# Patient Record
Sex: MALE | Race: WHITE | NOT HISPANIC OR LATINO | Employment: UNEMPLOYED | URBAN - METROPOLITAN AREA
[De-identification: names, ages, dates, MRNs, and addresses within clinical notes are randomized per-mention and may not be internally consistent; named-entity substitution may affect disease eponyms.]

---

## 2019-08-19 ENCOUNTER — TRANSFERRED RECORDS (OUTPATIENT)
Dept: HEALTH INFORMATION MANAGEMENT | Facility: CLINIC | Age: 29
End: 2019-08-19

## 2019-12-20 ENCOUNTER — TRANSFERRED RECORDS (OUTPATIENT)
Dept: HEALTH INFORMATION MANAGEMENT | Facility: CLINIC | Age: 29
End: 2019-12-20

## 2020-12-15 ENCOUNTER — HOSPITAL ENCOUNTER (EMERGENCY)
Facility: CLINIC | Age: 30
Discharge: HOME | End: 2020-12-15
Attending: EMERGENCY MEDICINE | Admitting: EMERGENCY MEDICINE
Payer: COMMERCIAL

## 2020-12-15 VITALS
SYSTOLIC BLOOD PRESSURE: 138 MMHG | RESPIRATION RATE: 18 BRPM | TEMPERATURE: 96.4 F | WEIGHT: 220 LBS | OXYGEN SATURATION: 100 % | DIASTOLIC BLOOD PRESSURE: 86 MMHG | HEART RATE: 99 BPM

## 2020-12-15 DIAGNOSIS — Z76.0 ENCOUNTER FOR MEDICATION REFILL: ICD-10-CM

## 2020-12-15 LAB
AMPHETAMINES UR QL SCN: NEGATIVE
BARBITURATES UR QL: NEGATIVE
BENZODIAZ UR QL: NEGATIVE
CANNABINOIDS UR QL SCN: NEGATIVE
COCAINE UR QL: NEGATIVE
ETHANOL UR QL SCN: NEGATIVE
OPIATES UR QL SCN: NEGATIVE

## 2020-12-15 PROCEDURE — 80307 DRUG TEST PRSMV CHEM ANLYZR: CPT | Performed by: EMERGENCY MEDICINE

## 2020-12-15 PROCEDURE — 80320 DRUG SCREEN QUANTALCOHOLS: CPT | Performed by: EMERGENCY MEDICINE

## 2020-12-15 PROCEDURE — 99283 EMERGENCY DEPT VISIT LOW MDM: CPT | Performed by: EMERGENCY MEDICINE

## 2020-12-15 PROCEDURE — 99282 EMERGENCY DEPT VISIT SF MDM: CPT | Performed by: EMERGENCY MEDICINE

## 2020-12-15 RX ORDER — BUPROPION HYDROCHLORIDE 150 MG/1
150 TABLET ORAL EVERY MORNING
COMMUNITY
End: 2021-08-12

## 2020-12-15 NOTE — ED AVS SNAPSHOT
[facility] Emergency Department  [address]  [place] [address]  Phone: [phone]  Fax: [phone]                                    Xavier Landon   MRN: [id number]    Department: [facility] Emergency Department   Date of Visit: 12/15/2020           After Visit Summary Signature Page    I have received my discharge instructions, and my questions have been answered. I have discussed any challenges I see with this plan with the nurse or doctor.    ..........................................................................................................................................  Patient/Patient Representative Signature      ..........................................................................................................................................  Patient Representative Print Name and Relationship to Patient    ..................................................               ................................................  Date                                   Time    ..........................................................................................................................................  Reviewed by Signature/Title    ...................................................              ..............................................  Date                                               Time          22EPIC Rev 08/18

## 2020-12-15 NOTE — ED PROVIDER NOTES
ED Provider Note  [facility]      History     Chief Complaint   Patient presents with     Medication Refill     Says he is here for a refill for his adderall. Says his psychiatrist cancelled his appointment.  His therapist told him to come here.     Suicidal     did not check in for this, but answered yes to a couple of the screening questions.  Suicidal thoughts, no plan.     HPI  Xavier Landon is a 30 year old male with a past medical history significant for depression, ADHD and tobacco use who presents to the ED for a medication refill.  He says he has an intake appointment with [facility] this week for an IOP intake appointment.  His psychiatrist fired him for erratic behavior so he needs his adderall refilled.  His therapist told him to go to the ER for a refill.  He does not want a therapist evaluation because he already has the IOP intake appointment.  Denies safety concerns.      Past Medical History  Past Medical History:   Diagnosis Date     ADHD (attention deficit hyperactivity disorder)      Past Surgical History:   Procedure Laterality Date     GENITOURINARY SURGERY            Amphetamine-Dextroamphetamine (ADDERALL XR PO)       buPROPion (WELLBUTRIN XL) 150 MG 24 hr tablet       Varenicline Tartrate (CHANTIX PO)       FLUoxetine HCl (PROZAC PO)      No Known Allergies  Past medical history, past surgical history, medications, and allergies were reviewed with the patient. Additional pertinent items: Depression, ADHD and tobacco use    Family History  No family history on file.  Family history was reviewed with the patient. Additional pertinent items: None    Social History  Social History     Tobacco Use     Smoking status: Former Smoker   Substance Use Topics     Alcohol use: Not on file     Drug use: Not on file      Social history was reviewed with the patient. Additional pertinent items: None      Review of Systems  A complete review of systems was performed  "with pertinent positives and negatives noted in the HPI, and all other systems negative.    Physical Exam   BP: 138/86  Pulse: 99  Temp: 96.4  F (35.8  C)  Resp: 18  Weight: 99.8 kg (220 lb)  SpO2: 100 %  Physical Exam  When the patient found out we do not refill adderall he became angry and said he wanted to leave and did not want any further assistance.  He said \"well this is a f#*& ing waste\".    ED Course      Procedures         No results found for any visits on 12/15/20.  Medications - No data to display     Assessments & Plan (with Medical Decision Making)   The patient presented for adderall refill.  When he found out that we do not refill this in the ED he became angry and wanted to leave immediately without DEC assessment or physician exam.  He did say his prior psychiatrist fired him due to his erratic behavior.      I have reviewed the nursing notes. I have reviewed the findings, diagnosis, plan and need for follow up with the patient.    New Prescriptions    No medications on file       Final diagnoses:   Encounter for medication refill       --    [facility] EMERGENCY DEPARTMENT  12/15/2020     Mellisa Batista MD  12/15/20 [id number]    "

## 2020-12-22 ENCOUNTER — TELEPHONE (OUTPATIENT)
Dept: BEHAVIORAL HEALTH | Facility: CLINIC | Age: 30
End: 2020-12-22

## 2020-12-22 ENCOUNTER — HOSPITAL ENCOUNTER (OUTPATIENT)
Dept: BEHAVIORAL HEALTH | Facility: CLINIC | Age: 30
Discharge: HOME | End: 2020-12-22
Attending: PSYCHIATRY & NEUROLOGY | Admitting: PSYCHIATRY & NEUROLOGY
Payer: COMMERCIAL

## 2020-12-22 ENCOUNTER — BEH TREATMENT PLAN (OUTPATIENT)
Dept: BEHAVIORAL HEALTH | Facility: CLINIC | Age: 30
End: 2020-12-22
Attending: PSYCHIATRY & NEUROLOGY

## 2020-12-22 DIAGNOSIS — F33.2 MAJOR DEPRESSIVE DISORDER, RECURRENT EPISODE, SEVERE WITH ANXIOUS DISTRESS (H): ICD-10-CM

## 2020-12-22 DIAGNOSIS — F33.2 MDD (MAJOR DEPRESSIVE DISORDER), RECURRENT SEVERE, WITHOUT PSYCHOSIS (H): ICD-10-CM

## 2020-12-22 PROCEDURE — 90791 PSYCH DIAGNOSTIC EVALUATION: CPT | Mod: TEL | Performed by: PSYCHOLOGIST

## 2020-12-22 RX ORDER — DEXTROAMPHETAMINE SACCHARATE, AMPHETAMINE ASPARTATE, DEXTROAMPHETAMINE SULFATE AND AMPHETAMINE SULFATE 5; 5; 5; 5 MG/1; MG/1; MG/1; MG/1
20 TABLET ORAL 2 TIMES DAILY
COMMUNITY
End: 2021-09-14

## 2020-12-22 ASSESSMENT — COLUMBIA-SUICIDE SEVERITY RATING SCALE - C-SSRS
4. HAVE YOU HAD THESE THOUGHTS AND HAD SOME INTENTION OF ACTING ON THEM?: NO
ATTEMPT LIFETIME: NO
5. HAVE YOU STARTED TO WORK OUT OR WORKED OUT THE DETAILS OF HOW TO KILL YOURSELF? DO YOU INTEND TO CARRY OUT THIS PLAN?: NO
6. HAVE YOU EVER DONE ANYTHING, STARTED TO DO ANYTHING, OR PREPARED TO DO ANYTHING TO END YOUR LIFE?: NO
TOTAL  NUMBER OF INTERRUPTED ATTEMPTS LIFETIME: NO
6. HAVE YOU EVER DONE ANYTHING, STARTED TO DO ANYTHING, OR PREPARED TO DO ANYTHING TO END YOUR LIFE?: NO
TOTAL  NUMBER OF INTERRUPTED ATTEMPTS PAST 3 MONTHS: NO
2. HAVE YOU ACTUALLY HAD ANY THOUGHTS OF KILLING YOURSELF LIFETIME?: YES
2. HAVE YOU ACTUALLY HAD ANY THOUGHTS OF KILLING YOURSELF?: YES
TOTAL  NUMBER OF ABORTED OR SELF INTERRUPTED ATTEMPTS PAST LIFETIME: NO
5. HAVE YOU STARTED TO WORK OUT OR WORKED OUT THE DETAILS OF HOW TO KILL YOURSELF? DO YOU INTEND TO CARRY OUT THIS PLAN?: NO
4. HAVE YOU HAD THESE THOUGHTS AND HAD SOME INTENTION OF ACTING ON THEM?: NO
3. HAVE YOU BEEN THINKING ABOUT HOW YOU MIGHT KILL YOURSELF?: NO
REASONS FOR IDEATION LIFETIME: MOSTLY TO END OR STOP THE PAIN (YOU COULDN'T GO ON LIVING WITH THE PAIN OR HOW YOU WERE FEELING)
ATTEMPT PAST THREE MONTHS: NO
TOTAL  NUMBER OF ABORTED OR SELF INTERRUPTED ATTEMPTS PAST 3 MONTHS: NO
REASONS FOR IDEATION PAST MONTH: MOSTLY TO END OR STOP THE PAIN (YOU COULDN'T GO ON LIVING WITH THE PAIN OR HOW YOU WERE FEELING)
1. IN THE PAST MONTH, HAVE YOU WISHED YOU WERE DEAD OR WISHED YOU COULD GO TO SLEEP AND NOT WAKE UP?: YES
1. IN THE PAST MONTH, HAVE YOU WISHED YOU WERE DEAD OR WISHED YOU COULD GO TO SLEEP AND NOT WAKE UP?: YES

## 2020-12-22 ASSESSMENT — ANXIETY QUESTIONNAIRES
7. FEELING AFRAID AS IF SOMETHING AWFUL MIGHT HAPPEN: MORE THAN HALF THE DAYS
IF YOU CHECKED OFF ANY PROBLEMS ON THIS QUESTIONNAIRE, HOW DIFFICULT HAVE THESE PROBLEMS MADE IT FOR YOU TO DO YOUR WORK, TAKE CARE OF THINGS AT HOME, OR GET ALONG WITH OTHER PEOPLE: VERY DIFFICULT
GAD7 TOTAL SCORE: 13
3. WORRYING TOO MUCH ABOUT DIFFERENT THINGS: NEARLY EVERY DAY
2. NOT BEING ABLE TO STOP OR CONTROL WORRYING: MORE THAN HALF THE DAYS
6. BECOMING EASILY ANNOYED OR IRRITABLE: MORE THAN HALF THE DAYS
5. BEING SO RESTLESS THAT IT IS HARD TO SIT STILL: NOT AT ALL
1. FEELING NERVOUS, ANXIOUS, OR ON EDGE: MORE THAN HALF THE DAYS

## 2020-12-22 ASSESSMENT — PATIENT HEALTH QUESTIONNAIRE - PHQ9
SUM OF ALL RESPONSES TO PHQ QUESTIONS 1-9: 24
5. POOR APPETITE OR OVEREATING: MORE THAN HALF THE DAYS

## 2020-12-22 NOTE — TELEPHONE ENCOUNTER
"RN Review of Medical History / Physical Health Screen  Outpatient Mental Health Programs - [facility] Adult Partial Hospitalization Program    PATIENT'S NAME: Xavier Landon  MRN:   [id number]  :   1990  ACCT. NUMBER: [id number]  CURRENT AGE:  30 year old    DATE OF DIAGNOSTIC ASSESSMENT: 20  DATE OF ADMISSION: 20     Please see Diagnostic Assessment for additional Medical History.     General Health:   Have you had any exposure to any communicable disease in the past 2-3 weeks? no     Are you aware of safe sex practices? yes       Nutrition:    Are you on a special diet? If yes, please explain:  no   Do you have any concerns regarding your nutritional status? If yes, please explain:  no   Have you had any appetite changes in the last 3 months?  No     Have you had any weight loss or weight gain in the last 3 months?  No     Do you have a history of an eating disorder? no   Do you have a history of being in an eating disorder program? no     No; Unable to measure  Because of temporary in-person programmatic suspension due to COVID-19 pandemic, all pt weights and heights will be collected through patient self-report an recorded in physical health screening progress note upon admission to the program.                            Height/Weight Review:  Patient reported height:     6'3\"   Patient reports weight:  Date last checked:  215lb   Any referrals/needs identified?     no       BMI Review:  Was the patient informed of BMI? no      Findings  No Intervention         Fall Risk:   Have you had any falls in the past 3 months? no     Do you currently use any assistive devices for mobility?   no      Additional Comments/Assessment: denies outstanding medical issues at this time    Per completion of the Medical History / Physical Health Screen, is there a recommendation to see / follow up with a primary care physician/clinic or dentist?    No.      Verna Gamble " RN  12/22/2020

## 2020-12-22 NOTE — PROGRESS NOTES
"Outpatient Mental Health Services - Adult     MY COPING PLAN FOR SAFETY     PATIENT'S NAME:    Xavier Landon  MRN:                           [id number]     SAFETY PLAN:     Step 1: Warning signs / cues (Thoughts, images, mood, situation, behavior) that a crisis may be developing:     ? Thoughts: \"I can't do this anymore\"  ? Images: none identified  ? Thinking Processes: ruminations (can't stop thinking about my problems): feeling overwhelmed and like a failure  ? Mood: worsening depression, hopelessness and helplessness  ? Behaviors: using drugs, using alcohol, not taking care of myself and not taking care of my responsibilities  ? Situations: right feels like nothing is going right   ?    Step 2: Coping strategies - Things I can do to take my mind off of my problems without contacting another person (relaxation technique, physical activity):     ? Distress Tolerance Strategies:  \"nothing sticks\" said he does not have the discipline to stick to anything  ? Physical Activities: exercise has helped in the past  ? Focus on helpful thoughts:  remind myself of what is important to me: family and friends     Step 3: People and social settings that provide distraction:                 Name: friends                No social outings due to COVID     Step 4: Remind myself of people and things that are important to me and worth living for:  Parents, friends     Step 5: When I am in crisis, I can ask these people to help me use my safety plan:                 Name: lauri Roberts     Step 6: Making the environment safe:      ? remove alcohol and be around others     Step 7: Professionals or agencies I can contact during a crisis:     ? Suicide Prevention Lifeline: 9-305-408-TALK (1609)  ? Crisis Text Line Service (available 24 hours a day, 7 days a week): Text [place] to 368025  ? Call  **CRISIS (842891) from a cell phone to talk to a team of professionals who can help you.          Crisis Services By [facility]: Phone Number: "   [place]     [phone]   [place]    [phone]   [place]    [phone]   [place]    [phone]   [place]    [phone]   [place] [phone]   [place]     [phone]      ? Call 911 or go to my nearest emergency department.             I helped develop this safety plan and agree to use it when needed.  I have been given a copy of this plan.          Today s date:  12/22/2020  Adapted from Safety Plan Template 2008 Val Yu and Timo Coffey is reprinted with the express permission of the authors.  No portion of the Safety Plan Template may be reproduced without the express, written permission.  You can contact the authors at [email or web address] or [email or web address]

## 2020-12-22 NOTE — PROGRESS NOTES
Admission Date: 12/22/2020    Identify any current concerns with potential impact to admission:     medication/medical concerns: no     immediate safety concerns: no    Does patient have safety plan? yes  Note: Please copy safety plan copied into [facility] Encounter     Other (insurance/childcare/transportation/housing/planned absences/etc): work at 3 pm schedule. Has work off at this time, and may need assist with FMLA/leave paperwork.    Patient's insurance is: [employer]    Does patient need appointment with provider? Pt will see Dr Hardy/Juan Jose    Review patient's program schedule and inform them of any variation due to late days or holidays.                                                                     Completed by: GORDO Gamble RNC          HPI      ROS      Physical Exam

## 2020-12-22 NOTE — PROGRESS NOTES
"[facility]  Provider Name:  Dr. Cristina Larsen [facility] [phone]        PATIENT'S NAME: Xavier Landon  PREFERRED NAME: Huey  PRONOUNS: he/him/his     MRN: [id number]  : 1990   ACCT. NUMBER:  [id number]  DATE OF SERVICE: 20  START TIME: 815  END TIME: 955  PREFERRED PHONE: [phone]  May we leave a program related message: Yes  SERVICE MODALITY:  Phone Visit:      Provider verified identity through the following two step process.  Patient provided:  Patient     The patient has been notified of the following:      \"We have found that certain health care needs can be provided without the need for a face to face visit.  This service lets us provide the care you need with a phone conversation.       I will have full access to your [facility] medical record during this entire phone call.   I will be taking notes for your medical record.      Since this is like an office visit, we will bill your insurance company for this service.       There are potential benefits and risks of telephone visits (e.g. limits to patient confidentiality) that differ from in-person visits.?  Confidentiality still applies for telephone services, and nobody will record the visit.  It is important to be in a quiet, private space that is free of distractions (including cell phone or other devices) during the visit.??      If during the course of the call I believe a telephone visit is not appropriate, you will not be charged for this service\"     Consent has been obtained for this service by care team member: Yes     UNIVERSAL ADULT Mental Health DIAGNOSTIC ASSESSMENT      Identifying Information:   Patient is a 30 year old, .  The pronoun use throughout this assessment reflects the patient's chosen pronoun.  Patient was referred for an assessment by current behavioral health provider Sommer Ghosh.  Patient attended the session alone.     Chief Complaint: " "  The reason for seeking services at this time is: \" having a rough time, feel at the end of my rope\"   The problem(s) began May.  He said he started seeing his therapist and she did recommend PHP at that time but it did not feel as bad.  He said his mood has worsened and he is not seeing the point of the suffering anymore.  He said his feelings of failure and grief do not seem to leave. He said he feels like a failure.  He said he has felt miserable for his entire adult life.  He said he needs to do something to change his life.He said it is \"hard to overstate how disappointed I am in my life\".  Patient has attempted to resolve these concerns in the past through individual therapy, psychiatry.    Social/Family History:  Patient reported they grew up in [place]. Moved to [place] at age 15 to [place] for father's work.  They were raised by biological parents.  Parents stayed .  Pt is an only child   Patient reported that their childhood was good, did not want for anything. [place], affluent family.  Patient described their current relationships with family of origin as david to have parents who care about him and support him.      The patient describes their cultural background as .  Cultural influences and impact on patient's life structure, values, norms, and healthcare: Racial or Ethnic Self-Identification white.  Contextual influences on patient's health include: Family Factors good family.    These factors will be addressed in the Preliminary Treatment plan.  Patient identified their preferred language to be English. Patient reported they does not need the assistance of an  or other support involved in therapy.     Patient reported had no significant delays in developmental tasks.   Patient's highest education level was some college. Patient identified the following learning problems: attention. Said he had a psych evaluation in October.   Modifications will not be used to " "assist communication in therapy.   Patient reports they are  able to understand written materials.    Patient reported the following relationship history never .  Patient's current relationship status is single .   Patient identified their sexual orientation as heterosexual.  Patient reported having zero child(katt). Patient identified siblings and friends as part of their support system.  Patient identified the quality of these relationships as stable and meaningful.      Patient's current living/housing situation involves staying with house.  They live with a few roommates and they report that housing is stable.     Patient is currently employed full time and reports they are not able to function appropriately at work.. 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  He said he is on the verge of tears every shift at work.  He said \"the place is devouring my soul\".  He said he does not believe he is valued or validated at this job.  He said it has him drowning that he does not feel like his job has meaning.  He said he has been there 2 years and he has medical benefits.  Patient reports their finances are obtained through employment.  Patient does identify finances as a current stressor.  He said he has been floating by dipping into his 401K    Patient reported that they have  been involved with the legal system.  DUI 9 years ago.  Said he just turned 21. He said he does not drink and drive as a result of the DUI.  He reports he had a \"bad break up\" a few years ago and his exgirlfriend took out a restraining order, but rescinded it.   Patient denies being on probation / parole / under the jurisdiction of the court.    Patient's Strengths and Limitations:  Patient identified the following strengths or resources that will help them succeed in treatment: motivation. Things that may interfere with the patient's success in treatment include: none identified.     Personal and Family Medical History:   Patient does " report a family history of mental health concerns.  Patient reports family history includes Anxiety Disorder in his mother; Depression in his father..     Patient does report Mental Health Diagnosis and/or Treatment.  Patient Patient reported the following previous diagnoses which include(s): ADHD and Depression.  Patient reported symptoms began adolescence.   Patient has received mental health services in the past: therapy and psychiatry.  Psychiatric Hospitalizations: None.  Patient denies a history of civil commitment.  Currently, patient is receiving other mental health services.  These include psychotherapy with Sommer Ghosh and psychiatry with new psychiatrist.  Next appointment: TBS. He reports he had a prescriber who would no longer see him because he missed too many appointments.  He said he was given a referral for a video appt for a prescriber in [place] but has not seen this person yet          Patient has had a physical exam to rule out medical causes for current symptoms.  Date of last physical exam was greater than a year ago and client was encouraged to schedule an exam with PCP. The patient does not have a Primary Care Provider and was encouraged to establish care with a PCP..  Patient reports the following current medical concerns: impotence.  Patient denies any issues with pain..   There are not significant appetite / nutritional concerns / weight changes.   Patient does not report a history of head injury / trauma / cognitive impairment.      Patient reports current meds as:   Outpatient Medications Marked as Taking for the 12/22/20 encounter (Hospital Encounter) with [facility]   Medication Sig     Amphetamine-Dextroamphetamine (ADDERALL XR PO) Take 60 mg by mouth daily.     amphetamine-dextroamphetamine (ADDERALL) 20 MG tablet Take 20 mg by mouth 2 times daily     Varenicline Tartrate (CHANTIX PO) Take by mouth 2 times daily     He reports he was just prescribed a new medication but  could not remember the name and he has not picked up the medication as of this appointment.        Medication Adherence:  Patient reports there have been some months he has run out of his adderall a few days prior to his prescription running out.  He said he will take an extra one if he cannot maintain his attention during work. .   Patient Allergies:  No Known Allergies    Medical History:    Past Medical History:   Diagnosis Date     ADHD (attention deficit hyperactivity disorder)          Current Mental Status Exam:   Appearance:   Unable to assess due to telephone assessment  Eye Contact:   Unable to assess due to telephone assessment  Psychomotor:   Unable to assess due to telephone assessment      Gait / station:  Unable to assess due to telephone assessment  Attitude / Demeanor: Cooperative   Speech      Rate / Production: Hyperverbal       Volume:  Normal  volume      Language:  no problems  Mood:   Anxious  Depressed   Affect:   Tearful   Thought Content: Rumination   Thought Process: Flight of Ideas       Associations: No loosening of associations  Insight:   Poor   Judgment:  Impaired   Orientation:  All  Attention/concentration: Fair    Rating Scales:    PHQ9:    PHQ-9 SCORE 12/22/2020   PHQ-9 Total Score 24   ;    GAD7:    SHAUNA-7 SCORE 12/22/2020   Total Score 13     CGI:     First:Considering your total clinical experience with this particular patient population, how severe are the patient's symptoms at this time?: 5 (12/22/2020  8:25 AM)  ;    Most recentCompared to the patient's condition at the START of treatment, this patient's condition is: 4 (12/22/2020  8:25 AM)      Substance Use:  Patient did report a family history of substance use concerns; see medical history section for details.  Patient has not received chemical dependency treatment in the past.  Patient has not ever been to detox.      Patient is not currently receiving any chemical dependency treatment. Patient reported the following  "problems as a result of their substance use: sexual issues.    Patient reports he drinks 4-5 drinks per night a few nights per week.  He said he does know that he should not be drinking. He said his roommates reported concern for him and told him he cannot drink while in their home.  He said he has not drank for the past week.  He is willing to abstain from alcohol use while in the program. He said he has been using to cope and knows it is not a solution to his problem.  Patient denies using tobacco.  Patient reports he smokes marijuana 1-2 per week.  Patient denies using caffeine.  Patient reports using/abusing the following substance(s). Patient reports he has used cocaine.  He said if it offered to him, he will take it.  He said the last time was in the summer. He said it is rare and he does not buy it.    CAGE- AID:    1. Yes  2. Yes  3. No  4. No  Substance Use: hangovers and daily use    Based on the positive CAGE score and clinical interview there are indications of substance use disorder.  Pt agrees to no use while in the program.  He reports his roommate has asked him to refrain from use while in the apartment and he agreed.  He said he is not going out due to COVID.  He believes if his meds worked he would have more symptom relief.      Significant Losses / Trauma / Abuse / Neglect Issues:   Patient did not serve in the .  There are indications or report of significant loss, trauma, abuse or neglect issues related to: are no indications and client denies any losses, trauma, abuse, or neglect concerns.  Concerns for possible neglect are not present.     Safety Assessment: no suicide attempts, no concrete plans, \"is it worth it to keep going\" has never had thoughts about how to kill himself.  He said he has thoughts a few days per week.  He said his thoughts are not concerning because it is just a manifestation of self-loathing.  He said he does not see himself trying to hurt himself.  He said he " does not consider the thoughts past having.  He said the thoughts are fleeting.    Current Safety Concerns:  [place] Suicide Severity Rating Scale (Lifetime/Recent)  [place] Suicide Severity Rating (Lifetime/Recent) 12/22/2020   1. Wish to be Dead (Lifetime) Yes   Wish to be Dead Description (Lifetime) (No Data)   Comments feeling overwhelmed wondering if life is worth it   1. Wish to be Dead (Recent) Yes   Wish to be Dead Description (Recent) (No Data)   Comments overwhelmed wondering if his life is worth it   2. Non-Specific Active Suicidal Thoughts (Lifetime) Yes   Non-Specific Active Suicidal Thought Description (Lifetime) (No Data)   Comments just does not want to feel so bad any longer   2. Non-Specific Active Suicidal Thoughts (Recent) Yes   Non-Specific Active Suicidal Thought Description (Recent) (No Data)   Comments has fleeting thoughts of being dead daily   3. Active Suicidal Ideation with any Methods (Not Plan) Without Intent to Act (Lifetime) No   3. Active Sucidal Ideation with any Methods (Not Plan) Without Intent to Act (Recent) No   4. Active Suicidal Ideation with Some Intent to Act, Without Specific Plan (Lifetime) No   4. Active Suicidal Ideation with Some Intent to Act, Without Specific Plan (Recent) No   5. Active Suicidal Ideation with Specific Plan and Intent (Lifetime) No   5. Active Suicidal Ideation with Specific Plan and Intent (Recent) No   Most Severe Ideation Rating (Lifetime) 5   Frequency (Lifetime) 4   Duration (Lifetime) 1   Controllability (Lifetime) 2   Protective Factors  (Lifetime) 1   Reasons for Ideation (Lifetime) 4   Most Severe Ideation Rating (Past Month) 5   Frequency (Past Month) 4   Duration (Past Month) 1   Controllability (Past Month) 2   Protective Factors (Past Month) 1   Reasons for Ideation (Past Month) 4   Actual Attempt (Lifetime) No   Actual Attempt (Past 3 Months) No   Has subject engaged in non-suicidal self-injurious behavior? (Lifetime) No   Has subject  "engaged in non-suicidal self-injurious behavior? (Past 3 Months) No   Interrupted Attempts (Lifetime) No   Interrupted Attempts (Past 3 Months) No   Aborted or Self-Interrupted Attempt (Lifetime) No   Aborted or Self-Interrupted Attempt (Past 3 Months) No   Preparatory Acts or Behavior (Lifetime) No   Preparatory Acts or Behavior (Past 3 Months) No     Patient denies current homicidal ideation and behaviors.  Patient denies current self-injurious ideation and behaviors.    Patient denied risk behaviors associated with substance use.  Patient denies any high risk behaviors associated with mental health symptoms.  Patient reports the following current concerns for their personal safety: None.  Patient reports there are not  firearms in the house. .     History of Safety Concerns:  Patient denied a history of homicidal ideation.     Patient denied a history of personal safety concerns.    Patient denied a history of assaultive behaviors.    Patient denied a history of sexual assault behaviors.     Patient denied a history of risk behaviors associated with substance use.  Patient reported a history of impulsive/compulsive spending behaviors associated with mental health symptoms.  Patient reports the following protective factors: \"has to believe there is something better than this,  still has some hope\", obligations to family, friends    Risk Plan:  See Recommendations for Safety and Risk Management Plan    Review of Symptoms per patient report:  Depression: Change in sleep, Excessive or inappropriate guilt, Change in energy level, Difficulties concentrating, Psychomotor slowing or agitation, Suicidal ideation, Feelings of hopelessness, Feelings of helplessness, Low self-worth, Ruminations, Irritability, Feeling sad, down, or depressed and Frequent crying  Kathy:  No Symptoms  Psychosis: No Symptoms  Anxiety: Excessive worry, Nervousness, Physical complaints, such as headaches, stomachaches, muscle tension, Ruminations, " Poor concentration and Irritability  Panic:  No symptoms  Post Traumatic Stress Disorder:  No Symptoms   Eating Disorder: No Symptoms  ADD / ADHD:  Inattentive, Difficulties listening, Poor task completion, Poor organizational skills, Distractibility and Impulsive  Conduct Disorder: No symptoms  Autism Spectrum Disorder: No symptoms  Obsessive Compulsive Disorder: No Symptoms    Patient reports the following compulsive behaviors and treatment history: Pornography - has not had treatment..      Diagnostic Criteria:   A. Excessive anxiety and worry about a number of events or activities (such as work or school performance).   B. The person finds it difficult to control the worry.   - Restlessness or feeling keyed up or on edge.    - Difficulty concentrating or mind going blank.    - Irritability.    - Muscle tension.    - Sleep disturbance (difficulty falling or staying asleep, or restless unsatisfying sleep).   D. The focus of the anxiety and worry is not confined to features of an Axis I disorder.  E. The anxiety, worry, or physical symptoms cause clinically significant distress or impairment in social, occupational, or other important areas of functioning.   F. The disturbance is not due to the direct physiological effects of a substance (e.g., a drug of abuse, a medication) or a general medical condition (e.g., hyperthyroidism) and does not occur exclusively during a Mood Disorder, a Psychotic Disorder, or a Pervasive Developmental Disorder.  A) Recurrent episode(s) - symptoms have been present during the same 2-week period and represent a change from previous functioning 5 or more symptoms (required for diagnosis)   - Depressed mood. Note: In children and adolescents, can be irritable mood.     - Diminished interest or pleasure in all, or almost all, activities.    - Psychomotor activity agitation.    - Fatigue or loss of energy.    - Feelings of worthlessness or excessive guilt.    - Diminished ability to think  or concentrate, or indecisiveness.    - Recurrent thoughts of death (not just fear of dying), recurrent suicidal ideation without a specific plan, or a suicide attempt or a specific plan for committing suicide.   B) The symptoms cause clinically significant distress or impairment in social, occupational, or other important areas of functioning  C) The episode is not attributable to the physiological effects of a substance or to another medical condition  D) The occurence of major depressive episode is not better explained by other thought / psychotic disorders  E) There has never been a manic episode or hypomanic episode    Functional Status:  Patient reports the following functional impairments: chronic disease management, management of the household and or completion of tasks, organization and self-care.     WHODAS:   WHODAS 2.0 Total Score 2020   Total Score 26       LOCUS Worksheet     Name: Xavier Landon MRN: [id number]    : 1990      Gender:  male    PMI:  [employer]   Provider Name: [facility] Provider NPI:  [id number]    Actual level of Care Provided:  Outpatient therapy and psychiatry    Service(s) receiving or referred to:  Partial Hospitalization    Reason for Variance: Increase in symptom distress including suicidal thoughts      Rating completed by: Dr. Cristina Larsen [facility]       I. Risk of Harm:   2      Low Risk of Harm    II. Functional Status:   4      Serious Impairment    III. Co-Morbidity:   3      Significant Co-Morbidity    IV - A. Recovery Environment - Level of Stress:   3      Moderately Stress Environment    IV - B. Recovery Environment - Level of Support:   3      Limited Support in Environment    V. Treatment and Recovery History:   4      Poor Response to Treatment and Recovery Management    VI. Engagement and Recovery Project:   3      Limited Engagement and Recovery       22 Composite Score    Level of Care Recommendation:   20 to 22       Medically Monitored  "Non-Residential Services                  Outpatient Mental Health Services - Adult    MY COPING PLAN FOR SAFETY    PATIENT'S NAME: Xavier Landon  MRN:   [id number]    SAFETY PLAN:    Step 1: Warning signs / cues (Thoughts, images, mood, situation, behavior) that a crisis may be developing:      Thoughts: \"I can't do this anymore\"    Images: none identified    Thinking Processes: ruminations (can't stop thinking about my problems): feeling overwhelmed and like a failure    Mood: worsening depression, hopelessness and helplessness    Behaviors: using drugs, using alcohol, not taking care of myself and not taking care of my responsibilities    Situations: right feels like nothing is going right       Step 2: Coping strategies - Things I can do to take my mind off of my problems without contacting another person (relaxation technique, physical activity):      Distress Tolerance Strategies:  \"nothing sticks\" said he does not have the discipline to stick to anything    Physical Activities: exercise has helped in the past    Focus on helpful thoughts:  remind myself of what is important to me: family and friends    Step 3: People and social settings that provide distraction:     Name: friends    No social outings due to COVID    Step 4: Remind myself of people and things that are important to me and worth living for:  Parents, friends    Step 5: When I am in crisis, I can ask these people to help me use my safety plan:     Name: lauri Roberts    Step 6: Making the environment safe:       remove alcohol and be around others    Step 7: Professionals or agencies I can contact during a crisis:      Suicide Prevention Lifeline: 0-472-800-TALK (8965)    Crisis Text Line Service (available 24 hours a day, 7 days a week): Text [place] to 881421    Call  **CRISIS (307909) from a cell phone to talk to a team of professionals who can help you.    Crisis Services By [facility]: Phone Number:   [place]     [phone]   [place]    " [phone]   [place]    [phone]   [place]    [phone]   [place]    [phone]   [place] [phone]   [place]     [phone]       Call 911 or go to my nearest emergency department.     I helped develop this safety plan and agree to use it when needed.  I have been given a copy of this plan.        Today s date:  12/22/2020  Adapted from Safety Plan Template 2008 Val Yu and Timo Coffey is reprinted with the express permission of the authors.  No portion of the Safety Plan Template may be reproduced without the express, written permission.  You can contact the authors at [email or web address] or [email or web address]    Programmatic care:  Current LOCUS was assessed and patient needs the following level of care based on score 22  .    Clinical Summary:  1. Reason for assessment: Pt referred for increased structure and mood stabilization  .  2. Psychosocial, Cultural and Contextual Factors: Pt reports he is on leave from work.  Stressed due to symptoms  .  3. Principal DSM5 Diagnoses  (Sustained by DSM5 Criteria Listed Above):   296.33 (F33.2) Major Depressive Disorder, Recurrent Episode, Severe _ and With anxious distress  300.02 (F41.1) Generalized Anxiety Disorder.  4. Other Diagnoses that is relevant to services:   Attention-Deficit/Hyperactivity Disorder  314.01 (F90.9) Unspecified Attention -Deficit / Hyperactivity Disorder.  5. Provisional Diagnosis: R/O Substance-Related & Addictive Disorders Alcohol Use Disorder   303.90 (F10.20) Moderate , as evidenced by recent increase in alcohol use, drinking more than intended, causing problems with roomates .  Should he be unable to control use, or other symptoms emerge during the course of treatment, the disorder may become the focus of assessment at that time.  6. Prognosis: Relieve Acute Symptoms.    7. Likely consequences of symptoms if not treated: Without treatment patient more than likely will experience a continuation of  symptoms with decreased daily functioning, requiring an increased level of care.  8. Client strengths include: articulate  Recommendations:     1. Plan for Safety and Risk Management:   A safety and risk management plan has been developed including: Patient consented to co-developed safety plan.  Safety and risk management plan was completed.  Patient agreed to use safety plan should any safety concerns arise.  A copy was given to the patient..   Report to child / adult protection services was NA.     2. Patient identified no cultural or spiritual concerns for treatment services. Patient encouraged to ask for help should needs arise in the course of treatment.      3. Initial Treatment will focus on:    stabilize mood, increase skills to manage negative thinking.     4. Resources/Service Plan:    services are not indicated.   Modifications to assist communication are not indicated.   Additional disability accommodations are not indicated.      5. Collaboration:   Collaboration / coordination of treatment will be initiated with the following  support professionals: outpatient therapist.      6.  Referrals:   The following referral(s) will be initiated: Partial Hospitalization Program. Next Scheduled Appointment: 12/23/20.     A Release of Information has been obtained for the following: emergency contact.    7. VIVEK:    VIVEK:  Discussed the general effects of drugs and alcohol on health and well-being. Recommendations:  Abstain from chemical use while in the program .     8. Records:   These were not available for review at time of assessment.   Information in this assessment was obtained from the medical record and  provided by patient who is a limited historian.    Patient will have open access to their mental health medical record.      Provider Name/ Credentials:  Dr. Crsitina Larsen PSYD, LP   December 22, 2020

## 2020-12-23 ENCOUNTER — HOSPITAL ENCOUNTER (OUTPATIENT)
Dept: BEHAVIORAL HEALTH | Facility: CLINIC | Age: 30
End: 2020-12-23
Attending: PSYCHIATRY & NEUROLOGY
Payer: COMMERCIAL

## 2020-12-23 DIAGNOSIS — F39 EPISODIC MOOD DISORDER (H): Primary | ICD-10-CM

## 2020-12-23 PROBLEM — F33.2 MDD (MAJOR DEPRESSIVE DISORDER), RECURRENT SEVERE, WITHOUT PSYCHOSIS (H): Status: ACTIVE | Noted: 2020-12-23

## 2020-12-23 PROCEDURE — H0035 MH PARTIAL HOSP TX UNDER 24H: HCPCS | Mod: GT | Performed by: OCCUPATIONAL THERAPIST

## 2020-12-23 PROCEDURE — H0035 MH PARTIAL HOSP TX UNDER 24H: HCPCS | Mod: GT | Performed by: COUNSELOR

## 2020-12-23 RX ORDER — LAMOTRIGINE 25 MG/1
25 TABLET ORAL DAILY
Qty: 50 TABLET | Refills: 0 | Status: SHIPPED | OUTPATIENT
Start: 2020-12-23 | End: 2021-09-14

## 2020-12-23 ASSESSMENT — ANXIETY QUESTIONNAIRES: GAD7 TOTAL SCORE: 13

## 2020-12-23 NOTE — GROUP NOTE
Process Group Note    PATIENT'S NAME: Xavier Landon  MRN:   [id number]  :   1990  ACCT. NUMBER: [id number]  DATE OF SERVICE: 20  START TIME: 10:00 AM  END TIME: 10:50 AM  FACILITATOR: Izzy Arriaza [facility]  TOPIC:  Process Group    Diagnoses:  296.33 (F33.2) Major Depressive Disorder, Recurrent Episode, Severe _ and With anxious distress  300.02 (F41.1) Generalized Anxiety Disorder.  4. Other Diagnoses that is relevant to services:   Attention-Deficit/Hyperactivity Disorder  314.01 (F90.9) Unspecified Attention -Deficit / Hyperactivity Disorder.  5. Provisional Diagnosis: R/O Substance-Related & Addictive Disorders Alcohol Use Disorder   303.90 (F10.20) Moderate , as evidenced by recent increase in alcohol use, drinking more than intended, causing problems with roomates .  Should he be unable to control use, or other symptoms emerge during the course of treatment, the disorder may become the focus of assessment at that time.      [facility] Adult Partial Hospitalization Program  TRACK: PHP    NUMBER OF PARTICIPANTS: 7    Telemedicine Visit: The patient's condition can be safely assessed and treated via synchronous audio and visual telemedicine encounter.      Reason for Telemedicine Visit: Services only offered telehealth and due to COVID-19    Originating Site (Patient Location): Patient's home    Distant Site (Provider Location): Provider Remote Setting    Consent:  The patient/guardian has verbally consented to: the potential risks and benefits of telemedicine (video visit) versus in person care; bill my insurance or make self-payment for services provided; and responsibility for payment of non-covered services.     Mode of Communication:  Video Conference via [employer]    As the provider I attest to compliance with applicable laws and regulations related to telemedicine.            Data:    Session content: At the start of this group, patients were invited to check in by  identifying themselves, describing their current emotional status, and identifying issues to address in this group.   Area(s) of treatment focus addressed in this session included Symptom Management, Personal Safety and Community Resources/Discharge Planning.    Patient reported feeling embarrassed about late. Patient discussed working toward finishing D1G shopping. Patient identified shopping as skills they will use to address their goal(s). Patient reported getting distracted may be a barrier to working toward their goal(s) and/or addressing mental health symptoms. Patient reported no safety concerns and/or self-injurious behaviors. Patient reported no substance use. Patient reported they are taking their medications as prescribed. Patient reported feeling proud that they showed up for the treatment group. Patient discussed starting programming with the treatment group.     Therapeutic Interventions/Treatment Strategies:  Psychotherapist offered support, feedback and validation and reinforced use of skills. Treatment modalities used include Motivational Interviewing and Cognitive Behavioral Therapy. Interventions include Coping Skills: Assisted patient in identifying 1-2 healthy distraction skills to reduce overall distress, Symptoms Management: Promoted understanding of their diagnoses and how it impacts their functioning and Emotions Management:  Discussed barriers to emotional regulation.    Assessment:    Patient response:   Patient responded to session by accepting feedback, giving feedback, listening, focusing on goals, being attentive and accepting support    Possible barriers to participation / learning include: and no barriers identified    Health Issues:   None reported       Substance Use Review:   Substance Use: No active concerns identified.    Mental Status/Behavioral Observations  Appearance:   Appropriate   Eye Contact:   Good   Psychomotor Behavior: Normal   Attitude:   Cooperative    Orientation:   All  Speech   Rate / Production: Normal/ Responsive Normal    Volume:  Normal   Mood:    Anxious  Normal  Affect:    Appropriate   Thought Content:   Clear and Safety denies any current safety concerns including suicidal ideation, self-harm, and homicidal ideation  Thought Form:  Coherent  Logical     Insight:    Good     Plan:     Safety Plan: No current safety concerns identified.  Recommended that patient call 911 or go to the local ED should there be a change in any of these risk factors.     Barriers to treatment: None identified    Patient Contracts (see media tab):  None    Substance Use: Provided encouragement towards sobriety     Continue or Discharge: Patient will continue in Adult Partial Hospitalization Program (PHP)  as planned. Patient is likely to benefit from learning and using skills as they work toward the goals identified in their treatment plan.      Izzy Arriaza, [facility]  December 23, 2020

## 2020-12-23 NOTE — GROUP NOTE
Psychoeducation Group Note    PATIENT'S NAME: Xavier Landon  MRN:   [id number]  :   1990  ACCT. NUMBER: [id number]  DATE OF SERVICE: 20  START TIME: 11:00 AM  END TIME: 11:50 AM  FACILITATOR: Cinda Cantu OTR/L  TOPIC: MH PHP OT Group: Self- Regulation Skills  [facility] Adult Partial Hospitalization Program  TRACK: 1    NUMBER OF PARTICIPANTS: 7    Telemedicine Visit: The patient's condition can be safely assessed and treated via synchronous audio and visual telemedicine encounter.      Reason for Telemedicine Visit: Services only offered telehealth    Originating Site (Patient Location): Patient's home    Distant Site (Provider Location): [facility] Outpatient Setting: Partial [facility]    Consent:  The patient/guardian has verbally consented to: the potential risks and benefits of telemedicine (video visit) versus in person care; bill my insurance or make self-payment for services provided; and responsibility for payment of non-covered services.     Mode of Communication:  Video Conference via [employer]    As the provider I attest to compliance with applicable laws and regulations related to telemedicine.     Summary of Group / Topics Discussed:  Self-Regulation Skills: Coping Through the Senses Introduction Part 1: Patients were introduced and taught about neurosensory based skills and strategies related to supporting effective self regulation skills.  Patients were taught about the external sensory systems and how they can be used for coping with mental health symptoms and stressors.  Patients were provided with an opportunity to increase self-awareness of helpful sensory input and self care activities. Patients were introduced on how to create supportive environments that encourage use of these skills.    Patient Session Goals / Objectives:    Review/learn the external sensory systems and how they relate to self-regulation    Identified specific and  individualized neurosensory skills to help manage stress and symptoms     Identified skills learned and how this applies to current daily life    Established a plan for practice of these skills in their own environments      Patient Participation / Response:  Fully participated with the group by sharing personal reflections / insights and openly received / provided feedback with other participants.    Patient presentation: constricted affect; appeared restless in session-moving around, changing positions; active in sharing ideas/examples with the group, Verbalized understanding of content and Patient would benefit from additional opportunities to practice the content to be able to generalize it to their everyday life with increased intentionality, consistency, and efficacy in support of their psychiatric recovery    Treatment Plan:  Patient has an initial individualized treatment plan that was created as part of their diagnostic assessment / admission process.  A master individualized treatment plan is in the process of being developed with the patient and multi-disciplinary care team.    CLYDE Salmeron/L

## 2020-12-23 NOTE — H&P
"Outpatient Psychiatric Evaluation- Standard  Adult    Name:  Xavier Landon  : 1990    Psychiatrist or PCP: None currently, previously saw Mary Nelson MD  Current Psychotherapist: [facility] in [place]    Identifying Data:  Patient is a 30 year old, single  White American male  who presents for initial visit with me.  Patient is currently unemployed. Patient attended the phone/video session alone. Patient prefers to be called: \" Huey\"    Chief Complaint:  \"nothing is going right\"    HPI:  Xavier Landon is a 30 year old male with past history including ADHD, polysubstance use disorder and possible personality disorder presenting for PHP at his own referral because he feels as life is \"miserable\" and he is unable to function in any sphere.  Huey reports lifelong history of ADHD, but his parents were opposed to using stimulants until late in high school when he started to take Vyvanse.  He took that and later Adderall until about age 26, when he took a 3-year In using stimulants due to financial reasons.  Throughout his late adolescence and to the present day he drinks alcohol regularly, 5-6 drinks daily recently until he quit 5 days ago.  He also smokes marijuana regularly.  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  Sometime in  he decided to go back on Adderall and was getting treatment by Dr. Nelson at [facility], but she terminated services this fall after he made some phone calls to the office and Huey admits today that he was \"rude\" to the office staff.  He is guarded about what exactly happened, but alludes to a dispute over his medications, and what he was upset about, but he did find another prescriber last week who refilled his Adderall XR 30 mg twice daily and Adderall IR 20 mg daily prescriptions \"after I had to pay a $50 co-pay.\"  Review of the [place] [facility] database shows consistent prescriptions " "throughout the year with the dose increasing from 20 mg twice daily to the current 80 mg total daily dose over the course of the year with some prescriptions seemingly filled in less than 30 days from the previous one.  Review of care everywhere indicates he was in a partial hospital program and 2012 at [facility] but continued to drink during that treatment series and was discharged prematurely.  He saw a therapist twice in 2018, but was inconsistent with that care and was advised to seek more intense level of service.  Identified problems have consistently related to difficulty maintaining relationships and employment, conflict with others and his drinking although he minimizes its impact and has never sought CD treatment.    Huey reports that his issues are \"I am a full spectrum of failure\" and \"I cannot will myself to do anything.\"  He feels that he has been a failure and is incapable of normal functioning in areas of relationships or career advancement.  He says he has few friends, no hobbies, has messed up all his relationships, and he cannot go on like this, so he decided to \"take a break from drinking\" and was told about partial hospital program, so he made arrangements to enroll.  He presents as very dramatic and self-deprecatory, profane and psychomotor agitated.  \"Every day I have to eat the shit sandwich that is my life.\"  He describes a history consistent with lifelong severe ADHD, including school performance below expectations, chronic disruptive behavior in school, which he did well in with minimal studying.  When he stopped stimulants for 3 years from 2017 to 2019, he says he was \"completely unable to function.\"  When he did not have stimulants, his anxiety was worse and he experienced cardiac symptoms at times.  He reports a \"definite diagnosable addiction to pornography\", reporting exposure as early as age 8 and doing online porn on a regular usually daily basis, which he describes as having disrupted " "several relationships, and now he sees as a barrier to even developing a relationship with a woman.  \"I do not have romantic feelings for women anymore.\"  He has undergone therapy at various times through his life without much impact on his sexual behavior, but recently was advised to seek DBT therapy.  He also says he has an [facility].    He describes chronic feelings of self-loathing and low mood.  He has thoughts of suicide but says he is \"too much of a coward\" and has never made a suicide attempt.  He often sleeps poorly and is plagued by \"dark thoughts.\"  He has taken Prozac and Wellbutrin but did not find them particularly helpful for depression.    Past diagnoses include: ADHD, alcohol dependence, cannabis use disorder, personality disorder  Current medications include:   Current Outpatient Medications   Medication     Amphetamine-Dextroamphetamine (ADDERALL XR PO)     amphetamine-dextroamphetamine (ADDERALL) 20 MG tablet     buPROPion (WELLBUTRIN XL) 150 MG 24 hr tablet     FLUoxetine HCl (PROZAC PO)     Varenicline Tartrate (CHANTIX PO)     No current facility-administered medications for this encounter.         Adderall XR 60 mg QAM   Adderall 20 mg Q. 3PM  Medication side effects: Yes: occ insomnia  Current stressors include: Financial Difficulties, Symptoms and Occupational Difficulties  Coping mechanisms and supports include: Drugs/Alcohol    Psychiatric Review of Symptoms:  Depression: Interest: Decrease  Depressed Mood  Energy: Decrease  Guilt: Increase  Concentration: Decrease  Psychomotor agitation  Worthless: Increase   Irritability: Increase    PHQ-9 scores:   PHQ-9 SCORE 12/22/2020   PHQ-9 Total Score 24     Kathy:  Pressured Speech: Increase  Irritability      Medical Review of Systems:  10 systems (general, cardiovascular, respiratory, eyes, ENT, endocrine, GI, , M/S, neurological) were reviewed.. The remaining systems are all " unremarkable.    A 12-item WHODAS 2.0 assessment was not completed. See Epic for any previously completed WHODAS assessments.    Psychiatric History:   Hospitalizations: None  History of Commitment? No   Past Treatment: counseling, medication(s) from physician / PCP and psychiatry  Suicide Attempts: No   Current Suicide Risk: Suicide Assessment Completed Today.  Self-injurious Behavior: Denies  Electroconvulsive Therapy (ECT) or Transcranial Magnetic Stimulation (TMS): No   GeneSight Genetic Testing: No     Past medication trials include but are not limited to:   Stimulants, fluoxetine, bupropion    Substance Use History: See above    Based on the clinical interview, there  are indications of drug or alcohol abuse. Although the patient currently is abstaining from alcohol. Continue to monitor.   Discussed effect of substance use on overall health.     Past Medical History:  Past Medical History:   Diagnosis Date     ADHD (attention deficit hyperactivity disorder)       Surgery:   Past Surgical History:   Procedure Laterality Date     GENITOURINARY SURGERY       Food and Medicine Allergies:   No Known Allergies  Seizures or Head Injury: No  Diet: No Restrictions  Exercise: No regular exercise program  Supplements: Reviewed per Electronic Medical Record Today    Vital Signs:  None since this is a phone/video visit.     Labs:  Most recent laboratory results reviewed and the pertinent results include:   No visits with results within 1 Year(s) from this visit.   Latest known visit with results is:   No results found for any previous visit.     Most recent labs reviewed and no new labs.     Family History:   Patient reported family history includes:   Family History   Problem Relation Age of Onset     Anxiety Disorder Mother      Depression Father      Attention Deficit Disorder Maternal Aunt        Social History:   Birth place: [place]  Childhood: Yes intact home  Siblings:  zero Brother(s),  zero Sister(s)  Highest  education level was some college.   Employment Status: Employed  Current Living situation:  Feels safe at home.  Children: zero   Firearms/Weapons Access: No: Patient denies   Service: No    Legal History:  Yes: DUI; ex-girlfriend had a restraining order against him      Comprehensive Examination (limited due to virtual visit format, phone/video):  Vital Signs:  Vitals: There were no vitals taken for this visit.  General/Constitutional:  Appearance: awake, alert, adequately groomed, agitated, restless, talkative  Attitude:  cooperative   Eye Contact:  good  Musculoskeletal:  Muscle Strength and Tone: no gross abnormalities by observation  Psychomotor Behavior:  no evidence of tardive dyskinesia, dystonia, or tics  Gait and Station: normal, no gross abnormalities noted by observation  Psychiatric:  Speech:  clear, coherent, regular rate, rhythm, and volume  Associations:  no loose associations  Thought Process:  logical, linear and goal oriented  Thought Content: Reports hopelessness and passive death wish, no evidence of homicidal ideation, no evidence of psychotic thought, no auditory hallucinations present and no visual hallucinations present  Mood:  depressed  Affect:  intensity is exaggerated and intensity is dramatic  Insight:  limited  Judgment:  limited, adequate for safety  Impulse Control:  limited  Neurological:  Oriented to:  person, place, time, and situation  Attention Span and Concentration:  normal  Language: intact  Recent and Remote Memory:  Intact to interview. Not formally assessed. No amnesia.  Fund of Knowledge: appropriate    DSM5  Diagnosis:  1. Attention-Deficit/Hyperactivity Disorder  314.01 (F90.9) Unspecified Attention -Deficit / Hyperactivity Disorder  2. 300.4 (F34.1) Persistent Depressive Disorder, Moderate  3. Substance-Related & Addictive Disorders Alcohol Use Disorder   303.90 (F10.20) Moderate Active recently, quit 5 days ago  4. 304.30 (F12.20)  Cannabis Use Disorder  Moderate  , Current  5. 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  He also reports an addiction to pornography.  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  Although he reports his function is much worse when he does not take stimulants, it is hard to know how his function is better in the past year when he did have Adderall compared to previously.  He has not had much benefit from limited exposure to antidepressants but after discussing various options including nonaddicting antianxiety drugs, antipsychotics, or mood stabilizers, Huey agreed to a trial of lamotrigine to target his emotional dysregulation and low mood.  He was advised over the potential benefits and risks including Dick-Errol syndrome and provided education regarding reporting any rash to medical personnel.      Medication side effects and alternatives reviewed. Health promotion activities recommended and reviewed today. All questions addressed. Education and counseling completed regarding risks and benefits of medications and psychotherapy options. Recommend therapy for additional support.     Treatment Plan:    Continue Adderall as per her outside prescriber    Discontinue fluoxetine and Wellbutrin    Start lamotrigine 25 mg daily for 1 week, then 25 mg twice daily; Rx sent to [facility] in [place] [id number]    Continue therapy as  planned.    Continue all other medications as reviewed per electronic medical record today.     Safety plan reviewed. To the Emergency Department as needed or call after hours crisis line at [phone] or [phone]. [place] Crisis Text Line: Text [place] to 802575  or  Suicide LifeLine Chat: [email or web address] [email or web address]/chat    Follow-up next week in PHP    Follow up with outpatient provider as planned or sooner if needed for acute medical concerns.    Call the psychiatric nurse line with medication questions or concerns at [phone].    1Rebelhart may be used to communicate with your provider, but this is not intended to be used for emergencies.    Patient Education:  Given information about Lamictal as per routine including mild rash (5% incidence), and more severe Dick-Errol syndrome, instructions to contact provider for any rash, go to an ER if experiences blistering rash on mucous membranes, do not skip doses or go on and off medication, avoid new skin products or laundry soap 1/3000    Community Resources:    National Suicide Prevention Lifeline: 377.553.5732 (TTY: 554.296.2307). Call anytime for help.  (www.suicidepreventionlifeline.org)  National [place] on Mental Illness (www.laura.org): 222.147.7589 or 679-179-3682.   Mental Health Association (www.mentalhealth.org): 860.774.8878 or 985-024-5423.  [place] Crisis Text Line: Text [place] to 635810  Suicide LifeLine Chat: [email or web address] [employer].[email or web address]/chat    Administrative Billing:     Video-Visit Details    Type of service:  Video Visit    Video Start Time (time video started): 1:20 PM  Video End Time (time video stopped): 2:20 PM    Originating Location (pt. Location): Home    Distant Location (provider location): Provider remote location    Mode of Communication:  Video Conference via [email or web address]    Physician has received verbal consent for a Video Visit from the patient? Yes        Signed:   Maxime Ingram MD

## 2020-12-24 ENCOUNTER — HOSPITAL ENCOUNTER (OUTPATIENT)
Dept: BEHAVIORAL HEALTH | Facility: CLINIC | Age: 30
End: 2020-12-24
Attending: PSYCHIATRY & NEUROLOGY
Payer: COMMERCIAL

## 2020-12-24 ENCOUNTER — TELEPHONE (OUTPATIENT)
Dept: BEHAVIORAL HEALTH | Facility: CLINIC | Age: 30
End: 2020-12-24

## 2020-12-24 PROCEDURE — H0035 MH PARTIAL HOSP TX UNDER 24H: HCPCS | Mod: 95

## 2020-12-24 PROCEDURE — H0035 MH PARTIAL HOSP TX UNDER 24H: HCPCS | Mod: GT | Performed by: COUNSELOR

## 2020-12-24 PROCEDURE — G0177 OPPS/PHP; TRAIN & EDUC SERV: HCPCS | Mod: GT

## 2020-12-24 NOTE — TELEPHONE ENCOUNTER
Writer contacted patient about his absences from the program yesterday, patient reported he was meeting with the psychiatrist yesterday afternoon and reported no barriers to attending programming today.    MORGAN Zhang on 12/24/2020 at 8:28 AM

## 2020-12-24 NOTE — GROUP NOTE
Psychotherapy Group Note    PATIENT'S NAME: Xavier Landon  MRN:   [id number]  :   1990  ACCT. NUMBER: [id number]  DATE OF SERVICE: 20  START TIME: 10:00 AM  END TIME: 10:50 AM  FACILITATOR: Izzy Arriaza LPCC  TOPIC: MH EBP Group: Coping Skills  [facility] Adult Partial Hospitalization Program  TRACK: [facility]    NUMBER OF PARTICIPANTS: 8    Summary of Group / Topics Discussed:  Coping Skills: Additional Coping Skills:  Patients discussed and practiced processing strategies to address holiday/family stressors.  Reviewed the benefits of applying the aforementioned coping strategies.  Patients explored how these strategies might be applied to daily stressors or distressing situations.    Patient Session Goals / Objectives:    Understand the purpose and benefits of applying in the moment coping strategies    Address barriers to utilizing coping skills when in distress.    Telemedicine Visit: The patient's condition can be safely assessed and treated via synchronous audio and visual telemedicine encounter.      Reason for Telemedicine Visit: Services only offered telehealth and due to COVID-19    Originating Site (Patient Location): Patient's home    Distant Site (Provider Location): Provider Remote Setting    Consent:  The patient/guardian has verbally consented to: the potential risks and benefits of telemedicine (video visit) versus in person care; bill my insurance or make self-payment for services provided; and responsibility for payment of non-covered services.     Mode of Communication:  Video Conference via [employer]    As the provider I attest to compliance with applicable laws and regulations related to telemedicine.          Patient Participation / Response:  Fully participated with the group by sharing personal reflections / insights and openly received / provided feedback with other participants.    Demonstrated understanding of topics discussed through group discussion and  participation, Expressed understanding of the relevance / importance of coping skills at distressing times in life and Demonstrated knowledge of when to consider using a variety of coping skills in daily life    Treatment Plan:  Patient has an initial individualized treatment plan that was created as part of their diagnostic assessment / admission process.  A master individualized treatment plan is in the process of being developed with the patient and multi-disciplinary care team.    Izzy Arriaza, [facility]

## 2020-12-24 NOTE — GROUP NOTE
Process Group Note    PATIENT'S NAME: Xavier Landon  MRN:   [id number]  :   1990  ACCT. NUMBER: [id number]  DATE OF SERVICE: 20  START TIME:  9:00 AM  END TIME:  9:50 AM  FACILITATOR: [facility]aza [facility]  TOPIC:  Process Group    Diagnoses:  296.33 (F33.2) Major Depressive Disorder, Recurrent Episode, Severe _ and With anxious distress  300.02 (F41.1) Generalized Anxiety Disorder.  4. Other Diagnoses that is relevant to services:   Attention-Deficit/Hyperactivity Disorder  314.01 (F90.9) Unspecified Attention -Deficit / Hyperactivity Disorder.  5. Provisional Diagnosis: R/O Substance-Related & Addictive Disorders Alcohol Use Disorder   303.90 (F10.20) Moderate , as evidenced by recent increase in alcohol use, drinking more than intended, causing problems with roomates .  Should he be unable to control use, or other symptoms emerge during the course of treatment, the disorder may become the focus of assessment at that time.      [facility] Adult Partial Hospitalization Program  TRACK: PHP    NUMBER OF PARTICIPANTS: 8    Telemedicine Visit: The patient's condition can be safely assessed and treated via synchronous audio and visual telemedicine encounter.      Reason for Telemedicine Visit: Services only offered telehealth and due to COVID-19    Originating Site (Patient Location): Patient's home    Distant Site (Provider Location): Provider Remote Setting    Consent:  The patient/guardian has verbally consented to: the potential risks and benefits of telemedicine (video visit) versus in person care; bill my insurance or make self-payment for services provided; and responsibility for payment of non-covered services.     Mode of Communication:  Video Conference via [email or web address]    As the provider I attest to compliance with applicable laws and regulations related to telemedicine.            Data:    Session content: At the start of this group, patients were invited to check in by  identifying themselves, describing their current emotional status, and identifying issues to address in this group.   Area(s) of treatment focus addressed in this session included Symptom Management, Personal Safety and Community Resources/Discharge Planning.    Patient reported feeling stressed. Patient discussed working toward shoveling. Patient identified physical activity as skills they will use to address their goal(s). Patient reported fear and avoidance may be a barrier to working toward their goal(s) and/or addressing mental health symptoms. Patient reported yes safety concerns and/or self-injurious behaviors. Patient reported yes substance use. Patient reported they are taking their medications as prescribed. Patient reported feeling proud that they are in this program. Patient discussed needing to complete paperwork for FMLA and feeling trapped and not able to complete it with the treatment group.     Therapeutic Interventions/Treatment Strategies:  Psychotherapist offered support, feedback and validation and reinforced use of skills. Treatment modalities used include Motivational Interviewing and Cognitive Behavioral Therapy. Interventions include Coping Skills: Assisted patient in identifying 1-2 healthy distraction skills to reduce overall distress, Symptoms Management: Promoted understanding of their diagnoses and how it impacts their functioning and Emotions Management:  Discussed barriers to emotional regulation.    Assessment:    Patient response:   Patient responded to session by accepting feedback, giving feedback, listening, focusing on goals, being attentive and accepting support    Possible barriers to participation / learning include: and no barriers identified    Health Issues:   None reported       Substance Use Review:   Substance Use: Last use: Marijuana last night    Mental Status/Behavioral Observations  Appearance:   Appropriate   Eye Contact:   Good   Psychomotor Behavior: Normal    Attitude:   Cooperative   Orientation:   All  Speech   Rate / Production: Normal/ Responsive Normal    Volume:  Normal   Mood:    Anxious  Depressed  Normal  Affect:    Appropriate   Thought Content:   Clear and Safety reports  presence of suicidal ideation passive suicidal ideation   Thought Form:  Coherent  Logical     Insight:    Good     Plan:     Safety Plan: Patient consented to co-developed safety plan.  Safety and risk management plan was completed.  Patient agreed to use safety plan should any safety concerns arise.  A copy was given to the patient.    Committed to safety and agreed to follow previously developed safety coping plan.      Barriers to treatment: None identified    Patient Contracts (see media tab):  None    Substance Use: Provided encouragement towards sobriety     Continue or Discharge: Patient will continue in Adult Partial Hospitalization Program (PHP)  as planned. Patient is likely to benefit from learning and using skills as they work toward the goals identified in their treatment plan.      Izzy Arriaza, [facility]  December 24, 2020

## 2020-12-24 NOTE — GROUP NOTE
Psychoeducation Group Note    PATIENT'S NAME: Xavier Landon  MRN:   [id number]  :   1990  ACCT. NUMBER: [id number]  DATE OF SERVICE: 20  START TIME: 11:00 AM  END TIME: 11:50 AM  FACILITATOR: iDallo Shabazz OTR/L  TOPIC:  PHP OT Group: Lifestyle Balance and Structure  Telemedicine Visit: The patient's condition can be safely assessed and treated via synchronous audio and visual telemedicine encounter.      Reason for Telemedicine Visit: COVID-19    Originating Site (Patient Location): Patient's home    Distant Site (Provider Location): [facility]: [place] [address]    Consent:  The patient/guardian has verbally consented to: the potential risks and benefits of telemedicine (video visit) versus in person care; bill my insurance or make self-payment for services provided; and responsibility for payment of non-covered services.     Mode of Communication:  Video Conference via [employer]    As the provider I attest to compliance with applicable laws and regulations related to telemedicine.   [facility] Adult Partial Hospitalization Program  TRACK: Group 1    NUMBER OF PARTICIPANTS: 6    Summary of Group / Topics Discussed:  Lifestyle Balance and Structure:  Leisure/Benefits of Focused activity to Mange Stress Provided psychoeducation and discussion on benefits of leisure on stress management, parasympathetic NS activation, and wellness. Facilitated a structured self-reflective process where patients identified their leisure values: what is most important to them with regards to how they spend their time and energy on that promotes lifestyle balance to support mental wellbeing. Experiential process facilitated where patients reflected on past, present, and potential future leisure activities that fulfill their leisure values. Validation and support provided.    Patient Session Goals / Objectives:    [place] the mechanisms and benefits of leisure activity to create lifestyle  balance and improve mental health.     Identified their leisure values (how they want to spend their time and energy)    Identified past, present, and future leisure activities that demonstrate a connection to their leisure values    Identify first step to engaging in a new or old leisure activity again and problem solve barriers.         Patient Participation / Response:  Moderately participated, sharing some personal reflections / insights and adequately adequately received / provided feedback with other participants.    Demonstrated understanding of content through handout/group discussion , Verbalized understanding of content and Patient would benefit from additional opportunities to practice the content to be able to generalize it to their everyday life with increased intentionality, consistency, and efficacy in support of their psychiatric recovery    Treatment Plan:  Patient has a current master individualized treatment plan.  See Epic treatment plan for more information.    Diallo Shabazz, OTR/L

## 2020-12-24 NOTE — GROUP NOTE
Psychoeducation Group Note    PATIENT'S NAME: Xavier Landon  MRN:   [id number]  :   1990  ACCT. NUMBER: [id number]  DATE OF SERVICE: 20  START TIME:  2:00 PM  END TIME:  2:50 PM  FACILITATOR: Diallo Shabazz OTR/L  TOPIC: [facility] OT Group: Lifestyle Balance and Structure  Telemedicine Visit: The patient's condition can be safely assessed and treated via synchronous audio and visual telemedicine encounter.      Reason for Telemedicine Visit: COVID-19    Originating Site (Patient Location): Patient's home    Distant Site (Provider Location): [facility]: [facility]    Consent:  The patient/guardian has verbally consented to: the potential risks and benefits of telemedicine (video visit) versus in person care; bill my insurance or make self-payment for services provided; and responsibility for payment of non-covered services.     Mode of Communication:  Video Conference via [place]    As the provider I attest to compliance with applicable laws and regulations related to telemedicine.   [facility] Adult Partial Hospitalization Program  TRACK: Group 1    NUMBER OF PARTICIPANTS: 6    Summary of Group / Topics Discussed:  Lifestyle Balance and Structure: Holiday Wellness Recovery Action Plan Patients were provided psychoeducation on an evidenced framework to help them identify areas in their lives (roles, routines, rituals, relationships, habits) that are most important to them and identify a plan to keep themselves well during the holiday.    Patient Session Goals / Objectives:    Reflected on possibilities of what lifestyle balance consists of though a group brainstorming process.     Identified areas of imbalance in their lives as well areas that are balanced.    Apply a values based prioritizing process to their most important life roles, routines, habits, rituals, and relationships and work to create a schema for lifestyle balance.     Identify strategies and  skills to meet specific needs and address barriers     Wellness Recovery Action Plan        Patient Participation / Response:  Fully participated with the group by sharing personal reflections / insights and openly received / provided feedback with other participants.    Demonstrated understanding of content through handout/group discussion , Verbalized understanding of content and Patient would benefit from additional opportunities to practice the content to be able to generalize it to their everyday life with increased intentionality, consistency, and efficacy in support of their psychiatric recovery    Treatment Plan:  Patient has a current master individualized treatment plan.  See Epic treatment plan for more information.    Diallo Shabazz, OTR/L

## 2020-12-24 NOTE — GROUP NOTE
Psychoeducation Group Note    PATIENT'S NAME: Xavier Landon  MRN:   [id number]  :   1990  ACCT. NUMBER: [id number]  DATE OF SERVICE: 20  START TIME:  1:00 PM  END TIME:  1:50 PM  FACILITATOR: Jenifer Pagan RN  TOPIC: TIMUR RN Group: Medication Education and Management  [facility] Adult Partial Hospitalization Program  TRACK: 1    NUMBER OF PARTICIPANTS: 7    Telemedicine Visit: The patient's condition can be safely assessed and treated via synchronous audio and visual telemedicine encounter.      Reason for Telemedicine Visit: Services only offered telehealth    Originating Site (Patient Location): Patient's home    Distant Site (Provider Location): Provider Remote Setting    Consent:  The patient/guardian has verbally consented to: the potential risks and benefits of telemedicine (video visit) versus in person care; bill my insurance or make self-payment for services provided; and responsibility for payment of non-covered services.     Mode of Communication:  Video Conference via [employer]bel    As the provider I attest to compliance with applicable laws and regulations related to telemedicine.    Summary of Group / Topics Discussed:  Medication Educations and Management:  Medication Assessment/Review: In this group, patients were encouraged to discuss questions, concerns, and knowledge of their medications. Various topics were reviewed within the context of a group-based discussion including medication side effects, adverse reactions, contraindications, and expected effects.The risks of substance use was also discussed with particular emphasis on avoiding substance abuse to cope.    Patient Session Goals / Objectives:  ? Assessed patient understanding of their current medications and indication  ? Describe the risks of drinking when taking medications  ? Assessed knowledge of medication side effects and how to manage them      Patient Participation / Response:  Fully participated with  the group by sharing personal reflections / insights and openly received / provided feedback with other participants.     Demonstrated understanding of topics discussed through group discussion and participation, Identified / Expressed personal readiness to practice skills and Verbalized understanding of medication education and management topic    Treatment Plan:  Patient has an initial individualized treatment plan that was created as part of their diagnostic assessment / admission process.  A master individualized treatment plan is in the process of being developed with the patient and multi-disciplinary care team.    Jenifer Pagan RN

## 2020-12-25 NOTE — ADDENDUM NOTE
Encounter addended by: Diallo Shaabzz, OTR/L on: 12/25/2020 7:30 AM   Actions taken: Charge Capture section accepted

## 2020-12-28 ENCOUNTER — HOSPITAL ENCOUNTER (OUTPATIENT)
Dept: BEHAVIORAL HEALTH | Facility: CLINIC | Age: 30
End: 2020-12-28
Attending: PSYCHIATRY & NEUROLOGY
Payer: COMMERCIAL

## 2020-12-28 ENCOUNTER — TELEPHONE (OUTPATIENT)
Dept: BEHAVIORAL HEALTH | Facility: CLINIC | Age: 30
End: 2020-12-28

## 2020-12-28 PROCEDURE — H0035 MH PARTIAL HOSP TX UNDER 24H: HCPCS | Mod: 95

## 2020-12-28 PROCEDURE — H0035 MH PARTIAL HOSP TX UNDER 24H: HCPCS | Mod: GT | Performed by: OCCUPATIONAL THERAPIST

## 2020-12-28 PROCEDURE — H0035 MH PARTIAL HOSP TX UNDER 24H: HCPCS | Mod: 95 | Performed by: COUNSELOR

## 2020-12-28 NOTE — GROUP NOTE
Process Group Note    PATIENT'S NAME: Xavier Landon  MRN:   [id number]  :   1990  ACCT. NUMBER: [id number]  DATE OF SERVICE: 20  START TIME: 10:00 AM  END TIME: 10:50 AM  FACILITATOR: Izzy Arriaza LPCC  TOPIC:  Process Group    Diagnoses:  296.33 (F33.2) Major Depressive Disorder, Recurrent Episode, Severe _ and With anxious distress  300.02 (F41.1) Generalized Anxiety Disorder.  4. Other Diagnoses that is relevant to services:   Attention-Deficit/Hyperactivity Disorder  314.01 (F90.9) Unspecified Attention -Deficit / Hyperactivity Disorder.  5. Provisional Diagnosis: R/O Substance-Related & Addictive Disorders Alcohol Use Disorder   303.90 (F10.20) Moderate , as evidenced by recent increase in alcohol use, drinking more than intended, causing problems with roomates .  Should he be unable to control use, or other symptoms emerge during the course of treatment, the disorder may become the focus of assessment at that time.      [facility] Adult Partial Hospitalization Program  TRACK: PHP    NUMBER OF PARTICIPANTS: 7    Telemedicine Visit: The patient's condition can be safely assessed and treated via synchronous audio and visual telemedicine encounter.      Reason for Telemedicine Visit: Services only offered telehealth and due to COVID-19    Originating Site (Patient Location): Patient's home    Distant Site (Provider Location): Provider Remote Setting    Consent:  The patient/guardian has verbally consented to: the potential risks and benefits of telemedicine (video visit) versus in person care; bill my insurance or make self-payment for services provided; and responsibility for payment of non-covered services.     Mode of Communication:  Video Conference via [employer]    As the provider I attest to compliance with applicable laws and regulations related to telemedicine.          Data:    Session content: At the start of this group, patients were invited to check in by  identifying themselves, describing their current emotional status, and identifying issues to address in this group.   Area(s) of treatment focus addressed in this session included Symptom Management, Personal Safety and Community Resources/Discharge Planning.    Patient reported feeling irate, irritated and frustrated with himself. Patient discussed working toward completing FMLA paperwork. Patient identified advocating for himself as skills they will use to address their goal(s). Patient reported lack of motivation may be a barrier to working toward their goal(s) and/or addressing mental health symptoms. Patient reported yes safety concerns and/or self-injurious behaviors; patient reports fleeting/passive suicidal ideation with no plan or intent/consistently engage in safety planning. Patient reported yes substance use. Patient reported they are taking their medications as prescribed. Patient reported feeling proud that they cleaned this weekend. Patient discussed paperwork frustrations with the treatment group.     Therapeutic Interventions/Treatment Strategies:  Psychotherapist offered support, feedback and validation and reinforced use of skills. Treatment modalities used include Motivational Interviewing and Cognitive Behavioral Therapy. Interventions include Coping Skills: Assisted patient in identifying 1-2 healthy distraction skills to reduce overall distress, Symptoms Management: Promoted understanding of their diagnoses and how it impacts their functioning and Emotions Management:  Discussed barriers to emotional regulation.    Assessment:    Patient response:   Patient responded to session by accepting feedback, giving feedback, listening, focusing on goals, being attentive and accepting support    Possible barriers to participation / learning include: and no barriers identified    Health Issues:   None reported       Substance Use Review:   Substance Use: Last use: Marijuana over the weekend    Mental  Status/Behavioral Observations  Appearance:   Appropriate   Eye Contact:   Good   Psychomotor Behavior: Normal   Attitude:   Cooperative   Orientation:   All  Speech   Rate / Production: Normal/ Responsive Normal    Volume:  Normal   Mood:    Anxious  Depressed  Normal  Affect:    Appropriate   Thought Content:   Clear and Safety denies any current safety concerns including suicidal ideation, self-harm, and homicidal ideation  Thought Form:  Coherent  Logical     Insight:    Good     Plan:     Safety Plan: No current safety concerns identified.  Recommended that patient call 911 or go to the local ED should there be a change in any of these risk factors.     Barriers to treatment: None identified    Patient Contracts (see media tab):  None    Substance Use: Provided encouragement towards sobriety     Continue or Discharge: Patient will continue in Adult Partial Hospitalization Program (PHP)  as planned. Patient is likely to benefit from learning and using skills as they work toward the goals identified in their treatment plan.      Izzy Arriaza, [facility]  December 28, 2020

## 2020-12-28 NOTE — GROUP NOTE
Psychoeducation Group Note    PATIENT'S NAME: Xavier Landon  MRN:   [id number]  :   1990  ACCT. NUMBER: [id number]  DATE OF SERVICE: 20  START TIME:  9:00 AM  END TIME:  9:50 AM  FACILITATOR: Jenifer Pagan RN  TOPIC: TIMUR RN Group: Mental Health Maintenance  [facility] Adult Partial Hospitalization Program  TRACK: 1    NUMBER OF PARTICIPANTS: 8    Telemedicine Visit: The patient's condition can be safely assessed and treated via synchronous audio and visual telemedicine encounter.      Reason for Telemedicine Visit: Services only offered telehealth    Originating Site (Patient Location): Patient's home    Distant Site (Provider Location): Provider Remote Setting    Consent:  The patient/guardian has verbally consented to: the potential risks and benefits of telemedicine (video visit) versus in person care; bill my insurance or make self-payment for services provided; and responsibility for payment of non-covered services.     Mode of Communication:  Video Conference via [employer]    As the provider I attest to compliance with applicable laws and regulations related to telemedicine.    Summary of Group / Topics Discussed:  Mental Health Maintenance:  Vulnerability: In this group, the concept of vulnerability was explored through the viewing, discussion, and self-reflection of the Nazario Gibson Talk Titled,  The Power of Vulnerability.      Patient Session Goals / Objectives:  ? Defined and described definition of vulnerability   ? Identified 2 or more ways of practicing authenticity       Patient Participation / Response:  Fully participated with the group by sharing personal reflections / insights and openly received / provided feedback with other participants.    Demonstrated understanding of topics discussed through group discussion and participation, Identified / Expressed personal readiness to practice skills and Verbalized understanding of mental health maintenance  topic    Treatment Plan:  Patient has a current master individualized treatment plan.  See Epic treatment plan for more information.    Jenifer Pagan RN

## 2020-12-28 NOTE — GROUP NOTE
Psychoeducation Group Note    PATIENT'S NAME: Xavier Landon  MRN:   [id number]  :   1990  ACCT. NUMBER: [id number]  DATE OF SERVICE: 20  START TIME: 11:00 AM  END TIME: 11:50 AM  FACILITATOR: Verna Gamble RN  TOPIC: TIMUR RN Group: Mental Health Maintenance  [facility] Adult Partial Hospitalization Program  TRACK: 1    NUMBER OF PARTICIPANTS: 6    Summary of Group / Topics Discussed:  Mental Health Maintenance:  Assessments of Strengths: Patients completed a self-reflection on personal strengths worksheet. The concept of personal strength as it relates to resilience were explored. Patients shared responses with the group and participated in discussion.     Patient Session Goals / Objectives:  ? Patients identified 1-3 qualities they consider a personal strength.  ? Patients understood the concept of personal strengths and the connection it has to resiliency  Telemedicine Visit: The patient's condition can be safely assessed and treated via synchronous audio and visual telemedicine encounter.      Reason for Telemedicine Visit: Services only offered telehealth    Originating Site (Patient Location): Patient's home    Distant Site (Provider Location): Provider Remote Setting    Consent:  The patient/guardian has verbally consented to: the potential risks and benefits of telemedicine (video visit) versus in person care; bill my insurance or make self-payment for services provided; and responsibility for payment of non-covered services.     Mode of Communication:  Video Conference via [employer]    As the provider I attest to compliance with applicable laws and regulations related to telemedicine.       Patient Participation / Response:  Fully participated with the group by sharing personal reflections / insights and openly received / provided feedback with other participants.    Demonstrated understanding of topics discussed through group discussion and participation    Treatment  Plan:  Patient has a current master individualized treatment plan.  See Epic treatment plan for more information.    Verna Gamble RN

## 2020-12-28 NOTE — GROUP NOTE
Psychotherapy Group Note    PATIENT'S NAME: Xavier Landon  MRN:   [id number]  :   1990  ACCT. NUMBER: [id number]  DATE OF SERVICE: 20  START TIME:  2:00 PM  END TIME:  2:50 PM  FACILITATOR: Izzy Arriaza LPCC  TOPIC: MH EBP Group: Self-Awareness  [facility] Adult Partial Hospitalization Program  TRACK: [facility]    NUMBER OF PARTICIPANTS: 6    Summary of Group / Topics Discussed:  Self-Awareness: Values: Patients identified personal values by examining development of their current values and how their values influence their daily functioning and life choices. Patients explored the impact of their values on their thoughts, feelings, and actions. Patients discussed definition of personal values and how they develop and change over time. The goal is to help patients reconcile value conflicts and achieve balance and flexibility to improve mood and daily functioning.     Patient Session Goals / Objectives:    Examined development of values and impact of values on functioning    Identified and prioritized important values related to current well-being     Identified strategies to change or enhance values to positively impact symptoms    Assisted patients to find ways to adapt functioning to better fit their values    Telemedicine Visit: The patient's condition can be safely assessed and treated via synchronous audio and visual telemedicine encounter.      Reason for Telemedicine Visit: Services only offered telehealth and due to COVID-19    Originating Site (Patient Location): Patient's home    Distant Site (Provider Location): Provider Remote Setting    Consent:  The patient/guardian has verbally consented to: the potential risks and benefits of telemedicine (video visit) versus in person care; bill my insurance or make self-payment for services provided; and responsibility for payment of non-covered services.     Mode of Communication:  Video Conference via [employer]    As the provider I  attest to compliance with applicable laws and regulations related to telemedicine.        Patient Participation / Response:  Fully participated with the group by sharing personal reflections / insights and openly received / provided feedback with other participants.    Demonstrated understanding of topics discussed through group discussion and participation, Demonstrated understanding of values, strengths, and challenges to learn about themselves and Identified / Expressed readiness to act intentionally, increase self-compassion, promote personal growth    Treatment Plan:  Patient has a current master individualized treatment plan.  See Epic treatment plan for more information.    Izzy Arriaza, KURTC

## 2020-12-28 NOTE — PROGRESS NOTES
Adult Partial Hospitalization Program:  Individualized Treatment Plan     Date of Plan: 20    Name: Xavier Landon MRN: [id number]  :   1990    Programs: Adult Partial Hospitalization Program    DSM5 Primary Diagnosis:  296.33 (F33.2) Major Depressive Disorder, Recurrent Episode, Severe _ and With anxious distress  300.02 (F41.1) Generalized Anxiety Disorder.  4. Other Diagnoses that is relevant to services:   Attention-Deficit/Hyperactivity Disorder  314.01 (F90.9) Unspecified Attention -Deficit / Hyperactivity Disorder.  5. Provisional Diagnosis: R/O Substance-Related & Addictive Disorders Alcohol Use Disorder   303.90 (F10.20) Moderate , as evidenced by recent increase in alcohol use, drinking more than intended, causing problems with roomates .  Should he be unable to control use, or other symptoms emerge during the course of treatment, the disorder may become the focus of assessment at that time.      Adult Partial Hospitalization Program Multidisciplinary Team Members: [facility], Roseann Mccain [facility],  Radha Gamble, RUSTY, Tim Wise MOTR/L; Cinda Cantu OTR/L; Nabeel Jones MD    Xavier Landon will participate in the Adult Partial Hospitalization Program 5 days per week, 5 hours per day. Anticipated duration/discharge: 2 weeks    Due to COVID-19, services will be delivered via telemedicine until further notice.     Program Start Date: 20  Anticipated Discharge Date: 21 (pending authorization/clinical changes)    NOTE: Complete CGI     Xavier Landon would be at reasonable risk of requiring inpatient hospitalization in the absence of partial hospitalization.     Review Date: Does Xavier Landon continue to meet criteria to participate in the Partial Hospitalization Program, 5 days per week; 5 hours per day?   20 yes   21 Yes  No-discharging as scheduled       Client Strengths:  educated, employed, intelligent, support of family,  "friends and providers, wants to learn and willing to ask questions    Client Areas of Vulnerability:  Anxiety  Depressive symptoms     Client Participation in Plan:  Contributed to goals and plan   Attended individual treatment plan meeting on 12/29/20  Agrees with plan     Long-Term Goals:  Knowledge about illness and management of symptoms   Maintenance of personal safety     Abuse Prevention Plan:  Safe, therapeutic environment   Safety coping plan as needed   Education regarding illness and skill development     Discharge Criteria:  Satisfactory progress toward treatment goals   Improvement re: identified problems and symptoms   Ability to continue recovery at next level of service   Has a discharge plan in place   Has safety/coping plan in place        Areas of Treatment Focus       Area of Treatment Focus:  Personal Safety  Start Date:    12/29/20    Problem Description:  Safety Assessment: no suicide attempts, no concrete plans, \"is it worth it to keep going\" has never had thoughts about how to kill himself.  He said he has thoughts a few days per week.  He said his thoughts are not concerning because it is just a manifestation of self-loathing.  He said he does not see himself trying to hurt himself.  He said he does not consider the thoughts past having.  He said the thoughts are fleeting.      Goal: Target Date: 1/4/21, discharge Status: Stopped  1) Safety:   Client will notify staff when needing assistance to develop or implement a coping plan to manage suicidal or self injurious urges.  Client will use coping plan for safety, as needed.    Progress:  12/29/20: Met with team members. Discussed program, process, and progress. Discussed and set treatment goals. Patient agrees with goal, continue until next review.   1/4/21: No current safety concerns reported, continue goal until discharge.   1/7/21-Discharge: Patient has reported a decrease in suicidal ideation while in programming; it is recommended " "patient continue to address this goal are in individual therapy and dual diagnosis programming.     Treatment Strategies:  Assist clients in establishing / strengthening support network  Assist to identify treatment goals  Assist with discharge planning  Engage in safety planning when indicated  Facilitate increased self-awareness  Provide education regarding distress tolerance skills     Area of Treatment Focus:  Symptom Management  Start Date:    12/29/20    Description:   Chief Complaint:   The reason for seeking services at this time is: \" having a rough time, feel at the end of my rope\"   The problem(s) began May.  He said he started seeing his therapist and she did recommend PHP at that time but it did not feel as bad.  He said his mood has worsened and he is not seeing the point of the suffering anymore.  He said his feelings of failure and grief do not seem to leave. He said he feels like a failure.  He said he has felt miserable for his entire adult life.  He said he needs to do something to change his life.He said it is \"hard to overstate how disappointed I am in my life\".  Patient has attempted to resolve these concerns in the past through individual therapy, psychiatry.    Goal: Target Date: 1/4/21, discharge Status: Stopped  2) In Psychotherapy groups Huey will:   Identify and process emotions regarding increased mood symptoms and feelings of being a \"failure.\" Patient will learn and utilize appropriate coping skills to address mood symptoms and frustrations when he engages in negative self-talk.      As measured by self report and staff observation during groups daily.       Progress:   12/29/20: Met with team members. Discussed program, process, and progress. Discussed and set treatment goals. Patient agrees with goal, continue until next review.   1/4/21: Patient consistently processes emotions and mood symptoms. Patient expresses frustration with difficulty implementing coping skills. Continue goal " until discharge.   1/7/21-Discharge: It is recommended patient continue to address this goal are in individual therapy and dual diagnosis programming.      Treatment Strategies:  Assist clients in establishing / strengthening support network  Assist to identify treatment goals  Assist with discharge planning  Engage in safety planning when indicated  Facilitate increased self awareness  Provide education regarding how to use group process, thoughts/behaviors/emotions management, emotional regulation, values identification, distorted thinking, grief and loss, shame, self compassion, self awareness, mindfulness, radical acceptance, distress tolerance skills, hope, problem solving. Communication/interpersonal effectiveness.      Area of Treatment Focus:  Develop / Improve Independent Living Skills  Start Date:    12/29/20    Description:    Patient engages in negative self-talk and would like to increase self awareness to improve self-talk.     Goal: Target Date: 1/4/21, discharge Status: Stopped  3) In Occupational Therapy groups Huey will:    Lifestyle health: Learn, practice, apply 2 skills/strategies that support participation in meaningful activities with an emphasis on social realtionships to improve mental health management after discharge.     Self Regulation: Learn, practice, apply 2 body based self regulation skills for improved mood and distress tolerance to support participation in meaningful activities with social relationships and occupational tasks (roles, routines, relationships and responsibilities).     Resiliency development: Develop self awareness around ways to manage self-compassion from a self compassionate and culturally relevant perspective to support mental wellbeing.      As measured by self report and staff observation during groups daily.     Progress:   12/29/20: Met with team members. Discussed program, process, and progress. Discussed and set treatment goals. Patient would like to find  "\"more meaning and sense of purpose in life.\"  1/4/21: Patient would like to continue finding \"more meaning and purpose\" and process emotions in both group programming and individual therapy.   1/7/21-Discharge: It is recommended patient continue to address this goal are in individual therapy and dual diagnosis programming.      Treatment Strategies:  Assist to identify treatment goals  Engage in safety planning when indicated  Facilitate increased self awareness  OT assessment  Provide education regarding:  Lifestyle Health: balance/structure/routine, goal setting and integration, prioritizing and planning, leisure values, behavior activation, weaving a mindful lifestyle and benefits of focused activity,.  Self regulation: sensory modulation, window of tolerance, ANS and vagus nerve activation, self awareness, development of a self regulation plan, sensory enhanced mindfulness, sensory/body based grounding skills.  Resiliency development: Motivation, transitions, energy management, self compassion, stress management, positive thought processes, neuroscience of change.        Area of Treatment Focus:  Community Resources/Discharge Planning  Start Date:    12/29/20    Description:   Psychiatrist/Med Mgmt: Referral for a provider in [place]   Individual Therapist: Sommer Ghosh    Goal: Target Date: 1/4/21, discharge   Status: Stopped    4) Wellness and Discharge preparation:     Will improve wellness related behaviors by attending wellness sessions and ask questions as they come up.    Will increase effective use of support / increase ability to ask for help. Invite someone to the Support Network Education group to discuss your support needs.    Will develop an aftercare / transition plan by scheduling on-going individual therapy and participating in the dual diagnosis program    Progress:  12/29/20: Met with team members. Discussed program, process, and progress. Discussed and set treatment goals. Patient and program " "staff discussed the dual diagnosis program upon discharge from [facility]. Patient is currently taking a break from individual therapy while in [facility].   1/4/21: Patient reported he doesn't know if he will attend DDP upon discharge and would like to consult with his individual therapist this evening.   1/7/21-Discharge: It is recommended patient continue to address this goal are in individual therapy and dual diagnosis programming.      Treatment Strategies:  Assist to identify treatment goals  Assist with discharge planning  Engage in safety planning when indicated  Facilitate increased self awareness  Provide education regarding eight dimensions of wellness. sleep hygiene. medication education and management. stigma. nutrition. signs and symptoms. community resources. support network education.      MORGAN Zhang LPCC on 12/29/2020 at 12:10 PM  MORGAN Zhang on 1/5/2021 at 2:02 PM        NOTE: Required signatures are completed manually and scanned into the electronic medical record. See \"Media\" tab in epic.    The Individualized Treatment Plan Signature Page has been routed to the provider for co-sign.     "

## 2020-12-29 ENCOUNTER — HOSPITAL ENCOUNTER (OUTPATIENT)
Dept: BEHAVIORAL HEALTH | Facility: CLINIC | Age: 30
End: 2020-12-29
Attending: PSYCHIATRY & NEUROLOGY
Payer: COMMERCIAL

## 2020-12-29 ENCOUNTER — TELEPHONE (OUTPATIENT)
Dept: BEHAVIORAL HEALTH | Facility: CLINIC | Age: 30
End: 2020-12-29

## 2020-12-29 PROCEDURE — H0035 MH PARTIAL HOSP TX UNDER 24H: HCPCS | Mod: GT | Performed by: COUNSELOR

## 2020-12-29 PROCEDURE — H0035 MH PARTIAL HOSP TX UNDER 24H: HCPCS | Mod: 95

## 2020-12-29 NOTE — PROGRESS NOTES
Acknowledgement of Current Treatment Plan       I have reviewed my treatment plan with my therapist / counselor on 12/29.   I agree with the plan as it is written in the electronic health record.    Name:      Signature:  Xavier Landon Unable to sign due to COVID-19     Dr. Nabeel Jones MD  Psychiatrist    Roseann Mccain, MS, [facility], BC-DMT  Psychotherapist     Izzy Arriaza MA, [facility]  Psychotherapist  MORGAN Zhang on 12/29/2020 at 10:48 AM

## 2020-12-29 NOTE — GROUP NOTE
Psychotherapy Group Note    PATIENT'S NAME: Xavier Landon  MRN:   [id number]  :   1990  ACCT. NUMBER: [id number]  DATE OF SERVICE: 20  START TIME:  2:00 PM  END TIME:  2:50 PM  FACILITATOR: Izzy Arriaza LPCC  TOPIC:  EBP Group: Emotions Management  [facility] Adult Partial Hospitalization Program  TRACK: [facility]    NUMBER OF PARTICIPANTS: 7    Summary of Group / Topics Discussed:  Emotions Management: Guilt and Shame: Patients explored and shared personal experiences associated with feelings of guilt and shame.  Group explored how these feelings develop, what they mean to each individual, and how to increase acceptance and usefulness of these feelings.  Group members assisted one another to contextualize these concepts and promote healing.     Patient Session Goals / Objectives:    Discuss and review definitions and personal views/experiences with guilt and shame    Understand the differences between guilt and shame    Explore how feelings of guilt and shame impact functioning    Understand and practice strategies to manage difficult emotions and move towards healing    Understand and normalize difficult emotions through group discussion    Understand the utility of guilt and shame    Target  unwanted  emotions for change    Telemedicine Visit: The patient's condition can be safely assessed and treated via synchronous audio and visual telemedicine encounter.      Reason for Telemedicine Visit: Services only offered telehealth and due to COVID-19    Originating Site (Patient Location): Patient's home    Distant Site (Provider Location): Provider Remote Setting    Consent:  The patient/guardian has verbally consented to: the potential risks and benefits of telemedicine (video visit) versus in person care; bill my insurance or make self-payment for services provided; and responsibility for payment of non-covered services.     Mode of Communication:  Video Conference via  [employer]    As the provider I attest to compliance with applicable laws and regulations related to telemedicine.        Patient Participation / Response:  Fully participated with the group by sharing personal reflections / insights and openly received / provided feedback with other participants.    Demonstrated understanding of topics discussed through group discussion and participation, Expressed understanding of the relevance / importance of emotions management skills at distressing times in life and Demonstrated knowledge of when to consider applying a variety of emotions management skills in daily life    Treatment Plan:  Patient has a current master individualized treatment plan and today was our weekly review of the patient's progress.  See Epic treatment plan for progress / updates on goals and plan.    Izzy Arriaza, [facility]

## 2020-12-29 NOTE — GROUP NOTE
Process Group Note    PATIENT'S NAME: Xavier Landon  MRN:   [id number]  :   1990  ACCT. NUMBER: [id number]  DATE OF SERVICE: 20  START TIME:  1:00 PM  END TIME:  1:50 PM  [facility]  TOPIC:  Process Group    Diagnoses:  296.33 (F33.2) Major Depressive Disorder, Recurrent Episode, Severe _ and With anxious distress  300.02 (F41.1) Generalized Anxiety Disorder.  4. Other Diagnoses that is relevant to services:   Attention-Deficit/Hyperactivity Disorder  314.01 (F90.9) Unspecified Attention -Deficit / Hyperactivity Disorder.  5. Provisional Diagnosis: R/O Substance-Related & Addictive Disorders Alcohol Use Disorder   303.90 (F10.20) Moderate , as evidenced by recent increase in alcohol use, drinking more than intended, causing problems with roomates .  Should he be unable to control use, or other symptoms emerge during the course of treatment, the disorder may become the focus of assessment at that time.      [facility] Adult Partial Hospitalization Program  TRACK: PHP    NUMBER OF PARTICIPANTS: 6    Telemedicine Visit: The patient's condition can be safely assessed and treated via synchronous audio and visual telemedicine encounter.      Reason for Telemedicine Visit: Services only offered telehealth and due to COVID-19    Originating Site (Patient Location): Patient's home    Distant Site (Provider Location): Provider Remote Setting    Consent:  The patient/guardian has verbally consented to: the potential risks and benefits of telemedicine (video visit) versus in person care; bill my insurance or make self-payment for services provided; and responsibility for payment of non-covered services.     Mode of Communication:  Video Conference via [employer]    As the provider I attest to compliance with applicable laws and regulations related to telemedicine.            Data:    Session content: At the start of this group, patients were invited to check in by  identifying themselves, describing their current emotional status, and identifying issues to address in this group.   Area(s) of treatment focus addressed in this session included Symptom Management, Personal Safety and Community Resources/Discharge Planning.    Patient reported feeling better than yesterday. Patient discussed working toward sending FMA paperwork. Patient identified being capable as skills they will use to address their goal(s). Patient reported motivation may be a barrier to working toward their goal(s) and/or addressing mental health symptoms. Patient reported no safety concerns and/or self-injurious behaviors. Patient reported yes substance use. Patient reported they are taking their medications as prescribed. Patient reported feeling proud that they watched a movie with his roommate.     Therapeutic Interventions/Treatment Strategies:  Psychotherapist offered support, feedback and validation and reinforced use of skills. Treatment modalities used include Motivational Interviewing and Cognitive Behavioral Therapy. Interventions include Coping Skills: Discussed use of self-soothe skills to decrease distress in the body and Assisted patient in identifying 1-2 healthy distraction skills to reduce overall distress, Symptoms Management: Promoted understanding of their diagnoses and how it impacts their functioning and Emotions Management:  Discussed barriers to emotional regulation.    Assessment:    Patient response:   Patient responded to session by accepting feedback, giving feedback, listening, focusing on goals, being attentive and accepting support    Possible barriers to participation / learning include: and no barriers identified    Health Issues:   None reported       Substance Use Review:   Substance Use: Last use: Marijuana last night    Mental Status/Behavioral Observations  Appearance:   Appropriate   Eye Contact:   Good   Psychomotor Behavior: Normal   Attitude:   Cooperative    Orientation:   All  Speech   Rate / Production: Normal/ Responsive Normal    Volume:  Normal   Mood:    Anxious  Depressed  Normal  Affect:    Appropriate   Thought Content:   Clear and Safety reports  presence of suicidal ideation passive suicidal ideation   Thought Form:  Coherent  Logical     Insight:    Good     Plan:     Safety Plan: Patient consented to co-developed safety plan.  Safety and risk management plan was completed.  Patient agreed to use safety plan should any safety concerns arise.  A copy was given to the patient.    Committed to safety and agreed to follow previously developed safety coping plan.      Barriers to treatment: None identified    Patient Contracts (see media tab):  None    Substance Use: Provided encouragement towards sobriety     Continue or Discharge: Patient will continue in Adult Partial Hospitalization Program (PHP)  as planned. Patient is likely to benefit from learning and using skills as they work toward the goals identified in their treatment plan.      Izzy Arriaza, [facility]  December 29, 2020

## 2020-12-29 NOTE — GROUP NOTE
Psychoeducation Group Note    PATIENT'S NAME: Xavier Landon  MRN:   [id number]  :   1990  ACCT. NUMBER: [id number]  DATE OF SERVICE: 20  START TIME: 11:00 AM  END TIME: 11:50 AM  FACILITATOR: Verna Gamble RN  TOPIC: MH RN Group: [facility] Adult Partial Hospitalization Program  TRACK: 1    NUMBER OF PARTICIPANTS: 7    Summary of Group / Topics Discussed:  [facility]:  [facility]: self compassion  Patients will watch a video by Dr Lenore Delong, and discuss.  A brief review of the research on self compassion will be presented.  Patients will discuss differences between self compassion and self esteem.  We will discuss 3 core concepts of self compassion    Patient Session Goals / Objectives:  ? Patients will compare and contrast self esteem and self compassion  ? Patients will verbalize 3 concepts of self compassion  ? Patients will participate in self compassion exercise    Telemedicine Visit: The patient's condition can be safely assessed and treated via synchronous audio and visual telemedicine encounter.      Reason for Telemedicine Visit: Services only offered telehealth    Originating Site (Patient Location): Patient's home    Distant Site (Provider Location): Provider Remote Setting    Consent:  The patient/guardian has verbally consented to: the potential risks and benefits of telemedicine (video visit) versus in person care; bill my insurance or make self-payment for services provided; and responsibility for payment of non-covered services.     Mode of Communication:  Video Conference via [employer]    As the provider I attest to compliance with applicable laws and regulations related to telemedicine.           Patient Participation / Response:  Fully participated with the group by sharing personal reflections / insights and openly received / provided feedback with other participants.    Demonstrated understanding of topics discussed through  group discussion and participation    Treatment Plan:  Patient has a current master individualized treatment plan.  See Epic treatment plan for more information.    Verna Gamble RN

## 2020-12-29 NOTE — GROUP NOTE
Psychoeducation Group Note    PATIENT'S NAME: Xavier Landon  MRN:   [id number]  :   1990  ACCT. NUMBER: [id number]  DATE OF SERVICE: 20  START TIME:  9:00 AM  END TIME:  9:50 AM  FACILITATOR: Jenifer Pagan RN  TOPIC: TIMUR RN Group: [facility] Adult Partial Hospitalization Program  TRACK: 1    NUMBER OF PARTICIPANTS: 6    Telemedicine Visit: The patient's condition can be safely assessed and treated via synchronous audio and visual telemedicine encounter.      Reason for Telemedicine Visit: Services only offered telehealth    Originating Site (Patient Location): Patient's home    Distant Site (Provider Location): Provider Remote Setting    Consent:  The patient/guardian has verbally consented to: the potential risks and benefits of telemedicine (video visit) versus in person care; bill my insurance or make self-payment for services provided; and responsibility for payment of non-covered services.     Mode of Communication:  Video Conference via [employer]    As the provider I attest to compliance with applicable laws and regulations related to telemedicine.    Summary of Group / Topics Discussed:  Health Maintenance: Discharge planning/Community resources: Patients worked on completing an instructor-facilitated discharge planning activity. Discharge planning begins for all patients after admission. Competent discharge planning promotes a successful transition and decreases the likelihood of mental health relapse. In this group, all dimensions of wellness were reviewed to assess for needs/discharge readiness. These dimensions included: physical, emotional, occupational/productivity, environmental, social, spiritual, intellectual, and financial. Patients worked on completing/updating their discharge planning and identifying their treatment needs prior to time of discharge.     Patient Session Goals / Objectives:  ? Identified unmet treatment needs to accomplish before  discharge  ? Completed all dimensions of the discharge planning packet  ? Participated in the planning process, make phone calls, set up appointments, got connected with community resources, followed up with treatment team as needed         Patient Participation / Response:  Fully participated with the group by sharing personal reflections / insights and openly received / provided feedback with other participants.    Demonstrated understanding of topics discussed through group discussion and participation, Identified / Expressed personal readiness to practice skills and Verbalized understanding of health maintenance topic    Treatment Plan:  Patient has a current master individualized treatment plan.  See Epic treatment plan for more information.    Jenifer Pagan RN

## 2020-12-30 ENCOUNTER — HOSPITAL ENCOUNTER (OUTPATIENT)
Dept: BEHAVIORAL HEALTH | Facility: CLINIC | Age: 30
End: 2020-12-30
Attending: PSYCHIATRY & NEUROLOGY
Payer: COMMERCIAL

## 2020-12-30 PROCEDURE — H0035 MH PARTIAL HOSP TX UNDER 24H: HCPCS | Mod: 95

## 2020-12-30 PROCEDURE — 90853 GROUP PSYCHOTHERAPY: CPT | Mod: GT | Performed by: COUNSELOR

## 2020-12-30 PROCEDURE — H0035 MH PARTIAL HOSP TX UNDER 24H: HCPCS | Mod: GT

## 2020-12-30 PROCEDURE — H0035 MH PARTIAL HOSP TX UNDER 24H: HCPCS | Mod: 95 | Performed by: COUNSELOR

## 2020-12-30 NOTE — GROUP NOTE
Psychotherapy Group Note    PATIENT'S NAME: Xavier Landon  MRN:   [id number]  :   1990  ACCT. NUMBER: [id number]  DATE OF SERVICE: 20  START TIME:  1:00 PM  END TIME:  1:50 PM  FACILITATOR: Izzy Arriaza LPCC  TOPIC: MH EBP Group: Symptom Awareness  [facility] Adult Partial Hospitalization Program  TRACK: [facility]    NUMBER OF PARTICIPANTS: 6    Summary of Group / Topics Discussed:  Symptom Awareness: Mood Disorders: Patients received a general overview of mood disorders including depressive disorders, anxiety disorders, and bipolar disorders and how it relates to their current symptoms. The purpose is to promote understanding of their diagnoses and how it impacts their functioning. Patients reviewed their current awareness of symptoms and diagnoses. Patients received information regarding diagnoses, etiology, cultural, and environmental factors as well as impact on functioning.     Patient Session Goals / Objectives:    Discussed patient individual symptoms and experiences    Reviewed diagnostic criteria and etiology of diagnoses     Telemedicine Visit: The patient's condition can be safely assessed and treated via synchronous audio and visual telemedicine encounter.      Reason for Telemedicine Visit: Services only offered telehealth and due to COVID-19    Originating Site (Patient Location): Patient's home    Distant Site (Provider Location): Provider Remote Setting    Consent:  The patient/guardian has verbally consented to: the potential risks and benefits of telemedicine (video visit) versus in person care; bill my insurance or make self-payment for services provided; and responsibility for payment of non-covered services.     Mode of Communication:  Video Conference via [employer]    As the provider I attest to compliance with applicable laws and regulations related to telemedicine.          Patient Participation / Response:  Fully participated with the group by sharing personal  reflections / insights and openly received / provided feedback with other participants.    Demonstrated understanding of topics discussed through group discussion and participation, Demonstrated understanding of how information regarding symptoms can assist in management of symptoms and Identified / Expressed personal readiness to increase awareness of symptoms and apply skills as necessary    Treatment Plan:  Patient has a current master individualized treatment plan.  See Epic treatment plan for more information.    Izzy Arriaza, [facility]

## 2020-12-30 NOTE — GROUP NOTE
Process Group Note    PATIENT'S NAME: Xavier Landon  MRN:   [id number]  :   1990  ACCT. NUMBER: [id number]  DATE OF SERVICE: 20  START TIME: 10:00 AM  END TIME: 10:50 AM  FACILITATOR: Izzy Arriaza LPCC  TOPIC:  Process Group    Diagnoses:  296.33 (F33.2) Major Depressive Disorder, Recurrent Episode, Severe _ and With anxious distress  300.02 (F41.1) Generalized Anxiety Disorder.  4. Other Diagnoses that is relevant to services:   Attention-Deficit/Hyperactivity Disorder  314.01 (F90.9) Unspecified Attention -Deficit / Hyperactivity Disorder.  5. Provisional Diagnosis: R/O Substance-Related & Addictive Disorders Alcohol Use Disorder   303.90 (F10.20) Moderate , as evidenced by recent increase in alcohol use, drinking more than intended, causing problems with roomates .  Should he be unable to control use, or other symptoms emerge during the course of treatment, the disorder may become the focus of assessment at that time.      [facility] Adult Partial Hospitalization Program  TRACK: PHP    NUMBER OF PARTICIPANTS: 5    Telemedicine Visit: The patient's condition can be safely assessed and treated via synchronous audio and visual telemedicine encounter.      Reason for Telemedicine Visit: Services only offered telehealth and due to COVID-19    Originating Site (Patient Location): Patient's home    Distant Site (Provider Location): Provider Remote Setting    Consent:  The patient/guardian has verbally consented to: the potential risks and benefits of telemedicine (video visit) versus in person care; bill my insurance or make self-payment for services provided; and responsibility for payment of non-covered services.     Mode of Communication:  Video Conference via [facility]    As the provider I attest to compliance with applicable laws and regulations related to telemedicine.            Data:    Session content: At the start of this group, patients were invited to check in by  identifying themselves, describing their current emotional status, and identifying issues to address in this group.   Area(s) of treatment focus addressed in this session included Symptom Management, Personal Safety and Community Resources/Discharge Planning.    Patient reported feeling excited for the weekend. Patient discussed working toward figuring logistics of his road trip. Patient identified motivation as skills they will use to address their goal(s). Patient reported no identifable barriers to working toward their goal(s) and/or addressing mental health symptoms. Patient reported no safety concerns and/or self-injurious behaviors. Patient reported no substance use. Patient reported they are taking their medications as prescribed. Patient reported feeling proud that they sent his FMLA paperwork to program staff. Patient discussed his weekend plans with the treatment group.     Therapeutic Interventions/Treatment Strategies:  Psychotherapist offered support, feedback and validation and reinforced use of skills. Treatment modalities used include Motivational Interviewing and Cognitive Behavioral Therapy. Interventions include Coping Skills: Assisted patient in identifying 1-2 healthy distraction skills to reduce overall distress, Symptoms Management: Promoted understanding of their diagnoses and how it impacts their functioning and Emotions Management:  Discussed barriers to emotional regulation.    Assessment:    Patient response:   Patient responded to session by accepting feedback, giving feedback, listening, focusing on goals and being attentive    Possible barriers to participation / learning include: and no barriers identified    Health Issues:   None reported       Substance Use Review:   Substance Use: Last use: Marijuana last night    Mental Status/Behavioral Observations  Appearance:   Appropriate   Eye Contact:   Good   Psychomotor Behavior: Normal   Attitude:   Cooperative    Orientation:   All  Speech   Rate / Production: Normal/ Responsive Normal    Volume:  Normal   Mood:    Anxious  Depressed  Normal  Affect:    Appropriate   Thought Content:   Clear and Safety denies any current safety concerns including suicidal ideation, self-harm, and homicidal ideation  Thought Form:  Coherent  Logical     Insight:    Good     Plan:     Safety Plan: No current safety concerns identified.  Recommended that patient call 911 or go to the local ED should there be a change in any of these risk factors.     Barriers to treatment: None identified    Patient Contracts (see media tab):  None    Substance Use: Provided encouragement towards sobriety     Continue or Discharge: Patient will continue in Adult Partial Hospitalization Program (PHP)  as planned. Patient is likely to benefit from learning and using skills as they work toward the goals identified in their treatment plan.      Izzy Arriaza, [facility]  December 30, 2020

## 2020-12-30 NOTE — GROUP NOTE
Psychoeducation Group Note    PATIENT'S NAME: Xavire Landon  MRN:   [id number]  :   1990  [facility] NUMBER: [id number]  DATE OF SERVICE: 20  START TIME:  2:00 PM  END TIME:  2:50 PM  FACILITATOR: Verna Gamble RN  TOPIC: MH RN Group: Mind/Body Practice & Complementary  [facility] Adult Partial Hospitalization Program  TRACK: 1    NUMBER OF PARTICIPANTS: 6    Summary of Group / Topics Discussed:  Mind/Body Practice & Complementary Therapies:  Progressive Muscle Relaxation: In addition to affecting our mood and behavior, psychological stress can cause a myriad of physical symptoms in our body. Patients were educated on these effects and guided to increased self-awareness of how stress affects their body. The purpose, benefits, history, and techniques of progressive muscle relaxation were discussed. In an instructor guided experiential, patients were guided to practice PMR to help reduce physical symptoms of psychological stress and achieve a more balanced feeling of well-being.    Patient Session Goals / Objectives:  ? Identified physiological symptoms of stress on the body  ? Listed & Explained the purpose and benefits to practicing PMR   ? Practiced progressive muscle relaxation experiential  Telemedicine Visit: The patient's condition can be safely assessed and treated via synchronous audio and visual telemedicine encounter.      Reason for Telemedicine Visit: Services only offered telehealth    Originating Site (Patient Location): Patient's home    Distant Site (Provider Location): Provider Remote Setting    Consent:  The patient/guardian has verbally consented to: the potential risks and benefits of telemedicine (video visit) versus in person care; bill my insurance or make self-payment for services provided; and responsibility for payment of non-covered services.     Mode of Communication:  Video Conference via [employer]    As the provider I attest to compliance with applicable  laws and regulations related to telemedicine.       Patient Participation / Response:  Moderately participated, sharing some personal reflections / insights and adequately adequately received / provided feedback with other participants.    Demonstrated understanding of topics discussed through group discussion and participation    Treatment Plan:  Patient has a current master individualized treatment plan.  See Epic treatment plan for more information.    Verna Gamble RN

## 2020-12-30 NOTE — PROGRESS NOTES
"[facility] Psychiatric Progress Note  2020    Patient:  Xavier Landon   Medical Record Number:  [id number]  :  1990          Interim History:   The patient's care was discussed with the treatment team and chart notes were reviewed.  Patient seen last week for initial PHP exam did not get Lamictal filled \"I was too busy\" continues to abstain from alcohol but is smoking marijuana regularly.  Reports he finds the groups helpful but worries he has too far to go to get better.  Reports ongoing anxiety, which he would typically suppress with alcohol, consists of muscle tension and heart pounding at times.  Willing to try propranolol, will go and get Lamictal filled at the same time.    Psychiatric ROS:  Mood: depressed, anxious and agitated, less so than last week  Sleep:abnormal  Appetite:normal  Eating:normal  Energy Level:LOW  Concentration/Memory Problems:  YES  Suicidal Thoughts:No  Homicidal Thoughts:No  Psychotic Symptoms: No  Medication Side Effects:No  Medication Compliance:No  Physical Complaints:positive for anxiety symptoms as noted above         Medications:     Current Outpatient Medications   Medication Sig     Amphetamine-Dextroamphetamine (ADDERALL XR PO) Take 60 mg by mouth daily.     amphetamine-dextroamphetamine (ADDERALL) 20 MG tablet Take 20 mg by mouth 2 times daily     buPROPion (WELLBUTRIN XL) 150 MG 24 hr tablet Take 150 mg by mouth every morning     FLUoxetine HCl (PROZAC PO) Take  by mouth.     lamoTRIgine (LAMICTAL) 25 MG tablet Take 1 tablet (25 mg) by mouth daily for 1 week, then 1 tablet twice daily. Do not exceed this dose.     Varenicline Tartrate (CHANTIX PO) Take by mouth 2 times daily     No current facility-administered medications for this encounter.              Allergies:   No Known Allergies         Psychiatric Examination:   There were no vitals taken for this visit.  Weight is 0 lbs 0 oz  There is no height or " weight on file to calculate BMI.    Appearance:  adequately groomed, alert and mild distress  Attitude:  guarded  Eye Contact:  fair  Mood:  anxious  Affect:  intensity is exaggerated, intensity is dramatic and reactive  Speech:  pressured speech  Psychomotor Behavior:  no evidence of tardive dyskinesia, dystonia, or tics  Throught Process:  circumstantial  Associations:  no loose associations  Thought Content:  passive suicidal ideation present, no auditory hallucinations present and no visual hallucinations present  Insight:  fair  Judgement:  limited  Oriented to:  time, person, and place  Attention Span and Concentration:  limited  Recent and Remote Memory:  fair  Gait:Normal    Risk/Potential for Dangerousness:  Multiple Active Diagnoses:HIGH  Self Care:MODERATE  Suicide:LOW  Assault:LOW  Self Injurious Behaviors:LOW  Inappropriate Sexual Behavior:MODERATE         Labs:   No results found for this or any previous visit (from the past 24 hour(s)).     No results found for this or any previous visit (from the past 336 hour(s)).      Impression:   This is a 30 year old male ADHD and substance use disorder, has quit drinking for 2 weeks but displays little interest in long-term sobriety or CD treatment.  Did not get his Lamictal filled last week, will do so and try as needed propranolol low dose to target his anxiety symptoms.         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

## 2020-12-30 NOTE — GROUP NOTE
Psychoeducation Group Note    PATIENT'S NAME: Xavier Landon  MRN:   [id number]  :   1990  ACCT. NUMBER: [id number]  DATE OF SERVICE: 20  START TIME:  9:00 AM  END TIME:  9:50 AM  FACILITATOR: Verna Gamble RN  TOPIC: TIMUR RN Group: [facility] Adult Partial Hospitalization Program  TRACK: 1    NUMBER OF PARTICIPANTS: 6    Summary of Group / Topics Discussed:  Foundations of Health: Nutrition: Macronutrients: Patient were provided education on major macronutrients, their role in the body, and why it is important to meet daily nutritional needs. Obstacles to meeting daily nutritional needs were identified in group discussion and strategies to promote improved nutrition were explored. Macronutrients discussed include: carbohydrates, proteins and amino acids, fats, fiber, and water.     Patient Session Goals / Objectives:  ? Discussed the role of diet on mood, physical health, energy level, and the development of chronic disease.  ? Identified daily nutritional needs recommended by the [employer] via My [employer] education resources  ? Developing increased health literacy skills in finding credible nutrition information from reliable sources  Telemedicine Visit: The patient's condition can be safely assessed and treated via synchronous audio and visual telemedicine encounter.      Reason for Telemedicine Visit: Services only offered telehealth    Originating Site (Patient Location): Patient's home    Distant Site (Provider Location): Provider Remote Setting    Consent:  The patient/guardian has verbally consented to: the potential risks and benefits of telemedicine (video visit) versus in person care; bill my insurance or make self-payment for services provided; and responsibility for payment of non-covered services.     Mode of Communication:  Video Conference via [employer]    As the provider I attest to compliance with applicable laws and regulations related to telemedicine.        Patient Participation / Response:  Fully participated with the group by sharing personal reflections / insights and openly received / provided feedback with other participants.    Demonstrated understanding of topics discussed through group discussion and participation    Treatment Plan:  Patient has a current master individualized treatment plan.  See Epic treatment plan for more information.    Verna Gamble RN

## 2020-12-30 NOTE — GROUP NOTE
Psychoeducation Group Note    PATIENT'S NAME: Xavier Landon  MRN:   [id number]  :   1990  ACCT. NUMBER: [id number]  DATE OF SERVICE: 20  START TIME: 11:00 AM  END TIME: 11:50 AM  FACILITATOR: Diallo Shabazz OTR/L  TOPIC: [facility] OT Group: Lifestyle Balance and Structure  [facility] Day Treatment  TRACK: Partial    NUMBER OF PARTICIPANTS: 6    Summary of Group / Topics Discussed:  Lifestyle Balance and Structure:  Leisure Values and Motivation: Provided psychoeducation and discussion on benefits of leisure on stress management, parasympathetic NS activation, and wellness. Facilitated a structured self-reflective process where patients identified their leisure values: what is most important to them with regards to how they spend their time and energy on that promotes lifestyle balance to support mental wellbeing. Experiential process facilitated where patients reflected on past, present, and potential future leisure activities that fulfill their leisure values. Validation and support provided.    Patient Session Goals / Objectives:    [place] the mechanisms and benefits of leisure activity to create lifestyle balance and improve mental health.     Identified their leisure values (how they want to spend their time and energy)    Identified past, present, and future leisure activities that demonstrate a connection to their leisure values    Identify first step to engaging in a new or old leisure activity again and problem solve barriers.         Patient Participation / Response:  Fully participated with the group by sharing personal reflections / insights and openly received / provided feedback with other participants.    Demonstrated understanding of content through handout/group discussion , Verbalized understanding of content and Patient would benefit from additional opportunities to practice the content to be able to generalize it to their everyday life with  increased intentionality, consistency, and efficacy in support of their psychiatric recovery    Treatment Plan:  Patient has a current master individualized treatment plan.  See Epic treatment plan for more information.    Diallo Shabazz, OTR/L

## 2020-12-31 NOTE — ADDENDUM NOTE
Encounter addended by: Izzy Arriaza LPCC on: 12/31/2020 7:31 AM   Actions taken: Charge Capture section accepted

## 2021-01-04 ENCOUNTER — HOSPITAL ENCOUNTER (OUTPATIENT)
Dept: BEHAVIORAL HEALTH | Facility: CLINIC | Age: 31
End: 2021-01-04
Attending: PSYCHIATRY & NEUROLOGY
Payer: COMMERCIAL

## 2021-01-04 DIAGNOSIS — F33.2 MDD (MAJOR DEPRESSIVE DISORDER), RECURRENT SEVERE, WITHOUT PSYCHOSIS (H): Primary | ICD-10-CM

## 2021-01-04 RX ORDER — PROPRANOLOL HYDROCHLORIDE 10 MG/1
10 TABLET ORAL 4 TIMES DAILY PRN
Qty: 60 TABLET | Refills: 0 | Status: SHIPPED | OUTPATIENT
Start: 2021-01-04 | End: 2021-08-12

## 2021-01-04 NOTE — PROGRESS NOTES
"[facility]   Dr. Jones's Psychiatric Progress Note  2021      Patient:  Xavier Landon   Medical Record Number:  [id number]  :  1990          Interim History:   The patient's care was discussed with the treatment team and chart notes were reviewed.  Patient seen last week for initial PHP exam did not get Lamictal filled \"I was too busy\" continues to abstain from alcohol but is smoking marijuana regularly.  Reports he finds the groups helpful but worries he has too far to go to get better.  Reports ongoing anxiety, which he would typically suppress with alcohol, consists of muscle tension and heart pounding at times.  Willing to try propranolol, will go and get Lamictal filled at the same time.    21  Pt started the PHP the week before Xmas.  [place] lives in [place]  He has no OP psychiatrist.  Mood was depressed and hopeless PTA.  He had anhedonia and amotivation.  He has not started Lamictal or even picked it up.  Mood is a little better.  He's unemployed.  He's looking at DBT or MICD Day Tx.      Psychiatric ROS:  Mood:  Not great;  A little better  Sleep:  Too much or too little  Appetite:  Out of hand at times if not taking Adderall  Eating:  Normal;  Stable back on meds  Energy Level: ok;  Visited friends and enjoyed self  Concentration/Memory:  Poor;  Adderall helps  Suicidal Thoughts:No  Homicidal Thoughts:No  Psychotic Symptoms: No  Medication Side Effects:No  Medication Compliance:No  Physical Complaints: none         Medications:     Current Outpatient Medications   Medication Sig     propranolol (INDERAL) 10 MG tablet Take 1 tablet (10 mg) by mouth 4 times daily as needed (anxiety)     Amphetamine-Dextroamphetamine (ADDERALL XR PO) Take 60 mg by mouth daily.     amphetamine-dextroamphetamine (ADDERALL) 20 MG tablet Take 20 mg by mouth 2 times daily     buPROPion (WELLBUTRIN XL) 150 MG 24 hr tablet Take 150 mg by mouth every morning     FLUoxetine HCl " (PROZAC PO) Take  by mouth.     lamoTRIgine (LAMICTAL) 25 MG tablet Take 1 tablet (25 mg) by mouth daily for 1 week, then 1 tablet twice daily. Do not exceed this dose.     Varenicline Tartrate (CHANTIX PO) Take by mouth 2 times daily     No current facility-administered medications for this encounter.              Allergies:   No Known Allergies         Psychiatric Examination:   There were no vitals taken for this visit.  Weight is 0 lbs 0 oz  There is no height or weight on file to calculate BMI.    Appearance:  adequately groomed, alert and mild distress  Attitude:  guarded  Eye Contact:  fair  Mood:  anxious  Affect:  intensity is exaggerated, intensity is dramatic and reactive  Speech:  pressured speech  Psychomotor Behavior:  no evidence of tardive dyskinesia, dystonia, or tics  Throught Process:  circumstantial  Associations:  no loose associations  Thought Content:  passive suicidal ideation present, no auditory hallucinations present and no visual hallucinations present  Insight:  fair  Judgement:  limited  Oriented to:  time, person, and place  Attention Span and Concentration:  limited  Recent and Remote Memory:  fair  Gait:Normal    Risk/Potential for Dangerousness:  Multiple Active Diagnoses:HIGH  Self Care:MODERATE  Suicide:LOW  Assault:LOW  Self Injurious Behaviors:LOW  Inappropriate Sexual Behavior:MODERATE         Labs:   No results found for this or any previous visit (from the past 24 hour(s)).     No results found for this or any previous visit (from the past 336 hour(s)).      Impression:   This is a 30 year old male ADHD and substance use disorder, has quit drinking for 2 weeks but displays little interest in long-term sobriety or CD treatment.  Did not get his Lamictal filled last week, will do so and try as needed propranolol low dose to target his anxiety symptoms.         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 Ingram added propranolol 10 mg up to 4 times daily as needed anxiety or tremor also start taking lamotrigine, which he never picked up at the pharmacy.    Discussed Risks/Benefits/Side Effects/Alternatives: YES  Able to give informed consent:  YES   Precautions:yes, advised to watch for lightheadedness or dizziness  DBT or MICD Day Tx.        Nabeel Jones MD

## 2021-01-05 ENCOUNTER — HOSPITAL ENCOUNTER (OUTPATIENT)
Dept: BEHAVIORAL HEALTH | Facility: CLINIC | Age: 31
End: 2021-01-05
Attending: PSYCHIATRY & NEUROLOGY
Payer: COMMERCIAL

## 2021-01-05 ENCOUNTER — TELEPHONE (OUTPATIENT)
Dept: BEHAVIORAL HEALTH | Facility: CLINIC | Age: 31
End: 2021-01-05

## 2021-01-05 PROCEDURE — H0035 MH PARTIAL HOSP TX UNDER 24H: HCPCS | Mod: GT | Performed by: COUNSELOR

## 2021-01-05 PROCEDURE — H0035 MH PARTIAL HOSP TX UNDER 24H: HCPCS | Mod: GT

## 2021-01-05 NOTE — TELEPHONE ENCOUNTER
Writer contacted patient to complete treatment plan review and discuss treatment options as patient has missed the past two days of the partial hospitalization program. Patient's voicemail box was full and writer was unable to leave a message.     MORGAN Zhang on 1/5/2021 at 10:14 AM

## 2021-01-05 NOTE — PROGRESS NOTES
Acknowledgement of Current Treatment Plan       I have reviewed my treatment plan with my therapist / counselor on 1/5/21.   I agree with the plan as it is written in the electronic health record.    Name:      Signature:  Xavier Landon Unable to sign due to COVID-19     Dr. Nabeel Jones MD  Psychiatrist    Roseann Mccain, MS, [facility], BC-DMT  Psychotherapist     Izzy Arriaza MA, [facility]  Psychotherapist  MORGAN Zhang on 1/5/2021 at 2:03 PM

## 2021-01-05 NOTE — TELEPHONE ENCOUNTER
Writer contacted patient to discuss treatment plan; voicemail box was full.     MORGAN hZang on 1/5/2021 at 11:53 AM

## 2021-01-05 NOTE — GROUP NOTE
Psychotherapy Group Note    PATIENT'S NAME: Xavier Landon  MRN:   [id number]  :   1990  ACCT. NUMBER: [id number]  DATE OF SERVICE: 21  START TIME:  4:00 PM  END TIME:  6:00 PM  FACILITATOR: Izzy Arriaza LPCC  TOPIC: MH EBP Group: Specialty Awareness  [facility] Adult Partial Hospitalization Program  TRACK: [facility]    NUMBER OF PARTICIPANTS: 4    Summary of Group / Topics Discussed:  Specialty Topics: Education Support Network: Patients worked on identifying the mild, moderate, and severe symptoms of their disorder.  Patients identified helpful supportive statements and actions they would appreciate from caregivers.  The goal is to gather information in preparation for the education support network for problem solving and planning for support with caregivers.    Education Support Network:  Patients and caregivers received an overview of diagnoses including physical, intellectual, and behavioral indicators of illness. Patients presented information created in the support network development group to engage caregivers in planning for help and support to manage mental health symptoms.  The goal is to help patients and caregivers create a plan for support and assistance after discharge.      Patient Session Goals / Objectives:    Understanding diagnoses and accompanying physical reactions, thoughts, and behaviors.      Explored options to support patients in their recovery     Formulated a plan with caregivers for assistance and support to aid in recovery     Problem solved barriers to follow through on plan      Telemedicine Visit: The patient's condition can be safely assessed and treated via synchronous audio and visual telemedicine encounter.      Reason for Telemedicine Visit: Services only offered telehealth and due to COVID-19    Originating Site (Patient Location): Patient's home    Distant Site (Provider Location): Provider Remote Setting    Consent:  The patient/guardian has  verbally consented to: the potential risks and benefits of telemedicine (video visit) versus in person care; bill my insurance or make self-payment for services provided; and responsibility for payment of non-covered services.     Mode of Communication:  Video Conference via [employer]    As the provider I attest to compliance with applicable laws and regulations related to telemedicine.        Patient Participation / Response:  Fully participated with the group by sharing personal reflections / insights and openly received / provided feedback with other participants.    Demonstrated understanding of topics discussed through group discussion and participation and Identified / Expressed readiness to act on skill suggestions discussed in topic    Treatment Plan:  Patient has a current master individualized treatment plan and today was our weekly review of the patient's progress.  See Epic treatment plan for progress / updates on goals and plan.    Izzy Arriaza, [facility]

## 2021-01-05 NOTE — GROUP NOTE
Process Group Note    PATIENT'S NAME: Xavier Landon  MRN:   [id number]  :   1990  ACCT. NUMBER: [id number]  DATE OF SERVICE: 21  START TIME:  1:00 PM  END TIME:  1:50 PM  FACILITATOR: [facility]aza [facility]  TOPIC:  Process Group    Diagnoses:  296.33 (F33.2) Major Depressive Disorder, Recurrent Episode, Severe _ and With anxious distress  300.02 (F41.1) Generalized Anxiety Disorder.  4. Other Diagnoses that is relevant to services:   Attention-Deficit/Hyperactivity Disorder  314.01 (F90.9) Unspecified Attention -Deficit / Hyperactivity Disorder.  5. Provisional Diagnosis: R/O Substance-Related & Addictive Disorders Alcohol Use Disorder   303.90 (F10.20) Moderate , as evidenced by recent increase in alcohol use, drinking more than intended, causing problems with roomates .  Should he be unable to control use, or other symptoms emerge during the course of treatment, the disorder may become the focus of assessment at that time.      [facility] Adult Partial Hospitalization Program  TRACK: [facility]    NUMBER OF PARTICIPANTS: 5    Telemedicine Visit: The patient's condition can be safely assessed and treated via synchronous audio and visual telemedicine encounter.      Reason for Telemedicine Visit: Services only offered telehealth and due to COVID-19    Originating Site (Patient Location): Patient's home    Distant Site (Provider Location): Provider Remote Setting    Consent:  The patient/guardian has verbally consented to: the potential risks and benefits of telemedicine (video visit) versus in person care; bill my insurance or make self-payment for services provided; and responsibility for payment of non-covered services.     Mode of Communication:  Video Conference via [employer]    As the provider I attest to compliance with applicable laws and regulations related to telemedicine.            Data:    Session content: At the start of this group, patients were invited to check in by  identifying themselves, describing their current emotional status, and identifying issues to address in this group.   Area(s) of treatment focus addressed in this session included Symptom Management, Personal Safety and Community Resources/Discharge Planning.    Patient reported feeling tired. Patient discussed working toward focusing during group. Patient identified focusing on one thing at a time as skills they will use to address their goal(s). Patient reported exhaustion from driving may be a barrier to working toward their goal(s) and/or addressing mental health symptoms. Patient reported no safety concerns and/or self-injurious behaviors. Patient reported yes substance use. Patient reported they are taking their medications as prescribed. Patient reported feeling proud that they made the trip to [place]. Patient discussed his trip to visit his friend with the treatment group.     Therapeutic Interventions/Treatment Strategies:  Psychotherapist offered support, feedback and validation and reinforced use of skills. Treatment modalities used include Motivational Interviewing and Cognitive Behavioral Therapy. Interventions include Coping Skills: Assisted patient in identifying 1-2 healthy distraction skills to reduce overall distress, Symptoms Management: Promoted understanding of their diagnoses and how it impacts their functioning and Emotions Management:  Discussed barriers to emotional regulation.    Assessment:    Patient response:   Patient responded to session by accepting feedback, giving feedback, listening, focusing on goals, being attentive and accepting support    Possible barriers to participation / learning include: and no barriers identified    Health Issues:   None reported       Substance Use Review:   Substance Use: Last use: Marijuana and alcohol over the weekend    Mental Status/Behavioral Observations  Appearance:   Appropriate   Eye Contact:   Good   Psychomotor Behavior: Normal    Attitude:   Cooperative   Orientation:   All  Speech   Rate / Production: Normal/ Responsive Normal    Volume:  Normal   Mood:    Anxious  Depressed  Normal  Affect:    Appropriate   Thought Content:   Clear and Safety denies any current safety concerns including suicidal ideation, self-harm, and homicidal ideation  Thought Form:  Coherent  Logical     Insight:    Good     Plan:     Safety Plan: No current safety concerns identified.  Recommended that patient call 911 or go to the local ED should there be a change in any of these risk factors.     Barriers to treatment: None identified    Patient Contracts (see media tab):  None    Substance Use: Provided encouragement towards sobriety     Continue or Discharge: Patient will continue in Adult Partial Hospitalization Program (PHP)  as planned. Patient is likely to benefit from learning and using skills as they work toward the goals identified in their treatment plan.      Izzy Arriaza, [facility]  January 5, 2021

## 2021-01-05 NOTE — GROUP NOTE
Psychoeducation Group Note    PATIENT'S NAME: Xavier Landon  MRN:   [id number]  :   1990  ACCT. NUMBER: [id number]  DATE OF SERVICE: 21  START TIME:  2:00 PM  END TIME:  2:50 PM  FACILITATOR: Verna Gamble RN  TOPIC: TIMUR RN Group: [facility] Adult Partial Hospitalization Program  TRACK: 1    NUMBER OF PARTICIPANTS: 5    Summary of Group / Topics Discussed:  Foundations of Health: Sleep: Case study/sleep hygiene: Patients explored the connection between sleep and mental illness. Patients learned about how adequate sleep can improve health, productivity, wellness, quality of life, and safety.     Patient Session Goals / Objectives:  ? Demonstrated understanding of sleep hygiene practices and benefits of sleep  ? Identified sleep hygiene strategies to utilize     Described the connection between sleep disturbances and mental illness  Telemedicine Visit: The patient's condition can be safely assessed and treated via synchronous audio and visual telemedicine encounter.      Reason for Telemedicine Visit: Services only offered telehealth    Originating Site (Patient Location): Patient's home    Distant Site (Provider Location): Provider Remote Setting    Consent:  The patient/guardian has verbally consented to: the potential risks and benefits of telemedicine (video visit) versus in person care; bill my insurance or make self-payment for services provided; and responsibility for payment of non-covered services.     Mode of Communication:  Video Conference via [employer]    As the provider I attest to compliance with applicable laws and regulations related to telemedicine.       Patient Participation / Response:  Fully participated with the group by sharing personal reflections / insights and openly received / provided feedback with other participants.    Demonstrated understanding of topics discussed through group discussion and participation    Treatment  Plan:  Patient has a current master individualized treatment plan.  See Epic treatment plan for more information.    Verna Gamble RN

## 2021-01-05 NOTE — GROUP NOTE
Psychoeducation Group Note    PATIENT'S NAME: Xavier Landon  MRN:   [id number]  :   1990  [id number]  DATE OF SERVICE: 21  START TIME:  3:00 PM  END TIME:  3:50 PM  FACILITATOR: Tim Wise OT  TOPIC: [facility] OT Group: Self- Regulation Skills  [facility] Adult Partial Hospitalization Program  TRACK: 1    Telemedicine Visit: The patient's condition can be safely assessed and treated via synchronous audio and visual telemedicine encounter.      Reason for Telemedicine Visit: Services only offered telehealth and Covid 19    Originating Site (Patient Location): Patient's home    Distant Site (Provider Location): Provider Remote Setting    Consent:  The patient/guardian has verbally consented to: the potential risks and benefits of telemedicine (video visit) versus in person care; bill my insurance or make self-payment for services provided; and responsibility for payment of non-covered services.     Mode of Communication:  Video Conference via [employer]    As the provider I attest to compliance with applicable laws and regulations related to telemedicine.      NUMBER OF PARTICIPANTS: 5    Summary of Group / Topics Discussed:  Self-Regulation Skills: Sensory Enhanced Mindfulness: Breathwork. Patients were provided with written and verbal psychoeducation on the concept of integrating mindfulness with a bottom up, sensory rich, experiential intervention activities to develop self-awareness and skills for self-regulation.  Emphasis placed on the benefits of mindfulness through bottom up (body based) activities and how they can help to emotionally ground oneself or provide a healthy distraction to self-regulate when distressed. Patients worked to increase knowledge and skills during a guided skilled structured sensory enhanced activity: safe functional breathing practices to support distress tolerance. Facilitation of reflection to reinforce taught concepts was provided.  "    Patient Session Goals / Objectives:    Identified how using sensory enhanced mindfulness practices can be used for grounding, stress management, and self-regulation      Improved awareness of different types of sensory enhanced mindfulness activities that assist with healthy coping of stress and symptoms      Established a plan for practice of these skills in their own environments    Practiced and reflected on how to generalize taught skills to their everyday life        Patient Participation / Response:  Fully participated with the group by sharing personal reflections / insights and openly received / provided feedback with other participants.    Patient presentation: Resisitent to concept of \"mindfulness\" but was able to express this and did participate in the breathing practice. , Verbalized understanding of content and Patient would benefit from additional opportunities to practice the content to be able to generalize it to their everyday life with increased intentionality, consistency, and efficacy in support of their psychiatric recovery    Treatment Plan:  Patient has a current master individualized treatment plan.  See Epic treatment plan for more information.    Tim Wise, OT  "

## 2021-01-06 ENCOUNTER — HOSPITAL ENCOUNTER (OUTPATIENT)
Dept: BEHAVIORAL HEALTH | Facility: CLINIC | Age: 31
End: 2021-01-06
Attending: PSYCHIATRY & NEUROLOGY
Payer: COMMERCIAL

## 2021-01-06 PROCEDURE — H0035 MH PARTIAL HOSP TX UNDER 24H: HCPCS | Mod: GT

## 2021-01-06 PROCEDURE — H0035 MH PARTIAL HOSP TX UNDER 24H: HCPCS | Mod: GT | Performed by: OCCUPATIONAL THERAPIST

## 2021-01-06 PROCEDURE — H0035 MH PARTIAL HOSP TX UNDER 24H: HCPCS | Mod: GT | Performed by: COUNSELOR

## 2021-01-06 PROCEDURE — H0035 MH PARTIAL HOSP TX UNDER 24H: HCPCS | Mod: GT | Performed by: SOCIAL WORKER

## 2021-01-06 NOTE — PROGRESS NOTES
"[facility]   Dr. Jones's Psychiatric Progress Note  2021      Patient:  Xavier Landon   Medical Record Number:  [id number]  :  1990          Interim History:   The patient's care was discussed with the treatment team and chart notes were reviewed.  Patient seen last week for initial PHP exam did not get Lamictal filled \"I was too busy\" continues to abstain from alcohol but is smoking marijuana regularly.  Reports he finds the groups helpful but worries he has too far to go to get better.  Reports ongoing anxiety, which he would typically suppress with alcohol, consists of muscle tension and heart pounding at times.  Willing to try propranolol, will go and get Lamictal filled at the same time.    21  Pt started the PHP the week before [facility].  Pt lives in [facility].  He has no OP psychiatrist.  Mood was depressed and hopeless PTA.  He had anhedonia and amotivation.  He has not started Lamictal or even picked it up.  Mood is a little better.  He's unemployed.  He's looking at DBT or [facility].      21:  Looking at next steps.  Pt was referred to PHP by an intake person at [facility].  His psych thru [facility] in [place] termed with him.  Pt had left that [facility] rude voicemails, so they terminated with him.   Pt has not gotten an outpatient psychiatrist.  He may have to go back to work next week.  Pt finishes PHP tomorrow.  Pt works at [employer] in [place].  He's bummed that he might have to go back to work next week.  Likes being around people in group but finds PHP unhelpful.  No racing thoughts.  He never did follow through with filling the Lamictal Dr. Ingram prescribed for him.      Psychiatric ROS:  Mood:  Anxious, somewhat upset  Sleep:  Not great; 4 to 5 hours;    Appetite:  normal  Eating:  Normal  Energy Level:  energized  Concentration/Memory:  Poor;  Adderall helps  Suicidal Thoughts:No  Homicidal Thoughts:No  Psychotic Symptoms: No  Medication Side " Effects:No  Medication Compliance:No  Physical Complaints: none         Medications:     Current Outpatient Medications   Medication Sig     Amphetamine-Dextroamphetamine (ADDERALL XR PO) Take 60 mg by mouth daily.     amphetamine-dextroamphetamine (ADDERALL) 20 MG tablet Take 20 mg by mouth 2 times daily     buPROPion (WELLBUTRIN XL) 150 MG 24 hr tablet Take 150 mg by mouth every morning     FLUoxetine HCl (PROZAC PO) Take  by mouth.     lamoTRIgine (LAMICTAL) 25 MG tablet Take 1 tablet (25 mg) by mouth daily for 1 week, then 1 tablet twice daily. Do not exceed this dose.     propranolol (INDERAL) 10 MG tablet Take 1 tablet (10 mg) by mouth 4 times daily as needed (anxiety)     Varenicline Tartrate (CHANTIX PO) Take by mouth 2 times daily     No current facility-administered medications for this encounter.              Allergies:   No Known Allergies         Psychiatric Examination:   There were no vitals taken for this visit.  Weight is 0 lbs 0 oz  There is no height or weight on file to calculate BMI.    Appearance:  adequately groomed, alert and mild distress  Attitude:  guarded  Eye Contact:  fair  Mood:  anxious  Affect:  intensity is exaggerated, intensity is dramatic and reactive  Speech:  pressured speech  Psychomotor Behavior:  no evidence of tardive dyskinesia, dystonia, or tics  Throught Process:  circumstantial  Associations:  no loose associations  Thought Content:  passive suicidal ideation present, no auditory hallucinations present and no visual hallucinations present  Insight:  fair  Judgement:  limited  Oriented to:  time, person, and place  Attention Span and Concentration:  limited  Recent and Remote Memory:  fair  Gait:Normal    Risk/Potential for Dangerousness:  Multiple Active Diagnoses:HIGH  Self Care:MODERATE  Suicide:LOW  Assault:LOW  Self Injurious Behaviors:LOW  Inappropriate Sexual Behavior:MODERATE         Labs:   No results found for this or any previous visit (from the past 24  hour(s)).     No results found for this or any previous visit (from the past 336 hour(s)).      Impression:   This is a 30 year old male ADHD and substance use disorder, has quit drinking for 2 weeks but displays little interest in long-term sobriety or CD treatment.  Did not get his Lamictal filled last week, will do so and try as needed propranolol low dose to target his anxiety symptoms.         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 Ingram added propranolol 10 mg up to 4 times daily as needed anxiety or tremor also start taking lamotrigine, which he never picked up at the pharmacy.    Pt is getting a psychiatrist thru [facility] but not Dr. Jones.      Discussed Risks/Benefits/Side Effects/Alternatives: YES  Able to give informed consent:  YES   Precautions:yes, advised to watch for lightheadedness or dizziness  DBT thru [facility] or [facility].        Nabeel Jones MD

## 2021-01-06 NOTE — GROUP NOTE
Psychoeducation Group Note    PATIENT'S NAME: Xavier Landon  MRN:   [id number]  :   1990  ACCT. NUMBER: [id number]  DATE OF SERVICE: 21  START TIME:  9:00 AM  END TIME:  9:50 AM  FACILITATOR: Verna Gamble RN  TOPIC: MH RN Group: Brain Health  [facility] Adult Partial Hospitalization Program  TRACK: 1    NUMBER OF PARTICIPANTS: 8    Summary of Group / Topics Discussed:  Brain Health:  Pathophysiology of stress and anxiety: Patients were educated on the difference between stress, chronic stress, and anxiety. The anatomy and pathophysiology of the body/brain were reviewed to illustrate the immediate effects of stress/anxiety in the body and the long term effects and increased risks for chronic disease that come from unmanaged stress/anxiety. Self-coping strategies to manage symptoms of stress were reviewed and pharmacologic, psychotherapeutic, and complementary treatment options were discussed.    Patient Session Goals / Objectives:  ? Described the differences between stress and anxiety and how the body responds to it  ? Listed the long term effects and increased risks for chronic disease that can arise from unmanaged stress/anxiety  ? Identified and described pharmacologic, psychotherapeutic, and complementary treatment options  Telemedicine Visit: The patient's condition can be safely assessed and treated via synchronous audio and visual telemedicine encounter.      Reason for Telemedicine Visit: Services only offered telehealth    Originating Site (Patient Location): Patient's home    Distant Site (Provider Location): Provider Remote Setting    Consent:  The patient/guardian has verbally consented to: the potential risks and benefits of telemedicine (video visit) versus in person care; bill my insurance or make self-payment for services provided; and responsibility for payment of non-covered services.     Mode of Communication:  Video Conference via [employer]    As the provider I  attest to compliance with applicable laws and regulations related to telemedicine.       Patient Participation / Response:  Fully participated with the group by sharing personal reflections / insights and openly received / provided feedback with other participants.    Demonstrated understanding of topics discussed through group discussion and participation    Treatment Plan:  Patient has a current master individualized treatment plan.  See Epic treatment plan for more information.    Verna Gamble RN

## 2021-01-06 NOTE — GROUP NOTE
Process Group Note    PATIENT'S NAME: Xavier aLndon  MRN:   [id number]  :   1990  ACCT. 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      Reason for Telemedicine Visit: Services only offered telehealth and due to COVID-19    Originating Site (Patient Location): Patient's home    Distant Site (Provider Location): Provider Remote Setting    Consent:  The patient/guardian has verbally consented to: the potential risks and benefits of telemedicine (video visit) versus in person care; bill my insurance or make self-payment for services provided; and responsibility for payment of non-covered services.     Mode of Communication:  Video Conference via [employer]    As the provider I attest to compliance with applicable laws and regulations related to telemedicine.            Data:    Session content: At the start of this group, patients were invited to check in by identifying themselves, describing their current emotional status, and identifying issues to address in this group.   Area(s) of treatment focus addressed in this session included Symptom Management, Personal Safety and Community Resources/Discharge Planning.    Patient reported feeling anxious and tired. Patient discussed working toward planning next steps after completing this program. Patient identified contact his therapist as skills they will use to address their goal(s). Patient reported anxiety  may be a barrier to working toward their goal(s) and/or addressing mental health symptoms. Patient reported no safety concerns and/or self-injurious behaviors. Patient reported no substance use. Patient reported they are taking their medications as prescribed. Patient reported feeling proud that they didn't use substances. Patient discussed wanting to start his lamictal prescription with the treatment group.     Therapeutic Interventions/Treatment Strategies:  Psychotherapist offered support, feedback and validation and reinforced use of skills. Treatment modalities used include Motivational Interviewing and Cognitive Behavioral Therapy. Interventions include Coping Skills: Discussed use of self-soothe skills to decrease distress in the body, Mindfulness: Facilitated discussion of when/how to use mindfulness skills to benefit general health, mental health symptoms, and stressors and Symptoms Management: Promoted understanding of their diagnoses and how it impacts their functioning.    Assessment:    Patient response:   Patient responded to session by accepting feedback, giving feedback, listening, focusing on goals, being attentive and accepting support    Possible barriers to participation / learning include: and no barriers identified    Health Issues:   None reported       Substance Use Review:   Substance Use: No active concerns identified.    Mental Status/Behavioral Observations  Appearance:   Appropriate   Eye Contact:   Good   Psychomotor Behavior: Normal   Attitude:   Cooperative   Orientation:   All  Speech   Rate / Production: Normal/ Responsive Normal    Volume:  Normal   Mood:    Anxious  Depressed  Normal  Affect:    Appropriate   Thought Content:   Clear and Safety denies any current safety concerns including suicidal ideation, self-harm, and homicidal ideation  Thought Form:  Coherent  Logical     Insight:    Good     Plan:     Safety Plan: No current safety concerns identified.  Recommended that  patient call 911 or go to the local ED should there be a change in any of these risk factors.     Barriers to treatment: None identified    Patient Contracts (see media tab):  None    Substance Use: Provided encouragement towards sobriety     Continue or Discharge: Patient will continue in Adult Partial Hospitalization Program (PHP)  as planned. Patient is likely to benefit from learning and using skills as they work toward the goals identified in their treatment plan.      Izzy Arriaza, [facility]  January 6, 2021

## 2021-01-06 NOTE — GROUP NOTE
Psychotherapy Group Note    PATIENT'S NAME: Xavier Landon  MRN:   [id number]  :   1990  ACCT. NUMBER: [id number]  DATE OF SERVICE: 21  START TIME:  2:00 PM  END TIME:  2:50 PM  FACILITATOR: Roseann Mccain LICSW  TOPIC:  EBP Group: Enhanced Mindfulness  [facility] Adult Partial Hospitalization Program  TRACK: 1    NUMBER OF PARTICIPANTS: 8    Summary of Group / Topics Discussed:  Enhanced Mindfulness: Body and Mind Integration: Patients received an overview and education regarding the importance of including the body in the management of emotional health and self-care and as a direct route to mindfulness practice.  Patients discussed various ways in which the body can serve as an informant to their physical and emotional experiences/need. Patients discussed the body as a direct link to the present moment and to mindfulness practice.  Patients discussed current relationship with body, self-awareness, mindfulness practice and barriers to connection with body.  Patients were guided through breath work and movement to facilitate greater self-awareness, grounding, self-expression, and connection to other.  Patients discussed how the experiential could be applied to better manage mental health and develop hudson connection to self.    Patient Session Goals / Objectives:    Identify how movement awareness could be used for grounding, stress management, self-expression, connection to other and self-regulation    Improved awareness of breath and movement preferences    Identify how movement and the body is used in mindfulness practice    Reflect on use of these practices in everyday life.    Identify barriers to attending to body  Telemedicine Visit: The patient's condition can be safely assessed and treated via synchronous audio and visual telemedicine encounter.      Reason for Telemedicine Visit: Services only offered telehealth and covid19    Originating Site (Patient Location): Patient's  home    Distant Site (Provider Location): Provider Remote Setting    Consent:  The patient/guardian has verbally consented to: the potential risks and benefits of telemedicine (video visit) versus in person care; bill my insurance or make self-payment for services provided; and responsibility for payment of non-covered services.     Mode of Communication:  Video Conference via [employer]    As the provider I attest to compliance with applicable laws and regulations related to telemedicine.       Patient Participation / Response:  Fully participated with the group by sharing personal reflections / insights and openly received / provided feedback with other participants.    Demonstrated understanding of topics discussed through group discussion and participation and Practiced skills in session    Treatment Plan:  Patient has a current master individualized treatment plan.  See Epic treatment plan for more information.    ESTRELLITA EarlySW

## 2021-01-06 NOTE — GROUP NOTE
Psychoeducation Group Note    PATIENT'S NAME: Xavier Landon  MRN:   [id number]  :   1990  ACCT. NUMBER: [id number]  DATE OF SERVICE: 21  START TIME: 11:00 AM  END TIME: 11:50 AM  FACILITATOR: Cinda Cantu OTR/L  TOPIC: MH PHP OT Group: Self- Regulation Skills  [facility] Adult Partial Hospitalization Program  TRACK: 1    NUMBER OF PARTICIPANTS: 8    Telemedicine Visit: The patient's condition can be safely assessed and treated via synchronous audio and visual telemedicine encounter.      Reason for Telemedicine Visit: Services only offered telehealth    Originating Site (Patient Location): Patient's home    Distant Site (Provider Location): [facility] Outpatient Setting: Partial [facility]    Consent:  The patient/guardian has verbally consented to: the potential risks and benefits of telemedicine (video visit) versus in person care; bill my insurance or make self-payment for services provided; and responsibility for payment of non-covered services.     Mode of Communication:  Video Conference via Zoom     As the provider I attest to compliance with applicable laws and regulations related to telemedicine.     Summary of Group / Topics Discussed:  Self-Regulation Skills: Coping Through the Senses Introduction Part 1: Patients were introduced and taught about neurosensory based skills and strategies related to supporting effective self regulation skills.  Patients were taught about the external sensory systems and how they can be used for coping with mental health symptoms and stressors.  Patients were provided with an opportunity to identify and increase self-awareness of helpful sensory input and self care activities. Patients were introduced on how to create supportive environments that encourage use of these skills.  Briefly discussed the importance of developing a repertoire of helpful sensory based coping skills for self regulation and normalized the use of these  skills in everyday life.      Patient Session Goals / Objectives:    Review/learn the external sensory systems and how they relate to self-regulation    Identified specific and individualized neurosensory skills to help when distressed      Identified skills learned and how this applies to current daily life    Established a plan for practice of these skills in their own environments      Patient Participation / Response:  Fully participated with the group by sharing personal reflections / insights and openly received / provided feedback with other participants.    Patient presentation: constricted affect; active in group discussion sharing ideas and asking questions, Verbalized understanding of content and Patient would benefit from additional opportunities to practice the content to be able to generalize it to their everyday life with increased intentionality, consistency, and efficacy in support of their psychiatric recovery    Treatment Plan:  Patient has a current master individualized treatment plan.  See Epic treatment plan for more information.    Cinda Cantu, OTR/L

## 2021-01-06 NOTE — PROGRESS NOTES
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 [phone]  [facility] [phone] Crisis Connection [phone]  [facility] [phone] [facility] [phone]  [facility] [phone] National Suicide Prevention [phone]  [facility] [phone] Suicide Prevention [phone]  [facility] [phone]    Managing symptoms of:  Depression, anxiety, negative self-talk and thoughts    Community support/health:  [facility] at [employer] [facility]    Managing Symptoms and Preventing Relapse    * Go to all of your appointments    * Take all medications as directed.      * Carry a current list if medication with you    * Do not use illicit (street) drugs.  Avoid alcohol    * Report these symptoms to your care team. These are early signs of relapse:   Thoughts of suicide   Losing more sleep   Increased confusion   Mood getting worse   Feeling more aggressive   Other:  Isolation    *Use these skills daily:  Mindfulness, vulnerability with support system, express emotions    Copy of summary sent to: Patient via [email or web address]    Follow up with psychiatrist / main caregiver: New Provider at [facility]    Next visit: 21    Follow up with your therapist: Sommer Ghosh   Next visit: Patient has contacted therapist and will schedule follow-up appointments    Go to group therapy and / or support groups at: Dual Diagnosis Program  (DDP 2) at [facility] meets Monday, Tuesday, Thursday and Friday from 10:00am-1:00pm; scheduled to start Monday 1/11    See your medical doctor about:  Annual physical exams and any medical concerns that may arise.     Other:  Your treatment team enjoyed the opportunity to work with you Huey!    Client Signature:_Unable to sign due to COVID-19  Staff Signature:__Izzy Arriaza [facility] on 1/7/2021 at 11:59 AM

## 2021-01-07 ENCOUNTER — HOSPITAL ENCOUNTER (OUTPATIENT)
Dept: BEHAVIORAL HEALTH | Facility: CLINIC | Age: 31
End: 2021-01-07
Attending: PSYCHIATRY & NEUROLOGY
Payer: COMMERCIAL

## 2021-01-07 PROCEDURE — H0035 MH PARTIAL HOSP TX UNDER 24H: HCPCS | Mod: GT | Performed by: COUNSELOR

## 2021-01-07 PROCEDURE — H0035 MH PARTIAL HOSP TX UNDER 24H: HCPCS | Mod: GT

## 2021-01-07 NOTE — PROGRESS NOTES
Admission Date: 1/7/2021    Identify any current concerns with potential impact to admission:     medication/medical concerns: None reported     immediate safety concerns: None reported     Does patient have safety plan? Yes  Note: Please copy safety plan copied into BEH Encounter     Other (insurance/childcare/transportation/housing/planned absences/etc): None reported    Patient's insurance is: [employer] .     Does patient need appointment with provider? Yes; IOP track in DDP    Review patient's program schedule and inform them of any variation due to late days or holidays.                                                                                  (delete if not applicable):   For Dual Disorder Outpatient Program:     Patient reported his last use of substances as: last night (marijuana).     Patient reports the following concerns in regards to withdrawal/intoxication: None reported         Completed by: MORGAN Zhang on 1/7/2021 at 12:07 PM

## 2021-01-07 NOTE — GROUP NOTE
Psychoeducation Group Note    PATIENT'S NAME: Xavier Landon  MRN:   [id number]  :   1990  [id number]  DATE OF SERVICE: 21  START TIME: 11:00 AM  END TIME: 11:50 AM  FACILITATOR: Tim Wise OT  TOPIC: [facility] OT Group: Self- Regulation Skills  [facility] Adult Partial Hospitalization Program  TRACK: 1    Telemedicine Visit: The patient's condition can be safely assessed and treated via synchronous audio and visual telemedicine encounter.      Reason for Telemedicine Visit: Services only offered telehealth and Covid 19    Originating Site (Patient Location): Patient's home    Distant Site (Provider Location): Provider Remote Setting    Consent:  The patient/guardian has verbally consented to: the potential risks and benefits of telemedicine (video visit) versus in person care; bill my insurance or make self-payment for services provided; and responsibility for payment of non-covered services.     Mode of Communication:  Video Conference via [employer]    As the provider I attest to compliance with applicable laws and regulations related to telemedicine.      NUMBER OF PARTICIPANTS: 8    Summary of Group / Topics Discussed:  Occupational Therapy: Self-Regulation Skills: The vagus nerve and autonomic regulation:   Patient's explored the neurosensory connections between the body and the mind including skills and techniques to help develop one's capacity to be self aware and self regulate. Patients were provided with psychoeducation on the Autonomic Nervous System specifically focusing on the parasympathetic nervous system and explored the ways to tap into the calming properties of the Vagus nerve (parasympathetic NS) to help with distress tolerance and manage the impact of stressors on the body. Concepts presented and discussed included: interoception, polyvagal theory (neuroception, ventral and dorsal vagal activity, sympathetic, vagal brake). Explored ways to develop  vagal tone and heart rate variability as strategy to support effective stress responses in specific contexts for improved functioning. Facilitated discussion around specific ways to they are doing this already and some possibilities for moving forward including: breath work, vagal nerve mobilization exercises, cold input, meditation, physical activity, socializing & laughing, talking/singing/humming/gargling as based on current neurosensory science.    Patient Session Goals / Objectives:    Maynard how the ANS works with an emphasis on the vagus nerve and importance of its' impact on functioning and mental wellbeing.    Maynard about the Polyvagal theory as a framework to begin to understand stress responses and autonomic regulation. (Shel[place])    Maynard how developing interoceptive awareness can help one self-regulate sooner rather than later.    Identified specific and individualized neurosensory skills to help when distressed and for crisis management via the vagus nerve, specifically ways to develop/build vagal tone to support mental health management.     Established a plan for practice of these skills in their own environments aligned with their Occupational Therapy goals       Patient Participation / Response:  Minimally participated, only when prompted / asked.    Patient presentation: Disengaged. Not  on camera. , Verbalized understanding of content and Patient would benefit from additional opportunities to practice the content to be able to generalize it to their everyday life with increased intentionality, consistency, and efficacy in support of their psychiatric recovery    Treatment Plan:  Patient has a current master individualized treatment plan.  See Epic treatment plan for more information.    Tim Wise, OT

## 2021-01-07 NOTE — GROUP NOTE
Psychoeducation Group Note    PATIENT'S NAME: Xavier Landon  MRN:   [id number]  :   1990  ACCT. NUMBER: [id number]  DATE OF SERVICE: 21  START TIME:  1:00 PM  END TIME:  1:50 PM  FACILITATOR: Verna Gamble RN  TOPIC: MH RN Group: Brain Health  [facility] Adult Partial Hospitalization Program  TRACK: 1    NUMBER OF PARTICIPANTS: 8    Summary of Group / Topics Discussed:  Brain Health:  Pathophysiology of stress and anxiety: Patients were educated on the difference between stress, chronic stress, and anxiety. The anatomy and pathophysiology of the body/brain were reviewed to illustrate the immediate effects of stress/anxiety in the body and the long term effects and increased risks for chronic disease that come from unmanaged stress/anxiety. Self-coping strategies to manage symptoms of stress were reviewed and pharmacologic, psychotherapeutic, and complementary treatment options were discussed.    Patient Session Goals / Objectives:  ? Described the differences between stress and anxiety and how the body responds to it  ? Listed the long term effects and increased risks for chronic disease that can arise from unmanaged stress/anxiety  ? Identified and described pharmacologic, psychotherapeutic, and complementary treatment options  Telemedicine Visit: The patient's condition can be safely assessed and treated via synchronous audio and visual telemedicine encounter.      Reason for Telemedicine Visit: Services only offered telehealth    Originating Site (Patient Location): Patient's home    Distant Site (Provider Location): Provider Remote Setting    Consent:  The patient/guardian has verbally consented to: the potential risks and benefits of telemedicine (video visit) versus in person care; bill my insurance or make self-payment for services provided; and responsibility for payment of non-covered services.     Mode of Communication:  Video Conference via [employer]    As the provider I  attest to compliance with applicable laws and regulations related to telemedicine.       Patient Participation / Response:  Fully participated with the group by sharing personal reflections / insights and openly received / provided feedback with other participants.    Identified / Expressed personal readiness to practice skills    Treatment Plan:  Patient has a current master individualized treatment plan.  See Epic treatment plan for more information.    Verna Gamble RN

## 2021-01-07 NOTE — PROGRESS NOTES
Acknowledgement of Current Treatment Plan       I have reviewed my treatment plan with my therapist / counselor on 1/7/21.   I agree with the plan as it is written in the electronic health record.    Name:      Signature:  Xavier Landon Unable to sign due to COVID-19     Dr. Nabeel Jones MD  Psychiatrist    Roseann Mccain, MS, [facility], BC-DMT  Psychotherapist     Izzy Arriaza MA, [facility]  Psychotherapist  MORGAN Zhang on 1/7/2021 at 8:43 AM

## 2021-01-07 NOTE — GROUP NOTE
Process Group Note    PATIENT'S NAME: Xavier Landon  MRN:   [id number]  :   1990  ACCT. 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      Reason for Telemedicine Visit: Services only offered telehealth and due to COVID-19    Originating Site (Patient Location): Patient's home    Distant Site (Provider Location): Provider Remote Setting    Consent:  The patient/guardian has verbally consented to: the potential risks and benefits of telemedicine (video visit) versus in person care; bill my insurance or make self-payment for services provided; and responsibility for payment of non-covered services.     Mode of Communication:  Video Conference via [employer]    As the provider I attest to compliance with applicable laws and regulations related to telemedicine.            Data:    Session content: At the start of this group, patients were invited to check in by identifying themselves, describing their current emotional status, and identifying issues to address in this group.   Area(s) of treatment focus addressed in this session included Symptom Management, Personal Safety and Community Resources/Discharge Planning.    Patient reported feeling uncertain and apprehensive about discharging today. Patient discussed working toward finalizing his discharge plan. Patient identified processing ideas as skills they will use to address their goal(s). Patient reported  avoiding tasks may be a barrier to working toward their goal(s) and/or addressing mental health symptoms. Patient reported no safety concerns and/or self-injurious behaviors. Patient reported no substance use. Patient reported they are taking their medications as prescribed. Patient reported feeling proud that they completed this program. Patient discussed going to the dual diagnosis program with the treatment group.     Therapeutic Interventions/Treatment Strategies:  Psychotherapist offered support, feedback and validation and reinforced use of skills. Treatment modalities used include Motivational Interviewing and Cognitive Behavioral Therapy. Interventions include Coping Skills: Assisted patient in identifying 1-2 healthy distraction skills to reduce overall distress, Symptoms Management: Promoted understanding of their diagnoses and how it impacts their functioning and Emotions Management:  Discussed barriers to emotional regulation.    Assessment:    Patient response:   Patient responded to session by accepting feedback, giving feedback, listening, focusing on goals and being attentive    Possible barriers to participation / learning include: and no barriers identified    Health Issues:   None reported       Substance Use Review:   Substance Use: No active concerns identified.    Mental Status/Behavioral Observations  Appearance:   Appropriate   Eye Contact:   Good   Psychomotor Behavior: Normal   Attitude:   Cooperative   Orientation:   All  Speech   Rate / Production: Normal/ Responsive Normal    Volume:  Normal   Mood:    Anxious  Depressed  Irritable  Normal  Affect:    Appropriate   Thought Content:   Clear and Safety denies any current safety concerns including suicidal ideation, self-harm, and homicidal ideation  Thought Form:  Coherent  Logical     Insight:    Good     Plan:     Safety Plan: No current safety concerns identified.  Recommended that patient call 911 or go to the local ED should there be  a change in any of these risk factors.     Barriers to treatment: None identified    Patient Contracts (see media tab):  None    Substance Use: Provided encouragement towards sobriety     Continue or Discharge: Patient is being discharged today. See Treatment Plan and Discharge Summary.       Izzy Arriaza, [facility]  January 7, 2021

## 2021-01-07 NOTE — GROUP NOTE
Psychotherapy Group Note    PATIENT'S NAME: Xavier Landon  MRN:   [id number]  :   1990  ACCT. NUMBER: [id number]  DATE OF SERVICE: 21  START TIME: 10:00 AM  END TIME: 10:50 AM  FACILITATOR: Izzy Arriaza LPCC  TOPIC: MH EBP Group: Cognitive Restructuring  [facility] Adult Partial Hospitalization Program  TRACK: [facility]    NUMBER OF PARTICIPANTS: 8    Summary of Group / Topics Discussed:  Cognitive Restructuring: Distortions: Patients received an overview of how to identify common cognitive distortions. Patients will explore alternatives to cognitive distortions and practice challenging their negative thought patterns. The goal is to help patients target modify ineffective thought patterns.     Patient Session Goals / Objectives:    Familiarized self with ineffective / unhealthy thoughts and how they develop.      Explored impact of ineffective thoughts / distortions on mood and activity    Formulated new neutral/positive alternatives to challenge less helpful / ineffective thoughts.    Practiced and plan to apply in daily life    Telemedicine Visit: The patient's condition can be safely assessed and treated via synchronous audio and visual telemedicine encounter.      Reason for Telemedicine Visit: Services only offered telehealth and due to COVID-19    Originating Site (Patient Location): Patient's home    Distant Site (Provider Location): Provider Remote Setting    Consent:  The patient/guardian has verbally consented to: the potential risks and benefits of telemedicine (video visit) versus in person care; bill my insurance or make self-payment for services provided; and responsibility for payment of non-covered services.     Mode of Communication:  Video Conference via [employer]    As the provider I attest to compliance with applicable laws and regulations related to telemedicine.             Patient Participation / Response:  Fully participated with the group by sharing personal  reflections / insights and openly received / provided feedback with other participants.    Demonstrated understanding of topics discussed through group discussion and participation, Expressed understanding of the relationship between behaviors, thoughts, and feelings and Demonstrated knowledge of personal thought patterns and how they impact their mood and behavior.    Treatment Plan:  Patient has See Epic Treatment Plan - Patient is discharging.    Izzy Arriaza, [facility]

## 2021-01-07 NOTE — GROUP NOTE
Psychotherapy Group Note    PATIENT'S NAME: Xavier Landon  MRN:   [id number]  :   1990  ACCT. NUMBER: [id number]  DATE OF SERVICE: 21  START TIME:  2:00 PM  END TIME:  2:50 PM  FACILITATOR: Izzy Arriaza LPCC  TOPIC:  EBP Group: Coping Skills  [facility] Adult Partial Hospitalization Program  TRACK: [facility]    NUMBER OF PARTICIPANTS: 8    Summary of Group / Topics Discussed:  Coping Skills: Radical Acceptance: Patients learned radical acceptance as a way to tolerate heightened stress in the moment.  Patients identified situations that necessitate radical acceptance.  They focused on applying acceptance of the moment to tolerate difficult emotions and events. Patients discussed how to distinguish when this can be useful in their lives and when other skills are more relevant or helpful.    Patient Session Goals / Objectives:    Understand that some amount of pain exists for each of us in our lives    Process the difficulty of acceptance in our lives and benefits of radical acceptance to mood and functioning.    Demonstrate understanding of when to use the radical acceptance to tolerate distress by providing examples of situations where this could be applied.    Identify barriers to applying radical acceptance to difficult situations and explore strategies to overcome them    Telemedicine Visit: The patient's condition can be safely assessed and treated via synchronous audio and visual telemedicine encounter.      Reason for Telemedicine Visit: Services only offered telehealth and due to COVID-19    Originating Site (Patient Location): Patient's home    Distant Site (Provider Location): Provider Remote Setting    Consent:  The patient/guardian has verbally consented to: the potential risks and benefits of telemedicine (video visit) versus in person care; bill my insurance or make self-payment for services provided; and responsibility for payment of non-covered services.     Mode of  Communication:  Video Conference via [employer]    As the provider I attest to compliance with applicable laws and regulations related to telemedicine.          Patient Participation / Response:  Fully participated with the group by sharing personal reflections / insights and openly received / provided feedback with other participants.    Demonstrated understanding of topics discussed through group discussion and participation, Expressed understanding of the relevance / importance of coping skills at distressing times in life and Demonstrated knowledge of when to consider using a variety of coping skills in daily life    Treatment Plan:  Patient has See Epic Treatment Plan - Patient is discharging.    Izzy Arriaza, [facility]

## 2021-01-07 NOTE — PROGRESS NOTES
LOCUS Worksheet     Name: Xavier Landon MRN: [id number]    : 1990      Gender:  male    PMI:  N/A   Provider Name: Izzy Arriaza MA, KURT   Provider NPI:  [id number]    Actual level of Care Provided:  Partial Hospitalization Program    Service(s) receiving or referred to:  Dual Diagnosis Program    Reason for Variance: Discharging as scheduled to lower level of care      Rating completed by: MORGAN Zhang on 2021 at 8:38 AM      I. Risk of Harm:   3      Moderate Risk of Harm    II. Functional Status:   3      Moderate Impairment    III. Co-Morbidity:   2      Minor Co-Morbidity    IV - A. Recovery Environment - Level of Stress:   2      Mildly Stressful Environment    IV - B. Recovery Environment - Level of Support:   3      Limited Support in Environment    V. Treatment and Recovery History:   3      Moderate to Equivocal Response to Treatment and Recovery Management    VI. Engagement and Recovery Project:   3      Limited Engagement and Recovery       19 Composite Score    Level of Care Recommendation:   17 to 19       High Intensity Community Based Services

## 2021-01-11 ENCOUNTER — HOSPITAL ENCOUNTER (OUTPATIENT)
Dept: BEHAVIORAL HEALTH | Facility: CLINIC | Age: 31
End: 2021-01-11
Attending: PSYCHIATRY & NEUROLOGY
Payer: COMMERCIAL

## 2021-01-11 PROBLEM — F33.2 MAJOR DEPRESSIVE DISORDER, RECURRENT EPISODE, SEVERE WITH ANXIOUS DISTRESS (H): Status: ACTIVE | Noted: 2021-01-11

## 2021-01-11 PROCEDURE — 90853 GROUP PSYCHOTHERAPY: CPT | Mod: GT | Performed by: PSYCHOLOGIST

## 2021-01-11 PROCEDURE — 90853 GROUP PSYCHOTHERAPY: CPT | Mod: 95 | Performed by: MARRIAGE & FAMILY THERAPIST

## 2021-01-11 NOTE — GROUP NOTE
Process Group Note    PATIENT'S NAME: Xavier Landon  MRN:   [id number]  :   1990  ACCT. NUMBER: [id number]  DATE OF SERVICE: 21  START TIME: 10:00 AM  END TIME: 10:50 AM  FACILITATOR: Deborah Watson LMFT  TOPIC:  Process Group    Diagnoses:  296.33 (F33.2) Major Depressive Disorder, Recurrent Episode, Severe _ and With anxious distress  300.02 (F41.1) Generalized Anxiety Disorder.  4. Other Diagnoses that is relevant to services:   Attention-Deficit/Hyperactivity Disorder  314.01 (F90.9) Unspecified Attention -Deficit / Hyperactivity Disorder.  5. Provisional Diagnosis: R/O Substance-Related & Addictive Disorders Alcohol Use Disorder   303.90 (F10.20) [facility] Adult Dual Diagnosis Day Treatment  TRACK: 2    NUMBER OF PARTICIPANTS: 3    Telemedicine Visit: The patient's condition can be safely assessed and treated via synchronous audio and visual telemedicine encounter.      Reason for Telemedicine Visit: Services only offered telehealth    Originating Site (Patient Location): Patient's home    Distant Site (Provider Location): Provider Remote Setting    Consent:  The patient/guardian has verbally consented to: the potential risks and benefits of telemedicine (video visit) versus in person care; bill my insurance or make self-payment for services provided; and responsibility for payment of non-covered services.     Mode of Communication:  Video Conference via [employer]    As the provider I attest to compliance with applicable laws and regulations related to telemedicine.           Data:    Session content: At the start of this group, patients were invited to check in by identifying themselves, describing their current emotional status, and identifying issues to address in this group.   Area(s) of treatment focus addressed in this session included Symptom Management, Personal Safety and Abstinence/Relapse Prevention.    Huey reports feeling groggy, tired, nervous  today, his goal is to figure out his mental health needs and increase stability, figuring out long term disability needs, is using calming and self soothing, is out of adderall for adhd, reports passive si-can follow safety plan, used alcohol last night, is taking rx as prescribed-no adderall, is going to start lamictal, is proud of attending group today, shared about his sobriety journey    Therapeutic Interventions/Treatment Strategies:  Psychotherapist offered support, feedback and validation and reinforced use of skills. Treatment modalities used include Motivational Interviewing. Interventions include Relapse Prevention: Assisted patient in identifying the challenges and barriers to participation and attendance to support groups/community resources.    Assessment:    Patient response:   Patient responded to session by listening, being attentive and appearing alert    Possible barriers to participation / learning include: and no barriers identified    Health Issues:   None reported       Substance Use Review:   Substance Use: Last use: 1/10    Mental Status/Behavioral Observations  Appearance:   Appropriate   Eye Contact:   Good   Psychomotor Behavior: Restless   Attitude:   Cooperative   Orientation:   All  Speech   Rate / Production: Normal    Volume:  Normal   Mood:    Anxious   Affect:    Blunted   Thought Content:   Clear and Safety reports  presence of suicidal ideation passive suicidal ideation   Thought Form:  Coherent  Logical     Insight:    Good     Plan:     Safety Plan: Committed to safety and agreed to follow previously developed safety coping plan.      Barriers to treatment: None identified    Patient Contracts (see media tab):  None    Substance Use: Provided encouragement towards sobriety    Provided support and affirmation for steps taken towards sobriety      Continue or Discharge: Patient will continue in Adult Dual Disorder Program (DDP) as planned. Patient is likely to benefit from  learning and using skills as they work toward the goals identified in their treatment plan.      Vance Watson, LMFT  January 11, 2021

## 2021-01-11 NOTE — GROUP NOTE
Psychoeducation Group Note    PATIENT'S NAME: Xavier Landon  MRN:   [id number]  :   1990  ACCT. NUMBER: [id number]  DATE OF SERVICE: 21  START TIME: 12:00 PM  END TIME: 12:50 PM  FACILITATOR: Jairo Rios LP  TOPIC: MH RN Group: Health Maintenance  [facility] Adult Dual Diagnosis Day Treatment  TRACK: 2    NUMBER OF PARTICIPANTS: 3    Telemedicine Visit: The patient's condition can be safely assessed and treated via synchronous audio and visual telemedicine encounter.      Reason for Telemedicine Visit: Services only offered telehealth    Originating Site (Patient Location): Patient's home    Distant Site (Provider Location): Provider Remote Setting    Consent:  The patient/guardian has verbally consented to: the potential risks and benefits of telemedicine (video visit) versus in person care; bill my insurance or make self-payment for services provided; and responsibility for payment of non-covered services.     Mode of Communication:  Video Conference via [employer]    As the provider I attest to compliance with applicable laws and regulations related to telemedicine.      Summary of Group / Topics Discussed:  Health Maintenance: Wellness Check-in: Patients met with group facilitator to individually review a holistic wellness check-in to assess patient medication adherence/concerns, appointments, physical and mental health, exercise, nutrition, sleep, socialization, substance use, and need for service/resource referrals.       Patient Session Goals / Objectives:    Discussed various aspects of health management and self-care related to physical and mental health    Demonstrated increased self-awareness of current wellness needs    Developed health literacy skills in navigating the healthcare system and self-advocacy/communicating needs with health care team          Patient Participation / Response:  Fully participated with the group by sharing personal reflections /  insights and openly received / provided feedback with other participants.    Demonstrated understanding of topics discussed through group discussion and participation    Treatment Plan:  Patient has a current master individualized treatment plan.  See Epic treatment plan for more information.    Jairo Rios LP

## 2021-01-11 NOTE — GROUP NOTE
Psychotherapy Group Note    PATIENT'S NAME: Xavier Landon  MRN:   [id number]  :   1990  ACCT. NUMBER: [id number]  DATE OF SERVICE: 21  START TIME: 11:00 AM  END TIME: 11:50 AM  FACILITATOR: Deborah Watson LMFT  TOPIC:  EBP Group: Emotions Management  [facility] Adult Dual Diagnosis Day Treatment  TRACK: 2    NUMBER OF PARTICIPANTS: 3    Telemedicine Visit: The patient's condition can be safely assessed and treated via synchronous audio and visual telemedicine encounter.      Reason for Telemedicine Visit: Services only offered telehealth    Originating Site (Patient Location): Patient's home    Distant Site (Provider Location): Provider Remote Setting    Consent:  The patient/guardian has verbally consented to: the potential risks and benefits of telemedicine (video visit) versus in person care; bill my insurance or make self-payment for services provided; and responsibility for payment of non-covered services.     Mode of Communication:  Video Conference via [employer]    As the provider I attest to compliance with applicable laws and regulations related to telemedicine.   Summary of Group / Topics Discussed:  Emotions Management: Understanding Emotions: Patients discussed the purpose of emotions and function they serve in our lives.  Reviewed core emotions, why they happen (triggers), and how they occur. The group assisted one anothers' understanding that: emotional experiences are important; difficult emotions have a place in our lives; and the differences between various emotions.    Patient Session Goals / Objectives:    Demonstrate understanding of types various emotions    Identify and discuss specific emotions and when they occur; understand triggers    Discuss barriers to emotional regulation      Patient Participation / Response:  Fully participated with the group by sharing personal reflections / insights and openly received / provided feedback with other  participants.    Demonstrated understanding of topics discussed through group discussion and participation, Expressed understanding of the relevance / importance of emotions management skills at distressing times in life and Self-aware of experiences with difficult emotions, and strategies to employ to manage them    Treatment Plan:  Patient has an initial individualized treatment plan that was created as part of their diagnostic assessment / admission process.  A master individualized treatment plan is in the process of being developed with the patient and multi-disciplinary care team.    Vance Watson LMFT

## 2021-01-12 ENCOUNTER — HOSPITAL ENCOUNTER (OUTPATIENT)
Dept: BEHAVIORAL HEALTH | Facility: CLINIC | Age: 31
End: 2021-01-12
Attending: PSYCHIATRY & NEUROLOGY
Payer: COMMERCIAL

## 2021-01-12 PROCEDURE — 90853 GROUP PSYCHOTHERAPY: CPT | Mod: GT | Performed by: MARRIAGE & FAMILY THERAPIST

## 2021-01-12 NOTE — PROGRESS NOTES
Adult Dual Disorder Program:  Individualized Treatment Plan     Date of Plan: 2020    Name: Xavier Landon MRN: [id number]    : 1990    Programs:  Adult Dual Disorder Program (DDP)    Clinical Track (if applicable):  Group 2    DSM5 Diagnosis  296.33 (F33.2) Major Depressive Disorder, Recurrent Episode, Severe _ and With anxious distress  300.02 (F41.1) Generalized Anxiety Disorder.  Attention-Deficit/Hyperactivity Disorder  314.01 (F90.9) Unspecified Attention -Deficit / Hyperactivity Disorder.  Substance-Related & Addictive Disorders Alcohol Use Disorder   303.90 (F10.20) Moderate   301.83 (F60.3) Borderline Personality Disorder      Adult Dual Disorder Program Multidisciplinary Team Members: Reba Pagan MD, Marcel Rios MA, , [facility]; VIKTOR Guzmán, [facility]; Bing Aguilar PsyD,  and/or Dr. Mingo Aj MD    Xavier Landon will participate in the Adult Dual Disorder Program  4 days per week, 3 hours per day. Anticipated duration/discharge: 6-8 weeks     Due to COVID-19, services will be delivered via telemedicine until further notice.        Program Start Date: 2021  Anticipated Discharge Date: 3/5/2021 (pending authorization/clinical changes)    NOTE: Complete CGI     Review Date: Does Xavier Landon continue to meet criteria to participate in the Adult Dual Disorder Program, 5 days per week; 3 hours per day?   21 yes   21 Yes - Mingo Aj MD   21 Yes - Mingo Aj MD               Client Strengths:  open to learning, open to suggestions / feedback, willing to ask questions and willing to relate to others    Client Participation in Plan:  Contributed to goals and plan   Agrees with plan   Received copy of treatment plan   Discussed with staff     Areas of Vulnerability:  Suicidal Ideation   Anxiety  Depressive symptoms     Long-Term Goals:  Knowledge about illness and management of symptoms   Maintenance of sobriety     Abuse  Prevention Plan:  Safe, therapeutic environment   Safety coping plan as needed   Education regarding illness and skill development   Monitor for use of substances    Discharge Criteria:  Satisfactory progress toward treatment goals   Ability to continue recovery at next level of service   Has a discharge plan in place   Has safety/coping plan in place   Ability to maintain sobriety  Regular attendance as scheduled     Areas of Treatment Focus        Area of Treatment Focus:   Personal Safety  Start Date:    1/13/2021    Dimension:   III. Emotional / Behavioral Condition    Description:   Huey has struggled with passive suicidal ideation daily, he reports an ability to follow and use his coping for safety plan     Goal:  Target Date: 2/17/21, 3/5/21 Status: Completed  Client will notify staff when needing assistance to develop or implement a coping plan to manage suicidal or self injurious urges.  Client will use coping plan for safety, as needed.      Progress: 2/17/2021 Huey usually reports passive suicidal ideation daily, he reports an ability to follow his coping plan for safety, continue goal. 3/5/2021 Huey has remained safe, he reports lessened intensity of passive suicidal ideation, he is discharging today.       Treatment Strategies:   Assess / reassess level of potential for harm to self or others  Engage in safety planning when indicated        Area of Treatment Focus:   Symptom Stabilization and Management  Start Date:    1/13/2021    Dimension:   III. Emotional / Behavioral Condition    Description:   Huey reports struggling with depressive and anxiety symptoms, he reports the symptoms impair his ability to function    Goal:  Target Date: 2/17/21, 3/5/21 Status: Completed  Huey will learn and practice 1-2 coping skills per week to manage symptoms of depression and anxiety  Huey will take processing time at least 1x per week  Huey will continue to see his psychiatrist-  Huey will obtain an individual  therapist and work on setting up DBT as aftercare  Huey will continue to take rx as prescribed      Progress: 2/17/21 Huey continues to struggle with depressive symptoms, he continues to see Patricia Feliciano at [facility] for medication management-he is looking to get on an anti-depressent.  He is currently looking for an individual therapist as his case with Sommer Ghosh has been closed.  Huey struggles to regulate his emotions, often becoming overwhelmed by them.  Huey has recently started seeing Dr Phillip at the [facility] to address his needs related to his sexual health concerns.  Huey is open to finding DBT treatment as after care, continue goals. 3/5/21 Huey is discharging today, he reports utlizing breathing to ground himself and regulate his emotions, he reports still struggling with cognitive distortions.  He is going to attend [facility] for DBT services starting 3/8/21.  He will continue to see Dr Phillip.  He reports his new rx prozac seems to be working.       Treatment Strategies:   Teach adaptive coping skills and communication skills  Use reality based supportive approach        Area of Treatment Focus:   Abstinence / Relapse Prevention  Start Date:    1/13/21    Dimension:   IV. Treatment Acceptance / Resistance    Description:   Huey has struggled with alcohol use, he reports one period of sobriety lasting about 60 days, he reports alcohol has been a tool to cope for many years    Goal:  Target Date: 2/17/21, 3/5/21 Status: Completed  Huey will have zero instances of substance use while in the program  Huey will learn and practice 1-2 skills per week to manage urges, cravings, and thoughts about use  Huey will consider MAT as an additional way to manage sobriety-evaluating his needs in relation to his recovery efforts  Huey will consider [email or web address] as a support group      Progress: 2/17/21 Huey has had 2 slips while in the program, he has engaged in discussions to figure  "out his triggers and alternative skills he can use to manage overwhelming thoughts and emotions.  It has been recommended by Dr Phillip to consider naltrexone as a MAT, continue goals. 3/5/21 Huey has remained sober since 2/14, he reports a plan to continue his sobriety in the DBT Substance use program.  He is discharging today.       Treatment Strategies:   Teach adaptive coping skills and communication skills  Use reality based supportive approach      Area of Treatment Focus:   Community Resources / Support and Discharge Planning  Start Date:    1/13/21    Dimension:   VI. Recovery Environment    Description:   Huey reports concern with returning to work, he is working on FMLA and possibly short term disabilty while he attends the program-go back to work-    Goal:  Target Date: 2/17/21, 3/5/21 Status: Completed  Huey will follow up with the referral to the [facility]  Huey will be returning to work-he wants to find a different job  Huey will work on finding purpose-working on increasing motivation  Huey is working on following through on DBT referrals to continue to meet his mental health wellness goals  Huey is working on structuring financial payments  Huey is working toward his end goal of moving from the state and meeting his needs so that he move    Progress: 2/17/21 Huey has started working with Dr Phillip at the [facility].  He was approved for short term disability. Continue goals. 3/5/21 Huey is discharging today, he will start DBT on 3/8/21 and will continue to see Dr Phillip.         Treatment Strategies:   Assist clients in establishing / strengthening support network  Assist with discharge planning         Vance Watson, LMFT      NOTE: Required signatures are completed manually and scanned into the electronic medical record. See \"Media\" tab in epic.    The Individualized Treatment Plan Signature Page has been routed to the provider for co-sign.     I have reviewed the patient's " Individualized Treatment Plan and agree with the current goals, interventions and level of care.     Mingo Aj MD  1/13/2021

## 2021-01-12 NOTE — GROUP NOTE
Psychotherapy Group Note    PATIENT'S NAME: Xavier Landon  MRN:   [id number]  :   1990  ACCT. NUMBER: [id number]  DATE OF SERVICE: 21  START TIME: 11:00 AM  END TIME: 11:50 AM  FACILITATOR: Deborah Watson LMFT  TOPIC:  EBP Group: Emotions Management  [facility] Adult Dual Diagnosis Day Treatment  TRACK: 2    NUMBER OF PARTICIPANTS: 3    Telemedicine Visit: The patient's condition can be safely assessed and treated via synchronous audio and visual telemedicine encounter.      Reason for Telemedicine Visit: Services only offered telehealth    Originating Site (Patient Location): Patient's home    Distant Site (Provider Location): Provider Remote Setting    Consent:  The patient/guardian has verbally consented to: the potential risks and benefits of telemedicine (video visit) versus in person care; bill my insurance or make self-payment for services provided; and responsibility for payment of non-covered services.     Mode of Communication:  Video Conference via [employer]    As the provider I attest to compliance with applicable laws and regulations related to telemedicine.   Summary of Group / Topics Discussed:  Emotions Management: Model of Emotions: Patients were introduced to the cyclical model of emotions.  Explored emotions are shaped by different events and one s interpretation of events.  Group discussed how emotions begin with an event, followed by one s interpretation, followed by associated feelings.  Discussion included a review of personal urges and actions that can/do follow an emotional experience in the patient s life, and the end results.    Patient Session Goals / Objectives:    Demonstrate understanding of types various emotions.    Identify and discuss specific emotions and when they occur; understand triggers.    Identify individual emotions and physical sensations that accompany them.    Discuss urges and actions, and how to influence the intensity of emotional  reactions and disrupt the cycle.      Discuss barriers to emotional regulation.    Choose 1-2 strategies to assist with emotional response to potentially distressing situations.      Patient Participation / Response:  Moderately participated, sharing some personal reflections / insights and adequately adequately received / provided feedback with other participants.    Demonstrated understanding of topics discussed through group discussion and participation, Expressed understanding of the relevance / importance of emotions management skills at distressing times in life and Self-aware of experiences with difficult emotions, and strategies to employ to manage them    Treatment Plan:  Patient has an initial individualized treatment plan that was created as part of their diagnostic assessment / admission process.  A master individualized treatment plan is in the process of being developed with the patient and multi-disciplinary care team.    Vance Watson LMFT

## 2021-01-12 NOTE — GROUP NOTE
Psychotherapy Group Note    PATIENT'S NAME: Xavier Landon  MRN:   [id number]  :   1990  ACCT. NUMBER: [id number]  DATE OF SERVICE: 21  START TIME: 12:00 PM  END TIME: 12:50 PM  FACILITATOR: Deborah Watson LMFT  TOPIC:  EBP Group: Emotions Management  [facility] Adult Dual Diagnosis Day Treatment  TRACK: 2    NUMBER OF PARTICIPANTS: 4    Telemedicine Visit: The patient's condition can be safely assessed and treated via synchronous audio and visual telemedicine encounter.      Reason for Telemedicine Visit: Services only offered telehealth    Originating Site (Patient Location): Patient's home    Distant Site (Provider Location): Provider Remote Setting    Consent:  The patient/guardian has verbally consented to: the potential risks and benefits of telemedicine (video visit) versus in person care; bill my insurance or make self-payment for services provided; and responsibility for payment of non-covered services.     Mode of Communication:  Video Conference via [employer]    As the provider I attest to compliance with applicable laws and regulations related to telemedicine.   Summary of Group / Topics Discussed:  Emotions Management: Check Facts: Patients participated in an interactive approach to identifying and challenging cognitive distortions that arise following an event that triggers intense emotion.  Patients choose an emotional reaction/event to work on.  The group shared their experiences and thought processes for feedback.      Patient Session Goals / Objectives:    Learn the process of identifying thoughts associated with the situation and reaction    Learn to challenge cognitive distortions and reframe the situation/event/reaction     Distinguish between facts, feelings, thoughts    Gain understanding of how to interpret an emotional reaction    Practice identification of cognitive distortions and evaluating an emotionally charged situation more  rationally/objectively/mindfully      Patient Participation / Response:  Fully participated with the group by sharing personal reflections / insights and openly received / provided feedback with other participants.    Demonstrated understanding of topics discussed through group discussion and participation, Expressed understanding of the relevance / importance of emotions management skills at distressing times in life and Self-aware of experiences with difficult emotions, and strategies to employ to manage them    Treatment Plan:  Patient has an initial individualized treatment plan that was created as part of their diagnostic assessment / admission process.  A master individualized treatment plan is in the process of being developed with the patient and multi-disciplinary care team.    Vance Watson LMFT

## 2021-01-12 NOTE — GROUP NOTE
Process Group Note    PATIENT'S NAME: Xavier Landon  MRN:   [id number]  :   1990  ACCT. NUMBER: [id number]  DATE OF SERVICE: 21  START TIME: 10:00 AM  END TIME: 10:50 AM  FACILITATOR: Deborah Watson LMFT  TOPIC:  Process Group    Diagnoses:  296.33 (F33.2) Major Depressive Disorder, Recurrent Episode, Severe _ and With anxious distress  300.02 (F41.1) Generalized Anxiety Disorder.  4. Other Diagnoses that is relevant to services:   Attention-Deficit/Hyperactivity Disorder  314.01 (F90.9) Unspecified Attention -Deficit / Hyperactivity Disorder.  5. Provisional Diagnosis: R/O Substance-Related & Addictive Disorders Alcohol Use Disorder   303.90 (F10.20) [facility] Adult Dual Diagnosis Day Treatment  TRACK: 2    NUMBER OF PARTICIPANTS: 3    Telemedicine Visit: The patient's condition can be safely assessed and treated via synchronous audio and visual telemedicine encounter.      Reason for Telemedicine Visit: Services only offered telehealth    Originating Site (Patient Location): Patient's home    Distant Site (Provider Location): Provider Remote Setting    Consent:  The patient/guardian has verbally consented to: the potential risks and benefits of telemedicine (video visit) versus in person care; bill my insurance or make self-payment for services provided; and responsibility for payment of non-covered services.     Mode of Communication:  Video Conference via [employer]    As the provider I attest to compliance with applicable laws and regulations related to telemedicine.           Data:    Session content: At the start of this group, patients were invited to check in by identifying themselves, describing their current emotional status, and identifying issues to address in this group.   Area(s) of treatment focus addressed in this session included Symptom Management, Personal Safety and Abstinence/Relapse Prevention.    Huey is feeling disoriented since he slept so  much yesterday, is feeling skeptical and frustrated, his goal for the day is to do some laundry and clean his room, reports passive si-can follow safety plan, denies chemical use, started lamictal today, is feeling grateful that he is living in the place he is at right now    Therapeutic Interventions/Treatment Strategies:  Psychotherapist offered support, feedback and validation and reinforced use of skills.    Assessment:    Patient response:   Patient responded to session by listening, being attentive and appearing alert    Possible barriers to participation / learning include: and no barriers identified    Health Issues:   None reported       Substance Use Review:   Substance Use: Last use: 10/10    Mental Status/Behavioral Observations  Appearance:   Appropriate   Eye Contact:   Good   Psychomotor Behavior: Normal   Attitude:   Cooperative   Orientation:   All  Speech   Rate / Production: Normal    Volume:  Normal   Mood:    Anxious   Affect:    Blunted   Thought Content:   Clear and Safety reports  presence of suicidal ideation passive suicidal ideation   Thought Form:  Coherent  Logical     Insight:    Good     Plan:     Safety Plan: Committed to safety and agreed to follow previously developed safety coping plan.      Barriers to treatment: None identified    Patient Contracts (see media tab):  None    Substance Use: Provided encouragement towards sobriety    Provided support and affirmation for steps taken towards sobriety      Continue or Discharge: Patient will continue in Adult Dual Disorder Program (DDP) as planned. Patient is likely to benefit from learning and using skills as they work toward the goals identified in their treatment plan.      Vance Watson, LMFT  January 12, 2021

## 2021-01-13 ENCOUNTER — HOSPITAL ENCOUNTER (OUTPATIENT)
Dept: BEHAVIORAL HEALTH | Facility: CLINIC | Age: 31
End: 2021-01-13
Attending: PSYCHIATRY & NEUROLOGY
Payer: COMMERCIAL

## 2021-01-13 PROCEDURE — 99214 OFFICE O/P EST MOD 30 MIN: CPT | Mod: 95 | Performed by: PSYCHIATRY & NEUROLOGY

## 2021-01-13 NOTE — PROGRESS NOTES
Adult Dual Disorder Program:   Acknowledgement of Current Treatment Plan       I have reviewed my treatment plan with my therapist on 1/13/2021.   I agree with the plan as it is written in the electronic health record.    Name:      Signature:  Xavier Landon       Unable to sign due to covid    VIKTOR Guzmán  Psychotherapist   VIKTOR Anderson on 1/13/2021 at 9:52 AM       Reba Pagan MD  Psychiatrist/Medical Director   Reba Pagan MD on 2-5-21

## 2021-01-13 NOTE — PROGRESS NOTES
"[facility]   Adult Day Treatment, Followup Note    PATIENT'S NAME: Xavier Landon  MRN:   [id number]  :   1990  ACCT. NUMBER: [id number]  DATE OF SERVICE: 21         Interim History:     The patient is a 29yo male with a history of ADHD, depression and alcohol use disorder who is seen for followup in our MICD program. MICD program is \"going fine.\" Only a couple of days in. Thinks it will be beneficial. Just started Lamictal. Hasn't noticed anything yet with it. Seen about six psychiatrists in the last month. Is in the process of finding a more permanent provider. New provider through [facility]. Working on the paperwork for them. Knows that he is on a high dose. Patrick in [place]. Mood is \"okay, improving.\" Says that he is in \"a dismal situation lifewise.\" Has had some SI at times but no real intent or urges. Says that he has a safety program in place. No desire or motivation to harm himself. No history of suicide attempts. Feels safe at home. Sleeping \"too much or too little.\" Tried Inderal but wasn't sure if it was helping.          Medications:     Lamictal 25mg currently. Will increase to BID in two weeks.   Inderal PRN  Adderall per outpatient provider  -- Adderall XR 60mg Qam  -- Adderall IR 20mg Qafternoon  Wellbutrin and Prozac discontinued     Current Outpatient Medications   Medication     Amphetamine-Dextroamphetamine (ADDERALL XR PO)     amphetamine-dextroamphetamine (ADDERALL) 20 MG tablet     buPROPion (WELLBUTRIN XL) 150 MG 24 hr tablet     FLUoxetine HCl (PROZAC PO)     lamoTRIgine (LAMICTAL) 25 MG tablet     propranolol (INDERAL) 10 MG tablet     Varenicline Tartrate (CHANTIX PO)     No current facility-administered medications for this visit.            Allergies:   No Known Allergies       Labs:   No results found for this or any previous visit (from the past 24 hour(s)).       Psychiatric Examination:     PHQ-9 scores:   PHQ-9 SCORE " 12/22/2020   PHQ-9 Total Score 24     SHAUNA-7 scores:    SHAUNA-7 SCORE 12/22/2020   Total Score 13       Risk status (Self / Other harm or suicidal ideation)  Patient has had a history of suicidal ideation: but no suicide attempts  Patient denies current fears or concerns for personal safety.  Patient reports the following current or recent suicidal ideation or behaviors: at times but no intent or urges.  Patient denies current or recent homicidal ideation or behaviors.  Patient denies current or recent self injurious behavior or ideation.  Patient denies other safety concerns.  A safety and risk management plan has not been developed at this time, however patient was encouraged to call [place] [phone] should there be a change in any of these risk factors.    Appearance: awake, alert and adequately groomed  Attitude:  cooperative  Eye Contact:  good  Mood:  better  Affect:  mood congruent  Speech:  clear, coherent  Psychomotor Behavior:  no evidence of tardive dyskinesia, dystonia, or tics  Thought Process:  goal oriented  Associations:  no loose associations  Thought Content:  no evidence of suicidal ideation or homicidal ideation and no evidence of psychotic thought  Insight:  fair  Judgement:  fair  Oriented to:  time, person, and place  Attention Span and Concentration:  fair  Recent and Remote Memory:  intact           Diagnoses:     Axis I: ADHD              Alcohol use disorder              Cannabis use disorder              Depression NOS, R/O bipolar 2 disorder           Assessment and Plan:     Treatment Objective(s) Addressed in This Session:  The purpose of today's call is for this author to provide oversight of patient's care while receiving program services. Specific treatment goals addressed included personal safety, symptoms stabilization and management, wellness and mental health, and community resources/discharge planning.     1) Is running out of his Adderall. Will coordinate with MICD program and  outpatient provider/pharmacy about continuing this.   2) Continue Lamictal titration.     This author will follow up with the patient in approximately 30 days.    Patient continues to meet criteria for recommended level of care: MICD program  Patient would be at reasonable risk of requiring a higher level of care in the absence of current services.    Patient does agree with the current plan of care.    Mingo Aj MD  1/13/2021      Video-Visit Details    Type of service:  Video Visit    Video Start Time (time video started): 1332    Video End Time (time video stopped): 1349    Originating Location (pt. Location): Home    Distant Location (provider location): Provider remote location    Mode of Communication:  Video Conference via [employer]    Physician has received verbal consent for a Video Visit from the patient? Yes    Entered by: Mingo Aj on 1/13/2021 at 4:45 AM

## 2021-01-14 ENCOUNTER — HOSPITAL ENCOUNTER (OUTPATIENT)
Dept: BEHAVIORAL HEALTH | Facility: CLINIC | Age: 31
End: 2021-01-14
Attending: PSYCHIATRY & NEUROLOGY
Payer: COMMERCIAL

## 2021-01-14 PROCEDURE — 90853 GROUP PSYCHOTHERAPY: CPT | Mod: 95 | Performed by: PSYCHOLOGIST

## 2021-01-14 NOTE — GROUP NOTE
Psychotherapy Group Note    PATIENT'S NAME: Xavier Landon  MRN:   [id number]  :   1990  ACCT. NUMBER: [id number]  DATE OF SERVICE: 21  START TIME: 11:00 AM  END TIME: 11:50 AM  FACILITATOR: Jairo Rios LP  TOPIC:  EBP Group: DDP Relapse Prevention  [facility] Adult Dual Diagnosis Day Treatment  TRACK: 2    NUMBER OF PARTICIPANTS: 3    Telemedicine Visit: The patient's condition can be safely assessed and treated via synchronous audio and visual telemedicine encounter.      Reason for Telemedicine Visit: Services only offered telehealth    Originating Site (Patient Location): Patient's home    Distant Site (Provider Location): Provider Remote Setting    Consent:  The patient/guardian has verbally consented to: the potential risks and benefits of telemedicine (video visit) versus in person care; bill my insurance or make self-payment for services provided; and responsibility for payment of non-covered services.     Mode of Communication:  Video Conference via [employer]    As the provider I attest to compliance with applicable laws and regulations related to telemedicine.      Summary of Group / Topics Discussed:  DDP Relapse Prevention: Observing and Describing Process of Relapse: Patients explored the process of relapse and what individual factors can contribute to relapse. This will assist patients in recognizing that relapse is a process that often begins well before the actual use of a substance. Patients discussed their own personal vulnerabilities, emotions, cravings, urges, situations, and thoughts that may lead to a relapse and what has led to past relapses.     Patient Session Goals / Objectives:    Verbalized understanding the importance of awareness of factors that contribute to relapse     Verbalized understanding that relapse is a process    Shared personal vulnerabilities, thoughts, emotions, and situations that may lead to relapse     Described skills  to manage factors that contribute to relapse         Patient Participation / Response:  Fully participated with the group by sharing personal reflections / insights and openly received / provided feedback with other participants.    Demonstrated understanding of topics discussed through group discussion and participation    Treatment Plan:  Patient has a current master individualized treatment plan.  See Epic treatment plan for more information.    Jairo Rios, LP

## 2021-01-14 NOTE — GROUP NOTE
Psychoeducation Group Note    PATIENT'S NAME: Xavier Landon  MRN:   [id number]  :   1990  ACCT. NUMBER: [id number]  DATE OF SERVICE: 21  START TIME: 12:00 PM  END TIME: 12:50 PM  FACILITATOR: Jairo Rios LP  TOPIC: TIMUR RN Group: Mental Health Maintenance  [facility] Adult Dual Diagnosis Day Treatment  TRACK: 2    NUMBER OF PARTICIPANTS: 3    Telemedicine Visit: The patient's condition can be safely assessed and treated via synchronous audio and visual telemedicine encounter.      Reason for Telemedicine Visit: Services only offered telehealth    Originating Site (Patient Location): Patient's home    Distant Site (Provider Location): Provider Remote Setting    Consent:  The patient/guardian has verbally consented to: the potential risks and benefits of telemedicine (video visit) versus in person care; bill my insurance or make self-payment for services provided; and responsibility for payment of non-covered services.     Mode of Communication:  Video Conference via [employer]    As the provider I attest to compliance with applicable laws and regulations related to telemedicine.      Summary of Group / Topics Discussed:  Mental Health Maintenance:  Stigma: In this group patients explored stigma surrounding a mental health diagnosis.  The group discussed the way stigma impacts their own life, and discussed strategies to reduce. The relationship between physical and mental health were also explored in the context of healthcare access, treatment, and support.    Patient Session Goals / Objectives:  ? Patients identified the importance of practicing emotional hygiene  ? Patients identified ways to decrease the  impact of stigma in their own life          Patient Participation / Response:  Fully participated with the group by sharing personal reflections / insights and openly received / provided feedback with other participants.    Demonstrated understanding of topics  discussed through group discussion and participation    Treatment Plan:  Patient has a current master individualized treatment plan.  See Epic treatment plan for more information.    Jairo Rios LP

## 2021-01-14 NOTE — GROUP NOTE
Process Group Note    PATIENT'S NAME: Xavier Landon  MRN:   [id number]  :   1990  ACCT. NUMBER: [id number]  DATE OF SERVICE: 21  START TIME: 10:00 AM  END TIME: 10:50 AM  FACILITATOR: Jairo Rios LP  TOPIC:  Process Group    Diagnoses:  296.33 (F33.2) Major Depressive Disorder, Recurrent Episode, Severe _ and With anxious distress  300.02 (F41.1) Generalized Anxiety Disorder.  4. Other Diagnoses that is relevant to services:   Attention-Deficit/Hyperactivity Disorder  314.01 (F90.9) Unspecified Attention -Deficit / Hyperactivity Disorder.  5. Provisional Diagnosis: R/O Substance-Related & Addictive Disorders Alcohol Use Disorder   303.90 (F10.20) [facility] Adult Dual Diagnosis Day Treatment  TRACK: 2    NUMBER OF PARTICIPANTS: 3    Telemedicine Visit: The patient's condition can be safely assessed and treated via synchronous audio and visual telemedicine encounter.      Reason for Telemedicine Visit: Services only offered telehealth    Originating Site (Patient Location): Patient's home    Distant Site (Provider Location): Provider Remote Setting    Consent:  The patient/guardian has verbally consented to: the potential risks and benefits of telemedicine (video visit) versus in person care; bill my insurance or make self-payment for services provided; and responsibility for payment of non-covered services.     Mode of Communication:  Video Conference via [employer]    As the provider I attest to compliance with applicable laws and regulations related to telemedicine.        Data:    Session content: At the start of this group, patients were invited to check in by identifying themselves, describing their current emotional status, and identifying issues to address in this group.   Area(s) of treatment focus addressed in this session included Symptom Management, Personal Safety, Community Resources/Discharge Planning, Abstinence/Relapse Prevention,  "Develop / Improve Independent Living Skills and Develop Socialization / Interpersonal Relationship Skills.    Pt report daily S/I and denies a plan or intent. Pt reports he feels like he has made a lot of mistakes in life and feels worthless. Pt reports he would not follow through on his thoughts. Pt reports he was able to clean his room yesterday.     Therapeutic Interventions/Treatment Strategies:  Psychotherapist offered support, feedback and validation. Treatment modalities used include Cognitive Behavioral Therapy. Interventions include Coping Skills: Discussed how the use of intentional \"in the moment\" actions can help reduce distress and Emotions Management:  Increased awareness of daily mood patterns/changes.    Assessment:    Patient response:   Patient responded to session by accepting feedback, giving feedback, listening, focusing on goals, being attentive, accepting support and appearing alert    Possible barriers to participation / learning include: and no barriers identified    Health Issues:   None reported       Substance Use Review:   Substance Use: alcohol .     Mental Status/Behavioral Observations  Appearance:   Appropriate   Eye Contact:   Good   Psychomotor Behavior: Normal   Attitude:   Cooperative   Orientation:   All  Speech   Rate / Production: Normal    Volume:  Normal   Mood:    Depressed   Affect:    Appropriate   Thought Content:   Clear  Thought Form:  Coherent  Logical     Insight:    Fair     Plan:     Safety Plan: Patient consented to co-developed safety plan.  Safety and risk management plan was completed.  Patient agreed to use safety plan should any safety concerns arise.  A copy was given to the patient.     Barriers to treatment: None identified    Patient Contracts (see media tab):  None    Substance Use: Stage of Change: Action     Continue or Discharge: Patient will continue in Adult Dual Disorder Program (DDP) as planned. Patient is likely to benefit from learning and using " skills as they work toward the goals identified in their treatment plan.      Jairo Rios, NADIYA  January 14, 2021

## 2021-01-15 ENCOUNTER — HOSPITAL ENCOUNTER (OUTPATIENT)
Dept: BEHAVIORAL HEALTH | Facility: CLINIC | Age: 31
End: 2021-01-15
Attending: PSYCHIATRY & NEUROLOGY
Payer: COMMERCIAL

## 2021-01-15 ENCOUNTER — HEALTH MAINTENANCE LETTER (OUTPATIENT)
Age: 31
End: 2021-01-15

## 2021-01-15 PROCEDURE — 90853 GROUP PSYCHOTHERAPY: CPT | Mod: GT | Performed by: PSYCHOLOGIST

## 2021-01-15 NOTE — GROUP NOTE
Process Group Note    PATIENT'S NAME: Xavier Landon  MRN:   [id number]  :   1990  ACCT. NUMBER: [id number]  DATE OF SERVICE: 1/15/21  START TIME: 10:00 AM  END TIME: 10:50 AM  FACILITATOR: Jairo Rios LP  TOPIC:  Process Group    Diagnoses:  296.33 (F33.2) Major Depressive Disorder, Recurrent Episode, Severe _ and With anxious distress  300.02 (F41.1) Generalized Anxiety Disorder.  4. Other Diagnoses that is relevant to services:   Attention-Deficit/Hyperactivity Disorder  314.01 (F90.9) Unspecified Attention -Deficit / Hyperactivity Disorder.  5. Provisional Diagnosis: R/O Substance-Related & Addictive Disorders Alcohol Use Disorder   303.90 (F10.20) [facility] Adult Dual Diagnosis Day Treatment  TRACK: 2    NUMBER OF PARTICIPANTS: 4    Telemedicine Visit: The patient's condition can be safely assessed and treated via synchronous audio and visual telemedicine encounter.      Reason for Telemedicine Visit: Services only offered telehealth    Originating Site (Patient Location): Patient's home    Distant Site (Provider Location): Provider Remote Setting    Consent:  The patient/guardian has verbally consented to: the potential risks and benefits of telemedicine (video visit) versus in person care; bill my insurance or make self-payment for services provided; and responsibility for payment of non-covered services.     Mode of Communication:  Video Conference via [employer]    As the provider I attest to compliance with applicable laws and regulations related to telemedicine.            Data:    Session content: At the start of this group, patients were invited to check in by identifying themselves, describing their current emotional status, and identifying issues to address in this group.   Area(s) of treatment focus addressed in this session included Symptom Management, Personal Safety, Community Resources/Discharge Planning, Abstinence/Relapse  Prevention, Develop / Improve Independent Living Skills and Develop Socialization / Interpersonal Relationship Skills.    Pt reports he is irritable due to political issues and does not want to talk about it in this format. Pt has a goal to shovel. Pt denies safety issues and denies use.     Therapeutic Interventions/Treatment Strategies:  Psychotherapist offered support, feedback and validation.    Assessment:    Patient response:   Patient responded to session by giving feedback, listening, focusing on goals, being attentive and appearing alert    Possible barriers to participation / learning include: and no barriers identified    Health Issues:   None reported       Substance Use Review:   Substance Use: alcohol .     Mental Status/Behavioral Observations  Appearance:   Appropriate   Eye Contact:   Good   Psychomotor Behavior: Normal   Attitude:   Guarded   Orientation:   All  Speech   Rate / Production: Normal    Volume:  Normal   Mood:    Irritable   Affect:    Appropriate   Thought Content:   Clear  Thought Form:  Coherent  Logical     Insight:    Fair     Plan:     Safety Plan: Recommended that patient call 911 or go to the local ED should there be a change in any of these risk factors.     Barriers to treatment: None identified    Patient Contracts (see media tab):  None    Substance Use: Stage of Change: Action     Continue or Discharge: Patient will continue in Adult Dual Disorder Program (DDP) as planned. Patient is likely to benefit from learning and using skills as they work toward the goals identified in their treatment plan.      Jairo Rios LP  January 15, 2021

## 2021-01-18 ENCOUNTER — HOSPITAL ENCOUNTER (OUTPATIENT)
Dept: BEHAVIORAL HEALTH | Facility: CLINIC | Age: 31
End: 2021-01-18
Attending: PSYCHIATRY & NEUROLOGY
Payer: COMMERCIAL

## 2021-01-18 PROCEDURE — 90853 GROUP PSYCHOTHERAPY: CPT | Mod: 95

## 2021-01-18 PROCEDURE — 90853 GROUP PSYCHOTHERAPY: CPT | Mod: 95 | Performed by: PSYCHOLOGIST

## 2021-01-18 NOTE — GROUP NOTE
Telemedicine Visit: The patient's condition can be safely assessed and treated via synchronous audio and visual telemedicine encounter.      Reason for Telemedicine Visit: Patient has requested telehealth visit    Originating Site (Patient Location): Patient's home    Distant Site (Provider Location): Provider Remote Setting    Consent:  The patient/guardian has verbally consented to: the potential risks and benefits of telemedicine (video visit) versus in person care; bill my insurance or make self-payment for services provided; and responsibility for payment of non-covered services.     Mode of Communication:  Video Conference via [employer]    As the provider I attest to compliance with applicable laws and regulations related to telemedicine.  Psychotherapy Group Note    PATIENT'S NAME: Xavier Landon  MRN:   [id number]  :   1990  ACCT. NUMBER: [id number]  DATE OF SERVICE: 21  START TIME: 12:00 PM  END TIME: 12:50 PM  FACILITATOR: Chey Gabriel LMFT  TOPIC:  EBP Group: Coping Skills  [facility]+ Program  TRACK: DDP2   NUMBER OF PARTICIPANTS: 2    Summary of Group / Topics Discussed:  Coping Skills: Grounding: Patients discussed and practiced strategies to increase attachment / presence to the current moment.  Patients identified situations in which using these strategies will help improve emotion regulation sense of calm in the body.  Reviewed the benefits of applying grounding strategies, as well as past / current practices of each member.  Patients identified situations in which using these strategies would reduce stress. They developed the ability to distinguish when these strategies can be useful in their lives for management and stress and psychological well-being.    Patient Session Goals / Objectives:    Understand the purpose of using grounding strategies to reduce stress.    Verbalize understanding of how and when to apply grounding strategies to reduce distress and  increase presence in the moment.    Review patients current grounding practices and discuss a more formal way of practicing and accessing skills.    Practice using various calming strategies (e.g. 5-4-3-2-1; mental and body awareness).    Choose 1-2 grounding strategies to apply during times of distress.        Patient Participation / Response:  Moderately participated, sharing some personal reflections / insights and adequately adequately received / provided feedback with other participants.    Identified barriers to applying coping skills    Treatment Plan:  Patient has a current master individualized treatment plan.  See Epic treatment plan for more information.    VIKTOR Nielsen

## 2021-01-18 NOTE — GROUP NOTE
Process Group Note    PATIENT'S NAME: Xavier Landon  MRN:   [id number]  :   1990  ACCT. NUMBER: [id number]  DATE OF SERVICE: 21  START TIME: 10:00 AM  END TIME: 10:50 AM  FACILITATOR: Jairo Rios LP  TOPIC:  Process Group    Diagnoses:  296.33 (F33.2) Major Depressive Disorder, Recurrent Episode, Severe _ and With anxious distress  300.02 (F41.1) Generalized Anxiety Disorder.  4. Other Diagnoses that is relevant to services:   Attention-Deficit/Hyperactivity Disorder  314.01 (F90.9) Unspecified Attention -Deficit / Hyperactivity Disorder.  5. Provisional Diagnosis: R/O Substance-Related & Addictive Disorders Alcohol Use Disorder   303.90 (F10.20) [facility] Adult Dual Diagnosis Day Treatment  TRACK: 2    NUMBER OF PARTICIPANTS: 2    Telemedicine Visit: The patient's condition can be safely assessed and treated via synchronous audio and visual telemedicine encounter.      Reason for Telemedicine Visit: Services only offered telehealth    Originating Site (Patient Location): Patient's home    Distant Site (Provider Location): Provider Remote Setting    Consent:  The patient/guardian has verbally consented to: the potential risks and benefits of telemedicine (video visit) versus in person care; bill my insurance or make self-payment for services provided; and responsibility for payment of non-covered services.     Mode of Communication:  Video Conference via [employer]    As the provider I attest to compliance with applicable laws and regulations related to telemedicine.            Data:    Session content: At the start of this group, patients were invited to check in by identifying themselves, describing their current emotional status, and identifying issues to address in this group.   Area(s) of treatment focus addressed in this session included Symptom Management, Personal Safety, Community Resources/Discharge Planning, Abstinence/Relapse  Prevention, Develop / Improve Independent Living Skills and Develop Socialization / Interpersonal Relationship Skills.    Pt reports irritation with no providers being willing to fill his adderall rx. He has reached out to the people he has talked to and no one will call him back.     Pt reports frustration with the group size (2). He asked what he is supposed to be doing here.     Pt reports low motivation, denies safety concerns, denies use.     Therapeutic Interventions/Treatment Strategies:  Psychotherapist offered support, feedback and validation. Treatment modalities used include Cognitive Behavioral Therapy. Interventions include Coping Skills: Discussed use of self-soothe skills to decrease distress in the body and Addressed barriers to utilizing coping skills when in distress and Emotions Management:  Increased awareness of daily mood patterns/changes.    Assessment:    Patient response:   Patient responded to session by accepting feedback, giving feedback, listening, focusing on goals, being attentive, accepting support and appearing alert    Possible barriers to participation / learning include: and no barriers identified    Health Issues:   None reported       Substance Use Review:   Substance Use: alcohol .     Mental Status/Behavioral Observations  Appearance:   Appropriate   Eye Contact:   Good   Psychomotor Behavior: Normal   Attitude:   Cooperative   Orientation:   All  Speech   Rate / Production: Normal    Volume:  Normal   Mood:    Irritable   Affect:    Appropriate   Thought Content:   Clear  Thought Form:  Coherent  Logical     Insight:    Good     Plan:     Safety Plan: Recommended that patient call 911 or go to the local ED should there be a change in any of these risk factors.     Barriers to treatment: None identified    Patient Contracts (see media tab):  None    Substance Use: Stage of Change: Action     Continue or Discharge: Patient will continue in Adult Dual Disorder Program (DDP) as  planned. Patient is likely to benefit from learning and using skills as they work toward the goals identified in their treatment plan.      Jairo Rios LP  January 18, 2021

## 2021-01-18 NOTE — GROUP NOTE
Psychoeducation Group Note    PATIENT'S NAME: Xavier Landon  MRN:   [id number]  :   1990  ACCT. NUMBER: [id number]  DATE OF SERVICE: 21  START TIME: 11:00 AM  END TIME: 11:50 AM  FACILITATOR: Jairo Rios LP  TOPIC: MH RN Group: Health Maintenance  [facility] Adult Dual Diagnosis Day Treatment  TRACK: 2    NUMBER OF PARTICIPANTS: 2    Telemedicine Visit: The patient's condition can be safely assessed and treated via synchronous audio and visual telemedicine encounter.      Reason for Telemedicine Visit: Services only offered telehealth    Originating Site (Patient Location): Patient's home    Distant Site (Provider Location): Provider Remote Setting    Consent:  The patient/guardian has verbally consented to: the potential risks and benefits of telemedicine (video visit) versus in person care; bill my insurance or make self-payment for services provided; and responsibility for payment of non-covered services.     Mode of Communication:  Video Conference via [email or web address]    As the provider I attest to compliance with applicable laws and regulations related to telemedicine.      Summary of Group / Topics Discussed:  Health Maintenance: Wellness Check-in: Patients met with group facilitator to individually review a holistic wellness check-in to assess patient medication adherence/concerns, appointments, physical and mental health, exercise, nutrition, sleep, socialization, substance use, and need for service/resource referrals.       Patient Session Goals / Objectives:    Discussed various aspects of health management and self-care related to physical and mental health    Demonstrated increased self-awareness of current wellness needs    Developed health literacy skills in navigating the healthcare system and self-advocacy/communicating needs with health care team          Patient Participation / Response:  Fully participated with the group by sharing personal reflections /  insights and openly received / provided feedback with other participants.    Demonstrated understanding of topics discussed through group discussion and participation    Treatment Plan:  Patient has a current master individualized treatment plan.  See Epic treatment plan for more information.    Jairo Rios LP

## 2021-01-19 ENCOUNTER — TELEPHONE (OUTPATIENT)
Dept: BEHAVIORAL HEALTH | Facility: CLINIC | Age: 31
End: 2021-01-19

## 2021-01-19 ENCOUNTER — HOSPITAL ENCOUNTER (OUTPATIENT)
Dept: BEHAVIORAL HEALTH | Facility: CLINIC | Age: 31
End: 2021-01-19
Attending: PSYCHIATRY & NEUROLOGY
Payer: COMMERCIAL

## 2021-01-19 PROCEDURE — 90853 GROUP PSYCHOTHERAPY: CPT | Mod: 95

## 2021-01-19 PROCEDURE — 90853 GROUP PSYCHOTHERAPY: CPT | Mod: 95 | Performed by: PSYCHOLOGIST

## 2021-01-19 NOTE — GROUP NOTE
Psychotherapy Group Note    PATIENT'S NAME: Xavier Landon  MRN:   [id number]  :   1990  ACCT. NUMBER: [id number]  DATE OF SERVICE: 21  START TIME: 10:00 AM  END TIME: 10:50 AM  FACILITATOR: Jairo Rios LP  TOPIC:  EBP Group: Cognitive Restructuring  [facility] Adult Dual Diagnosis Day Treatment  TRACK: 2    NUMBER OF PARTICIPANTS: 2    Telemedicine Visit: The patient's condition can be safely assessed and treated via synchronous audio and visual telemedicine encounter.      Reason for Telemedicine Visit: Services only offered telehealth    Originating Site (Patient Location): Patient's home    Distant Site (Provider Location): Provider Remote Setting    Consent:  The patient/guardian has verbally consented to: the potential risks and benefits of telemedicine (video visit) versus in person care; bill my insurance or make self-payment for services provided; and responsibility for payment of non-covered services.     Mode of Communication:  Video Conference via [employer]    As the provider I attest to compliance with applicable laws and regulations related to telemedicine.      Summary of Group / Topics Discussed:  Cognitive Restructuring: Distortions: Patients received an overview of how to identify common cognitive distortions. Patients will explore alternatives to cognitive distortions and practice challenging their negative thought patterns. The goal is to help patients target modify ineffective thought patterns.     Patient Session Goals / Objectives:    Familiarized self with ineffective / unhealthy thoughts and how they develop.      Explored impact of ineffective thoughts / distortions on mood and activity    Formulated new neutral/positive alternatives to challenge less helpful / ineffective thoughts.    Practiced and plan to apply in daily life               Patient Participation / Response:  Fully participated with the group by sharing personal reflections /  insights and openly received / provided feedback with other participants.    Demonstrated understanding of topics discussed through group discussion and participation    Treatment Plan:  Patient has a current master individualized treatment plan.  See Epic treatment plan for more information.    Jairo Rios LP

## 2021-01-19 NOTE — GROUP NOTE
Telemedicine Visit: The patient's condition can be safely assessed and treated via synchronous audio and visual telemedicine encounter.      Reason for Telemedicine Visit: Services only offered telehealth    Originating Site (Patient Location): Patient's home    Distant Site (Provider Location): Provider Remote Setting    Consent:  The patient/guardian has verbally consented to: the potential risks and benefits of telemedicine (video visit) versus in person care; bill my insurance or make self-payment for services provided; and responsibility for payment of non-covered services.     Mode of Communication:  Video Conference via [employer]    As the provider I attest to compliance with applicable laws and regulations related to telemedicine.  Process Group Note    PATIENT'S NAME: Xavier Landon  MRN:   [id number]  :   1990  ACCT. NUMBER: [id number]  DATE OF SERVICE: 21  START TIME: 11:00 AM  END TIME: 11:50 AM  FACILITATOR: Chey Gabriel LMFT  TOPIC:  Process Group    Diagnoses:  296.33 (F33.2) Major Depressive Disorder, Recurrent Episode, Severe _ and With anxious distress  300.02 (F41.1) Generalized Anxiety Disorder.  4. Other Diagnoses that is relevant to services:   Attention-Deficit/Hyperactivity Disorder  314.01 (F90.9) Unspecified Attention -Deficit / Hyperactivity Disorder.  5. 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   Area(s) of treatment focus addressed in this session included Symptom Management.  Processed feeling upset with providers and the process of getting his medication refilled. Identifies  "he is taking steps he needs to take and feels invalidated that his doctors continue to set up barriers for him to get his med. Explored ways to take steps in managing stress in the moment and reframe. Explored ways to maintain sobriety and focus on strengths. No substance use and no safety concerns.     Therapeutic Interventions/Treatment Strategies:  Psychotherapist offered support, feedback and validation and reinforced use of skills. Treatment modalities used include Cognitive Behavioral Therapy. Interventions include Cognitive Restructuring:  Assisted patient in identifying new neutral/positive core beliefs and Coping Skills: Discussed use of self-soothe skills to decrease distress in the body and Discussed how the use of intentional \"in the moment\" actions can help reduce distress.    Assessment:    Patient response:   Patient responded to session by accepting feedback, giving feedback, listening and verbalizing understanding    Possible barriers to participation / learning include: and no barriers identified    Health Issues:   None reported       Substance Use Review:   Substance Use: No active concerns identified.    Mental Status/Behavioral Observations  Appearance:   Appropriate   Eye Contact:   Good   Psychomotor Behavior: Agitated   Attitude:   Cooperative   Orientation:   All  Speech   Rate / Production: Normal    Volume:  Normal   Mood:    Agitated  Affect:    Appropriate   Thought Content:   Clear  Thought Form:  Coherent  Logical  Obsessive     Insight:    Poor     Plan:     Safety Plan: No current safety concerns identified.  Recommended that patient call 911 or go to the local ED should there be a change in any of these risk factors.     Barriers to treatment: None identified    Patient Contracts (see media tab):  None    Substance Use: Not addressed in session     Continue or Discharge: Patient will continue in Adult Dual Disorder Program (DDP) as planned. Patient is likely to benefit from " learning and using skills as they work toward the goals identified in their treatment plan.      VIKTOR Nielsen  January 19, 2021

## 2021-01-21 ENCOUNTER — TELEPHONE (OUTPATIENT)
Dept: BEHAVIORAL HEALTH | Facility: CLINIC | Age: 31
End: 2021-01-21

## 2021-01-21 ENCOUNTER — HOSPITAL ENCOUNTER (OUTPATIENT)
Dept: BEHAVIORAL HEALTH | Facility: CLINIC | Age: 31
End: 2021-01-21
Attending: PSYCHIATRY & NEUROLOGY
Payer: COMMERCIAL

## 2021-01-21 PROCEDURE — 90853 GROUP PSYCHOTHERAPY: CPT | Mod: 95 | Performed by: MARRIAGE & FAMILY THERAPIST

## 2021-01-21 PROCEDURE — 90853 GROUP PSYCHOTHERAPY: CPT | Mod: 95 | Performed by: PSYCHOLOGIST

## 2021-01-21 NOTE — GROUP NOTE
Psychotherapy Group Note    PATIENT'S NAME: Xavier Landon  MRN:   [id number]  :   1990  ACCT. NUMBER: [id number]  DATE OF SERVICE: 21  START TIME: 12:00 PM  END TIME: 12:50 PM  FACILITATOR: Deborah Watson LMFT  TOPIC:  EBP Group: Coping Skills  [facility] Adult Dual Diagnosis Day Treatment  TRACK: 2    NUMBER OF PARTICIPANTS: 3    Telemedicine Visit: The patient's condition can be safely assessed and treated via synchronous audio and visual telemedicine encounter.      Reason for Telemedicine Visit: Services only offered telehealth    Originating Site (Patient Location): Patient's home    Distant Site (Provider Location): Provider Remote Setting    Consent:  The patient/guardian has verbally consented to: the potential risks and benefits of telemedicine (video visit) versus in person care; bill my insurance or make self-payment for services provided; and responsibility for payment of non-covered services.     Mode of Communication:  Video Conference via [employer]    As the provider I attest to compliance with applicable laws and regulations related to telemedicine.   Summary of Group / Topics Discussed:  Coping Skills: Radical Acceptance: Patients learned radical acceptance as a way to tolerate heightened stress in the moment.  Patients identified situations that necessitate radical acceptance.  They focused on applying acceptance of the moment to tolerate difficult emotions and events. Patients discussed how to distinguish when this can be useful in their lives and when other skills are more relevant or helpful.    Patient Session Goals / Objectives:    Understand that some amount of pain exists for each of us in our lives    Process the difficulty of acceptance in our lives and benefits of radical acceptance to mood and functioning.    Demonstrate understanding of when to use the radical acceptance to tolerate distress by providing examples of situations where this could be  applied.    Identify barriers to applying radical acceptance to difficult situations and explore strategies to overcome them        Patient Participation / Response:  Fully participated with the group by sharing personal reflections / insights and openly received / provided feedback with other participants.    Demonstrated understanding of topics discussed through group discussion and participation, Expressed understanding of the relevance / importance of coping skills at distressing times in life and Demonstrated knowledge of when to consider using a variety of coping skills in daily life    Treatment Plan:  Patient has a current master individualized treatment plan.  See Epic treatment plan for more information.    Vance Watson, LARISAFT

## 2021-01-21 NOTE — TELEPHONE ENCOUNTER
Called pt to discuss teddy for his psych provider magi wal[facility], pt gave verbal permission and teddy was filled out.    Pt is needing medication refilled.  Is struggling with concentration.  Pt stated he would call [facility] again to get his adhd testing and psych testing that was done to give to program.    This writer will contact magi walters to make introduction and coordinate care

## 2021-01-21 NOTE — GROUP NOTE
Process Group Note    PATIENT'S NAME: Xavier Landon  MRN:   [id number]  :   1990  ACCT. NUMBER: [id number]  DATE OF SERVICE: 21  START TIME: 11:00 AM  END TIME: 11:50 AM  FACILITATOR: Deborah Watson LMFT  TOPIC:  Process Group    Diagnoses:  296.33 (F33.2) Major Depressive Disorder, Recurrent Episode, Severe _ and With anxious distress  300.02 (F41.1) Generalized Anxiety Disorder.  4. Other Diagnoses that is relevant to services:   Attention-Deficit/Hyperactivity Disorder  314.01 (F90.9) Unspecified Attention -Deficit / Hyperactivity Disorder.  5. Provisional Diagnosis: R/O Substance-Related & Addictive Disorders Alcohol Use Disorder   303.90 (F10.20) [facility] Adult Dual Diagnosis Day Treatment  TRACK: 2    NUMBER OF PARTICIPANTS: 3    Telemedicine Visit: The patient's condition can be safely assessed and treated via synchronous audio and visual telemedicine encounter.      Reason for Telemedicine Visit: Services only offered telehealth    Originating Site (Patient Location): Patient's home    Distant Site (Provider Location): Provider Remote Setting    Consent:  The patient/guardian has verbally consented to: the potential risks and benefits of telemedicine (video visit) versus in person care; bill my insurance or make self-payment for services provided; and responsibility for payment of non-covered services.     Mode of Communication:  Video Conference via [employer] Lynk    As the provider I attest to compliance with applicable laws and regulations related to telemedicine.           Data:    Session content: At the start of this group, patients were invited to check in by identifying themselves, describing their current emotional status, and identifying issues to address in this group.   Area(s) of treatment focus addressed in this session included Symptom Management, Personal Safety and Abstinence/Relapse Prevention.    Huey reports feeling frustrated today-his  "goal for the day is to get his prescription filled-calling [facility], motivation-desperation, denies barriers, reports passive si-can follow safety plan, smoked weed last night-is taking lamictal, is proud that he bought a bunch of groceries the other day and has been making meals-it feels good to not go out and buy food, was hanging out with roommates-was feeling frustrated with program-thinking \"fuck it\"-struggling with triggers    Therapeutic Interventions/Treatment Strategies:  Psychotherapist offered support, feedback and validation and reinforced use of skills. Treatment modalities used include Motivational Interviewing. Interventions include Relapse Prevention: Facilitated understanding the importance of awareness of factors that contribute to relapse , Assisted patient in identifying personal vulnerabilities, thoughts, emotions, and situations that may lead to relapse  and Coached on skills to manage factors that contribute to relapse.    Assessment:    Patient response:   Patient responded to session by accepting feedback, listening, being attentive and appearing alert    Possible barriers to participation / learning include: and no barriers identified    Health Issues:   None reported       Substance Use Review:   Substance Use: Last use: 1/20    Mental Status/Behavioral Observations  Appearance:   Appropriate   Eye Contact:   Good   Psychomotor Behavior: Normal   Attitude:   Cooperative   Orientation:   All  Speech   Rate / Production: Normal    Volume:  Normal   Mood:    Anxious  Irritable   Affect:    Constricted   Thought Content:   Clear and Safety reports  presence of suicidal ideation passive suicidal ideation   Thought Form:  Coherent  Logical     Insight:    Good     Plan:     Safety Plan: Committed to safety and agreed to follow previously developed safety coping plan.      Barriers to treatment: None identified    Patient Contracts (see media tab):  None    Substance Use: Provided encouragement " towards sobriety    Provided support and affirmation for steps taken towards sobriety     Facilitated behavior chain analysis     Continue or Discharge: Patient will continue in Adult Dual Disorder Program (DDP) as planned. Patient is likely to benefit from learning and using skills as they work toward the goals identified in their treatment plan.      Vance Watson, LMFT  January 21, 2021

## 2021-01-21 NOTE — GROUP NOTE
Psychotherapy Group Note    PATIENT'S NAME: Xavier Landon  MRN:   [id number]  :   1990  ACCT. NUMBER: [id number]  DATE OF SERVICE: 21  START TIME: 10:00 AM  END TIME: 10:50 AM  FACILITATOR: Jairo Rios LP  TOPIC:  EBP Group: DDP Relapse Prevention  [facility] Adult Dual Diagnosis Day Treatment  TRACK: 2    NUMBER OF PARTICIPANTS: 3    Telemedicine Visit: The patient's condition can be safely assessed and treated via synchronous audio and visual telemedicine encounter.      Reason for Telemedicine Visit: Services only offered telehealth    Originating Site (Patient Location): Patient's home    Distant Site (Provider Location): Provider Remote Setting    Consent:  The patient/guardian has verbally consented to: the potential risks and benefits of telemedicine (video visit) versus in person care; bill my insurance or make self-payment for services provided; and responsibility for payment of non-covered services.     Mode of Communication:  Video Conference via [employer]    As the provider I attest to compliance with applicable laws and regulations related to telemedicine.      Summary of Group / Topics Discussed:  DDP Relapse Prevention: Observing and Describing Triggers for Substance Use: Patients explored in greater depth factors that contribute to a relapse including: emotions, thoughts, and situations that trigger substance use. Goal of topic is to assist patients in increased recognition of specific emotions, thoughts, and situations they may experience that increases risk. Patients were able to identify and share their specific emotions, thoughts, and situations with the group. Patients also learned  bridge burning  skill to assist in removing means of acting on substance use.    Patient Session Goals / Objectives:    Verbalized understanding of the importance of awareness of triggers for substance use    Identified their own individual triggers    [place]  effective coping skills in response to triggers for substance use including  bridge burning  skill      Patient Participation / Response:  Moderately participated, sharing some personal reflections / insights and adequately adequately received / provided feedback with other participants.    Demonstrated understanding of topics discussed through group discussion and participation    Treatment Plan:  Patient has a current master individualized treatment plan.  See Epic treatment plan for more information.    Jairo Rios, LP

## 2021-01-22 ENCOUNTER — HOSPITAL ENCOUNTER (OUTPATIENT)
Dept: BEHAVIORAL HEALTH | Facility: CLINIC | Age: 31
End: 2021-01-22
Attending: PSYCHIATRY & NEUROLOGY
Payer: COMMERCIAL

## 2021-01-22 PROCEDURE — 90853 GROUP PSYCHOTHERAPY: CPT | Mod: GT | Performed by: MARRIAGE & FAMILY THERAPIST

## 2021-01-22 PROCEDURE — 90853 GROUP PSYCHOTHERAPY: CPT | Mod: 95 | Performed by: MARRIAGE & FAMILY THERAPIST

## 2021-01-22 PROCEDURE — 90853 GROUP PSYCHOTHERAPY: CPT | Mod: 95

## 2021-01-22 NOTE — GROUP NOTE
Psychoeducation Group Note    PATIENT'S NAME: Xavier Landon  MRN:   [id number]  :   1990  ACCT. NUMBER: [id number]  DATE OF SERVICE: 21  START TIME: 12:00 PM  END TIME: 12:50 PM  FACILITATOR: Deborah Watson LMFT  TOPIC: TIMUR RN Group: Health Maintenance  [facility] Adult Dual Diagnosis Day Treatment  TRACK: 2    NUMBER OF PARTICIPANTS: 2    Telemedicine Visit: The patient's condition can be safely assessed and treated via synchronous audio and visual telemedicine encounter.      Reason for Telemedicine Visit: Services only offered telehealth    Originating Site (Patient Location): Patient's home    Distant Site (Provider Location): Provider Remote Setting    Consent:  The patient/guardian has verbally consented to: the potential risks and benefits of telemedicine (video visit) versus in person care; bill my insurance or make self-payment for services provided; and responsibility for payment of non-covered services.     Mode of Communication:  Video Conference via [employer]    As the provider I attest to compliance with applicable laws and regulations related to telemedicine.   Summary of Group / Topics Discussed:  Health Maintenance: Weekend planning: Patients were given time to complete a weekend plan of what they will do to promote wellness and sobriety over the weekend when they do not have the structure of group. Patients were encouraged to review progress on their treatment goals and were challenged to identify ways to work toward meeting them. Patients identified and discussed foreseeable barriers to success over the weekend and then developed a plan to overcome them. Patients reviewed their distress coping skills and social support network and discussed this with the group.       Patient Session Goals / Objectives:    ?    Identified activities to engage in that promote balance in wellness  ?    Distinguished possible barriers to success over the weekend and created a plan to  overcome them  ?    Listed distress coping skills and identified social support network to utilize if in crisis during the weekend          Patient Participation / Response:  Fully participated with the group by sharing personal reflections / insights and openly received / provided feedback with other participants.    Demonstrated understanding of topics discussed through group discussion and participation and Identified / Expressed personal readiness to practice skills    Treatment Plan:  Patient has a current master individualized treatment plan.  See Epic treatment plan for more information.    Vance Watson LMFT

## 2021-01-22 NOTE — GROUP NOTE
Process Group Note    PATIENT'S NAME: Xavier Landon  MRN:   [id number]  :   1990  ACCT. NUMBER: [id number]  DATE OF SERVICE: 21  START TIME: 10:00 AM  END TIME: 10:50 AM  FACILITATOR: Deborah Watson LMFT  TOPIC:  Process Group    Diagnoses:  296.33 (F33.2) Major Depressive Disorder, Recurrent Episode, Severe _ and With anxious distress  300.02 (F41.1) Generalized Anxiety Disorder.  4. Other Diagnoses that is relevant to services:   Attention-Deficit/Hyperactivity Disorder  314.01 (F90.9) Unspecified Attention -Deficit / Hyperactivity Disorder.  5. Provisional Diagnosis: R/O Substance-Related & Addictive Disorders Alcohol Use Disorder   303.90 (F10.20) [facility] Adult Dual Diagnosis Day Treatment  TRACK: 2    NUMBER OF PARTICIPANTS: 2    Telemedicine Visit: The patient's condition can be safely assessed and treated via synchronous audio and visual telemedicine encounter.      Reason for Telemedicine Visit: Services only offered telehealth    Originating Site (Patient Location): Patient's home    Distant Site (Provider Location): Provider Remote Setting    Consent:  The patient/guardian has verbally consented to: the potential risks and benefits of telemedicine (video visit) versus in person care; bill my insurance or make self-payment for services provided; and responsibility for payment of non-covered services.     Mode of Communication:  Video Conference via [employer]    As the provider I attest to compliance with applicable laws and regulations related to telemedicine.           Data:    Session content: At the start of this group, patients were invited to check in by identifying themselves, describing their current emotional status, and identifying issues to address in this group.   Area(s) of treatment focus addressed in this session included Symptom Management and Personal Safety.    Huey is feeling tired, his goal is to get his rx filled today, will be using  persistence, denies safety concerns today, denies chemical use, is taking rx, is grateful to have his roommates, discussed going back to work and feeling concern over the expectations    Therapeutic Interventions/Treatment Strategies:  Psychotherapist offered support, feedback and validation and reinforced use of skills. Treatment modalities used include Motivational Interviewing. Interventions include Behavioral Activation: Explored how behaviors effect mood and interact with thoughts and feelings.    Assessment:    Patient response:   Patient responded to session by listening, being attentive and appearing alert    Possible barriers to participation / learning include: and no barriers identified    Health Issues:   None reported       Substance Use Review:   Substance Use: Last use: 1/20    Mental Status/Behavioral Observations  Appearance:   Appropriate   Eye Contact:   Good   Psychomotor Behavior: Normal   Attitude:   Cooperative   Orientation:   All  Speech   Rate / Production: Normal    Volume:  Normal   Mood:    Anxious  Depressed   Affect:    Constricted   Thought Content:   Clear and Safety denies any current safety concerns including suicidal ideation, self-harm, and homicidal ideation  Thought Form:  Coherent  Logical     Insight:    Good     Plan:     Safety Plan: No current safety concerns identified.  Recommended that patient call 911 or go to the local ED should there be a change in any of these risk factors.     Barriers to treatment: None identified    Patient Contracts (see media tab):  None    Substance Use: Provided encouragement towards sobriety    Provided support and affirmation for steps taken towards sobriety      Continue or Discharge: Patient will continue in Adult Dual Disorder Program (DDP) as planned. Patient is likely to benefit from learning and using skills as they work toward the goals identified in their treatment plan.      Vance Watson, LMFT  January 22, 2021

## 2021-01-22 NOTE — GROUP NOTE
Psychotherapy Group Note    PATIENT'S NAME: Xavier Landon  MRN:   [id number]  :   1990  ACCT. NUMBER: [id number]  DATE OF SERVICE: 21  START TIME: 11:00 AM  END TIME: 11:50 AM  FACILITATOR: Mariia Ramos LICSW  TOPIC: MH EBP Group: Coping Skills  [facility] Adult Dual Diagnosis Day Treatment  TRACK: 2    Telemedicine Visit: The patient's condition can be safely assessed and treated via synchronous audio and visual telemedicine encounter.      Reason for Telemedicine Visit: Services only offered telehealth    Originating Site (Patient Location): Patient's home    Distant Site (Provider Location): [facility]: [facility]    Consent:  The patient/guardian has verbally consented to: the potential risks and benefits of telemedicine (video visit) versus in person care; bill my insurance or make self-payment for services provided; and responsibility for payment of non-covered services.     Mode of Communication:  Video Conference via [employer]    As the provider I attest to compliance with applicable laws and regulations related to telemedicine.      NUMBER OF PARTICIPANTS: 2    Summary of Group / Topics Discussed:  Coping Skills: Self-Soothe: Patients learned to apply self-soothe as a way to decrease heightened stress in the moment.  Patients identified situations that necessitate self-soothe strategies.  They focused on ways to manage physical symptoms of distress using the senses. They discussed how to distinguish when this can be useful in their lives when other strategies are more relevant or helpful.    Patient Session Goals / Objectives:    Understand the purpose of using the senses to decrease distress    Process what happens in the body when using self-soothe strategies    Demonstrate understanding of when to use self-soothe strategies    Identify and problem solve barriers to applying self-soothe strategies.    Choose 1-2 self-soothe strategies to apply during times  of distress.        Patient Participation / Response:  Fully participated with the group by sharing personal reflections / insights and openly received / provided feedback with other participants.    Demonstrated understanding of topics discussed through group discussion and participation and Identified / Expressed personal readiness to practice new coping skills    Treatment Plan:  Patient has a current master individualized treatment plan.  See Epic treatment plan for more information.    Mariia Kang, LICSW

## 2021-01-25 ENCOUNTER — HOSPITAL ENCOUNTER (OUTPATIENT)
Dept: BEHAVIORAL HEALTH | Facility: CLINIC | Age: 31
End: 2021-01-25
Attending: PSYCHIATRY & NEUROLOGY
Payer: COMMERCIAL

## 2021-01-25 PROCEDURE — 90853 GROUP PSYCHOTHERAPY: CPT | Mod: GT | Performed by: MARRIAGE & FAMILY THERAPIST

## 2021-01-25 PROCEDURE — 90853 GROUP PSYCHOTHERAPY: CPT | Mod: 95 | Performed by: MARRIAGE & FAMILY THERAPIST

## 2021-01-25 PROCEDURE — 90853 GROUP PSYCHOTHERAPY: CPT | Mod: GT | Performed by: PSYCHOLOGIST

## 2021-01-25 NOTE — GROUP NOTE
Process Group Note    PATIENT'S NAME: Xavier Landon  MRN:   [id number]  :   1990  ACCT. NUMBER: [id number]  DATE OF SERVICE: 21  START TIME: 10:00 AM  END TIME: 10:50 AM  FACILITATOR: Deborah Watson LMFT  TOPIC:  Process Group    Diagnoses:  296.33 (F33.2) Major Depressive Disorder, Recurrent Episode, Severe _ and With anxious distress  300.02 (F41.1) Generalized Anxiety Disorder.  4. Other Diagnoses that is relevant to services:   Attention-Deficit/Hyperactivity Disorder  314.01 (F90.9) Unspecified Attention -Deficit / Hyperactivity Disorder.  5. Provisional Diagnosis: R/O Substance-Related & Addictive Disorders Alcohol Use Disorder   303.90 (F10.20) [facility] Adult Dual Diagnosis Day Treatment  TRACK: 2    NUMBER OF PARTICIPANTS: 3    Telemedicine Visit: The patient's condition can be safely assessed and treated via synchronous audio and visual telemedicine encounter.      Reason for Telemedicine Visit: Services only offered telehealth    Originating Site (Patient Location): Patient's home    Distant Site (Provider Location): Provider Remote Setting    Consent:  The patient/guardian has verbally consented to: the potential risks and benefits of telemedicine (video visit) versus in person care; bill my insurance or make self-payment for services provided; and responsibility for payment of non-covered services.     Mode of Communication:  Video Conference via [employer]    As the provider I attest to compliance with applicable laws and regulations related to telemedicine.           Data:    Session content: At the start of this group, patients were invited to check in by identifying themselves, describing their current emotional status, and identifying issues to address in this group.   Area(s) of treatment focus addressed in this session included Symptom Management, Personal Safety and Abstinence/Relapse Prevention.    Huey is feeling anxious and on edge-mental  state, his goal for the day is to clean up his room and put things away-is going to use the nervous energy, denies barriers, reports passive si-can follow safety plan, denies chemical use, is taking rx as prescribed, is proud that he made the appt for his med provider, discussed his weekend, seeing his parents and volunteering this weekend, feeling good about the few hours he put in    Therapeutic Interventions/Treatment Strategies:  Psychotherapist offered support, feedback and validation and reinforced use of skills. Treatment modalities used include Dialectical Behavioral Therapy. Interventions include Cognitive Restructuring:  Assisted patient in formulating new neutral/positive alternatives to challenge less helpful / ineffective thoughts.    Assessment:    Patient response:   Patient responded to session by listening, being attentive and appearing alert    Possible barriers to participation / learning include: and no barriers identified    Health Issues:   None reported       Substance Use Review:   Substance Use: Last use: 1/20    Mental Status/Behavioral Observations  Appearance:   Appropriate   Eye Contact:   Good   Psychomotor Behavior: Restless   Attitude:   Cooperative   Orientation:   All  Speech   Rate / Production: Hyperverbal    Volume:  Normal   Mood:    Anxious   Affect:    Constricted   Thought Content:   Clear and Safety reports  presence of suicidal ideation passive suicidal ideation   Thought Form:  Coherent  Logical     Insight:    Good     Plan:     Safety Plan: Committed to safety and agreed to follow previously developed safety coping plan.      Barriers to treatment: None identified    Patient Contracts (see media tab):  None    Substance Use: Provided encouragement towards sobriety    Provided support and affirmation for steps taken towards sobriety      Continue or Discharge: Patient will continue in Adult Dual Disorder Program (DDP) as planned. Patient is likely to benefit from learning  and using skills as they work toward the goals identified in their treatment plan.      Vance Watson, LMFT  January 25, 2021

## 2021-01-25 NOTE — GROUP NOTE
Psychotherapy Group Note    PATIENT'S NAME: Xavier Landon  MRN:   [id number]  :   1990  ACCT. NUMBER: [id number]  DATE OF SERVICE: 21  START TIME: 11:00 AM  END TIME: 11:50 AM  FACILITATOR: Deborah Watson LMFT  TOPIC: MH EBP Group: Relationship Skills  [facility] Adult Dual Diagnosis Day Treatment  TRACK: 2    NUMBER OF PARTICIPANTS: 3    Telemedicine Visit: The patient's condition can be safely assessed and treated via synchronous audio and visual telemedicine encounter.      Reason for Telemedicine Visit: Services only offered telehealth    Originating Site (Patient Location): Patient's home    Distant Site (Provider Location): Provider Remote Setting    Consent:  The patient/guardian has verbally consented to: the potential risks and benefits of telemedicine (video visit) versus in person care; bill my insurance or make self-payment for services provided; and responsibility for payment of non-covered services.     Mode of Communication:  Video Conference via [employer]    As the provider I attest to compliance with applicable laws and regulations related to telemedicine.     Summary of Group / Topics Discussed:  Relationship Skills: Assertive Communication: Patients were provided with a general overview of assertive communication skills and how practicing assertive communication skills will assist patients in developing healthier and more effective relationships. Patients reviewed their current awareness on ability to practice assertive communication, ways to increase assertive communication, and identified/problem solved barriers to assertive communication.     Patient Session Goals / Objectives:    Identified and discussed patient individual challenges with communication    Presented and practiced effective communication skills in session    Assisted patients in implementing more effective communication skills in their relationships      Patient Participation / Response:  Fully  participated with the group by sharing personal reflections / insights and openly received / provided feedback with other participants.    Demonstrated understanding of topics discussed through group discussion and participation, Demonstrated understanding of relationship skills and communication skills and Identified / Expressed personal readiness to incorporate effective communication skills    Treatment Plan:  Patient has a current master individualized treatment plan.  See Epic treatment plan for more information.    Vance Watson, LARISAFT

## 2021-01-25 NOTE — GROUP NOTE
Psychoeducation Group Note    PATIENT'S NAME: Xavier Landon  MRN:   [id number]  :   1990  ACCT. NUMBER: [id number]  DATE OF SERVICE: 21  START TIME: 12:00 PM  END TIME: 12:50 PM  FACILITATOR: Jairo Rios LP  TOPIC: MH RN Group: Health Maintenance  [facility] Adult Dual Diagnosis Day Treatment  TRACK: 2    NUMBER OF PARTICIPANTS: 2    Telemedicine Visit: The patient's condition can be safely assessed and treated via synchronous audio and visual telemedicine encounter.      Reason for Telemedicine Visit: Services only offered telehealth    Originating Site (Patient Location): Patient's home    Distant Site (Provider Location): Provider Remote Setting    Consent:  The patient/guardian has verbally consented to: the potential risks and benefits of telemedicine (video visit) versus in person care; bill my insurance or make self-payment for services provided; and responsibility for payment of non-covered services.     Mode of Communication:  Video Conference via [employer]    As the provider I attest to compliance with applicable laws and regulations related to telemedicine.      Summary of Group / Topics Discussed:  Health Maintenance: Wellness Check-in: Patients met with group facilitator to individually review a holistic wellness check-in to assess patient medication adherence/concerns, appointments, physical and mental health, exercise, nutrition, sleep, socialization, substance use, and need for service/resource referrals.       Patient Session Goals / Objectives:    Discussed various aspects of health management and self-care related to physical and mental health    Demonstrated increased self-awareness of current wellness needs    Developed health literacy skills in navigating the healthcare system and self-advocacy/communicating needs with health care team          Patient Participation / Response:  Fully participated with the group by sharing personal reflections /  insights and openly received / provided feedback with other participants.    Demonstrated understanding of topics discussed through group discussion and participation    Treatment Plan:  Patient has a current master individualized treatment plan.  See Epic treatment plan for more information.    Jairo Rios LP

## 2021-01-26 ENCOUNTER — HOSPITAL ENCOUNTER (OUTPATIENT)
Dept: BEHAVIORAL HEALTH | Facility: CLINIC | Age: 31
End: 2021-01-26
Attending: PSYCHIATRY & NEUROLOGY
Payer: COMMERCIAL

## 2021-01-26 PROCEDURE — 90853 GROUP PSYCHOTHERAPY: CPT | Mod: 95 | Performed by: MARRIAGE & FAMILY THERAPIST

## 2021-01-26 NOTE — GROUP NOTE
Psychoeducation Group Note    PATIENT'S NAME: Xavier Landon  MRN:   [id number]  :   1990  ACCT. NUMBER: [id number]  DATE OF SERVICE: 21  START TIME: 12:00 PM  END TIME: 12:50 PM  FACILITATOR: Deborah Watson LMFT  TOPIC: MH RN Group: Mental Health Maintenance  [facility] Adult Dual Diagnosis Day Treatment  TRACK: 2    NUMBER OF PARTICIPANTS: 2    Telemedicine Visit: The patient's condition can be safely assessed and treated via synchronous audio and visual telemedicine encounter.      Reason for Telemedicine Visit: Services only offered telehealth    Originating Site (Patient Location): Patient's home    Distant Site (Provider Location): Provider Remote Setting    Consent:  The patient/guardian has verbally consented to: the potential risks and benefits of telemedicine (video visit) versus in person care; bill my insurance or make self-payment for services provided; and responsibility for payment of non-covered services.     Mode of Communication:  Video Conference via [employer]    As the provider I attest to compliance with applicable laws and regulations related to telemedicine.     Summary of Group / Topics Discussed:  Mental Health Maintenance:  Vulnerability: In this group, the concept of vulnerability was explored through the viewing, discussion, and self-reflection of the Nazario Gibson Talk Titled,  The Power of Vulnerability.      Patient Session Goals / Objectives:  ? Defined and described definition of vulnerability   ? Identified 2 or more ways of practicing authenticity         Patient Participation / Response:  Fully participated with the group by sharing personal reflections / insights and openly received / provided feedback with other participants.    Demonstrated understanding of topics discussed through group discussion and participation, Identified / Expressed personal readiness to practice skills and Verbalized understanding of mental health maintenance  topic    Treatment Plan:  Patient has a current master individualized treatment plan.  See Epic treatment plan for more information.    Vance Watson LMFT

## 2021-01-26 NOTE — GROUP NOTE
Psychotherapy Group Note    PATIENT'S NAME: Xavier Landon  MRN:   [id number]  :   1990  ACCT. NUMBER: [id number]  DATE OF SERVICE: 21  START TIME: 11:00 AM  END TIME: 11:50 AM  FACILITATOR: Deborah Watson LMFT  TOPIC: MH EBP Group: Relationship Skills  [facility] Adult Dual Diagnosis Day Treatment  TRACK: 2    NUMBER OF PARTICIPANTS: 2    Telemedicine Visit: The patient's condition can be safely assessed and treated via synchronous audio and visual telemedicine encounter.      Reason for Telemedicine Visit: Services only offered telehealth    Originating Site (Patient Location): Patient's home    Distant Site (Provider Location): Provider Remote Setting    Consent:  The patient/guardian has verbally consented to: the potential risks and benefits of telemedicine (video visit) versus in person care; bill my insurance or make self-payment for services provided; and responsibility for payment of non-covered services.     Mode of Communication:  Video Conference via [employer]    As the provider I attest to compliance with applicable laws and regulations related to telemedicine.   Summary of Group / Topics Discussed:  Relationship Skills: Boundaries: Patients were provided with a general overview of interpersonal boundaries and how lack of boundaries relates to symptoms and functioning. The purpose is to help patients identify boundary issues and gain awareness and skills to work towards healthier interpersonal boundaries. Current awareness of healthy boundary characteristics and barriers to establishing healthy boundaries were discussed.    Patient Session Goals / Objectives:    Familiarized patients with the concept of interpersonal boundaries and their characteristics    Discussed and practiced strategies to promote healthier interpersonal boundaries    Identified boundary issues and identified plan to improve boundaries      Patient Participation / Response:  Fully participated with  the group by sharing personal reflections / insights and openly received / provided feedback with other participants.    Demonstrated understanding of topics discussed through group discussion and participation, Demonstrated understanding of relationship skills and communication skills and Identified / Expressed personal readiness to incorporate effective communication skills    Treatment Plan:  Patient has a current master individualized treatment plan.  See Epic treatment plan for more information.    Vance Watson, LARISAFT

## 2021-01-28 ENCOUNTER — HOSPITAL ENCOUNTER (OUTPATIENT)
Dept: BEHAVIORAL HEALTH | Facility: CLINIC | Age: 31
End: 2021-01-28
Attending: PSYCHIATRY & NEUROLOGY
Payer: COMMERCIAL

## 2021-01-28 PROCEDURE — 90853 GROUP PSYCHOTHERAPY: CPT | Mod: 95 | Performed by: MARRIAGE & FAMILY THERAPIST

## 2021-01-28 PROCEDURE — 90853 GROUP PSYCHOTHERAPY: CPT | Mod: GT | Performed by: PSYCHOLOGIST

## 2021-01-28 NOTE — GROUP NOTE
Psychotherapy Group Note    PATIENT'S NAME: Xavier Landon  MRN:   [id number]  :   1990  ACCT. NUMBER: [id number]  DATE OF SERVICE: 21  START TIME: 11:00 AM  END TIME: 11:50 AM  FACILITATOR: Jairo Rios LP  TOPIC:  EBP Group: DDP Relapse Prevention  [facility] Adult Dual Diagnosis Day Treatment  TRACK: 2    NUMBER OF PARTICIPANTS: 2    Telemedicine Visit: The patient's condition can be safely assessed and treated via synchronous audio and visual telemedicine encounter.      Reason for Telemedicine Visit: Services only offered telehealth    Originating Site (Patient Location): Patient's home    Distant Site (Provider Location): Provider Remote Setting    Consent:  The patient/guardian has verbally consented to: the potential risks and benefits of telemedicine (video visit) versus in person care; bill my insurance or make self-payment for services provided; and responsibility for payment of non-covered services.     Mode of Communication:  Video Conference via [employer]    As the provider I attest to compliance with applicable laws and regulations related to telemedicine.      Summary of Group / Topics Discussed:  DDP Relapse Prevention: Stages of Change: Patients explored the process and types of change in relation to substance use, including but not limited to: theories of change, steps to making change, methods of changing behavior, and potential barriers to implementing change. Patients discussed their current and past experiences with managing change in relation to substance use and what stage of change they currently identify with. Patients identified what changes may benefit their daily lives and how they can work towards implementing change.    Patient Session Goals / Objectives:    Gained understanding of the change process    Identified positive and negative behavioral patterns    Made plans to track and implement changes and shared experiences in  group    Identified personal barriers to change        Patient Participation / Response:  Fully participated with the group by sharing personal reflections / insights and openly received / provided feedback with other participants.    Demonstrated understanding of topics discussed through group discussion and participation    Treatment Plan:  Patient has a current master individualized treatment plan.  See Epic treatment plan for more information.    Jairo Rios, LP

## 2021-01-28 NOTE — GROUP NOTE
Process Group Note    PATIENT'S NAME: Xavier Landon  MRN:   [id number]  :   1990  ACCT. NUMBER: [id number]  DATE OF SERVICE: 21  START TIME: 10:00 AM  END TIME: 10:50 AM  FACILITATOR: Deborah Watson LMFT  TOPIC:  Process Group    Diagnoses:  296.33 (F33.2) Major Depressive Disorder, Recurrent Episode, Severe _ and With anxious distress  300.02 (F41.1) Generalized Anxiety Disorder.  4. Other Diagnoses that is relevant to services:   Attention-Deficit/Hyperactivity Disorder  314.01 (F90.9) Unspecified Attention -Deficit / Hyperactivity Disorder.  5. Provisional Diagnosis: R/O Substance-Related & Addictive Disorders Alcohol Use Disorder   303.90 (F10.20) [facility] Adult Dual Diagnosis Day Treatment  TRACK: 2    NUMBER OF PARTICIPANTS: 2    Telemedicine Visit: The patient's condition can be safely assessed and treated via synchronous audio and visual telemedicine encounter.      Reason for Telemedicine Visit: Services only offered telehealth    Originating Site (Patient Location): Patient's home    Distant Site (Provider Location): Provider Remote Setting    Consent:  The patient/guardian has verbally consented to: the potential risks and benefits of telemedicine (video visit) versus in person care; bill my insurance or make self-payment for services provided; and responsibility for payment of non-covered services.     Mode of Communication:  Video Conference via [employer]    As the provider I attest to compliance with applicable laws and regulations related to telemedicine.           Data:    Session content: At the start of this group, patients were invited to check in by identifying themselves, describing their current emotional status, and identifying issues to address in this group.   Area(s) of treatment focus addressed in this session included Symptom Management, Personal Safety and Abstinence/Relapse Prevention.    Huey is feeling all of the place today, he  got his medication, he got his psych eval finally and got [facility], is feeling like he is at a crossroads, is feeling excited possibly hopeful, feeling confused about his BPD diagnosis, his gaol for the day is to clean his room-look into short term disability, is taking his rx as prescribed, denies barriers, reports passive si can follow safety plan-denies chemical use, is taking rx as prescribed, is grateful that he will be getting services from the sexual [facility] center-    Therapeutic Interventions/Treatment Strategies:  Psychotherapist offered support, feedback and validation and reinforced use of skills. Treatment modalities used include Motivational Interviewing. Interventions include Behavioral Activation: Explored how behaviors effect mood and interact with thoughts and feelings.    Assessment:    Patient response:   Patient responded to session by accepting feedback, listening, being attentive and appearing alert    Possible barriers to participation / learning include: and no barriers identified    Health Issues:   None reported       Substance Use Review:   Substance Use: Last use: 1/20    Mental Status/Behavioral Observations  Appearance:   Appropriate   Eye Contact:   Good   Psychomotor Behavior: Normal   Attitude:   Cooperative   Orientation:   All  Speech   Rate / Production: Normal    Volume:  Normal   Mood:    calm, relaxed  Affect:    Appropriate   Thought Content:   Clear and Safety reports  presence of suicidal ideation passive suicidal ideation   Thought Form:  Coherent  Logical     Insight:    Good     Plan:     Safety Plan: Committed to safety and agreed to follow previously developed safety coping plan.      Barriers to treatment: None identified    Patient Contracts (see media tab):  None    Substance Use: Provided encouragement towards sobriety    Provided support and affirmation for steps taken towards sobriety      Continue or  Discharge: Patient will continue in Adult Dual Disorder Program (DDP) as planned. Patient is likely to benefit from learning and using skills as they work toward the goals identified in their treatment plan.      Vance Watson, LMFT  January 28, 2021

## 2021-01-28 NOTE — PROGRESS NOTES
Case Coordination And Contacts Made       Name: Xavier Landon MRN: [id number]    : 1990    Area of Focus:  D. Assessment    Pt reports a BPD diagnosis from his psych testing done at [facility].  This writer educated pt on Borderline Personality Disorder-reviewed criteria with pt, and discussed the criteria that pt endorsed.  This writer evaluated pt- 7 out of 9 criteria met for borderline personality disorder.  Educated pt on treatment options, validated and normalized pt's lived experience.  Pt will continue with ddp program, will speak to his individual therapist about DBT skill building in sessions.  Pt will consider DBT group options (however return to work may impact this).  Will meet for aftercare planning on Wednesday 2/3.  BPD will be added to pt's treatment diagnosis.    296.33 (F33.2) Major Depressive Disorder, Recurrent Episode, Severe _ and With anxious distress  300.02 (F41.1) Generalized Anxiety Disorder.  Attention-Deficit/Hyperactivity Disorder  314.01 (F90.9) Unspecified Attention -Deficit / Hyperactivity Disorder.  Substance-Related & Addictive Disorders Alcohol Use Disorder   303.90 (F10.20) Moderate   301.83 (F60.3) Borderline Personality Disorder

## 2021-01-28 NOTE — GROUP NOTE
Psychoeducation Group Note    PATIENT'S NAME: Xavier Landon  MRN:   [id number]  :   1990  ACCT. NUMBER: [id number]  DATE OF SERVICE: 21  START TIME: 12:00 PM  END TIME: 12:50 PM  FACILITATOR: Deborah Watson LMFT  TOPIC: MH RN Group: Mental Health Maintenance  [facility] Adult Dual Diagnosis Day Treatment  TRACK: 2    NUMBER OF PARTICIPANTS: 2    Telemedicine Visit: The patient's condition can be safely assessed and treated via synchronous audio and visual telemedicine encounter.      Reason for Telemedicine Visit: Services only offered telehealth    Originating Site (Patient Location): Patient's home    Distant Site (Provider Location): Provider Remote Setting    Consent:  The patient/guardian has verbally consented to: the potential risks and benefits of telemedicine (video visit) versus in person care; bill my insurance or make self-payment for services provided; and responsibility for payment of non-covered services.     Mode of Communication:  Video Conference via [employer]    As the provider I attest to compliance with applicable laws and regulations related to telemedicine.     Summary of Group / Topics Discussed:  Mental Health Maintenance:  Anatomy of Trust: In this group, the concept of trust was explored through the viewing, discussion, and self-reflection of the Osirisne Brown video Titled,  the anatomy of trust.      Patient Session Goals / Objectives:  ? Defined and described definition of trust  ? Identified 2 or more ways of practicing authenticity         Patient Participation / Response:  Fully participated with the group by sharing personal reflections / insights and openly received / provided feedback with other participants.    Demonstrated understanding of topics discussed through group discussion and participation and Identified / Expressed personal readiness to practice skills    Treatment Plan:  Patient has a current master individualized treatment plan.  See  Epic treatment plan for more information.    Vance Watson LMFT

## 2021-01-29 ENCOUNTER — HOSPITAL ENCOUNTER (OUTPATIENT)
Dept: BEHAVIORAL HEALTH | Facility: CLINIC | Age: 31
End: 2021-01-29
Attending: PSYCHIATRY & NEUROLOGY
Payer: COMMERCIAL

## 2021-01-29 PROCEDURE — 90853 GROUP PSYCHOTHERAPY: CPT | Mod: GT | Performed by: MARRIAGE & FAMILY THERAPIST

## 2021-01-29 NOTE — GROUP NOTE
Process Group Note    PATIENT'S NAME: Xavier Landon  MRN:   [id number]  :   1990  ACCT. NUMBER: [id number]  DATE OF SERVICE: 21  START TIME: 10:00 AM  END TIME: 10:50 AM  FACILITATOR: Deborah Watson LMFT  TOPIC:  Process Group    Diagnoses:  296.33 (F33.2) Major Depressive Disorder, Recurrent Episode, Severe _ and With anxious distress  300.02 (F41.1) Generalized Anxiety Disorder.  Attention-Deficit/Hyperactivity Disorder  314.01 (F90.9) Unspecified Attention -Deficit / Hyperactivity Disorder.  Substance-Related & Addictive Disorders Alcohol Use Disorder   303.90 (F10.20) Moderate   301.83 (F60.3) Borderline Personality Disorder      [facility] Adult Dual Diagnosis Day Treatment  TRACK: 2    NUMBER OF PARTICIPANTS: 3    Telemedicine Visit: The patient's condition can be safely assessed and treated via synchronous audio and visual telemedicine encounter.      Reason for Telemedicine Visit: Services only offered telehealth    Originating Site (Patient Location): Patient's home    Distant Site (Provider Location): Provider Remote Setting    Consent:  The patient/guardian has verbally consented to: the potential risks and benefits of telemedicine (video visit) versus in person care; bill my insurance or make self-payment for services provided; and responsibility for payment of non-covered services.     Mode of Communication:  Video Conference via [employer]    As the provider I attest to compliance with applicable laws and regulations related to telemedicine.       Data:    Session content: At the start of this group, patients were invited to check in by identifying themselves, describing their current emotional status, and identifying issues to address in this group.   Area(s) of treatment focus addressed in this session included Symptom Management, Personal Safety and Abstinence/Relapse Prevention.    Huey is feeling worried, today, he slept well, having more troubled thoughts about  the future-reports struggling with thoughts, reports passive si-can follow plan-denies chemical use, is taking rx as prescribed, is grateful for the program therapist who has helped him understand things this week and has been supportive, he discussed his struggling with motivation    Therapeutic Interventions/Treatment Strategies:  Psychotherapist offered support, feedback and validation and reinforced use of skills. Treatment modalities used include Dialectical Behavioral Therapy. Interventions include Behavioral Activation: Reinforced benefits/challenges of change process through applying skills to replace unwanted behaviors.    Assessment:    Patient response:   Patient responded to session by accepting feedback, listening, being attentive and appearing alert    Possible barriers to participation / learning include: and no barriers identified    Health Issues:   None reported       Substance Use Review:   Substance Use: Last use: 1/20    Mental Status/Behavioral Observations  Appearance:   Appropriate   Eye Contact:   Good   Psychomotor Behavior: Normal   Attitude:   Cooperative   Orientation:   All  Speech   Rate / Production: Normal    Volume:  Normal   Mood:    Anxious  Depressed   Affect:    Blunted   Thought Content:   Clear and Safety reports  presence of suicidal ideation passive suicidal ideation   Thought Form:  Coherent  Logical     Insight:    Good     Plan:     Safety Plan: Committed to safety and agreed to follow previously developed safety coping plan.      Barriers to treatment: None identified    Patient Contracts (see media tab):  None    Substance Use: Provided encouragement towards sobriety    Provided support and affirmation for steps taken towards sobriety      Continue or Discharge: Patient will continue in Adult Dual Disorder Program (DDP) as planned. Patient is likely to benefit from learning and using skills as they work toward the goals identified in their treatment plan.      Vance  LOKI Watson, LMFT  January 29, 2021

## 2021-01-29 NOTE — GROUP NOTE
Psychotherapy Group Note    PATIENT'S NAME: Xavier Landon  MRN:   [id number]  :   1990  ACCT. NUMBER: [id number]  DATE OF SERVICE: 21  START TIME: 11:00 AM  END TIME: 11:50 AM  FACILITATOR: Deborah Watson LMFT  TOPIC: MH EBP Group: Relationship Skills  [facility] Adult Dual Diagnosis Day Treatment  TRACK: 2    NUMBER OF PARTICIPANTS: 3  Telemedicine Visit: The patient's condition can be safely assessed and treated via synchronous audio and visual telemedicine encounter.      Reason for Telemedicine Visit: Services only offered telehealth    Originating Site (Patient Location): Patient's home    Distant Site (Provider Location): Provider Remote Setting    Consent:  The patient/guardian has verbally consented to: the potential risks and benefits of telemedicine (video visit) versus in person care; bill my insurance or make self-payment for services provided; and responsibility for payment of non-covered services.     Mode of Communication:  Video Conference via [employer]    As the provider I attest to compliance with applicable laws and regulations related to telemedicine.   Summary of Group / Topics Discussed:  Relationship Skills: Conflict Resolution: Topic of conflict resolution in interpersonal communication was discussed. Patients received an overview of how communication skills during times of conflict impact symptoms and functioning. Patients discussed their current communication challenges and how this impacts their functioning. Patients also verbalized understanding of skills learned and practiced in session.     Patient Session Goals / Objectives:    Identified and discussed patient individual challenges with communication    Presented and practiced effective communication skills in session    Assisted patients in implementing more effective communication skills in their relationships      Patient Participation / Response:  Fully participated with the group by sharing  personal reflections / insights and openly received / provided feedback with other participants.    Demonstrated understanding of topics discussed through group discussion and participation, Demonstrated understanding of relationship skills and communication skills and Identified / Expressed personal readiness to incorporate effective communication skills    Treatment Plan:  Patient has a current master individualized treatment plan.  See Epic treatment plan for more information.    Vance Watson LMFT

## 2021-02-01 ENCOUNTER — HOSPITAL ENCOUNTER (OUTPATIENT)
Dept: BEHAVIORAL HEALTH | Facility: CLINIC | Age: 31
End: 2021-02-01
Attending: PSYCHIATRY & NEUROLOGY
Payer: COMMERCIAL

## 2021-02-01 ENCOUNTER — VIRTUAL VISIT (OUTPATIENT)
Dept: PSYCHOLOGY | Facility: CLINIC | Age: 31
End: 2021-02-01
Payer: COMMERCIAL

## 2021-02-01 DIAGNOSIS — F33.2 MAJOR DEPRESSIVE DISORDER, RECURRENT EPISODE, SEVERE WITH ANXIOUS DISTRESS (H): ICD-10-CM

## 2021-02-01 DIAGNOSIS — F19.90 SUBSTANCE USE DISORDER: ICD-10-CM

## 2021-02-01 DIAGNOSIS — F90.2 ATTENTION DEFICIT HYPERACTIVITY DISORDER (ADHD), COMBINED TYPE: ICD-10-CM

## 2021-02-01 DIAGNOSIS — F91.8 CONDUCT DISORDER, UNDIFFERENTIATED TYPE: Primary | ICD-10-CM

## 2021-02-01 DIAGNOSIS — F41.1 GENERALIZED ANXIETY DISORDER: ICD-10-CM

## 2021-02-01 PROCEDURE — 90832 PSYTX W PT 30 MINUTES: CPT | Mod: GT | Performed by: MARRIAGE & FAMILY THERAPIST

## 2021-02-01 PROCEDURE — 90791 PSYCH DIAGNOSTIC EVALUATION: CPT | Mod: 95 | Performed by: PSYCHOLOGIST

## 2021-02-01 NOTE — PROGRESS NOTES
[facility]  Program in Human Sexuality  Department of Family Medicine & Community Health  [facility]  [address]               [place], [place] [address]  Phone: [phone]  Fax: [phone]  [email or web address]    Diagnostic Assessment Interview       Date of Service: 2/01/21  Client Name: Xavier Landon  YOB: 1990  Age: 30 year old  MRN: [id number]  Gender/Gender Identity: male  Treating Provider: Aysha Phillip, PhD LP  Program: CSB  Type of Session: Diagnostic Assessment  Present in Session: Patient  Number of Minutes:  55    Video start time: 12:00  Video end time: 12:55    Telemedicine Visit: The patient's condition can be safely assessed and treated via synchronous audio and visual telemedicine encounter.      Reason for Telemedicine Visit: Covid    Originating Site (Patient Location): Patient's home    Distant Site (Provider Location): [facility] Clinics: [facility]    Consent:  The patient/guardian has verbally consented to: the potential risks and benefits of telemedicine (video visit) versus in person care; bill my insurance or make self-payment for services provided; and responsibility for payment of non-covered services.     Mode of Communication:  Video Conference via Doxy.    As the provider I attest to compliance with applicable laws and regulations related to telemedicine.         Reviewed confidentiality, informed consent, and relevant [facility] policies.    Referral Source: [facility]    Cultural/Spiritual/Advent Considerations:   Patient is a 30 year old year old individual assigned male at birth who identifies as male.  Patient denied any other culturally relevant factors.    Chief Complaint/ History of Presenting Problem and Goals:  Patient's problems with out of control, driven, impulsive, compulsive sexual behavior began very early in his life.  He started masturbating to pornography at age 8 or 9 - started compulsively.   "Continued to escalate over time.  He would masturbate multiple times a day. He describes ejaculating 1-12 times daily.  He would try to quit and would be successful for a few days and then go back to his pattern.  As a result he feels that he has been impotent all of his life and believes this is due to his \"porn addiction.\"  He feels that his sex life has been stolen from him.  He is extremely distressed about this and does not see the reason to continue living this way.    In terms of erections, he is able to get aroused and have a full erection but when he attempts oral or vaginal penetration, he loses his erection.    He also feels that the manner in which he learned to masturbate is the cause.  He was uncircumcised and would masturbate with his penis tucked down because it would be painful if he would get a full erection.  He learned to ejaculate without an erection.   He was circumcised at age 17 to release the tension caused by his foreskin.    He is very distressed about his erotic interests.  He has a lot of submissive fantasies - been depraved, being penetrated by multiple partners including she-males.  He also has cross-dressing fantasies.  He is mostly attracted to women but he has fantasies of having sex with men - usually in a submissive mannter.    As he has fantasies of being a women and fantasies of \"she-males\" and being penetrated by men, he wonders about his gender identity and sexual orientation.      He feels he has fetishized his own failure as a person.    He also describes crippling ADHD which is treated with medication (adderal) since high school.   He feels he could not function without adderal.    As a result of all of this, he has suffered crippling anxiety and depression and substance use problems.    Patient has made the following efforts to change problematic behavior:  Mostly has dealt with anxiety, depression and substance use in therapy.  Had had so much shame has never dealt with " his sexual problems in depth.    Patient would like to feel capable of being a functional male adult.  He would like to feel capable of love in romantic and sexual contexts.    More or less every facet of his existence causes him deep shame.  He feels that he has an utterly miserable catastrophically failed sex life.      Endorsed problematic behaviors include:   Anonymous/multiple sexual encounters: No  Compulsive Fixation on unattainable partner: No  Obsessive fantasizing: Yes  Multiple love affairs: No  Compulsive sexual expression with primary partner: No  Compulsive masturbation: Yes  Internet pornography: Yes  Other types of pornography: No  Chat rooms, social media sites: No  Personals ads: No  Bath houses: No  Peep/strip shows: No  Phone sex: No  Prostitution: No  Sex in public places: No    Any paraphilic behaviors:  Voyeurism: No  Exhibitionism: No  Frotteurism: No  Sexual Masochism: No  Sexual Sadism: No  Pedophilia: No  Cross dressing: Yes  Fetishism: No  Coercive sexual behavior: No    Patient reports sexual orientation concerns.    Patient reports gender identity concerns.    Patient reports sexual functioning concerns.   ________________________________________________________________  Mental Health History:    Patient has had a history of hospitalizations for mental health.  Patient has not had a history of suicide attempts.   Patient has had a history of suicidal ideation.   Patient has had a history of previous psychotherapy.    He was first hospitalized in 2012 at [facility] in a partial outpatient program.  Did not finish.    Then in December 2020 went to [facility] was hospitalized and then began a dual diagnosis outpatient treatment with an emphasis on addressing substance use disorder, major depression and learning DBT skills.    He has had about 6-8 therapists in his lifetime, none of which have seemed to help him.  Most recently, he saw Sommer Ghosh from May - December 2020  for depression, anxiety and ADHD.    She tried to address sexual dysfunction and compulsive sexual behavior and other issues which led her to recommend partial hospitalization and now in current DBT at [place].    Also saw a therapist at [facility] from Spring 2019-Winter, 2019 for depressive symptoms.  He left that therapy as he did not feel the therapist  cared.     Has been treated by Patricia Gonzalez at [facility]  from Jan 2021 to present.  Has seen her twice.  Being seen for ADHD, depression, anxiety.    Patient has been on adderal since an adolescent.  Currently has been prescribed 40 mg of Adderal XR.  Was also prescribed lamictal 25 mg for depression.  Does not feel that this is controlling his depression.  In the past, he has been treated with Wellbutrin which he did not think was helpful.  He was not sure if he had been treated with an SSRI but if had been, he doesn t feel that it was very effective.    Patient has had a history of mental health concerns in his family. His father had undiagnosed depression.  Mother suffered from anxiety and depression.       Substance Use:   Patient acknowledges using nicotine products. Has tried to quit before and took Chantix.  Would like to quit again.  Patient has been using alcohol. Typically used every day and acknowledges abuse pattern.  Has recently quit using as part of his treatment. Has been sober 2 weeeks.  Acknowledges that he has abused alcohol his whole adult life.  The patient denies using any other substances.   Patient acknowledges substance abuse issues (see CAGE score - 3).  Patient denies a history of substance use concerns in his family.    Medical History:   Patient did not report a primary care physician.  He denies any medical issues past or present.    Relationships/Social History    Family History:   Father: is 70 and in good health.  He describes his relationship as distant sometimes, a bit closed off, but still loving and supportive.  He is  understandably disappointed and confused about his son s mental condition.    Mother: was loving and supportive  throughout but also He is understandably disappointed and confused about her son s mental condition.    Overall he describes a loving environment growing up which does not explain his difficulty in his own adult life.    Siblings: He is an only child.    Children: None    He has had three significant intimate relationships.  One in [id number], one in [id number] and one in spring 2020 till fall of 2020.  He describes these relationships as short lived and hurt by his own sexual dysfunction.      Trauma History:  The patient reports no history of trauma.     Educational History:   Patient completed high school and then went on to college.  He did not do well after his 1st year and then tried a second year and that did not work out either.    He attributes his mental health problems as interfering with his educational progress.    Occupational history:    Current/most recent position: [employer].  .  Lynn been there since 10/2018.  He is not happy with his work or where he is occupationally.  He is happy that this job provides him health care.  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  He is on FMLA for the past two moths.     FINANCIAL RESOURCES:   The patient's financial situation is dissatisfying.  Would like a better job.  Thinking of applying for short-term disability.    HOUSING/LIVING SITUATION:   The patient lives with two roomates.    Legal Issues:   Patient acknowledges having a DUI in 2012.  No other present legal issues.      ______________________________________________________________________     CONCLUSIONS    Strengths and Vulnerabilities:   His parents are very  supportive.  He has a few good friends.  He is articulate and bright.  His mental health issues are clearly his biggest weakness.    Symptoms:    Compulsive sexual behavior.  Depressed  Anxious  Problems with attention, concentration  Sexual Dysfunction.    See PHQ 9 (24) and SHAUNA 7 (13) Scores    DSM-5 Diagnosis:   312.89 (F91.8) Other Specified Disruptive, Impulse Control, and Conduct Disorder (Hypersexual Disorder)    Major Depressive Disorder, recurrent, severe with anxiety  Generalized Anxiety Disorder  ADHD  Substance Use Disorder (Alchohol)  Rule out Bipolar  Rule out Personality Disorder    Conclusions/Recommendations/Initial Treatment Goals:   Based on the patient's report of symptoms, patient meets criteria for compulsive sexual behavior, major depression, generalized anxiety, ADHD and substance use disorder.  He is CSB is rather severe and extremely distressing.  He also worries that he may have damaged his penis either through his pattern of masturbation or through his excessive masturbation - also contributing to his sexual dysfunction.  His sexual dysfunction seems to be limited to attempts at sexual intercourse.  Otherwise, he describes good sexual arousal.  He has been diagnosed as bipolar but the does not feel that is accurate and will have to be ruled out. It seems he is probably dealing with a personality disorder and that will need to be ruled out as well.  The patient's mental health concerns affect patient's ability to function occupationally and in social and interpersonal relationships.  and have been causing clinically significant distress. The patient reports no drug/alcohol use in the past two weeks. However, he acknowledges having a substance use disorder that certainly goes back to all of his adult years.   Patient denies safety concerns. Based on the patient's reported symptoms and impact on functioning, the plan for the patient is to consult with his dual diagnosis program to coordinate  treatment planning.  He needs to establish ongoing psychiatric care with his current psychiatrist or see psychiatry at [facility].  He seems appropriate for care at [facility] although he needs to be stabilized with his mood and to stay in recovery from alcohol.  He will benefic from further testing through our [employer] measures which I told him I would send him.  He might benefit from a more thorough psychiatric assessment to aid in treatment planning.  He certainly needs to be assisted in treating his compulsive sexual behavior which he attributes much of his mental health problems.  He also needs to sort through his sexual orientation, gender identity and erotic conflicts.    Aysha Phillip, PhD LP

## 2021-02-01 NOTE — ADDENDUM NOTE
Encounter addended by: Deborha Watson LMFT on: 2/1/2021 1:57 PM   Actions taken: Clinical Note Signed

## 2021-02-01 NOTE — PROGRESS NOTES
Start time: 10:00am  End time: 11:15am  ATTENDEES: Patient   Service Modality:  Video Visit:      Provider verified identity through the following two step process.  Patient provided:  Patient     Telemedicine Visit: The patient's condition can be safely assessed and treated via synchronous audio and visual telemedicine encounter.      Reason for Telemedicine Visit: Services only offered telehealth    Originating Site (Patient Location): Patient's home    Distant Site (Provider Location): Provider Remote Setting    Consent:  The patient/guardian has verbally consented to: the potential risks and benefits of telemedicine (video visit) versus in person care; bill my insurance or make self-payment for services provided; and responsibility for payment of non-covered services.     Patient would like the video invitation sent by:  My Chart    Mode of Communication:  Video Conference via Medical Zoom    As the provider I attest to compliance with applicable laws and regulations related to telemedicine.    Previous PHQ-9:   PHQ-9 SCORE 2020   PHQ-9 Total Score 24     Previous SHAUNA-7:   SHAUNA-7 SCORE 2020   Total Score 13     Diagnosis  296.33 (F33.2) Major Depressive Disorder, Recurrent Episode, Severe _ and With anxious distress  300.02 (F41.1) Generalized Anxiety Disorder.  Attention-Deficit/Hyperactivity Disorder  314.01 (F90.9) Unspecified Attention -Deficit / Hyperactivity Disorder.  Substance-Related & Addictive Disorders Alcohol Use Disorder   303.90 (F10.20) Moderate   301.83 (F60.3) Borderline Personality Disorder      DATA    Treatment Objective(s) Addressed in This Session:    Pt checked in about the weekend, struggling with action and feeling stuck, he reports wanting to work on long term goals for himself and seeing changes-discussed how to achieve long term goals-creating plan of action    Current Stressors / Issues:  Feeling stuck, feeling frustrated with life situation      Progress on Treatment  Objective(s) / Homework:  No improvement - PREPARATION (Decided to change - considering how); Intervened by negotiating a change plan and determining options / strategies for behavior change, identifying triggers, exploring social supports, and working towards setting a date to begin behavior change    Therapeutic Interventions/Treatment Strategies:  Support, Feedback, Problem Solving and Motivational Enhancement Therapy    Response to Treatment Strategies:  Accepted Feedback, Listened, Focused on Goals, Attentive, Accepted Support and Alert    Changes in Health Issues:   None reported    Chemical Use Review:   Substance Use: No substance use concerns reported / identified no chemical use since 1/20 (cannabis), 3 weeks of no use for alcohol    Assessment: Current Emotional / Mental Status (status of significant symptoms):  Risk status (Self / Other harm or suicidal ideation)  Patient has had a history of suicidal ideation: passiv  Patient denies current fears or concerns for personal safety.  Patient denies current or recent suicidal ideation or behaviors.  Patient denies current or recent homicidal ideation or behaviors.  Patient denies current or recent self injurious behavior or ideation.  Patient denies other safety concerns.  A safety and risk management plan has not been developed at this time, however patient was encouraged to call [place] [phone] should there be a change in any of these risk factors.    Appearance:   Appropriate   Eye Contact:   Good   Psychomotor Behavior: Normal   Attitude:   Cooperative  Attentive  Orientation:   All  Speech   Rate / Production: Talkative   Volume:  Normal   Mood:    calm engaged  Affect:    Appropriate   Thought Content:  Clear   Thought Form:  Coherent  Logical   Insight:    Good     Diagnoses:      Plan: (Homework, other):  Work on setting up and following through with having a more set schedule and routine during the day     Patient has a current master  individualized treatment plan.  See Epic treatment plan for more information.                                                Patient has reviewed and agreed to the above plan.      Vance Watson, LMFT  February 1, 2021

## 2021-02-02 ENCOUNTER — HOSPITAL ENCOUNTER (OUTPATIENT)
Dept: BEHAVIORAL HEALTH | Facility: CLINIC | Age: 31
End: 2021-02-02
Attending: PSYCHIATRY & NEUROLOGY
Payer: COMMERCIAL

## 2021-02-02 PROCEDURE — 90832 PSYTX W PT 30 MINUTES: CPT | Mod: 95 | Performed by: MARRIAGE & FAMILY THERAPIST

## 2021-02-02 NOTE — GROUP NOTE
Process Note    PATIENT'S NAME: Xavier Landon  MRN:   [id number]  :   1990  ACCT. NUMBER: [id number]  DATE OF SERVICE: 21  START TIME: 10:00 AM  END TIME: 11:35 AM  FACILITATOR: Deborah Watson LMFT  TOPIC: MH Process     Diagnoses:  296.33 (F33.2) Major Depressive Disorder, Recurrent Episode, Severe _ and With anxious distress  300.02 (F41.1) Generalized Anxiety Disorder.  Attention-Deficit/Hyperactivity Disorder  314.01 (F90.9) Unspecified Attention -Deficit / Hyperactivity Disorder.  Substance-Related & Addictive Disorders Alcohol Use Disorder   303.90 (F10.20) Moderate   301.83 (F60.3) Borderline Personality Disorder      [facility] Adult Dual Diagnosis Day Treatment  TRACK: 2    NUMBER OF PARTICIPANTS: 1 person-individual therapy given    Telemedicine Visit: The patient's condition can be safely assessed and treated via synchronous audio and visual telemedicine encounter.      Reason for Telemedicine Visit: Services only offered telehealth    Originating Site (Patient Location): Patient's home    Distant Site (Provider Location): Provider Remote Setting    Consent:  The patient/guardian has verbally consented to: the potential risks and benefits of telemedicine (video visit) versus in person care; bill my insurance or make self-payment for services provided; and responsibility for payment of non-covered services.     Mode of Communication:  Video Conference via Sonavation    As the provider I attest to compliance with applicable laws and regulations related to telemedicine.           Data:    Session content: At the start of this meeting, patients were invited to check in by identifying themselves, describing their current emotional status, and identifying issues to address in this group.   Area(s) of treatment focus addressed in this session included Symptom Management, Personal Safety and Abstinence/Relapse Prevention.    Huey reports passive si-can follow plan, discussed  "goals for the week and setting up routine, explored barriers to setting routine and strategies to follow through on goals, pt reports a \"desire to learn and a desire to obtain mastery\" encouraged pt to use these two values as a way to decide if he will engage in an activity, provided education around pt's recent psych eval and psycho-education on dopamine, oxytocin, serotonin, and endorphins.  Pt engaged in discussion about barriers, struggling with negative self talk and the critical self, and ways to mitigate it    Therapeutic Interventions/Treatment Strategies:  Psychotherapist offered support, feedback and validation and reinforced use of skills. Treatment modalities used include Dialectical Behavioral Therapy. Interventions include Behavioral Activation: Reinforced benefits/challenges of change process through applying skills to replace unwanted behaviors, Cognitive Restructuring:  Explored impact of ineffective thoughts / distortions on mood and activity and Assisted patient in formulating new neutral/positive alternatives to challenge less helpful / ineffective thoughts and Mindfulness: Facilitated discussion of when/how to use mindfulness skills to benefit general health, mental health symptoms, and stressors and Encouraged a plan to use mindfulness skills in daily life.    Assessment:    Patient response:   Patient responded to session by accepting feedback, listening, being attentive and appearing alert    Possible barriers to participation / learning include: and no barriers identified    Health Issues:   None reported       Substance Use Review:   Substance Use: Last use: 1/20    Mental Status/Behavioral Observations  Appearance:   Appropriate   Eye Contact:   Good   Psychomotor Behavior: Normal   Attitude:   Cooperative   Orientation:   All  Speech   Rate / Production: Emotional Talkative   Volume:  Normal   Mood:    Anxious  Agitated  Affect:    Constricted   Thought Content:   Clear and Safety reports "  presence of suicidal ideation passive suicidal ideation   Thought Form:  Coherent  Logical     Insight:    Good     Plan:     Safety Plan: Committed to safety and agreed to follow previously developed safety coping plan.      Barriers to treatment: None identified    Patient Contracts (see media tab):  None    Substance Use: Provided encouragement towards sobriety    Provided support and affirmation for steps taken towards sobriety      Continue or Discharge: Patient will continue in Adult Dual Disorder Program (DDP) as planned. Patient is likely to benefit from learning and using skills as they work toward the goals identified in their treatment plan.      Vance Watson, LMFT  February 2, 2021

## 2021-02-04 ENCOUNTER — HOSPITAL ENCOUNTER (OUTPATIENT)
Dept: BEHAVIORAL HEALTH | Facility: CLINIC | Age: 31
End: 2021-02-04
Attending: PSYCHIATRY & NEUROLOGY
Payer: COMMERCIAL

## 2021-02-04 PROCEDURE — 90853 GROUP PSYCHOTHERAPY: CPT | Mod: 95 | Performed by: MARRIAGE & FAMILY THERAPIST

## 2021-02-04 PROCEDURE — 90853 GROUP PSYCHOTHERAPY: CPT | Mod: GT | Performed by: MARRIAGE & FAMILY THERAPIST

## 2021-02-04 PROCEDURE — 90853 GROUP PSYCHOTHERAPY: CPT | Mod: 95 | Performed by: PSYCHOLOGIST

## 2021-02-04 ASSESSMENT — PATIENT HEALTH QUESTIONNAIRE - PHQ9
5. POOR APPETITE OR OVEREATING: MORE THAN HALF THE DAYS
SUM OF ALL RESPONSES TO PHQ QUESTIONS 1-9: 24

## 2021-02-04 ASSESSMENT — ANXIETY QUESTIONNAIRES
6. BECOMING EASILY ANNOYED OR IRRITABLE: NEARLY EVERY DAY
3. WORRYING TOO MUCH ABOUT DIFFERENT THINGS: MORE THAN HALF THE DAYS
7. FEELING AFRAID AS IF SOMETHING AWFUL MIGHT HAPPEN: MORE THAN HALF THE DAYS
5. BEING SO RESTLESS THAT IT IS HARD TO SIT STILL: NOT AT ALL
2. NOT BEING ABLE TO STOP OR CONTROL WORRYING: MORE THAN HALF THE DAYS
GAD7 TOTAL SCORE: 13
1. FEELING NERVOUS, ANXIOUS, OR ON EDGE: MORE THAN HALF THE DAYS

## 2021-02-04 NOTE — GROUP NOTE
Psychotherapy Group Note    PATIENT'S NAME: Xavier Landon  MRN:   [id number]  :   1990  ACCT. NUMBER: [id number]  DATE OF SERVICE: 21  START TIME: 12:00 PM  END TIME: 12:50 PM  FACILITATOR: Jairo Rios LP  TOPIC:  EBP Group: DDP Relapse Prevention  [facility] Adult Dual Diagnosis Day Treatment  TRACK: 2    NUMBER OF PARTICIPANTS: 3    Telemedicine Visit: The patient's condition can be safely assessed and treated via synchronous audio and visual telemedicine encounter.      Reason for Telemedicine Visit: Services only offered telehealth    Originating Site (Patient Location): Patient's home    Distant Site (Provider Location): Provider Remote Setting    Consent:  The patient/guardian has verbally consented to: the potential risks and benefits of telemedicine (video visit) versus in person care; bill my insurance or make self-payment for services provided; and responsibility for payment of non-covered services.     Mode of Communication:  Video Conference via Audible Magic    As the provider I attest to compliance with applicable laws and regulations related to telemedicine.      Summary of Group / Topics Discussed:  DDP Relapse Prevention: Urge Surfing: Patients explored the process and types of change in relation to substance use, including but not limited to: theories of change, steps to making change, methods of changing behavior, and potential barriers to implementing change. Patients discussed their current and past experiences with managing change in relation to substance use and what stage of change they currently identify with. Patients identified what changes may benefit their daily lives and how they can work towards implementing change.    Patient Session Goals / Objectives:    Gained understanding of the change process    Identified positive and negative behavioral patterns    Made plans to track and implement changes and shared experiences in  group    Identified personal barriers to change        Patient Participation / Response:  Moderately participated, sharing some personal reflections / insights and adequately adequately received / provided feedback with other participants.    Demonstrated understanding of topics discussed through group discussion and participation    Treatment Plan:  Patient has a current master individualized treatment plan.  See Epic treatment plan for more information.    Jairo Rios, LP

## 2021-02-04 NOTE — GROUP NOTE
Psychotherapy Group Note    PATIENT'S NAME: Xavier Landon  MRN:   [id number]  :   1990  ACCT. NUMBER: [id number]  DATE OF SERVICE: 21  START TIME: 12:00 PM  END TIME: 12:50 PM  FACILITATOR: Deborah Watson LMFT  TOPIC:  EBP Group: Self-Awareness  [facility] Adult Dual Diagnosis Day Treatment  TRACK: 2    NUMBER OF PARTICIPANTS: 3    Telemedicine Visit: The patient's condition can be safely assessed and treated via synchronous audio and visual telemedicine encounter.      Reason for Telemedicine Visit: Services only offered telehealth    Originating Site (Patient Location): Patient's home    Distant Site (Provider Location): Provider Remote Setting    Consent:  The patient/guardian has verbally consented to: the potential risks and benefits of telemedicine (video visit) versus in person care; bill my insurance or make self-payment for services provided; and responsibility for payment of non-covered services.     Mode of Communication:  Video Conference via [employer]    As the provider I attest to compliance with applicable laws and regulations related to telemedicine.     Summary of Group / Topics Discussed:  Self-Awareness: Personal Strengths: Topic focused on assisting patients in identifying personal strengths and how they relate to the management of mental health symptoms. Patients discussed the benefits of acknowledging their personal strengths and their impact on mood improvement, mindfulness, and perspective. Patients worked to increase time spent on recognition and appreciation of what is positive and working in their lives. The goal is to reduce rumination and negative thinking resulting in increased mindfulness and resilience. Patients will work to put skills into practice and problem-solve barriers.     Patient Session Goals / Objectives:    Identified personal strengths    Identified barriers to recognition of personal strengths    Verbalized understanding of strategies  to increase use of their strengths in management of daily symptoms      Patient Participation / Response:  Fully participated with the group by sharing personal reflections / insights and openly received / provided feedback with other participants.    Demonstrated understanding of topics discussed through group discussion and participation and Demonstrated understanding of values, strengths, and challenges to learn about themselves    Treatment Plan:  Patient has a current master individualized treatment plan.  See Epic treatment plan for more information.    Vance Watson, LARISAFT

## 2021-02-04 NOTE — GROUP NOTE
Process Group Note    PATIENT'S NAME: Xavier Landon  MRN:   [id number]  :   1990  ACCT. NUMBER: [id number]  DATE OF SERVICE: 21  START TIME: 10:00 AM  END TIME: 10:50 AM  FACILITATOR: Deborah Watson LMFT  TOPIC:  Process Group    Diagnoses:  296.33 (F33.2) Major Depressive Disorder, Recurrent Episode, Severe _ and With anxious distress  300.02 (F41.1) Generalized Anxiety Disorder.  Attention-Deficit/Hyperactivity Disorder  314.01 (F90.9) Unspecified Attention -Deficit / Hyperactivity Disorder.  Substance-Related & Addictive Disorders Alcohol Use Disorder   303.90 (F10.20) Moderate   301.83 (F60.3) Borderline Personality Disorder      [facility] Adult Dual Diagnosis Day Treatment  TRACK: 2    NUMBER OF PARTICIPANTS: 3    Telemedicine Visit: The patient's condition can be safely assessed and treated via synchronous audio and visual telemedicine encounter.      Reason for Telemedicine Visit: Services only offered telehealth    Originating Site (Patient Location): Patient's home    Distant Site (Provider Location): Provider Remote Setting    Consent:  The patient/guardian has verbally consented to: the potential risks and benefits of telemedicine (video visit) versus in person care; bill my insurance or make self-payment for services provided; and responsibility for payment of non-covered services.     Mode of Communication:  Video Conference via [employer]    As the provider I attest to compliance with applicable laws and regulations related to telemedicine.           Data:    Session content: At the start of this group, patients were invited to check in by identifying themselves, describing their current emotional status, and identifying issues to address in this group.   Area(s) of treatment focus addressed in this session included Symptom Management, Personal Safety and Abstinence/Relapse Prevention.    Huey reports feeling agitated and determined today, his goal for the day is to  get headlight fixed on car-draw and read today, is using pros and cons, is struggling with the snow, passive si-can use coping plan, denies chemical use, is taking rx, is proud that he followed through with therapist and friend yesterday-processed about his therapist ending services with him and trying to not take it personally    Therapeutic Interventions/Treatment Strategies:  Psychotherapist offered support, feedback and validation and reinforced use of skills. Treatment modalities used include Dialectical Behavioral Therapy. Interventions include Relapse Prevention: Assisted patient in identifying the challenges and barriers to participation and attendance to support groups/community resources.    Assessment:    Patient response:   Patient responded to session by listening, being attentive and appearing alert    Possible barriers to participation / learning include: and no barriers identified    Health Issues:   None reported       Substance Use Review:   Substance Use: Last use: 1/20    Mental Status/Behavioral Observations  Appearance:   Appropriate   Eye Contact:   Good   Psychomotor Behavior: Normal   Attitude:   Cooperative   Orientation:   All  Speech   Rate / Production: Normal    Volume:  Normal   Mood:    Depressed   Affect:    Blunted   Thought Content:   Clear and Safety reports  presence of suicidal ideation passive suicidal ideation   Thought Form:  Coherent  Logical     Insight:    Good     Plan:     Safety Plan: Committed to safety and agreed to follow previously developed safety coping plan.      Barriers to treatment: None identified    Patient Contracts (see media tab):  None    Substance Use: Provided encouragement towards sobriety    Provided support and affirmation for steps taken towards sobriety      Continue or Discharge: Patient will continue in Adult Dual Disorder Program (DDP) as planned. Patient is likely to benefit from learning and using skills as they work toward the goals  identified in their treatment plan.      Vance Watson, LMFT  February 4, 2021

## 2021-02-05 ENCOUNTER — TELEPHONE (OUTPATIENT)
Dept: PSYCHOLOGY | Facility: CLINIC | Age: 31
End: 2021-02-05

## 2021-02-05 ENCOUNTER — TELEPHONE (OUTPATIENT)
Dept: BEHAVIORAL HEALTH | Facility: CLINIC | Age: 31
End: 2021-02-05

## 2021-02-05 ASSESSMENT — ANXIETY QUESTIONNAIRES: GAD7 TOTAL SCORE: 13

## 2021-02-05 NOTE — TELEPHONE ENCOUNTER
Huey was absent from groups this date and did not call in. Placed call and talked with him directly to find out reason for absence. He noted that he had woken up a little late and then knowing the his primary psychotherapist was not going to be working this date that he would take time to focus on himself this date. He apologized for not informing staff of this decision. This writer gave him main program number again and noted he can call in to ask for an email invite if he awakens late in the future. He noted he was doing all right for the day and his decision to take care of himself today.

## 2021-02-08 ENCOUNTER — HOSPITAL ENCOUNTER (OUTPATIENT)
Dept: BEHAVIORAL HEALTH | Facility: CLINIC | Age: 31
End: 2021-02-08
Attending: PSYCHIATRY & NEUROLOGY
Payer: COMMERCIAL

## 2021-02-08 ENCOUNTER — VIRTUAL VISIT (OUTPATIENT)
Dept: PSYCHOLOGY | Facility: CLINIC | Age: 31
End: 2021-02-08
Payer: COMMERCIAL

## 2021-02-08 DIAGNOSIS — F41.1 GENERALIZED ANXIETY DISORDER: ICD-10-CM

## 2021-02-08 DIAGNOSIS — F91.8 CONDUCT DISORDER, UNDIFFERENTIATED TYPE: Primary | ICD-10-CM

## 2021-02-08 DIAGNOSIS — F33.2 MDD (MAJOR DEPRESSIVE DISORDER), RECURRENT SEVERE, WITHOUT PSYCHOSIS (H): ICD-10-CM

## 2021-02-08 DIAGNOSIS — F19.90 SUBSTANCE USE DISORDER: ICD-10-CM

## 2021-02-08 DIAGNOSIS — F90.2 ATTENTION DEFICIT HYPERACTIVITY DISORDER (ADHD), COMBINED TYPE: ICD-10-CM

## 2021-02-08 PROCEDURE — 90853 GROUP PSYCHOTHERAPY: CPT | Mod: GT

## 2021-02-08 PROCEDURE — 90785 PSYTX COMPLEX INTERACTIVE: CPT | Mod: 95 | Performed by: PSYCHOLOGIST

## 2021-02-08 PROCEDURE — 99207 PR NO CHARGE LOS: CPT | Performed by: PSYCHOLOGIST

## 2021-02-08 PROCEDURE — 90834 PSYTX W PT 45 MINUTES: CPT | Mod: 95 | Performed by: PSYCHOLOGIST

## 2021-02-08 NOTE — PROGRESS NOTES
" [facility]  Program in Human Sexuality  Department of Family Medicine & Community Health  [facility]  [address]               [place], [place] [address]  Phone: [phone]  Fax: [phone]  [email or web address]    [place] -  Case Progress Note    2/08/21    Client Name: Xavier Landon  YOB: 1990  MRN:  [id number]  Treating Provider: Aysha Phillip, PhD LP  Type of Session: Individual  Present in Session: Patient  Number of Minutes:  45    Video start time: 11:05  Video end time: 11:50    Telemedicine Visit: The patient's condition can be safely assessed and treated via synchronous audio and visual telemedicine encounter.      Reason for Telemedicine Visit: covid    Originating Site (Patient Location): Patient's home    Distant Site (Provider Location): [facility] Clinics: [facility]    Consent:  The patient/guardian has verbally consented to: the potential risks and benefits of telemedicine (video visit) versus in person care; bill my insurance or make self-payment for services provided; and responsibility for payment of non-covered services.     Mode of Communication:  Video Conference via [employer]    As the provider I attest to compliance with applicable laws and regulations related to telemedicine.      Current Symptoms/Status:    Patient's problems with out of control, driven, impulsive, compulsive sexual behavior began very early in his life.    He would try to quit and would be successful for a few days and then go back to his pattern.  As a result he feels that he has been impotent all of his life and believes this is due to his \"porn addiction.\"  He feels that his sex life has been stolen from him.  He is extremely distressed about this and does not see the reason to continue living this way.    In terms of erections, he is able to get aroused and have a full erection but when he attempts oral or vaginal penetration, he loses " "his erection.    He is very distressed about his erotic interests.  He has a lot of submissive fantasies - been depraved, being penetrated by multiple partners including she-males.  He also has cross-dressing fantasies.  He is mostly attracted to women but he has fantasies of having sex with men - usually in a submissive mannter.    As he has fantasies of being a women and fantasies of \"she-males\" and being penetrated by men, he wonders about his gender identity and sexual orientation.      He feels he has fetishized his own failure as a person.    He also describes crippling ADHD which is treated with medication (adderal) since high school.   He feels he could not function without adderal.    As a result of all of this, he has suffered crippling anxiety and depression and substance use problems.    Patient would like to feel capable of being a functional male adult.  He would like to feel capable of love in romantic and sexual contexts.    More or less every facet of his existence causes him deep shame.  He feels that he has an utterly miserable catastrophically failed sex life.      Progress Toward Treatment Goals:   Just beginning therapy    Intervention: Modality and Description:  Interpersonal, CBT, relapse prevention    Response to Intervention:  Processed more of his history,  Utter anger and frustration.  Talked about need for psychiatric review of his meds  Emphasized that he would need to stay focused on his recovery from alcohol use disorder.  Tried to give hope - bursts of anger and frustration.  Feeling hopeless.      Assignment:  See psychiatrist.  Arrange coordination with therapist in dual dx program.    Interactive Complexity:  There are four specific communication difficulties that complicate the work of the primary psychiatric procedure.  Interactive complexity (+[id number]) may be reported when at least one of these difficulties is present.    Communication difficulties present during current the " psychiatric procedure include:  1. The need to manage maladaptive communication (related to e.g. high anxiety, high reactivity, repeated questions, or disagreement) among participants that complicates delivery of care.    Diagnosis:  312.89 (F91.8) Other Specified Disruptive, Impulse Control, and Conduct Disorder (Hypersexual Disorder)    Major Depressive Disorder, recurrent, severe with anxiety  Generalized Anxiety Disorder  ADHD  Substance Use Disorder (Alchohol)  Rule out Bipolar  Rule out Personality Disorder      Plan / Need for Future Services:  Return for therapy in 1 week.      Aysha Phillip, PhD LP

## 2021-02-08 NOTE — GROUP NOTE
Telemedicine Visit: The patient's condition can be safely assessed and treated via synchronous audio and visual telemedicine encounter.      Reason for Telemedicine Visit: Services only offered telehealth    Originating Site (Patient Location): Patient's home    Distant Site (Provider Location): Provider Remote Setting    Consent:  The patient/guardian has verbally consented to: the potential risks and benefits of telemedicine (video visit) versus in person care; bill my insurance or make self-payment for services provided; and responsibility for payment of non-covered services.     Mode of Communication:  Video Conference via [employer]    As the provider I attest to compliance with applicable laws and regulations related to telemedicine.  Psychotherapy Group Note    PATIENT'S NAME: Xavier Landon  MRN:   [id number]  :   1990  ACCT. NUMBER: [id number]  DATE OF SERVICE: 21  START TIME: 10:00 AM  END TIME: 10:50 AM  FACILITATOR: Chey Gabriel LMFT  TOPIC:  EBP Group: Cognitive Restructuring  [place] [address] Program  TRACK: DDP2    NUMBER OF PARTICIPANTS: 3    Summary of Group / Topics Discussed:  Cognitive Restructuring: Distortions: Patients received an overview of how to identify common cognitive distortions. Patients will explore alternatives to cognitive distortions and practice challenging their negative thought patterns. The goal is to help patients target modify ineffective thought patterns.     Patient Session Goals / Objectives:    Familiarized self with ineffective / unhealthy thoughts and how they develop.      Explored impact of ineffective thoughts / distortions on mood and activity    Formulated new neutral/positive alternatives to challenge less helpful / ineffective thoughts.    Practiced and plan to apply in daily life               Patient Participation / Response:  Moderately participated, sharing some personal reflections / insights and adequately adequately received /  provided feedback with other participants.    Identified barriers to changing thought patterns and Practiced skills in session    Treatment Plan:  Patient has a current master individualized treatment plan.  See Epic treatment plan for more information.    VIKTOR Nielsen

## 2021-02-09 ENCOUNTER — HOSPITAL ENCOUNTER (OUTPATIENT)
Dept: BEHAVIORAL HEALTH | Facility: CLINIC | Age: 31
End: 2021-02-09
Attending: PSYCHIATRY & NEUROLOGY
Payer: COMMERCIAL

## 2021-02-09 PROCEDURE — 90853 GROUP PSYCHOTHERAPY: CPT | Mod: 95 | Performed by: MARRIAGE & FAMILY THERAPIST

## 2021-02-09 PROCEDURE — 99214 OFFICE O/P EST MOD 30 MIN: CPT | Mod: 95 | Performed by: PSYCHIATRY & NEUROLOGY

## 2021-02-09 NOTE — GROUP NOTE
Psychotherapy Group Note    PATIENT'S NAME: Xavier Landon  MRN:   [id number]  :   1990  ACCT. NUMBER: [id number]  DATE OF SERVICE: 21  START TIME: 12:00 PM  END TIME: 12:50 PM  FACILITATOR: Deborah Watson LMFT  TOPIC:  EBP Group: Cognitive Restructuring  [facility] Adult Dual Diagnosis Day Treatment  TRACK: 2    NUMBER OF PARTICIPANTS: 4    Telemedicine Visit: The patient's condition can be safely assessed and treated via synchronous audio and visual telemedicine encounter.      Reason for Telemedicine Visit: Services only offered telehealth    Originating Site (Patient Location): Patient's home    Distant Site (Provider Location): Provider Remote Setting    Consent:  The patient/guardian has verbally consented to: the potential risks and benefits of telemedicine (video visit) versus in person care; bill my insurance or make self-payment for services provided; and responsibility for payment of non-covered services.     Mode of Communication:  Video Conference via [employer]    As the provider I attest to compliance with applicable laws and regulations related to telemedicine.   Summary of Group / Topics Discussed:  Cognitive Restructuring: Distortions: Patients received an overview of how to identify common cognitive distortions. Patients will explore alternatives to cognitive distortions and practice challenging their negative thought patterns. The goal is to help patients target modify ineffective thought patterns.     Patient Session Goals / Objectives:    Familiarized self with ineffective / unhealthy thoughts and how they develop.      Explored impact of ineffective thoughts / distortions on mood and activity    Formulated new neutral/positive alternatives to challenge less helpful / ineffective thoughts.    Practiced and plan to apply in daily life               Patient Participation / Response:  Moderately participated, sharing some personal reflections / insights and  adequately adequately received / provided feedback with other participants.    Demonstrated knowledge of personal thought patterns and how they impact their mood and behavior., Identified barriers to changing thought patterns and Practiced skills in session    Treatment Plan:  Patient has a current master individualized treatment plan.  See Epic treatment plan for more information.    Vance Watson LMFT

## 2021-02-09 NOTE — GROUP NOTE
Process Group Note    PATIENT'S NAME: Xavier Landon  MRN:   [id number]  :   1990  ACCT. NUMBER: [id number]  DATE OF SERVICE: 21  START TIME: 10:00 AM  END TIME: 10:50 AM  FACILITATOR: Deborah Watson LMFT  TOPIC:  Process Group    Diagnoses:  296.33 (F33.2) Major Depressive Disorder, Recurrent Episode, Severe _ and With anxious distress  300.02 (F41.1) Generalized Anxiety Disorder.  Attention-Deficit/Hyperactivity Disorder  314.01 (F90.9) Unspecified Attention -Deficit / Hyperactivity Disorder.  Substance-Related & Addictive Disorders Alcohol Use Disorder   303.90 (F10.20) Moderate   301.83 (F60.3) Borderline Personality Disorder      [facility] Adult Dual Diagnosis Day Treatment  TRACK: 2    NUMBER OF PARTICIPANTS: 4    Telemedicine Visit: The patient's condition can be safely assessed and treated via synchronous audio and visual telemedicine encounter.      Reason for Telemedicine Visit: Services only offered telehealth    Originating Site (Patient Location): Patient's home    Distant Site (Provider Location): Provider Remote Setting    Consent:  The patient/guardian has verbally consented to: the potential risks and benefits of telemedicine (video visit) versus in person care; bill my insurance or make self-payment for services provided; and responsibility for payment of non-covered services.     Mode of Communication:  Video Conference via [employer]    As the provider I attest to compliance with applicable laws and regulations related to telemedicine.           Data:    Session content: At the start of this group, patients were invited to check in by identifying themselves, describing their current emotional status, and identifying issues to address in this group.   Area(s) of treatment focus addressed in this session included Symptom Management, Personal Safety and Abstinence/Relapse Prevention.    Huye reports feeling irritated and nervous-havent told parents about  treatment-is going to need to address it with his parents, his goal is to clear his head and figure out how to talk to his parents about asking for money and his situation, is going to use distractions-writing things down, reports passive si-can follow safety plan, denies chemical use-is taking rx-is proud that he is getting the living room painted and it looks better, Client declined additional process time but contributed to group discussion and provided feedback and support to peers.      Therapeutic Interventions/Treatment Strategies:  Psychotherapist offered support, feedback and validation and reinforced use of skills. Treatment modalities used include Dialectical Behavioral Therapy. Interventions include Relationship Skills: Assisted patients in implementing more effective communication skills in their relationships.    Assessment:    Patient response:   Patient responded to session by accepting feedback, listening, being attentive and appearing alert    Possible barriers to participation / learning include: and no barriers identified    Health Issues:   None reported       Substance Use Review:   Substance Use: Last use: 1/20    Mental Status/Behavioral Observations  Appearance:   Appropriate   Eye Contact:   Good   Psychomotor Behavior: Normal   Attitude:   Cooperative   Orientation:   All  Speech   Rate / Production: Normal    Volume:  Normal   Mood:    Anxious  Irritable   Affect:    Blunted   Thought Content:   Clear and Safety reports  presence of suicidal ideation passive suicidal ideation   Thought Form:  Coherent  Logical     Insight:    Good     Plan:     Safety Plan: Committed to safety and agreed to follow previously developed safety coping plan.      Barriers to treatment: None identified    Patient Contracts (see media tab):  None    Substance Use: Provided encouragement towards sobriety    Provided support and affirmation for steps taken towards sobriety      Continue or Discharge: Patient will  continue in Adult Dual Disorder Program (DDP) as planned. Patient is likely to benefit from learning and using skills as they work toward the goals identified in their treatment plan.      Vance Watson, LMFT  February 9, 2021

## 2021-02-09 NOTE — PROGRESS NOTES
"[facility]   Adult Day Treatment, Followup Note    PATIENT'S NAME: Xavier Landon  MRN:   [id number]  :   1990  ACCT. NUMBER: [id number]  DATE OF SERVICE: 21         Interim History:     The patient is a 31yo male with a history of ADHD, depression and alcohol use disorder who is seen for followup in our MICD program. \"Just waking up.\" Program is going \"all right.\" Mood is \"stable-scar\" but \"still pretty down.\" Not on the Lamictal anymore. Still using the Inderal as needed - doesn't seem to do much. Says that he has been \"getting so many diagnoses from so many people - and it would take a long time to kick in.\" Still on Adderall XR - no longer taking IR and is now on 40mg Qam. ADHD is \"fine - it helps me focus.\" [facility] with doctor [facility] for compulsions. Dr. Aysha Phillip. [facility]. Says that he \"always has suicidal thoughts\". No urges or plans. Has not done anything to harm himself. No recent alcohol or drug use. Does have some cravings, but \"I'm dealing with them.\" Program is four days a week. Wants to wait to discuss with outpatient provider before trying any other medications for mood.          Medications:     Stopped Lamictal.   Inderal PRN  Adderall per outpatient provider  -- Adderall XR 40mg Qam    Current Outpatient Medications   Medication     Amphetamine-Dextroamphetamine (ADDERALL XR PO)     amphetamine-dextroamphetamine (ADDERALL) 20 MG tablet     buPROPion (WELLBUTRIN XL) 150 MG 24 hr tablet     FLUoxetine HCl (PROZAC PO)     lamoTRIgine (LAMICTAL) 25 MG tablet     propranolol (INDERAL) 10 MG tablet     Varenicline Tartrate (CHANTIX PO)     No current facility-administered medications for this visit.            Allergies:   No Known Allergies       Labs:   No results found for this or any previous visit (from the past 24 hour(s)).       Psychiatric Examination:     PHQ-9 scores:   PHQ-9 SCORE " "12/22/2020 2/4/2021   PHQ-9 Total Score 24 24     SHAUNA-7 scores:    SHAUNA-7 SCORE 12/22/2020 2/4/2021   Total Score 13 13       Risk status (Self / Other harm or suicidal ideation)  Patient has had a history of suicidal ideation: but no suicide attempts  Patient denies current fears or concerns for personal safety.  Patient reports the following current or recent suicidal ideation or behaviors: at times but no intent or urges.  Patient denies current or recent homicidal ideation or behaviors.  Patient denies current or recent self injurious behavior or ideation.  Patient denies other safety concerns.  A safety and risk management plan has not been developed at this time, however patient was encouraged to call [place] [phone] should there be a change in any of these risk factors.    Appearance: awake, alert and adequately groomed  Attitude:  cooperative  Eye Contact:  good  Mood:  \"stable-scar\"  Affect:  mood congruent  Speech:  clear, coherent  Psychomotor Behavior:  no evidence of tardive dyskinesia, dystonia, or tics  Thought Process:  goal oriented  Associations:  no loose associations  Thought Content:  no evidence of psychotic thought and passive suicidal ideation present, but denies any urges or plans. Feels safe at home.   Insight:  fair  Judgement:  fair  Oriented to:  time, person, and place  Attention Span and Concentration:  fair  Recent and Remote Memory:  intact           Diagnoses:     Axis I: ADHD              Alcohol use disorder              Cannabis use disorder              Depression NOS, R/O bipolar 2 disorder           Assessment and Plan:     Treatment Objective(s) Addressed in This Session:  The purpose of today's call is for this author to provide oversight of patient's care while receiving program services. Specific treatment goals addressed included personal safety, symptoms stabilization and management, wellness and mental health, and community resources/discharge planning.     1) " Continue Adderall and Inderal PRN.    This author will follow up with the patient in approximately 30 days.    Patient continues to meet criteria for recommended level of care: MICD program  Patient would be at reasonable risk of requiring a higher level of care in the absence of current services.    Patient does agree with the current plan of care.    Mingo Aj MD  2/9/2021      Video-Visit Details    Type of service:  Video Visit    Video Start Time (time video started): 0830    Video End Time (time video stopped): 0838    Originating Location (pt. Location): Home    Distant Location (provider location): Provider remote location    Mode of Communication:  Video Conference via AnSyn    Physician has received verbal consent for a Video Visit from the patient? Yes    Entered by: Mingo Aj on 2/9/2021 at 3:43 AM

## 2021-02-09 NOTE — GROUP NOTE
Psychotherapy Group Note    PATIENT'S NAME: Xavier Landon  MRN:   [id number]  :   1990  ACCT. NUMBER: [id number]  DATE OF SERVICE: 21  START TIME: 11:00 AM  END TIME: 11:50 AM  FACILITATOR: Deborah Watson LMFT  TOPIC:  EBP Group: Cognitive Restructuring  [facility] Adult Dual Diagnosis Day Treatment  TRACK: 2    NUMBER OF PARTICIPANTS: 4    Telemedicine Visit: The patient's condition can be safely assessed and treated via synchronous audio and visual telemedicine encounter.      Reason for Telemedicine Visit: Services only offered telehealth    Originating Site (Patient Location): Patient's home    Distant Site (Provider Location): Provider Remote Setting    Consent:  The patient/guardian has verbally consented to: the potential risks and benefits of telemedicine (video visit) versus in person care; bill my insurance or make self-payment for services provided; and responsibility for payment of non-covered services.     Mode of Communication:  Video Conference via [employer]    As the provider I attest to compliance with applicable laws and regulations related to telemedicine.   Summary of Group / Topics Discussed:  Cognitive Restructuring: Core Beliefs: Patients received an overview of what a core belief is, and how they develop. Patients then began to identify their negative core beliefs. Patients worked to modify core beliefs with the goal of improved self-image and functioning.     Patient Session Goals / Objectives:    Familiarize self with the concept of core beliefs and how they develop.      Explore personal core beliefs (positive and negative)    Develop / advance recognition of the connection between negative thoughts and negative core beliefs.    Formulate new neutral/positive core beliefs               Patient Participation / Response:  Fully participated with the group by sharing personal reflections / insights and openly received / provided feedback with other  participants.    Demonstrated understanding of topics discussed through group discussion and participation, Expressed understanding of the relationship between behaviors, thoughts, and feelings and Demonstrated knowledge of personal thought patterns and how they impact their mood and behavior.    Treatment Plan:  Patient has a current master individualized treatment plan.  See Epic treatment plan for more information.    Vance Watson LMFT

## 2021-02-11 ENCOUNTER — HOSPITAL ENCOUNTER (OUTPATIENT)
Dept: BEHAVIORAL HEALTH | Facility: CLINIC | Age: 31
End: 2021-02-11
Attending: PSYCHIATRY & NEUROLOGY
Payer: COMMERCIAL

## 2021-02-11 PROCEDURE — 90853 GROUP PSYCHOTHERAPY: CPT | Mod: 95 | Performed by: MARRIAGE & FAMILY THERAPIST

## 2021-02-11 NOTE — GROUP NOTE
Psychotherapy Group Note    PATIENT'S NAME: Xavier Landon  MRN:   [id number]  :   1990  ACCT. NUMBER: [id number]  DATE OF SERVICE: 21  START TIME: 11:00 AM  END TIME: 11:50 AM  FACILITATOR: Deborah Watson LMFT  TOPIC:  EBP Group: Cognitive Restructuring  JEAN [facility] Adult Dual Diagnosis Day Treatment  TRACK: 2    NUMBER OF PARTICIPANTS: 5    Telemedicine Visit: The patient's condition can be safely assessed and treated via synchronous audio and visual telemedicine encounter.      Reason for Telemedicine Visit: Services only offered telehealth    Originating Site (Patient Location): Patient's home    Distant Site (Provider Location): Provider Remote Setting    Consent:  The patient/guardian has verbally consented to: the potential risks and benefits of telemedicine (video visit) versus in person care; bill my insurance or make self-payment for services provided; and responsibility for payment of non-covered services.     Mode of Communication:  Video Conference via [employer]    As the provider I attest to compliance with applicable laws and regulations related to telemedicine.   Summary of Group / Topics Discussed:  Cognitive Restructuring: Perfectionism: Patients discussed and reflected on what perfectionism is, how it develops, and how it impacts functioning. Ways to challenge perfectionism were explored and discussed by the group, with the goal of challenging perfectionistic thinking and improving functioning.    Patient Session Goals / Objectives:    Understand the concept of perfectionistic thoughts and how they develop.     Increase recognition of the connection between perfectionistic thinking and symptoms.    Explore and practice new ways to challenge the perfectionistic stance and replace with reasonable expectations of self and others.    Begin to formulate realistic personal expectations and goals.               Patient Participation / Response:  Moderately participated,  sharing some personal reflections / insights and adequately adequately received / provided feedback with other participants.    Demonstrated understanding of topics discussed through group discussion and participation, Expressed understanding of the relationship between behaviors, thoughts, and feelings and Identified barriers to changing thought patterns    Treatment Plan:  Patient has a current master individualized treatment plan.  See Epic treatment plan for more information.    Vnace Watson, LARISAFT

## 2021-02-11 NOTE — GROUP NOTE
Process Group Note    PATIENT'S NAME: Xavier Landon  MRN:   [id number]  :   1990  ACCT. NUMBER: [id number]  DATE OF SERVICE: 21  START TIME: 10:00 AM  END TIME: 10:50 AM  FACILITATOR: Deborah Watson LMFT  TOPIC:  Process Group    Diagnoses:  296.33 (F33.2) Major Depressive Disorder, Recurrent Episode, Severe _ and With anxious distress  300.02 (F41.1) Generalized Anxiety Disorder.  Attention-Deficit/Hyperactivity Disorder  314.01 (F90.9) Unspecified Attention -Deficit / Hyperactivity Disorder.  Substance-Related & Addictive Disorders Alcohol Use Disorder   303.90 (F10.20) Moderate   301.83 (F60.3) Borderline Personality Disorder      [facility] Adult Dual Diagnosis Day Treatment  TRACK: 2    NUMBER OF PARTICIPANTS: 5    Telemedicine Visit: The patient's condition can be safely assessed and treated via synchronous audio and visual telemedicine encounter.      Reason for Telemedicine Visit: Services only offered telehealth    Originating Site (Patient Location): Patient's home    Distant Site (Provider Location): Provider Remote Setting    Consent:  The patient/guardian has verbally consented to: the potential risks and benefits of telemedicine (video visit) versus in person care; bill my insurance or make self-payment for services provided; and responsibility for payment of non-covered services.     Mode of Communication:  Video Conference via [email or web address]    As the provider I attest to compliance with applicable laws and regulations related to telemedicine.           Data:    Session content: At the start of this group, patients were invited to check in by identifying themselves, describing their current emotional status, and identifying issues to address in this group.   Area(s) of treatment focus addressed in this session included Symptom Management, Personal Safety and Abstinence/Relapse Prevention.    Huey is feeling exhausted and anxious today, his goal is to pay some bills and  make a budget today, time management  Is the skill today, is struggling with low motivation, passive si-can follow safety plan, denies chemical use its 30 days today, is taking rx, is grateful that he is in the program today, Client declined additional process time but contributed to group discussion and provided feedback and support to peers.      Therapeutic Interventions/Treatment Strategies:  Psychotherapist offered support, feedback and validation and reinforced use of skills.    Assessment:    Patient response:   Patient responded to session by listening, being attentive and appearing alert    Possible barriers to participation / learning include: and no barriers identified    Health Issues:   None reported       Substance Use Review:   Substance Use: Last use: 1/20    Mental Status/Behavioral Observations  Appearance:   Appropriate   Eye Contact:   Good   Psychomotor Behavior: Restless   Attitude:   Cooperative   Orientation:   All  Speech   Rate / Production: Normal    Volume:  Normal   Mood:    Anxious  Depressed   Affect:    Blunted   Thought Content:   Clear and Safety reports  presence of suicidal ideation passive suicidal ideation   Thought Form:  Coherent  Logical     Insight:    Good     Plan:     Safety Plan: Committed to safety and agreed to follow previously developed safety coping plan.      Barriers to treatment: None identified    Patient Contracts (see media tab):  None    Substance Use: Provided encouragement towards sobriety    Provided support and affirmation for steps taken towards sobriety      Continue or Discharge: Patient will continue in Adult Dual Disorder Program (DDP) as planned. Patient is likely to benefit from learning and using skills as they work toward the goals identified in their treatment plan.      Vance Watson, LMFT  February 11, 2021

## 2021-02-12 ENCOUNTER — HOSPITAL ENCOUNTER (OUTPATIENT)
Dept: BEHAVIORAL HEALTH | Facility: CLINIC | Age: 31
End: 2021-02-12
Attending: PSYCHIATRY & NEUROLOGY
Payer: COMMERCIAL

## 2021-02-12 PROCEDURE — 90853 GROUP PSYCHOTHERAPY: CPT | Mod: GT | Performed by: MARRIAGE & FAMILY THERAPIST

## 2021-02-12 NOTE — GROUP NOTE
Psychoeducation Group Note    PATIENT'S NAME: Xavier Landon  MRN:   [id number]  :   1990  ACCT. NUMBER: [id number]  DATE OF SERVICE: 21  START TIME: 11:00 AM  END TIME: 11:50 AM  FACILITATOR: Deborah Watson LMFT  TOPIC: TIMUR RN Group: [facility] Adult Dual Diagnosis Day Treatment  TRACK: 2    NUMBER OF PARTICIPANTS: 4    Telemedicine Visit: The patient's condition can be safely assessed and treated via synchronous audio and visual telemedicine encounter.      Reason for Telemedicine Visit: Services only offered telehealth    Originating Site (Patient Location): Patient's home    Distant Site (Provider Location): Provider Remote Setting    Consent:  The patient/guardian has verbally consented to: the potential risks and benefits of telemedicine (video visit) versus in person care; bill my insurance or make self-payment for services provided; and responsibility for payment of non-covered services.     Mode of Communication:  Video Conference via [employer]    As the provider I attest to compliance with applicable laws and regulations related to telemedicine.     Summary of Group / Topics Discussed:  Foundations of Health: Sleep: Case study/sleep hygiene: Patients explored the connection between sleep and mental illness. Patients learned about how adequate sleep can improve health, productivity, wellness, quality of life, and safety.     Patient Session Goals / Objectives:  ? Demonstrated understanding of sleep hygiene practices and benefits of sleep  ? Identified sleep hygiene strategies to utilize     Described the connection between sleep disturbances and mental illness        Patient Participation / Response:  Moderately participated, sharing some personal reflections / insights and adequately adequately received / provided feedback with other participants.    Demonstrated understanding of topics discussed through group discussion and participation and  Identified / Expressed personal readiness to practice skills    Treatment Plan:  Patient has a current master individualized treatment plan.  See Epic treatment plan for more information.    Vance Watson LMFT

## 2021-02-12 NOTE — GROUP NOTE
Process Group Note    PATIENT'S NAME: Xavier Landon  MRN:   [id number]  :   1990  ACCT. NUMBER: [id number]  DATE OF SERVICE: 21  START TIME: 10:00 AM  END TIME: 10:50 AM  FACILITATOR: Deborah Watson LMFT  TOPIC:  Process Group    Diagnoses:  296.33 (F33.2) Major Depressive Disorder, Recurrent Episode, Severe _ and With anxious distress  300.02 (F41.1) Generalized Anxiety Disorder.  Attention-Deficit/Hyperactivity Disorder  314.01 (F90.9) Unspecified Attention -Deficit / Hyperactivity Disorder.  Substance-Related & Addictive Disorders Alcohol Use Disorder   303.90 (F10.20) Moderate   301.83 (F60.3) Borderline Personality Disorder      [facility] Adult Dual Diagnosis Day Treatment  TRACK: 2    NUMBER OF PARTICIPANTS: 4    Telemedicine Visit: The patient's condition can be safely assessed and treated via synchronous audio and visual telemedicine encounter.      Reason for Telemedicine Visit: Services only offered telehealth    Originating Site (Patient Location): Patient's home    Distant Site (Provider Location): Provider Remote Setting    Consent:  The patient/guardian has verbally consented to: the potential risks and benefits of telemedicine (video visit) versus in person care; bill my insurance or make self-payment for services provided; and responsibility for payment of non-covered services.     Mode of Communication:  Video Conference via [employer]otte    As the provider I attest to compliance with applicable laws and regulations related to telemedicine.           Data:    Session content: At the start of this group, patients were invited to check in by identifying themselves, describing their current emotional status, and identifying issues to address in this group.   Area(s) of treatment focus addressed in this session included Symptom Management, Personal Safety and Abstinence/Relapse Prevention.    Huey is feeling like he overslept yesterday, he napped yesterday until 5pm,  and then went back to bed-he has slept 18 hours-his goal for the day is clean up his room-help his roommates finish painting today, focusing on the obligations and the reward, denies barriers, is struggling with low motivation, reports passive si-can follow safety plan, denies chemical use, is taking rx, is grateful for the hot and sour soup that is in the fridge, Client declined additional process time but contributed to group discussion and provided feedback and support to peers.     Therapeutic Interventions/Treatment Strategies:  Psychotherapist offered support, feedback and validation and reinforced use of skills.    Assessment:    Patient response:   Patient responded to session by giving feedback, listening, being attentive and appearing alert    Possible barriers to participation / learning include: and no barriers identified    Health Issues:   None reported       Substance Use Review:   Substance Use: Last use: 1/20    Mental Status/Behavioral Observations  Appearance:   Disheveled   Eye Contact:   Good   Psychomotor Behavior: Restless   Attitude:   Cooperative   Orientation:   All  Speech   Rate / Production: Normal    Volume:  Normal   Mood:    Anxious   Affect:    Blunted   Thought Content:   Clear and Safety reports  presence of suicidal ideation passive suicidal ideation   Thought Form:  Coherent  Logical     Insight:    Good     Plan:     Safety Plan: Committed to safety and agreed to follow previously developed safety coping plan.      Barriers to treatment: None identified    Patient Contracts (see media tab):  None    Substance Use: Provided encouragement towards sobriety    Provided support and affirmation for steps taken towards sobriety      Continue or Discharge: Patient will continue in Adult Dual Disorder Program (DDP) as planned. Patient is likely to benefit from learning and using skills as they work toward the goals identified in their treatment plan.      Vance Watson,  [facility]  February 12, 2021

## 2021-02-14 PROBLEM — F41.1 GENERALIZED ANXIETY DISORDER: Status: ACTIVE | Noted: 2021-02-14

## 2021-02-14 PROBLEM — F19.90 SUBSTANCE USE DISORDER: Status: ACTIVE | Noted: 2021-02-14

## 2021-02-14 PROBLEM — F91.8 CONDUCT DISORDER, UNDIFFERENTIATED TYPE: Status: ACTIVE | Noted: 2021-02-14

## 2021-02-14 PROBLEM — F90.2 ATTENTION DEFICIT HYPERACTIVITY DISORDER (ADHD), COMBINED TYPE: Status: ACTIVE | Noted: 2021-02-14

## 2021-02-15 ENCOUNTER — TELEPHONE (OUTPATIENT)
Dept: BEHAVIORAL HEALTH | Facility: CLINIC | Age: 31
End: 2021-02-15

## 2021-02-15 ENCOUNTER — HOSPITAL ENCOUNTER (OUTPATIENT)
Dept: BEHAVIORAL HEALTH | Facility: CLINIC | Age: 31
End: 2021-02-15
Attending: PSYCHIATRY & NEUROLOGY
Payer: COMMERCIAL

## 2021-02-15 PROCEDURE — 90853 GROUP PSYCHOTHERAPY: CPT | Mod: GT | Performed by: MARRIAGE & FAMILY THERAPIST

## 2021-02-15 PROCEDURE — 90853 GROUP PSYCHOTHERAPY: CPT | Mod: 95 | Performed by: PSYCHOLOGIST

## 2021-02-15 NOTE — GROUP NOTE
Psychoeducation Group Note    PATIENT'S NAME: Xavier Landon  MRN:   [id number]  :   1990  ACCT. NUMBER: [id number]  DATE OF SERVICE: 2/15/21  START TIME: 12:00 PM  END TIME: 12:50 PM  FACILITATOR: Jairo Rios LP  TOPIC: MH RN Group: Health Maintenance  [facility] Adult Dual Diagnosis Day Treatment  TRACK: 2    NUMBER OF PARTICIPANTS: 6    Telemedicine Visit: The patient's condition can be safely assessed and treated via synchronous audio and visual telemedicine encounter.      Reason for Telemedicine Visit: Services only offered telehealth    Originating Site (Patient Location): Patient's home    Distant Site (Provider Location): Provider Remote Setting    Consent:  The patient/guardian has verbally consented to: the potential risks and benefits of telemedicine (video visit) versus in person care; bill my insurance or make self-payment for services provided; and responsibility for payment of non-covered services.     Mode of Communication:  Video Conference via [employer]    As the provider I attest to compliance with applicable laws and regulations related to telemedicine.      Summary of Group / Topics Discussed:  Health Maintenance: Wellness Check-in: Patients met with group facilitator to individually review a holistic wellness check-in to assess patient medication adherence/concerns, appointments, physical and mental health, exercise, nutrition, sleep, socialization, substance use, and need for service/resource referrals.       Patient Session Goals / Objectives:    Discussed various aspects of health management and self-care related to physical and mental health    Demonstrated increased self-awareness of current wellness needs    Developed health literacy skills in navigating the healthcare system and self-advocacy/communicating needs with health care team          Patient Participation / Response:  Fully participated with the group by sharing personal reflections /  insights and openly received / provided feedback with other participants.    Demonstrated understanding of topics discussed through group discussion and participation    Treatment Plan:  Patient has a current master individualized treatment plan.  See Epic treatment plan for more information.    Jairo Rios LP

## 2021-02-15 NOTE — GROUP NOTE
Process Group Note    PATIENT'S NAME: Xavier Landon  MRN:   [id number]  :   1990  ACCT. NUMBER: [id number]  DATE OF SERVICE: 2/15/21  START TIME: 10:00 AM  END TIME: 10:50 AM  FACILITATOR: Deborah Watson LMFT  TOPIC:  Process Group    Diagnoses:  296.33 (F33.2) Major Depressive Disorder, Recurrent Episode, Severe _ and With anxious distress  300.02 (F41.1) Generalized Anxiety Disorder.  Attention-Deficit/Hyperactivity Disorder  314.01 (F90.9) Unspecified Attention -Deficit / Hyperactivity Disorder.  Substance-Related & Addictive Disorders Alcohol Use Disorder   303.90 (F10.20) Moderate   301.83 (F60.3) Borderline Personality Disorder      [facility] Adult Dual Diagnosis Day Treatment  TRACK: 2    NUMBER OF PARTICIPANTS: 7    As the provider I attest to compliance with applicable laws and regulations related to telemedicine.   Telemedicine Visit: The patient's condition can be safely assessed and treated via synchronous audio and visual telemedicine encounter.      Reason for Telemedicine Visit: Services only offered telehealth    Originating Site (Patient Location): Patient's home    Distant Site (Provider Location): Provider Remote Setting    Consent:  The patient/guardian has verbally consented to: the potential risks and benefits of telemedicine (video visit) versus in person care; bill my insurance or make self-payment for services provided; and responsibility for payment of non-covered services.     Mode of Communication:  Video Conference via [employer]    As the provider I attest to compliance with applicable laws and regulations related to telemedicine.           Data:    Session content: At the start of this group, patients were invited to check in by identifying themselves, describing their current emotional status, and identifying issues to address in this group.   Area(s) of treatment focus addressed in this session included Symptom Management, Personal Safety and  Abstinence/Relapse Prevention.    Huey is feeling disappointed, anxious, and overwhelmed today, his goal is to have a conversation with roommates and clean room, put up his calendar-is going to use blocks of time and a timer today, is struggling with emotional regulation, reports passive si-can follow safety plan, reports using alcohol all weekend-is not happy about it, is taking rx, is grateful that he has a place to stay-he processed about using alcohol over the weekend    Therapeutic Interventions/Treatment Strategies:  Psychotherapist offered support, feedback and validation and reinforced use of skills. Treatment modalities used include Motivational Interviewing. Interventions include Relapse Prevention: Facilitated understanding of effective coping skills in response to triggers for substance use.    Assessment:    Patient response:   Patient responded to session by listening, being attentive and appearing alert    Possible barriers to participation / learning include: and no barriers identified    Health Issues:   None reported       Substance Use Review:   Substance Use: Last use: 2/14    Mental Status/Behavioral Observations  Appearance:   Appropriate   Eye Contact:   Good   Psychomotor Behavior: Normal   Attitude:   Cooperative   Orientation:   All  Speech   Rate / Production: Normal    Volume:  Normal   Mood:    Anxious  Depressed   Affect:    Blunted   Thought Content:   Clear and Safety reports  presence of suicidal ideation passive suicidal ideation   Thought Form:  Coherent  Logical     Insight:    Good     Plan:     Safety Plan: Committed to safety and agreed to follow previously developed safety coping plan.      Barriers to treatment: None identified    Patient Contracts (see media tab):  None    Substance Use: Provided encouragement towards sobriety    Provided support and affirmation for steps taken towards sobriety     Facilitated behavior chain analysis     Continue or Discharge: Patient will  continue in Adult Dual Disorder Program (DDP) as planned. Patient is likely to benefit from learning and using skills as they work toward the goals identified in their treatment plan.      Vance Watson, LMFT  February 15, 2021

## 2021-02-15 NOTE — GROUP NOTE
Psychotherapy Group Note    PATIENT'S NAME: Xavier Landon  MRN:   [id number]  :   1990  ACCT. NUMBER: [id number]  DATE OF SERVICE: 2/15/21  START TIME: 11:00 AM  END TIME: 11:50 AM  FACILITATOR: Deborah Watson LMFT  TOPIC:  EBP Group: [facility] Adult Dual Diagnosis Day Treatment  TRACK: 2    NUMBER OF PARTICIPANTS: 6    Telemedicine Visit: The patient's condition can be safely assessed and treated via synchronous audio and visual telemedicine encounter.      Reason for Telemedicine Visit: Services only offered telehealth    Originating Site (Patient Location): Patient's home    Distant Site (Provider Location): Provider Remote Setting    Consent:  The patient/guardian has verbally consented to: the potential risks and benefits of telemedicine (video visit) versus in person care; bill my insurance or make self-payment for services provided; and responsibility for payment of non-covered services.     Mode of Communication:  Video Conference via [employer]    As the provider I attest to compliance with applicable laws and regulations related to telemedicine.   Summary of Group / Topics Discussed:  Mindfulness: What is Mindfulness: Patients received an overview on what mindfulness is and how mindfulness can benefit general health, mental health symptoms, and stressors. The history of mindfulness, its application to mental health therapies, and key concepts were also discussed. Patients discussed current awareness, knowledge, and practice of mindfulness skills. Patients also discussed barriers to mindfulness practice.     Patient Session Goals / Objectives:    Demonstrated understanding of key concepts and application to daily life    Identified when/how to use mindfulness     Resolved barriers to practice    Identified plan to use mindfulness in daily life      Patient Participation / Response:  Moderately participated, sharing some personal reflections / insights and  adequately adequately received / provided feedback with other participants.    Demonstrated understanding of topics discussed through group discussion and participation and Demonstrated understanding of mindfulness skills and benefits of practice    Treatment Plan:  Patient has a current master individualized treatment plan.  See Epic treatment plan for more information.    Vance Watson LMFT

## 2021-02-15 NOTE — TELEPHONE ENCOUNTER
"Discussed attendance expectations, pt stated he \"wasn't in a good place\" Friday which is why he skipped last hour, when encouraged pt stated he would attend all 3 hours of programming each day this week   "

## 2021-02-16 ENCOUNTER — VIRTUAL VISIT (OUTPATIENT)
Dept: PSYCHOLOGY | Facility: CLINIC | Age: 31
End: 2021-02-16

## 2021-02-16 ENCOUNTER — HOSPITAL ENCOUNTER (OUTPATIENT)
Dept: BEHAVIORAL HEALTH | Facility: CLINIC | Age: 31
End: 2021-02-16
Attending: PSYCHIATRY & NEUROLOGY
Payer: COMMERCIAL

## 2021-02-16 DIAGNOSIS — F90.2 ATTENTION DEFICIT HYPERACTIVITY DISORDER (ADHD), COMBINED TYPE: ICD-10-CM

## 2021-02-16 DIAGNOSIS — F19.90 SUBSTANCE USE DISORDER: ICD-10-CM

## 2021-02-16 DIAGNOSIS — F33.2 MDD (MAJOR DEPRESSIVE DISORDER), RECURRENT SEVERE, WITHOUT PSYCHOSIS (H): ICD-10-CM

## 2021-02-16 DIAGNOSIS — F91.8 CONDUCT DISORDER, UNDIFFERENTIATED TYPE: Primary | ICD-10-CM

## 2021-02-16 PROCEDURE — 90853 GROUP PSYCHOTHERAPY: CPT | Mod: GT | Performed by: MARRIAGE & FAMILY THERAPIST

## 2021-02-16 PROCEDURE — 90834 PSYTX W PT 45 MINUTES: CPT | Mod: GT | Performed by: PSYCHOLOGIST

## 2021-02-16 PROCEDURE — 99207 PR NO CHARGE LOS: CPT | Performed by: PSYCHOLOGIST

## 2021-02-16 PROCEDURE — 90785 PSYTX COMPLEX INTERACTIVE: CPT | Mod: GT | Performed by: PSYCHOLOGIST

## 2021-02-16 NOTE — GROUP NOTE
Psychotherapy Group Note    PATIENT'S NAME: Xavier Landon  MRN:   [id number]  :   1990  ACCT. NUMBER: [id number]  DATE OF SERVICE: 21  START TIME: 11:00 AM  END TIME: 11:50 AM  FACILITATOR: Deborah Watson LMFT  TOPIC:  EBP Group: [facility] Adult Dual Diagnosis Day Treatment  TRACK: 2    NUMBER OF PARTICIPANTS: 4    Telemedicine Visit: The patient's condition can be safely assessed and treated via synchronous audio and visual telemedicine encounter.      Reason for Telemedicine Visit: Services only offered telehealth    Originating Site (Patient Location): Patient's home    Distant Site (Provider Location): Provider Remote Setting    Consent:  The patient/guardian has verbally consented to: the potential risks and benefits of telemedicine (video visit) versus in person care; bill my insurance or make self-payment for services provided; and responsibility for payment of non-covered services.     Mode of Communication:  Video Conference via [employer]    As the provider I attest to compliance with applicable laws and regulations related to telemedicine.   Summary of Group / Topics Discussed:  Mindfulness: Mindfulness Skills: Patients received information on the main components of mindfulness. Patients participated in discussion on how to practice observing, describing, and participating in internal and external environments. Relevance of mindfulness skills to overall mental and physical health was explored.  Patients explored and discussed in group their current awareness and knowledge of mindfulness skills as well as barriers to applying skills.    Patient Session Goals / Objectives:    Demonstrated and verbalized understanding of key mindfulness concepts    Identified when/how to use mindfulness skills    Resolved barriers to practicing mindfulness skills    Identified plan to use mindfulness skills in daily life       Patient Participation / Response:  Fully  participated with the group by sharing personal reflections / insights and openly received / provided feedback with other participants.    Demonstrated understanding of topics discussed through group discussion and participation, Demonstrated understanding of mindfulness skills and benefits of practice and Practiced skills in session    Treatment Plan:  Patient has a current master individualized treatment plan.  See Epic treatment plan for more information.    Vance Watson LMFT

## 2021-02-16 NOTE — GROUP NOTE
Psychotherapy Group Note    PATIENT'S NAME: Xavier Landon  MRN:   [id number]  :   1990  ACCT. NUMBER: [id number]  DATE OF SERVICE: 21  START TIME: 12:00 PM  END TIME: 12:50 PM  FACILITATOR: Deborah Watson LMFT  TOPIC:  EBP Group: [facility] Adult Dual Diagnosis Day Treatment  TRACK: 2    NUMBER OF PARTICIPANTS: 5    Telemedicine Visit: The patient's condition can be safely assessed and treated via synchronous audio and visual telemedicine encounter.      Reason for Telemedicine Visit: Services only offered telehealth    Originating Site (Patient Location): Patient's home    Distant Site (Provider Location): Provider Remote Setting    Consent:  The patient/guardian has verbally consented to: the potential risks and benefits of telemedicine (video visit) versus in person care; bill my insurance or make self-payment for services provided; and responsibility for payment of non-covered services.     Mode of Communication:  Video Conference via [employer]    As the provider I attest to compliance with applicable laws and regulations related to telemedicine.     Summary of Group / Topics Discussed:  Mindfulness: Mindfulness Skills: Patients received information on the main components of mindfulness. Patients participated in discussion on how to practice observing, describing, and participating in internal and external environments. Relevance of mindfulness skills to overall mental and physical health was explored.  Patients explored and discussed in group their current awareness and knowledge of mindfulness skills as well as barriers to applying skills.    Patient Session Goals / Objectives:    Demonstrated and verbalized understanding of key mindfulness concepts    Identified when/how to use mindfulness skills    Resolved barriers to practicing mindfulness skills    Identified plan to use mindfulness skills in daily life       Patient Participation / Response:  Fully  participated with the group by sharing personal reflections / insights and openly received / provided feedback with other participants.    Demonstrated understanding of topics discussed through group discussion and participation, Demonstrated understanding of mindfulness skills and benefits of practice and Practiced skills in session    Treatment Plan:  Patient has a current master individualized treatment plan.  See Epic treatment plan for more information.    Vance Watson LMFT

## 2021-02-16 NOTE — GROUP NOTE
Process Group Note    PATIENT'S NAME: Xavier Landon  MRN:   [id number]  :   1990  ACCT. NUMBER: [id number]  DATE OF SERVICE: 21  START TIME: 10:00 AM  END TIME: 10:50 AM  FACILITATOR: Deborah Watson LMFT  TOPIC:  Process Group    Diagnoses:  296.33 (F33.2) Major Depressive Disorder, Recurrent Episode, Severe _ and With anxious distress  300.02 (F41.1) Generalized Anxiety Disorder.  Attention-Deficit/Hyperactivity Disorder  314.01 (F90.9) Unspecified Attention -Deficit / Hyperactivity Disorder.  Substance-Related & Addictive Disorders Alcohol Use Disorder   303.90 (F10.20) Moderate   301.83 (F60.3) Borderline Personality Disorder      [facility] Adult Dual Diagnosis Day Treatment  TRACK: 2    NUMBER OF PARTICIPANTS: 5    Telemedicine Visit: The patient's condition can be safely assessed and treated via synchronous audio and visual telemedicine encounter.      Reason for Telemedicine Visit: Services only offered telehealth    Originating Site (Patient Location): Patient's home    Distant Site (Provider Location): Provider Remote Setting    Consent:  The patient/guardian has verbally consented to: the potential risks and benefits of telemedicine (video visit) versus in person care; bill my insurance or make self-payment for services provided; and responsibility for payment of non-covered services.     Mode of Communication:  Video Conference via [employer]    As the provider I attest to compliance with applicable laws and regulations related to telemedicine.           Data:    Session content: At the start of this group, patients were invited to check in by identifying themselves, describing their current emotional status, and identifying issues to address in this group.   Area(s) of treatment focus addressed in this session included Symptom Management, Personal Safety and Abstinence/Relapse Prevention.    Huey is feeling apprehensive, he goes to his parents house today for dinner,  is feeling blank, his goal is to talk to his parents about the family meeting tomorrow, is going to work on being honest and manage his emotions with his parents, is struggling with patterns of behavior, reports passive si-can use coping skills, denies chemical use, is taking rx, is grateful for his roommates, asked for feedback on how to talk to his family about his mh and his participation in the program    Therapeutic Interventions/Treatment Strategies:  Psychotherapist offered support, feedback and validation and reinforced use of skills. Treatment modalities used include Dialectical Behavioral Therapy. Interventions include Relationship Skills: Encouraged development and maintenance  of healthy boundaries and Discussed strategies to promote healthier understanding of interpersonal relationships.    Assessment:    Patient response:   Patient responded to session by accepting feedback, listening, being attentive and appearing alert    Possible barriers to participation / learning include: and no barriers identified    Health Issues:   None reported       Substance Use Review:   Substance Use: Last use: 2/14    Mental Status/Behavioral Observations  Appearance:   Appropriate   Eye Contact:   Good   Psychomotor Behavior: Normal   Attitude:   Cooperative   Orientation:   All  Speech   Rate / Production: Normal    Volume:  Normal   Mood:    Anxious   Affect:    Blunted  Subdued   Thought Content:   Clear and Safety reports  presence of suicidal ideation passive suicidal ideation   Thought Form:  Coherent  Logical     Insight:    Good     Plan:     Safety Plan: Committed to safety and agreed to follow previously developed safety coping plan.      Barriers to treatment: None identified    Patient Contracts (see media tab):  None    Substance Use: Provided encouragement towards sobriety    Provided support and affirmation for steps taken towards sobriety      Continue or Discharge: Patient will continue in Adult Dual  Disorder Program (DDP) as planned. Patient is likely to benefit from learning and using skills as they work toward the goals identified in their treatment plan.      Vance Watson, LMFT  February 16, 2021

## 2021-02-17 NOTE — PROGRESS NOTES
Adult Dual Disorder Program:   Acknowledgement of Current Treatment Plan       I have reviewed my treatment plan with my therapist on 2/17/21.   I agree with the plan as it is written in the electronic health record.    Name:      Signature:  Xavier Landon       Unable to sign due to covid 19   VIKTOR Guzmán  Psychotherapist   VIKTOR Anderson on 2/17/2021 at 10:19 AM       Reba Pagan MD  Psychiatrist/Medical Director   Reba Pagan MD on 2-21-21

## 2021-02-17 NOTE — PROGRESS NOTES
[facility] -  Case Progress Note    2/16/21    Client Name: Xavier Landon  YOB: 1990  MRN:  [id number]  Treating Provider: Aysha Phillip, PhD LP  Type of Session: Individual  Present in Session: Patient    Number of Minutes:  49    Video start time: 1:10  Video end time: 1:59    Telemedicine Visit: The patient's condition can be safely assessed and treated via synchronous audio and visual telemedicine encounter.      Reason for Telemedicine Visit: Covid    Originating Site (Patient Location): Patient's home    Distant Site (Provider Location): [facility]: [facility]    Consent:  The patient/guardian has verbally consented to: the potential risks and benefits of telemedicine (video visit) versus in person care; bill my insurance or make self-payment for services provided; and responsibility for payment of non-covered services.     Mode of Communication:  Video Conference via [employer]    As the provider I attest to compliance with applicable laws and regulations related to telemedicine.        Current Symptoms/Status:  Patient's problems with out of control, driven, impulsive, compulsive sexual behavior began very early in his life.   He feels that his sex life has been stolen from him.  He is extremely distressed about this and does not see the reason to continue live.  No current suicidal plan.  Depressed.  Attention and concentration issues.  Sexual Orientation and gender identity concerns. Sexual functioning issues.    Progress Toward Treatment Goals:   Has just begun assessment and treatment planning at [facility].  In dual diagnosis OIP at [facility].  Had relapse on alcohol this weekend.  Remains very dysregulated in terms of emotions.    Intervention: Modality and Description:  Interpersonal, CBT, relapse prevention.  Treatment planning.    Response to Intervention:  Patient is still dysregulated.  Talked about treatment planning.  Had relapse this weekend.  Emphasized need to  get involved in recovery programs.  Pt. Said he would follow through.  Discussed dx and need to stay with current psychiatrist. Consider antidepressant.  And then consider naltrexone.  Will be meeting with parents and treatment team.  Concerns about costs of tx.  Pt is being relatively compliant at the same time very discouraged about help.  Became very overwrought.  Had to compose himself.  Expressions of anger.  Reinforced the need to stay the course.  Discussed consultation with his primary therapist, agreement on tx.  Needs to get into DBT and if necessary day treatment.  Needs continuity.  Explained need to focus on his sobriety and follow through with DBT in order to effectively deal with CSB.  Noted increase in CSB with adderal use - but problems with getting things accomplished without it.    Encouraged him to consult with psychiatrist to get his meds on track.  Through liaison with primary therapist confirmed dx of BPD.  Does not think bipolar dx is appropriate.    Assignment:  Meet with psychiatrist asap to revisit med regimen.  Follow tx recommendations of dual diagnosis program;  Sobriety!      Interactive Complexity:  There are four specific communication difficulties that complicate the work of the primary psychiatric procedure.  Interactive complexity (+[id number]) may be reported when at least one of these difficulties is present.    Communication difficulties present during current the psychiatric procedure include:  1. The need to manage maladaptive communication (related to e.g. high anxiety, high reactivity, anger outburts that complicates delivery of care.    Diagnosis:  312.89 (F91.8) Other Specified Disruptive, Impulse Control, and Conduct Disorder (Hypersexual Disorder)    Major Depressive Disorder, recurrent, with high anxiety  ADHD  Borderline Personality Disorder      Plan / Need for Future Services:  Return for therapy in 2 weeks.      TREATMENT PLAN  Date of Treatment Plan 2-16-21  Name:  Xavier CARLTON Barbi  : 1990  Medical Record Number: [id number]  Treating Provider: Aysha Pihllip  Type of Session: Individual  Present in Session: Patient    Current Status:  Depression/Mood:  Sad  Decreased Self-Esteem  Hopelessness/Helplessness  Irritable  Decreased Concentration  Suicidal Ideation  Dysphoria    Anxiety/Panic:  Worry  Intrusive Thoughts    Thought:   Reports no problems or symptoms at this time    Sensorium:  Reports no problems or symptoms at this time    Behavior/Health:  Compulsive  Hyperactive    Chemical Use:  Alcohol Abuse    Sexual Problems:  Compulsive sexual behavior  Erectile Problems      Suicide Risk Assessment:  Assessed Level of Immediate Risk:  YES  Ideation:  YES  Plan:  NO  Means:  NO  Intent:  NO    Homicide Risk Assessment:  Assessed Level of Immediate Risk:  YES  Ideation:  NO  Plan:  NO  Means:  NO  Intent:  NO    Impact of Symptoms on Function:  Decreased Social/Family Function  Decreased Work Function    DSM-5 Diagnoses:       Screening Questionnaires:  Please indicate whether the following screening questionnaires have been completed:  CAGE: Yes  PHQ-9: Yes    SHAUNA-7: Yes  Safety Screening: Yes  WHODAS: No    Problem(s):  1. Compulsive Sexual Behavior.  2. Substance use disorder - alcoholism  3. Depression  4. Personality disorder  5. ADHD    Short-Long Term Goals  Decrease Symptoms  Increase Coping  Change Behavior  Change Cognitions  Increase Psychosocial/Support Functioning  Increase Decision Making  Monitor Psych Status    Interventions  [facility]  Cognitive-Behavioral Therapy  Medication Management Referral    Expected Outcomes and Prognosis  Unknown    Anticipated Treatment Duration:  Unknown    Frequency of Sessions  2 x / Month    Progress Update (for Plan Update Only)  NA:  1st Treatment Planning    Current Psychoactive Medications:  See Psychiatric Treatment Plan  Discharge & Aftercare Goals: To Be Determined.  Care Coordination: Yes    Consent  to Treatment:   Patient participated in this treatment planning process and indicated verbal agreement with the above treatment plan.  If patient doesn't sign, indicate why: Telehealth visit due to COVID19 pandemic    Patient Signature: Xavier Landon  Date: 2-16-21     Provider Signature: Aysha Phillip, PhD,   Date: 2-16-21

## 2021-02-18 ENCOUNTER — HOSPITAL ENCOUNTER (OUTPATIENT)
Dept: BEHAVIORAL HEALTH | Facility: CLINIC | Age: 31
End: 2021-02-18
Attending: PSYCHIATRY & NEUROLOGY
Payer: COMMERCIAL

## 2021-02-18 PROCEDURE — 90853 GROUP PSYCHOTHERAPY: CPT | Mod: GT | Performed by: MARRIAGE & FAMILY THERAPIST

## 2021-02-18 PROCEDURE — 90853 GROUP PSYCHOTHERAPY: CPT | Mod: GT | Performed by: PSYCHOLOGIST

## 2021-02-18 NOTE — GROUP NOTE
Psychotherapy Group Note    PATIENT'S NAME: Xavier Landon  MRN:   [id number]  :   1990  ACCT. NUMBER: [id number]  DATE OF SERVICE: 21  START TIME: 11:00 AM  END TIME: 11:50 AM  FACILITATOR: Jairo Rios LP  TOPIC:  EBP Group: DDP Relapse Prevention  [facility] Adult Dual Diagnosis Day Treatment  TRACK: 2    NUMBER OF PARTICIPANTS: 7    Telemedicine Visit: The patient's condition can be safely assessed and treated via synchronous audio and visual telemedicine encounter.      Reason for Telemedicine Visit: Services only offered telehealth    Originating Site (Patient Location): Patient's home    Distant Site (Provider Location): Provider Remote Setting    Consent:  The patient/guardian has verbally consented to: the potential risks and benefits of telemedicine (video visit) versus in person care; bill my insurance or make self-payment for services provided; and responsibility for payment of non-covered services.     Mode of Communication:  Video Conference via TableNOW    As the provider I attest to compliance with applicable laws and regulations related to telemedicine.      Summary of Group / Topics Discussed:  DDP Relapse Prevention: Goodbye Letter: Patients were assigned topic of writing a goodbye letter to their substance of abuse and presented the goodbye letter to the group. Purpose of this assignment is to process grief around loss of substance and coping with emotions of not having substances as a way to cope with stressors and difficult emotions. This assignment highlights maladaptive relationship patient has with the substance and gain awareness about how substance use impacted functioning. This group is conducted in a two part series. First group is education about assignment and reading example goodbye letters. Second group allows patients to read their completed goodbye letters to group members and receive feedback and validation.     Patient Session  Goals / Objectives:    Processed grief and loss of the substances    Gained awareness of maladaptive relationship with substance     Received support, feedback, and validation from peers        Patient Participation / Response:  Fully participated with the group by sharing personal reflections / insights and openly received / provided feedback with other participants.    Demonstrated understanding of topics discussed through group discussion and participation    Treatment Plan:  Patient has a current master individualized treatment plan.  See Epic treatment plan for more information.    Jairo Rios, LP

## 2021-02-18 NOTE — GROUP NOTE
Psychotherapy Group Note    PATIENT'S NAME: Xavier Landon  MRN:   [id number]  :   1990  ACCT. NUMBER: [id number]  DATE OF SERVICE: 21  START TIME: 12:00 PM  END TIME: 12:50 PM  FACILITATOR: Deborah Watson LMFT  TOPIC:  EBP Group: [facility] Adult Dual Diagnosis Day Treatment  TRACK: 2    NUMBER OF PARTICIPANTS: 6    Telemedicine Visit: The patient's condition can be safely assessed and treated via synchronous audio and visual telemedicine encounter.      Reason for Telemedicine Visit: Services only offered telehealth    Originating Site (Patient Location): Patient's home    Distant Site (Provider Location): Provider Remote Setting    Consent:  The patient/guardian has verbally consented to: the potential risks and benefits of telemedicine (video visit) versus in person care; bill my insurance or make self-payment for services provided; and responsibility for payment of non-covered services.     Mode of Communication:  Video Conference via [employer]    As the provider I attest to compliance with applicable laws and regulations related to telemedicine.   Summary of Group / Topics Discussed:  Mindfulness: Mindfulness Experiential: Patients received an overview on what mindfulness is and how mindfulness can benefit general health, mental health symptoms, and stressors. The history of mindfulness, its application to mental health therapies, and key concepts were also discussed. Patients discussed current awareness, knowledge, and practice of mindfulness skills. Patients also discussed barriers to mindfulness practice.    Patient Session Goals / Objectives:    Demonstrated and verbalized understanding of key mindfulness concepts    Identified when/how to use mindfulness skills    Resolved barriers to practicing mindfulness skills    Identified plan to use mindfulness skills in daily life       Patient Participation / Response:  Fully participated with the group by sharing  personal reflections / insights and openly received / provided feedback with other participants.    Demonstrated understanding of topics discussed through group discussion and participation, Demonstrated understanding of mindfulness skills and benefits of practice and Identified / Expressed personal readiness to practice mindfulness skills    Treatment Plan:  Patient has a current master individualized treatment plan.  See Epic treatment plan for more information.    Vance Watson LMFT

## 2021-02-18 NOTE — GROUP NOTE
Process Group Note    PATIENT'S NAME: Xavier Landon  MRN:   [id number]  :   1990  ACCT. NUMBER: [id number]  DATE OF SERVICE: 21  START TIME: 10:00 AM  END TIME: 10:50 AM  FACILITATOR: Deborah Watson LMFT  TOPIC:  Process Group    Diagnoses:  296.33 (F33.2) Major Depressive Disorder, Recurrent Episode, Severe _ and With anxious distress  300.02 (F41.1) Generalized Anxiety Disorder.  Attention-Deficit/Hyperactivity Disorder  314.01 (F90.9) Unspecified Attention -Deficit / Hyperactivity Disorder.  Substance-Related & Addictive Disorders Alcohol Use Disorder   303.90 (F10.20) Moderate   301.83 (F60.3) Borderline Personality Disorder      [facility] Adult Dual Diagnosis Day Treatment  TRACK: 2    NUMBER OF PARTICIPANTS: 7    Telemedicine Visit: The patient's condition can be safely assessed and treated via synchronous audio and visual telemedicine encounter.      Reason for Telemedicine Visit: Services only offered telehealth    Originating Site (Patient Location): Patient's home    Distant Site (Provider Location): Provider Remote Setting    Consent:  The patient/guardian has verbally consented to: the potential risks and benefits of telemedicine (video visit) versus in person care; bill my insurance or make self-payment for services provided; and responsibility for payment of non-covered services.     Mode of Communication:  Video Conference via trueEX    As the provider I attest to compliance with applicable laws and regulations related to telemedicine.           Data:    Session content: At the start of this group, patients were invited to check in by identifying themselves, describing their current emotional status, and identifying issues to address in this group.   Area(s) of treatment focus addressed in this session included Symptom Management, Personal Safety and Abstinence/Relapse Prevention.    Huey is feeling hopeful and rejuvenated, isnt feeling awful, his goal for the  day is to organize and write down his goals and get started on them, is using distractions by keeping busy, using mindfulness, denies barriers, reports passive si-can follow safety plan, is taking rx, denies chemical use, is grateful to his family and is grateful for the group, Client declined additional process time but contributed to group discussion and provided feedback and support to peers.      Therapeutic Interventions/Treatment Strategies:  Psychotherapist offered support, feedback and validation and reinforced use of skills.    Assessment:    Patient response:   Patient responded to session by giving feedback, listening, being attentive and appearing alert    Possible barriers to participation / learning include: and no barriers identified    Health Issues:   None reported       Substance Use Review:   Substance Use: Last use: 2/14    Mental Status/Behavioral Observations  Appearance:   Appropriate   Eye Contact:   Good   Psychomotor Behavior: Normal   Attitude:   Cooperative   Orientation:   All  Speech   Rate / Production: Normal    Volume:  Normal   Mood:    Anxious   Affect:    Appropriate   Thought Content:   Clear and Safety reports  presence of suicidal ideation passive suicidal ideation   Thought Form:  Coherent  Logical     Insight:    Good     Plan:     Safety Plan: Committed to safety and agreed to follow previously developed safety coping plan.      Barriers to treatment: None identified    Patient Contracts (see media tab):  None    Substance Use: Provided encouragement towards sobriety    Provided support and affirmation for steps taken towards sobriety      Continue or Discharge: Patient will continue in Adult Dual Disorder Program (DDP) as planned. Patient is likely to benefit from learning and using skills as they work toward the goals identified in their treatment plan.      Vance Watson, LMFT  February 18, 2021

## 2021-02-19 ENCOUNTER — HOSPITAL ENCOUNTER (OUTPATIENT)
Dept: BEHAVIORAL HEALTH | Facility: CLINIC | Age: 31
End: 2021-02-19
Attending: PSYCHIATRY & NEUROLOGY
Payer: COMMERCIAL

## 2021-02-19 PROCEDURE — 90853 GROUP PSYCHOTHERAPY: CPT | Mod: GT | Performed by: MARRIAGE & FAMILY THERAPIST

## 2021-02-19 PROCEDURE — 90853 GROUP PSYCHOTHERAPY: CPT | Mod: 95 | Performed by: PSYCHOLOGIST

## 2021-02-19 NOTE — GROUP NOTE
Psychoeducation Group Note    PATIENT'S NAME: Xavier Landon  MRN:   [id number]  :   1990  ACCT. NUMBER: [id number]  DATE OF SERVICE: 21  START TIME: 12:00 PM  END TIME: 12:50 PM  FACILITATOR: Jairo Rios LP  TOPIC: TIMUR RN Group: Health Maintenance  [facility] Adult Dual Diagnosis Day Treatment  TRACK: 2    NUMBER OF PARTICIPANTS: 5    Telemedicine Visit: The patient's condition can be safely assessed and treated via synchronous audio and visual telemedicine encounter.      Reason for Telemedicine Visit: Services only offered telehealth    Originating Site (Patient Location): Patient's home    Distant Site (Provider Location): Provider Remote Setting    Consent:  The patient/guardian has verbally consented to: the potential risks and benefits of telemedicine (video visit) versus in person care; bill my insurance or make self-payment for services provided; and responsibility for payment of non-covered services.     Mode of Communication:  Video Conference via xaitment    As the provider I attest to compliance with applicable laws and regulations related to telemedicine.      Summary of Group / Topics Discussed:  Health Maintenance: Weekend planning: Patients were given time to complete a weekend plan of what they will do to promote wellness and sobriety over the weekend when they do not have the structure of group. Patients were encouraged to review progress on their treatment goals and were challenged to identify ways to work toward meeting them. Patients identified and discussed foreseeable barriers to success over the weekend and then developed a plan to overcome them. Patients reviewed their distress coping skills and social support network and discussed this with the group.       Patient Session Goals / Objectives:    ?    Identified activities to engage in that promote balance in wellness  ?    Distinguished possible barriers to success over the weekend and  created a plan to overcome them  ?    Listed distress coping skills and identified social support network to utilize if in crisis during the weekend          Patient Participation / Response:  Fully participated with the group by sharing personal reflections / insights and openly received / provided feedback with other participants.    Demonstrated understanding of topics discussed through group discussion and participation    Treatment Plan:  Patient has a current master individualized treatment plan.  See Epic treatment plan for more information.    Jairo Rios, LP

## 2021-02-19 NOTE — GROUP NOTE
Psychotherapy Group Note    PATIENT'S NAME: Xavier Landon  MRN:   [id number]  :   1990  ACCT. NUMBER: [id number]  DATE OF SERVICE: 21  START TIME: 11:00 AM  END TIME: 11:50 AM  FACILITATOR: Deborah Watson LMFT  TOPIC:  EBP Group: Behavioral Activation  [facility] Adult Dual Diagnosis Day Treatment  TRACK: 2    NUMBER OF PARTICIPANTS: 4    Telemedicine Visit: The patient's condition can be safely assessed and treated via synchronous audio and visual telemedicine encounter.      Reason for Telemedicine Visit: Services only offered telehealth    Originating Site (Patient Location): Patient's home    Distant Site (Provider Location): Provider Remote Setting    Consent:  The patient/guardian has verbally consented to: the potential risks and benefits of telemedicine (video visit) versus in person care; bill my insurance or make self-payment for services provided; and responsibility for payment of non-covered services.     Mode of Communication:  Video Conference via Miso    As the provider I attest to compliance with applicable laws and regulations related to telemedicine.     Summary of Group / Topics Discussed:  Behavioral Activation: Activity Scheduling:Patients explored how they currently spend their time, and how specific behaviors impact thoughts and feelings.  The group explored the effect of negative and positive activities on mood states and thought patterns.  Patients identified activities that help to improve mood and thinking patterns, and developed a plan to implement positive activities between sessions.      Patient Session Goals / Objectives:    Identify impact of current behaviors on thoughts and mood    Identify 2-3 behavioral changes that could have a positive impact on thoughts and mood    Prepare to make desired behavioral change: Create a change plan / activity schedule      Patient Participation / Response:  Fully participated with the group by sharing  personal reflections / insights and openly received / provided feedback with other participants.    Demonstrated understanding of topics discussed through group discussion and participation and Expressed understanding of the relationship between behaviors, thoughts, and feelings    Treatment Plan:  Patient has a current master individualized treatment plan.  See Epic treatment plan for more information.    Vance Watson LMFT

## 2021-02-19 NOTE — GROUP NOTE
Process Group Note    PATIENT'S NAME: Xavier Landon  MRN:   [id number]  :   1990  ACCT. NUMBER: [id number]  DATE OF SERVICE: 21  START TIME: 10:00 AM  END TIME: 10:50 AM  FACILITATOR: Deborah Watson LMFT  TOPIC:  Process Group    Diagnoses:  296.33 (F33.2) Major Depressive Disorder, Recurrent Episode, Severe _ and With anxious distress  300.02 (F41.1) Generalized Anxiety Disorder.  Attention-Deficit/Hyperactivity Disorder  314.01 (F90.9) Unspecified Attention -Deficit / Hyperactivity Disorder.  Substance-Related & Addictive Disorders Alcohol Use Disorder   303.90 (F10.20) Moderate   301.83 (F60.3) Borderline Personality Disorder      [facility] Adult Dual Diagnosis Day Treatment  TRACK: 2    NUMBER OF PARTICIPANTS: 4    Telemedicine Visit: The patient's condition can be safely assessed and treated via synchronous audio and visual telemedicine encounter.      Reason for Telemedicine Visit: Services only offered telehealth    Originating Site (Patient Location): Patient's home    Distant Site (Provider Location): Provider Remote Setting    Consent:  The patient/guardian has verbally consented to: the potential risks and benefits of telemedicine (video visit) versus in person care; bill my insurance or make self-payment for services provided; and responsibility for payment of non-covered services.     Mode of Communication:  Video Conference via Zixi    As the provider I attest to compliance with applicable laws and regulations related to telemedicine.           Data:    Session content: At the start of this group, patients were invited to check in by identifying themselves, describing their current emotional status, and identifying issues to address in this group.   Area(s) of treatment focus addressed in this session included Symptom Management, Personal Safety and Abstinence/Relapse Prevention.  Huey is feeling positive today-, energetic, he has talked to his parents  for the past few days, made spaghetti and meatballs last night, is feeling refreshed and rejuvenated, his goal for the day is to go down the list of things to do, write things down, schedule payments, look into smart recovery meeting, using organizational skills, work on productivity, denies barriers, denies safety concerns today, denies chemical use, is taking rx, is proud that he has not drank this week,processed about speaking to his family importance of communication     Therapeutic Interventions/Treatment Strategies:  Psychotherapist offered support, feedback and validation and reinforced use of skills. Treatment modalities used include Dialectical Behavioral Therapy. Interventions include Relationship Skills: Assisted patients in implementing more effective communication skills in their relationships and Discussed strategies to promote healthier understanding of interpersonal relationships.    Assessment:    Patient response:   Patient responded to session by accepting feedback, listening, being attentive and appearing alert    Possible barriers to participation / learning include: and no barriers identified    Health Issues:   None reported       Substance Use Review:   Substance Use: Last use: 2/14    Mental Status/Behavioral Observations  Appearance:   Appropriate   Eye Contact:   Good   Psychomotor Behavior: Normal   Attitude:   Cooperative   Orientation:   All  Speech   Rate / Production: Normal    Volume:  Normal   Mood:    Euthymic  Affect:    Bright   Thought Content:   Clear and Safety denies any current safety concerns including suicidal ideation, self-harm, and homicidal ideation  Thought Form:  Coherent  Logical     Insight:    Good     Plan:     Safety Plan: No current safety concerns identified.  Recommended that patient call 911 or go to the local ED should there be a change in any of these risk factors.     Barriers to treatment: None identified    Patient Contracts (see media tab):   None    Substance Use: Provided encouragement towards sobriety    Provided support and affirmation for steps taken towards sobriety      Continue or Discharge: Patient will continue in Adult Dual Disorder Program (DDP) as planned. Patient is likely to benefit from learning and using skills as they work toward the goals identified in their treatment plan.      Vance Watson, LMFT  February 19, 2021

## 2021-02-22 ENCOUNTER — HOSPITAL ENCOUNTER (OUTPATIENT)
Dept: BEHAVIORAL HEALTH | Facility: CLINIC | Age: 31
End: 2021-02-22
Attending: PSYCHIATRY & NEUROLOGY
Payer: COMMERCIAL

## 2021-02-22 PROCEDURE — 90853 GROUP PSYCHOTHERAPY: CPT | Mod: GT | Performed by: PSYCHOLOGIST

## 2021-02-22 PROCEDURE — 90853 GROUP PSYCHOTHERAPY: CPT | Mod: GT | Performed by: MARRIAGE & FAMILY THERAPIST

## 2021-02-22 NOTE — GROUP NOTE
Psychotherapy Group Note    PATIENT'S NAME: Xavier Landon  MRN:   [id number]  :   1990  ACCT. NUMBER: [id number]  DATE OF SERVICE: 21  START TIME: 11:00 AM  END TIME: 11:50 AM  FACILITATOR: Deborah Watson LMFT  TOPIC:  EBP Group: Behavioral Activation  [facility] Adult Dual Diagnosis Day Treatment  TRACK: 2    NUMBER OF PARTICIPANTS: 7    Telemedicine Visit: The patient's condition can be safely assessed and treated via synchronous audio and visual telemedicine encounter.      Reason for Telemedicine Visit: Services only offered telehealth    Originating Site (Patient Location): Patient's home    Distant Site (Provider Location): Provider Remote Setting    Consent:  The patient/guardian has verbally consented to: the potential risks and benefits of telemedicine (video visit) versus in person care; bill my insurance or make self-payment for services provided; and responsibility for payment of non-covered services.     Mode of Communication:  Video Conference via [employer]    As the provider I attest to compliance with applicable laws and regulations related to telemedicine.   Summary of Group / Topics Discussed:  Behavioral Activation: Motivation and Procrastination: Patients explored how they currently spend their time, identifying thoughts and feelings that are motivating and serve to increase desired behaviors.  They also examined behaviors that contribute to procrastination.  Different types of procrastination behaviors were identified, and strategies to reduce individual procrastination and increase motivation were explored and practiced.  Patients identified ways to increase goal-directed activities to enhance mood and reduce symptoms.        Patient Session Goals / Objectives:    Identify current patterns of procrastination behavior and how they influence thoughts and moods, and inhibit motivation.    Identify behaviors that can be implemented that contribute to improving  thoughts and feelings, motivation, and reduce symptoms.    Identify and develop a plan to increase activities that promote a sense of accomplishment and competence.    Practice scheduling positive activities / behaviors into daily routines.      Patient Participation / Response:  Fully participated with the group by sharing personal reflections / insights and openly received / provided feedback with other participants.    Demonstrated understanding of topics discussed through group discussion and participation, Expressed understanding of the relationship between behaviors, thoughts, and feelings and Shared experiences and challenges with making behavioral changes    Treatment Plan:  Patient has a current master individualized treatment plan.  See Epic treatment plan for more information.    Vance Watson LMFT

## 2021-02-22 NOTE — GROUP NOTE
Process Group Note    PATIENT'S NAME: Xavier Landon  MRN:   [id number]  :   1990  ACCT. NUMBER: [id number]  DATE OF SERVICE: 21  START TIME: 10:00 AM  END TIME: 10:50 AM  FACILITATOR: Deborah Watson LMFT  TOPIC:  Process Group    Diagnoses:  296.33 (F33.2) Major Depressive Disorder, Recurrent Episode, Severe _ and With anxious distress  300.02 (F41.1) Generalized Anxiety Disorder.  Attention-Deficit/Hyperactivity Disorder  314.01 (F90.9) Unspecified Attention -Deficit / Hyperactivity Disorder.  Substance-Related & Addictive Disorders Alcohol Use Disorder   303.90 (F10.20) Moderate   301.83 (F60.3) Borderline Personality Disorder      [facility] Adult Dual Diagnosis Day Treatment  TRACK: 2    NUMBER OF PARTICIPANTS: 6    Telemedicine Visit: The patient's condition can be safely assessed and treated via synchronous audio and visual telemedicine encounter.      Reason for Telemedicine Visit: Services only offered telehealth    Originating Site (Patient Location): Patient's home    Distant Site (Provider Location): Provider Remote Setting    Consent:  The patient/guardian has verbally consented to: the potential risks and benefits of telemedicine (video visit) versus in person care; bill my insurance or make self-payment for services provided; and responsibility for payment of non-covered services.     Mode of Communication:  Video Conference via [employer]    As the provider I attest to compliance with applicable laws and regulations related to telemedicine.           Data:    Session content: At the start of this group, patients were invited to check in by identifying themselves, describing their current emotional status, and identifying issues to address in this group.   Area(s) of treatment focus addressed in this session included Symptom Management, Personal Safety and Abstinence/Relapse Prevention.    Huey is feeling alert, grounded, aware today, his goal for the day is to  organize a little more to prepare for treatment changes, he accomplished things he wanted to do over the weekend, is using determination, breathing exercises, he is getting a rx refill tomorrow, reports passive si-can follow safety plan, denies chemical use, is proud that he did not drink this weekend-Client declined additional process time but contributed to group discussion and provided feedback and support to peers.      Therapeutic Interventions/Treatment Strategies:  Psychotherapist offered support, feedback and validation and reinforced use of skills.    Assessment:    Patient response:   Patient responded to session by giving feedback, listening, being attentive and appearing alert    Possible barriers to participation / learning include: and no barriers identified    Health Issues:   None reported       Substance Use Review:   Substance Use: Last use: 2/14    Mental Status/Behavioral Observations  Appearance:   Appropriate   Eye Contact:   Good   Psychomotor Behavior: Normal   Attitude:   Cooperative   Orientation:   All  Speech   Rate / Production: Normal    Volume:  Normal   Mood:    Euthymic  Affect:    Bright   Thought Content:   Clear and Safety reports  presence of suicidal ideation passive suicidal ideation   Thought Form:  Coherent  Logical     Insight:    Good     Plan:     Safety Plan: Committed to safety and agreed to follow previously developed safety coping plan.      Barriers to treatment: None identified    Patient Contracts (see media tab):  None    Substance Use: Provided encouragement towards sobriety    Provided support and affirmation for steps taken towards sobriety      Continue or Discharge: Patient will continue in Adult Dual Disorder Program (DDP) as planned. Patient is likely to benefit from learning and using skills as they work toward the goals identified in their treatment plan.      Vance Watson, LMFT  February 22, 2021

## 2021-02-22 NOTE — GROUP NOTE
Psychoeducation Group Note    PATIENT'S NAME: Xavier Landon  MRN:   [id number]  :   1990  ACCT. NUMBER: [id number]  DATE OF SERVICE: 21  START TIME: 12:00 PM  END TIME: 12:50 PM  FACILITATOR: Jairo Rios LP  TOPIC: MH RN Group: Health Maintenance  [facility] Adult Dual Diagnosis Day Treatment  TRACK: 2    NUMBER OF PARTICIPANTS: 6    Telemedicine Visit: The patient's condition can be safely assessed and treated via synchronous audio and visual telemedicine encounter.      Reason for Telemedicine Visit: Services only offered telehealth    Originating Site (Patient Location): Patient's home    Distant Site (Provider Location): Provider Remote Setting    Consent:  The patient/guardian has verbally consented to: the potential risks and benefits of telemedicine (video visit) versus in person care; bill my insurance or make self-payment for services provided; and responsibility for payment of non-covered services.     Mode of Communication:  Video Conference via [employer]    As the provider I attest to compliance with applicable laws and regulations related to telemedicine.      Summary of Group / Topics Discussed:  Health Maintenance: Wellness Check-in: Patients met with group facilitator to individually review a holistic wellness check-in to assess patient medication adherence/concerns, appointments, physical and mental health, exercise, nutrition, sleep, socialization, substance use, and need for service/resource referrals.       Patient Session Goals / Objectives:    Discussed various aspects of health management and self-care related to physical and mental health    Demonstrated increased self-awareness of current wellness needs    Developed health literacy skills in navigating the healthcare system and self-advocacy/communicating needs with health care team          Patient Participation / Response:  Fully participated with the group by sharing personal reflections /  insights and openly received / provided feedback with other participants.    Demonstrated understanding of topics discussed through group discussion and participation    Treatment Plan:  Patient has a current master individualized treatment plan.  See Epic treatment plan for more information.    Jairo Rios LP

## 2021-02-23 ENCOUNTER — HOSPITAL ENCOUNTER (OUTPATIENT)
Dept: BEHAVIORAL HEALTH | Facility: CLINIC | Age: 31
End: 2021-02-23
Attending: PSYCHIATRY & NEUROLOGY
Payer: COMMERCIAL

## 2021-02-23 PROCEDURE — 90853 GROUP PSYCHOTHERAPY: CPT | Mod: 95 | Performed by: MARRIAGE & FAMILY THERAPIST

## 2021-02-23 NOTE — GROUP NOTE
Psychotherapy Group Note    PATIENT'S NAME: Xavier Landon  MRN:   [id number]  :   1990  ACCT. NUMBER: [id number]  DATE OF SERVICE: 21  START TIME: 11:00 AM  END TIME: 11:50 AM  FACILITATOR: Deborah Watson LMFT  TOPIC:  EBP Group: Behavioral Activation  [facility] Adult Dual Diagnosis Day Treatment  TRACK: 2    NUMBER OF PARTICIPANTS: 7    Telemedicine Visit: The patient's condition can be safely assessed and treated via synchronous audio and visual telemedicine encounter.      Reason for Telemedicine Visit: Services only offered telehealth    Originating Site (Patient Location): Patient's home    Distant Site (Provider Location): Provider Remote Setting    Consent:  The patient/guardian has verbally consented to: the potential risks and benefits of telemedicine (video visit) versus in person care; bill my insurance or make self-payment for services provided; and responsibility for payment of non-covered services.     Mode of Communication:  Video Conference via [employer]    As the provider I attest to compliance with applicable laws and regulations related to telemedicine.   Summary of Group / Topics Discussed:  Behavioral Activation: Motivation and Procrastination: Patients explored how they currently spend their time, identifying thoughts and feelings that are motivating and serve to increase desired behaviors.  They also examined behaviors that contribute to procrastination.  Different types of procrastination behaviors were identified, and strategies to reduce individual procrastination and increase motivation were explored and practiced.  Patients identified ways to increase goal-directed activities to enhance mood and reduce symptoms.        Patient Session Goals / Objectives:    Identify current patterns of procrastination behavior and how they influence thoughts and moods, and inhibit motivation.    Identify behaviors that can be implemented that contribute to improving  thoughts and feelings, motivation, and reduce symptoms.    Identify and develop a plan to increase activities that promote a sense of accomplishment and competence.    Practice scheduling positive activities / behaviors into daily routines.      Patient Participation / Response:  Fully participated with the group by sharing personal reflections / insights and openly received / provided feedback with other participants.    Demonstrated understanding of topics discussed through group discussion and participation, Expressed understanding of the relationship between behaviors, thoughts, and feelings and Shared experiences and challenges with making behavioral changes    Treatment Plan:  Patient has a current master individualized treatment plan.  See Epic treatment plan for more information.    Vance Watson LMFT

## 2021-02-23 NOTE — GROUP NOTE
Psychotherapy Group Note    PATIENT'S NAME: Xavier Landon  MRN:   [id number]  :   1990  ACCT. NUMBER: [id number]  DATE OF SERVICE: 21  START TIME: 12:00 PM  END TIME: 12:50 PM  FACILITATOR: Deborah Watson LMFT  TOPIC:  EBP Group: Behavioral Activation  [facility] Adult Dual Diagnosis Day Treatment  TRACK: 2    NUMBER OF PARTICIPANTS: 6    Telemedicine Visit: The patient's condition can be safely assessed and treated via synchronous audio and visual telemedicine encounter.      Reason for Telemedicine Visit: Services only offered telehealth    Originating Site (Patient Location): Patient's home    Distant Site (Provider Location): Provider Remote Setting    Consent:  The patient/guardian has verbally consented to: the potential risks and benefits of telemedicine (video visit) versus in person care; bill my insurance or make self-payment for services provided; and responsibility for payment of non-covered services.     Mode of Communication:  Video Conference via [employer]    As the provider I attest to compliance with applicable laws and regulations related to telemedicine.     Summary of Group / Topics Discussed:  Behavioral Activation: [place] Ahead: {Patients identified situations that prompt unwanted and unhelpful emotions / thoughts / behaviors.   Patients discussed how to problem solve by proactively using coping skills in potentially difficult situations. Components included describing the situation, brainstorming coping skills, imagining how scenario can/will unfold, rehearsing the action plan, and practicing relaxation to follow.  Patients practiced using these skills to reduce symptom distress and increase effective coping  behaviors.      Patient Session Goals / Objectives:    Identify difficult situation(s), and gain proficiency with alternative behaviors / skills to problem solve.    Increase confidence using coping skills through group practice in session.    Receive  and provide feedback regarding skill development.    Apply coping skills in daily life situations.      Patient Participation / Response:  Fully participated with the group by sharing personal reflections / insights and openly received / provided feedback with other participants.    Demonstrated understanding of topics discussed through group discussion and participation, Expressed understanding of the relationship between behaviors, thoughts, and feelings and Shared experiences and challenges with making behavioral changes    Treatment Plan:  Patient has a current master individualized treatment plan.  See Epic treatment plan for more information.    Vance Watson LMFT

## 2021-02-25 ENCOUNTER — VIRTUAL VISIT (OUTPATIENT)
Dept: PSYCHOLOGY | Facility: CLINIC | Age: 31
End: 2021-02-25
Payer: COMMERCIAL

## 2021-02-25 ENCOUNTER — HOSPITAL ENCOUNTER (OUTPATIENT)
Dept: BEHAVIORAL HEALTH | Facility: CLINIC | Age: 31
End: 2021-02-25
Attending: PSYCHIATRY & NEUROLOGY
Payer: COMMERCIAL

## 2021-02-25 DIAGNOSIS — F33.2 MDD (MAJOR DEPRESSIVE DISORDER), RECURRENT SEVERE, WITHOUT PSYCHOSIS (H): ICD-10-CM

## 2021-02-25 DIAGNOSIS — F41.1 GENERALIZED ANXIETY DISORDER: ICD-10-CM

## 2021-02-25 DIAGNOSIS — F91.8 CONDUCT DISORDER, UNDIFFERENTIATED TYPE: Primary | ICD-10-CM

## 2021-02-25 DIAGNOSIS — F19.90 SUBSTANCE USE DISORDER: ICD-10-CM

## 2021-02-25 DIAGNOSIS — F90.2 ATTENTION DEFICIT HYPERACTIVITY DISORDER (ADHD), COMBINED TYPE: ICD-10-CM

## 2021-02-25 PROCEDURE — 90853 GROUP PSYCHOTHERAPY: CPT | Mod: GT | Performed by: MARRIAGE & FAMILY THERAPIST

## 2021-02-25 PROCEDURE — 90837 PSYTX W PT 60 MINUTES: CPT | Mod: GT | Performed by: PSYCHOLOGIST

## 2021-02-25 PROCEDURE — 90853 GROUP PSYCHOTHERAPY: CPT | Mod: 95 | Performed by: MARRIAGE & FAMILY THERAPIST

## 2021-02-25 NOTE — GROUP NOTE
Psychotherapy Group Note    PATIENT'S NAME: Xavier Landon  MRN:   [id number]  :   1990  ACCT. NUMBER: [id number]  DATE OF SERVICE: 21  START TIME: 11:00 AM  END TIME: 11:50 AM  FACILITATOR: Deborah Watson LMFT  TOPIC:  EBP Group: Behavioral Activation  [facility] Adult Dual Diagnosis Day Treatment  TRACK: 2    NUMBER OF PARTICIPANTS: 7    Telemedicine Visit: The patient's condition can be safely assessed and treated via synchronous audio and visual telemedicine encounter.      Reason for Telemedicine Visit: Services only offered telehealth    Originating Site (Patient Location): Patient's home    Distant Site (Provider Location): Provider Remote Setting    Consent:  The patient/guardian has verbally consented to: the potential risks and benefits of telemedicine (video visit) versus in person care; bill my insurance or make self-payment for services provided; and responsibility for payment of non-covered services.     Mode of Communication:  Video Conference via [employer]    As the provider I attest to compliance with applicable laws and regulations related to telemedicine.     Summary of Group / Topics Discussed:  Behavioral Activation: Behavior Chain Analysis: Patients explored the steps leading up to identified unwanted behaviors, and ways to replace them with more effective behaviors. Patients examined factors contributing to unwanted behaviors, with a goal of determining ways to interrupt the unhealthy patterns.    Patient Session Goals / Objectives:    Identify thoughts, feelings, and actions that contribute to unwanted behaviors    Identify thoughts, feelings, and actions that contribute to more effective/healthy and desired behaviors    Make plans to track and implement changes and share experiences in group    Identify personal barriers to change      Patient Participation / Response:  Fully participated with the group by sharing personal reflections / insights and openly  received / provided feedback with other participants.    Demonstrated understanding of topics discussed through group discussion and participation and Expressed understanding of the relationship between behaviors, thoughts, and feelings    Treatment Plan:  Patient has a current master individualized treatment plan.  See Epic treatment plan for more information.    Vance Watson LMFT

## 2021-02-25 NOTE — GROUP NOTE
Psychotherapy Group Note    PATIENT'S NAME: Xavier Landon  MRN:   [id number]  :   1990  ACCT. NUMBER: [id number]  DATE OF SERVICE: 21  START TIME: 12:00 PM  END TIME: 12:50 PM  FACILITATOR: Deborah Watson LMFT  TOPIC:  EBP Group: DDP Relapse Prevention  [facility] Adult Dual Diagnosis Day Treatment  TRACK: 2    NUMBER OF PARTICIPANTS: 8    Telemedicine Visit: The patient's condition can be safely assessed and treated via synchronous audio and visual telemedicine encounter.      Reason for Telemedicine Visit: Services only offered telehealth    Originating Site (Patient Location): Patient's home    Distant Site (Provider Location): Provider Remote Setting    Consent:  The patient/guardian has verbally consented to: the potential risks and benefits of telemedicine (video visit) versus in person care; bill my insurance or make self-payment for services provided; and responsibility for payment of non-covered services.     Mode of Communication:  Video Conference via [employer]    As the provider I attest to compliance with applicable laws and regulations related to telemedicine.   Summary of Group / Topics Discussed:  DDP Relapse Prevention: Support Groups and Community Resources: Patients explored the different support groups and community resources that aid in recovery.  Patients examined their attitudes and preconceived notions about support groups including identifying challenges and barriers to participation and attendance in community supports. The importance of attending additional supports in the recovery journey was discussed.    Patient Session Goals / Objectives:    Identified reasons why support groups/community resources are helpful to recovery    Identified the challenges and barriers to participation and attendance to support groups/community resources    [place] the differences between different types of support groups/community resources    Searched and found a  support group/community resource that patient is willing to explore       Patient Participation / Response:  Fully participated with the group by sharing personal reflections / insights and openly received / provided feedback with other participants.    Demonstrated understanding of topics discussed through group discussion and participation and Demonstrated understanding of utilizing relapse prevention skills to manage urges and maintain sobriety    Treatment Plan:  Patient has a current master individualized treatment plan.  See Epic treatment plan for more information.    Vance Watson, LARISAFT

## 2021-02-25 NOTE — GROUP NOTE
Process Group Note    PATIENT'S NAME: Xavier Landon  MRN:   [id number]  :   1990  ACCT. NUMBER: [id number]  DATE OF SERVICE: 21  START TIME: 10:00 AM  END TIME: 10:50 AM  FACILITATOR: Deborah Watson LMFT  TOPIC:  Process Group    Diagnoses:  296.33 (F33.2) Major Depressive Disorder, Recurrent Episode, Severe _ and With anxious distress  300.02 (F41.1) Generalized Anxiety Disorder.  Attention-Deficit/Hyperactivity Disorder  314.01 (F90.9) Unspecified Attention -Deficit / Hyperactivity Disorder.  Substance-Related & Addictive Disorders Alcohol Use Disorder   303.90 (F10.20) Moderate   301.83 (F60.3) Borderline Personality Disorder      [facility] Adult Dual Diagnosis Day Treatment  TRACK: 2    NUMBER OF PARTICIPANTS: 7    Telemedicine Visit: The patient's condition can be safely assessed and treated via synchronous audio and visual telemedicine encounter.      Reason for Telemedicine Visit: Services only offered telehealth    Originating Site (Patient Location): Patient's home    Distant Site (Provider Location): Provider Remote Setting    Consent:  The patient/guardian has verbally consented to: the potential risks and benefits of telemedicine (video visit) versus in person care; bill my insurance or make self-payment for services provided; and responsibility for payment of non-covered services.     Mode of Communication:  Video Conference via [employer]    As the provider I attest to compliance with applicable laws and regulations related to telemedicine.           Data:    Session content: At the start of this group, patients were invited to check in by identifying themselves, describing their current emotional status, and identifying issues to address in this group.   Area(s) of treatment focus addressed in this session included Symptom Management, Personal Safety and Abstinence/Relapse Prevention.    Huey is feeling irritable and on edge today, his goal for today is to continue  working on his goals-finishing his list, finish his goodbye letter, action opposite to emotion, distress tolerance, barriers is freezing, reports passive si-can follow safety plan, denies chemical use, is taking rx, is proud that he started his goodbye letter last night, he processed about having more thoughts about his fate and sometimes feeling out of control    Therapeutic Interventions/Treatment Strategies:  Psychotherapist offered support, feedback and validation and reinforced use of skills. Treatment modalities used include Cognitive Behavioral Therapy. Interventions include Cognitive Restructuring:  Explored impact of ineffective thoughts / distortions on mood and activity.    Assessment:    Patient response:   Patient responded to session by accepting feedback, giving feedback, listening, being attentive and appearing alert    Possible barriers to participation / learning include: and no barriers identified    Health Issues:   None reported       Substance Use Review:   Substance Use: Last use: 2/14    Mental Status/Behavioral Observations  Appearance:   Appropriate   Eye Contact:   Good   Psychomotor Behavior: Normal   Attitude:   Cooperative   Orientation:   All  Speech   Rate / Production: Normal    Volume:  Normal   Mood:    Anxious  Depressed   Affect:    Blunted   Thought Content:   Clear and Safety reports  presence of suicidal ideation passive suicidal ideation   Thought Form:  Coherent  Logical     Insight:    Good     Plan:     Safety Plan: Committed to safety and agreed to follow previously developed safety coping plan.      Barriers to treatment: None identified    Patient Contracts (see media tab):  None    Substance Use: Provided encouragement towards sobriety    Provided support and affirmation for steps taken towards sobriety      Continue or Discharge: Patient will continue in Adult Dual Disorder Program (DDP) as planned. Patient is likely to benefit from learning and using skills as they  work toward the goals identified in their treatment plan.      Vance Watson, FT  February 25, 2021

## 2021-02-25 NOTE — PROGRESS NOTES
[facility] -  Case Progress Note    2/25/21    Client Name: Xavier Landon  YOB: 1990  MRN:  [id number]  Treating Provider: Aysha Phillip, PhD LP  Type of Session: Individual  Present in Session: Patient    Number of Minutes:  60    Video start time: 9:02  Video end time: 10:02    Telemedicine Visit: The patient's condition can be safely assessed and treated via synchronous audio and visual telemedicine encounter.      Reason for Telemedicine Visit: Covid    Originating Site (Patient Location): Patient's home    Distant Site (Provider Location): [facility] Clinics: [facility]    Consent:  The patient/guardian has verbally consented to: the potential risks and benefits of telemedicine (video visit) versus in person care; bill my insurance or make self-payment for services provided; and responsibility for payment of non-covered services.     Mode of Communication:  Video Conference via Zoom  As the provider I attest to compliance with applicable laws and regulations related to telemedicine.        Current Symptoms/Status:  Patient's problems with out of control, driven, impulsive, compulsive sexual behavior began very early in his life.   He feels that his sex life has been stolen from him.  He is extremely distressed about this and does not see the reason to continue live.  No current suicidal plan.  Depressed.  Attention and concentration issues.  Sexual Orientation and gender identity concerns. Sexual functioning issues.    Progress Toward Treatment Goals:   Has just begun assessment and treatment planning at [facility].  In dual diagnosis OIP at [place].  Has been sober since last relapse.  Remains very dysregulated in terms of emotions.  But seems just a bit better.    Intervention: Modality and Description:  Interpersonal, CBT, relapse prevention.       Response to Intervention:  Patient is still dysregulated. But seems a bit better.  Sober.  Emphasized need to get involved in  recovery programs.  Pt. Said he would follow through.  Started Prozac 20 mg per psychiatrist.  Went off of adderal (ran out) - and started up and that triggered t 2days of CSB.  When he is on it regularly he is able to manage his CSB better.    Met with parents and treatment team.  [place] good about that    Pt is being relatively compliant at the same time very discouraged about help (but a bit more hopeful).     Reinforced the need to stay the course.     Needs to get into DBT and if necessary day treatment.  Needs continuity.  Explained need to focus on his sobriety and follow through with DBT in order to effectively deal with CSB.     Assignment:  Follow up with psychiatrist asap to revisit med regimen.  Follow tx recommendations of dual diagnosis program;  Start DBT.  Sobriety!  Get into smart recovery.      Interactive Complexity:  There are four specific communication difficulties that complicate the work of the primary psychiatric procedure.  Interactive complexity (+[id number]) may be reported when at least one of these difficulties is present.    Communication difficulties present during current the psychiatric procedure include:  1. N/A    Diagnosis:  312.89 (F91.8) Other Specified Disruptive, Impulse Control, and Conduct Disorder (Hypersexual Disorder)    Major Depressive Disorder, recurrent, with high anxiety  ADHD  Borderline Personality Disorder      Plan / Need for Future Services:  Return for therapy in 3 weeks.      TREATMENT PLAN  Date of Treatment Plan 21  Name: Xavier Landon  : 1990  Medical Record Number: [id number]  Treating Provider: Aysha Phillip  Type of Session: Individual  Present in Session: Patient    Current Status:  Depression/Mood:  Sad  Decreased Self-Esteem  Hopelessness/Helplessness  Irritable  Decreased Concentration  Suicidal Ideation  Dysphoria    Anxiety/Panic:  Worry  Intrusive Thoughts    Thought:   Reports no problems or symptoms at this  time    Sensorium:  Reports no problems or symptoms at this time    Behavior/Health:  Compulsive  Hyperactive    Chemical Use:  Alcohol Abuse    Sexual Problems:  Compulsive sexual behavior  Erectile Problems      Suicide Risk Assessment:  Assessed Level of Immediate Risk:  YES  Ideation:  YES  Plan:  NO  Means:  NO  Intent:  NO    Homicide Risk Assessment:  Assessed Level of Immediate Risk:  YES  Ideation:  NO  Plan:  NO  Means:  NO  Intent:  NO    Impact of Symptoms on Function:  Decreased Social/Family Function  Decreased Work Function    DSM-5 Diagnoses:       Screening Questionnaires:  Please indicate whether the following screening questionnaires have been completed:  CAGE: Yes  PHQ-9: Yes    SHAUNA-7: Yes  Safety Screening: Yes  WHODAS: No    Problem(s):  1. Compulsive Sexual Behavior.  2. Substance use disorder - alcoholism  3. Depression  4. Personality disorder  5. ADHD    Short-Long Term Goals  Decrease Symptoms  Increase Coping  Change Behavior  Change Cognitions  Increase Psychosocial/Support Functioning  Increase Decision Making  Monitor Psych Status    Interventions  [place] [place] [facility]  Cognitive-Behavioral Therapy  Medication Management Referral    Expected Outcomes and Prognosis  Unknown    Anticipated Treatment Duration:  Unknown    Frequency of Sessions  2 x / Month    Progress Update (for Plan Update Only)  NA:  1st Treatment Planning    Current Psychoactive Medications:  See Psychiatric Treatment Plan  Discharge & Aftercare Goals: To Be Determined.  Care Coordination: Yes    Consent to Treatment:   Patient participated in this treatment planning process and indicated verbal agreement with the above treatment plan.  If patient doesn't sign, indicate why: Telehealth visit due to COVID19 pandemic    Patient Signature: Xavier Landon  Date: 2-16-21     Provider Signature: Aysha Phillip, PhD, LP  Date: 2-16-21

## 2021-02-26 ENCOUNTER — HOSPITAL ENCOUNTER (OUTPATIENT)
Dept: BEHAVIORAL HEALTH | Facility: CLINIC | Age: 31
End: 2021-02-26
Attending: PSYCHIATRY & NEUROLOGY
Payer: COMMERCIAL

## 2021-02-26 PROCEDURE — 90853 GROUP PSYCHOTHERAPY: CPT | Mod: GT | Performed by: PSYCHOLOGIST

## 2021-02-26 PROCEDURE — 90853 GROUP PSYCHOTHERAPY: CPT | Mod: 95 | Performed by: MARRIAGE & FAMILY THERAPIST

## 2021-02-26 PROCEDURE — 90853 GROUP PSYCHOTHERAPY: CPT | Mod: GT | Performed by: MARRIAGE & FAMILY THERAPIST

## 2021-02-26 NOTE — GROUP NOTE
Psychotherapy Group Note    PATIENT'S NAME: Xavier Landon  MRN:   [id number]  :   1990  ACCT. NUMBER: [id number]  DATE OF SERVICE: 21  START TIME: 12:00 PM  END TIME: 12:50 PM  FACILITATOR: Deborah Watson LMFT  TOPIC:  EBP Group: Coping Skills  [facility] Adult Dual Diagnosis Day Treatment  TRACK: 2    NUMBER OF PARTICIPANTS: 6    Telemedicine Visit: The patient's condition can be safely assessed and treated via synchronous audio and visual telemedicine encounter.      Reason for Telemedicine Visit: Services only offered telehealth    Originating Site (Patient Location): Patient's home    Distant Site (Provider Location): Provider Remote Setting    Consent:  The patient/guardian has verbally consented to: the potential risks and benefits of telemedicine (video visit) versus in person care; bill my insurance or make self-payment for services provided; and responsibility for payment of non-covered services.     Mode of Communication:  Video Conference via Fanmode    As the provider I attest to compliance with applicable laws and regulations related to telemedicine.     Summary of Group / Topics Discussed:  Coping Skills: Calming Strategies: Patients discussed and practiced strategies to increase sense of calm in the body.  Reviewed the benefits of calming strategies as well as past / current practices of each member.  Patients identified situations in which using these strategies would reduce stress. They developed the ability to distinguish when these strategies can be useful in their lives for management and stress and psychological well-being.    Patient Session Goals / Objectives:    Understand the purpose of using calming strategies to reduce stress    Review patients current calming practices and discuss a more formal way of practicing and accessing skills.    Increase ability to decide when to use various calming strategies (e.g. Progressive muscle relaxation, deep  breathing, guided imagery, + thoughts).    Choose 1-2 calming strategies to apply during times of distress.        Patient Participation / Response:  Fully participated with the group by sharing personal reflections / insights and openly received / provided feedback with other participants.    Demonstrated understanding of topics discussed through group discussion and participation, Expressed understanding of the relevance / importance of coping skills at distressing times in life and Demonstrated knowledge of when to consider using a variety of coping skills in daily life    Treatment Plan:  Patient has a current master individualized treatment plan.  See Epic treatment plan for more information.    Vnace Watson, LARISAFT

## 2021-02-26 NOTE — GROUP NOTE
Process Group Note    PATIENT'S NAME: Xavier Landon  MRN:   [id number]  :   1990  ACCT. NUMBER: [id number]  DATE OF SERVICE: 21  START TIME: 11:00 AM  END TIME: 11:50 AM  FACILITATOR: Deborah Watson LMFT  TOPIC:  Process Group    Diagnoses:  296.33 (F33.2) Major Depressive Disorder, Recurrent Episode, Severe _ and With anxious distress  300.02 (F41.1) Generalized Anxiety Disorder.  Attention-Deficit/Hyperactivity Disorder  314.01 (F90.9) Unspecified Attention -Deficit / Hyperactivity Disorder.  Substance-Related & Addictive Disorders Alcohol Use Disorder   303.90 (F10.20) Moderate   301.83 (F60.3) Borderline Personality Disorder      [facility] Adult Dual Diagnosis Day Treatment  TRACK: 2    NUMBER OF PARTICIPANTS: 7    Telemedicine Visit: The patient's condition can be safely assessed and treated via synchronous audio and visual telemedicine encounter.      Reason for Telemedicine Visit: Services only offered telehealth    Originating Site (Patient Location): Patient's home    Distant Site (Provider Location): Provider Remote Setting    Consent:  The patient/guardian has verbally consented to: the potential risks and benefits of telemedicine (video visit) versus in person care; bill my insurance or make self-payment for services provided; and responsibility for payment of non-covered services.     Mode of Communication:  Video Conference via TeraFirrma    As the provider I attest to compliance with applicable laws and regulations related to telemedicine.           Data:    Session content: At the start of this group, patients were invited to check in by identifying themselves, describing their current emotional status, and identifying issues to address in this group.   Area(s) of treatment focus addressed in this session included Symptom Management, Personal Safety and Abstinence/Relapse Prevention.    Huey reports feeling frantic and desperate-is hoping that he can be effective  in applying the skills he has learned here, his goal for the day is to call [place] office and [facility] recovery, will be using accountabilty, is struggling with motivation, passive si-can follow plan for safety, denies chemical use, is taking rx, is proud that he got the letter done, da shared his goodbye letter with the group.    Therapeutic Interventions/Treatment Strategies:  Psychotherapist offered support, feedback and validation and reinforced use of skills. Treatment modalities used include Motivational Interviewing. Interventions include Relapse Prevention: Facilitated understanding of effective coping skills in response to triggers for substance use.    Assessment:    Patient response:   Patient responded to session by accepting feedback, listening, being attentive and appearing alert    Possible barriers to participation / learning include: and no barriers identified    Health Issues:   None reported       Substance Use Review:   Substance Use: Last use: 2/14    Mental Status/Behavioral Observations  Appearance:   Appropriate   Eye Contact:   Good   Psychomotor Behavior: Normal   Attitude:   Cooperative   Orientation:   All  Speech   Rate / Production: Normal    Volume:  Normal   Mood:    Anxious   Affect:    Blunted   Thought Content:   Clear and Safety reports  presence of suicidal ideation passive suicidal ideation   Thought Form:  Coherent  Logical     Insight:    Good     Plan:     Safety Plan: Committed to safety and agreed to follow previously developed safety coping plan.      Barriers to treatment: None identified    Patient Contracts (see media tab):  None    Substance Use: Provided encouragement towards sobriety    Provided support and affirmation for steps taken towards sobriety      Continue or Discharge: Patient will continue in Adult Dual Disorder Program (DDP) as planned. Patient is likely to benefit from learning and using skills as they work toward the goals identified in their  treatment plan.      Vance Watson, [facility]  February 26, 2021

## 2021-02-26 NOTE — GROUP NOTE
Psychoeducation Group Note    PATIENT'S NAME: Xavier Landon  MRN:   [id number]  :   1990  ACCT. NUMBER: [id number]  DATE OF SERVICE: 21  START TIME: 10:00 AM  END TIME: 10:50 AM  FACILITATOR: Jairo Rios LP  TOPIC: TIMUR RN Group: Health Maintenance  [facility] Adult Dual Diagnosis Day Treatment  TRACK: 2    NUMBER OF PARTICIPANTS: 6    Telemedicine Visit: The patient's condition can be safely assessed and treated via synchronous audio and visual telemedicine encounter.      Reason for Telemedicine Visit: Services only offered telehealth    Originating Site (Patient Location): Patient's home    Distant Site (Provider Location): Provider Remote Setting    Consent:  The patient/guardian has verbally consented to: the potential risks and benefits of telemedicine (video visit) versus in person care; bill my insurance or make self-payment for services provided; and responsibility for payment of non-covered services.     Mode of Communication:  Video Conference via COADE    As the provider I attest to compliance with applicable laws and regulations related to telemedicine.      Summary of Group / Topics Discussed:  Health Maintenance: Weekend planning: Patients were given time to complete a weekend plan of what they will do to promote wellness and sobriety over the weekend when they do not have the structure of group. Patients were encouraged to review progress on their treatment goals and were challenged to identify ways to work toward meeting them. Patients identified and discussed foreseeable barriers to success over the weekend and then developed a plan to overcome them. Patients reviewed their distress coping skills and social support network and discussed this with the group.       Patient Session Goals / Objectives:    ?    Identified activities to engage in that promote balance in wellness  ?    Distinguished possible barriers to success over the weekend and  created a plan to overcome them  ?    Listed distress coping skills and identified social support network to utilize if in crisis during the weekend          Patient Participation / Response:  Fully participated with the group by sharing personal reflections / insights and openly received / provided feedback with other participants.    Demonstrated understanding of topics discussed through group discussion and participation    Treatment Plan:  Patient has a current master individualized treatment plan.  See Epic treatment plan for more information.    Jairo Rios, LP

## 2021-03-01 ENCOUNTER — HOSPITAL ENCOUNTER (OUTPATIENT)
Dept: BEHAVIORAL HEALTH | Facility: CLINIC | Age: 31
End: 2021-03-01
Attending: PSYCHIATRY & NEUROLOGY
Payer: COMMERCIAL

## 2021-03-01 PROCEDURE — 90853 GROUP PSYCHOTHERAPY: CPT | Mod: 95 | Performed by: MARRIAGE & FAMILY THERAPIST

## 2021-03-01 PROCEDURE — 90853 GROUP PSYCHOTHERAPY: CPT | Mod: 95 | Performed by: PSYCHOLOGIST

## 2021-03-01 PROCEDURE — 90853 GROUP PSYCHOTHERAPY: CPT | Mod: GT | Performed by: MARRIAGE & FAMILY THERAPIST

## 2021-03-01 NOTE — GROUP NOTE
Psychoeducation Group Note    PATIENT'S NAME: Xavier Landon  MRN:   [id number]  :   1990  ACCT. NUMBER: [id number]  DATE OF SERVICE: 3/01/21  START TIME: 12:00 PM  END TIME: 12:50 PM  FACILITATOR: Jairo Rios LP  TOPIC: TIMUR RN Group: Health Maintenance  [facility] Adult Dual Diagnosis Day Treatment  TRACK: 2    NUMBER OF PARTICIPANTS: 7    Telemedicine Visit: The patient's condition can be safely assessed and treated via synchronous audio and visual telemedicine encounter.      Reason for Telemedicine Visit: Services only offered telehealth    Originating Site (Patient Location): Patient's home    Distant Site (Provider Location): Provider Remote Setting    Consent:  The patient/guardian has verbally consented to: the potential risks and benefits of telemedicine (video visit) versus in person care; bill my insurance or make self-payment for services provided; and responsibility for payment of non-covered services.     Mode of Communication:  Video Conference via Kidlandia    As the provider I attest to compliance with applicable laws and regulations related to telemedicine.      Summary of Group / Topics Discussed:  Health Maintenance: Wellness Check-in: Patients met with group facilitator to individually review a holistic wellness check-in to assess patient medication adherence/concerns, appointments, physical and mental health, exercise, nutrition, sleep, socialization, substance use, and need for service/resource referrals.       Patient Session Goals / Objectives:    Discussed various aspects of health management and self-care related to physical and mental health    Demonstrated increased self-awareness of current wellness needs    Developed health literacy skills in navigating the healthcare system and self-advocacy/communicating needs with health care team          Patient Participation / Response:  Fully participated with the group by sharing personal reflections /  insights and openly received / provided feedback with other participants.    Demonstrated understanding of topics discussed through group discussion and participation    Treatment Plan:  Patient has a current master individualized treatment plan.  See Epic treatment plan for more information.    Jairo Rios LP

## 2021-03-01 NOTE — GROUP NOTE
Psychotherapy Group Note    PATIENT'S NAME: Xavier Landon  MRN:   [id number]  :   1990  ACCT. NUMBER: [id number]  DATE OF SERVICE: 3/01/21  START TIME: 11:00 AM  END TIME: 11:50 AM  FACILITATOR: Deborah Watson LMFT  TOPIC:  EBP Group: Coping Skills  [facility] Adult Dual Diagnosis Day Treatment  TRACK: 2    NUMBER OF PARTICIPANTS: 6  Telemedicine Visit: The patient's condition can be safely assessed and treated via synchronous audio and visual telemedicine encounter.      Reason for Telemedicine Visit: Services only offered telehealth    Originating Site (Patient Location): Patient's home    Distant Site (Provider Location): Provider Remote Setting    Consent:  The patient/guardian has verbally consented to: the potential risks and benefits of telemedicine (video visit) versus in person care; bill my insurance or make self-payment for services provided; and responsibility for payment of non-covered services.     Mode of Communication:  Video Conference via Xignite    As the provider I attest to compliance with applicable laws and regulations related to telemedicine.     Summary of Group / Topics Discussed:  Coping Skills: Building Positive Experiences: Patients discussed the importance of planning and engaging in positive experiences, as strategies to increase positive thinking, hope, and self-worth.  Explored the benefits of planning / creating positive experiences, including recognizing and reducing negativity bias by focusing on and building positive experiences.   Several approaches to building positive experiences were presented and discussed relevant to each patient.      Patient Session Goals / Objectives:    Understand the purpose of planning / creating / participating / sharing in positive experiences.    Explore patient s experiences related to negative thinking and how it influences activities and moodIdentify current positive events in patient s life.     Set goals to  increase a variety of positive experiences.    Address barriers to planning / engaging in positive experiences.        Patient Participation / Response:  Fully participated with the group by sharing personal reflections / insights and openly received / provided feedback with other participants.    Demonstrated understanding of topics discussed through group discussion and participation, Expressed understanding of the relevance / importance of coping skills at distressing times in life and Demonstrated knowledge of when to consider using a variety of coping skills in daily life    Treatment Plan:  Patient has a current master individualized treatment plan.  See Epic treatment plan for more information.    Vance Watson, LARISAFT

## 2021-03-01 NOTE — GROUP NOTE
Process Group Note    PATIENT'S NAME: Xavier Landon  MRN:   [id number]  :   1990  ACCT. NUMBER: [id number]  DATE OF SERVICE: 3/01/21  START TIME: 10:00 AM  END TIME: 10:50 AM  FACILITATOR: Deborah Watson LMFT  TOPIC:  Process Group    Diagnoses:  296.33 (F33.2) Major Depressive Disorder, Recurrent Episode, Severe _ and With anxious distress  300.02 (F41.1) Generalized Anxiety Disorder.  Attention-Deficit/Hyperactivity Disorder  314.01 (F90.9) Unspecified Attention -Deficit / Hyperactivity Disorder.  Substance-Related & Addictive Disorders Alcohol Use Disorder   303.90 (F10.20) Moderate   301.83 (F60.3) Borderline Personality Disorder      [facility] Adult Dual Diagnosis Day Treatment  TRACK: 2    NUMBER OF PARTICIPANTS: 6    Telemedicine Visit: The patient's condition can be safely assessed and treated via synchronous audio and visual telemedicine encounter.      Reason for Telemedicine Visit: Services only offered telehealth    Originating Site (Patient Location): Patient's home    Distant Site (Provider Location): Provider Remote Setting    Consent:  The patient/guardian has verbally consented to: the potential risks and benefits of telemedicine (video visit) versus in person care; bill my insurance or make self-payment for services provided; and responsibility for payment of non-covered services.     Mode of Communication:  Video Conference via [employer]    As the provider I attest to compliance with applicable laws and regulations related to telemedicine.           Data:    Session content: At the start of this group, patients were invited to check in by identifying themselves, describing their current emotional status, and identifying issues to address in this group.   Area(s) of treatment focus addressed in this session included Symptom Management, Personal Safety and Abstinence/Relapse Prevention.    Huey is feeling lost and bitter today, he is working on calling for dbt and  completing intake, call [facility], is using persistence and desire to keep job, is feeling low today, reports passive si-can follow safety plan, denies chemical use, is taking rx, is grateful that life isnt as bad as it could be, he processed about struggling with thoughts about if therapy is worth it, struggling with cognitive distortions    Therapeutic Interventions/Treatment Strategies:  Psychotherapist offered support, feedback and validation and reinforced use of skills. Treatment modalities used include Cognitive Behavioral Therapy. Interventions include Cognitive Restructuring:  Assisted patient in formulating new neutral/positive alternatives to challenge less helpful / ineffective thoughts.    Assessment:    Patient response:   Patient responded to session by accepting feedback, listening, being attentive and appearing alert    Possible barriers to participation / learning include: and no barriers identified    Health Issues:   None reported       Substance Use Review:   Substance Use: Last use: 2/14    Mental Status/Behavioral Observations  Appearance:   Appropriate   Eye Contact:   Good   Psychomotor Behavior: Normal   Attitude:   Cooperative   Orientation:   All  Speech   Rate / Production: Normal    Volume:  Normal   Mood:    Anxious  Depressed   Affect:    Blunted  Subdued   Thought Content:   Clear and Safety reports  presence of suicidal ideation passive suicidal ideation   Thought Form:  Coherent  Logical     Insight:    Good     Plan:     Safety Plan: Committed to safety and agreed to follow previously developed safety coping plan.      Barriers to treatment: None identified    Patient Contracts (see media tab):  None    Substance Use: Provided encouragement towards sobriety    Provided support and affirmation for steps taken towards sobriety      Continue or Discharge: Patient will continue in Adult Dual Disorder Program (DDP) as planned. Patient is likely to benefit from learning and using skills as  they work toward the goals identified in their treatment plan.      Vance Watson, [facility]  March 1, 2021

## 2021-03-02 ENCOUNTER — HOSPITAL ENCOUNTER (OUTPATIENT)
Dept: BEHAVIORAL HEALTH | Facility: CLINIC | Age: 31
End: 2021-03-02
Attending: PSYCHIATRY & NEUROLOGY
Payer: COMMERCIAL

## 2021-03-02 PROCEDURE — 90853 GROUP PSYCHOTHERAPY: CPT | Mod: GT | Performed by: MARRIAGE & FAMILY THERAPIST

## 2021-03-02 PROCEDURE — 90853 GROUP PSYCHOTHERAPY: CPT | Mod: 95 | Performed by: MARRIAGE & FAMILY THERAPIST

## 2021-03-02 NOTE — GROUP NOTE
Process Group Note    PATIENT'S NAME: Xavier Landon  MRN:   [id number]  :   1990  ACCT. NUMBER: [id number]  DATE OF SERVICE: 3/02/21  START TIME: 10:00 AM  END TIME: 10:50 AM  FACILITATOR: Deborah Watson LMFT  TOPIC:  Process Group    Diagnoses:  296.33 (F33.2) Major Depressive Disorder, Recurrent Episode, Severe _ and With anxious distress  300.02 (F41.1) Generalized Anxiety Disorder.  Attention-Deficit/Hyperactivity Disorder  314.01 (F90.9) Unspecified Attention -Deficit / Hyperactivity Disorder.  Substance-Related & Addictive Disorders Alcohol Use Disorder   303.90 (F10.20) Moderate   301.83 (F60.3) Borderline Personality Disorder      [facility] Adult Dual Diagnosis Day Treatment  TRACK: 2    NUMBER OF PARTICIPANTS: 7    Telemedicine Visit: The patient's condition can be safely assessed and treated via synchronous audio and visual telemedicine encounter.      Reason for Telemedicine Visit: Services only offered telehealth    Originating Site (Patient Location): Patient's home    Distant Site (Provider Location): Provider Remote Setting    Consent:  The patient/guardian has verbally consented to: the potential risks and benefits of telemedicine (video visit) versus in person care; bill my insurance or make self-payment for services provided; and responsibility for payment of non-covered services.     Mode of Communication:  Video Conference via "[employer]    As the provider I attest to compliance with applicable laws and regulations related to telemedicine.           Data:    Session content: At the start of this group, patients were invited to check in by identifying themselves, describing their current emotional status, and identifying issues to address in this group.   Area(s) of treatment focus addressed in this session included Symptom Management, Personal Safety and Abstinence/Relapse Prevention.    Huey is feeling hopeless and displeased and anxious, his goal for the day is  to call archana, see his parents, talk to roommates-pay some bills, using accountability, reports passive si-can follow coping plan, is taking rx, denies chemical use, is grateful that DBT is set up, he processed about having difficulty letting things go, struggling with talents-struggling with self loathing    Therapeutic Interventions/Treatment Strategies:  Psychotherapist offered support, feedback and validation and reinforced use of skills. Treatment modalities used include Dialectical Behavioral Therapy. Interventions include Cognitive Restructuring:  Explored impact of ineffective thoughts / distortions on mood and activity and Facilitated recognition of the connection between negative thoughts and negative core beliefs.    Assessment:    Patient response:   Patient responded to session by accepting feedback, giving feedback, listening, being attentive and appearing alert    Possible barriers to participation / learning include: and no barriers identified    Health Issues:   None reported       Substance Use Review:   Substance Use: Last use: 2/14    Mental Status/Behavioral Observations  Appearance:   Appropriate   Eye Contact:   Good   Psychomotor Behavior: Normal   Attitude:   Cooperative   Orientation:   All  Speech   Rate / Production: Normal    Volume:  Normal   Mood:    Depressed   Affect:    Blunted   Thought Content:   Clear and Safety reports  presence of suicidal ideation passive suicidal ideation   Thought Form:  Coherent  Logical     Insight:    Good     Plan:     Safety Plan: Committed to safety and agreed to follow previously developed safety coping plan.      Barriers to treatment: None identified    Patient Contracts (see media tab):  None    Substance Use: Provided encouragement towards sobriety    Provided support and affirmation for steps taken towards sobriety      Continue or Discharge: Patient will continue in Adult Dual Disorder Program (DDP) as planned. Patient is likely to benefit from  learning and using skills as they work toward the goals identified in their treatment plan.      Vance Watson, LMFT  March 2, 2021

## 2021-03-02 NOTE — GROUP NOTE
Psychoeducation Group Note    PATIENT'S NAME: Xavier Landon  MRN:   [id number]  :   1990  ACCT. NUMBER: [id number]  DATE OF SERVICE: 3/02/21  START TIME: 11:00 AM  END TIME: 11:50 AM  FACILITATOR: Deborah Watson LMFT  TOPIC: TIMUR RN Group: Health Maintenance  [facility] Adult Dual Diagnosis Day Treatment  TRACK: 2    NUMBER OF PARTICIPANTS: 7    Telemedicine Visit: The patient's condition can be safely assessed and treated via synchronous audio and visual telemedicine encounter.      Reason for Telemedicine Visit: Services only offered telehealth    Originating Site (Patient Location): Patient's home    Distant Site (Provider Location): Provider Remote Setting    Consent:  The patient/guardian has verbally consented to: the potential risks and benefits of telemedicine (video visit) versus in person care; bill my insurance or make self-payment for services provided; and responsibility for payment of non-covered services.     Mode of Communication:  Video Conference via [employer]    As the provider I attest to compliance with applicable laws and regulations related to telemedicine.     Summary of Group / Topics Discussed:  Health Maintenance: Eight Dimensions of Wellness: The concept of holistic health through the model of eight dimensions was introduced. Group members participated in identifying behaviors and activities in each of the dimensions of wellness.  The importance of each dimension was reinforced and the concept of balance in life as it relates to wellness was explored.      Patient Session Goals / Objectives:    Verbalized understanding of balance in wellness and how it relates to their life    Identified and explained the eight dimensions of wellness    Categorized activities and wellness needs into corresponding dimensions appropriately during exercise          Patient Participation / Response:  Moderately participated, sharing some personal reflections / insights and  adequately adequately received / provided feedback with other participants.    Demonstrated understanding of topics discussed through group discussion and participation and Identified / Expressed personal readiness to practice skills    Treatment Plan:  Patient has a current master individualized treatment plan.  See Epic treatment plan for more information.    Vance Watson LMFT

## 2021-03-02 NOTE — GROUP NOTE
Psychoeducation Group Note    PATIENT'S NAME: Xavier Landon  MRN:   [id number]  :   1990  ACCT. NUMBER: [id number]  DATE OF SERVICE: 3/02/21  START TIME: 12:00 PM  END TIME: 12:50 PM  FACILITATOR: Deborah Watson LMFT  TOPIC: TIMUR RN Group: Health Maintenance  [facility] Adult Dual Diagnosis Day Treatment  TRACK: 2    NUMBER OF PARTICIPANTS: 7    Telemedicine Visit: The patient's condition can be safely assessed and treated via synchronous audio and visual telemedicine encounter.      Reason for Telemedicine Visit: Services only offered telehealth    Originating Site (Patient Location): Patient's home    Distant Site (Provider Location): Provider Remote Setting    Consent:  The patient/guardian has verbally consented to: the potential risks and benefits of telemedicine (video visit) versus in person care; bill my insurance or make self-payment for services provided; and responsibility for payment of non-covered services.     Mode of Communication:  Video Conference via [employer]    As the provider I attest to compliance with applicable laws and regulations related to telemedicine.     Summary of Group / Topics Discussed:  Health Maintenance: Goal Setting: Meaningful goals can bring a sense of direction and purpose in life.  They also highlight our most important values. Patients were assisted by instructor to identify short term goals to promote their mental health recovery and improve overall health and wellness. Patients were educated on SMART goal setting framework as a strategy to increase outcomes and promote success.    Patient Session Goals / Objectives:  ? Explained the key concepts of SMART goal setting framework  ? Identified three goals successfully using SMART goal setting framework  ? Reviewed concept of balance in wellness as it pertains to goal setting        Patient Participation / Response:  Moderately participated, sharing some personal reflections / insights and  adequately adequately received / provided feedback with other participants.    Demonstrated understanding of topics discussed through group discussion and participation, Identified / Expressed personal readiness to practice skills and Verbalized understanding of health maintenance topic    Treatment Plan:  Patient has a current master individualized treatment plan.  See Epic treatment plan for more information.    Vance Watson LMFT

## 2021-03-04 ENCOUNTER — HOSPITAL ENCOUNTER (OUTPATIENT)
Dept: BEHAVIORAL HEALTH | Facility: CLINIC | Age: 31
End: 2021-03-04
Attending: PSYCHIATRY & NEUROLOGY
Payer: COMMERCIAL

## 2021-03-04 PROCEDURE — 90853 GROUP PSYCHOTHERAPY: CPT | Mod: 95 | Performed by: MARRIAGE & FAMILY THERAPIST

## 2021-03-04 PROCEDURE — 90853 GROUP PSYCHOTHERAPY: CPT | Mod: GT | Performed by: MARRIAGE & FAMILY THERAPIST

## 2021-03-04 NOTE — GROUP NOTE
Process Group Note    PATIENT'S NAME: Xavier Landon  MRN:   [id number]  :   1990  ACCT. NUMBER: [id number]  DATE OF SERVICE: 3/04/21  START TIME: 10:00 AM  END TIME: 10:50 AM  FACILITATOR: Deborah Watson LMFT  TOPIC:  Process Group    Diagnoses:  296.33 (F33.2) Major Depressive Disorder, Recurrent Episode, Severe _ and With anxious distress  300.02 (F41.1) Generalized Anxiety Disorder.  Attention-Deficit/Hyperactivity Disorder  314.01 (F90.9) Unspecified Attention -Deficit / Hyperactivity Disorder.  Substance-Related & Addictive Disorders Alcohol Use Disorder   303.90 (F10.20) Moderate   301.83 (F60.3) Borderline Personality Disorder      [facility] Adult Dual Diagnosis Day Treatment  TRACK: 2    NUMBER OF PARTICIPANTS: 6    Telemedicine Visit: The patient's condition can be safely assessed and treated via synchronous audio and visual telemedicine encounter.      Reason for Telemedicine Visit: Services only offered telehealth    Originating Site (Patient Location): Patient's home    Distant Site (Provider Location): Provider Remote Setting    Consent:  The patient/guardian has verbally consented to: the potential risks and benefits of telemedicine (video visit) versus in person care; bill my insurance or make self-payment for services provided; and responsibility for payment of non-covered services.     Mode of Communication:  Video Conference via [employer]    As the provider I attest to compliance with applicable laws and regulations related to telemedicine.           Data:    Session content: At the start of this group, patients were invited to check in by identifying themselves, describing their current emotional status, and identifying issues to address in this group.   Area(s) of treatment focus addressed in this session included Symptom Management, Personal Safety and Abstinence/Relapse Prevention.    Huey reports feeling pretty good-energized, capable today, thinks prozac may  "be working, his goal for the day is to get some phone calls done-take care of things around the house, is using effectiveness and action opposite to emotion today, is struggling with fear-freezing, denies safety concerns today, denies chemical use, is taking rx as prescribed, is proud that he paid some bills yesterday-he processed about wanting to be more productive today since he is feeling better and not wanting to \"waste\" his day of feeling good.    Therapeutic Interventions/Treatment Strategies:  Psychotherapist offered support, feedback and validation and reinforced use of skills. Treatment modalities used include Dialectical Behavioral Therapy. Interventions include Behavioral Activation: Encouraged strategies to reduce individual procrastination and increase motivation by increasing goal-directed activities to enhance mood and reduce symptoms. and Cognitive Restructuring:  Assisted patient in formulating new neutral/positive alternatives to challenge less helpful / ineffective thoughts.    Assessment:    Patient response:   Patient responded to session by accepting feedback, giving feedback, listening, being attentive, accepting support and appearing alert    Possible barriers to participation / learning include: and no barriers identified    Health Issues:   None reported       Substance Use Review:   Substance Use: Last use: 2/14    Mental Status/Behavioral Observations  Appearance:   Appropriate   Eye Contact:   Good   Psychomotor Behavior: Normal   Attitude:   Cooperative   Orientation:   All  Speech   Rate / Production: Normal    Volume:  Normal   Mood:    Euthymic  Affect:    Bright   Thought Content:   Clear and Safety denies any current safety concerns including suicidal ideation, self-harm, and homicidal ideation  Thought Form:  Coherent  Logical     Insight:    Good     Plan:     Safety Plan: No current safety concerns identified.  Recommended that patient call 911 or go to the local ED should there " be a change in any of these risk factors.     Barriers to treatment: None identified    Patient Contracts (see media tab):  None    Substance Use: Provided encouragement towards sobriety    Provided support and affirmation for steps taken towards sobriety      Continue or Discharge: Patient will continue in Adult Dual Disorder Program (DDP) as planned. Patient is likely to benefit from learning and using skills as they work toward the goals identified in their treatment plan.      Vance Watson, LMFT  March 4, 2021

## 2021-03-04 NOTE — GROUP NOTE
Psychoeducation Group Note    PATIENT'S NAME: Xavier Landon  MRN:   [id number]  :   1990  ACCT. NUMBER: [id number]  DATE OF SERVICE: 3/04/21  START TIME: 12:00 PM  END TIME: 12:50 PM  FACILITATOR: Deborah Watson LMFT  TOPIC: TIMUR RN Group: Health Maintenance  [facility] Adult Dual Diagnosis Day Treatment  TRACK: 2    NUMBER OF PARTICIPANTS: 7  Telemedicine Visit: The patient's condition can be safely assessed and treated via synchronous audio and visual telemedicine encounter.      Reason for Telemedicine Visit: Services only offered telehealth    Originating Site (Patient Location): Patient's home    Distant Site (Provider Location): Provider Remote Setting    Consent:  The patient/guardian has verbally consented to: the potential risks and benefits of telemedicine (video visit) versus in person care; bill my insurance or make self-payment for services provided; and responsibility for payment of non-covered services.     Mode of Communication:  Video Conference via [employer]    As the provider I attest to compliance with applicable laws and regulations related to telemedicine.     Summary of Group / Topics Discussed:  Health Maintenance: Discharge planning/Community resources: Patients worked on completing an instructor-facilitated discharge planning activity. Discharge planning begins for all patients after admission. Competent discharge planning promotes a successful transition and decreases the likelihood of mental health relapse. In this group, all dimensions of wellness were reviewed to assess for needs/discharge readiness. These dimensions included: physical, emotional, occupational/productivity, environmental, social, spiritual, intellectual, and financial. Patients worked on completing/updating their discharge planning and identifying their treatment needs prior to time of discharge.     Patient Session Goals / Objectives:  ? Identified unmet treatment needs to accomplish before  discharge  ? Completed all dimensions of the discharge planning packet  ? Participated in the planning process, make phone calls, set up appointments, got connected with community resources, followed up with treatment team as needed         Patient Participation / Response:  Moderately participated, sharing some personal reflections / insights and adequately adequately received / provided feedback with other participants.    Demonstrated understanding of topics discussed through group discussion and participation, Identified / Expressed personal readiness to practice skills and Verbalized understanding of health maintenance topic    Treatment Plan:  Patient has a current master individualized treatment plan.  See Epic treatment plan for more information.    Vance Watson LMFT

## 2021-03-04 NOTE — GROUP NOTE
Psychotherapy Group Note    PATIENT'S NAME: Xavier Landon  MRN:   [id number]  :   1990  ACCT. NUMBER: [id number]  DATE OF SERVICE: 3/04/21  START TIME: 11:00 AM  END TIME: 11:50 AM  FACILITATOR: Deborah Watson LMFT  TOPIC:  EBP Group: DDP Relapse Prevention  [facility] Adult Dual Diagnosis Day Treatment  TRACK: 2    NUMBER OF PARTICIPANTS: 6    Telemedicine Visit: The patient's condition can be safely assessed and treated via synchronous audio and visual telemedicine encounter.      Reason for Telemedicine Visit: Services only offered telehealth    Originating Site (Patient Location): Patient's home    Distant Site (Provider Location): Provider Remote Setting    Consent:  The patient/guardian has verbally consented to: the potential risks and benefits of telemedicine (video visit) versus in person care; bill my insurance or make self-payment for services provided; and responsibility for payment of non-covered services.     Mode of Communication:  Video Conference via [employer]    As the provider I attest to compliance with applicable laws and regulations related to telemedicine.   Summary of Group / Topics Discussed:  DDP Relapse Prevention: Relationship Mapping: Patients identified different types of relationships they have in their life and examined if there is conflict or closeness, the degree of conflict or closeness, and the reason for conflict. The goal of this topic is to help patients gain awareness of the relationships they have with others, identify types of conflict in patients  lives and how they impact symptoms/functioning, and identify how they can improve relationships with relationship and interpersonal skills they have learned.     Patient Session Goals / Objectives:    Familiarized patients with awareness to the different types of relationships they may have with different people and substances in their lives    Discussed and practiced strategies to promote healthier  understanding of interpersonal relationships with a focus on awareness of conflict, the causes of conflict, and the ways to transform conflict    Discussed the use of substances and its impact on their relationships      Patient Participation / Response:  Moderately participated, sharing some personal reflections / insights and adequately adequately received / provided feedback with other participants.    Demonstrated understanding of topics discussed through group discussion and participation, Demonstrated understanding of utilizing relapse prevention skills to manage urges and maintain sobriety and Identified / Expressed personal readiness to utilize relapse prevention skills    Treatment Plan:  Patient has a current master individualized treatment plan.  See Epic treatment plan for more information.    Vance Watson, LARISAFT

## 2021-03-05 ENCOUNTER — HOSPITAL ENCOUNTER (OUTPATIENT)
Dept: BEHAVIORAL HEALTH | Facility: CLINIC | Age: 31
End: 2021-03-05
Attending: PSYCHIATRY & NEUROLOGY
Payer: COMMERCIAL

## 2021-03-05 PROCEDURE — 90853 GROUP PSYCHOTHERAPY: CPT | Mod: 95 | Performed by: MARRIAGE & FAMILY THERAPIST

## 2021-03-05 PROCEDURE — 90853 GROUP PSYCHOTHERAPY: CPT | Mod: GT | Performed by: PSYCHOLOGIST

## 2021-03-05 NOTE — GROUP NOTE
Psychoeducation Group Note    PATIENT'S NAME: Xavier Landon  MRN:   [id number]  :   1990  ACCT. NUMBER: [id number]  DATE OF SERVICE: 3/05/21  START TIME: 10:00 AM  END TIME: 10:50 AM  FACILITATOR: Jairo Rios LP  TOPIC: TIMUR RN Group: Health Maintenance  [facility] Adult Dual Diagnosis Day Treatment  TRACK: 2    NUMBER OF PARTICIPANTS: 6    Telemedicine Visit: The patient's condition can be safely assessed and treated via synchronous audio and visual telemedicine encounter.      Reason for Telemedicine Visit: Services only offered telehealth    Originating Site (Patient Location): Patient's home    Distant Site (Provider Location): Provider Remote Setting    Consent:  The patient/guardian has verbally consented to: the potential risks and benefits of telemedicine (video visit) versus in person care; bill my insurance or make self-payment for services provided; and responsibility for payment of non-covered services.     Mode of Communication:  Video Conference via MeetMe    As the provider I attest to compliance with applicable laws and regulations related to telemedicine.     Summary of Group / Topics Discussed:  Health Maintenance: Weekend planning: Patients were given time to complete a weekend plan of what they will do to promote wellness and sobriety over the weekend when they do not have the structure of group. Patients were encouraged to review progress on their treatment goals and were challenged to identify ways to work toward meeting them. Patients identified and discussed foreseeable barriers to success over the weekend and then developed a plan to overcome them. Patients reviewed their distress coping skills and social support network and discussed this with the group.       Patient Session Goals / Objectives:    ?    Identified activities to engage in that promote balance in wellness  ?    Distinguished possible barriers to success over the weekend and created  a plan to overcome them  ?    Listed distress coping skills and identified social support network to utilize if in crisis during the weekend          Patient Participation / Response:  Fully participated with the group by sharing personal reflections / insights and openly received / provided feedback with other participants.    Demonstrated understanding of topics discussed through group discussion and participation    Treatment Plan:  Patient has a current master individualized treatment plan.  See Epic treatment plan for more information.    Jairo Rios, LP

## 2021-03-05 NOTE — GROUP NOTE
Process Group Note    PATIENT'S NAME: Xavier Landon  MRN:   [id number]  :   1990  ACCT. NUMBER: [id number]  DATE OF SERVICE: 3/05/21  START TIME: 11:00 AM  END TIME: 11:50 AM  FACILITATOR: Deborah Watson LMFT  TOPIC:  Process Group    Diagnoses:  296.33 (F33.2) Major Depressive Disorder, Recurrent Episode, Severe _ and With anxious distress  300.02 (F41.1) Generalized Anxiety Disorder.  Attention-Deficit/Hyperactivity Disorder  314.01 (F90.9) Unspecified Attention -Deficit / Hyperactivity Disorder.  Substance-Related & Addictive Disorders Alcohol Use Disorder   303.90 (F10.20) Moderate   301.83 (F60.3) Borderline Personality Disorder      [facility] Adult Dual Diagnosis Day Treatment  TRACK: 2    NUMBER OF PARTICIPANTS: 7    Telemedicine Visit: The patient's condition can be safely assessed and treated via synchronous audio and visual telemedicine encounter.      Reason for Telemedicine Visit: Services only offered telehealth    Originating Site (Patient Location): Patient's home    Distant Site (Provider Location): Provider Remote Setting    Consent:  The patient/guardian has verbally consented to: the potential risks and benefits of telemedicine (video visit) versus in person care; bill my insurance or make self-payment for services provided; and responsibility for payment of non-covered services.     Mode of Communication:  Video Conference via [employer]    As the provider I attest to compliance with applicable laws and regulations related to telemedicine.           Data:    Session content: At the start of this group, patients were invited to check in by identifying themselves, describing their current emotional status, and identifying issues to address in this group.   Area(s) of treatment focus addressed in this session included Symptom Management, Personal Safety and Abstinence/Relapse Prevention.    Huey reports feeling anxious today-wondering what is coming next-feeling  wistful-vaguly sad, his goal is to do some dishes, clear his head, make a list of things to do this week, is using action opposite to emotion, making a list, stay in the moment-take a long walk today, denies safety concerns, denies chemical use, is taking rx as prescribed, is grateful to the program-grateful to program therapist-said goodbye to the group and accepted feedback and support    Therapeutic Interventions/Treatment Strategies:  Psychotherapist offered support, feedback and validation and reinforced use of skills. Treatment modalities used include Dialectical Behavioral Therapy. Interventions include Relapse Prevention: Assisted patient in identifying the challenges and barriers to participation and attendance to support groups/community resources.    Assessment:    Patient response:   Patient responded to session by accepting feedback, giving feedback, listening, being attentive and appearing alert    Possible barriers to participation / learning include: and no barriers identified    Health Issues:   None reported       Substance Use Review:   Substance Use: Last use: 2/14    Mental Status/Behavioral Observations  Appearance:   Appropriate   Eye Contact:   Good   Psychomotor Behavior: Normal   Attitude:   Cooperative   Orientation:   All  Speech   Rate / Production: Normal    Volume:  Normal   Mood:    Anxious   Affect:    Appropriate   Thought Content:   Clear and Safety denies any current safety concerns including suicidal ideation, self-harm, and homicidal ideation  Thought Form:  Coherent  Logical     Insight:    Good     Plan:     Safety Plan: No current safety concerns identified.  Recommended that patient call 911 or go to the local ED should there be a change in any of these risk factors.     Barriers to treatment: None identified    Patient Contracts (see media tab):  None    Substance Use: Provided encouragement towards sobriety    Provided support and affirmation for steps taken towards  sobriety      Continue or Discharge: Patient is being discharged today. See Treatment Plan and Discharge Summary.       Vance Watson, [facility]  March 5, 2021

## 2021-03-05 NOTE — GROUP NOTE
Psychotherapy Group Note    PATIENT'S NAME: Xavier Landon  MRN:   [id number]  :   1990  ACCT. NUMBER: [id number]  DATE OF SERVICE: 3/05/21  START TIME: 12:00 PM  END TIME: 12:50 PM  FACILITATOR: Deborah Watson LMFT  TOPIC:  EBP Group: Specialty Awareness  [facility] Adult Dual Diagnosis Day Treatment  TRACK: 2    NUMBER OF PARTICIPANTS: 7    Telemedicine Visit: The patient's condition can be safely assessed and treated via synchronous audio and visual telemedicine encounter.      Reason for Telemedicine Visit: Services only offered telehealth    Originating Site (Patient Location): Patient's home    Distant Site (Provider Location): Provider Remote Setting    Consent:  The patient/guardian has verbally consented to: the potential risks and benefits of telemedicine (video visit) versus in person care; bill my insurance or make self-payment for services provided; and responsibility for payment of non-covered services.     Mode of Communication:  Video Conference via [employer]    As the provider I attest to compliance with applicable laws and regulations related to telemedicine.   Summary of Group / Topics Discussed:  Specialty Topics: Life Transitions: The topic of life transitions was presented in order to help patients to better understand the challenges presented by life transitions, and how to best navigate them. Exploring the phases of transition and how one works through them was discussed. Patients were provided with information regarding community resources.     Patient Session Goals / Objectives:    Discussed the timing and nature of major life transitions    Explored how life transitions may impact mental health and functioning    Discussed coping strategies to manage symptoms and help with transitioning    Discussed and planned a successful transition        Patient Participation / Response:  Moderately participated, sharing some personal reflections / insights and  adequately adequately received / provided feedback with other participants.    Demonstrated understanding of topics discussed through group discussion and participation, Identified / Expressed readiness to act on skill suggestions discussed in topic and Verbalized understanding of ways to proactively manage illness    Treatment Plan:  Patient has See Epic Treatment Plan - Patient is discharging.    Vance Watson LMFT

## 2021-03-05 NOTE — DISCHARGE SUMMARY
Adult Dual Disorder Program Discharge Summary / Instructions     Patient: Xavier Landon MRN: [id number]  : 1990 Age:  30 year old Sex:  male    Admission Date: 21  Discharge Date: 3/5/21    Reason for Discharge: Completed treatment               Prognosis: Fair      Client Progress Toward AchievingTreatment Plan Goals / Dimensions Risk Scale       Xavier attended programming regularly      Diagnosis:   296.33 (F33.2) Major Depressive Disorder, Recurrent Episode, Severe _ and With anxious distress  300.02 (F41.1) Generalized Anxiety Disorder.  Attention-Deficit/Hyperactivity Disorder  314.01 (F90.9) Unspecified Attention -Deficit / Hyperactivity Disorder.  Substance-Related & Addictive Disorders Alcohol Use Disorder   303.90 (F10.20) Moderate   301.83 (F60.3) Borderline Personality Disorder      Individualized Treatment Plan Goals/Progress:    Area of Treatment Focus:   Personal Safety  Start Date:    2021    Dimension:   III. Emotional / Behavioral Condition    Description:   Huey has struggled with passive suicidal ideation daily, he reports an ability to follow and use his coping for safety plan     Goal:  Target Date: 21, 3/5/21 Status: Completed  Client will notify staff when needing assistance to develop or implement a coping plan to manage suicidal or self injurious urges.  Client will use coping plan for safety, as needed.      Progress: 2021 Huey usually reports passive suicidal ideation daily, he reports an ability to follow his coping plan for safety, continue goal. 3/5/2021 Huey has remained safe, he reports lessened intensity of passive suicidal ideation, he is discharging today.       Treatment Strategies:   Assess / reassess level of potential for harm to self or others  Engage in safety planning when indicated        Area of Treatment Focus:   Symptom Stabilization and Management  Start Date:    2021    Dimension:   III. Emotional / Behavioral Condition     Description:   Huey reports struggling with depressive and anxiety symptoms, he reports the symptoms impair his ability to function    Goal:  Target Date: 2/17/21, 3/5/21 Status: Completed  Huey will learn and practice 1-2 coping skills per week to manage symptoms of depression and anxiety  Huey will take processing time at least 1x per week  Huey will continue to see his psychiatrist-  Huey will obtain an individual therapist and work on setting up DBT as aftercare  Huey will continue to take rx as prescribed      Progress: 2/17/21 Huey continues to struggle with depressive symptoms, he continues to see Patricia Feliciano at [facility] for medication management-he is looking to get on an anti-depressent.  He is currently looking for an individual therapist as his case with Sommer Ghosh has been closed.  Huey struggles to regulate his emotions, often becoming overwhelmed by them.  Huey has recently started seeing Dr Phillip at the [facility] to address his needs related to his sexual health concerns.  Huey is open to finding DBT treatment as after care, continue goals. 3/5/21 Huey is discharging today, he reports utlizing breathing to ground himself and regulate his emotions, he reports still struggling with cognitive distortions.  He is going to attend [facility] for DBT services starting 3/8/21.  He will continue to see Dr Phillip.  He reports his new rx prozac seems to be working.       Treatment Strategies:   Teach adaptive coping skills and communication skills  Use reality based supportive approach        Area of Treatment Focus:   Abstinence / Relapse Prevention  Start Date:    1/13/21    Dimension:   IV. Treatment Acceptance / Resistance    Description:   Huey has struggled with alcohol use, he reports one period of sobriety lasting about 60 days, he reports alcohol has been a tool to cope for many years    Goal:  Target Date: 2/17/21, 3/5/21 Status: Completed  Huey will have zero  instances of substance use while in the program  Huey will learn and practice 1-2 skills per week to manage urges, cravings, and thoughts about use  Huey will consider MAT as an additional way to manage sobriety-evaluating his needs in relation to his recovery efforts  Huey will consider [facility] as a support group      Progress: 2/17/21 Huey has had 2 slips while in the program, he has engaged in discussions to figure out his triggers and alternative skills he can use to manage overwhelming thoughts and emotions.  It has been recommended by Dr Phillip to consider naltrexone as a MAT, continue goals. 3/5/21 Huey has remained sober since 2/14, he reports a plan to continue his sobriety in the DBT Substance use program.  He is discharging today.       Treatment Strategies:   Teach adaptive coping skills and communication skills  Use reality based supportive approach      Area of Treatment Focus:   Community Resources / Support and Discharge Planning  Start Date:    1/13/21    Dimension:   VI. Recovery Environment    Description:   Huey reports concern with returning to work, he is working on FMLA and possibly short term disabilty while he attends the program-go back to work-    Goal:  Target Date: 2/17/21, 3/5/21 Status: Completed  Huey will follow up with the referral to the [facility]  Huey will be returning to work-he wants to find a different job  Huey will work on finding purpose-working on increasing motivation  Huey is working on following through on DBT referrals to continue to meet his mental health wellness goals  Huey is working on structuring financial payments  Huey is working toward his end goal of moving from the state and meeting his needs so that he move    Progress: 2/17/21 Huey has started working with Dr Phillip at the [facility].  He was approved for short term disability. Continue goals. 3/5/21 Huey is discharging today, he will start DBT on 3/8/21 and will continue to see   Kenji.         Treatment Strategies:   Assist clients in establishing / strengthening support network  Assist with discharge planning        Admit Discharge   PHQ-9 24 Sent via [employer]   SHAUNA-7 13 Sent via [employer]   CGI 4 3         Additional Comments: NA    Completed By: VIKTOR Guzmán [facility]

## 2021-03-05 NOTE — DISCHARGE SUMMARY
Adult Dual Disorder Program   Discharge Summary/Instructions     Patient: Xavier Landon MRN: [id number]  : 1990 Age:  30 year old Sex:  male    Admission Date: 21  Discharge Date: 3/5/21  Diagnosis: 296.33 (F33.2) Major Depressive Disorder, Recurrent Episode, Severe _ and With anxious distress  300.02 (F41.1) Generalized Anxiety Disorder.  Attention-Deficit/Hyperactivity Disorder  314.01 (F90.9) Unspecified Attention -Deficit / Hyperactivity Disorder.  Substance-Related & Addictive Disorders Alcohol Use Disorder   303.90 (F10.20) Moderate   301.83 (F60.3) Borderline Personality Disorder      Focus of Treatment / Discharge Recommendations:    Personal Safety/ Management of Symptoms:    * Follow your safety plan.  Report increased symptoms to your care team and/or use the crisis resources listed below.    Crisis Resources:    Suicide Prevention Lifeline: 2-611-802-TALK (1474)    Crisis Text Line Service (available 24 hours a day, 7 days a week): Text [place] to 278879    Call  **CRISIS (103864) from a cell phone to talk to a team of professionals who can help you.  Crisis Services By [facility]: Phone Number:   [place]     [phone]   [place]    [phone]   [place]    [phone]   [place]    [phone]   [place]    [phone]   [place] [phone]   [place]     [phone]       Call 911 or go to my nearest emergency department.     Managing Symptoms / Abstinence / Preventing Relapse:    Take all medicines as directed.  Carry a current list of medicines with you.    Use coping skills: deep breathing-mindfulness, challenging cognitive distortions, patience, persistence, willingness    Do not use illicit (street) drugs, controlled substances (narcotics) or alcohol.    Go to all appointments.    Report symptoms to your care team including: thoughts of suicide, loss of sleep, increased confusion, mood getting worse, feeling more aggressive.    Develop/Improve Independent Living/Socialization  Skills:    Community Resources/Supports and Discharge Planning:    Follow up with psychiatrist / main caregiver: Patricia Gonzalez at [facility]    Follow up with your therapist: Dr Aysha Phillip March 22 at 9am    Go to group therapy and / or support groups at: [facility]     See your medical doctor about:  Any other concerns or referrals    Other:  NA    Copy of summary sent to: sent to pt via [employer]      Client Signature:______unable to sign due to covid 19  Staff Signature:____VIKTOR Anderson on 3/5/2021 at 1:04 PM  ____

## 2021-03-22 ENCOUNTER — VIRTUAL VISIT (OUTPATIENT)
Dept: PSYCHOLOGY | Facility: CLINIC | Age: 31
End: 2021-03-22
Payer: COMMERCIAL

## 2021-03-22 ENCOUNTER — TELEPHONE (OUTPATIENT)
Dept: BEHAVIORAL HEALTH | Facility: CLINIC | Age: 31
End: 2021-03-22

## 2021-03-22 DIAGNOSIS — F33.2 MDD (MAJOR DEPRESSIVE DISORDER), RECURRENT SEVERE, WITHOUT PSYCHOSIS (H): ICD-10-CM

## 2021-03-22 DIAGNOSIS — F19.90 SUBSTANCE USE DISORDER: ICD-10-CM

## 2021-03-22 DIAGNOSIS — F90.2 ATTENTION DEFICIT HYPERACTIVITY DISORDER (ADHD), COMBINED TYPE: ICD-10-CM

## 2021-03-22 DIAGNOSIS — F91.8 CONDUCT DISORDER, UNDIFFERENTIATED TYPE: Primary | ICD-10-CM

## 2021-03-22 PROCEDURE — 99207 PR NO CHARGE LOS: CPT | Performed by: PSYCHOLOGIST

## 2021-03-22 PROCEDURE — 90837 PSYTX W PT 60 MINUTES: CPT | Mod: GT | Performed by: PSYCHOLOGIST

## 2021-03-22 NOTE — PROGRESS NOTES
[facility]  Case Progress Note    3/22/21    Client Name: Xavier Landon  YOB: 1990  MRN:  [id number]  Treating Provider: Aysha Phillip, PhD LP  Type of Session: Individual  Present in Session: Patient    Number of Minutes:  55    Video start time: 9:01  Video end time: 9:56    Telemedicine Visit: The patient's condition can be safely assessed and treated via synchronous audio and visual telemedicine encounter.      Reason for Telemedicine Visit: Covid    Originating Site (Patient Location): Patient's home    Distant Site (Provider Location): [facility]: [facility]    Consent:  The patient/guardian has verbally consented to: the potential risks and benefits of telemedicine (video visit) versus in person care; bill my insurance or make self-payment for services provided; and responsibility for payment of non-covered services.     Mode of Communication:  Video Conference via Zoom  As the provider I attest to compliance with applicable laws and regulations related to telemedicine.    Health Maintenance Summary - Mental Health Treatment Plan       Status Date      MENTAL HEALTH TX PLAN Overdue 1990               Current Symptoms/Status:  Patient's problems with out of control, driven, impulsive, compulsive sexual behavior began very early in his life.   He feels that his sex life has been stolen from him.  He is extremely distressed about this and does not see the reason to continue live.  No current suicidal plan.  Depressed.  Attention and concentration issues.  Sexual Orientation and gender identity concerns. Sexual functioning issues.    Progress Toward Treatment Goals:   Has just begun assessment and treatment planning at [facility].  Has graduated to DBT program.  Has been sober since last relapse.  Mood is much more regulated in terms of emotions.  Much improved.     Intervention: Modality and Description:  Interpersonal, CBT, relapse prevention.       Response to  Intervention:  Mood greatly improved since starting Prozac.  Started abilify.  Sober.  Emphasized need to get involved in recovery programs.  Pt. Said he would follow through.  Started Prozac 20 mg per psychiatrist.  5 mg. Of Abilify.  Adderal regularly.    When he is on it regularly he is able to manage his CSB better.    Pt is being relatively compliant.     Reinforced the need to stay the course.     Explained need to focus on his sobriety and follow through with DBT in order to effectively deal with CSB.     Assignment:  Follow up with psychiatrist re med regimen.  Follow tx recommendations of dual diagnosis program;  Continue  DBT.  Sobriety!  Get into smart recovery or AA      Interactive Complexity:  There are four specific communication difficulties that complicate the work of the primary psychiatric procedure.  Interactive complexity (+[id number]) may be reported when at least one of these difficulties is present.    Communication difficulties present during current the psychiatric procedure include:  1. N/A    Diagnosis:  312.89 (F91.8) Other Specified Disruptive, Impulse Control, and Conduct Disorder (Hypersexual Disorder)    Major Depressive Disorder, recurrent, with high anxiety  ADHD  Borderline Personality Disorder      Plan / Need for Future Services:  Return for therapy in 3 weeks.      TREATMENT PLAN  Date of Treatment Plan 21  Name: Xavier Landon  : 1990  Medical Record Number: [id number]  Treating Provider: Aysha Phillip  Type of Session: Individual  Present in Session: Patient    Current Status:  Depression/Mood:  Sad  Decreased Self-Esteem  Hopelessness/Helplessness  Irritable  Decreased Concentration  Suicidal Ideation  Dysphoria    Anxiety/Panic:  Worry  Intrusive Thoughts    Thought:   Reports no problems or symptoms at this time    Sensorium:  Reports no problems or symptoms at this time    Behavior/Health:  Compulsive  Hyperactive    Chemical Use:  Alcohol Abuse    Sexual  Problems:  Compulsive sexual behavior  Erectile Problems      Suicide Risk Assessment:  Assessed Level of Immediate Risk:  YES  Ideation:  YES  Plan:  NO  Means:  NO  Intent:  NO    Homicide Risk Assessment:  Assessed Level of Immediate Risk:  YES  Ideation:  NO  Plan:  NO  Means:  NO  Intent:  NO    Impact of Symptoms on Function:  Decreased Social/Family Function  Decreased Work Function    DSM-5 Diagnoses:       Screening Questionnaires:  Please indicate whether the following screening questionnaires have been completed:  CAGE: Yes  PHQ-9: Yes    SHAUNA-7: Yes  Safety Screening: Yes  WHODAS: No    Problem(s):  1. Compulsive Sexual Behavior.  2. Substance use disorder - alcoholism  3. Depression  4. Personality disorder  5. ADHD    Short-Long Term Goals  Decrease Symptoms  Increase Coping  Change Behavior  Change Cognitions  Increase Psychosocial/Support Functioning  Increase Decision Making  Monitor Psych Status    Interventions  [place] [facility]  Cognitive-Behavioral Therapy  Medication Management Referral    Expected Outcomes and Prognosis  Unknown    Anticipated Treatment Duration:  Unknown    Frequency of Sessions  2 x / Month    Progress Update (for Plan Update Only)  NA:  1st Treatment Planning    Current Psychoactive Medications:  See Psychiatric Treatment Plan  Discharge & Aftercare Goals: To Be Determined.  Care Coordination: Yes    Consent to Treatment:   Patient participated in this treatment planning process and indicated verbal agreement with the above treatment plan.  If patient doesn't sign, indicate why: Telehealth visit due to COVID19 pandemic    Patient Signature: Xavier Landon  Date: 2-16-21     Provider Signature: Aysha Phillip, PhD, LP  Date: 2-16-21

## 2021-04-26 ENCOUNTER — VIRTUAL VISIT (OUTPATIENT)
Dept: PSYCHOLOGY | Facility: CLINIC | Age: 31
End: 2021-04-26
Payer: COMMERCIAL

## 2021-04-26 DIAGNOSIS — F91.8 CONDUCT DISORDER, UNDIFFERENTIATED TYPE: Primary | ICD-10-CM

## 2021-04-26 DIAGNOSIS — F41.1 GENERALIZED ANXIETY DISORDER: ICD-10-CM

## 2021-04-26 DIAGNOSIS — F33.2 MDD (MAJOR DEPRESSIVE DISORDER), RECURRENT SEVERE, WITHOUT PSYCHOSIS (H): ICD-10-CM

## 2021-04-26 DIAGNOSIS — F19.90 SUBSTANCE USE DISORDER: ICD-10-CM

## 2021-04-26 PROCEDURE — 99207 PR NO CHARGE LOS: CPT | Performed by: PSYCHOLOGIST

## 2021-04-26 PROCEDURE — 90837 PSYTX W PT 60 MINUTES: CPT | Mod: GT | Performed by: PSYCHOLOGIST

## 2021-04-26 ASSESSMENT — PATIENT HEALTH QUESTIONNAIRE - PHQ9: SUM OF ALL RESPONSES TO PHQ QUESTIONS 1-9: 6

## 2021-04-26 NOTE — PROGRESS NOTES
[facility]  Case Progress Note    4/26/21    Client Name: Xavier Landon  YOB: 1990  MRN:  [id number]  Treating Provider: Aysha Phillip, PhD LP  Type of Session: Individual  Present in Session: Patient    Number of Minutes:  55    Video start time: 9:01  Video end time: 9:56    Telemedicine Visit: The patient's condition can be safely assessed and treated via synchronous audio and visual telemedicine encounter.      Reason for Telemedicine Visit: Covid    Originating Site (Patient Location): Patient's home    Distant Site (Provider Location): [facility] Clinics: [facility]    Consent:  The patient/guardian has verbally consented to: the potential risks and benefits of telemedicine (video visit) versus in person care; bill my insurance or make self-payment for services provided; and responsibility for payment of non-covered services.     Mode of Communication:  Video Conference via Zoom  As the provider I attest to compliance with applicable laws and regulations related to telemedicine.    Health Maintenance Summary - Mental Health Treatment Plan       Status Date      MENTAL HEALTH TX PLAN Next Due 1/16/2022      Done 2/16/2021               Current Symptoms/Status:  Patient's problems with out of control, driven, impulsive, compulsive sexual behavior began very early in his life.   He feels that his sex life has been stolen from him.  He is extremely distressed about this and does not see the reason to continue live.  No current suicidal plan.  Depressed.  Attention and concentration issues.  Sexual Orientation and gender identity concerns. Sexual functioning issues.    Progress Toward Treatment Goals:   Has just begun assessment and treatment planning at [facility].  Has graduated to DBT program.  Has been sober since last relapse.  Mood is much more regulated in terms of emotions.  Much improved.     Intervention: Modality and Description:  Interpersonal, CBT, relapse prevention.        Response to Intervention:  Mood greatly improved since starting Prozac and adding abilify.  Sober.  Emphasized need to get involved in recovery programs.  Pt. Said he would follow through.  Prozac 20 mg    5 mg. Of Abilify.  Adderal regularly.      Staying within boundaries for the last month.  No drinking.    Pt is being very compliant.     Reinforced the need to stay the course.     Focus on his sobriety and follow through with DBT in order to effectively deal with CSB.      Assignment:  Follow up with psychiatrist re med regimen.  Continue  DBT.  Sobriety!  Get into smart recovery or AA (Still resistant)       Interactive Complexity:  There are four specific communication difficulties that complicate the work of the primary psychiatric procedure.  Interactive complexity (+17274) may be reported when at least one of these difficulties is present.    Communication difficulties present during current the psychiatric procedure include:  1. N/A    Diagnosis:  312.89 (F91.8) Other Specified Disruptive, Impulse Control, and Conduct Disorder (Hypersexual Disorder)    Major Depressive Disorder, recurrent, with high anxiety  ADHD  Borderline Personality Disorder      Plan / Need for Future Services:  Return for therapy in 3 weeks.      TREATMENT PLAN  Date of Treatment Plan 21  Name: Xavier Landon  : 1990  Medical Record Number: [id number]  Treating Provider: Aysha Phillip  Type of Session: Individual  Present in Session: Patient    Current Status:  Depression/Mood:  Sad  Decreased Self-Esteem  Hopelessness/Helplessness  Irritable  Decreased Concentration  Suicidal Ideation  Dysphoria    Anxiety/Panic:  Worry  Intrusive Thoughts    Thought:   Reports no problems or symptoms at this time    Sensorium:  Reports no problems or symptoms at this time    Behavior/Health:  Compulsive  Hyperactive    Chemical Use:  Alcohol Abuse    Sexual Problems:  Compulsive sexual behavior  Erectile Problems      Suicide  Risk Assessment:  Assessed Level of Immediate Risk:  YES  Ideation:  YES  Plan:  NO  Means:  NO  Intent:  NO    Homicide Risk Assessment:  Assessed Level of Immediate Risk:  YES  Ideation:  NO  Plan:  NO  Means:  NO  Intent:  NO    Impact of Symptoms on Function:  Decreased Social/Family Function  Decreased Work Function    DSM-5 Diagnoses:       Screening Questionnaires:  Please indicate whether the following screening questionnaires have been completed:  CAGE: Yes  PHQ-9: Yes    SHAUNA-7: Yes  Safety Screening: Yes  WHODAS: No    Problem(s):  1. Compulsive Sexual Behavior.  2. Substance use disorder - alcoholism  3. Depression  4. Personality disorder  5. ADHD    Short-Long Term Goals  Decrease Symptoms  Increase Coping  Change Behavior  Change Cognitions  Increase Psychosocial/Support Functioning  Increase Decision Making  Monitor Psych Status    Interventions  [facility]  Cognitive-Behavioral Therapy  Medication Management Referral    Expected Outcomes and Prognosis  Unknown    Anticipated Treatment Duration:  Unknown    Frequency of Sessions  2 x / Month    Progress Update (for Plan Update Only)  NA:  1st Treatment Planning    Current Psychoactive Medications:  See Psychiatric Treatment Plan  Discharge & Aftercare Goals: To Be Determined.  Care Coordination: Yes    Consent to Treatment:   Patient participated in this treatment planning process and indicated verbal agreement with the above treatment plan.  If patient doesn't sign, indicate why: Telehealth visit due to COVID19 pandemic    Patient Signature: Xavier Landon  Date: 2-16-21     Provider Signature: Aysha Phillip, PhD, LP  Date: 2-16-21

## 2021-05-17 ENCOUNTER — VIRTUAL VISIT (OUTPATIENT)
Dept: PSYCHOLOGY | Facility: CLINIC | Age: 31
End: 2021-05-17
Payer: COMMERCIAL

## 2021-05-17 DIAGNOSIS — F41.1 GENERALIZED ANXIETY DISORDER: ICD-10-CM

## 2021-05-17 DIAGNOSIS — F90.2 ATTENTION DEFICIT HYPERACTIVITY DISORDER (ADHD), COMBINED TYPE: ICD-10-CM

## 2021-05-17 DIAGNOSIS — F33.2 MDD (MAJOR DEPRESSIVE DISORDER), RECURRENT SEVERE, WITHOUT PSYCHOSIS (H): ICD-10-CM

## 2021-05-17 DIAGNOSIS — F19.90 SUBSTANCE USE DISORDER: ICD-10-CM

## 2021-05-17 DIAGNOSIS — F91.8 CONDUCT DISORDER, UNDIFFERENTIATED TYPE: Primary | ICD-10-CM

## 2021-05-17 PROCEDURE — 99207 PR NO CHARGE LOS: CPT | Performed by: PSYCHOLOGIST

## 2021-05-17 PROCEDURE — 90785 PSYTX COMPLEX INTERACTIVE: CPT | Mod: GT | Performed by: PSYCHOLOGIST

## 2021-05-17 PROCEDURE — 90837 PSYTX W PT 60 MINUTES: CPT | Mod: GT | Performed by: PSYCHOLOGIST

## 2021-05-17 NOTE — PROGRESS NOTES
[facility]  Case Progress Note    5/17/21    Client Name: Xavier Landon  YOB: 1990  MRN:  [id number]  Treating Provider: Aysha Phillip, PhD LP  Type of Session: Individual  Present in Session: Patient    Number of Minutes:  55    Video start time: 3:03  Video end time: 3:59    Telemedicine Visit: The patient's condition can be safely assessed and treated via synchronous audio and visual telemedicine encounter.      Reason for Telemedicine Visit: Covid    Originating Site (Patient Location): Patient's home    Distant Site (Provider Location): [facility] Clinics: [facility]    Consent:  The patient/guardian has verbally consented to: the potential risks and benefits of telemedicine (video visit) versus in person care; bill my insurance or make self-payment for services provided; and responsibility for payment of non-covered services.     Mode of Communication:  Video Conference via Zoom  As the provider I attest to compliance with applicable laws and regulations related to telemedicine.    Health Maintenance Summary - Mental Health Treatment Plan       Status Date      MENTAL HEALTH TX PLAN Next Due 1/16/2022      Done 2/16/2021               Current Symptoms/Status:  Patient's problems with out of control, driven, impulsive, compulsive sexual behavior began very early in his life.   He feels that his sex life has been stolen from him.  He is extremely distressed about this and does not see the reason to continue live.  No current suicidal plan.  Depressed.  Attention and concentration issues.  Sexual Orientation and gender identity concerns. Sexual functioning issues.    Progress Toward Treatment Goals:   Has just begun assessment and treatment planning at [facility].  Has graduated to DBT program.  Has been sober since last relapse.  Mood is much more regulated in terms of emotions.  Much improved.     Intervention: Modality and Description:  Interpersonal, CBT, relapse prevention.        Response to Intervention:  Seems more regressed.  Mood worse.  Had some anxiety and as a result more use of porn.  Feels like he is back at square one.  Acknowledged that he hasn/t.    Sober.  Emphasized need to get involved in recovery programs.  Pt. Said he would follow through.  Prozac 20 mg    5 mg. Of Abilify.  Adderal regularly.      Going back to work.  Looking for a new job that might be more fulfilling.    Staying within boundaries for the last month.  No drinking.    Pt is being very compliant.     Reinforced the need to stay the course.     Focus on his sobriety and follow through with DBT in order to effectively deal with CSB.    Was very resistant today - but in the end seemed to have a way forward.      Assignment:  Follow up with psychiatrist re med regimen (consider change in med)  Continue  DBT.  Sobriety!  Get into smart recovery or AA (Still resistant)       Interactive Complexity:  There are four specific communication difficulties that complicate the work of the primary psychiatric procedure.  Interactive complexity (+68065) may be reported when at least one of these difficulties is present.    Communication difficulties present during current the psychiatric procedure include:  Maladaptive communication, high anxiety, depressive feelings made procedure difficult.    Diagnosis:  312.89 (F91.8) Other Specified Disruptive, Impulse Control, and Conduct Disorder (Hypersexual Disorder)    Major Depressive Disorder, recurrent, with high anxiety  ADHD  Borderline Personality Disorder      Plan / Need for Future Services:  Return for therapy in 3 weeks.      TREATMENT PLAN  Date of Treatment Plan 21  Name: Xavier Landon  : 1990  Medical Record Number: [id number]  Treating Provider: Aysha Phillip  Type of Session: Individual  Present in Session: Patient    Current Status:  Depression/Mood:  Sad  Decreased Self-Esteem  Hopelessness/Helplessness  Irritable  Decreased  Concentration  Suicidal Ideation  Dysphoria    Anxiety/Panic:  Worry  Intrusive Thoughts    Thought:   Reports no problems or symptoms at this time    Sensorium:  Reports no problems or symptoms at this time    Behavior/Health:  Compulsive  Hyperactive    Chemical Use:  Alcohol Abuse    Sexual Problems:  Compulsive sexual behavior  Erectile Problems      Suicide Risk Assessment:  Assessed Level of Immediate Risk:  YES  Ideation:  YES  Plan:  NO  Means:  NO  Intent:  NO    Homicide Risk Assessment:  Assessed Level of Immediate Risk:  YES  Ideation:  NO  Plan:  NO  Means:  NO  Intent:  NO    Impact of Symptoms on Function:  Decreased Social/Family Function  Decreased Work Function    DSM-5 Diagnoses:       Screening Questionnaires:  Please indicate whether the following screening questionnaires have been completed:  CAGE: Yes  PHQ-9: Yes    SHAUNA-7: Yes  Safety Screening: Yes  WHODAS: No    Problem(s):  1. Compulsive Sexual Behavior.  2. Substance use disorder - alcoholism  3. Depression  4. Personality disorder  5. ADHD    Short-Long Term Goals  Decrease Symptoms  Increase Coping  Change Behavior  Change Cognitions  Increase Psychosocial/Support Functioning  Increase Decision Making  Monitor Psych Status    Interventions  [facility]  Cognitive-Behavioral Therapy  Medication Management Referral    Expected Outcomes and Prognosis  Unknown    Anticipated Treatment Duration:  Unknown    Frequency of Sessions  2 x / Month    Progress Update (for Plan Update Only)  NA:  1st Treatment Planning    Current Psychoactive Medications:  See Psychiatric Treatment Plan  Discharge & Aftercare Goals: To Be Determined.  Care Coordination: Yes    Consent to Treatment:   Patient participated in this treatment planning process and indicated verbal agreement with the above treatment plan.  If patient doesn't sign, indicate why: Telehealth visit due to COVID19 pandemic    Patient Signature: Xavier Landon  Date: 2-16-21      Provider Signature: Aysha Phillip, PhD,   Date: 2-16-21

## 2021-08-10 ENCOUNTER — MYC MEDICAL ADVICE (OUTPATIENT)
Dept: FAMILY MEDICINE | Facility: CLINIC | Age: 31
End: 2021-08-10

## 2021-08-10 ENCOUNTER — TELEPHONE (OUTPATIENT)
Dept: FAMILY MEDICINE | Facility: CLINIC | Age: 31
End: 2021-08-10

## 2021-08-11 NOTE — TELEPHONE ENCOUNTER
"Scheduling notified me that patient had tried to make virtual visit to \" establish care/ refill medications\" As I am not taking on any new patients I tried to call him times 2 to help redirect him   I kept being disconnected from his epic telephone number so I called his emergency contact and asked them for his correct telephone number   I then called again and was able to reach him  and he agreed to schedule with a provide who is seeing new patients     "

## 2021-08-11 NOTE — PROGRESS NOTES
Huey is a 31 year old who is being evaluated via a billable video visit.      How would you like to obtain your AVS? MyChart  If the video visit is dropped, the invitation should be resent by: Text to cell phone: [phone]  Will anyone else be joining your video visit? No    Video Start Time: 5:35 PM    Assessment & Plan     Borderline personality disorder (H)  Complex mental health Hx, Chemical dependancy,  I think best managed by multispecialty team    - FLUoxetine (PROZAC) 20 MG capsule; Take 1 capsule (20 mg) by mouth daily  - ARIPiprazole (ABILIFY) 5 MG tablet; Take 1 tablet (5 mg) by mouth daily  - MENTAL HEALTH REFERRAL  - Adult; Psychiatry; Psychiatry; Collaborative Care Psychiatry Service/Bridge to Long-Term Psychiatry as indicated ([phone]); Yes; Diagnostic clarification; Yes; We will contact you to schedule the appointment or ple...; Future    Major depressive disorder, recurrent episode, severe with anxious distress (H)    - FLUoxetine (PROZAC) 20 MG capsule; Take 1 capsule (20 mg) by mouth daily  - ARIPiprazole (ABILIFY) 5 MG tablet; Take 1 tablet (5 mg) by mouth daily  - MENTAL HEALTH REFERRAL  - Adult; Psychiatry; Psychiatry; Collaborative Care Psychiatry Service/Bridge to Long-Term Psychiatry as indicated ([phone]); Yes; Diagnostic clarification; Yes; We will contact you to schedule the appointment or ple...; Future    Attention deficit hyperactivity disorder (ADHD), combined type    Some signs and Hx of compulsive use of substances, he is at risk for abuse.  That said has been on this since age 18 by his report    We will bridge on recent dose for now,     - amphetamine-dextroamphetamine (ADDERALL XR) 20 MG 24 hr capsule; Take 1 capsule (20 mg) by mouth 2 times daily  - MENTAL HEALTH REFERRAL  - Adult; Psychiatry; Psychiatry; Collaborative Care Psychiatry Service/Bridge to Long-Term Psychiatry as indicated ([phone]); Yes; Diagnostic clarification; Yes; We will contact you to  "schedule the appointment or ple...; Future    Generalized anxiety disorder    - FLUoxetine (PROZAC) 20 MG capsule; Take 1 capsule (20 mg) by mouth daily  - ARIPiprazole (ABILIFY) 5 MG tablet; Take 1 tablet (5 mg) by mouth daily  - MENTAL HEALTH REFERRAL  - Adult; Psychiatry; Psychiatry; Collaborative Care Psychiatry Service/Bridge to Long-Term Psychiatry as indicated ([phone]); Yes; Diagnostic clarification; Yes; We will contact you to schedule the appointment or ple...; Future    Other Specified Disruptive, Impulse Control, and Conduct Disorder (Hypersexual Disorder)      Following at [facility], continue care    - FLUoxetine (PROZAC) 20 MG capsule; Take 1 capsule (20 mg) by mouth daily  - ARIPiprazole (ABILIFY) 5 MG tablet; Take 1 tablet (5 mg) by mouth daily  - amphetamine-dextroamphetamine (ADDERALL XR) 20 MG 24 hr capsule; Take 1 capsule (20 mg) by mouth 2 times daily  - MENTAL HEALTH REFERRAL  - Adult; Psychiatry; Psychiatry; [facility] Psychiatry Service/Bridge to Long-Term Psychiatry as indicated ([phone]); Yes; Diagnostic clarification; Yes; We will contact you to schedule the appointment or ple...; Future    Substance use disorder  Complicates use of controlled substances, he understands this and says he \"has this under control\"   Some lack of insight there I think      No follow-ups on file.    Brad Garcia MD  [facility] is a 31 year old who presents for the following health issues     HPI     New Patient/Transfer of Care    Medication Followup of adderall XR 40 mg, fluoxetine 20 mg, aripiprazole 5 mg    Taking Medication as prescribed: yes    Side Effects:  None    Medication Helping Symptoms:  Yes    Pt requesting referral for psychiatry - switched insurance recently and his regular psychiatrist is no longer covered, pt has not had meds for 1.5 months, requesting refills today.        Old Psychiatrist no longer in [place] " "[facility] recovery  Candace Magana  Per ED records 12/15 \"His psychiatrist fired him for erratic behavior so he needs his adderall refilled.  His therapist told him to go to the ER for a refill.  He does not want a therapist evaluation because he already has the IOP intake appointment.  Denies safety concerns.\"    Pt reports that he left an irate message on her [employer] [place] prescription drug registry. No concerning patterns of Rx use in this state in the past year that I can find    However patient did ask for much higher doses of adderall than recently prescribed    Psych hx from 12/23/20  \"ADHD maintained for many years on stimulants, and alcohol and cannabis use, and an unspecified mood disorder which may be secondary to his substance abuse.  He also reports an addiction to pornography.  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  \"    December 2020 went to [facility] was hospitalized and then began a dual diagnosis outpatient treatment with an emphasis on addressing substance use disorder, major depression and learning DBT skills.                Depression Followup    How are you doing with your depression since your last visit? Improved     Are you having other symptoms that might be associated with depression? Yes:  depression sx's    Have you had a significant life event? Started new job; recently finished [facility] DBT outpatient program mid-june     Are you feeling anxious or having panic attacks?   No    Do you have any concerns with your use of alcohol or other drugs? No    Social History     Tobacco Use     Smoking status: Former Smoker     Smokeless tobacco: Never Used   Substance Use Topics     Alcohol use: Not on file     Drug use: Not on file     No flowsheet data found.  No flowsheet data found.  Last PHQ-9 8/12/2021   1.  Little " interest or pleasure in doing things 3   2.  Feeling down, depressed, or hopeless 3   3.  Trouble falling or staying asleep, or sleeping too much 2   4.  Feeling tired or having little energy 3   5.  Poor appetite or overeating 3   6.  Feeling bad about yourself 3   7.  Trouble concentrating 3   8.  Moving slowly or restless 0   Q9: Thoughts of better off dead/self-harm past 2 weeks 2   PHQ-9 Total Score 22   Difficulty at work, home, or with people Very difficult   Some encounter information is confidential and restricted. Go to Review [email or web address] activity to see all data.     SHAUNA-7  8/12/2021   1. Feeling nervous, anxious, or on edge 3   2. Not being able to stop or control worrying 3   3. Worrying too much about different things 3   4. Trouble relaxing 2   5. Being so restless that it is hard to sit still 0   6. Becoming easily annoyed or irritable 2   7. Feeling afraid, as if something awful might happen 3   SHAUNA-7 Total Score 16   If you checked any problems, how difficult have they made it for you to do your work, take care of things at home, or get along with other people? Somewhat difficult   Some encounter information is confidential and restricted. Go to [email or web address] activity to see all data.       Suicide Assessment Five-step Evaluation and Treatment (SAFE-T)      .ADHD Follow-Up    Date of last ADHD office visit: 5/17/21  Status since last visit: Stable, but has been out of medication for 1.5 months  Taking controlled (daily) medications as prescribed: Yes                     Parent/Patient Concerns with Medications: None  ADHD Medication     Amphetamines Disp Start End     Amphetamine-Dextroamphetamine (ADDERALL XR PO)          Sig - Route: Take 60 mg by mouth daily. - Oral    Patient not taking: Reported on 8/12/2021       Class: Historical     amphetamine-dextroamphetamine (ADDERALL) 20 MG tablet          Sig - Route: Take 20 mg by mouth 2 times daily - Oral    Patient not taking: Reported on  8/12/2021       Class: Historical        Medication Benefits:   Controlled symptoms: Attention span and Distractability      Medication side effects:  none          How many servings of fruits and vegetables do you eat daily?  0-1    On average, how many sweetened beverages do you drink each day (Examples: soda, juice, sweet tea, etc.  Do NOT count diet or artificially sweetened beverages)?   0    How many days per week do you exercise enough to make your heart beat faster? 5    How many minutes a day do you exercise enough to make your heart beat faster? 20 - 29    How many days per week do you miss taking your medication? 0        Review of Systems   Constitutional, HEENT, cardiovascular, pulmonary, gi and gu systems are negative, except as otherwise noted.      Objective           Vitals:  No vitals were obtained today due to virtual visit.    Physical Exam   GENERAL: Healthy, alert and no distress  EYES: Eyes grossly normal to inspection.  No discharge or erythema, or obvious scleral/conjunctival abnormalities.  RESP: No audible wheeze, cough, or visible cyanosis.  No visible retractions or increased work of breathing.    SKIN: Visible skin clear. No significant rash, abnormal pigmentation or lesions.  NEURO: Cranial nerves grossly intact.  Mentation and speech appropriate for age.  PSYCH: Mentation appears normal, affect normal/bright, judgement mildly impaired (Pt minimizes CD history)          Video-Visit Details    Type of service:  Video Visit    Video End Time:6:02 PM    Originating Location (pt. Location): Home    Distant Location (provider location):  [facility]     Platform used for Video Visit: Signifyd

## 2021-08-12 ENCOUNTER — VIRTUAL VISIT (OUTPATIENT)
Dept: FAMILY MEDICINE | Facility: CLINIC | Age: 31
End: 2021-08-12
Payer: COMMERCIAL

## 2021-08-12 DIAGNOSIS — F41.1 GENERALIZED ANXIETY DISORDER: ICD-10-CM

## 2021-08-12 DIAGNOSIS — F33.2 MAJOR DEPRESSIVE DISORDER, RECURRENT EPISODE, SEVERE WITH ANXIOUS DISTRESS (H): ICD-10-CM

## 2021-08-12 DIAGNOSIS — F19.90 SUBSTANCE USE DISORDER: ICD-10-CM

## 2021-08-12 DIAGNOSIS — F60.3 BORDERLINE PERSONALITY DISORDER (H): Primary | ICD-10-CM

## 2021-08-12 DIAGNOSIS — F90.2 ATTENTION DEFICIT HYPERACTIVITY DISORDER (ADHD), COMBINED TYPE: ICD-10-CM

## 2021-08-12 DIAGNOSIS — F91.8 CONDUCT DISORDER, UNDIFFERENTIATED TYPE: ICD-10-CM

## 2021-08-12 PROBLEM — F98.8 ADD (ATTENTION DEFICIT DISORDER): Status: ACTIVE | Noted: 2019-12-29

## 2021-08-12 PROBLEM — F33.9 DEPRESSION, MAJOR, RECURRENT (H): Status: ACTIVE | Noted: 2018-05-21

## 2021-08-12 PROCEDURE — 99204 OFFICE O/P NEW MOD 45 MIN: CPT | Mod: 95 | Performed by: FAMILY MEDICINE

## 2021-08-12 RX ORDER — DEXTROAMPHETAMINE SACCHARATE, AMPHETAMINE ASPARTATE MONOHYDRATE, DEXTROAMPHETAMINE SULFATE AND AMPHETAMINE SULFATE 5; 5; 5; 5 MG/1; MG/1; MG/1; MG/1
20 CAPSULE, EXTENDED RELEASE ORAL 2 TIMES DAILY
Qty: 60 CAPSULE | Refills: 0 | Status: SHIPPED | OUTPATIENT
Start: 2021-08-12 | End: 2021-09-14

## 2021-08-12 RX ORDER — ARIPIPRAZOLE 5 MG/1
5 TABLET ORAL DAILY
COMMUNITY
Start: 2021-05-19 | End: 2021-08-12

## 2021-08-12 RX ORDER — ARIPIPRAZOLE 5 MG/1
5 TABLET ORAL DAILY
Qty: 30 TABLET | Refills: 3 | Status: SHIPPED | OUTPATIENT
Start: 2021-08-12 | End: 2021-10-14

## 2021-08-12 ASSESSMENT — ANXIETY QUESTIONNAIRES
1. FEELING NERVOUS, ANXIOUS, OR ON EDGE: NEARLY EVERY DAY
GAD7 TOTAL SCORE: 16
2. NOT BEING ABLE TO STOP OR CONTROL WORRYING: NEARLY EVERY DAY
6. BECOMING EASILY ANNOYED OR IRRITABLE: MORE THAN HALF THE DAYS
7. FEELING AFRAID AS IF SOMETHING AWFUL MIGHT HAPPEN: NEARLY EVERY DAY
IF YOU CHECKED OFF ANY PROBLEMS ON THIS QUESTIONNAIRE, HOW DIFFICULT HAVE THESE PROBLEMS MADE IT FOR YOU TO DO YOUR WORK, TAKE CARE OF THINGS AT HOME, OR GET ALONG WITH OTHER PEOPLE: SOMEWHAT DIFFICULT
3. WORRYING TOO MUCH ABOUT DIFFERENT THINGS: NEARLY EVERY DAY
5. BEING SO RESTLESS THAT IT IS HARD TO SIT STILL: NOT AT ALL

## 2021-08-12 ASSESSMENT — PATIENT HEALTH QUESTIONNAIRE - PHQ9
5. POOR APPETITE OR OVEREATING: MORE THAN HALF THE DAYS
SUM OF ALL RESPONSES TO PHQ QUESTIONS 1-9: 22

## 2021-08-13 ASSESSMENT — ANXIETY QUESTIONNAIRES: GAD7 TOTAL SCORE: 16

## 2021-08-23 ENCOUNTER — VIRTUAL VISIT (OUTPATIENT)
Dept: PSYCHOLOGY | Facility: CLINIC | Age: 31
End: 2021-08-23
Payer: COMMERCIAL

## 2021-08-23 DIAGNOSIS — F90.2 ATTENTION DEFICIT HYPERACTIVITY DISORDER (ADHD), COMBINED TYPE: ICD-10-CM

## 2021-08-23 DIAGNOSIS — F33.2 MDD (MAJOR DEPRESSIVE DISORDER), RECURRENT SEVERE, WITHOUT PSYCHOSIS (H): ICD-10-CM

## 2021-08-23 DIAGNOSIS — F41.1 GENERALIZED ANXIETY DISORDER: ICD-10-CM

## 2021-08-23 DIAGNOSIS — F19.90 SUBSTANCE USE DISORDER: ICD-10-CM

## 2021-08-23 DIAGNOSIS — F91.8 CONDUCT DISORDER, UNDIFFERENTIATED TYPE: Primary | ICD-10-CM

## 2021-08-23 PROCEDURE — 90837 PSYTX W PT 60 MINUTES: CPT | Mod: 95 | Performed by: PSYCHOLOGIST

## 2021-08-23 PROCEDURE — 99207 PR NO CHARGE LOS: CPT | Performed by: PSYCHOLOGIST

## 2021-08-23 NOTE — PROGRESS NOTES
[facility]  Case Progress Note    8/23/21    Client Name: Xavier Landon  YOB: 1990  MRN:  [id number]  Treating Provider: Aysha Phillip, PhD LP  Type of Session: Individual  Present in Session: Patient    Number of Minutes:  55    Video start time: 12:00  Video end time: 12:55    Telemedicine Visit: The patient's condition can be safely assessed and treated via synchronous audio and visual telemedicine encounter.      Reason for Telemedicine Visit: Covid    Originating Site (Patient Location): Patient's home    Distant Site (Provider Location): [facility]: [facility]    Consent:  The patient/guardian has verbally consented to: the potential risks and benefits of telemedicine (video visit) versus in person care; bill my insurance or make self-payment for services provided; and responsibility for payment of non-covered services.     Mode of Communication:  Video Conference via D[employer]    As the provider I attest to compliance with applicable laws and regulations related to telemedicine.    Health Maintenance Summary - Mental Health Treatment Plan       Status Date      MENTAL HEALTH TX PLAN Next Due 1/16/2022      Done 2/16/2021           Current Symptoms/Status:  Patient's problems with out of control, driven, impulsive, compulsive sexual behavior began very early in his life.   He feels that his sex life has been stolen from him.  He is extremely distressed about this and does not see the reason to continue live.  No current suicidal plan.  Depressed.  Attention and concentration issues.  Sexual Orientation and gender identity concerns. Sexual functioning issues.    Progress Toward Treatment Goals:   Has regressed but getting back to working on goals.  Mood is much more regulated in terms of emotions.  Much improved.     Intervention: Modality and Description:  Interpersonal, CBT, relapse prevention.       Response to Intervention:  Has a new job - likes it.  Could not go  back to old job (hated it).  Was very very depressed - had gone back to working.    Stopped doing DBT - but continued working with LICSW     Went back on adderal about two weeks ago. 40 mg/1 day  - extended release.  Discontinued Prozac 20 mg, 5 mg. Abilify.    Went back to drinking - still drinking.  Drinking every day and more on weekends.  Trying to use mindfulness, harm reduction.  Feels some progress.  Was sober for three months.    Does not feel as bad as hopeless.    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    Reinforced the need to stay the course.     Focus on his sobriety and continue with LICSW.    Assignment:  Follow up with psychiatrist re med regimen (consider change in med)  Schedule with Kofi Carter.  Sobriety!  Get into smart recovery or AA (Still resistant)       Interactive Complexity:  None.       Diagnosis:  312.89 (F91.8) Other Specified Disruptive, Impulse Control, and Conduct Disorder (Hypersexual Disorder)    Major Depressive Disorder, recurrent, with high anxiety  ADHD  Borderline Personality Disorder      Plan / Need for Future Services:  Return for therapy in 4-5 weeks.      TREATMENT PLAN  Date of Treatment Plan 21  Name: Xavierronaldo Landon  : 1990  Medical Record Number: [id number]  Treating Provider: Aysha Phillip  Type of Session: Individual  Present in Session: Patient    Current Status:  Depression/Mood:  Sad  Decreased Self-Esteem  Hopelessness/Helplessness  Irritable  Decreased Concentration  Suicidal Ideation  Dysphoria    Anxiety/Panic:  Worry  Intrusive Thoughts    Thought:   Reports no problems or symptoms at this time    Sensorium:  Reports no problems or symptoms at this time    Behavior/Health:  Compulsive  Hyperactive    Chemical Use:  Alcohol Abuse    Sexual Problems:  Compulsive sexual behavior  Erectile Problems      Suicide Risk Assessment:  Assessed Level of Immediate Risk:  YES  Ideation:  YES  Plan:  NO  Means:  NO  Intent:  NO    Homicide  Risk Assessment:  Assessed Level of Immediate Risk:  YES  Ideation:  NO  Plan:  NO  Means:  NO  Intent:  NO    Impact of Symptoms on Function:  Decreased Social/Family Function  Decreased Work Function    DSM-5 Diagnoses:       Screening Questionnaires:  Please indicate whether the following screening questionnaires have been completed:  CAGE: Yes  PHQ-9: Yes    SHAUNA-7: Yes  Safety Screening: Yes  WHODAS: No    Problem(s):  1. Compulsive Sexual Behavior.  2. Substance use disorder - alcoholism  3. Depression  4. Personality disorder  5. ADHD    Short-Long Term Goals  Decrease Symptoms  Increase Coping  Change Behavior  Change Cognitions  Increase Psychosocial/Support Functioning  Increase Decision Making  Monitor Psych Status    Interventions  [facility]  Cognitive-Behavioral Therapy  Medication Management Referral    Expected Outcomes and Prognosis  Unknown    Anticipated Treatment Duration:  Unknown    Frequency of Sessions  2 x / Month    Progress Update (for Plan Update Only)  NA:  1st Treatment Planning    Current Psychoactive Medications:  See Psychiatric Treatment Plan  Discharge & Aftercare Goals: To Be Determined.  Care Coordination: Yes    Consent to Treatment:   Patient participated in this treatment planning process and indicated verbal agreement with the above treatment plan.  If patient doesn't sign, indicate why: Telehealth visit due to COVID19 pandemic    Patient Signature: Xavier Landon  Date: 2-16-21     Provider Signature: Aysha Phillip, PhD, LP  Date: 2-16-21

## 2021-08-30 ENCOUNTER — MYC MEDICAL ADVICE (OUTPATIENT)
Dept: FAMILY MEDICINE | Facility: CLINIC | Age: 31
End: 2021-08-30

## 2021-08-31 NOTE — TELEPHONE ENCOUNTER
Routing to prior PCP    Please see [employer] message and advise.      Verna Brizuela RN 8/31/2021 at 3:10 PM

## 2021-09-03 ENCOUNTER — MEDICAL CORRESPONDENCE (OUTPATIENT)
Dept: HEALTH INFORMATION MANAGEMENT | Facility: CLINIC | Age: 31
End: 2021-09-03

## 2021-09-14 ENCOUNTER — VIRTUAL VISIT (OUTPATIENT)
Dept: FAMILY MEDICINE | Facility: CLINIC | Age: 31
End: 2021-09-14

## 2021-09-14 DIAGNOSIS — F91.8 CONDUCT DISORDER, UNDIFFERENTIATED TYPE: ICD-10-CM

## 2021-09-14 DIAGNOSIS — F90.2 ATTENTION DEFICIT HYPERACTIVITY DISORDER (ADHD), COMBINED TYPE: ICD-10-CM

## 2021-09-14 PROCEDURE — 99213 OFFICE O/P EST LOW 20 MIN: CPT | Mod: 95 | Performed by: FAMILY MEDICINE

## 2021-09-14 RX ORDER — DEXTROAMPHETAMINE SACCHARATE, AMPHETAMINE ASPARTATE MONOHYDRATE, DEXTROAMPHETAMINE SULFATE AND AMPHETAMINE SULFATE 5; 5; 5; 5 MG/1; MG/1; MG/1; MG/1
20 CAPSULE, EXTENDED RELEASE ORAL 2 TIMES DAILY
Qty: 60 CAPSULE | Refills: 0 | Status: SHIPPED | OUTPATIENT
Start: 2021-09-14 | End: 2021-10-15

## 2021-09-14 NOTE — PROGRESS NOTES
Huey is a 31 year old who is being evaluated via a billable video visit.      How would you like to obtain your AVS? Александр  If the video visit is dropped, the invitation should be resent by: Александр or else Text to cell phone: [phone]  Will anyone else be joining your video visit? No    Video Start Time: 5:20 PM    Assessment & Plan     Attention deficit hyperactivity disorder (ADHD), combined type  Stable    Complex mental health Hx, Chemical dependancy,  I think best managed by multispecialty team  Some signs and Hx of compulsive use of substances, he is at risk for abuse.  That said has been on this since age 18 by his report    We will bridge on recent dose for now,     - amphetamine-dextroamphetamine (ADDERALL XR) 20 MG 24 hr capsule; Take 1 capsule (20 mg) by mouth 2 times daily    Other Specified Disruptive, Impulse Control, and Conduct Disorder (Hypersexual Disorder)    [facility]    - amphetamine-dextroamphetamine (ADDERALL XR) 20 MG 24 hr capsule; Take 1 capsule (20 mg) by mouth 2 times daily    No follow-ups on file.    Brad Garcia MD  [facility]   Huey is a 31 year old who presents for the following health issues     HPI     Medication Followup of adderall XR 20 mg BID    Taking Medication as prescribed: yes    Side Effects:  None    Medication Helping Symptoms:  Yes    Pt states still unable to make psych appt as there are no openings until October, needs refills in the meantime.     ADHD Follow-Up    Date of last ADHD office visit: 8/12/21 VV  Status since last visit: Stable  Taking controlled (daily) medications as prescribed: Yes           Parent/Patient Concerns with Medications: None  ADHD Medication     Amphetamines Disp Start End     amphetamine-dextroamphetamine (ADDERALL XR) 20 MG 24 hr capsule    60 capsule 8/12/2021     Sig - Route: Take 1 capsule (20 mg) by mouth 2 times daily - Oral    Class: E-Prescribe    Earliest  Fill Date: 8/12/2021     Amphetamine-Dextroamphetamine (ADDERALL XR PO)          Sig - Route: Take 60 mg by mouth daily. - Oral    Patient not taking: Reported on 8/12/2021       Class: Historical     amphetamine-dextroamphetamine (ADDERALL) 20 MG tablet          Sig - Route: Take 20 mg by mouth 2 times daily - Oral    Patient not taking: Reported on 8/12/2021       Class: Historical          Medication Benefits:   Controlled symptoms: Hyperactivity - motor restlessness, Attention span and Distractability      Medication side effects:  Side effects noted: none  Denies: appetite suppression, weight loss and insomnia      Review of Systems   Constitutional, HEENT, cardiovascular, pulmonary, gi and gu systems are negative, except as otherwise noted.      Objective           Vitals:  No vitals were obtained today due to virtual visit.    Physical Exam   GENERAL: Healthy, alert and no distress  EYES: Eyes grossly normal to inspection.  No discharge or erythema, or obvious scleral/conjunctival abnormalities.  RESP: No audible wheeze, cough, or visible cyanosis.  No visible retractions or increased work of breathing.    SKIN: Visible skin clear. No significant rash, abnormal pigmentation or lesions.  NEURO: Cranial nerves grossly intact.  Mentation and speech appropriate for age.  PSYCH: Mentation appears normal, affect normal/bright, judgement and insight intact, normal speech and appearance well-groomed.              Video-Visit Details    Type of service:  Video Visit    Video End Time:5:30 PM    Originating Location (pt. Location): Home    Distant Location (provider location):  [facility]     Platform used for Video Visit: Share0

## 2021-09-16 ENCOUNTER — MEDICAL CORRESPONDENCE (OUTPATIENT)
Dept: HEALTH INFORMATION MANAGEMENT | Facility: CLINIC | Age: 31
End: 2021-09-16

## 2021-09-30 ENCOUNTER — VIRTUAL VISIT (OUTPATIENT)
Dept: PSYCHOLOGY | Facility: CLINIC | Age: 31
End: 2021-09-30
Payer: COMMERCIAL

## 2021-09-30 DIAGNOSIS — F41.1 GENERALIZED ANXIETY DISORDER: ICD-10-CM

## 2021-09-30 DIAGNOSIS — F19.90 SUBSTANCE USE DISORDER: ICD-10-CM

## 2021-09-30 DIAGNOSIS — F90.2 ATTENTION DEFICIT HYPERACTIVITY DISORDER (ADHD), COMBINED TYPE: ICD-10-CM

## 2021-09-30 DIAGNOSIS — F91.8 CONDUCT DISORDER, UNDIFFERENTIATED TYPE: Primary | ICD-10-CM

## 2021-09-30 DIAGNOSIS — F33.1 MODERATE EPISODE OF RECURRENT MAJOR DEPRESSIVE DISORDER (H): ICD-10-CM

## 2021-09-30 PROCEDURE — 90832 PSYTX W PT 30 MINUTES: CPT | Mod: 95 | Performed by: PSYCHOLOGIST

## 2021-09-30 PROCEDURE — 99207 PR NO CHARGE LOS: CPT | Performed by: PSYCHOLOGIST

## 2021-09-30 NOTE — PROGRESS NOTES
[facility]  Case Progress Note    9/30/21    Client Name: Xavier Landon  YOB: 1990  MRN:  [id number]  Treating Provider: Aysha Phillip, PhD LP  Type of Session: Individual  Present in Session: Patient    Number of Minutes:  30    Video start time: 3:00  Video end time: 3:30    Telemedicine Visit: The patient's condition can be safely assessed and treated via synchronous audio and visual telemedicine encounter.      Reason for Telemedicine Visit: Covid    Originating Site (Patient Location): Patient's home    Distant Site (Provider Location): [facility]: [facility]    Consent:  The patient/guardian has verbally consented to: the potential risks and benefits of telemedicine (video visit) versus in person care; bill my insurance or make self-payment for services provided; and responsibility for payment of non-covered services.     Mode of Communication:  Video Conference via D[employer]    As the provider I attest to compliance with applicable laws and regulations related to telemedicine.    Health Maintenance Summary - Mental Health Treatment Plan       Status Date      MENTAL HEALTH TX PLAN Next Due 1/16/2022      Done 2/16/2021           Current Symptoms/Status:  Patient's problems with out of control, driven, impulsive, compulsive sexual behavior began very early in his life.   He feels that his sex life has been stolen from him.  He is extremely distressed about this and does not see the reason to continue live.  No current suicidal plan.  Depressed.  Attention and concentration issues.  Sexual Orientation and gender identity concerns. Sexual functioning issues.    Progress Toward Treatment Goals:   Has regressed but getting back to working on goals.  Mood is a bit more dark.  Overall since beginning much improved.     Intervention: Modality and Description:  Interpersonal, CBT, relapse prevention.       Response to Intervention:  Has a new job - likes it.  Stuggling with  working and living at home.  Feeding his negative core fuels.    Stopped doing DBT - but continued working with LICSW on a weekly basis    Continues on adderal  40 mg/1 day  - extended release.  Discontinued Prozac 20 mg, 5 mg. Abilify.  Planned to get refill of Prozac today.  Will see Dr. Martin next week.    Went back to drinking - still drinking.  Drinking every day (approx 7 drinks) and more on weekends.  Trying to use mindfulness, harm reduction.  Feels some progress.  Was sober for three months.    Feeling more hopeless today.      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    Reinforced the need to stay the course.     Focus on his sobriety and continue with LICSW.    Assignment:  Follow up with psychiatrist re med regimen (consider change in med)  See. Kofi Carter.  Keep his alcohol use under reasonably control.  Get into smart recovery or AA (Still resistant)       Interactive Complexity:  None.       Diagnosis:  312.89 (F91.8) Other Specified Disruptive, Impulse Control, and Conduct Disorder (Hypersexual Disorder)    Major Depressive Disorder, recurrent, with high anxiety  ADHD  Borderline Personality Disorder      Plan / Need for Future Services:  Return for therapy in 4-5 weeks.      TREATMENT PLAN  Date of Treatment Plan 21  Name: Xavier Landon  : 1990  Medical Record Number: [id number]  Treating Provider: Aysha Phillip  Type of Session: Individual  Present in Session: Patient    Current Status:  Depression/Mood:  Sad  Decreased Self-Esteem  Hopelessness/Helplessness  Irritable  Decreased Concentration  Suicidal Ideation  Dysphoria    Anxiety/Panic:  Worry  Intrusive Thoughts    Thought:   Reports no problems or symptoms at this time    Sensorium:  Reports no problems or symptoms at this time    Behavior/Health:  Compulsive  Hyperactive    Chemical Use:  Alcohol Abuse    Sexual Problems:  Compulsive sexual behavior  Erectile Problems      Suicide Risk Assessment:  Assessed  Level of Immediate Risk:  YES  Ideation:  YES  Plan:  NO  Means:  NO  Intent:  NO    Homicide Risk Assessment:  Assessed Level of Immediate Risk:  YES  Ideation:  NO  Plan:  NO  Means:  NO  Intent:  NO    Impact of Symptoms on Function:  Decreased Social/Family Function  Decreased Work Function    DSM-5 Diagnoses:       Screening Questionnaires:  Please indicate whether the following screening questionnaires have been completed:  CAGE: Yes  PHQ-9: Yes    SHAUNA-7: Yes  Safety Screening: Yes  WHODAS: No    Problem(s):  1. Compulsive Sexual Behavior.  2. Substance use disorder - alcoholism  3. Depression  4. Personality disorder  5. ADHD    Short-Long Term Goals  Decrease Symptoms  Increase Coping  Change Behavior  Change Cognitions  Increase Psychosocial/Support Functioning  Increase Decision Making  Monitor Psych Status    Interventions  [place] [facility]  Cognitive-Behavioral Therapy  Medication Management Referral    Expected Outcomes and Prognosis  Unknown    Anticipated Treatment Duration:  Unknown    Frequency of Sessions  2 x / Month    Progress Update (for Plan Update Only)  NA:  1st Treatment Planning    Current Psychoactive Medications:  See Psychiatric Treatment Plan  Discharge & Aftercare Goals: To Be Determined.  Care Coordination: Yes    Consent to Treatment:   Patient participated in this treatment planning process and indicated verbal agreement with the above treatment plan.  If patient doesn't sign, indicate why: Telehealth visit due to COVID19 pandemic    Patient Signature: Xavier Landon  Date: 2-16-21     Provider Signature: Aysha Phillip, PhD, LP  Date: 2-16-21

## 2021-10-02 ENCOUNTER — HEALTH MAINTENANCE LETTER (OUTPATIENT)
Age: 31
End: 2021-10-02

## 2021-10-04 ENCOUNTER — APPOINTMENT (OUTPATIENT)
Dept: URBAN - METROPOLITAN AREA CLINIC 256 | Age: 31
Setting detail: DERMATOLOGY
End: 2021-10-04

## 2021-10-04 VITALS — WEIGHT: 210 LBS | RESPIRATION RATE: 15 BRPM | HEIGHT: 74 IN

## 2021-10-04 DIAGNOSIS — L40.0 PSORIASIS VULGARIS: ICD-10-CM

## 2021-10-04 PROCEDURE — 99203 OFFICE O/P NEW LOW 30 MIN: CPT

## 2021-10-04 PROCEDURE — OTHER PRESCRIPTION: OTHER

## 2021-10-04 PROCEDURE — OTHER COUNSELING: OTHER

## 2021-10-04 RX ORDER — CLOBETASOL PROPIONATE 0.5 MG/G
OINTMENT TOPICAL
Qty: 60 | Refills: 4 | Status: ERX

## 2021-10-04 RX ORDER — CLOBETASOL PROPIONATE 0.5 MG/G
OINTMENT TOPICAL
Qty: 60 | Refills: 4 | Status: ERX | COMMUNITY
Start: 2021-10-04

## 2021-10-04 ASSESSMENT — LOCATION SIMPLE DESCRIPTION DERM
LOCATION SIMPLE: RIGHT HAND
LOCATION SIMPLE: LEFT HAND

## 2021-10-04 ASSESSMENT — LOCATION DETAILED DESCRIPTION DERM
LOCATION DETAILED: LEFT RADIAL PALM
LOCATION DETAILED: RIGHT ULNAR PALM

## 2021-10-04 ASSESSMENT — LOCATION ZONE DERM: LOCATION ZONE: HAND

## 2021-10-04 NOTE — HPI: DRY SKIN
How Severe Is Your Dry Skin?: moderate
Additional History: Patient has utilized eucerin with no noticeable relief.

## 2021-10-14 ENCOUNTER — VIRTUAL VISIT (OUTPATIENT)
Dept: PSYCHIATRY | Facility: CLINIC | Age: 31
End: 2021-10-14
Payer: COMMERCIAL

## 2021-10-14 ENCOUNTER — RX ONLY (RX ONLY)
Age: 31
End: 2021-10-14

## 2021-10-14 DIAGNOSIS — F91.8 CONDUCT DISORDER, UNDIFFERENTIATED TYPE: ICD-10-CM

## 2021-10-14 DIAGNOSIS — F90.2 ATTENTION DEFICIT HYPERACTIVITY DISORDER (ADHD), COMBINED TYPE: ICD-10-CM

## 2021-10-14 DIAGNOSIS — F60.3 BORDERLINE PERSONALITY DISORDER (H): ICD-10-CM

## 2021-10-14 DIAGNOSIS — F33.2 MAJOR DEPRESSIVE DISORDER, RECURRENT EPISODE, SEVERE WITH ANXIOUS DISTRESS (H): ICD-10-CM

## 2021-10-14 DIAGNOSIS — F41.1 GENERALIZED ANXIETY DISORDER: ICD-10-CM

## 2021-10-14 PROCEDURE — 99214 OFFICE O/P EST MOD 30 MIN: CPT | Mod: 95 | Performed by: PHYSICIAN ASSISTANT

## 2021-10-14 RX ORDER — BETAMETHASONE DIPROPIONATE 0.5 MG/G
OINTMENT TOPICAL
Qty: 45 | Refills: 4 | Status: ERX | COMMUNITY
Start: 2021-10-14

## 2021-10-14 ASSESSMENT — ANXIETY QUESTIONNAIRES
5. BEING SO RESTLESS THAT IT IS HARD TO SIT STILL: NOT AT ALL
2. NOT BEING ABLE TO STOP OR CONTROL WORRYING: MORE THAN HALF THE DAYS
1. FEELING NERVOUS, ANXIOUS, OR ON EDGE: MORE THAN HALF THE DAYS
6. BECOMING EASILY ANNOYED OR IRRITABLE: MORE THAN HALF THE DAYS
3. WORRYING TOO MUCH ABOUT DIFFERENT THINGS: MORE THAN HALF THE DAYS
GAD7 TOTAL SCORE: 12
7. FEELING AFRAID AS IF SOMETHING AWFUL MIGHT HAPPEN: MORE THAN HALF THE DAYS

## 2021-10-14 NOTE — PROGRESS NOTES
Video start time: 6pm  Video end time: 645    Telemedicine Visit: The patient's condition can be safely assessed and treated via synchronous audio and visual telemedicine encounter.      Reason for Telemedicine Visit: Services only offered telehealth    Originating Site (Patient Location): Patient's home    Distant Site (Provider Location): Provider Remote Setting- Home Office    Consent:  The patient/guardian has verbally consented to: the potential risks and benefits of telemedicine (video visit) versus in person care; bill my insurance or make self-payment for services provided; and responsibility for payment of non-covered services.     Mode of Communication:  Video Conference via [employer]    As the provider I attest to compliance with applicable laws and regulations related to telemedicine.    [facility]    Name:  Xavier Landon   Aliases:  XAVIER LANDON W : HUEY LANDON  :  1990   MRN:  [id number]  Treating Provider: Kofi Martin PA-C EdD     Medications at start of visit:  Outpatient Medications Prior to Visit   Medication Sig Dispense Refill     amphetamine-dextroamphetamine (ADDERALL XR) 20 MG 24 hr capsule Take 1 capsule (20 mg) by mouth 2 times daily 60 capsule 0     ARIPiprazole (ABILIFY) 5 MG tablet Take 1 tablet (5 mg) by mouth daily 30 tablet 3     FLUoxetine (PROZAC) 20 MG capsule Take 1 capsule (20 mg) by mouth daily 30 capsule 3     Varenicline Tartrate (CHANTIX PO) Take by mouth 2 times daily (Patient not taking: Reported on 10/14/2021)       No facility-administered medications prior to visit.       Allergies:  No Known Allergies    Today's Visit    Current Complaint: Huey Morse presents for a discussion regarding options for psychiatric medication management, referred by Dr. Phillip.      Review of Systems: A review of the following systems was negative: Opthalmic, ENT, Cardiovascular, Gastrointestinal, Genitourinary, Musculoskeletal,  Integumentary, Neurological, Endocrine, Respiratory, Hematologic, Immunologic      Chemical History: Denies usage of and cravings for alcohol, cannabis, heroin, methamphetamine, cocaine, prescription opioids/benzodiazepines.      Social History: Reviewed with patient.  See Dr. Phillip's notes for complete details.     Mental Status Exam: The patient's orientation, memory,  Attention, language and fund of knowledge are at their usual best baseline.  Grooming/Hygiene: adequate  Eye Contact: normal  Psychomotor: normal  Observed mood and affect: appropriate  Judgment: intact, with thoughtful decision making and insight  Speech: normal rate, rhythm, tone and volume  Thought processes: normal, with normal rate of thought  Associations:  No deficiency  Abnormal Thoughts: none      Assessment: This is a 32 yo man who carries the diagnosis of impulse control disorder, ADHD. The patient's questions were answered to their satisfaction regarding the plan as outlined below. Side effects, risks/benefits/alternatives regarding all psychiatric medications were discussed with the patient in detail.          Plan:    Patient Instructions   1)Naltrexone 50 mg: Take 1/2 tablet daily for 2 weeks, then take 1 tablet daily (new medication to help with compulsive behavior).    2)Fluoxetine: Continue taking as you have been.     3)Adderall:  For now, continue the 40 mg XR.  We will look at adding immediate release in the future.      4)We talked about other medications which may help with CSB, such as lithium.      5)See me in 1 month.  Contact my office with any questions or concerns.         Total face-to-face time: 30 min    Counseling/Coordination of care greater than 50%.    Counseling/Coordination of care included: Educational counseling regarding psychiatric medications, side effects, interactions.

## 2021-10-15 RX ORDER — NALTREXONE HYDROCHLORIDE 50 MG/1
TABLET, FILM COATED ORAL
Qty: 30 TABLET | Refills: 1 | Status: SHIPPED | OUTPATIENT
Start: 2021-10-15 | End: 2021-11-10

## 2021-10-15 RX ORDER — DEXTROAMPHETAMINE SACCHARATE, AMPHETAMINE ASPARTATE MONOHYDRATE, DEXTROAMPHETAMINE SULFATE AND AMPHETAMINE SULFATE 5; 5; 5; 5 MG/1; MG/1; MG/1; MG/1
20 CAPSULE, EXTENDED RELEASE ORAL 2 TIMES DAILY
Qty: 60 CAPSULE | Refills: 0 | Status: SHIPPED | OUTPATIENT
Start: 2021-10-15 | End: 2021-11-10

## 2021-10-15 ASSESSMENT — ANXIETY QUESTIONNAIRES: GAD7 TOTAL SCORE: 12

## 2021-10-15 NOTE — PATIENT INSTRUCTIONS
1)Naltrexone 50 mg: Take 1/2 tablet daily for 2 weeks, then take 1 tablet daily (new medication to help with compulsive behavior).    2)Fluoxetine: Continue taking as you have been.     3)Adderall:  For now, continue the 40 mg XR.  We will look at adding immediate release in the future.      4)We talked about other medications which may help with CSB, such as lithium.      5)See me in 1 month.  Contact my office with any questions or concerns.

## 2021-10-25 ENCOUNTER — VIRTUAL VISIT (OUTPATIENT)
Dept: PSYCHOLOGY | Facility: CLINIC | Age: 31
End: 2021-10-25
Payer: COMMERCIAL

## 2021-10-25 DIAGNOSIS — F33.2 MDD (MAJOR DEPRESSIVE DISORDER), RECURRENT SEVERE, WITHOUT PSYCHOSIS (H): ICD-10-CM

## 2021-10-25 DIAGNOSIS — F90.2 ATTENTION DEFICIT HYPERACTIVITY DISORDER (ADHD), COMBINED TYPE: ICD-10-CM

## 2021-10-25 DIAGNOSIS — F41.1 GENERALIZED ANXIETY DISORDER: ICD-10-CM

## 2021-10-25 DIAGNOSIS — F19.90 SUBSTANCE USE DISORDER: ICD-10-CM

## 2021-10-25 DIAGNOSIS — F91.8 CONDUCT DISORDER, UNDIFFERENTIATED TYPE: Primary | ICD-10-CM

## 2021-10-25 PROCEDURE — 99207 PR NO CHARGE LOS: CPT | Performed by: PSYCHOLOGIST

## 2021-10-25 PROCEDURE — 90834 PSYTX W PT 45 MINUTES: CPT | Mod: 95 | Performed by: PSYCHOLOGIST

## 2021-10-25 NOTE — PROGRESS NOTES
[facility]  Case Progress Note    10/25/21    Client Name: Xavier Landon  YOB: 1990  MRN:  [id number]  Treating Provider: Aysha Phillip, PhD LP  Type of Session: Individual  Present in Session: Patient    Number of Minutes:  45    Video start time: 3:00  Video end time: 3:45    Telemedicine Visit: The patient's condition can be safely assessed and treated via synchronous audio and visual telemedicine encounter.      Reason for Telemedicine Visit: Covid    Originating Site (Patient Location): Patient's home    Distant Site (Provider Location): [facility] Clinics: [facility]    Consent:  The patient/guardian has verbally consented to: the potential risks and benefits of telemedicine (video visit) versus in person care; bill my insurance or make self-payment for services provided; and responsibility for payment of non-covered services.     Mode of Communication:  Video Conference had to switch  via phone    As the provider I attest to compliance with applicable laws and regulations related to telemedicine.    Health Maintenance Summary - Mental Health Treatment Plan       Status Date      MENTAL HEALTH TX PLAN Next Due 1/16/2022      Done 2/16/2021           Current Symptoms/Status:  Patient's problems with out of control, driven, impulsive, compulsive sexual behavior began very early in his life.   He feels that his sex life has been stolen from him.  He is extremely distressed about this and does not see the reason to continue live.  No current suicidal plan.  Depressed.  Attention and concentration issues.  Sexual Orientation and gender identity concerns. Sexual functioning issues.    Progress Toward Treatment Goals:   Seems much improved since I saw him last.  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    Intervention: Modality and Description:  Interpersonal, CBT, relapse prevention.       Response to Intervention:  Feeling such better - and still living at home.  Working with  L[facility] on a weekly basis    Saw Dr. Martin.  Plans to start   Started back on Prozac 1 - and 1 1/2 week.  Has not felt much improvement yet.  Will start naltrexone this week.  Uses pill minder.    Continues on adderal  40 mg/1 day  - extended release.  Prozac 20 mg.     Holding steady.  Drinking every day (approx 7 drinks - down to 5 - sometimes more) and more on weekends.  Trying to use mindfulness, harm reduction.  Feels some progress.  Was sober for three months before.    Feeling more hopeful today.  Radical acceptance.    Reinforced the need to stay the course.     Focus on his sobriety and continue with LICSW.    Assignment:  Follow up with psychiatrist re med regimen (consider change in med)  Continue  with Kofi Carter.  Keep his alcohol use under reasonably control.  Get into smart recovery or AA (Still resistant)       Interactive Complexity:  None.       Diagnosis:  312.89 (F91.8) Other Specified Disruptive, Impulse Control, and Conduct Disorder (Hypersexual Disorder)    Major Depressive Disorder, recurrent, with high anxiety  ADHD  Borderline Personality Disorder      Plan / Need for Future Services:  Return for therapy in 4-5 weeks.      TREATMENT PLAN  Date of Treatment Plan 21  Name: Xavierronaldo Landon  : 1990  Medical Record Number: [id number]  Treating Provider: Aysha Phillip  Type of Session: Individual  Present in Session: Patient    Current Status:  Depression/Mood:  Sad  Decreased Self-Esteem  Hopelessness/Helplessness  Irritable  Decreased Concentration  Suicidal Ideation  Dysphoria    Anxiety/Panic:  Worry  Intrusive Thoughts    Thought:   Reports no problems or symptoms at this time    Sensorium:  Reports no problems or symptoms at this time    Behavior/Health:  Compulsive  Hyperactive    Chemical Use:  Alcohol Abuse    Sexual Problems:  Compulsive sexual behavior  Erectile Problems      Suicide Risk Assessment:  Assessed Level of Immediate Risk:  YES  Ideation:  YES  Plan:   NO  Means:  NO  Intent:  NO    Homicide Risk Assessment:  Assessed Level of Immediate Risk:  YES  Ideation:  NO  Plan:  NO  Means:  NO  Intent:  NO    Impact of Symptoms on Function:  Decreased Social/Family Function  Decreased Work Function    DSM-5 Diagnoses:       Screening Questionnaires:  Please indicate whether the following screening questionnaires have been completed:  CAGE: Yes  PHQ-9: Yes    SHAUNA-7: Yes  Safety Screening: Yes  WHODAS: No    Problem(s):  1. Compulsive Sexual Behavior.  2. Substance use disorder - alcoholism  3. Depression  4. Personality disorder  5. ADHD    Short-Long Term Goals  Decrease Symptoms  Increase Coping  Change Behavior  Change Cognitions  Increase Psychosocial/Support Functioning  Increase Decision Making  Monitor Psych Status    Interventions  [facility]  Cognitive-Behavioral Therapy  Medication Management Referral    Expected Outcomes and Prognosis  Unknown    Anticipated Treatment Duration:  Unknown    Frequency of Sessions  2 x / Month    Progress Update (for Plan Update Only)  NA:  1st Treatment Planning    Current Psychoactive Medications:  See Psychiatric Treatment Plan  Discharge & Aftercare Goals: To Be Determined.  Care Coordination: Yes    Consent to Treatment:   Patient participated in this treatment planning process and indicated verbal agreement with the above treatment plan.  If patient doesn't sign, indicate why: Telehealth visit due to COVID19 pandemic    Patient Signature: Xavier Landon  Date: 2-16-21     Provider Signature: Aysha Phillip, PhD,   Date: 2-16-21

## 2021-11-10 DIAGNOSIS — F91.8 CONDUCT DISORDER, UNDIFFERENTIATED TYPE: ICD-10-CM

## 2021-11-10 DIAGNOSIS — F90.2 ATTENTION DEFICIT HYPERACTIVITY DISORDER (ADHD), COMBINED TYPE: ICD-10-CM

## 2021-11-10 RX ORDER — DEXTROAMPHETAMINE SACCHARATE, AMPHETAMINE ASPARTATE MONOHYDRATE, DEXTROAMPHETAMINE SULFATE AND AMPHETAMINE SULFATE 5; 5; 5; 5 MG/1; MG/1; MG/1; MG/1
20 CAPSULE, EXTENDED RELEASE ORAL 2 TIMES DAILY
Qty: 60 CAPSULE | Refills: 0 | Status: SHIPPED | OUTPATIENT
Start: 2021-11-10 | End: 2021-12-09

## 2021-11-10 RX ORDER — NALTREXONE HYDROCHLORIDE 50 MG/1
TABLET, FILM COATED ORAL
Qty: 30 TABLET | Refills: 1 | Status: SHIPPED | OUTPATIENT
Start: 2021-11-10 | End: 2021-12-09

## 2021-11-10 NOTE — TELEPHONE ENCOUNTER
Requested Medication: Adderall XR  Dose: 20mg  Quantity: 60  Refills: 0    Take 1 capsule 2 times daily  _____    Requested Medication: Naltrexone  Dose: 50mg  Quantity: 30  Refills: 1    Take 1 tablet daily      Last seen at [facility]: 10/14  Next Appointment with Provider: 12/1    Lois Baird CMA

## 2021-12-09 DIAGNOSIS — F91.8 CONDUCT DISORDER, UNDIFFERENTIATED TYPE: ICD-10-CM

## 2021-12-09 DIAGNOSIS — F60.3 BORDERLINE PERSONALITY DISORDER (H): ICD-10-CM

## 2021-12-09 DIAGNOSIS — F90.2 ATTENTION DEFICIT HYPERACTIVITY DISORDER (ADHD), COMBINED TYPE: ICD-10-CM

## 2021-12-09 DIAGNOSIS — F33.2 MAJOR DEPRESSIVE DISORDER, RECURRENT EPISODE, SEVERE WITH ANXIOUS DISTRESS (H): ICD-10-CM

## 2021-12-09 DIAGNOSIS — F41.1 GENERALIZED ANXIETY DISORDER: ICD-10-CM

## 2021-12-09 RX ORDER — NALTREXONE HYDROCHLORIDE 50 MG/1
TABLET, FILM COATED ORAL
Qty: 30 TABLET | Refills: 1 | Status: SHIPPED | OUTPATIENT
Start: 2021-12-09 | End: 2022-01-19

## 2021-12-09 RX ORDER — DEXTROAMPHETAMINE SACCHARATE, AMPHETAMINE ASPARTATE MONOHYDRATE, DEXTROAMPHETAMINE SULFATE AND AMPHETAMINE SULFATE 5; 5; 5; 5 MG/1; MG/1; MG/1; MG/1
20 CAPSULE, EXTENDED RELEASE ORAL 2 TIMES DAILY
Qty: 60 CAPSULE | Refills: 0 | Status: SHIPPED | OUTPATIENT
Start: 2021-12-09 | End: 2022-01-04

## 2021-12-09 NOTE — TELEPHONE ENCOUNTER
Requested Medication: Naltrexone  Dose: 50mg  Quantity: 30  Refills: 1  _____    Requested Medication: Fluoxetine  Dose: 20mg  Quantity: 30  Refills: 3  _____    Requested Medication: Adderall XR   Dose: 20mg  Quantity: 60  Refills: 0    Take 1 (20mg) capsule twice daily    Last seen at [facility]: 10/14 - 1 mo follow up  Next Appointment with Provider: 1/19/2022    Lois Baird CMA

## 2022-01-03 DIAGNOSIS — F90.2 ATTENTION DEFICIT HYPERACTIVITY DISORDER (ADHD), COMBINED TYPE: ICD-10-CM

## 2022-01-03 DIAGNOSIS — F91.8 CONDUCT DISORDER, UNDIFFERENTIATED TYPE: ICD-10-CM

## 2022-01-03 NOTE — TELEPHONE ENCOUNTER
Requested Medication: Adderall XR 20mg  Dose: 40mg  Quantity: 60  Refills: 0    Take 1 (20mg) capsule twice daily    Last seen at [facility]: 10/14 -  Next Appointment with Provider: 1/19/2022    Lois Baird CMA

## 2022-01-04 RX ORDER — DEXTROAMPHETAMINE SACCHARATE, AMPHETAMINE ASPARTATE MONOHYDRATE, DEXTROAMPHETAMINE SULFATE AND AMPHETAMINE SULFATE 5; 5; 5; 5 MG/1; MG/1; MG/1; MG/1
20 CAPSULE, EXTENDED RELEASE ORAL 2 TIMES DAILY
Qty: 60 CAPSULE | Refills: 0 | Status: SHIPPED | OUTPATIENT
Start: 2022-01-04 | End: 2022-02-03

## 2022-01-19 ENCOUNTER — VIRTUAL VISIT (OUTPATIENT)
Dept: PSYCHIATRY | Facility: CLINIC | Age: 32
End: 2022-01-19
Payer: COMMERCIAL

## 2022-01-19 DIAGNOSIS — F91.8 CONDUCT DISORDER, UNDIFFERENTIATED TYPE: ICD-10-CM

## 2022-01-19 DIAGNOSIS — F90.2 ATTENTION DEFICIT HYPERACTIVITY DISORDER (ADHD), COMBINED TYPE: Primary | ICD-10-CM

## 2022-01-19 PROCEDURE — 99214 OFFICE O/P EST MOD 30 MIN: CPT | Mod: 95 | Performed by: PHYSICIAN ASSISTANT

## 2022-01-19 RX ORDER — LITHIUM CARBONATE 150 MG/1
CAPSULE ORAL
Qty: 45 CAPSULE | Refills: 1 | Status: SHIPPED | OUTPATIENT
Start: 2022-01-19 | End: 2022-03-23

## 2022-01-19 RX ORDER — DEXTROAMPHETAMINE SACCHARATE, AMPHETAMINE ASPARTATE, DEXTROAMPHETAMINE SULFATE AND AMPHETAMINE SULFATE 2.5; 2.5; 2.5; 2.5 MG/1; MG/1; MG/1; MG/1
TABLET ORAL
Qty: 30 TABLET | Refills: 0 | Status: SHIPPED | OUTPATIENT
Start: 2022-01-19 | End: 2022-02-24

## 2022-01-19 ASSESSMENT — PATIENT HEALTH QUESTIONNAIRE - PHQ9
SUM OF ALL RESPONSES TO PHQ QUESTIONS 1-9: 6
10. IF YOU CHECKED OFF ANY PROBLEMS, HOW DIFFICULT HAVE THESE PROBLEMS MADE IT FOR YOU TO DO YOUR WORK, TAKE CARE OF THINGS AT HOME, OR GET ALONG WITH OTHER PEOPLE: SOMEWHAT DIFFICULT
SUM OF ALL RESPONSES TO PHQ QUESTIONS 1-9: 6

## 2022-01-19 ASSESSMENT — ANXIETY QUESTIONNAIRES
4. TROUBLE RELAXING: NOT AT ALL
1. FEELING NERVOUS, ANXIOUS, OR ON EDGE: SEVERAL DAYS
2. NOT BEING ABLE TO STOP OR CONTROL WORRYING: SEVERAL DAYS
7. FEELING AFRAID AS IF SOMETHING AWFUL MIGHT HAPPEN: SEVERAL DAYS
6. BECOMING EASILY ANNOYED OR IRRITABLE: SEVERAL DAYS
7. FEELING AFRAID AS IF SOMETHING AWFUL MIGHT HAPPEN: SEVERAL DAYS
GAD7 TOTAL SCORE: 5
GAD7 TOTAL SCORE: 5
5. BEING SO RESTLESS THAT IT IS HARD TO SIT STILL: NOT AT ALL
3. WORRYING TOO MUCH ABOUT DIFFERENT THINGS: SEVERAL DAYS
GAD7 TOTAL SCORE: 5

## 2022-01-20 ASSESSMENT — ANXIETY QUESTIONNAIRES: GAD7 TOTAL SCORE: 5

## 2022-01-20 ASSESSMENT — PATIENT HEALTH QUESTIONNAIRE - PHQ9: SUM OF ALL RESPONSES TO PHQ QUESTIONS 1-9: 6

## 2022-01-20 NOTE — PATIENT INSTRUCTIONS
1)Lithium 150 mg: Take 1 capsule once daily (new medication to help with mood stability, compulsive behavior).    2)Adderall immediate release 10 mg: Take 1 tablet once daily in     3)No other medication changes for now.     4)Contact my office with an update in 2 weeks.  If you are not having side effects, but not noticing any benefit, we will increase to 300 mg/day.     5)We will need to check lithium level, kidney and thyroid function within the next month.     6)See me in 1 month.

## 2022-01-20 NOTE — PROGRESS NOTES
Video start time: 735  Video end time: 8pm    Telemedicine Visit: The patient's condition can be safely assessed and treated via synchronous audio and visual telemedicine encounter.      Reason for Telemedicine Visit: Services only offered telehealth    Originating Site (Patient Location): Patient's home    Distant Site (Provider Location): Provider Remote Setting- Home Office    Consent:  The patient/guardian has verbally consented to: the potential risks and benefits of telemedicine (video visit) versus in person care; bill my insurance or make self-payment for services provided; and responsibility for payment of non-covered services.     Mode of Communication:  Video Conference via [employer]    As the provider I attest to compliance with applicable laws and regulations related to telemedicine.    [facility] Management    Name:  Xavier Landon   Aliases:  XAVIER LANDON W : HUEY LANDON  :  1990   MRN:  [id number]  Treating Provider: Kofi Martin PA-C EdD     Medications at start of visit:  Outpatient Medications Prior to Visit   Medication Sig Dispense Refill     amphetamine-dextroamphetamine (ADDERALL XR) 20 MG 24 hr capsule Take 1 capsule (20 mg) by mouth 2 times daily 60 capsule 0     FLUoxetine (PROZAC) 20 MG capsule Take 1 capsule (20 mg) by mouth daily 30 capsule 3     naltrexone (DEPADE/REVIA) 50 MG tablet Take 1 tablet daily 30 tablet 1     Varenicline Tartrate (CHANTIX PO) Take by mouth 2 times daily (Patient not taking: Reported on 10/14/2021)       No facility-administered medications prior to visit.       Allergies:  No Known Allergies    Today's Visit    Current Complaint: Huey presents for a recheck.  At their last appointment we continued adderall and fluoxetine. He tried the naltrexone, however he developed s/e and discontinued it. He continues to struggle with mood instability and CSB. The patient is not endorsing suicidal/homicidal ideation, active planning,  intent or means.  The patient is not endorsing s/s suggestive of hypomania/kamar or psychosis.      Review of Systems: A review of the following systems was negative: Opthalmic, ENT, Cardiovascular, Gastrointestinal, Genitourinary, Musculoskeletal, Integumentary, Neurological, Endocrine, Respiratory, Hematologic, Immunologic      Chemical History: Denies usage of and cravings for alcohol, cannabis, heroin, methamphetamine, cocaine, prescription opioids/benzodiazepines.      Social History: No interval change    Mental Status Exam: The patient's orientation, memory,  Attention, language and fund of knowledge are at their usual best baseline.  Grooming/Hygiene: adequate  Eye Contact: normal  Psychomotor: normal  Observed mood and affect: appropriate  Judgment: intact, with thoughtful decision making and insight  Speech: normal rate, rhythm, tone and volume  Thought processes: normal, with normal rate of thought  Associations:  No deficiency  Abnormal Thoughts: none      Assessment: This is a 30 yo who carries the diagnosis of MDD, SHAUNA. The patient's questions were answered to their satisfaction regarding the plan as outlined below. Side effects, risks/benefits/alternatives regarding all psychiatric medications were discussed with the patient in detail.          Plan:    Patient Instructions   1)Lithium 150 mg: Take 1 capsule once daily (new medication to help with mood stability, compulsive behavior).    2)Adderall immediate release 10 mg: Take 1 tablet once daily in     3)No other medication changes for now.     4)Contact my office with an update in 2 weeks.  If you are not having side effects, but not noticing any benefit, we will increase to 300 mg/day.     5)We will need to check lithium level, kidney and thyroid function within the next month.     6)See me in 1 month.        Total face-to-face time: 30 min    Counseling/Coordination of care greater than 50%.    Counseling/Coordination of care included:  Educational counseling regarding psychiatric medications, side effects, interactions.                           Answers for HPI/ROS submitted by the patient on 1/19/2022  If you checked off any problems, how difficult have these problems made it for you to do your work, take care of things at home, or get along with other people?: Somewhat difficult  PHQ9 TOTAL SCORE: 6  SHAUNA 7 TOTAL SCORE: 5

## 2022-01-22 ENCOUNTER — HEALTH MAINTENANCE LETTER (OUTPATIENT)
Age: 32
End: 2022-01-22

## 2022-01-31 ENCOUNTER — VIRTUAL VISIT (OUTPATIENT)
Dept: PSYCHOLOGY | Facility: CLINIC | Age: 32
End: 2022-01-31
Payer: COMMERCIAL

## 2022-01-31 DIAGNOSIS — F19.90 SUBSTANCE USE DISORDER: ICD-10-CM

## 2022-01-31 DIAGNOSIS — F33.2 MDD (MAJOR DEPRESSIVE DISORDER), RECURRENT SEVERE, WITHOUT PSYCHOSIS (H): ICD-10-CM

## 2022-01-31 DIAGNOSIS — F91.8 CONDUCT DISORDER, UNDIFFERENTIATED TYPE: Primary | ICD-10-CM

## 2022-01-31 DIAGNOSIS — F90.2 ATTENTION DEFICIT HYPERACTIVITY DISORDER (ADHD), COMBINED TYPE: ICD-10-CM

## 2022-01-31 PROCEDURE — 90834 PSYTX W PT 45 MINUTES: CPT | Mod: 95 | Performed by: PSYCHOLOGIST

## 2022-01-31 PROCEDURE — 99207 PR NO CHARGE LOS: CPT | Performed by: PSYCHOLOGIST

## 2022-01-31 NOTE — PROGRESS NOTES
[facility]  Case Progress Note    1/31/22    Client Name: Xavier Landon  YOB: 1990  MRN:  [id number]  Treating Provider: Aysha Phillip, PhD LP  Type of Session: Individual  Present in Session: Patient    Number of Minutes:  45    Video start time: 2:00  Video end time: 2:45    Telemedicine Visit: The patient's condition can be safely assessed and treated via synchronous audio and visual telemedicine encounter.      Reason for Telemedicine Visit: Covid    Originating Site (Patient Location): Patient's home    Distant Site (Provider Location): [facility] Clinics: [facility]    Consent:  The patient/guardian has verbally consented to: the potential risks and benefits of telemedicine (video visit) versus in person care; bill my insurance or make self-payment for services provided; and responsibility for payment of non-covered services.     Mode of Communication:  Video Conference on Doxy    As the provider I attest to compliance with applicable laws and regulations related to telemedicine.    Health Maintenance Summary - Mental Health Treatment Plan          Overdue - MENTAL HEALTH TX PLAN (Every 11 Months) Overdue since 1/16/2022 02/16/2021  Done                Current Symptoms/Status:  Patient's problems with out of control, driven, impulsive, compulsive sexual behavior began very early in his life.   He feels that his sex life has been stolen from him.  He is extremely distressed about this and does not see the reason to continue live.  No current suicidal plan.  Depressed.  Attention and concentration issues.  Sexual Orientation and gender identity concerns. Sexual functioning issues.    Progress Toward Treatment Goals:   Lost job.  Was regressing after that - but now stabilizing.      Intervention: Modality and Description:  Interpersonal, CBT, relapse prevention.       Response to Intervention:  Feeling such better - now living with friend.   Applying for unemployment,  look for a job.  Working with L[employer] on a weekly basis    Saw Dr. Martin.     Tried naltrexone. Felt like a body high.  Stopped immediately.  Feeling more regulated this past week without drinking.  Has reduced alcohol significantly.  Started back on Prozac 20 mg.  Has been prescribe lithium 150mg.  Will see Kofi mid Feb.     Met a woman.  In a very serious relationship.  Having some sexual functioning issues.  But has been very understanding.  Job gave him confidence.  Things were going well.  Was able to let her in.  Not as much self-loathing.  Feels more self efficacy.  Doesn't feel doomed.    After losing job began drinking recklessly and getting back into CSB patterns.  Been a week not drinking.  Has reduced porn use.    Encouraged him to attend AA meetings.      Continues to use mindfulness, harm reduction.  Feels some progress.    Feeling more hopeful today.  Radical acceptance.    Reinforced the need to stay the course.     Focus on his sobriety and continue with LICSW.    Assignment:  Follow up with psychiatrist re med regimen.  Continue  with Kofi Carter.  Keep his alcohol use under reasonably control.  Get into  AA (seems more willing)       Interactive Complexity:  None.       Diagnosis:  312.89 (F91.8) Other Specified Disruptive, Impulse Control, and Conduct Disorder (Hypersexual Disorder)    Major Depressive Disorder, recurrent, with high anxiety  ADHD  Borderline Personality Disorder  Substance Use Disorder      Plan / Need for Future Services:  Return for therapy in 4-5 weeks.      TREATMENT PLAN  Date of Treatment Plan 21  Name: Xavier Landon  : 1990  Medical Record Number: [id number]  Treating Provider: Aysha Phillip  Type of Session: Individual  Present in Session: Patient    Current Status:  Depression/Mood:  Sad  Decreased Self-Esteem  Hopelessness/Helplessness  Irritable  Decreased Concentration  Suicidal Ideation  Dysphoria    Anxiety/Panic:  Worry  Intrusive  Thoughts    Thought:   Reports no problems or symptoms at this time    Sensorium:  Reports no problems or symptoms at this time    Behavior/Health:  Compulsive  Hyperactive    Chemical Use:  Alcohol Abuse    Sexual Problems:  Compulsive sexual behavior  Erectile Problems      Suicide Risk Assessment:  Assessed Level of Immediate Risk:  YES  Ideation:  YES  Plan:  NO  Means:  NO  Intent:  NO    Homicide Risk Assessment:  Assessed Level of Immediate Risk:  YES  Ideation:  NO  Plan:  NO  Means:  NO  Intent:  NO    Impact of Symptoms on Function:  Decreased Social/Family Function  Decreased Work Function    DSM-5 Diagnoses:       Screening Questionnaires:  Please indicate whether the following screening questionnaires have been completed:  CAGE: Yes  PHQ-9: Yes    SHAUNA-7: Yes  Safety Screening: Yes  WHODAS: No    Problem(s):  1. Compulsive Sexual Behavior.  2. Substance use disorder - alcoholism  3. Depression  4. Personality disorder  5. ADHD    Short-Long Term Goals  Decrease Symptoms  Increase Coping  Change Behavior  Change Cognitions  Increase Psychosocial/Support Functioning  Increase Decision Making  Monitor Psych Status    Interventions  [place] [facility]  Cognitive-Behavioral Therapy  Medication Management Referral    Expected Outcomes and Prognosis  Unknown    Anticipated Treatment Duration:  Unknown    Frequency of Sessions  2 x / Month    Progress Update (for Plan Update Only)  NA:  1st Treatment Planning    Current Psychoactive Medications:  See Psychiatric Treatment Plan  Discharge & Aftercare Goals: To Be Determined.  Care Coordination: Yes    Consent to Treatment:   Patient participated in this treatment planning process and indicated verbal agreement with the above treatment plan.  If patient doesn't sign, indicate why: Telehealth visit due to COVID19 pandemic    Patient Signature: Xavier Landon  Date: 2-16-21     Provider Signature: Aysha Phillip, PhD, LP  Date: 2-16-21          [facility]  Sexual Health  Case Progress Note    1/31/22    Client Name: Xavier Landon  YOB: 1990  MRN:  [id number]  Treating Provider: Aysha Phillip, PhD LP  Type of Session: Individual  Present in Session: Patient    Number of Minutes:  45    Video start time: 3:00  Video end time: 3:45    Telemedicine Visit: The patient's condition can be safely assessed and treated via synchronous audio and visual telemedicine encounter.      Reason for Telemedicine Visit: Covid    Originating Site (Patient Location): Patient's home    Distant Site (Provider Location): [facility] Clinics: [facility]    Consent:  The patient/guardian has verbally consented to: the potential risks and benefits of telemedicine (video visit) versus in person care; bill my insurance or make self-payment for services provided; and responsibility for payment of non-covered services.     Mode of Communication:  Video Conference had to switch  via phone    As the provider I attest to compliance with applicable laws and regulations related to telemedicine.    Health Maintenance Summary - Mental Health Treatment Plan          Overdue - MENTAL HEALTH TX PLAN (Every 11 Months) Overdue since 1/16/2022 02/16/2021  Done                Current Symptoms/Status:  Patient's problems with out of control, driven, impulsive, compulsive sexual behavior began very early in his life.   He feels that his sex life has been stolen from him.  He is extremely distressed about this and does not see the reason to continue live.  No current suicidal plan.  Depressed.  Attention and concentration issues.  Sexual Orientation and gender identity concerns. Sexual functioning issues.    Progress Toward Treatment Goals:   Seems much improved since I saw him last.  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    Intervention: Modality and Description:  Interpersonal, CBT, relapse prevention.       Response to Intervention:  Feeling such better - and still living at  home.  Working with [facility] on a weekly basis    Saw Dr. Martin.  Plans to start   Started back on Prozac 1 - and 1 1/2 week.  Has not felt much improvement yet.  Will start naltrexone this week.  Uses pill minder.    Continues on adderal  40 mg/1 day  - extended release.  Prozac 20 mg.     Holding steady.  Drinking every day (approx 7 drinks - down to 5 - sometimes more) and more on weekends.  Trying to use mindfulness, harm reduction.  Feels some progress.  Was sober for three months before.    Feeling more hopeful today.  Radical acceptance.    Reinforced the need to stay the course.     Focus on his sobriety and continue with LICSW.    Assignment:  Follow up with psychiatrist re med regimen (consider change in med)  Continue  with Kofi Carter.  Keep his alcohol use under reasonably control.  Get into smart recovery or AA (Still resistant)       Interactive Complexity:  None.       Diagnosis:  312.89 (F91.8) Other Specified Disruptive, Impulse Control, and Conduct Disorder (Hypersexual Disorder)    Major Depressive Disorder, recurrent, with high anxiety  ADHD  Borderline Personality Disorder      Plan / Need for Future Services:  Return for therapy in 4-5 weeks.      TREATMENT PLAN  Date of Treatment Plan 21  Name: Xavier Kirknetostephanie  : 1990  Medical Record Number: [id number]  Treating Provider: Aysha Phillip  Type of Session: Individual  Present in Session: Patient    Current Status:  Depression/Mood:  Sad  Decreased Self-Esteem  Hopelessness/Helplessness  Irritable  Decreased Concentration  Suicidal Ideation  Dysphoria    Anxiety/Panic:  Worry  Intrusive Thoughts    Thought:   Reports no problems or symptoms at this time    Sensorium:  Reports no problems or symptoms at this time    Behavior/Health:  Compulsive  Hyperactive    Chemical Use:  Alcohol Abuse    Sexual Problems:  Compulsive sexual behavior  Erectile Problems      Suicide Risk Assessment:  Assessed Level of Immediate Risk:  YES  Ideation:   YES  Plan:  NO  Means:  NO  Intent:  NO    Homicide Risk Assessment:  Assessed Level of Immediate Risk:  YES  Ideation:  NO  Plan:  NO  Means:  NO  Intent:  NO    Impact of Symptoms on Function:  Decreased Social/Family Function  Decreased Work Function    DSM-5 Diagnoses:       Screening Questionnaires:  Please indicate whether the following screening questionnaires have been completed:  CAGE: Yes  PHQ-9: Yes    SHAUNA-7: Yes  Safety Screening: Yes  WHODAS: No    Problem(s):  1. Compulsive Sexual Behavior.  2. Substance use disorder - alcoholism  3. Depression  4. Personality disorder  5. ADHD    Short-Long Term Goals  Decrease Symptoms  Increase Coping  Change Behavior  Change Cognitions  Increase Psychosocial/Support Functioning  Increase Decision Making  Monitor Psych Status    Interventions  [place] [facility]  Cognitive-Behavioral Therapy  Medication Management Referral    Expected Outcomes and Prognosis  Unknown    Anticipated Treatment Duration:  Unknown    Frequency of Sessions  2 x / Month    Progress Update (for Plan Update Only)  NA:  1st Treatment Planning    Current Psychoactive Medications:  See Psychiatric Treatment Plan  Discharge & Aftercare Goals: To Be Determined.  Care Coordination: Yes    Consent to Treatment:   Patient participated in this treatment planning process and indicated verbal agreement with the above treatment plan.  If patient doesn't sign, indicate why: Telehealth visit due to COVID19 pandemic    Patient Signature: Xavier Landon  Date: 2-16-21     Provider Signature: Aysha Phillip, PhD, LP  Date: 2-16-21

## 2022-02-03 DIAGNOSIS — F90.2 ATTENTION DEFICIT HYPERACTIVITY DISORDER (ADHD), COMBINED TYPE: ICD-10-CM

## 2022-02-03 DIAGNOSIS — F91.8 CONDUCT DISORDER, UNDIFFERENTIATED TYPE: ICD-10-CM

## 2022-02-03 RX ORDER — DEXTROAMPHETAMINE SACCHARATE, AMPHETAMINE ASPARTATE MONOHYDRATE, DEXTROAMPHETAMINE SULFATE AND AMPHETAMINE SULFATE 5; 5; 5; 5 MG/1; MG/1; MG/1; MG/1
20 CAPSULE, EXTENDED RELEASE ORAL 2 TIMES DAILY
Qty: 60 CAPSULE | Refills: 0 | Status: SHIPPED | OUTPATIENT
Start: 2022-02-03 | End: 2022-02-24

## 2022-02-03 NOTE — TELEPHONE ENCOUNTER
Requested Medication: Adderall XR  Dose: 20mg  Quantity: 60  Refills: 0    Take 1 capsule (20mg) twice daily    Last seen at [facility]: 1/19/22 - 1 mo follow up  Next Appointment with Provider: 2/24/2022    Lois Baird CMA

## 2022-02-24 ENCOUNTER — VIRTUAL VISIT (OUTPATIENT)
Dept: PSYCHIATRY | Facility: CLINIC | Age: 32
End: 2022-02-24
Payer: COMMERCIAL

## 2022-02-24 DIAGNOSIS — F91.8 CONDUCT DISORDER, UNDIFFERENTIATED TYPE: ICD-10-CM

## 2022-02-24 DIAGNOSIS — F90.2 ATTENTION DEFICIT HYPERACTIVITY DISORDER (ADHD), COMBINED TYPE: ICD-10-CM

## 2022-02-24 PROCEDURE — 99207 PR NO BILLABLE SERVICE THIS VISIT: CPT | Performed by: PHYSICIAN ASSISTANT

## 2022-02-24 RX ORDER — DEXTROAMPHETAMINE SACCHARATE, AMPHETAMINE ASPARTATE, DEXTROAMPHETAMINE SULFATE AND AMPHETAMINE SULFATE 2.5; 2.5; 2.5; 2.5 MG/1; MG/1; MG/1; MG/1
TABLET ORAL
Qty: 30 TABLET | Refills: 0 | Status: SHIPPED | OUTPATIENT
Start: 2022-02-24 | End: 2022-03-22

## 2022-02-24 RX ORDER — DEXTROAMPHETAMINE SACCHARATE, AMPHETAMINE ASPARTATE MONOHYDRATE, DEXTROAMPHETAMINE SULFATE AND AMPHETAMINE SULFATE 5; 5; 5; 5 MG/1; MG/1; MG/1; MG/1
20 CAPSULE, EXTENDED RELEASE ORAL 2 TIMES DAILY
Qty: 60 CAPSULE | Refills: 0 | Status: SHIPPED | OUTPATIENT
Start: 2022-02-24 | End: 2022-03-22

## 2022-02-24 ASSESSMENT — ANXIETY QUESTIONNAIRES
5. BEING SO RESTLESS THAT IT IS HARD TO SIT STILL: SEVERAL DAYS
GAD7 TOTAL SCORE: 11
3. WORRYING TOO MUCH ABOUT DIFFERENT THINGS: MORE THAN HALF THE DAYS
7. FEELING AFRAID AS IF SOMETHING AWFUL MIGHT HAPPEN: MORE THAN HALF THE DAYS
1. FEELING NERVOUS, ANXIOUS, OR ON EDGE: MORE THAN HALF THE DAYS
2. NOT BEING ABLE TO STOP OR CONTROL WORRYING: SEVERAL DAYS
6. BECOMING EASILY ANNOYED OR IRRITABLE: SEVERAL DAYS

## 2022-02-24 ASSESSMENT — PATIENT HEALTH QUESTIONNAIRE - PHQ9
SUM OF ALL RESPONSES TO PHQ QUESTIONS 1-9: 18
5. POOR APPETITE OR OVEREATING: MORE THAN HALF THE DAYS

## 2022-02-25 ASSESSMENT — ANXIETY QUESTIONNAIRES: GAD7 TOTAL SCORE: 11

## 2022-02-28 ENCOUNTER — VIRTUAL VISIT (OUTPATIENT)
Dept: PSYCHOLOGY | Facility: CLINIC | Age: 32
End: 2022-02-28
Payer: COMMERCIAL

## 2022-02-28 DIAGNOSIS — F19.90 SUBSTANCE USE DISORDER: ICD-10-CM

## 2022-02-28 DIAGNOSIS — F91.8 CONDUCT DISORDER, UNDIFFERENTIATED TYPE: Primary | ICD-10-CM

## 2022-02-28 DIAGNOSIS — F90.2 ATTENTION DEFICIT HYPERACTIVITY DISORDER (ADHD), COMBINED TYPE: ICD-10-CM

## 2022-02-28 PROCEDURE — 99207 PR NO CHARGE LOS: CPT | Performed by: PSYCHOLOGIST

## 2022-02-28 PROCEDURE — 90834 PSYTX W PT 45 MINUTES: CPT | Mod: 95 | Performed by: PSYCHOLOGIST

## 2022-02-28 NOTE — PROGRESS NOTES
[facility]  Case Progress Note    2/28/22    Client Name: Xavier Landon  YOB: 1990  MRN:  [id number]  Treating Provider: Aysha Phillip, PhD LP  Type of Session: Individual  Present in Session: Patient    Number of Minutes:  45    Video start time: 12:00  Video end time: 12:45    Telemedicine Visit: The patient's condition can be safely assessed and treated via synchronous audio and visual telemedicine encounter.      Reason for Telemedicine Visit: Covid    Originating Site (Patient Location): Patient's home    Distant Site (Provider Location): [facility]: [facility]    Consent:  The patient/guardian has verbally consented to: the potential risks and benefits of telemedicine (video visit) versus in person care; bill my insurance or make self-payment for services provided; and responsibility for payment of non-covered services.     Mode of Communication:  Video Conference on Doxy    As the provider I attest to compliance with applicable laws and regulations related to telemedicine.    Health Maintenance Summary - Mental Health Treatment Plan          Overdue - MENTAL HEALTH TX PLAN (Every 11 Months) Overdue since 1/16/2022 02/16/2021  Done                Current Symptoms/Status:  Patient's problems with out of control, driven, impulsive, compulsive sexual behavior began very early in his life.   He feels that his sex life has been stolen from him.  He is extremely distressed about this and does not see the reason to continue live.  No current suicidal plan.  Depressed.  Attention and concentration issues.  Sexual Orientation and gender identity concerns. Sexual functioning issues.    Progress Toward Treatment Goals:   Regressed.  Drinking more.  CSB increased.      Intervention: Modality and Description:  Interpersonal, CBT, relapse prevention.       Response to Intervention:  Drinking still out of control.  Went to one AA meeting - not enough.  Encouraged him to think  about IOP, inpatient or much more regular AA.    Saw Dr. Martin.   Prescribed lithium.  Will start today.  Started back on Prozac 20 mg.  Has been prescribe lithium 150mg.        Relationship going reasonably well.  Encouraged him to talk to her about Alanon.    Job gave him confidence.  Things were going well.  Was able to let her in.  Not as much self-loathing.  Feels more self efficacy.  Doesn't feel doomed.    Reinforced the need to stay the course.     .    Assignment:   Focus on his sobriety and continue with [facility]   Get into  AA (seems more willing)       Interactive Complexity:  None.       Diagnosis:  312.89 (F91.8) Other Specified Disruptive, Impulse Control, and Conduct Disorder (Hypersexual Disorder)    Major Depressive Disorder, recurrent, with high anxiety  ADHD  Borderline Personality Disorder  Substance Use Disorder      Plan / Need for Future Services:  Return for therapy in 4-5 weeks.      TREATMENT PLAN  Date of Treatment Plan 21  Name: Xavier Landon  : 1990  Medical Record Number: [id number]  Treating Provider: Aysha Phillip  Type of Session: Individual  Present in Session: Patient    Current Status:  Depression/Mood:  Sad  Decreased Self-Esteem  Hopelessness/Helplessness  Irritable  Decreased Concentration  Suicidal Ideation  Dysphoria    Anxiety/Panic:  Worry  Intrusive Thoughts    Thought:   Reports no problems or symptoms at this time    Sensorium:  Reports no problems or symptoms at this time    Behavior/Health:  Compulsive  Hyperactive    Chemical Use:  Alcohol Abuse    Sexual Problems:  Compulsive sexual behavior  Erectile Problems      Suicide Risk Assessment:  Assessed Level of Immediate Risk:  YES  Ideation:  YES  Plan:  NO  Means:  NO  Intent:  NO    Homicide Risk Assessment:  Assessed Level of Immediate Risk:  YES  Ideation:  NO  Plan:  NO  Means:  NO  Intent:  NO    Impact of Symptoms on Function:  Decreased Social/Family Function  Decreased Work Function    DSM-5  Diagnoses:       Screening Questionnaires:  Please indicate whether the following screening questionnaires have been completed:  CAGE: Yes  PHQ-9: Yes    SHAUNA-7: Yes  Safety Screening: Yes  WHODAS: No    Problem(s):  1. Compulsive Sexual Behavior.  2. Substance use disorder - alcoholism  3. Depression  4. Personality disorder  5. ADHD    Short-Long Term Goals  Decrease Symptoms  Increase Coping  Change Behavior  Change Cognitions  Increase Psychosocial/Support Functioning  Increase Decision Making  Monitor Psych Status    Interventions  [facility]  Cognitive-Behavioral Therapy  Medication Management Referral    Expected Outcomes and Prognosis  Unknown    Anticipated Treatment Duration:  Unknown    Frequency of Sessions  2 x / Month    Progress Update (for Plan Update Only)  NA:  1st Treatment Planning    Current Psychoactive Medications:  See Psychiatric Treatment Plan  Discharge & Aftercare Goals: To Be Determined.  Care Coordination: Yes    Consent to Treatment:   Patient participated in this treatment planning process and indicated verbal agreement with the above treatment plan.  If patient doesn't sign, indicate why: Telehealth visit due to COVID19 pandemic    Patient Signature: Xavier Barbi  Date: 2-16-21     Provider Signature: Aysha Phillip, PhD, LP  Date: 2-16-21          [facility]  Case Progress Note    2/28/22    Client Name: Xavier Landon  YOB: 1990  MRN:  [id number]  Treating Provider: Aysha Phillip, PhD LP  Type of Session: Individual  Present in Session: Patient    Number of Minutes:  45    Video start time: 3:00  Video end time: 3:45    Telemedicine Visit: The patient's condition can be safely assessed and treated via synchronous audio and visual telemedicine encounter.      Reason for Telemedicine Visit: Covid    Originating Site (Patient Location): Patient's home    Distant Site (Provider Location): [facility]: [facility]    Consent:  The  patient/guardian has verbally consented to: the potential risks and benefits of telemedicine (video visit) versus in person care; bill my insurance or make self-payment for services provided; and responsibility for payment of non-covered services.     Mode of Communication:  Video Conference had to switch  via phone    As the provider I attest to compliance with applicable laws and regulations related to telemedicine.    Health Maintenance Summary - Mental Health Treatment Plan          Overdue - MENTAL HEALTH TX PLAN (Every 11 Months) Overdue since 1/16/2022 02/16/2021  Done                Current Symptoms/Status:  Patient's problems with out of control, driven, impulsive, compulsive sexual behavior began very early in his life.   He feels that his sex life has been stolen from him.  He is extremely distressed about this and does not see the reason to continue live.  No current suicidal plan.  Depressed.  Attention and concentration issues.  Sexual Orientation and gender identity concerns. Sexual functioning issues.    Progress Toward Treatment Goals:   Seems much improved since I saw him last.  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    Intervention: Modality and Description:  Interpersonal, CBT, relapse prevention.       Response to Intervention:  Feeling such better - and still living at home.  Working with [facility] on a weekly basis    Saw Dr. Martin.  Plans to start   Started back on Prozac 1 - and 1 1/2 week.  Has not felt much improvement yet.  Will start naltrexone this week.  Uses pill minder.    Continues on adderal  40 mg/1 day  - extended release.  Prozac 20 mg.     Holding steady.  Drinking every day (approx 7 drinks - down to 5 - sometimes more) and more on weekends.  Trying to use mindfulness, harm reduction.  Feels some progress.  Was sober for three months before.    Feeling more hopeful today.  Radical acceptance.    Reinforced the need to stay the course.     Focus on his sobriety and continue  with [facility].    Assignment:  Follow up with psychiatrist re med regimen (consider change in med)  Continue  with Kofi Carter.  Keep his alcohol use under reasonably control.  Get into smart recovery or AA (Still resistant)       Interactive Complexity:  None.       Diagnosis:  312.89 (F91.8) Other Specified Disruptive, Impulse Control, and Conduct Disorder (Hypersexual Disorder)    Major Depressive Disorder, recurrent, with high anxiety  ADHD  Borderline Personality Disorder      Plan / Need for Future Services:  Return for therapy in 4-5 weeks.      TREATMENT PLAN  Date of Treatment Plan 21  Name: Xavier BIANKA Barbi  : 1990  Medical Record Number: [id number]  Treating Provider: Aysha Phillip  Type of Session: Individual  Present in Session: Patient    Current Status:  Depression/Mood:  Sad  Decreased Self-Esteem  Hopelessness/Helplessness  Irritable  Decreased Concentration  Suicidal Ideation  Dysphoria    Anxiety/Panic:  Worry  Intrusive Thoughts    Thought:   Reports no problems or symptoms at this time    Sensorium:  Reports no problems or symptoms at this time    Behavior/Health:  Compulsive  Hyperactive    Chemical Use:  Alcohol Abuse    Sexual Problems:  Compulsive sexual behavior  Erectile Problems      Suicide Risk Assessment:  Assessed Level of Immediate Risk:  YES  Ideation:  YES  Plan:  NO  Means:  NO  Intent:  NO    Homicide Risk Assessment:  Assessed Level of Immediate Risk:  YES  Ideation:  NO  Plan:  NO  Means:  NO  Intent:  NO    Impact of Symptoms on Function:  Decreased Social/Family Function  Decreased Work Function    DSM-5 Diagnoses:       Screening Questionnaires:  Please indicate whether the following screening questionnaires have been completed:  CAGE: Yes  PHQ-9: Yes    SHAUNA-7: Yes  Safety Screening: Yes  WHODAS: No    Problem(s):  1. Compulsive Sexual Behavior.  2. Substance use disorder - alcoholism  3. Depression  4. Personality disorder  5. ADHD    Short-Long Term  Goals  Decrease Symptoms  Increase Coping  Change Behavior  Change Cognitions  Increase Psychosocial/Support Functioning  Increase Decision Making  Monitor Psych Status    Interventions  [facility]  Cognitive-Behavioral Therapy  Medication Management Referral    Expected Outcomes and Prognosis  Unknown    Anticipated Treatment Duration:  Unknown    Frequency of Sessions  2 x / Month    Progress Update (for Plan Update Only)  NA:  1st Treatment Planning    Current Psychoactive Medications:  See Psychiatric Treatment Plan  Discharge & Aftercare Goals: To Be Determined.  Care Coordination: Yes    Consent to Treatment:   Patient participated in this treatment planning process and indicated verbal agreement with the above treatment plan.  If patient doesn't sign, indicate why: Telehealth visit due to COVID19 pandemic    Patient Signature: Xavier Landon  Date: 2-16-21     Provider Signature: Aysha Phillip, PhD, LP  Date: 2-16-21

## 2022-03-22 DIAGNOSIS — F91.8 CONDUCT DISORDER, UNDIFFERENTIATED TYPE: ICD-10-CM

## 2022-03-22 DIAGNOSIS — F90.2 ATTENTION DEFICIT HYPERACTIVITY DISORDER (ADHD), COMBINED TYPE: ICD-10-CM

## 2022-03-22 RX ORDER — DEXTROAMPHETAMINE SACCHARATE, AMPHETAMINE ASPARTATE, DEXTROAMPHETAMINE SULFATE AND AMPHETAMINE SULFATE 2.5; 2.5; 2.5; 2.5 MG/1; MG/1; MG/1; MG/1
TABLET ORAL
Qty: 30 TABLET | Refills: 0 | Status: SHIPPED | OUTPATIENT
Start: 2022-03-22 | End: 2022-04-27

## 2022-03-22 RX ORDER — DEXTROAMPHETAMINE SACCHARATE, AMPHETAMINE ASPARTATE MONOHYDRATE, DEXTROAMPHETAMINE SULFATE AND AMPHETAMINE SULFATE 5; 5; 5; 5 MG/1; MG/1; MG/1; MG/1
20 CAPSULE, EXTENDED RELEASE ORAL 2 TIMES DAILY
Qty: 60 CAPSULE | Refills: 0 | Status: SHIPPED | OUTPATIENT
Start: 2022-03-22 | End: 2022-04-27

## 2022-03-22 NOTE — TELEPHONE ENCOUNTER
Requested Medication: Adderall  Dose: 10mg  Quantity: 30  Refills: 0    Take 1 tablet in the afternoon for breakthrough sx  _____    Requested Medication: Adderall   Dose: 20mg  Quantity: 60  Refills: 0    Take 1 (20mg) capsule bid    Last seen at [facility]: 2/24 -   Next Appointment with Provider:  Visit date not found      Lois Baird CMA

## 2022-03-23 DIAGNOSIS — F91.8 CONDUCT DISORDER, UNDIFFERENTIATED TYPE: ICD-10-CM

## 2022-03-23 RX ORDER — LITHIUM CARBONATE 150 MG/1
CAPSULE ORAL
Qty: 45 CAPSULE | Refills: 1 | Status: SHIPPED | OUTPATIENT
Start: 2022-03-23 | End: 2022-04-06

## 2022-03-23 NOTE — TELEPHONE ENCOUNTER
Requested Medication: Lithium   Dose: 150mg  Quantity: 45  Refills: 1    Take 1 capsule daily    Last seen at [facility]: 2/24 - 1 mo follow up  Next Appointment with Provider: 3/31    Lois Baird CMA    Patient still taking the Lithium once daily    Lab orders are pended

## 2022-03-28 ENCOUNTER — VIRTUAL VISIT (OUTPATIENT)
Dept: PSYCHOLOGY | Facility: CLINIC | Age: 32
End: 2022-03-28
Payer: COMMERCIAL

## 2022-03-28 DIAGNOSIS — F91.8 CONDUCT DISORDER, UNDIFFERENTIATED TYPE: Primary | ICD-10-CM

## 2022-03-28 DIAGNOSIS — F90.2 ATTENTION DEFICIT HYPERACTIVITY DISORDER (ADHD), COMBINED TYPE: ICD-10-CM

## 2022-03-28 DIAGNOSIS — F19.90 SUBSTANCE USE DISORDER: ICD-10-CM

## 2022-03-28 PROCEDURE — 90832 PSYTX W PT 30 MINUTES: CPT | Mod: 95 | Performed by: PSYCHOLOGIST

## 2022-03-28 PROCEDURE — 99207 PR NO CHARGE LOS: CPT | Performed by: PSYCHOLOGIST

## 2022-03-28 NOTE — PROGRESS NOTES
"[facility] - Progress Note    Date of Service: 3/28/22   Name: Xavier Landon  : 1990  Medical Record Number: [id number]  Treating Provider: Aysha Phillip  Type of Session: Individual  Present in Session: Patient  Session Start and Stop Time: 3:00-3:30  Number of Minutes:  30 minute    SERVICE MODALITY:  Phone Visit:      Provider verified identity through the following two step process.  Patient provided:  Patient is known previously to provider    The patient has been notified of the following:      \"We have found that certain health care needs can be provided without the need for a face to face visit.  This service lets us provide the care you need with a phone conversation.       I will have full access to your [facility] medical record during this entire phone call.   I will be taking notes for your medical record.      Since this is like an office visit, we will bill your insurance company for this service.       There are potential benefits and risks of telephone visits (e.g. limits to patient confidentiality) that differ from in-person visits.?Confidentiality still applies for telephone services, and nobody will record the visit.  It is important to be in a quiet, private space that is free of distractions (including cell phone or other devices) during the visit.??      If during the course of the call I believe a telephone visit is not appropriate, you will not be charged for this service\"     Consent has been obtained for this service by care team member: Yes     DSM-5 Diagnoses:  312.89 (F91.8) Other Specified Disruptive, Impulse Control, and Conduct Disorder (Hypersexual Disorder)    Substance Use Disorder  Major Depression, recurrent, moderate.      Current Reported Symptoms and Status update:  Still struggling with boundaries around sex and alcohol.  Depression seems to be a bit better.    Progress Toward Treatment Goals:   Minimal progress - " CONTEMPLATION (Considering change and yet undecided); Intervened by assessing the negative and positive thinking (ambivalence) about behavior change    Therapeutic Interventions/Treatment Strategies:    Area(s) of treatment focus addressed in this session included Symptom Management.        Treatment modalities used include Motivational Interviewing, Cognitive Behavioral Therapy and Interpersonal.  Support, Limit/Boundaries, Motivational Enhancement Therapy and Cognitive Behavioral Therapy    Patient Response:   Patient responded to session by accepting feedback, listening and being attentive  Possible barriers to participation / learning include: unresponsive to treatment strategies    Current Mental Status Exam:   Appearance:  could not assess   Eye Contact:  could not asess   Attitude / Demeanor: Cooperative  Pleasant  Speech      Rate / Production: Normal/ Responsive      Volume:  Normal  volume  Orientation:  All  Mood:   Anhedonia  Affect:   Flat   Thought Content: Clear   Insight:   Fair       Plan/Need for Future Services:  Return for therapy in 1 month  to treat diagnosed problems.    Patient has a current master individualized treatment plan.  See Epic treatment plan for more information.    Assignment:  Go to AA meetings.  Get sponsor    Interactive Complexity:  There are four specific communication difficulties that complicate the work of the primary psychiatric procedure.  Interactive complexity (+[id number]) may be reported when at least one of these difficulties is present.    Communication difficulties present during current the psychiatric procedure include:  1. None.

## 2022-04-06 ENCOUNTER — VIRTUAL VISIT (OUTPATIENT)
Dept: PSYCHIATRY | Facility: CLINIC | Age: 32
End: 2022-04-06
Payer: COMMERCIAL

## 2022-04-06 DIAGNOSIS — F91.8 CONDUCT DISORDER, UNDIFFERENTIATED TYPE: ICD-10-CM

## 2022-04-06 DIAGNOSIS — F60.3 BORDERLINE PERSONALITY DISORDER (H): ICD-10-CM

## 2022-04-06 DIAGNOSIS — F33.2 MAJOR DEPRESSIVE DISORDER, RECURRENT EPISODE, SEVERE WITH ANXIOUS DISTRESS (H): ICD-10-CM

## 2022-04-06 DIAGNOSIS — F41.1 GENERALIZED ANXIETY DISORDER: ICD-10-CM

## 2022-04-06 PROCEDURE — 99214 OFFICE O/P EST MOD 30 MIN: CPT | Mod: 95 | Performed by: PHYSICIAN ASSISTANT

## 2022-04-06 RX ORDER — LITHIUM CARBONATE 300 MG/1
300 CAPSULE ORAL
Qty: 30 CAPSULE | Refills: 1 | Status: SHIPPED | OUTPATIENT
Start: 2022-04-06 | End: 2022-04-21

## 2022-04-06 ASSESSMENT — ANXIETY QUESTIONNAIRES
7. FEELING AFRAID AS IF SOMETHING AWFUL MIGHT HAPPEN: MORE THAN HALF THE DAYS
1. FEELING NERVOUS, ANXIOUS, OR ON EDGE: MORE THAN HALF THE DAYS
3. WORRYING TOO MUCH ABOUT DIFFERENT THINGS: MORE THAN HALF THE DAYS
6. BECOMING EASILY ANNOYED OR IRRITABLE: MORE THAN HALF THE DAYS
GAD7 TOTAL SCORE: 10
5. BEING SO RESTLESS THAT IT IS HARD TO SIT STILL: NOT AT ALL
7. FEELING AFRAID AS IF SOMETHING AWFUL MIGHT HAPPEN: MORE THAN HALF THE DAYS
2. NOT BEING ABLE TO STOP OR CONTROL WORRYING: SEVERAL DAYS
4. TROUBLE RELAXING: SEVERAL DAYS
GAD7 TOTAL SCORE: 10
GAD7 TOTAL SCORE: 10

## 2022-04-06 ASSESSMENT — PATIENT HEALTH QUESTIONNAIRE - PHQ9
10. IF YOU CHECKED OFF ANY PROBLEMS, HOW DIFFICULT HAVE THESE PROBLEMS MADE IT FOR YOU TO DO YOUR WORK, TAKE CARE OF THINGS AT HOME, OR GET ALONG WITH OTHER PEOPLE: SOMEWHAT DIFFICULT
SUM OF ALL RESPONSES TO PHQ QUESTIONS 1-9: 20
SUM OF ALL RESPONSES TO PHQ QUESTIONS 1-9: 20

## 2022-04-07 ASSESSMENT — PATIENT HEALTH QUESTIONNAIRE - PHQ9: SUM OF ALL RESPONSES TO PHQ QUESTIONS 1-9: 20

## 2022-04-07 ASSESSMENT — ANXIETY QUESTIONNAIRES: GAD7 TOTAL SCORE: 10

## 2022-04-07 NOTE — PROGRESS NOTES
Video start time: 7pm  Video end time: 710    Telemedicine Visit: The patient's condition can be safely assessed and treated via synchronous audio and visual telemedicine encounter.      Reason for Telemedicine Visit: Services only offered telehealth    Originating Site (Patient Location): Patient's home    Distant Site (Provider Location): Provider Remote Setting- Home Office    Consent:  The patient/guardian has verbally consented to: the potential risks and benefits of telemedicine (video visit) versus in person care; bill my insurance or make self-payment for services provided; and responsibility for payment of non-covered services.     Mode of Communication:  Video Conference via [email or web address]    As the provider I attest to compliance with applicable laws and regulations related to telemedicine.    [facility]    Name:  Xavier Landon   Aliases:  XAVIER LANDON W : HUEY LANDON  :  1990   MRN:  [id number]  Treating Provider: Kofi Martin PA-C EdD     Medications at start of visit:  Outpatient Medications Prior to Visit   Medication Sig Dispense Refill     amphetamine-dextroamphetamine (ADDERALL XR) 20 MG 24 hr capsule Take 1 capsule (20 mg) by mouth 2 times daily 60 capsule 0     amphetamine-dextroamphetamine (ADDERALL) 10 MG tablet Take 1 tablet daily in afternoon for breakthrough sx 30 tablet 0     FLUoxetine (PROZAC) 20 MG capsule Take 1 capsule (20 mg) by mouth daily 30 capsule 3     lithium (ESKALITH) 150 MG capsule Take 1 capsule once daily for 2 weeks, then take 2 capsules daily 45 capsule 1     No facility-administered medications prior to visit.       Allergies:  No Known Allergies    Today's Visit    Current Complaint: Huey presents for a recheck.  At their last appointment no changes were made to his regimen.  He is reporting no s/e from the lithium, however he is unsure as to efficacy.  He reports overall stability of psychiatric sx, however he would be  interested in trying a higher dosage of lithium. The patient is not endorsing suicidal/homicidal ideation, active planning, intent or means.  The patient is not endorsing s/s suggestive of hypomania/kamar or psychosis.  The patient is endorsing good compliance with and efficacy of their medication regimen without s/e.       Review of Systems: A review of the following systems was negative: Opthalmic, ENT, Cardiovascular, Gastrointestinal, Genitourinary, Musculoskeletal, Integumentary, Neurological, Endocrine, Respiratory, Hematologic, Immunologic      Chemical History: Denies usage of and cravings for alcohol, cannabis, heroin, methamphetamine, cocaine, prescription opioids/benzodiazepines.      Social History: Huey continues to look for work, but has a likely prospect.     Mental Status Exam: The patient's orientation, memory,  Attention, language and fund of knowledge are at their usual best baseline.  Grooming/Hygiene: adequate  Eye Contact: normal  Psychomotor: normal  Observed mood and affect: appropriate  Judgment: intact, with thoughtful decision making and insight  Speech: normal rate, rhythm, tone and volume  Thought processes: normal, with normal rate of thought  Associations:  No deficiency  Abnormal Thoughts: none      Assessment: This is a 30 yo man who carries the diagnosis of MDD, BPD. The patient's questions were answered to their satisfaction regarding the plan as outlined below. Side effects, risks/benefits/alternatives regarding all psychiatric medications were discussed with the patient in detail.          Plan:    Patient Instructions   1)Lithium 300 mg: Take 1 capsule once daily (dosage increase)    2)No other medication changes for now.      3)See me in 1 month.  Contact the clinic with any questions or concerns.        Total face-to-face time: 30 min    Counseling/Coordination of care greater than 50%.    Counseling/Coordination of care included: Educational counseling regarding psychiatric  medications, side effects, interactions.                           Answers for HPI/ROS submitted by the patient on 4/6/2022  If you checked off any problems, how difficult have these problems made it for you to do your work, take care of things at home, or get along with other people?: Somewhat difficult  PHQ9 TOTAL SCORE: 20  SHAUNA 7 TOTAL SCORE: 10

## 2022-04-07 NOTE — PATIENT INSTRUCTIONS
1)Lithium 300 mg: Take 1 capsule once daily (dosage increase)    2)No other medication changes for now.      3)See me in 1 month.  Contact the clinic with any questions or concerns.

## 2022-04-21 DIAGNOSIS — F91.8 CONDUCT DISORDER, UNDIFFERENTIATED TYPE: ICD-10-CM

## 2022-04-21 RX ORDER — LITHIUM CARBONATE 300 MG/1
300 CAPSULE ORAL
Qty: 90 CAPSULE | Refills: 0 | Status: SHIPPED | OUTPATIENT
Start: 2022-04-21 | End: 2022-06-16

## 2022-04-21 NOTE — TELEPHONE ENCOUNTER
Requested Medication: Lithium  Dose: 300mg  Quantity: 90  Refills: 0    Take 1 (300mg) capsule three times daily with meals    Last seen at [facility]: 4/6 - 1 mo follow up  Next Appointment with Provider: 6/16    Lois Baird CMA

## 2022-04-25 ENCOUNTER — VIRTUAL VISIT (OUTPATIENT)
Dept: FAMILY MEDICINE | Facility: CLINIC | Age: 32
End: 2022-04-25
Payer: COMMERCIAL

## 2022-04-25 DIAGNOSIS — R11.0 NAUSEA: Primary | ICD-10-CM

## 2022-04-25 DIAGNOSIS — F91.8 CONDUCT DISORDER, UNDIFFERENTIATED TYPE: ICD-10-CM

## 2022-04-25 DIAGNOSIS — F90.2 ATTENTION DEFICIT HYPERACTIVITY DISORDER (ADHD), COMBINED TYPE: ICD-10-CM

## 2022-04-25 PROCEDURE — 99214 OFFICE O/P EST MOD 30 MIN: CPT | Mod: 95 | Performed by: FAMILY MEDICINE

## 2022-04-25 RX ORDER — ONDANSETRON 4 MG/1
4-8 TABLET, FILM COATED ORAL EVERY 8 HOURS PRN
Qty: 20 TABLET | Refills: 1 | Status: SHIPPED | OUTPATIENT
Start: 2022-04-25 | End: 2022-05-09

## 2022-04-25 ASSESSMENT — PATIENT HEALTH QUESTIONNAIRE - PHQ9
SUM OF ALL RESPONSES TO PHQ QUESTIONS 1-9: 14
10. IF YOU CHECKED OFF ANY PROBLEMS, HOW DIFFICULT HAVE THESE PROBLEMS MADE IT FOR YOU TO DO YOUR WORK, TAKE CARE OF THINGS AT HOME, OR GET ALONG WITH OTHER PEOPLE: SOMEWHAT DIFFICULT
SUM OF ALL RESPONSES TO PHQ QUESTIONS 1-9: 14

## 2022-04-25 ASSESSMENT — ENCOUNTER SYMPTOMS
HEMATOCHEZIA: 0
CONSTITUTIONAL NEGATIVE: 1
NAUSEA: 1
HEARTBURN: 0
DIARRHEA: 1
VOMITING: 1

## 2022-04-25 NOTE — PROGRESS NOTES
Huey is a 31 year old who is being evaluated via a billable video visit.      How would you like to obtain your AVS? MyChart  If the video visit is dropped, the invitation should be resent by: Text to cell phone: [phone]  Will anyone else be joining your video visit? No    Video Start Time: 2:31 PM    Assessment and Plan    (R11.0) Nausea  (primary encounter diagnosis)  Comment: does't sound c/w GB, more likley alcoholic gastritis vs pancreatitis.  Advised to eat low fat diet and reduce alcohol.  Will arrange for testing and follow up locally.  Advised to seek emergy care if sx worsen.  Plan: Comprehensive metabolic panel (BMP + Alb, Alk         Phos, ALT, AST, Total. Bili, TP), Lipase, CBC         with platelets, ondansetron (ZOFRAN) 4 MG         tablet              RTC in 1w prn    Ej Sweeney MD      Subjective   Huey is a 31 year old who presents for the following health issues     History of Present Illness       Mental Health Follow-up:                    Today's PHQ-9         PHQ-9 Total Score: 14  PHQ-9 Q9 Thoughts of better off dead/self-harm past 2 weeks :   (P) More than half the days  Thoughts of suicide or self harm: (P) No  Self-harm Plan:     Self-harm Action:       Safety concerns for self or others: (P) No    How difficult have these problems made it for you to do your work, take care of things at home, or get along with other people: Somewhat difficult        Reason for visit:  Persistent, increasing nausea and vomiting  Symptom onset:  1-2 weeks ago  Symptoms include:  Persistent nausea, vomiting, an ache right below my ribcage on the right side  Symptom intensity:  Moderate  Symptom progression:  Improving  Had these symptoms before:  No  What makes it worse:  No  What makes it better:  Alcohol and cannabis soothe them a bit.    He eats 2-3 servings of fruits and vegetables daily.He consumes 3 sweetened beverage(s) daily.He exercises with enough effort to increase his heart rate 20 to 29  minutes per day.  He exercises with enough effort to increase his heart rate 4 days per week.   He is taking medications regularly.         Nausea for a week, then vomiting.  Using some pepto bismol.  Did try some nausea Rx from room mate who has UC.  Feels he has a gallstone because pain is more RUQ.  Has not really had anything to eat in the last couple of days, still feeling nausea.  Drinking EtOH seems to help and does admit to overusing. Rare MJ, tried that as well and it didn't help.  Does feel better today than he did yesterday.  Did have some diarrhea this am, but had really been taking lots of vitamin C.  No bloody emesis or black tarry stool.     Lives in [place], does not have primary there.      Review of Systems   Constitutional: Negative.    Gastrointestinal: Positive for diarrhea, nausea and vomiting. Negative for heartburn and hematochezia.   Genitourinary: Negative.             Objective    Vitals - Patient Reported  Pain Score: Moderate Pain (4)      Vitals:  No vitals were obtained today due to virtual visit.    Physical Exam   GENERAL: Healthy, alert and no distress  EYES: Eyes grossly normal to inspection.  No discharge or erythema, or obvious scleral/conjunctival abnormalities.  RESP: No audible wheeze, cough, or visible cyanosis.  No visible retractions or increased work of breathing.    SKIN: Visible skin clear. No significant rash, abnormal pigmentation or lesions.  NEURO: Cranial nerves grossly intact.  Mentation and speech appropriate for age.  PSYCH: Mentation appears normal, affect normal/bright, judgement and insight intact, normal speech and appearance well-groomed.                Video-Visit Details    Type of service:  Video Visit    Video End Time:2:45 PM    Originating Location (pt. Location): Home    Distant Location (provider location):  [facility]     Platform used for Video Visit: [email or web address]

## 2022-04-25 NOTE — TELEPHONE ENCOUNTER
Requested Medication: Adderall 10mg  Dose: 10mg  Quantity: 30  Refills: 0    Take 1 tablet in afternoon for breakthrough sx  _____    Requested Medication:  Adderall XR 20mg  Dose: 20mg  Quantity: 60  Refills: 0    Take 1 capsule (20mg) 2 times daily    Last seen at [facility]: 4/6 - 1 mo  Next Appointment with Provider: 6/16    Lois Baird CMA

## 2022-04-26 ENCOUNTER — LAB (OUTPATIENT)
Dept: LAB | Facility: CLINIC | Age: 32
End: 2022-04-26
Payer: COMMERCIAL

## 2022-04-26 DIAGNOSIS — R11.0 NAUSEA: ICD-10-CM

## 2022-04-26 LAB
ERYTHROCYTE [DISTWIDTH] IN BLOOD BY AUTOMATED COUNT: 12.9 % (ref 10–15)
HCT VFR BLD AUTO: 49.2 % (ref 40–53)
HGB BLD-MCNC: 16.9 G/DL (ref 13.3–17.7)
LIPASE SERPL-CCNC: 72 U/L (ref 73–393)
MCH RBC QN AUTO: 31.4 PG (ref 26.5–33)
MCHC RBC AUTO-ENTMCNC: 34.3 G/DL (ref 31.5–36.5)
MCV RBC AUTO: 91 FL (ref 78–100)
PLATELET # BLD AUTO: 230 10E3/UL (ref 150–450)
RBC # BLD AUTO: 5.39 10E6/UL (ref 4.4–5.9)
WBC # BLD AUTO: 5.7 10E3/UL (ref 4–11)

## 2022-04-26 PROCEDURE — 80053 COMPREHEN METABOLIC PANEL: CPT

## 2022-04-26 PROCEDURE — 85027 COMPLETE CBC AUTOMATED: CPT

## 2022-04-26 PROCEDURE — 36415 COLL VENOUS BLD VENIPUNCTURE: CPT

## 2022-04-26 PROCEDURE — 83690 ASSAY OF LIPASE: CPT

## 2022-04-26 ASSESSMENT — PATIENT HEALTH QUESTIONNAIRE - PHQ9: SUM OF ALL RESPONSES TO PHQ QUESTIONS 1-9: 14

## 2022-04-27 LAB
ALBUMIN SERPL-MCNC: 4.6 G/DL (ref 3.4–5)
ALP SERPL-CCNC: 91 U/L (ref 40–150)
ALT SERPL W P-5'-P-CCNC: 201 U/L (ref 0–70)
ANION GAP SERPL CALCULATED.3IONS-SCNC: 10 MMOL/L (ref 3–14)
AST SERPL W P-5'-P-CCNC: 119 U/L (ref 0–45)
BILIRUB SERPL-MCNC: 0.6 MG/DL (ref 0.2–1.3)
BUN SERPL-MCNC: 12 MG/DL (ref 7–30)
CALCIUM SERPL-MCNC: 9.3 MG/DL (ref 8.5–10.1)
CHLORIDE BLD-SCNC: 105 MMOL/L (ref 94–109)
CO2 SERPL-SCNC: 23 MMOL/L (ref 20–32)
CREAT SERPL-MCNC: 0.78 MG/DL (ref 0.66–1.25)
GFR SERPL CREATININE-BSD FRML MDRD: >90 ML/MIN/1.73M2
GLUCOSE BLD-MCNC: 96 MG/DL (ref 70–99)
POTASSIUM BLD-SCNC: 4.1 MMOL/L (ref 3.4–5.3)
PROT SERPL-MCNC: 8.2 G/DL (ref 6.8–8.8)
SODIUM SERPL-SCNC: 138 MMOL/L (ref 133–144)

## 2022-04-27 RX ORDER — DEXTROAMPHETAMINE SACCHARATE, AMPHETAMINE ASPARTATE MONOHYDRATE, DEXTROAMPHETAMINE SULFATE AND AMPHETAMINE SULFATE 5; 5; 5; 5 MG/1; MG/1; MG/1; MG/1
20 CAPSULE, EXTENDED RELEASE ORAL 2 TIMES DAILY
Qty: 60 CAPSULE | Refills: 0 | Status: SHIPPED | OUTPATIENT
Start: 2022-04-27 | End: 2022-05-23

## 2022-04-27 RX ORDER — DEXTROAMPHETAMINE SACCHARATE, AMPHETAMINE ASPARTATE, DEXTROAMPHETAMINE SULFATE AND AMPHETAMINE SULFATE 2.5; 2.5; 2.5; 2.5 MG/1; MG/1; MG/1; MG/1
TABLET ORAL
Qty: 30 TABLET | Refills: 0 | Status: SHIPPED | OUTPATIENT
Start: 2022-04-27 | End: 2022-05-23

## 2022-04-28 ENCOUNTER — MYC MEDICAL ADVICE (OUTPATIENT)
Dept: FAMILY MEDICINE | Facility: CLINIC | Age: 32
End: 2022-04-28
Payer: COMMERCIAL

## 2022-05-02 ENCOUNTER — VIRTUAL VISIT (OUTPATIENT)
Dept: PSYCHOLOGY | Facility: CLINIC | Age: 32
End: 2022-05-02
Payer: COMMERCIAL

## 2022-05-02 DIAGNOSIS — F91.8 CONDUCT DISORDER, UNDIFFERENTIATED TYPE: Primary | ICD-10-CM

## 2022-05-02 DIAGNOSIS — F19.90 SUBSTANCE USE DISORDER: ICD-10-CM

## 2022-05-02 PROCEDURE — 99207 PR NO CHARGE LOS: CPT | Performed by: PSYCHOLOGIST

## 2022-05-02 PROCEDURE — 90832 PSYTX W PT 30 MINUTES: CPT | Mod: 95 | Performed by: PSYCHOLOGIST

## 2022-05-02 NOTE — PROGRESS NOTES
[facility] - Progress Note    Date of Service: 22   Name: Xavier Landon  : 1990  Medical Record Number: [id number]  Treating Provider: Aysha Phillip, Ph.D,   Type of Session: Individual  Present in Session: Patient  Session Start and Stop Time: 2:10-2:30  Number of Minutes:  20    SERVICE MODALITY:  Video Visit:      Provider verified identity through the following two step process.  Patient provided:  Patient  and Patient address    Telemedicine Visit: The patient's condition can be safely assessed and treated via synchronous audio and visual telemedicine encounter.      Reason for Telemedicine Visit: Services only offered telehealth    Originating Site (Patient Location): Patient's home    Distant Site (Provider Location): [facility]    Consent:  The patient/guardian has verbally consented to: the potential risks and benefits of telemedicine (video visit) versus in person care; bill my insurance or make self-payment for services provided; and responsibility for payment of non-covered services.     Patient would like the video invitation sent by:  Send to e-mail at: [email or web address]    Mode of Communication:  Video Conference via well    As the provider I attest to compliance with applicable laws and regulations related to telemedicine.    DSM-5 Diagnoses:  312.89 (F91.8) Other Specified Disruptive, Impulse Control, and Conduct Disorder (Hypersexual Disorder)    Substance Use Disorder  Major Depression, recurrent, moderate.      Current Reported Symptoms and Status update:  Still struggling with boundaries around sex and alcohol.  Depression seems to be about the same.    Progress Toward Treatment Goals:   Minimal progress - CONTEMPLATION (Considering change and yet undecided); Intervened by assessing the negative and positive thinking (ambivalence) about behavior change    Therapeutic Interventions/Treatment  Strategies:    Area(s) of treatment focus addressed in this session included Symptom Management.    Treatment modalities used include Motivational Interviewing, Cognitive Behavioral Therapy and Interpersonal.  Support, Limit/Boundaries, Motivational Enhancement Therapy and Cognitive Behavioral Therapy    Patient Response:   Patient responded to session by accepting feedback, listening and being attentive  Possible barriers to participation / learning include: unresponsive to treatment strategies   Challenged him about whether he could get alcohol under control without inpatient treatment.  He still wants to do what he can.    Had medical issues that are related to abuse of alchol.  Has either acute gastritis or pancreatitis.      MENTAL STATUS EXAM  Appearance: appropriate  Attitude: cooperative  Behavior: normal  Eyre Contact: normal  Speech: normal  Orientation: oreinted to person , place, time and situation  Mood:  admits sadness and anxiety most of time  Affect: Mood Congruient  Thought Process: clear  Suicidal Ideation: reports thoughts, no intention  Hallucination: no      Plan/Need for Future Services:  Return for therapy in 1 month  to treat diagnosed problems.    Patient has a current master individualized treatment plan.  See Epic treatment plan for more information.    Assignment:  Go to AA meetings.  Get sponsor. Consider inpatient tx.    Interactive Complexity:  There are four specific communication difficulties that complicate the work of the primary psychiatric procedure.  Interactive complexity (+[id number]) may be reported when at least one of these difficulties is present.    Communication difficulties present during current the psychiatric procedure include:  1. None.                     Aysha Phillip, Ph.D, LP

## 2022-05-06 NOTE — PROGRESS NOTES
Assessment & Plan     Nausea  Symptoms appear to have been due to alcohol induced hepatitis.  It is reassuring that he is feeling better since he quit drinking a couple of weeks ago.  I encouraged him to continue abstaining which he is planning to do.  He is feeling confident that he will be able to do this.  I recommended rechecking labs including an acute hepatitis panel.  He will continue to follow closely with his psychiatrist.    Elevated liver enzymes  - Acute hepatitis panel; Future  - Comprehensive metabolic panel (BMP + Alb, Alk Phos, ALT, AST, Total. Bili, TP); Future    Alcohol abuse    Attention deficit hyperactivity disorder (ADHD), combined type    Borderline personality disorder (H)    Major depressive disorder, recurrent episode, severe with anxious distress (H)    Generalized anxiety disorder                     Return in about 4 weeks (around 6/6/2022) for Follow up if symptoms not improving..    Ganesh Oneill, [facility]    Sultana Arzate is a 31 year old who presents for the following health issues     HPI     Concern - Nausea   Onset: 1 month ago  Description: nausea, vomiting (nonbloody)  Intensity: moderate  Progression of Symptoms:  improving  Accompanying Signs & Symptoms: ache below rib cage  Previous history of similar problem: no  Precipitating factors:        Worsened by: alcohol  Alleviating factors:        Improved by: discontinued use of alcohol.  However, he noticed some mild withdrawal symptoms for few days after he quit.  This included sweating and shaking symptoms.  Therapies tried and outcome: d/t elevated ALT, AST, pt discontinued use of alcohol and reports feeling back to baseline.  He reports that he was drinking about a half a liter of hard alcohol over the past year.        Review of Systems   Constitutional, HEENT, cardiovascular, pulmonary, gi and gu systems are negative, except as otherwise noted.      Objective    /82   Pulse  94   Temp 97.8  F (36.6  C)   Resp 18   Wt 103.9 kg (229 lb)   SpO2 98%   There is no height or weight on file to calculate BMI.  Physical Exam   GENERAL: healthy, alert and no distress  EYES: Eyes grossly normal to inspection, PERRL and conjunctivae and sclerae normal  HENT: mouth without ulcers or lesions  NECK: no adenopathy, no asymmetry, masses, or scars and thyroid normal to palpation  RESP: lungs clear to auscultation - no rales, rhonchi or wheezes  CV: regular rate and rhythm, normal S1 S2, no S3 or S4, no murmur, click or rub, no peripheral edema and peripheral pulses strong  ABDOMEN: soft, nontender, no hepatosplenomegaly, no masses and bowel sounds normal  MS: no gross musculoskeletal defects noted, no edema  SKIN: no suspicious lesions or rashes  NEURO: Normal strength and tone, mentation intact and speech normal  PSYCH: mentation appears normal, affect normal/bright

## 2022-05-09 ENCOUNTER — OFFICE VISIT (OUTPATIENT)
Dept: FAMILY MEDICINE | Facility: CLINIC | Age: 32
End: 2022-05-09
Payer: COMMERCIAL

## 2022-05-09 ENCOUNTER — MYC MEDICAL ADVICE (OUTPATIENT)
Dept: FAMILY MEDICINE | Facility: CLINIC | Age: 32
End: 2022-05-09

## 2022-05-09 VITALS
RESPIRATION RATE: 18 BRPM | WEIGHT: 229 LBS | OXYGEN SATURATION: 98 % | HEART RATE: 94 BPM | DIASTOLIC BLOOD PRESSURE: 82 MMHG | TEMPERATURE: 97.8 F | SYSTOLIC BLOOD PRESSURE: 130 MMHG

## 2022-05-09 DIAGNOSIS — F90.2 ATTENTION DEFICIT HYPERACTIVITY DISORDER (ADHD), COMBINED TYPE: ICD-10-CM

## 2022-05-09 DIAGNOSIS — F33.2 MAJOR DEPRESSIVE DISORDER, RECURRENT EPISODE, SEVERE WITH ANXIOUS DISTRESS (H): ICD-10-CM

## 2022-05-09 DIAGNOSIS — R74.8 ELEVATED LIVER ENZYMES: ICD-10-CM

## 2022-05-09 DIAGNOSIS — F10.10 ALCOHOL ABUSE: ICD-10-CM

## 2022-05-09 DIAGNOSIS — R74.8 ELEVATED LIVER ENZYMES: Primary | ICD-10-CM

## 2022-05-09 DIAGNOSIS — R11.0 NAUSEA: Primary | ICD-10-CM

## 2022-05-09 DIAGNOSIS — F60.3 BORDERLINE PERSONALITY DISORDER (H): ICD-10-CM

## 2022-05-09 DIAGNOSIS — F41.1 GENERALIZED ANXIETY DISORDER: ICD-10-CM

## 2022-05-09 LAB
ALBUMIN SERPL-MCNC: 4.3 G/DL (ref 3.4–5)
ALP SERPL-CCNC: 79 U/L (ref 40–150)
ALT SERPL W P-5'-P-CCNC: 240 U/L (ref 0–70)
ANION GAP SERPL CALCULATED.3IONS-SCNC: 3 MMOL/L (ref 3–14)
AST SERPL W P-5'-P-CCNC: 83 U/L (ref 0–45)
BILIRUB SERPL-MCNC: 0.5 MG/DL (ref 0.2–1.3)
BUN SERPL-MCNC: 14 MG/DL (ref 7–30)
CALCIUM SERPL-MCNC: 9.2 MG/DL (ref 8.5–10.1)
CHLORIDE BLD-SCNC: 108 MMOL/L (ref 94–109)
CO2 SERPL-SCNC: 26 MMOL/L (ref 20–32)
CREAT SERPL-MCNC: 0.86 MG/DL (ref 0.66–1.25)
GFR SERPL CREATININE-BSD FRML MDRD: >90 ML/MIN/1.73M2
GLUCOSE BLD-MCNC: 83 MG/DL (ref 70–99)
POTASSIUM BLD-SCNC: 3.7 MMOL/L (ref 3.4–5.3)
PROT SERPL-MCNC: 7.6 G/DL (ref 6.8–8.8)
SODIUM SERPL-SCNC: 137 MMOL/L (ref 133–144)

## 2022-05-09 PROCEDURE — 80074 ACUTE HEPATITIS PANEL: CPT | Performed by: FAMILY MEDICINE

## 2022-05-09 PROCEDURE — 99214 OFFICE O/P EST MOD 30 MIN: CPT | Performed by: FAMILY MEDICINE

## 2022-05-09 PROCEDURE — 36415 COLL VENOUS BLD VENIPUNCTURE: CPT | Performed by: FAMILY MEDICINE

## 2022-05-09 PROCEDURE — 80053 COMPREHEN METABOLIC PANEL: CPT | Performed by: FAMILY MEDICINE

## 2022-05-11 LAB
HAV IGM SERPL QL IA: NEGATIVE
HBV CORE IGM SERPL QL IA: NEGATIVE
HBV SURFACE AG SERPL QL IA: NONREACTIVE
HCV AB SERPL QL IA: NEGATIVE

## 2022-05-23 DIAGNOSIS — F91.8 CONDUCT DISORDER, UNDIFFERENTIATED TYPE: ICD-10-CM

## 2022-05-23 DIAGNOSIS — F90.2 ATTENTION DEFICIT HYPERACTIVITY DISORDER (ADHD), COMBINED TYPE: ICD-10-CM

## 2022-05-23 NOTE — TELEPHONE ENCOUNTER
Requested Medication:  Adderall XR 20mg  Dose: 20mg  Quantity: 60  Refills: 1    Take 1 capsule twice daily    _____    Requested Medication:  Adderall 10mg  Dose: 10mg  Quantity: 30  Refills: 1    Take 1 capsule daily    Last seen at [facility]: 4/6 - 1 month  Next Appointment with Provider:  6/16      Lois Baird CMA

## 2022-05-24 RX ORDER — DEXTROAMPHETAMINE SACCHARATE, AMPHETAMINE ASPARTATE, DEXTROAMPHETAMINE SULFATE AND AMPHETAMINE SULFATE 2.5; 2.5; 2.5; 2.5 MG/1; MG/1; MG/1; MG/1
TABLET ORAL
Qty: 30 TABLET | Refills: 0 | Status: SHIPPED | OUTPATIENT
Start: 2022-05-24 | End: 2022-06-16

## 2022-05-24 RX ORDER — DEXTROAMPHETAMINE SACCHARATE, AMPHETAMINE ASPARTATE MONOHYDRATE, DEXTROAMPHETAMINE SULFATE AND AMPHETAMINE SULFATE 5; 5; 5; 5 MG/1; MG/1; MG/1; MG/1
20 CAPSULE, EXTENDED RELEASE ORAL 2 TIMES DAILY
Qty: 60 CAPSULE | Refills: 0 | Status: SHIPPED | OUTPATIENT
Start: 2022-05-24 | End: 2022-06-16

## 2022-05-31 ENCOUNTER — TELEPHONE (OUTPATIENT)
Dept: FAMILY MEDICINE | Facility: CLINIC | Age: 32
End: 2022-05-31
Payer: COMMERCIAL

## 2022-05-31 NOTE — TELEPHONE ENCOUNTER
"Called patinent    DE out of office this week  No mention of checking hormones in 5/9/22 OV notes.    Patient states ok.  Will discuss with DE when he next sees him.  \"I just thought since I was have blood drawn I could check other labs\"  States he fine with just checking CMP at this time.    Jes Darby RN    "

## 2022-05-31 NOTE — TELEPHONE ENCOUNTER
Reason for Call: Request for an order or referral:    Order or referral being requested: lab order: hormone levels    Date needed: as soon as possible    Has the patient been seen by the PCP for this problem? YES    Additional comments: Patient asked if Dr. Xochitl Oneill could please add orders to have his hormone levels tested tomorrow at his lab appointment.    Phone number Patient can be reached at:  Home number on file [phone] (home)    Best Time:  Anytime    Can we leave a detailed message on this number?  YES    Call taken on 5/31/2022 at 10:46 AM by Betty Nunez

## 2022-06-01 ENCOUNTER — LAB (OUTPATIENT)
Dept: LAB | Facility: CLINIC | Age: 32
End: 2022-06-01
Payer: COMMERCIAL

## 2022-06-01 DIAGNOSIS — R74.8 ELEVATED LIVER ENZYMES: ICD-10-CM

## 2022-06-01 DIAGNOSIS — F91.8 CONDUCT DISORDER, UNDIFFERENTIATED TYPE: ICD-10-CM

## 2022-06-01 LAB
ALBUMIN SERPL-MCNC: 4.3 G/DL (ref 3.4–5)
ALP SERPL-CCNC: 68 U/L (ref 40–150)
ALT SERPL W P-5'-P-CCNC: 55 U/L (ref 0–70)
ANION GAP SERPL CALCULATED.3IONS-SCNC: 6 MMOL/L (ref 3–14)
AST SERPL W P-5'-P-CCNC: 19 U/L (ref 0–45)
BILIRUB SERPL-MCNC: 0.6 MG/DL (ref 0.2–1.3)
BUN SERPL-MCNC: 12 MG/DL (ref 7–30)
CALCIUM SERPL-MCNC: 9.3 MG/DL (ref 8.5–10.1)
CHLORIDE BLD-SCNC: 107 MMOL/L (ref 94–109)
CO2 SERPL-SCNC: 25 MMOL/L (ref 20–32)
CREAT SERPL-MCNC: 0.84 MG/DL (ref 0.66–1.25)
GFR SERPL CREATININE-BSD FRML MDRD: >90 ML/MIN/1.73M2
GLUCOSE BLD-MCNC: 101 MG/DL (ref 70–99)
LITHIUM SERPL-SCNC: 0.5 MMOL/L
POTASSIUM BLD-SCNC: 4 MMOL/L (ref 3.4–5.3)
PROT SERPL-MCNC: 7.6 G/DL (ref 6.8–8.8)
SODIUM SERPL-SCNC: 138 MMOL/L (ref 133–144)
TSH SERPL DL<=0.005 MIU/L-ACNC: 2.91 MU/L (ref 0.4–4)

## 2022-06-01 PROCEDURE — 80053 COMPREHEN METABOLIC PANEL: CPT

## 2022-06-01 PROCEDURE — 36415 COLL VENOUS BLD VENIPUNCTURE: CPT

## 2022-06-01 PROCEDURE — 80178 ASSAY OF LITHIUM: CPT

## 2022-06-01 PROCEDURE — 84443 ASSAY THYROID STIM HORMONE: CPT

## 2022-06-06 ENCOUNTER — VIRTUAL VISIT (OUTPATIENT)
Dept: PSYCHOLOGY | Facility: CLINIC | Age: 32
End: 2022-06-06
Payer: COMMERCIAL

## 2022-06-06 DIAGNOSIS — F90.2 ATTENTION DEFICIT HYPERACTIVITY DISORDER (ADHD), COMBINED TYPE: ICD-10-CM

## 2022-06-06 DIAGNOSIS — F91.8 CONDUCT DISORDER, UNDIFFERENTIATED TYPE: Primary | ICD-10-CM

## 2022-06-06 DIAGNOSIS — F19.90 SUBSTANCE USE DISORDER: ICD-10-CM

## 2022-06-06 DIAGNOSIS — F33.2 MDD (MAJOR DEPRESSIVE DISORDER), RECURRENT SEVERE, WITHOUT PSYCHOSIS (H): ICD-10-CM

## 2022-06-06 PROCEDURE — 99207 PR NO CHARGE LOS: CPT | Performed by: PSYCHOLOGIST

## 2022-06-06 PROCEDURE — 90791 PSYCH DIAGNOSTIC EVALUATION: CPT | Mod: 95 | Performed by: PSYCHOLOGIST

## 2022-06-06 ASSESSMENT — PATIENT HEALTH QUESTIONNAIRE - PHQ9
SUM OF ALL RESPONSES TO PHQ QUESTIONS 1-9: 12
10. IF YOU CHECKED OFF ANY PROBLEMS, HOW DIFFICULT HAVE THESE PROBLEMS MADE IT FOR YOU TO DO YOUR WORK, TAKE CARE OF THINGS AT HOME, OR GET ALONG WITH OTHER PEOPLE: SOMEWHAT DIFFICULT
SUM OF ALL RESPONSES TO PHQ QUESTIONS 1-9: 12

## 2022-06-06 NOTE — PROGRESS NOTES
"          [facility]            [address] [place] [address] [address]  [place] [address]                                                                                Phone: [phone]                                                                                  Fax: [phone]                                                                    [email or web address]    ANNUAL UPDATE: Diagnostic Assessment Interview    Date of Service: 6/06/22  Client Name: Xavier Landon  YOB: 1990  31 year old  MRN:  [id number]  Gender/Gender Identity:Male/Man  Treating Provider:  Aysha Phillip  Program: [facility]  Type of Session: Assessment  Present in Session: Patient  Number of Minutes:      The following was reviewed with the patient.   \"We have found that certain health care needs can be provided without the need for a face to face visit.  This service lets us provide the care you need with a phone conversation. I will have full access to your [facility] medical record during this entire phone call.   I will be taking notes for your medical record. Since this is like an office visit, we will bill your insurance company for this service. There are potential benefits and risks of telephone visits (e.g. limits to patient confidentiality) that differ from in-person visits.?  Confidentiality still applies for telephone services, and nobody will record the visit.  It is important to be in a quiet, private space that is free of distractions (including cell phone or other devices) during the visit.??If during the course of the call I believe a telephone visit is not appropriate, you will not be charged for this service\"    Consent has been obtained for this service by care team member: Yes     Updated Presenting Problem and Goals:    Has long history of compulsive sexual behavior.  Would like to gain control   Four times a week uses porn for about a half hour.  Not feeling so " dysphoric about that level.  Feels like it is so much better.  Feels that prescription of lithium is really helpful in addition to prescription of Prozac and Adderal.  Would like to develop healthy sexual relationship.  Stay sober.  Maintain job.  Wants to improve physical health.    Updated Mental Health History:   Still quite depressed.  Has improved.  Less suicidal.     PATIENT HEALTH QUESTIONNAIRE-9 (PHQ - 9)    Over the last 2 weeks, how often have you been bothered by any of the following problems?    1. Little interest or pleasure in doing things -  Several days   2. Feeling down, depressed, or hopeless -  Several days   3. Trouble falling or staying asleep, or sleeping too much - Several days   4. Feeling tired or having little energy -  Several days   5. Poor appetite or overeating -  Several days   6. Feeling bad about yourself - or that you are a failure or have let yourself or your family down -  Nearly every day   7. Trouble concentrating on things, such as reading the newspaper or watching television - Nearly every day   8. Moving or speaking so slowly that other people could have noticed? Or the opposite - being so fidgety or restless that you have been moving around a lot more than usual Not at all   9. Thoughts that you would be better off dead or of hurting  yourself in some way Several days   Total Score: 12     If you checked off any problems, how difficult have these problems made it for you to do your work, take care of things at home, or get along with other people?      Developed by Gina Cortes, Angelia Jara, Kenneth Phillips and colleagues, with an educational nahed from [employer] No permission required to reproduce, translate, display or distribute. permission required to reproduce, translate, display or distribute.     Updated Substance Use:   Long history of substance use disorder (alcohol).  Has been sober since early May 2022.    Updated Medical History:   Was starting  to have negative health impact of alcohol use.  Liver function has improved since stopping drinking.     Updated Family History:   Improved relationship with parents.  Helpfu that he is not living with them    Updated Current Significant Relationship/Partner:  Broke up with girfriend.  Feels ok about that.  Can now focus more on himself.      Updated Educational History:  None    Updated Occupational History:  Recently job a new job - feeling good about that.    Updated Legal Issues:  None.  ________________________________________________________________  CONCLUSIONS    Updated Strengths and Liabilities:   Seeking help.  Has support from family.  Liabilities are long history of mental illness, multiple co-morbidities.      Updated Symptoms:  Compulsive sexual behavior, Depressed, low self esteem, suicidal ideation, anxious, adhd reasonably controlled.  High reactivity in interpersonal relationships.    See updated PHQ-9, SHAUNA-7, CAGE, and Safety Screening    Updated Mental Status:      Mental Status:   Appearance:  Could not assess - telephone  Behavior/relationship to examiner/demeanor:  Cooperative and Engaged  Orientation: Oriented to all  Speech Rate:  Normal  Speech Spontaneity:  Normal  Mood:  depressed, dysphoric, distraught and really down  Affect:  Appropriate/mood-congruent and Dysphoric  Thought Process (Associations):  Logical, Linear and Goal directed  Thought Content:  Suicidal ideation and denies suicidal intent or plan  Abnormal Perception:  None  Attention/Concentration:  Normal  Language:  Intact  Insight:  Adequate  Judgment:  Fair          Interactive Complexity:  Communication difficulties present during the current psychiatric procedure included:     1. N/A    Updated DSM-5 Diagnoses:  312.89 (F91.8) Other Specified Disruptive, Impulse Control, and Conduct Disorder (Hypersexual Disorder)    Major Depressive Disorder, recurrent, severe with anxiety  Generalized Anxiety Disorder  ADHD  Substance  Use Disorder (Alcohol)  Borderline Personality Disorder        Conclusions/Recommendations/Initial Treatment Goals:   The client is a 31 year old cis white heterosexual  person assigned male at birth who identifies as a man. Based on the client's current report of symptoms, client continues to meet criteria for above referenced diagnoses. The client's mental health concerns continue to affect client's ability to function occupationally, interpersonally and have been causing clinically significant distress. The client reports no /alcohol use for the last month.  Has a long history of substance use disorder.  Client denies safety concerns. Based on the client's reported symptoms and impact on functioning, the plan for the patient is:  1. Continue supportive individual therapy    2. Continue care with psychiatry for medication management.   3. Consider ways of increasing client's social support through social activities, meditation groups and possibly AA.  4. Continue to assess the impact of client's family and relational patterns on their current well-being.   5. Consider participation in CSB group therapy in order to provide further indepth treatment for CSB.  6. Coordinate care as needed with medical providers.     Aysha Phillip, ., LP           Answers for HPI/ROS submitted by the patient on 6/6/2022  If you checked off any problems, how difficult have these problems made it for you to do your work, take care of things at home, or get along with other people?: Somewhat difficult  PHQ9 TOTAL SCORE: 12

## 2022-06-16 ENCOUNTER — VIRTUAL VISIT (OUTPATIENT)
Dept: PSYCHIATRY | Facility: CLINIC | Age: 32
End: 2022-06-16
Payer: COMMERCIAL

## 2022-06-16 DIAGNOSIS — F91.8 CONDUCT DISORDER, UNDIFFERENTIATED TYPE: ICD-10-CM

## 2022-06-16 DIAGNOSIS — F90.2 ATTENTION DEFICIT HYPERACTIVITY DISORDER (ADHD), COMBINED TYPE: ICD-10-CM

## 2022-06-16 PROCEDURE — 99214 OFFICE O/P EST MOD 30 MIN: CPT | Mod: 95 | Performed by: PHYSICIAN ASSISTANT

## 2022-06-16 RX ORDER — FLUOXETINE 40 MG/1
40 CAPSULE ORAL DAILY
Qty: 30 CAPSULE | Refills: 1 | Status: SHIPPED | OUTPATIENT
Start: 2022-06-16 | End: 2022-08-16

## 2022-06-16 RX ORDER — LITHIUM CARBONATE 300 MG/1
CAPSULE ORAL
Qty: 60 CAPSULE | Refills: 1 | Status: SHIPPED | OUTPATIENT
Start: 2022-06-16 | End: 2022-08-16

## 2022-06-16 RX ORDER — DEXTROAMPHETAMINE SACCHARATE, AMPHETAMINE ASPARTATE MONOHYDRATE, DEXTROAMPHETAMINE SULFATE AND AMPHETAMINE SULFATE 5; 5; 5; 5 MG/1; MG/1; MG/1; MG/1
20 CAPSULE, EXTENDED RELEASE ORAL 2 TIMES DAILY
Qty: 60 CAPSULE | Refills: 0 | Status: SHIPPED | OUTPATIENT
Start: 2022-06-16 | End: 2022-07-19

## 2022-06-16 RX ORDER — DEXTROAMPHETAMINE SACCHARATE, AMPHETAMINE ASPARTATE, DEXTROAMPHETAMINE SULFATE AND AMPHETAMINE SULFATE 2.5; 2.5; 2.5; 2.5 MG/1; MG/1; MG/1; MG/1
TABLET ORAL
Qty: 30 TABLET | Refills: 0 | Status: SHIPPED | OUTPATIENT
Start: 2022-06-16 | End: 2022-07-19

## 2022-06-17 NOTE — PROGRESS NOTES
Video start time: 735  Video end time: 745    Telemedicine Visit: The patient's condition can be safely assessed and treated via synchronous audio and visual telemedicine encounter.      Reason for Telemedicine Visit: Services only offered telehealth    Originating Site (Patient Location): Patient's home    Distant Site (Provider Location): Provider Remote Setting- Home Office    Consent:  The patient/guardian has verbally consented to: the potential risks and benefits of telemedicine (video visit) versus in person care; bill my insurance or make self-payment for services provided; and responsibility for payment of non-covered services.     Mode of Communication:  Video Conference via [employer]    As the provider I attest to compliance with applicable laws and regulations related to telemedicine.    [facility]    Name:  Xavier Landon   Aliases:  XAVIER LANDON W : HUEY LANDON  :  1990   MRN:  [id number]  Treating Provider: Kofi Martin PA-C EdD     Medications at start of visit:  Outpatient Medications Prior to Visit   Medication Sig Dispense Refill     amphetamine-dextroamphetamine (ADDERALL XR) 20 MG 24 hr capsule Take 1 capsule (20 mg) by mouth 2 times daily 60 capsule 0     amphetamine-dextroamphetamine (ADDERALL) 10 MG tablet Take 1 tablet daily in afternoon for breakthrough sx 30 tablet 0     FLUoxetine (PROZAC) 20 MG capsule Take 1 capsule (20 mg) by mouth daily 30 capsule 3     lithium 300 MG capsule Take 1 capsule (300 mg) by mouth 3 times daily (with meals) 90 capsule 0     No facility-administered medications prior to visit.       Allergies:  No Known Allergies    Today's Visit    Current Complaint: Huey presents for a recheck.  At their last appointment no changes were made to his regimen. He is reporting some ongoing and increasing sx of depression. The patient is not endorsing suicidal/homicidal ideation, active planning, intent or means.  The patient is  not endorsing s/s suggestive of hypomania/kamar or psychosis.  The patient is endorsing good compliance with and efficacy of their medication regimen without s/e, however he would be interested in an adjustment of his medications to offset depressive sx.     Review of Systems: A review of the following systems was negative: Opthalmic, ENT, Cardiovascular, Gastrointestinal, Genitourinary, Musculoskeletal, Integumentary, Neurological, Endocrine, Respiratory, Hematologic, Immunologic      Chemical History: Denies usage of and cravings for alcohol, cannabis, heroin, methamphetamine, cocaine, prescription opioids/benzodiazepines.      Social History: No interval change    Mental Status Exam: The patient's orientation, memory,  Attention, language and fund of knowledge are at their usual best baseline.  Grooming/Hygiene: adequate  Eye Contact: normal  Psychomotor: normal  Observed mood and affect: appropriate  Judgment: intact, with thoughtful decision making and insight  Speech: normal rate, rhythm, tone and volume  Thought processes: normal, with normal rate of thought  Associations:  No deficiency  Abnormal Thoughts: none      Assessment: This is a 30 yo man who carries the diagnosis of MDD. The patient's questions were answered to their satisfaction regarding the plan as outlined below. Side effects, risks/benefits/alternatives regarding all psychiatric medications were discussed with the patient in detail.          Plan:    There are no Patient Instructions on file for this visit.     Total face-to-face time: 30 min    Counseling/Coordination of care greater than 50%.    Counseling/Coordination of care included: Educational counseling regarding psychiatric medications, side effects, interactions.                           Answers for HPI/ROS submitted by the patient on 6/16/2022  If you checked off any problems, how difficult have these problems made it for you to do your work, take care of things at home, or get  along with other people?: Somewhat difficult  PHQ9 TOTAL SCORE: 14

## 2022-06-17 NOTE — PATIENT INSTRUCTIONS
1)Prozac 40 mg: Take 1 capsule once daily (dosage increase)    2)Lithium 300 mg: Take 1 tablet twice daily (dosage increase).    3)Please make the above changes 1 week apart.     4)No other medication changes.    5)See me in 1 month.  Contact the clinic with any questions or concerns.

## 2022-06-21 ENCOUNTER — VIRTUAL VISIT (OUTPATIENT)
Dept: PSYCHOLOGY | Facility: CLINIC | Age: 32
End: 2022-06-21
Payer: COMMERCIAL

## 2022-06-21 DIAGNOSIS — F91.8 CONDUCT DISORDER, UNDIFFERENTIATED TYPE: ICD-10-CM

## 2022-06-21 DIAGNOSIS — F90.2 ATTENTION DEFICIT HYPERACTIVITY DISORDER (ADHD), COMBINED TYPE: ICD-10-CM

## 2022-06-21 DIAGNOSIS — F33.2 MDD (MAJOR DEPRESSIVE DISORDER), RECURRENT SEVERE, WITHOUT PSYCHOSIS (H): Primary | ICD-10-CM

## 2022-06-21 PROCEDURE — 90853 GROUP PSYCHOTHERAPY: CPT | Mod: 95

## 2022-06-21 PROCEDURE — 99207 PR NO BILLABLE SERVICE THIS VISIT: CPT

## 2022-06-21 NOTE — PROGRESS NOTES
Program in Human Sexuality  [facility]  [address], [address]  [place]  [address]    Group Progress Note  [place] [facility] - Progress Note    Date of Service: 22   Name: Xavier Landon  : 1990  Medical Record Number: [id number]  Therapist(s) in Group: VIKTOR Anderson, [facility] and Misty Shin, PhD  Type of Session: Group  :  Number of Attendees:  5  Session Start and Stop Time: 6357-1257  Number of Minutes:  105    SERVICE MODALITY:  Video Visit:      Provider verified identity through the following two step process.  Patient provided:  Patient is known previously to provider    Telemedicine Visit: The patient's condition can be safely assessed and treated via synchronous audio and visual telemedicine encounter.      Reason for Telemedicine Visit: Services only offered telehealth    Originating Site (Patient Location): Patient's home    Distant Site (Provider Location): Provider Remote Setting- Home Office    Consent:  The patient/guardian has verbally consented to: the potential risks and benefits of telemedicine (video visit) versus in person care; bill my insurance or make self-payment for services provided; and responsibility for payment of non-covered services.     Patient would like the video invitation sent by:  Send to e-mail at: [email or web address]    Mode of Communication:  Video Conference via Medical Zoom    As the provider I attest to compliance with applicable laws and regulations related to telemedicine.    DSM-5 Diagnoses:  296.33 (F33.2) Major Depressive Disorder, Recurrent Episode, Severe _ and With anxious distress  300.02 (F41.1) Generalized Anxiety Disorder.  Attention-Deficit/Hyperactivity Disorder  314.01 (F90.9) Unspecified Attention -Deficit / Hyperactivity Disorder.  Substance-Related & Addictive Disorders Alcohol Use Disorder   303.90 (F10.20) Moderate   301.83 (F60.3) Borderline Personality  Disorder      Current Reported Symptoms and Status update:  Depressed mood, csb    Progress Toward Treatment Goals:   Minimal progress - PREPARATION (Decided to change - considering how); Intervened by negotiating a change plan and determining options / strategies for behavior change, identifying triggers, exploring social supports, and working towards setting a date to begin behavior change    Therapeutic Interventions/Treatment Strategies:    Area(s) of treatment focus addressed in this session included Symptom Management and Develop Socialization / Interpersonal Relationship Skills.    Patient checked in about their mental health symptoms, healthy coping skills used over the week, negative coping skills used over the week, what sexual behaviors they engaged in-fantasy, masturbation, sex, their comfort level with their behaviors, if there were triggers, urges to violate boundaries, and violations of boundaries.  They took time to process about why they are in group they provided support and feedback to others in the group.    Psychotherapist offered support, feedback and validation and reinforced use of skills. Treatment modalities used include Motivational Interviewing and group psychodynamic . Interventions include Relapse Prevention: Assisted patient in identifying the challenges and barriers to participation and attendance to support groups/community resources and Relationship Skills: Discussed strategies to promote healthier understanding of interpersonal relationships.  Support, Feedback and Structured Activity    Patient Response:   Patient responded to session by accepting feedback, giving feedback, listening, being attentive, accepting support, appearing alert and verbalizing understanding  Possible barriers to participation / learning include: and no barriers identified    Current Mental Status Exam:   Appearance:  Appropriate   Eye Contact:  Good   Attitude / Demeanor: Cooperative   Speech      Rate /  Production: Normal/ Responsive      Volume:  Normal  volume  Orientation:  All  Mood:   Anxious   Affect:   Appropriate   Thought Content: Clear   Insight:   Good       Plan/Need for Future Services:  Return for therapy in 1 weeks to treat diagnosed problems.    Patient has a current master individualized treatment plan.  See Epic treatment plan for more information.    Assignment:  Return to group in one week     Interactive Complexity:  There are four specific communication difficulties that complicate the work of the primary psychiatric procedure.  Interactive complexity (+06577) may be reported when at least one of these difficulties is present.    Communication difficulties present during current the psychiatric procedure include:  1. None.      Signature/Title:    Vance Watson, VIKTOR, [facility]

## 2022-06-28 ENCOUNTER — VIRTUAL VISIT (OUTPATIENT)
Dept: PSYCHOLOGY | Facility: CLINIC | Age: 32
End: 2022-06-28
Payer: COMMERCIAL

## 2022-06-28 DIAGNOSIS — F90.2 ATTENTION DEFICIT HYPERACTIVITY DISORDER (ADHD), COMBINED TYPE: ICD-10-CM

## 2022-06-28 DIAGNOSIS — F91.8 CONDUCT DISORDER, UNDIFFERENTIATED TYPE: ICD-10-CM

## 2022-06-28 DIAGNOSIS — F33.2 MDD (MAJOR DEPRESSIVE DISORDER), RECURRENT SEVERE, WITHOUT PSYCHOSIS (H): Primary | ICD-10-CM

## 2022-06-28 PROCEDURE — 90853 GROUP PSYCHOTHERAPY: CPT | Mod: 95

## 2022-06-28 PROCEDURE — 99207 PR NO BILLABLE SERVICE THIS VISIT: CPT

## 2022-06-28 NOTE — PROGRESS NOTES
Program in Human Sexuality  [facility]  [address], [address]  [place]  [address]    Group Progress Note  [place] [facility] - Progress Note    Date of Service: 22   Name: Xavier Landon  : 1990  Medical Record Number: [id number]  Therapist(s) in Group: VIKTOR Anderson, [facility] and Shira Shin, PhD  Type of Session: Group  :  Number of Attendees:  5  Session Start and Stop Time: 7627-3814  Number of Minutes:  105    SERVICE MODALITY:  Video Visit:      Provider verified identity through the following two step process.  Patient provided:  Patient is known previously to provider    Telemedicine Visit: The patient's condition can be safely assessed and treated via synchronous audio and visual telemedicine encounter.      Reason for Telemedicine Visit: Services only offered telehealth    Originating Site (Patient Location): Patient's home    Distant Site (Provider Location): Provider Remote Setting- Home Office    Consent:  The patient/guardian has verbally consented to: the potential risks and benefits of telemedicine (video visit) versus in person care; bill my insurance or make self-payment for services provided; and responsibility for payment of non-covered services.     Patient would like the video invitation sent by:  Send to e-mail at: [email or web address]    Mode of Communication:  Video Conference via Medical Zoom    As the provider I attest to compliance with applicable laws and regulations related to telemedicine.    DSM-5 Diagnoses:  296.33 (F33.2) Major Depressive Disorder, Recurrent Episode, Severe _ and With anxious distress  300.02 (F41.1) Generalized Anxiety Disorder.  Attention-Deficit/Hyperactivity Disorder  314.01 (F90.9) Unspecified Attention -Deficit / Hyperactivity Disorder.  Substance-Related & Addictive Disorders Alcohol Use Disorder   303.90 (F10.20) Moderate   301.83 (F60.3) Borderline Personality  "Disorder      Current Reported Symptoms and Status update:  Struggling with compulsive sexual behavior  Struggling with emotional regulation    Progress Toward Treatment Goals:   Minimal progress - PREPARATION (Decided to change - considering how); Intervened by negotiating a change plan and determining options / strategies for behavior change, identifying triggers, exploring social supports, and working towards setting a date to begin behavior change    Therapeutic Interventions/Treatment Strategies:    Area(s) of treatment focus addressed in this session included Symptom Management, Abstinence/Relapse Prevention and Develop Socialization / Interpersonal Relationship Skills.    Patient checked in about their mental health symptoms, healthy coping skills used over the week, negative coping skills used over the week, what sexual behaviors they engaged in-fantasy, masturbation, sex, their comfort level with their behaviors, if there were triggers, urges to violate boundaries, and violations of boundaries.  They took time to process about get some clarity and share with the group-how he is feeling stuck they provided support and feedback to others in the group.    Psychotherapist offered support, feedback and validation and reinforced use of skills. Treatment modalities used include Motivational Interviewing, Cognitive Behavioral Therapy, Dialectical Behavioral Therapy and grou psychodynamic. Interventions include Cognitive Restructuring:  Facilitated recognition of the connection between negative thoughts and negative core beliefs, Coping Skills: Discussed how the use of intentional \"in the moment\" actions can help reduce distress and Facilitated understanding of  what factors may contribute to symptom relapse and skills plan to manage symptom relapse  and Relapse Prevention: Facilitated understanding the importance of awareness of factors that contribute to relapse  and Assisted patient in identifying personal " vulnerabilities, thoughts, emotions, and situations that may lead to relapse .  Support, Feedback, Structured Activity and Clarification    Patient Response:   Patient responded to session by accepting feedback, giving feedback, listening, focusing on goals, being attentive, accepting support and appearing alert  Possible barriers to participation / learning include: and no barriers identified    Current Mental Status Exam:   Appearance:  Appropriate   Eye Contact:  Good   Attitude / Demeanor: Cooperative   Speech      Rate / Production: Normal/ Responsive Emotional      Volume:  Normal  volume  Orientation:  All  Mood:   Depressed   Affect:   Appropriate  Constricted   Thought Content: Clear   Insight:   Good       Plan/Need for Future Services:  Return for therapy in 1 weeks to treat diagnosed problems.    Patient has a current master individualized treatment plan.  See Epic treatment plan for more information.    Assignment:  Return to group in 1 week    Interactive Complexity:  There are four specific communication difficulties that complicate the work of the primary psychiatric procedure.  Interactive complexity (+[id number]) may be reported when at least one of these difficulties is present.    Communication difficulties present during current the psychiatric procedure include:  1. None.      Signature/Title:    Vance Watson, VIKTOR, [facility]

## 2022-07-05 ENCOUNTER — VIRTUAL VISIT (OUTPATIENT)
Dept: PSYCHOLOGY | Facility: CLINIC | Age: 32
End: 2022-07-05
Payer: COMMERCIAL

## 2022-07-05 DIAGNOSIS — F90.2 ATTENTION DEFICIT HYPERACTIVITY DISORDER (ADHD), COMBINED TYPE: ICD-10-CM

## 2022-07-05 DIAGNOSIS — F91.8 CONDUCT DISORDER, UNDIFFERENTIATED TYPE: ICD-10-CM

## 2022-07-05 DIAGNOSIS — F33.2 MDD (MAJOR DEPRESSIVE DISORDER), RECURRENT SEVERE, WITHOUT PSYCHOSIS (H): Primary | ICD-10-CM

## 2022-07-05 PROCEDURE — 99207 PR NO BILLABLE SERVICE THIS VISIT: CPT

## 2022-07-05 PROCEDURE — 90853 GROUP PSYCHOTHERAPY: CPT | Mod: 95

## 2022-07-05 NOTE — GROUP NOTE
Program in Human Sexuality  [facility]  [address], [address]  [place]  [address]    Group Progress Note  [place] for Sexual [facility] Health - Progress Note    Date of Service: 22   Name: Xavier Landon  : 1990  Medical Record Number: [id number]  Therapist(s) in Group: VIKTOR Anderson, [facility] and Shira Shin, PhD  Type of Session: Group  :  Number of Attendees:  7  Session Start and Stop Time: 5500-9526  Number of Minutes:  105    SERVICE MODALITY:  Video Visit:      Provider verified identity through the following two step process.  Patient provided:  Patient is known previously to provider    Telemedicine Visit: The patient's condition can be safely assessed and treated via synchronous audio and visual telemedicine encounter.      Reason for Telemedicine Visit: Services only offered telehealth    Originating Site (Patient Location): Patient's home    Distant Site (Provider Location): Provider Remote Setting- Home Office    Consent:  The patient/guardian has verbally consented to: the potential risks and benefits of telemedicine (video visit) versus in person care; bill my insurance or make self-payment for services provided; and responsibility for payment of non-covered services.     Patient would like the video invitation sent by:  Send to e-mail at: [email or web address]    Mode of Communication:  Video Conference via Medical Zoom    As the provider I attest to compliance with applicable laws and regulations related to telemedicine.    DSM-5 Diagnoses:  296.33 (F33.2) Major Depressive Disorder, Recurrent Episode, Severe _ and With anxious distress  300.02 (F41.1) Generalized Anxiety Disorder.  Attention-Deficit/Hyperactivity Disorder  314.01 (F90.9) Unspecified Attention -Deficit / Hyperactivity Disorder.  Substance-Related & Addictive Disorders Alcohol Use Disorder   303.90 (F10.20) Moderate   301.83 (F60.3) Borderline Personality  Disorder      Current Reported Symptoms and Status update:  Struggling with compulsive sexual behavior  Struggling with emotional regulation    Progress Toward Treatment Goals:   Minimal progress - PREPARATION (Decided to change - considering how); Intervened by negotiating a change plan and determining options / strategies for behavior change, identifying triggers, exploring social supports, and working towards setting a date to begin behavior change    Therapeutic Interventions/Treatment Strategies:    Area(s) of treatment focus addressed in this session included Symptom Management, Abstinence/Relapse Prevention and Develop Socialization / Interpersonal Relationship Skills.    Patient checked in about their mental health symptoms, healthy coping skills used over the week, negative coping skills used over the week, what sexual behaviors they engaged in-fantasy, masturbation, sex, their comfort level with their behaviors, if there were triggers, urges to violate boundaries, and violations of boundaries.  They took time to process about the past week they provided support and feedback to others in the group.      Psychotherapist offered support, feedback and validation and reinforced use of skills. Treatment modalities used include Motivational Interviewing and group psychodynamic. Interventions include Relapse Prevention: Assisted patient in identifying the challenges and barriers to participation and attendance to support groups/community resources.  Support, Feedback, Structured Activity and Clarification    Patient Response:   Patient responded to session by accepting feedback, giving feedback, listening, being attentive, appearing alert and verbalizing understanding  Possible barriers to participation / learning include: and no barriers identified    Current Mental Status Exam:   Appearance:  Appropriate   Eye Contact:  Good   Attitude / Demeanor: Guarded   Speech      Rate / Production: Emotional       Volume:  Loud  volume  Orientation:  All  Mood:   Anxious  Depressed  Elevated  Irritable   Affect:   Constricted   Thought Content: Clear   Insight:   Fair       Plan/Need for Future Services:  Return for therapy in 1 weeks to treat diagnosed problems.    Patient has a current master individualized treatment plan.  See Epic treatment plan for more information.    Assignment:  Attend group therapy    Interactive Complexity:  There are four specific communication difficulties that complicate the work of the primary psychiatric procedure.  Interactive complexity (+07793) may be reported when at least one of these difficulties is present.    Communication difficulties present during current the psychiatric procedure include:  1. None.      Signature/Title:    Vance Watson, VIKTOR, [facility]

## 2022-07-08 ENCOUNTER — OFFICE VISIT (OUTPATIENT)
Dept: PSYCHOLOGY | Facility: CLINIC | Age: 32
End: 2022-07-08
Payer: COMMERCIAL

## 2022-07-08 DIAGNOSIS — F91.8 CONDUCT DISORDER, UNDIFFERENTIATED TYPE: ICD-10-CM

## 2022-07-08 DIAGNOSIS — F90.2 ATTENTION DEFICIT HYPERACTIVITY DISORDER (ADHD), COMBINED TYPE: ICD-10-CM

## 2022-07-08 DIAGNOSIS — F33.2 MDD (MAJOR DEPRESSIVE DISORDER), RECURRENT SEVERE, WITHOUT PSYCHOSIS (H): Primary | ICD-10-CM

## 2022-07-08 DIAGNOSIS — F19.90 SUBSTANCE USE DISORDER: ICD-10-CM

## 2022-07-08 PROCEDURE — 90837 PSYTX W PT 60 MINUTES: CPT | Performed by: MARRIAGE & FAMILY THERAPIST

## 2022-07-08 PROCEDURE — 99207 PR NO BILLABLE SERVICE THIS VISIT: CPT | Performed by: MARRIAGE & FAMILY THERAPIST

## 2022-07-08 NOTE — PROGRESS NOTES
[place] for Sexual [facility] Gender Health - Progress Note    Date of Service: 22   Name: Xavier Landon  : 1990  Medical Record Number: [id number]  Treating Provider: VIKTOR Anderson, [facility]  Type of Session: Individual  Present in Session: pt  Session Start and Stop Time: 2859-7093  Number of Minutes:  53    SERVICE MODALITY:  In-person    DSM-5 Diagnoses:  296.33 (F33.2) Major Depressive Disorder, Recurrent Episode, Severe _ and With anxious distress  300.02 (F41.1) Generalized Anxiety Disorder.  Attention-Deficit/Hyperactivity Disorder  314.01 (F90.9) Unspecified Attention -Deficit / Hyperactivity Disorder.  Substance-Related & Addictive Disorders Alcohol Use Disorder   303.90 (F10.20) Moderate   301.83 (F60.3) Borderline Personality Disorder      Current Reported Symptoms and Status update:  Struggling with compulsive sexual behavior  Struggling with emotional regulation    Progress Toward Treatment Goals:   Minimal progress - ACTION (Actively working towards change); Intervened by reinforcing change plan / affirming steps taken    Therapeutic Interventions/Treatment Strategies:    Area(s) of treatment focus addressed in this session included Symptom Management and Develop Socialization / Interpersonal Relationship Skills.    Processed and discussed mindfulness progress, understanding purpose of program    Psychotherapist offered support, feedback and validation and reinforced use of skills. Treatment modalities used include Motivational Interviewing and Dialectical Behavioral Therapy. Interventions include Behavioral Activation: Reinforced benefits/challenges of change process through applying skills to replace unwanted behaviors and Cognitive Restructuring:  Explored impact of ineffective thoughts / distortions on mood and activity.  Support, Feedback, Structured Activity, Problem Solving and Education    Patient Response:   Patient responded to session by accepting feedback,  listening, being attentive, accepting support, appearing alert and verbalizing understanding  Possible barriers to participation / learning include: and no barriers identified    Current Mental Status Exam:   Appearance:  Appropriate   Eye Contact:  Good   Attitude / Demeanor: Cooperative   Speech      Rate / Production: Normal/ Responsive      Volume:  Normal  volume  Orientation:  All  Mood:   Anxious   Affect:   Appropriate   Thought Content: Clear   Insight:   Fair       Plan/Need for Future Services:  Return for therapy in 1 weeks to treat diagnosed problems.    Patient has a current master individualized treatment plan.  See Epic treatment plan for more information.    Assignment:  Work on boundaries assignment    Interactive Complexity:  There are four specific communication difficulties that complicate the work of the primary psychiatric procedure.  Interactive complexity (+15079) may be reported when at least one of these difficulties is present.    Communication difficulties present during current the psychiatric procedure include:  1. None.      Signature/Title:    VIKTOR Anderson, [facility]

## 2022-07-12 ENCOUNTER — VIRTUAL VISIT (OUTPATIENT)
Dept: PSYCHOLOGY | Facility: CLINIC | Age: 32
End: 2022-07-12
Payer: COMMERCIAL

## 2022-07-12 DIAGNOSIS — F33.2 MDD (MAJOR DEPRESSIVE DISORDER), RECURRENT SEVERE, WITHOUT PSYCHOSIS (H): Primary | ICD-10-CM

## 2022-07-12 DIAGNOSIS — F91.8 CONDUCT DISORDER, UNDIFFERENTIATED TYPE: ICD-10-CM

## 2022-07-12 DIAGNOSIS — F90.2 ATTENTION DEFICIT HYPERACTIVITY DISORDER (ADHD), COMBINED TYPE: ICD-10-CM

## 2022-07-12 PROCEDURE — 99207 PR NO BILLABLE SERVICE THIS VISIT: CPT

## 2022-07-12 PROCEDURE — 90853 GROUP PSYCHOTHERAPY: CPT | Mod: 95

## 2022-07-12 NOTE — PROGRESS NOTES
Program in Human Sexuality  [facility]  [address], [address]  [place]  [address]    Group Progress Note  [place] for Sexual and Gender Health - Progress Note    Date of Service: 22   Name: Xavier Landon  : 1990  Medical Record Number: [id number]  Therapist(s) in Group: VIKTOR Anderson, [facility] and Shira Brody, PhD  Type of Session: Group  :  Number of Attendees:  6  Session Start and Stop Time: 8447-0210  Number of Minutes:  105    SERVICE MODALITY:  Video Visit:      Provider verified identity through the following two step process.  Patient provided:  Patient is known previously to provider    Telemedicine Visit: The patient's condition can be safely assessed and treated via synchronous audio and visual telemedicine encounter.      Reason for Telemedicine Visit: Services only offered telehealth    Originating Site (Patient Location): Patient's home    Distant Site (Provider Location): Provider Remote Setting- Home Office    Consent:  The patient/guardian has verbally consented to: the potential risks and benefits of telemedicine (video visit) versus in person care; bill my insurance or make self-payment for services provided; and responsibility for payment of non-covered services.     Patient would like the video invitation sent by:  Send to e-mail at: [email or web address]    Mode of Communication:  Video Conference via Medical Zoom    As the provider I attest to compliance with applicable laws and regulations related to telemedicine.    DSM-5 Diagnoses:  296.33 (F33.2) Major Depressive Disorder, Recurrent Episode, Severe _ and With anxious distress  300.02 (F41.1) Generalized Anxiety Disorder.  Attention-Deficit/Hyperactivity Disorder  314.01 (F90.9) Unspecified Attention -Deficit / Hyperactivity Disorder.  Substance-Related & Addictive Disorders Alcohol Use Disorder   303.90 (F10.20) Moderate   301.83 (F60.3) Borderline Personality  Disorder      Current Reported Symptoms and Status update:  Struggling with compulsive sexual behavior  Struggling with emotional regulation      Progress Toward Treatment Goals:   Minimal progress - PREPARATION (Decided to change - considering how); Intervened by negotiating a change plan and determining options / strategies for behavior change, identifying triggers, exploring social supports, and working towards setting a date to begin behavior change    Therapeutic Interventions/Treatment Strategies:    Area(s) of treatment focus addressed in this session included Symptom Management, Abstinence/Relapse Prevention and Develop Socialization / Interpersonal Relationship Skills.    Patient checked in about their mental health symptoms, healthy coping skills used over the week, negative coping skills used over the week, what sexual behaviors they engaged in-fantasy, masturbation, sex, their comfort level with their behaviors, if there were triggers, urges to violate boundaries, and violations of boundaries.  They took time to process about their alcohol use and how it relates to their CSB-regulation they provided support and feedback to others in the group.      Psychotherapist offered support, feedback and validation and reinforced use of skills. Treatment modalities used include Motivational Interviewing and group psychodynamic. Interventions include Relapse Prevention: Facilitated understanding the importance of awareness of factors that contribute to relapse , Assisted patient in identifying personal vulnerabilities, thoughts, emotions, and situations that may lead to relapse  and Assisted patient in identifying the challenges and barriers to participation and attendance to support groups/community resources.  Support, Feedback, Structured Activity and Clarification    Patient Response:   Patient responded to session by accepting feedback, giving feedback, listening, being attentive, accepting support, appearing  alert and verbalizing understanding  Possible barriers to participation / learning include: and no barriers identified    Current Mental Status Exam:   Appearance:  Appropriate   Eye Contact:  Good   Attitude / Demeanor: Cooperative   Speech      Rate / Production: Normal/ Responsive      Volume:  Normal  volume  Orientation:  All  Mood:   Anxious  Depressed   Affect:   Appropriate  Constricted   Thought Content: Clear   Insight:   Fair       Plan/Need for Future Services:  Return for therapy in 2  weeks to treat diagnosed problems.    Patient has a current master individualized treatment plan.  See Epic treatment plan for more information.    Assignment:  Return to group in 2 weeks, individual therapy this week     Interactive Complexity:  There are four specific communication difficulties that complicate the work of the primary psychiatric procedure.  Interactive complexity (+77073) may be reported when at least one of these difficulties is present.    Communication difficulties present during current the psychiatric procedure include:  1. None.      Signature/Title:    VIKTOR Anderson, [facility]

## 2022-07-12 NOTE — PROGRESS NOTES
Program [facility] Sexuality  [facility]  [address], [address]  [place]  [address]     Group Progress Note  [place] [facility] - Progress Note     Date of Service: 22   Name: Xavier Landon  : 1990  Medical Record Number: [id number]  Therapist(s) in Group: VIKTOR Anderson, [facility] and Shira Shin, PhD  Type of Session: Group  :  Number of Attendees:  7  Session Start and Stop Time: 0808-3531  Number of Minutes:  105     SERVICE MODALITY:  Video Visit:      Provider verified identity through the following two step process.  Patient provided:  Patient is known previously to provider     Telemedicine Visit: The patient's condition can be safely assessed and treated via synchronous audio and visual telemedicine encounter.       Reason for Telemedicine Visit: Services only offered telehealth     Originating Site (Patient Location): Patient's home     Distant Site (Provider Location): Provider Remote Setting- Home Office     Consent:  The patient/guardian has verbally consented to: the potential risks and benefits of telemedicine (video visit) versus in person care; bill my insurance or make self-payment for services provided; and responsibility for payment of non-covered services.      Patient would like the video invitation sent by:  Send to e-mail at: [email or web address]     Mode of Communication:  Video Conference via Medical Zoom     As the provider I attest to compliance with applicable laws and regulations related to telemedicine.     DSM-5 Diagnoses:  296.33 (F33.2) Major Depressive Disorder, Recurrent Episode, Severe _ and With anxious distress  300.02 (F41.1) Generalized Anxiety Disorder.  Attention-Deficit/Hyperactivity Disorder  314.01 (F90.9) Unspecified Attention -Deficit / Hyperactivity Disorder.  Substance-Related & Addictive Disorders Alcohol Use Disorder   303.90 (F10.20) Moderate   301.83 (F60.3) Borderline  Personality Disorder        Current Reported Symptoms and Status update:  Struggling with compulsive sexual behavior  Struggling with emotional regulation     Progress Toward Treatment Goals:   Minimal progress - PREPARATION (Decided to change - considering how); Intervened by negotiating a change plan and determining options / strategies for behavior change, identifying triggers, exploring social supports, and working towards setting a date to begin behavior change     Therapeutic Interventions/Treatment Strategies:     Area(s) of treatment focus addressed in this session included Symptom Management, Abstinence/Relapse Prevention and Develop Socialization / Interpersonal Relationship Skills.     Patient checked in about their mental health symptoms, healthy coping skills used over the week, negative coping skills used over the week, what sexual behaviors they engaged in-fantasy, masturbation, sex, their comfort level with their behaviors, if there were triggers, urges to violate boundaries, and violations of boundaries.  They took time to process about the past week they provided support and feedback to others in the group.        Psychotherapist offered support, feedback and validation and reinforced use of skills. Treatment modalities used include Motivational Interviewing and group psychodynamic. Interventions include Relapse Prevention: Assisted patient in identifying the challenges and barriers to participation and attendance to support groups/community resources.  Support, Feedback, Structured Activity and Clarification     Patient Response:   Patient responded to session by accepting feedback, giving feedback, listening, being attentive, appearing alert and verbalizing understanding  Possible barriers to participation / learning include: and no barriers identified     Current Mental Status Exam:   Appearance:                Appropriate   Eye Contact:               Good   Attitude / Demeanor:   Guarded    Speech      Rate / Production:   Emotional      Volume:                   Loud  volume  Orientation:                 All  Mood:                          Anxious  Depressed  Elevated  Irritable   Affect:                          Constricted   Thought Content:        Clear   Insight:                         Fair         Plan/Need for Future Services:  Return for therapy in 1 weeks to treat diagnosed problems.    Patient has a current master individualized treatment plan.  See Epic treatment plan for more information.     Assignment:  Attend group therapy     Interactive Complexity:  There are four specific communication difficulties that complicate the work of the primary psychiatric procedure.  Interactive complexity (+90127) may be reported when at least one of these difficulties is present.     Communication difficulties present during current the psychiatric procedure include:  1. None.        Signature/Title:     Vance Watson, VIKTOR, [facility]

## 2022-07-15 ENCOUNTER — VIRTUAL VISIT (OUTPATIENT)
Dept: PSYCHOLOGY | Facility: CLINIC | Age: 32
End: 2022-07-15
Payer: COMMERCIAL

## 2022-07-15 DIAGNOSIS — F91.8 CONDUCT DISORDER, UNDIFFERENTIATED TYPE: ICD-10-CM

## 2022-07-15 DIAGNOSIS — F90.2 ATTENTION DEFICIT HYPERACTIVITY DISORDER (ADHD), COMBINED TYPE: ICD-10-CM

## 2022-07-15 DIAGNOSIS — F33.2 MDD (MAJOR DEPRESSIVE DISORDER), RECURRENT SEVERE, WITHOUT PSYCHOSIS (H): Primary | ICD-10-CM

## 2022-07-15 PROCEDURE — 99207 PR NO BILLABLE SERVICE THIS VISIT: CPT | Performed by: MARRIAGE & FAMILY THERAPIST

## 2022-07-15 PROCEDURE — 90834 PSYTX W PT 45 MINUTES: CPT | Mod: 95 | Performed by: MARRIAGE & FAMILY THERAPIST

## 2022-07-15 NOTE — PROGRESS NOTES
[facility] - Progress Note    Date of Service: 7/15/22   Name: Xavier Landon  : 1990  Medical Record Number: [id number]  Treating Provider: VIKTOR Anderson, [facility]  Type of Session: Individual  Present in Session: pt  Session Start and Stop Time: 4141-8674  Number of Minutes:  47    SERVICE MODALITY:  Video Visit:      Provider verified identity through the following two step process.  Patient provided:  Patient is known previously to provider    Telemedicine Visit: The patient's condition can be safely assessed and treated via synchronous audio and visual telemedicine encounter.      Reason for Telemedicine Visit: Services only offered telehealth    Originating Site (Patient Location): Patient's home    Distant Site (Provider Location): Provider Remote Setting- Home Office    Consent:  The patient/guardian has verbally consented to: the potential risks and benefits of telemedicine (video visit) versus in person care; bill my insurance or make self-payment for services provided; and responsibility for payment of non-covered services.     Patient would like the video invitation sent by:  My Chart    Mode of Communication:  Video Conference via eTruck    As the provider I attest to compliance with applicable laws and regulations related to telemedicine.    DSM-5 Diagnoses:  296.33 (F33.2) Major Depressive Disorder, Recurrent Episode, Severe _ and With anxious distress  300.02 (F41.1) Generalized Anxiety Disorder.  Attention-Deficit/Hyperactivity Disorder  314.01 (F90.9) Unspecified Attention -Deficit / Hyperactivity Disorder.  Substance-Related & Addictive Disorders Alcohol Use Disorder   303.90 (F10.20) Moderate   301.83 (F60.3) Borderline Personality Disorder      Current Reported Symptoms and Status update:  Struggling with compulsive sexual behavior  Struggling with emotional regulation      Progress Toward Treatment Goals:   Minimal progress - PREPARATION  (Decided to change - considering how); Intervened by negotiating a change plan and determining options / strategies for behavior change, identifying triggers, exploring social supports, and working towards setting a date to begin behavior change    Therapeutic Interventions/Treatment Strategies:    Area(s) of treatment focus addressed in this session included Symptom Management, Abstinence/Relapse Prevention and Develop Socialization / Interpersonal Relationship Skills.    Pt has been struggling with     Psychotherapist offered support, feedback and validation and reinforced use of skills. Treatment modalities used include Motivational Interviewing and Dialectical Behavioral Therapy. Interventions include Relapse Prevention: Facilitated understanding the importance of awareness of factors that contribute to relapse  and Coached on skills to manage factors that contribute to relapse.  Support, Redirection, Problem Solving and Clarification    Patient Response:   Patient responded to session by accepting feedback, listening, being attentive, accepting support, appearing alert and verbalizing understanding  Possible barriers to participation / learning include: severity of symptoms    Current Mental Status Exam:   Appearance:  Appropriate   Eye Contact:  Good   Attitude / Demeanor: Cooperative   Speech      Rate / Production: Normal/ Responsive Hyperverbal       Volume:  Normal  volume  Orientation:  All  Mood:   Depressed   Affect:   Constricted   Thought Content: Clear   Insight:   Fair       Plan/Need for Future Services:  Return for therapy in 2 weeks to treat diagnosed problems.    Patient has a current master individualized treatment plan.  See Epic treatment plan for more information.    Assignment:  Sobriety  Work on homework  Fill med minders    Interactive Complexity:  There are four specific communication difficulties that complicate the work of the primary psychiatric procedure.  Interactive complexity  (+22573) may be reported when at least one of these difficulties is present.    Communication difficulties present during current the psychiatric procedure include:  1. None.      Signature/Title:    VIKTOR Anderson, [facility]

## 2022-07-18 DIAGNOSIS — F91.8 CONDUCT DISORDER, UNDIFFERENTIATED TYPE: ICD-10-CM

## 2022-07-18 DIAGNOSIS — F90.2 ATTENTION DEFICIT HYPERACTIVITY DISORDER (ADHD), COMBINED TYPE: ICD-10-CM

## 2022-07-18 NOTE — TELEPHONE ENCOUNTER
Requested Medication: Adderall 10mg  Dose: 10mg  Quantity: 30  Refills: 0    Take 1 tablet in the afternoon for breakthrough symptoms  _____    Requested Medication: Adderall XR  20mg  Dose: 20mg  Quantity: 60  Refills: 0    Take 1 capsule (20mg) twice daily    Last seen at [facility]: 6/16 - 1 mo  Next Appointment with Provider: 7/27      Lois Baird CMA

## 2022-07-19 RX ORDER — DEXTROAMPHETAMINE SACCHARATE, AMPHETAMINE ASPARTATE, DEXTROAMPHETAMINE SULFATE AND AMPHETAMINE SULFATE 2.5; 2.5; 2.5; 2.5 MG/1; MG/1; MG/1; MG/1
TABLET ORAL
Qty: 30 TABLET | Refills: 0 | Status: SHIPPED | OUTPATIENT
Start: 2022-07-19 | End: 2022-08-15

## 2022-07-19 RX ORDER — DEXTROAMPHETAMINE SACCHARATE, AMPHETAMINE ASPARTATE MONOHYDRATE, DEXTROAMPHETAMINE SULFATE AND AMPHETAMINE SULFATE 5; 5; 5; 5 MG/1; MG/1; MG/1; MG/1
20 CAPSULE, EXTENDED RELEASE ORAL 2 TIMES DAILY
Qty: 60 CAPSULE | Refills: 0 | Status: SHIPPED | OUTPATIENT
Start: 2022-07-19 | End: 2022-08-15

## 2022-07-26 ENCOUNTER — OFFICE VISIT (OUTPATIENT)
Dept: PSYCHOLOGY | Facility: CLINIC | Age: 32
End: 2022-07-26
Payer: COMMERCIAL

## 2022-07-26 DIAGNOSIS — F19.90 SUBSTANCE USE DISORDER: ICD-10-CM

## 2022-07-26 DIAGNOSIS — F41.1 GENERALIZED ANXIETY DISORDER: ICD-10-CM

## 2022-07-26 DIAGNOSIS — F33.2 MDD (MAJOR DEPRESSIVE DISORDER), RECURRENT SEVERE, WITHOUT PSYCHOSIS (H): Primary | ICD-10-CM

## 2022-07-26 DIAGNOSIS — F91.8 CONDUCT DISORDER, UNDIFFERENTIATED TYPE: ICD-10-CM

## 2022-07-26 PROCEDURE — 90853 GROUP PSYCHOTHERAPY: CPT

## 2022-07-27 NOTE — PROGRESS NOTES
Program in Human Sexuality  [facility]  [address], [address]  [place]  [address]    Group Progress Note  [place] [facility] - Progress Note    Date of Service: 22   Name: Xavier Landon  : 1990  Medical Record Number: [id number]  Therapist(s) in Group: Vance Watson, LMFT, [facility]   Type of Session: Group  :  Number of Attendees:  4  Session Start and Stop Time: 630pm-830pm  Number of Minutes:  120    SERVICE MODALITY:  In-person    DSM-5 Diagnoses:  296.33 (F33.2) Major Depressive Disorder, Recurrent Episode, Severe _ and With anxious distress  300.02 (F41.1) Generalized Anxiety Disorder.  Attention-Deficit/Hyperactivity Disorder  314.01 (F90.9) Unspecified Attention -Deficit / Hyperactivity Disorder.  Substance-Related & Addictive Disorders Alcohol Use Disorder   303.90 (F10.20) Moderate   301.83 (F60.3) Borderline Personality Disorder      Current Reported Symptoms and Status update:  Struggling with compulsive sexual behavior  Struggling with emotional regulation    Progress Toward Treatment Goals:   Minimal progress - PREPARATION (Decided to change - considering how); Intervened by negotiating a change plan and determining options / strategies for behavior change, identifying triggers, exploring social supports, and working towards setting a date to begin behavior change    Therapeutic Interventions/Treatment Strategies:    Area(s) of treatment focus addressed in this session included Symptom Management, Abstinence/Relapse Prevention and Develop Socialization / Interpersonal Relationship Skills.    Patient checked in about their mental health symptoms, healthy coping skills used over the week, negative coping skills used over the week, what sexual behaviors they engaged in-fantasy, masturbation, sex, their comfort level with their behaviors, if there were triggers, urges to violate boundaries, and violations of boundaries.  They took  time to process about their experience with mindfulness they provided support and feedback to others in the group.    Psychotherapist offered support, feedback and validation and reinforced use of skills. Treatment modalities used include group psychodynamic. Interventions include Cognitive Restructuring:  Explored impact of ineffective thoughts / distortions on mood and activity, Relapse Prevention: Assisted patient in identifying personal vulnerabilities, thoughts, emotions, and situations that may lead to relapse  and Mindfulness: Encouraged a plan to use mindfulness skills in daily life.  Support, Feedback, Structured Activity, Problem Solving, Clarification and Education    Patient Response:   Patient responded to session by accepting feedback, giving feedback, listening, being attentive, accepting support, appearing alert and verbalizing understanding  Possible barriers to participation / learning include: and no barriers identified    Current Mental Status Exam:   Appearance:  Appropriate   Eye Contact:  Good   Attitude / Demeanor: Cooperative   Speech      Rate / Production: Normal/ Responsive      Volume:  Normal  volume  Orientation:  All  Mood:   Anxious  Normal  Affect:   Appropriate   Thought Content: Clear   Insight:   Good       Plan/Need for Future Services:  Return for therapy in 1 weeks to treat diagnosed problems.    Patient has a current master individualized treatment plan.  See Epic treatment plan for more information.    Assignment:  Return to group-    Interactive Complexity:  There are four specific communication difficulties that complicate the work of the primary psychiatric procedure.  Interactive complexity (+11228) may be reported when at least one of these difficulties is present.    Communication difficulties present during current the psychiatric procedure include:  1. None.      Signature/Title:    Vance Watson, VIKTOR, [facility]

## 2022-07-29 ENCOUNTER — OFFICE VISIT (OUTPATIENT)
Dept: PSYCHOLOGY | Facility: CLINIC | Age: 32
End: 2022-07-29
Payer: COMMERCIAL

## 2022-07-29 DIAGNOSIS — F91.8 CONDUCT DISORDER, UNDIFFERENTIATED TYPE: ICD-10-CM

## 2022-07-29 DIAGNOSIS — F33.2 MDD (MAJOR DEPRESSIVE DISORDER), RECURRENT SEVERE, WITHOUT PSYCHOSIS (H): Primary | ICD-10-CM

## 2022-07-29 DIAGNOSIS — F19.90 SUBSTANCE USE DISORDER: ICD-10-CM

## 2022-07-29 DIAGNOSIS — F41.1 GENERALIZED ANXIETY DISORDER: ICD-10-CM

## 2022-07-29 PROCEDURE — 90837 PSYTX W PT 60 MINUTES: CPT | Performed by: MARRIAGE & FAMILY THERAPIST

## 2022-07-29 NOTE — PROGRESS NOTES
[facility] - Progress Note    Date of Service: 22   Name: Xavier Landon  : 1990  Medical Record Number: [id number]  Treating Provider: VIKTOR Anderson, [facility]  Type of Session: Individual  Present in Session: pt  Session Start and Stop Time: 0557-7835  Number of Minutes:  59    SERVICE MODALITY:  In-person    DSM-5 Diagnoses:  296.33 (F33.2) Major Depressive Disorder, Recurrent Episode, Severe _ and With anxious distress  300.02 (F41.1) Generalized Anxiety Disorder.  Attention-Deficit/Hyperactivity Disorder  314.01 (F90.9) Unspecified Attention -Deficit / Hyperactivity Disorder.  Substance-Related & Addictive Disorders Alcohol Use Disorder   303.90 (F10.20) Moderate   301.83 (F60.3) Borderline Personality Disorder      Current Reported Symptoms and Status update:  Struggling with compulsive sexual behavior  Struggling with emotional regulation    Progress Toward Treatment Goals:   Minimal progress - PREPARATION (Decided to change - considering how); Intervened by negotiating a change plan and determining options / strategies for behavior change, identifying triggers, exploring social supports, and working towards setting a date to begin behavior change    Therapeutic Interventions/Treatment Strategies:    Area(s) of treatment focus addressed in this session included Symptom Management and Develop Socialization / Interpersonal Relationship Skills.    Mindfulness has been working for emotional regulation        Psychotherapist offered support, feedback and validation and reinforced use of skills. Treatment modalities used include Motivational Interviewing and Dialectical Behavioral Therapy. Interventions include Behavioral Activation: Reinforced benefits/challenges of change process through applying skills to replace unwanted behaviors and Explored how behaviors effect mood and interact with thoughts and feelings and Relationship Skills: Encouraged development  and maintenance  of healthy boundaries.  Support, Feedback, Limit/Boundaries and Structured Activity    Patient Response:   Patient responded to session by accepting feedback, listening, focusing on goals, accepting support, appearing alert and interrupting  Possible barriers to participation / learning include: and no barriers identified    Current Mental Status Exam:   Appearance:  Appropriate   Eye Contact:  Good   Attitude / Demeanor: Cooperative  Belligerent   Speech      Rate / Production: Emotional      Volume:  Normal  volume  Orientation:  All  Mood:   Angry  Anxious  Irritable   Affect:   Constricted   Thought Content: Clear   Insight:   Fair       Plan/Need for Future Services:  Return for therapy in 1 weeks to treat diagnosed problems.    Patient has a current master individualized treatment plan.  See Epic treatment plan for more information.    Assignment:  Explore boundary of masturbating 7 days a week    Interactive Complexity:  There are four specific communication difficulties that complicate the work of the primary psychiatric procedure.  Interactive complexity (+[id number]) may be reported when at least one of these difficulties is present.    Communication difficulties present during current the psychiatric procedure include:  1. None.      Signature/Title:    Vance Watson, LMFT, [facility]

## 2022-08-02 ENCOUNTER — OFFICE VISIT (OUTPATIENT)
Dept: PSYCHOLOGY | Facility: CLINIC | Age: 32
End: 2022-08-02
Payer: COMMERCIAL

## 2022-08-02 DIAGNOSIS — F33.2 MDD (MAJOR DEPRESSIVE DISORDER), RECURRENT SEVERE, WITHOUT PSYCHOSIS (H): Primary | ICD-10-CM

## 2022-08-02 DIAGNOSIS — F41.1 GENERALIZED ANXIETY DISORDER: ICD-10-CM

## 2022-08-02 DIAGNOSIS — F19.90 SUBSTANCE USE DISORDER: ICD-10-CM

## 2022-08-02 DIAGNOSIS — F91.8 CONDUCT DISORDER, UNDIFFERENTIATED TYPE: ICD-10-CM

## 2022-08-02 PROCEDURE — 90853 GROUP PSYCHOTHERAPY: CPT

## 2022-08-03 NOTE — PROGRESS NOTES
Program in Human Sexuality  [facility]  [address], [address]  [place]  [address]    Group Progress Note  [place] [facility] - Progress Note    Date of Service: 22   Name: Xavier Landon  : 1990  Medical Record Number: [id number]  Therapist(s) in Group: Vance Watson, LMFT, [facility]   Type of Session: Group  :  Number of Attendees:  5  Session Start and Stop Time: 630pm-830pm  Number of Minutes:  /120    SERVICE MODALITY:  In-person    DSM-5 Diagnoses:  296.33 (F33.2) Major Depressive Disorder, Recurrent Episode, Severe _ and With anxious distress  300.02 (F41.1) Generalized Anxiety Disorder.  Attention-Deficit/Hyperactivity Disorder  314.01 (F90.9) Unspecified Attention -Deficit / Hyperactivity Disorder.  Substance-Related & Addictive Disorders Alcohol Use Disorder   303.90 (F10.20) Moderate   301.83 (F60.3) Borderline Personality Disorder      Current Reported Symptoms and Status update:  Struggling with compulsive sexual behavior  Struggling with emotional regulation      Progress Toward Treatment Goals:   Minimal progress - PREPARATION (Decided to change - considering how); Intervened by negotiating a change plan and determining options / strategies for behavior change, identifying triggers, exploring social supports, and working towards setting a date to begin behavior change    Therapeutic Interventions/Treatment Strategies:    Area(s) of treatment focus addressed in this session included Symptom Management, Abstinence/Relapse Prevention and Develop Socialization / Interpersonal Relationship Skills.    Patient checked in about their mental health symptoms, healthy coping skills used over the week, negative coping skills used over the week, what sexual behaviors they engaged in-fantasy, masturbation, sex, their comfort level with their behaviors, if there were triggers, urges to violate boundaries, and violations of boundaries.  They took  time to process about their alcohol use/boundaries/how they have been showing up in the program and how they want to move forward, they provided support and feedback to others in the group.      Psychotherapist offered support, feedback and validation, reinforced use of skills and .. Treatment modalities used include group psychodynamic. Interventions include Relapse Prevention: Facilitated understanding the importance of awareness of factors that contribute to relapse  and Assisted patient in identifying the challenges and barriers to participation and attendance to support groups/community resources and Mindfulness: Facilitated discussion of when/how to use mindfulness skills to benefit general health, mental health symptoms, and stressors.  Support, Feedback, Structured Activity, Problem Solving and Clarification    Patient Response:   Patient responded to session by accepting feedback, giving feedback, listening, focusing on goals, being attentive, accepting support, appearing alert and verbalizing understanding  Possible barriers to participation / learning include: and no barriers identified    Current Mental Status Exam:   Appearance:  Appropriate   Eye Contact:  Good   Attitude / Demeanor: Cooperative   Speech      Rate / Production: Normal/ Responsive      Volume:  Normal  volume  Orientation:  All  Mood:   Anxious  Depressed   Affect:   Appropriate   Thought Content: Clear   Insight:   Good       Plan/Need for Future Services:  Return for therapy in 1 weeks to treat diagnosed problems.    Patient has a current master individualized treatment plan.  See Epic treatment plan for more information.    Assignment:  Return in one week  Continue to clarify boundaries     Interactive Complexity:  There are four specific communication difficulties that complicate the work of the primary psychiatric procedure.  Interactive complexity (+[id number]) may be reported when at least one of these difficulties is  present.    Communication difficulties present during current the psychiatric procedure include:  1. None.      Signature/Title:    [facility], [facility]

## 2022-08-05 ENCOUNTER — OFFICE VISIT (OUTPATIENT)
Dept: PSYCHOLOGY | Facility: CLINIC | Age: 32
End: 2022-08-05
Payer: COMMERCIAL

## 2022-08-05 DIAGNOSIS — F91.8 CONDUCT DISORDER, UNDIFFERENTIATED TYPE: ICD-10-CM

## 2022-08-05 DIAGNOSIS — F41.1 GENERALIZED ANXIETY DISORDER: ICD-10-CM

## 2022-08-05 DIAGNOSIS — F33.2 MDD (MAJOR DEPRESSIVE DISORDER), RECURRENT SEVERE, WITHOUT PSYCHOSIS (H): Primary | ICD-10-CM

## 2022-08-05 DIAGNOSIS — F19.90 SUBSTANCE USE DISORDER: ICD-10-CM

## 2022-08-05 PROCEDURE — 90837 PSYTX W PT 60 MINUTES: CPT | Performed by: MARRIAGE & FAMILY THERAPIST

## 2022-08-05 NOTE — PROGRESS NOTES
"[facility] Progress Note    Date of Service: 22   Name: Xavier Landon  : 1990  Medical Record Number: [id number]  Treating Provider: VIKTOR Anderson, [facility]  Type of Session: Individual  Present in Session: pt  Session Start and Stop Time: 3791-9807  Number of Minutes:  56    SERVICE MODALITY:  In-person    DSM-5 Diagnoses:  296.33 (F33.2) Major Depressive Disorder, Recurrent Episode, Severe _ and With anxious distress  300.02 (F41.1) Generalized Anxiety Disorder.  Attention-Deficit/Hyperactivity Disorder  314.01 (F90.9) Unspecified Attention -Deficit / Hyperactivity Disorder.  Substance-Related & Addictive Disorders Alcohol Use Disorder   303.90 (F10.20) Moderate   301.83 (F60.3) Borderline Personality Disorder      Current Reported Symptoms and Status update:  Struggling with compulsive sexual behavior  Struggling with emotional regulation      Progress Toward Treatment Goals:   Minimal progress - PREPARATION (Decided to change - considering how); Intervened by negotiating a change plan and determining options / strategies for behavior change, identifying triggers, exploring social supports, and working towards setting a date to begin behavior change    Therapeutic Interventions/Treatment Strategies:    Area(s) of treatment focus addressed in this session included Symptom Management, Abstinence/Relapse Prevention and Develop Socialization / Interpersonal Relationship Skills.    Core belief around alcohol   \"I need alcohol to forget about everything im going through\"  \"I need alcohol for liquid courage-  I am humiliated and I deserve this    Anxiety and boredom are the emotions in the drivers seat when choosing to drink-shame-    You have to keep doing this-because I cant change  I dont want to feel like shit-    Psychotherapist offered support, feedback and validation and reinforced use of skills. Treatment modalities used include Motivational " Interviewing. Interventions include Relapse Prevention: Assisted patient in identifying the challenges and barriers to participation and attendance to support groups/community resources.  Support and Feedback    Patient Response:   Patient responded to session by listening and being attentive  Possible barriers to participation / learning include: and no barriers identified    Current Mental Status Exam:   Appearance:  Appropriate   Eye Contact:  Good   Attitude / Demeanor: Cooperative   Speech      Rate / Production: Normal/ Responsive      Volume:  Normal  volume  Orientation:  All  Mood:   Depressed   Affect:   Blunted   Thought Content: Clear   Insight:   Good       Plan/Need for Future Services:  Return for therapy in 1 weeks to treat diagnosed problems.    Patient has a current master individualized treatment plan.  See Epic treatment plan for more information.    Assignment:  sobriety    Interactive Complexity:  There are four specific communication difficulties that complicate the work of the primary psychiatric procedure.  Interactive complexity (+35430) may be reported when at least one of these difficulties is present.    Communication difficulties present during current the psychiatric procedure include:  1. None.      Signature/Title:    Vance Watson, LMFT, [facility] [place] [facility] [place]

## 2022-08-12 ENCOUNTER — OFFICE VISIT (OUTPATIENT)
Dept: PSYCHOLOGY | Facility: CLINIC | Age: 32
End: 2022-08-12
Payer: COMMERCIAL

## 2022-08-12 DIAGNOSIS — F91.8 CONDUCT DISORDER, UNDIFFERENTIATED TYPE: ICD-10-CM

## 2022-08-12 DIAGNOSIS — F41.1 GENERALIZED ANXIETY DISORDER: ICD-10-CM

## 2022-08-12 DIAGNOSIS — F33.2 MDD (MAJOR DEPRESSIVE DISORDER), RECURRENT SEVERE, WITHOUT PSYCHOSIS (H): Primary | ICD-10-CM

## 2022-08-12 DIAGNOSIS — F19.90 SUBSTANCE USE DISORDER: ICD-10-CM

## 2022-08-12 PROCEDURE — 90837 PSYTX W PT 60 MINUTES: CPT | Performed by: MARRIAGE & FAMILY THERAPIST

## 2022-08-12 NOTE — PROGRESS NOTES
[facility] - Progress Note    Date of Service: 22   Name: Xavier Landon  : 1990  Medical Record Number: [id number]  Treating Provider: VIKTOR Anderson LAD  Type of Session: Individual  Present in Session: pt  Session Start and Stop Time: 7715-8558  Number of Minutes:  59    SERVICE MODALITY:  In-person    DSM-5 Diagnoses:  296.33 (F33.2) Major Depressive Disorder, Recurrent Episode, Severe _ and With anxious distress  300.02 (F41.1) Generalized Anxiety Disorder.  Attention-Deficit/Hyperactivity Disorder  314.01 (F90.9) Unspecified Attention -Deficit / Hyperactivity Disorder.  Substance-Related & Addictive Disorders Alcohol Use Disorder   303.90 (F10.20) Moderate   301.83 (F60.3) Borderline Personality Disorder      Current Reported Symptoms and Status update:  Struggling with compulsive sexual behavior  Struggling with emotional regulation      Progress Toward Treatment Goals:   Minimal progress - PREPARATION (Decided to change - considering how); Intervened by negotiating a change plan and determining options / strategies for behavior change, identifying triggers, exploring social supports, and working towards setting a date to begin behavior change    Therapeutic Interventions/Treatment Strategies:    Area(s) of treatment focus addressed in this session included Symptom Management, Abstinence/Relapse Prevention and Develop Socialization / Interpersonal Relationship Skills.    Processed about relapse, getting back on track with sobriety, being able to stay mindful ad focused addressed barriers to treatment     Psychotherapist offered support, feedback and validation and reinforced use of skills Treatment modalities used include Motivational Interviewing Dialectical Behavioral Therapy Interventions include Relapse Prevention: Facilitated understanding the importance of awareness of factors that contribute to relapse  and Assisted patient in identifying  personal vulnerabilities, thoughts, emotions, and situations that may lead to relapse   Support, Feedback, Problem Solving and Clarification    Patient Response:   Patient responded to session by accepting feedback, listening, being attentive and accepting support  Possible barriers to participation / learning include: and no barriers identified    Current Mental Status Exam:   Appearance:  Appropriate   Eye Contact:  Good   Attitude / Demeanor: Cooperative   Speech      Rate / Production: Normal/ Responsive      Volume:  Normal  volume  Orientation:  All  Mood:   Depressed   Affect:   Appropriate   Thought Content: Clear   Insight:   Fair       Plan/Need for Future Services:  Return for therapy in 1 weeks to treat diagnosed problems.    Patient has a current master individualized treatment plan.  See Epic treatment plan for more information.    Assignment:  Med minder, license, read atomic habits, sobriety     Interactive Complexity:  There are four specific communication difficulties that complicate the work of the primary psychiatric procedure.  Interactive complexity (+11734) may be reported when at least one of these difficulties is present.    Communication difficulties present during current the psychiatric procedure include:  1. None.      Signature/Title:    Vance Watson, LMFT, [facility]

## 2022-08-15 DIAGNOSIS — F90.2 ATTENTION DEFICIT HYPERACTIVITY DISORDER (ADHD), COMBINED TYPE: ICD-10-CM

## 2022-08-15 DIAGNOSIS — F91.8 CONDUCT DISORDER, UNDIFFERENTIATED TYPE: ICD-10-CM

## 2022-08-15 NOTE — TELEPHONE ENCOUNTER
Requested Medication: Addrell   Dose: 10mg  Quantity: 30  Refills: 1    Take 1 tablet daily in afternoon for breakthrough sx  _____    Requested Medication: Fluoxetine  Dose: 40mg  Quantity: 30  Refills: 1    Take 1 40 mg capsule daily  _____    Requested Medication: Adderall XR  Dose: 20mg  Quantity: 60  Refills: 1    Take 1 (20mg) capsule twice daily  _____    Requested Medication: Lithium  Dose: 300mg  Quantity: 60  Refills: 0    Take 1 capsule BID      Last seen at [facility]: 6/16 - 1 mo  Next Appointment with Provider: 9/22      Lois Baird CMA

## 2022-08-16 ENCOUNTER — OFFICE VISIT (OUTPATIENT)
Dept: PSYCHOLOGY | Facility: CLINIC | Age: 32
End: 2022-08-16
Payer: COMMERCIAL

## 2022-08-16 DIAGNOSIS — F33.2 MDD (MAJOR DEPRESSIVE DISORDER), RECURRENT SEVERE, WITHOUT PSYCHOSIS (H): Primary | ICD-10-CM

## 2022-08-16 DIAGNOSIS — F19.90 SUBSTANCE USE DISORDER: ICD-10-CM

## 2022-08-16 DIAGNOSIS — F41.1 GENERALIZED ANXIETY DISORDER: ICD-10-CM

## 2022-08-16 PROCEDURE — 90853 GROUP PSYCHOTHERAPY: CPT

## 2022-08-16 RX ORDER — DEXTROAMPHETAMINE SACCHARATE, AMPHETAMINE ASPARTATE MONOHYDRATE, DEXTROAMPHETAMINE SULFATE AND AMPHETAMINE SULFATE 5; 5; 5; 5 MG/1; MG/1; MG/1; MG/1
20 CAPSULE, EXTENDED RELEASE ORAL 2 TIMES DAILY
Qty: 60 CAPSULE | Refills: 0 | Status: SHIPPED | OUTPATIENT
Start: 2022-08-16 | End: 2022-09-15

## 2022-08-16 RX ORDER — DEXTROAMPHETAMINE SACCHARATE, AMPHETAMINE ASPARTATE, DEXTROAMPHETAMINE SULFATE AND AMPHETAMINE SULFATE 2.5; 2.5; 2.5; 2.5 MG/1; MG/1; MG/1; MG/1
TABLET ORAL
Qty: 30 TABLET | Refills: 0 | Status: SHIPPED | OUTPATIENT
Start: 2022-08-16 | End: 2022-09-15

## 2022-08-16 RX ORDER — LITHIUM CARBONATE 300 MG/1
CAPSULE ORAL
Qty: 60 CAPSULE | Refills: 1 | Status: SHIPPED | OUTPATIENT
Start: 2022-08-16 | End: 2022-09-15

## 2022-08-16 RX ORDER — FLUOXETINE 40 MG/1
40 CAPSULE ORAL DAILY
Qty: 30 CAPSULE | Refills: 1 | Status: SHIPPED | OUTPATIENT
Start: 2022-08-16 | End: 2022-09-15

## 2022-08-17 NOTE — PROGRESS NOTES
[facility]  [facility]  [address], [address]  [place]  [address]    Group Progress Note  [facility] - Progress Note    Date of Service: 22   Name: Xavier Landon  : 1990  Medical Record Number: [id number]  Therapist(s) in Group: Vance Watson, LMFT, [facility]  Type of Session: Group  :  Number of Attendees:  5  Session Start and Stop Time: 630pm-830p  Number of Minutes:  /120    SERVICE MODALITY:  In-person    DSM-5 Diagnoses:  296.33 (F33.2) Major Depressive Disorder, Recurrent Episode, Severe _ and With anxious distress  300.02 (F41.1) Generalized Anxiety Disorder.  Attention-Deficit/Hyperactivity Disorder  314.01 (F90.9) Unspecified Attention -Deficit / Hyperactivity Disorder.  Substance-Related & Addictive Disorders Alcohol Use Disorder   303.90 (F10.20) Moderate   301.83 (F60.3) Borderline Personality Disorder      Current Reported Symptoms and Status update:  Struggling with compulsive sexual behavior  Struggling with emotional regulation      Progress Toward Treatment Goals:   Minimal progress - PREPARATION (Decided to change - considering how); Intervened by negotiating a change plan and determining options / strategies for behavior change, identifying triggers, exploring social supports, and working towards setting a date to begin behavior change    Therapeutic Interventions/Treatment Strategies:    Area(s) of treatment focus addressed in this session included Symptom Management, Abstinence/Relapse Prevention and Develop Socialization / Interpersonal Relationship Skills.    Patient checked in about their mental health symptoms, healthy coping skills used over the week, negative coping skills used over the week, what sexual behaviors they engaged in-fantasy, masturbation, sex, their comfort level with their behaviors, if there were triggers, urges to violate boundaries, and violations of boundaries.  They took  time to process about their drinking/sobriety and struggling with sobriety-working on finding ways be sober-struggling with figuring out how it will look for their life- they provided support and feedback to others in the group.    Psychotherapist offered support, feedback and validation and reinforced use of skills Treatment modalities used include Group Psychodynamic  Interventions include Relapse Prevention: Assisted patient in identifying personal vulnerabilities, thoughts, emotions, and situations that may lead to relapse , Coached on skills to manage factors that contribute to relapse and Facilitated understanding of effective coping skills in response to triggers for substance use  Support, Feedback, Limit/Boundaries, Structured Activity, Problem Solving and Clarification    Patient Response:   Patient responded to session by accepting feedback, listening, being attentive, accepting support, appearing alert and verbalizing understanding  Possible barriers to participation / learning include: and no barriers identified    Current Mental Status Exam:   Appearance:  Appropriate   Eye Contact:  Good   Attitude / Demeanor: Cooperative   Speech      Rate / Production: Normal/ Responsive      Volume:  Normal  volume  Orientation:  All  Mood:   Anxious  Depressed   Affect:   Constricted   Thought Content: Clear   Insight:   Fair       Plan/Need for Future Services:  Return for therapy in 1 weeks to treat diagnosed problems.    Patient has a current master individualized treatment plan.  See Epic treatment plan for more information.    Assignment:  Return to group-work on sobriety    Interactive Complexity:  There are four specific communication difficulties that complicate the work of the primary psychiatric procedure.  Interactive complexity (+05879) may be reported when at least one of these difficulties is present.    Communication difficulties present during current the psychiatric procedure  include:  1. None.      Signature/Title:    Vance Watson, VIKTOR, [facility]

## 2022-08-19 ENCOUNTER — OFFICE VISIT (OUTPATIENT)
Dept: PSYCHOLOGY | Facility: CLINIC | Age: 32
End: 2022-08-19
Payer: COMMERCIAL

## 2022-08-19 DIAGNOSIS — F33.2 MDD (MAJOR DEPRESSIVE DISORDER), RECURRENT SEVERE, WITHOUT PSYCHOSIS (H): Primary | ICD-10-CM

## 2022-08-19 DIAGNOSIS — F19.90 SUBSTANCE USE DISORDER: ICD-10-CM

## 2022-08-19 DIAGNOSIS — F41.1 GENERALIZED ANXIETY DISORDER: ICD-10-CM

## 2022-08-19 PROCEDURE — 90837 PSYTX W PT 60 MINUTES: CPT | Performed by: MARRIAGE & FAMILY THERAPIST

## 2022-08-19 NOTE — PROGRESS NOTES
[facility] - Progress Note    Date of Service: 22   Name: Xavier Landon  : 1990  Medical Record Number: [id number]  Treating Provider: VIKTOR Anderson [facility]  Type of Session: Individual  Present in Session: pt  Session Start and Stop Time: 1000-1053am  Number of Minutes:  53    SERVICE MODALITY:  In-person    DSM-5 Diagnoses:  296.33 (F33.2) Major Depressive Disorder, Recurrent Episode, Severe _ and With anxious distress  300.02 (F41.1) Generalized Anxiety Disorder.  Attention-Deficit/Hyperactivity Disorder  314.01 (F90.9) Unspecified Attention -Deficit / Hyperactivity Disorder.  Substance-Related & Addictive Disorders Alcohol Use Disorder   303.90 (F10.20) Moderate   301.83 (F60.3) Borderline Personality Disorder      Current Reported Symptoms and Status update:  Struggling with compulsive sexual behavior  Struggling with emotional regulation      Progress Toward Treatment Goals:   Minimal progress - PREPARATION (Decided to change - considering how); Intervened by negotiating a change plan and determining options / strategies for behavior change, identifying triggers, exploring social supports, and working towards setting a date to begin behavior change    Therapeutic Interventions/Treatment Strategies:    Area(s) of treatment focus addressed in this session included Symptom Management and Develop Socialization / Interpersonal Relationship Skills.    Explored and processed the past week-sobriety, nt wanting to drink at work    Psychotherapist offered support, feedback and validation and reinforced use of skills Treatment modalities used include Motivational Interviewing Sexual Health and Wellness Education Interventions include Relapse Prevention: Facilitated understanding the importance of awareness of factors that contribute to relapse   Support, Feedback, Problem Solving and Clarification    Patient Response:   Patient responded to session by accepting  feedback, listening, being attentive and appearing alert  Possible barriers to participation / learning include: and no barriers identified    Current Mental Status Exam:   Appearance:  Appropriate   Eye Contact:  Good   Attitude / Demeanor: Cooperative   Speech      Rate / Production: Normal/ Responsive      Volume:  Normal  volume  Orientation:  All  Mood:   Anxious   Affect:   Appropriate   Thought Content: Clear   Insight:   Fair       Plan/Need for Future Services:  Return for therapy in 1 weeks to treat diagnosed problems.    Patient has a current master individualized treatment plan.  See Epic treatment plan for more information.    Assignment:  Continue sobriety     Interactive Complexity:  There are four specific communication difficulties that complicate the work of the primary psychiatric procedure.  Interactive complexity (+62398) may be reported when at least one of these difficulties is present.    Communication difficulties present during current the psychiatric procedure include:  1. None.      Signature/Title:    VIKTOR Anderson, [facility]

## 2022-08-23 ENCOUNTER — OFFICE VISIT (OUTPATIENT)
Dept: PSYCHOLOGY | Facility: CLINIC | Age: 32
End: 2022-08-23
Payer: COMMERCIAL

## 2022-08-23 DIAGNOSIS — F91.8 CONDUCT DISORDER, UNDIFFERENTIATED TYPE: ICD-10-CM

## 2022-08-23 DIAGNOSIS — F41.1 GENERALIZED ANXIETY DISORDER: ICD-10-CM

## 2022-08-23 DIAGNOSIS — F19.90 SUBSTANCE USE DISORDER: ICD-10-CM

## 2022-08-23 DIAGNOSIS — F33.2 MDD (MAJOR DEPRESSIVE DISORDER), RECURRENT SEVERE, WITHOUT PSYCHOSIS (H): Primary | ICD-10-CM

## 2022-08-23 PROCEDURE — 90853 GROUP PSYCHOTHERAPY: CPT

## 2022-08-23 NOTE — PROGRESS NOTES
[facility]uality  [facility]  [address], [address]  [place]  [address]    Group Progress Note  [facility] - Progress Note    Date of Service: 22   Name: Xavier Landon  : 1990  Medical Record Number: [id number]  Therapist(s) in Group: Vance Watson, LMFT, [facility]   Type of Session: Group  :  Number of Attendees:  4  Session Start and Stop Time: 630pm-830pm  Number of Minutes:  /120    SERVICE MODALITY:  In-person    DSM-5 Diagnoses:  296.33 (F33.2) Major Depressive Disorder, Recurrent Episode, Severe _ and With anxious distress  300.02 (F41.1) Generalized Anxiety Disorder.  Attention-Deficit/Hyperactivity Disorder  314.01 (F90.9) Unspecified Attention -Deficit / Hyperactivity Disorder.  Substance-Related & Addictive Disorders Alcohol Use Disorder   303.90 (F10.20) Moderate   301.83 (F60.3) Borderline Personality Disorder      Current Reported Symptoms and Status update:  Struggling with compulsive sexual behavior  Struggling with emotional regulation      Progress Toward Treatment Goals:   Minimal progress - PREPARATION (Decided to change - considering how); Intervened by negotiating a change plan and determining options / strategies for behavior change, identifying triggers, exploring social supports, and working towards setting a date to begin behavior change    Therapeutic Interventions/Treatment Strategies:    Area(s) of treatment focus addressed in this session included Symptom Management and Develop Socialization / Interpersonal Relationship Skills.    Patient checked in about their mental health symptoms, healthy coping skills used over the week, negative coping skills used over the week, what sexual behaviors they engaged in-fantasy, masturbation, sex, their comfort level with their behaviors, if there were triggers, urges to violate boundaries, and violations of boundaries.  They took time to process about their  concerns with their sexual concerns they provided support and feedback to others in the group.    Psychotherapist offered support, feedback and validation and reinforced use of skills Treatment modalities used include Motivational Interviewing Group Psychodynamic  Interventions include Relapse Prevention: Assisted patient in identifying personal vulnerabilities, thoughts, emotions, and situations that may lead to relapse  and Assisted patient in identifying the challenges and barriers to participation and attendance to support groups/community resources  Support, Feedback, Structured Activity and Clarification    Patient Response:   Patient responded to session by accepting feedback, giving feedback, listening, being attentive and appearing alert  Possible barriers to participation / learning include: and no barriers identified    Current Mental Status Exam:   Appearance:  Appropriate   Eye Contact:  Good   Attitude / Demeanor: Cooperative   Speech      Rate / Production: Normal/ Responsive      Volume:  Normal  volume  Orientation:  All  Mood:   Anxious   Affect:   Appropriate  Constricted   Thought Content: Clear   Insight:   Fair       Plan/Need for Future Services:  Return for therapy in 1 weeks to treat diagnosed problems.    Patient has a current master individualized treatment plan.  See Epic treatment plan for more information.    Assignment:  Return in one week    Interactive Complexity:  There are four specific communication difficulties that complicate the work of the primary psychiatric procedure.  Interactive complexity (+66528) may be reported when at least one of these difficulties is present.    Communication difficulties present during current the psychiatric procedure include:  1. None.      Signature/Title:    Vance Watson, VIKTOR, [facility]

## 2022-08-26 ENCOUNTER — OFFICE VISIT (OUTPATIENT)
Dept: PSYCHOLOGY | Facility: CLINIC | Age: 32
End: 2022-08-26
Payer: COMMERCIAL

## 2022-08-26 DIAGNOSIS — F41.1 GENERALIZED ANXIETY DISORDER: ICD-10-CM

## 2022-08-26 DIAGNOSIS — F91.8 CONDUCT DISORDER, UNDIFFERENTIATED TYPE: ICD-10-CM

## 2022-08-26 DIAGNOSIS — F33.2 MDD (MAJOR DEPRESSIVE DISORDER), RECURRENT SEVERE, WITHOUT PSYCHOSIS (H): Primary | ICD-10-CM

## 2022-08-26 DIAGNOSIS — F19.90 SUBSTANCE USE DISORDER: ICD-10-CM

## 2022-08-26 PROCEDURE — 90837 PSYTX W PT 60 MINUTES: CPT | Performed by: MARRIAGE & FAMILY THERAPIST

## 2022-08-26 NOTE — PROGRESS NOTES
[facility] - Progress Note    Date of Service: 22   Name: Xavier Landon  : 1990  Medical Record Number: [id number]  Treating Provider: VIKTOR Anderson, [facility] [place]  Type of Session: Individual  Present in Session: pt  Session Start and Stop Time: 4839-1897  Number of Minutes:  55    SERVICE MODALITY:  In-person    DSM-5 Diagnoses:  296.33 (F33.2) Major Depressive Disorder, Recurrent Episode, Severe _ and With anxious distress  300.02 (F41.1) Generalized Anxiety Disorder.  Attention-Deficit/Hyperactivity Disorder  314.01 (F90.9) Unspecified Attention -Deficit / Hyperactivity Disorder.  Substance-Related & Addictive Disorders Alcohol Use Disorder   303.90 (F10.20) Moderate   301.83 (F60.3) Borderline Personality Disorder      Current Reported Symptoms and Status update:  Struggling with compulsive sexual behavior  Struggling with emotional regulation      Progress Toward Treatment Goals:   Minimal progress - PREPARATION (Decided to change - considering how); Intervened by negotiating a change plan and determining options / strategies for behavior change, identifying triggers, exploring social supports, and working towards setting a date to begin behavior change    Therapeutic Interventions/Treatment Strategies:    Area(s) of treatment focus addressed in this session included Symptom Management and Abstinence/Relapse Prevention.    Distressed, angry, feeling like shame is taking over his life    Psychotherapist offered support, feedback and validation and reinforced use of skills Treatment modalities used include Motivational Interviewing Interventions include Relapse Prevention: Facilitated understanding the importance of awareness of factors that contribute to relapse  and Assisted patient in identifying the challenges and barriers to participation and attendance to support groups/community resources  Support, Feedback and Clarification    Patient Response:    Patient responded to session by listening, being attentive and accepting support  Possible barriers to participation / learning include: and no barriers identified    Current Mental Status Exam:   Appearance:  Appropriate   Eye Contact:  Good   Attitude / Demeanor: Cooperative  Belligerent   Speech      Rate / Production: Pressured  Emotional      Volume:  Loud  volume  Orientation:  All  Mood:   Angry  Depressed  Elevated   Affect:   Constricted   Thought Content: Clear   Insight:   Fair       Plan/Need for Future Services:  Return for therapy in 1 weeks to treat diagnosed problems.    Patient has a current master individualized treatment plan.  See Epic treatment plan for more information.    Assignment:  Return to group sobriety    Interactive Complexity:  There are four specific communication difficulties that complicate the work of the primary psychiatric procedure.  Interactive complexity (+[id number]) may be reported when at least one of these difficulties is present.    Communication difficulties present during current the psychiatric procedure include:  1. None.      Signature/Title:    VIKTOR Anderson, [facility]

## 2022-08-30 ENCOUNTER — OFFICE VISIT (OUTPATIENT)
Dept: PSYCHOLOGY | Facility: CLINIC | Age: 32
End: 2022-08-30
Payer: COMMERCIAL

## 2022-08-30 DIAGNOSIS — F33.2 MDD (MAJOR DEPRESSIVE DISORDER), RECURRENT SEVERE, WITHOUT PSYCHOSIS (H): Primary | ICD-10-CM

## 2022-08-30 DIAGNOSIS — F41.1 GENERALIZED ANXIETY DISORDER: ICD-10-CM

## 2022-08-30 DIAGNOSIS — F91.8 CONDUCT DISORDER, UNDIFFERENTIATED TYPE: ICD-10-CM

## 2022-08-30 DIAGNOSIS — F19.90 SUBSTANCE USE DISORDER: ICD-10-CM

## 2022-08-30 PROCEDURE — 90853 GROUP PSYCHOTHERAPY: CPT

## 2022-08-31 NOTE — PROGRESS NOTES
[facility]  [facility]  [address], [address]  [place]  [address]    Group Progress Note  [place] - Progress Note    Date of Service: 22   Name: Xavier Landon  : 1990  Medical Record Number: [id number]  Therapist(s) in Group: Vance Watson, LMFT, CHADD   Type of Session: Group  :  Number of Attendees:  4  Session Start and Stop Time: 630pm-830pm  Number of Minutes:  /120    SERVICE MODALITY:  In-person    DSM-5 Diagnoses:  296.33 (F33.2) Major Depressive Disorder, Recurrent Episode, Severe _ and With anxious distress  300.02 (F41.1) Generalized Anxiety Disorder.  Attention-Deficit/Hyperactivity Disorder  314.01 (F90.9) Unspecified Attention -Deficit / Hyperactivity Disorder.  Substance-Related & Addictive Disorders Alcohol Use Disorder   303.90 (F10.20) Moderate   301.83 (F60.3) Borderline Personality Disorder      Current Reported Symptoms and Status update:  Struggling with compulsive sexual behavior  Struggling with emotional regulation      Progress Toward Treatment Goals:   Minimal progress - PREPARATION (Decided to change - considering how); Intervened by negotiating a change plan and determining options / strategies for behavior change, identifying triggers, exploring social supports, and working towards setting a date to begin behavior change    Therapeutic Interventions/Treatment Strategies:    Area(s) of treatment focus addressed in this session included Symptom Management and Abstinence/Relapse Prevention.    Patient checked in about their mental health symptoms, healthy coping skills used over the week, negative coping skills used over the week, what sexual behaviors they engaged in-fantasy, masturbation, sex, their comfort level with their behaviors, if there were triggers, urges to violate boundaries, and violations of boundaries.  They took time to process about their anger with their situation of  erectile dysfunction they provided support and feedback to others in the group.    Psychotherapist offered support, feedback and validation, provided redirection and reinforced use of skills Treatment modalities used include Motivational Interviewing Interventions include Relapse Prevention: Facilitated understanding of effective coping skills in response to triggers for substance use and Assisted patient in identifying the challenges and barriers to participation and attendance to support groups/community resources  Support, Redirection, Feedback and Structured Activity    Patient Response:   Patient responded to session by listening, accepting support and interrupting  Possible barriers to participation / learning include: severity of symptoms    Current Mental Status Exam:   Appearance:  Appropriate   Eye Contact:  Good   Attitude / Demeanor: Belligerent   Speech      Rate / Production: Emotional      Volume:  Loud  volume  Orientation:  All  Mood:   Angry  Depressed   Affect:   Constricted   Thought Content: Clear   Insight:   Fair       Plan/Need for Future Services:  Return for therapy in 1 weeks to treat diagnosed problems.    Patient has a current master individualized treatment plan.  See Epic treatment plan for more information.    Assignment:  Return to session, no alcohol use    Interactive Complexity:  There are four specific communication difficulties that complicate the work of the primary psychiatric procedure.  Interactive complexity (+93470) may be reported when at least one of these difficulties is present.    Communication difficulties present during current the psychiatric procedure include:  1. None.      Signature/Title:    Vance Watson, VIKTOR, [facility]

## 2022-09-02 ENCOUNTER — OFFICE VISIT (OUTPATIENT)
Dept: PSYCHOLOGY | Facility: CLINIC | Age: 32
End: 2022-09-02
Payer: COMMERCIAL

## 2022-09-02 DIAGNOSIS — F19.90 SUBSTANCE USE DISORDER: ICD-10-CM

## 2022-09-02 DIAGNOSIS — F33.2 MDD (MAJOR DEPRESSIVE DISORDER), RECURRENT SEVERE, WITHOUT PSYCHOSIS (H): Primary | ICD-10-CM

## 2022-09-02 DIAGNOSIS — F91.8 CONDUCT DISORDER, UNDIFFERENTIATED TYPE: ICD-10-CM

## 2022-09-02 DIAGNOSIS — F41.1 GENERALIZED ANXIETY DISORDER: ICD-10-CM

## 2022-09-02 PROCEDURE — 90834 PSYTX W PT 45 MINUTES: CPT | Performed by: MARRIAGE & FAMILY THERAPIST

## 2022-09-02 NOTE — PROGRESS NOTES
[facility] - Progress Note    Date of Service: 22   Name: Xavier Landon  : 1990  Medical Record Number: [id number]  Treating Provider: VIKTOR Anderson, [facility]  Type of Session: Individual  Present in Session: pt  Session Start and Stop Time: 8284-3832  Number of Minutes:  50    SERVICE MODALITY:  In-person    DSM-5 Diagnoses:  296.33 (F33.2) Major Depressive Disorder, Recurrent Episode, Severe _ and With anxious distress  300.02 (F41.1) Generalized Anxiety Disorder.  Attention-Deficit/Hyperactivity Disorder  314.01 (F90.9) Unspecified Attention -Deficit / Hyperactivity Disorder.  Substance-Related & Addictive Disorders Alcohol Use Disorder   303.90 (F10.20) Moderate   301.83 (F60.3) Borderline Personality Disorder      Current Reported Symptoms and Status update:  Struggling with compulsive sexual behavior  Struggling with emotional regulation      Progress Toward Treatment Goals:   Minimal progress - PREPARATION (Decided to change - considering how); Intervened by negotiating a change plan and determining options / strategies for behavior change, identifying triggers, exploring social supports, and working towards setting a date to begin behavior change    Therapeutic Interventions/Treatment Strategies:    Area(s) of treatment focus addressed in this session included Symptom Management, Abstinence/Relapse Prevention and Develop Socialization / Interpersonal Relationship Skills.    Discussed sobering up and the painful process-struggling with radical acceptance-is committing to it     Psychotherapist offered support, feedback and validation and reinforced use of skills Treatment modalities used include Motivational Interviewing Dialectical Behavioral Therapy Interventions include Relapse Prevention: Facilitated understanding the importance of awareness of factors that contribute to relapse , Assisted patient in identifying personal vulnerabilities, thoughts,  emotions, and situations that may lead to relapse , Coached on skills to manage factors that contribute to relapse and Facilitated understanding of effective coping skills in response to triggers for substance use  Support, Structured Activity, Problem Solving, Clarification and Education    Patient Response:   Patient responded to session by accepting feedback, listening, being attentive and appearing alert  Possible barriers to participation / learning include: and no barriers identified    Current Mental Status Exam:   Appearance:  Appropriate   Eye Contact:  Good   Attitude / Demeanor: Cooperative   Speech      Rate / Production: Normal/ Responsive Emotional      Volume:  Normal  volume  Orientation:  All  Mood:   Anxious  Depressed   Affect:   Appropriate   Thought Content: Clear   Insight:   Fair       Plan/Need for Future Services:  Return for therapy in 1 weeks to treat diagnosed problems.    Patient has a current master individualized treatment plan.  See Epic treatment plan for more information.    Assignment:  Read handouts highlight questions    Interactive Complexity:  There are four specific communication difficulties that complicate the work of the primary psychiatric procedure.  Interactive complexity (+59627) may be reported when at least one of these difficulties is present.    Communication difficulties present during current the psychiatric procedure include:  1. None.      Signature/Title:    Vance Watson, LMFT, [facility]

## 2022-09-03 ENCOUNTER — HEALTH MAINTENANCE LETTER (OUTPATIENT)
Age: 32
End: 2022-09-03

## 2022-09-06 ENCOUNTER — OFFICE VISIT (OUTPATIENT)
Dept: PSYCHOLOGY | Facility: CLINIC | Age: 32
End: 2022-09-06
Payer: COMMERCIAL

## 2022-09-06 DIAGNOSIS — F33.2 MDD (MAJOR DEPRESSIVE DISORDER), RECURRENT SEVERE, WITHOUT PSYCHOSIS (H): Primary | ICD-10-CM

## 2022-09-06 DIAGNOSIS — F91.8 CONDUCT DISORDER, UNDIFFERENTIATED TYPE: ICD-10-CM

## 2022-09-06 DIAGNOSIS — F41.1 GENERALIZED ANXIETY DISORDER: ICD-10-CM

## 2022-09-06 DIAGNOSIS — F19.90 SUBSTANCE USE DISORDER: ICD-10-CM

## 2022-09-06 PROCEDURE — 90853 GROUP PSYCHOTHERAPY: CPT

## 2022-09-06 NOTE — PROGRESS NOTES
Program in Human Sexuality  [facility]  [address], [address]  [place]  [address]    Group Progress Note  [place] [facility] - Progress Note    Date of Service: 22   Name: Xavier Landon  : 1990  Medical Record Number: [id number]  Therapist(s) in Group: Vance Watson, LMFT, CHDAD  Type of Session: Group  :  Number of Attendees:  5  Session Start and Stop Time: 630pm-830pm  Number of Minutes:  /120    SERVICE MODALITY:  In-person    DSM-5 Diagnoses:  296.33 (F33.2) Major Depressive Disorder, Recurrent Episode, Severe _ and With anxious distress  300.02 (F41.1) Generalized Anxiety Disorder.  Attention-Deficit/Hyperactivity Disorder  314.01 (F90.9) Unspecified Attention -Deficit / Hyperactivity Disorder.  Substance-Related & Addictive Disorders Alcohol Use Disorder   303.90 (F10.20) Moderate   301.83 (F60.3) Borderline Personality Disorder      Current Reported Symptoms and Status update:  Struggling with compulsive sexual behavior  Struggling with emotional regulation      Progress Toward Treatment Goals:   Minimal progress - PREPARATION (Decided to change - considering how); Intervened by negotiating a change plan and determining options / strategies for behavior change, identifying triggers, exploring social supports, and working towards setting a date to begin behavior change    Therapeutic Interventions/Treatment Strategies:    Area(s) of treatment focus addressed in this session included Symptom Management and Develop Socialization / Interpersonal Relationship Skills.    Patient checked in about their mental health symptoms, healthy coping skills used over the week, negative coping skills used over the week, what sexual behaviors they engaged in-fantasy, masturbation, sex, their comfort level with their behaviors, if there were triggers, urges to violate boundaries, and violations of boundaries.  They took time to process about their  "struggle with depressive symptoms, feelings of despair and hopelessness they provided support and feedback to others in the group.    Psychotherapist offered support, feedback and validation and reinforced use of skills Treatment modalities used include Motivational Interviewing Dialectical Behavioral Therapy Group Psychodynamic  Interventions include Coping Skills: Discussed how the use of intentional \"in the moment\" actions can help reduce distress and Addressed barriers to utilizing coping skills when in distress and Relapse Prevention: Assisted patient in identifying the challenges and barriers to participation and attendance to support groups/community resources  Support, Feedback, Structured Activity and Clarification    Patient Response:   Patient responded to session by accepting feedback, giving feedback, listening, being attentive, accepting support and appearing alert  Possible barriers to participation / learning include: and no barriers identified    Current Mental Status Exam:   Appearance:  Appropriate   Eye Contact:  Good   Attitude / Demeanor: Cooperative   Speech      Rate / Production: Normal/ Responsive Emotional      Volume:  Normal  volume  Orientation:  All  Mood:   Depressed   Affect:   Constricted   Thought Content: Clear   Insight:   Fair       Plan/Need for Future Services:  Return for therapy in 1 weeks to treat diagnosed problems.    Patient has a current master individualized treatment plan.  See Epic treatment plan for more information.    Assignment:  Return in one week, continue sobriety     Interactive Complexity:  There are four specific communication difficulties that complicate the work of the primary psychiatric procedure.  Interactive complexity (+02589) may be reported when at least one of these difficulties is present.    Communication difficulties present during current the psychiatric procedure include:  1. None.      Signature/Title:    Vance Watson, LMFT, " LADC

## 2022-09-15 ENCOUNTER — VIRTUAL VISIT (OUTPATIENT)
Dept: PSYCHIATRY | Facility: CLINIC | Age: 32
End: 2022-09-15
Payer: COMMERCIAL

## 2022-09-15 DIAGNOSIS — F91.8 CONDUCT DISORDER, UNDIFFERENTIATED TYPE: ICD-10-CM

## 2022-09-15 DIAGNOSIS — F90.2 ATTENTION DEFICIT HYPERACTIVITY DISORDER (ADHD), COMBINED TYPE: ICD-10-CM

## 2022-09-15 PROCEDURE — 99214 OFFICE O/P EST MOD 30 MIN: CPT | Mod: 95 | Performed by: PHYSICIAN ASSISTANT

## 2022-09-15 RX ORDER — DEXTROAMPHETAMINE SACCHARATE, AMPHETAMINE ASPARTATE, DEXTROAMPHETAMINE SULFATE AND AMPHETAMINE SULFATE 2.5; 2.5; 2.5; 2.5 MG/1; MG/1; MG/1; MG/1
TABLET ORAL
Qty: 30 TABLET | Refills: 0 | Status: SHIPPED | OUTPATIENT
Start: 2022-09-15 | End: 2022-10-26

## 2022-09-15 RX ORDER — DEXTROAMPHETAMINE SACCHARATE, AMPHETAMINE ASPARTATE MONOHYDRATE, DEXTROAMPHETAMINE SULFATE AND AMPHETAMINE SULFATE 5; 5; 5; 5 MG/1; MG/1; MG/1; MG/1
20 CAPSULE, EXTENDED RELEASE ORAL 2 TIMES DAILY
Qty: 60 CAPSULE | Refills: 0 | Status: SHIPPED | OUTPATIENT
Start: 2022-09-15 | End: 2022-10-11

## 2022-09-15 RX ORDER — FLUOXETINE 40 MG/1
40 CAPSULE ORAL DAILY
Qty: 30 CAPSULE | Refills: 1 | Status: SHIPPED | OUTPATIENT
Start: 2022-09-15 | End: 2022-09-22

## 2022-09-15 RX ORDER — LITHIUM CARBONATE 300 MG/1
CAPSULE ORAL
Qty: 90 CAPSULE | Refills: 1 | Status: SHIPPED | OUTPATIENT
Start: 2022-09-15 | End: 2022-09-22

## 2022-09-15 RX ORDER — NALTREXONE HYDROCHLORIDE 50 MG/1
50 TABLET, FILM COATED ORAL DAILY
Qty: 30 TABLET | Refills: 1 | Status: SHIPPED | OUTPATIENT
Start: 2022-09-15 | End: 2022-10-18

## 2022-09-16 ENCOUNTER — OFFICE VISIT (OUTPATIENT)
Dept: PSYCHOLOGY | Facility: CLINIC | Age: 32
End: 2022-09-16
Payer: COMMERCIAL

## 2022-09-16 DIAGNOSIS — F33.2 MDD (MAJOR DEPRESSIVE DISORDER), RECURRENT SEVERE, WITHOUT PSYCHOSIS (H): Primary | ICD-10-CM

## 2022-09-16 DIAGNOSIS — F91.8 CONDUCT DISORDER, UNDIFFERENTIATED TYPE: ICD-10-CM

## 2022-09-16 DIAGNOSIS — F19.90 SUBSTANCE USE DISORDER: ICD-10-CM

## 2022-09-16 DIAGNOSIS — F90.2 ATTENTION DEFICIT HYPERACTIVITY DISORDER (ADHD), COMBINED TYPE: ICD-10-CM

## 2022-09-16 DIAGNOSIS — F41.1 GENERALIZED ANXIETY DISORDER: ICD-10-CM

## 2022-09-16 PROCEDURE — 90834 PSYTX W PT 45 MINUTES: CPT | Performed by: MARRIAGE & FAMILY THERAPIST

## 2022-09-16 NOTE — PATIENT INSTRUCTIONS
1)Naltrexone 50 mg: Take 1/2 tablet daily for 1 week, then take 1 tablet daily.     2)Lithium 300 mg: Take 3 tablets daily (dosage increase).     3)Please have a lithium level checked in 1 week.      4)We discussed other medication options including gabapentin, campral, but will hold off for now.     5)I recommend trying a 12 step meeting of your choice.      6)See me 2 weeks.  Contact me earlier with any questions or concerns.

## 2022-09-16 NOTE — PROGRESS NOTES
[facility] - Progress Note    Date of Service: 22   Name: Xavier Landon  : 1990  Medical Record Number: [id number]  Treating Provider: VIKTOR Anderson, [facility]  Type of Session: Individual  Present in Session: pt  Session Start and Stop Time: 3880-4893  Number of Minutes:  49    SERVICE MODALITY:  In-person    DSM-5 Diagnoses:  296.33 (F33.2) Major Depressive Disorder, Recurrent Episode, Severe _ and With anxious distress  300.02 (F41.1) Generalized Anxiety Disorder.  Attention-Deficit/Hyperactivity Disorder  314.01 (F90.9) Unspecified Attention -Deficit / Hyperactivity Disorder.  Substance-Related & Addictive Disorders Alcohol Use Disorder   303.90 (F10.20) Moderate   301.83 (F60.3) Borderline Personality Disorder      Current Reported Symptoms and Status update:  Struggling with compulsive sexual behavior  Struggling with emotional regulation      Progress Toward Treatment Goals:   Minimal progress - PREPARATION (Decided to change - considering how); Intervened by negotiating a change plan and determining options / strategies for behavior change, identifying triggers, exploring social supports, and working towards setting a date to begin behavior change    Therapeutic Interventions/Treatment Strategies:    Area(s) of treatment focus addressed in this session included Symptom Management and Abstinence/Relapse Prevention.    Processed about sobriety, working on getting basic needs met    Psychotherapist offered support, feedback and validation and reinforced use of skills Treatment modalities used include Motivational Interviewing Interventions include Relapse Prevention: Facilitated understanding the importance of awareness of factors that contribute to relapse , Coached on skills to manage factors that contribute to relapse and Assisted patient in identifying the challenges and barriers to participation and attendance to support groups/community  resources  Support, Feedback, Structured Activity, Problem Solving and Clarification    Patient Response:   Patient responded to session by accepting feedback, listening, being attentive and appearing alert  Possible barriers to participation / learning include: and no barriers identified    Current Mental Status Exam:   Appearance:  Appropriate   Eye Contact:  Good   Attitude / Demeanor: Cooperative   Speech      Rate / Production: Normal/ Responsive      Volume:  Normal  volume  Orientation:  All  Mood:   Anxious  Depressed  Irritable   Affect:   Appropriate   Thought Content: Clear   Insight:   Fair       Plan/Need for Future Services:  Return for therapy in 1 weeks to treat diagnosed problems.    Patient has a current master individualized treatment plan.  See Epic treatment plan for more information.    Assignment:  Continue sobriety,  rx    Interactive Complexity:  There are four specific communication difficulties that complicate the work of the primary psychiatric procedure.  Interactive complexity (+23315) may be reported when at least one of these difficulties is present.    Communication difficulties present during current the psychiatric procedure include:  1. None.      Signature/Title:    Vance Watson, LMFT, [facility]

## 2022-09-16 NOTE — PROGRESS NOTES
"The following was reviewed with the patient.   \"We have found that certain health care needs can be provided without the need for a face to face visit.  This service lets us provide the care you need with a phone conversation. I will have full access to your [facility] medical record during this entire phone call.   I will be taking notes for your medical record. Since this is like an office visit, we will bill your insurance company for this service. There are potential benefits and risks of telephone visits (e.g. limits to patient confidentiality) that differ from in-person visits.?  Confidentiality still applies for telephone services, and nobody will record the visit.  It is important to be in a quiet, private space that is free of distractions (including cell phone or other devices) during the visit.??If during the course of the call I believe a telephone visit is not appropriate, you will not be charged for this service\"    Consent has been obtained for this service by care team member: Yes    [facility] - Med Management    Name:  Xavier Landon   Aliases:  XAVIER LANDON W : HUEY LANDON  :  1990   MRN:  [id number]  Treating Provider: Kofi Martin PA-C EdD     Medications at start of visit:  Outpatient Medications Prior to Visit   Medication Sig Dispense Refill     amphetamine-dextroamphetamine (ADDERALL XR) 20 MG 24 hr capsule Take 1 capsule (20 mg) by mouth 2 times daily 60 capsule 0     amphetamine-dextroamphetamine (ADDERALL) 10 MG tablet Take 1 tablet daily in afternoon for breakthrough sx 30 tablet 0     FLUoxetine (PROZAC) 40 MG capsule Take 1 capsule (40 mg) by mouth daily 30 capsule 1     lithium 300 MG capsule Take 1 cap BID 60 capsule 1     No facility-administered medications prior to visit.       Allergies:  No Known Allergies    Today's Visit    Current Complaint: Huey presents for a recheck.  At their last appointment no changes were made to his regimen.  He is " reporting a significant increase in alcohol usage to the point that the was terminated from his job due to drinking in the workplace.  He reports that his therapist suggested that he be prescribed Antabuse, and he asks for that this evening.  In addition, he has had increased lability of mood and is wondering about an increased dosage of lithium.     Review of Systems: A review of the following systems was negative: Opthalmic, ENT, Cardiovascular, Gastrointestinal, Genitourinary, Musculoskeletal, Integumentary, Neurological, Endocrine, Respiratory, Hematologic, Immunologic      Chemical History: Denies usage of and cravings for alcohol, cannabis, heroin, methamphetamine, cocaine, prescription opioids/benzodiazepines.      Social History: As above    Mental Status Exam: The patient's orientation, memory,  Attention, language and fund of knowledge are at their usual best baseline.  Grooming/Hygiene: adequate  Eye Contact: normal  Psychomotor: normal  Observed mood and affect: appropriate  Judgment: intact, with thoughtful decision making and insight  Speech: normal rate, rhythm, tone and volume  Thought processes: normal, with normal rate of thought  Associations:  No deficiency  Abnormal Thoughts: none      Assessment: This is a 33 yo man who carries the diagnosis of episodic mood disorder. The patient's questions were answered to their satisfaction regarding the plan as outlined below. Side effects, risks/benefits/alternatives regarding all psychiatric medications were discussed with the patient in detail.          Plan:    Patient Instructions   1)Naltrexone 50 mg: Take 1/2 tablet daily for 1 week, then take 1 tablet daily.     2)Lithium 300 mg: Take 3 tablets daily (dosage increase).     3)Please have a lithium level checked in 1 week.      4)We discussed other medication options including gabapentin, campral, but will hold off for now.     5)I recommend trying a 12 step meeting of your choice.      6)See me 2  weeks.  Contact me earlier with any questions or concerns.         Total face-to-face time: 30 min    Counseling/Coordination of care greater than 50%.    Counseling/Coordination of care included: Educational counseling regarding psychiatric medications, side effects, interactions.

## 2022-09-20 ENCOUNTER — OFFICE VISIT (OUTPATIENT)
Dept: PSYCHOLOGY | Facility: CLINIC | Age: 32
End: 2022-09-20
Payer: COMMERCIAL

## 2022-09-20 DIAGNOSIS — F41.1 GENERALIZED ANXIETY DISORDER: ICD-10-CM

## 2022-09-20 DIAGNOSIS — F90.2 ATTENTION DEFICIT HYPERACTIVITY DISORDER (ADHD), COMBINED TYPE: ICD-10-CM

## 2022-09-20 DIAGNOSIS — F19.90 SUBSTANCE USE DISORDER: ICD-10-CM

## 2022-09-20 DIAGNOSIS — F33.2 MDD (MAJOR DEPRESSIVE DISORDER), RECURRENT SEVERE, WITHOUT PSYCHOSIS (H): Primary | ICD-10-CM

## 2022-09-20 DIAGNOSIS — F91.8 CONDUCT DISORDER, UNDIFFERENTIATED TYPE: ICD-10-CM

## 2022-09-20 PROCEDURE — 90853 GROUP PSYCHOTHERAPY: CPT

## 2022-09-20 NOTE — PROGRESS NOTES
Program in Human Sexuality  [facility]  [address], [address]  [place]  [address]    Group Progress Note  [place] [facility] - Progress Note    Date of Service: 22   Name: Xavier Landon  : 1990  Medical Record Number: [id number]  Therapist(s) in Group: Vance Watson, LMFT, CHADD   Type of Session: Group  :  Number of Attendees:  5  Session Start and Stop Time: 630pm-830pm  Number of Minutes:  /120    SERVICE MODALITY:  In-person    DSM-5 Diagnoses:  296.33 (F33.2) Major Depressive Disorder, Recurrent Episode, Severe _ and With anxious distress  300.02 (F41.1) Generalized Anxiety Disorder.  Attention-Deficit/Hyperactivity Disorder  314.01 (F90.9) Unspecified Attention -Deficit / Hyperactivity Disorder.  Substance-Related & Addictive Disorders Alcohol Use Disorder   303.90 (F10.20) Moderate   301.83 (F60.3) Borderline Personality Disorder      Current Reported Symptoms and Status update:  Struggling with compulsive sexual behavior  Struggling with emotional regulation      Progress Toward Treatment Goals:   Minimal progress - PREPARATION (Decided to change - considering how); Intervened by negotiating a change plan and determining options / strategies for behavior change, identifying triggers, exploring social supports, and working towards setting a date to begin behavior change    Therapeutic Interventions/Treatment Strategies:    Area(s) of treatment focus addressed in this session included Symptom Management and Abstinence/Relapse Prevention.    Patient checked in about their mental health symptoms, healthy coping skills used over the week, negative coping skills used over the week, what sexual behaviors they engaged in-fantasy, masturbation, sex, their comfort level with their behaviors, if there were triggers, urges to violate boundaries, and violations of boundaries.  They took time to process about working on sobriety- they provided  support and feedback to others in the group.    Psychotherapist offered support, feedback and validation and reinforced use of skills Treatment modalities used include Motivational Interviewing Cognitive Behavioral Therapy Group Psychodynamic  Interventions include Cognitive Restructuring:  Explored impact of ineffective thoughts / distortions on mood and activity and Relapse Prevention: Assisted patient in identifying personal vulnerabilities, thoughts, emotions, and situations that may lead to relapse  and Assisted patient in identifying the challenges and barriers to participation and attendance to support groups/community resources  Support, Feedback, Structured Activity and Clarification    Patient Response:   Patient responded to session by accepting feedback, giving feedback, listening, being attentive and appearing alert  Possible barriers to participation / learning include: and no barriers identified    Current Mental Status Exam:   Appearance:  Appropriate   Eye Contact:  Good   Attitude / Demeanor: Cooperative   Speech      Rate / Production: Normal/ Responsive      Volume:  Normal  volume  Orientation:  All  Mood:   Anxious  Depressed  Irritable   Affect:   Appropriate   Thought Content: Clear   Insight:   Fair       Plan/Need for Future Services:  Return for therapy in 1 weeks to treat diagnosed problems.    Patient has a current master individualized treatment plan.  See Epic treatment plan for more information.    Assignment:  Return in one week stay sober    Interactive Complexity:  There are four specific communication difficulties that complicate the work of the primary psychiatric procedure.  Interactive complexity (+51819) may be reported when at least one of these difficulties is present.    Communication difficulties present during current the psychiatric procedure include:  1. None.      Signature/Title:    VIKTOR Anderson, [facility]

## 2022-09-22 ENCOUNTER — VIRTUAL VISIT (OUTPATIENT)
Dept: PSYCHIATRY | Facility: CLINIC | Age: 32
End: 2022-09-22
Payer: COMMERCIAL

## 2022-09-22 DIAGNOSIS — F90.2 ATTENTION DEFICIT HYPERACTIVITY DISORDER (ADHD), COMBINED TYPE: ICD-10-CM

## 2022-09-22 PROCEDURE — 99213 OFFICE O/P EST LOW 20 MIN: CPT | Mod: 95 | Performed by: PHYSICIAN ASSISTANT

## 2022-09-22 RX ORDER — BUPROPION HYDROCHLORIDE 150 MG/1
150 TABLET ORAL EVERY MORNING
Qty: 30 TABLET | Refills: 1 | Status: SHIPPED | OUTPATIENT
Start: 2022-09-22 | End: 2022-10-26

## 2022-09-22 RX ORDER — LITHIUM CARBONATE 300 MG/1
CAPSULE ORAL
Qty: 120 CAPSULE | Refills: 2 | Status: SHIPPED | OUTPATIENT
Start: 2022-09-22 | End: 2022-10-26

## 2022-09-23 ENCOUNTER — OFFICE VISIT (OUTPATIENT)
Dept: PSYCHOLOGY | Facility: CLINIC | Age: 32
End: 2022-09-23
Payer: COMMERCIAL

## 2022-09-23 DIAGNOSIS — F33.2 MDD (MAJOR DEPRESSIVE DISORDER), RECURRENT SEVERE, WITHOUT PSYCHOSIS (H): Primary | ICD-10-CM

## 2022-09-23 DIAGNOSIS — F91.8 CONDUCT DISORDER, UNDIFFERENTIATED TYPE: ICD-10-CM

## 2022-09-23 DIAGNOSIS — F41.1 GENERALIZED ANXIETY DISORDER: ICD-10-CM

## 2022-09-23 DIAGNOSIS — F19.90 SUBSTANCE USE DISORDER: ICD-10-CM

## 2022-09-23 PROCEDURE — 90837 PSYTX W PT 60 MINUTES: CPT | Performed by: MARRIAGE & FAMILY THERAPIST

## 2022-09-23 NOTE — PROGRESS NOTES
Video start time: 7pm  Video end time: 715    Telemedicine Visit: The patient's condition can be safely assessed and treated via synchronous audio and visual telemedicine encounter.      Reason for Telemedicine Visit: Services only offered telehealth    Originating Site (Patient Location): Patient's home    Distant Site (Provider Location): Provider Remote Setting- Home Office    Consent:  The patient/guardian has verbally consented to: the potential risks and benefits of telemedicine (video visit) versus in person care; bill my insurance or make self-payment for services provided; and responsibility for payment of non-covered services.     Mode of Communication:  Video Conference via [employer]    As the provider I attest to compliance with applicable laws and regulations related to telemedicine.    [facility]    Name:  Xavier Landon   Aliases:  XAVIER LANDON W : HUEY LANDON  :  1990   MRN:  [id number]  Treating Provider: Kofi Martin PA-C EdD     Medications at start of visit:  Outpatient Medications Prior to Visit   Medication Sig Dispense Refill     amphetamine-dextroamphetamine (ADDERALL XR) 20 MG 24 hr capsule Take 1 capsule (20 mg) by mouth 2 times daily 60 capsule 0     amphetamine-dextroamphetamine (ADDERALL) 10 MG tablet Take 1 tablet daily in afternoon for breakthrough sx 30 tablet 0     naltrexone (DEPADE/REVIA) 50 MG tablet Take 1 tablet (50 mg) by mouth daily Take 1/2 tablet daily for 1 week, then take 1 tablet daily. 30 tablet 1     FLUoxetine (PROZAC) 40 MG capsule Take 1 capsule (40 mg) by mouth daily 30 capsule 1     lithium 300 MG capsule Take 3 tablets daily 90 capsule 1     No facility-administered medications prior to visit.       Allergies:  No Known Allergies    Today's Visit    Current Complaint: Huey presents for a recheck.  Today he is reporting overall stability of psychiatric sx since the recent medication adjustment. The patient is not  endorsing suicidal/homicidal ideation, active planning, intent or means.  The patient is not endorsing s/s suggestive of hypomania/kamar or psychosis.  The patient is endorsing good compliance with and efficacy of their medication regimen without s/e.       Review of Systems: A review of the following systems was negative: Opthalmic, ENT, Cardiovascular, Gastrointestinal, Genitourinary, Musculoskeletal, Integumentary, Neurological, Endocrine, Respiratory, Hematologic, Immunologic      Chemical History: Denies usage of and cravings for alcohol, cannabis, heroin, methamphetamine, cocaine, prescription opioids/benzodiazepines.      Social History: No interval change    Mental Status Exam: The patient's orientation, memory,  Attention, language and fund of knowledge are at their usual best baseline.  Grooming/Hygiene: adequate  Eye Contact: normal  Psychomotor: normal  Observed mood and affect: appropriate  Judgment: intact, with thoughtful decision making and insight  Speech: normal rate, rhythm, tone and volume  Thought processes: normal, with normal rate of thought  Associations:  No deficiency  Abnormal Thoughts: none      Assessment: This is a 31 yo man who carries the diagnosis of episodic mood disorder. The patient's questions were answered to their satisfaction regarding the plan as outlined below. Side effects, risks/benefits/alternatives regarding all psychiatric medications were discussed with the patient in detail.          Plan:    Patient Instructions   1)Medications: No change for now.    2)See me in 6 weeks. Contact the clinic with any questions or concerns.         Total face-to-face time: 30 min    Counseling/Coordination of care greater than 50%.    Counseling/Coordination of care included: Educational counseling regarding psychiatric medications, side effects, interactions.

## 2022-09-23 NOTE — PROGRESS NOTES
[facility] - Progress Note    Date of Service: 22   Name: Xavier Landon  : 1990  Medical Record Number: [id number]  Treating Provider: VIKTOR Anderson, [facility]  Type of Session: Individual  Present in Session: pt  Session Start and Stop Time: 7587-3734  Number of Minutes:  59    SERVICE MODALITY:  In-person    DSM-5 Diagnoses:  296.33 (F33.2) Major Depressive Disorder, Recurrent Episode, Severe _ and With anxious distress  300.02 (F41.1) Generalized Anxiety Disorder.  Attention-Deficit/Hyperactivity Disorder  314.01 (F90.9) Unspecified Attention -Deficit / Hyperactivity Disorder.  Substance-Related & Addictive Disorders Alcohol Use Disorder   303.90 (F10.20) Moderate   301.83 (F60.3) Borderline Personality Disorder      Current Reported Symptoms and Status update:  Struggling with compulsive sexual behavior  Struggling with emotional regulation      Progress Toward Treatment Goals:   Minimal progress - ACTION (Actively working towards change); Intervened by reinforcing change plan / affirming steps taken    Therapeutic Interventions/Treatment Strategies:    Area(s) of treatment focus addressed in this session included Symptom Management, Abstinence/Relapse Prevention and Develop Socialization / Interpersonal Relationship Skills.    Is worried about getting a job, is moving back in w parents for a month or 2, worried about money-    Psychotherapist offered support, feedback and validation and reinforced use of skills Treatment modalities used include Motivational Interviewing Dialectical Behavioral Therapy Mindfulness based intervention Interventions include Behavioral Activation: Reinforced benefits/challenges of change process through applying skills to replace unwanted behaviors and Encouraged strategies to reduce individual procrastination and increase motivation by increasing goal-directed activities to enhance mood and reduce symptoms. and Relapse  Prevention: Assisted patient in identifying the challenges and barriers to participation and attendance to support groups/community resources  Support, Feedback, Structured Activity, Problem Solving and Clarification    Patient Response:   Patient responded to session by accepting feedback, listening, being attentive and appearing alert  Possible barriers to participation / learning include: and no barriers identified    Current Mental Status Exam:   Appearance:  Appropriate   Eye Contact:  Good   Attitude / Demeanor: Cooperative   Speech      Rate / Production: Normal/ Responsive      Volume:  Normal  volume  Orientation:  All  Mood:   Anxious   Affect:   Appropriate   Thought Content: Clear   Insight:   Fair       Plan/Need for Future Services:  Return for therapy in 1 weeks to treat diagnosed problems.    Patient has a current master individualized treatment plan.  See Epic treatment plan for more information.    Assignment:  pmr and body scan  Job application  Move out sobriety     Interactive Complexity:  There are four specific communication difficulties that complicate the work of the primary psychiatric procedure.  Interactive complexity (+[id number]) may be reported when at least one of these difficulties is present.    Communication difficulties present during current the psychiatric procedure include:  1. None.      Signature/Title:    Vance Watson, LMFT, [facility]

## 2022-09-27 ENCOUNTER — OFFICE VISIT (OUTPATIENT)
Dept: PSYCHOLOGY | Facility: CLINIC | Age: 32
End: 2022-09-27
Payer: COMMERCIAL

## 2022-09-27 DIAGNOSIS — F91.8 CONDUCT DISORDER, UNDIFFERENTIATED TYPE: ICD-10-CM

## 2022-09-27 DIAGNOSIS — F41.1 GENERALIZED ANXIETY DISORDER: ICD-10-CM

## 2022-09-27 DIAGNOSIS — F33.2 MDD (MAJOR DEPRESSIVE DISORDER), RECURRENT SEVERE, WITHOUT PSYCHOSIS (H): Primary | ICD-10-CM

## 2022-09-27 DIAGNOSIS — F19.90 SUBSTANCE USE DISORDER: ICD-10-CM

## 2022-09-27 PROCEDURE — 90853 GROUP PSYCHOTHERAPY: CPT

## 2022-09-27 NOTE — PROGRESS NOTES
[facility]  [facility]  [address], [address]  [place]  [address]    Group Progress Note  [facility] - Progress Note    Date of Service: 22   Name: Xavier Landon  : 1990  Medical Record Number: [id number]  Therapist(s) in Group: Vance Watson, LMFT, [facility]   Type of Session: Group  :  Number of Attendees:  4  Session Start and Stop Time: 630pm-830pm  Number of Minutes:  /120    SERVICE MODALITY:  In-person    DSM-5 Diagnoses:  296.33 (F33.2) Major Depressive Disorder, Recurrent Episode, Severe _ and With anxious distress  300.02 (F41.1) Generalized Anxiety Disorder.  Attention-Deficit/Hyperactivity Disorder  314.01 (F90.9) Unspecified Attention -Deficit / Hyperactivity Disorder.  Substance-Related & Addictive Disorders Alcohol Use Disorder   303.90 (F10.20) Moderate   301.83 (F60.3) Borderline Personality Disorder      Current Reported Symptoms and Status update:  Struggling with compulsive sexual behavior  Struggling with emotional regulation    Progress Toward Treatment Goals:   Minimal progress - ACTION (Actively working towards change); Intervened by reinforcing change plan / affirming steps taken    Therapeutic Interventions/Treatment Strategies:    Area(s) of treatment focus addressed in this session included Symptom Management, Abstinence/Relapse Prevention and Develop Socialization / Interpersonal Relationship Skills.    Patient checked in about their mental health symptoms, healthy coping skills used over the week, negative coping skills used over the week, what sexual behaviors they engaged in-fantasy, masturbation, sex, their comfort level with their behaviors, if there were triggers, urges to violate boundaries, and violations of boundaries.  They took time to process about feeling like they need direction they provided support and feedback to others in the group.    Psychotherapist offered support,  feedback and validation and reinforced use of skills Treatment modalities used include Motivational Interviewing Dialectical Behavioral Therapy Group Psychodynamic  Interventions include Coping Skills: Reviewed patients current calming practices and discussed a more formal way of practicing and accessing skills and Discussed meditation skills and addressed ways to implement meditation skills  and Relationship Skills: Encouraged development and maintenance  of healthy boundaries  Support, Feedback, Structured Activity and Clarification    Patient Response:   Patient responded to session by accepting feedback, giving feedback, listening, being attentive and appearing alert  Possible barriers to participation / learning include: and no barriers identified    Current Mental Status Exam:   Appearance:  Appropriate   Eye Contact:  Good   Attitude / Demeanor: Cooperative   Speech      Rate / Production: Normal/ Responsive      Volume:  Normal  volume  Orientation:  All  Mood:   Anxious  Normal  Affect:   Appropriate   Thought Content: Clear   Insight:   Good       Plan/Need for Future Services:  Return for therapy in 1 weeks to treat diagnosed problems.    Patient has a current master individualized treatment plan.  See Epic treatment plan for more information.    Assignment:  Return in one week, continue working on habits    Interactive Complexity:  There are four specific communication difficulties that complicate the work of the primary psychiatric procedure.  Interactive complexity (+[id number]) may be reported when at least one of these difficulties is present.    Communication difficulties present during current the psychiatric procedure include:  1. None.      Signature/Title:    Vance Watson, VIKTOR, [facility]

## 2022-09-30 ENCOUNTER — OFFICE VISIT (OUTPATIENT)
Dept: PSYCHOLOGY | Facility: CLINIC | Age: 32
End: 2022-09-30
Payer: COMMERCIAL

## 2022-09-30 DIAGNOSIS — F33.2 MDD (MAJOR DEPRESSIVE DISORDER), RECURRENT SEVERE, WITHOUT PSYCHOSIS (H): Primary | ICD-10-CM

## 2022-09-30 DIAGNOSIS — F41.1 GENERALIZED ANXIETY DISORDER: ICD-10-CM

## 2022-09-30 DIAGNOSIS — F19.90 SUBSTANCE USE DISORDER: ICD-10-CM

## 2022-09-30 DIAGNOSIS — F91.8 CONDUCT DISORDER, UNDIFFERENTIATED TYPE: ICD-10-CM

## 2022-09-30 PROCEDURE — 90837 PSYTX W PT 60 MINUTES: CPT | Performed by: MARRIAGE & FAMILY THERAPIST

## 2022-09-30 NOTE — PROGRESS NOTES
[facility] - Progress Note    Date of Service: 22   Name: Xavier Landon  : 1990  Medical Record Number: [id number]  Treating Provider: VIKTOR Anderson, [facility]  Type of Session: Individual  Present in Session: pt  Session Start and Stop Time: 3470-9931  Number of Minutes:  60    SERVICE MODALITY:  In-person    DSM-5 Diagnoses:  296.33 (F33.2) Major Depressive Disorder, Recurrent Episode, Severe _ and With anxious distress  300.02 (F41.1) Generalized Anxiety Disorder.  Attention-Deficit/Hyperactivity Disorder  314.01 (F90.9) Unspecified Attention -Deficit / Hyperactivity Disorder.  Substance-Related & Addictive Disorders Alcohol Use Disorder   303.90 (F10.20) Moderate   301.83 (F60.3) Borderline Personality Disorder      Current Reported Symptoms and Status update:  Struggling with compulsive sexual behavior  Struggling with emotional regulation      Progress Toward Treatment Goals:   Minimal progress - ACTION (Actively working towards change); Intervened by reinforcing change plan / affirming steps taken    Therapeutic Interventions/Treatment Strategies:    Area(s) of treatment focus addressed in this session included Symptom Management, [place] Maintenance/Relapse Prevention and Sexual Health and Wellness    Pt processed thoughts and emotions from the past week, feeling frustration, working on getting things in orderw ith job, interview and working out    Psychotherapist offered support, feedback and validation and reinforced use of skills Treatment modalities used include Motivational Interviewing Dialectical Behavioral Therapy Sexual Health and Wellness Education Interventions include Coping Skills: Discussed meditation skills and addressed ways to implement meditation skills  and Assisted patient in understanding the purpose of planning / creating / participating / sharing in positive experiences, Mindfulness: Facilitated discussion of when/how to use  mindfulness skills to benefit general health, mental health symptoms, and stressors and Relationship Skills: Discussed strategies to promote healthier understanding of interpersonal relationships  Support, Feedback, Problem Solving, Clarification and Education    Patient Response:   Patient responded to session by accepting feedback, listening, focusing on goals and accepting support  Possible barriers to participation / learning include: N/A    Current Mental Status Exam:   Appearance:  Appropriate   Eye Contact:  Good   Attitude / Demeanor: Cooperative   Speech      Rate / Production: Normal/ Responsive      Volume:  Normal  volume  Orientation:  All  Mood:   Anxious   Affect:   Appropriate   Thought Content: Clear   Insight:   Good       Plan/Need for Future Services:  Return for therapy in 1 weeks to treat diagnosed problems.    Patient has a current master individualized treatment plan.  See Epic treatment plan for more information.    Assignment:  Continue sobriety  Emotional regulation    Interactive Complexity:  There are four specific communication difficulties that complicate the work of the primary psychiatric procedure.  Interactive complexity (+36059) may be reported when at least one of these difficulties is present.    Communication difficulties present during current the psychiatric procedure include:  1. None.      Signature/Title:    Vance Watson, LMFT, [facility]

## 2022-10-04 ENCOUNTER — OFFICE VISIT (OUTPATIENT)
Dept: PSYCHOLOGY | Facility: CLINIC | Age: 32
End: 2022-10-04
Payer: COMMERCIAL

## 2022-10-04 DIAGNOSIS — F33.2 MDD (MAJOR DEPRESSIVE DISORDER), RECURRENT SEVERE, WITHOUT PSYCHOSIS (H): Primary | ICD-10-CM

## 2022-10-04 DIAGNOSIS — F41.1 GENERALIZED ANXIETY DISORDER: ICD-10-CM

## 2022-10-04 DIAGNOSIS — F19.90 SUBSTANCE USE DISORDER: ICD-10-CM

## 2022-10-04 DIAGNOSIS — F91.8 CONDUCT DISORDER, UNDIFFERENTIATED TYPE: ICD-10-CM

## 2022-10-04 PROCEDURE — 90853 GROUP PSYCHOTHERAPY: CPT

## 2022-10-04 NOTE — PROGRESS NOTES
[facility]uality  [facility]  [address], [address]  [place]  [address]    Group Progress Note  [facility] - Progress Note    Date of Service: 10/04/22   Name: Xavier Landon  : 1990  Medical Record Number: [id number]  Therapist(s) in Group: Vance Watson, LMFT, [facility]   Type of Session: Group  :  Number of Attendees:  4  Session Start and Stop Time: 630pm-830pm  Number of Minutes:  /120    SERVICE MODALITY:  In-person    DSM-5 Diagnoses:  296.33 (F33.2) Major Depressive Disorder, Recurrent Episode, Severe _ and With anxious distress  300.02 (F41.1) Generalized Anxiety Disorder.  Attention-Deficit/Hyperactivity Disorder  314.01 (F90.9) Unspecified Attention -Deficit / Hyperactivity Disorder.  Substance-Related & Addictive Disorders Alcohol Use Disorder   303.90 (F10.20) Moderate   301.83 (F60.3) Borderline Personality Disorder      Current Reported Symptoms and Status update:  Struggling with compulsive sexual behavior  Struggling with emotional regulation      Progress Toward Treatment Goals:   Minimal progress - ACTION (Actively working towards change); Intervened by reinforcing change plan / affirming steps taken    Therapeutic Interventions/Treatment Strategies:    Area(s) of treatment focus addressed in this session included Symptom Management, [place] Maintenance/Relapse Prevention and Interpersonal Relationship Skills    Patient checked in about their mental health symptoms, healthy coping skills used over the week, negative coping skills used over the week, what sexual behaviors they engaged in-fantasy, masturbation, sex, their comfort level with their behaviors, if there were triggers, urges to violate boundaries, and violations of boundaries.  They took time to process about their continued sobriety, new changes they provided support and feedback to others in the group.    Psychotherapist offered support, feedback and  validation and reinforced use of skills Treatment modalities used include Motivational Interviewing Group Dynamic Therapy  Interventions include Coping Skills: Addressed barriers to utilizing coping skills when in distress and Relapse Prevention: Facilitated understanding the importance of awareness of factors that contribute to relapse  and Assisted patient in identifying personal vulnerabilities, thoughts, emotions, and situations that may lead to relapse   Support, Feedback, Structured Activity, Clarification and Education    Patient Response:   Patient responded to session by accepting feedback, giving feedback, listening, focusing on goals and accepting support  Possible barriers to participation / learning include: N/A    Current Mental Status Exam:   Appearance:  Appropriate   Eye Contact:  Good   Attitude / Demeanor: Cooperative   Speech      Rate / Production: Normal/ Responsive      Volume:  Normal  volume  Orientation:  All  Mood:   Depressed   Affect:   Subdued   Thought Content: Clear   Insight:   Fair       Plan/Need for Future Services:  Return for therapy in 1 weeks to treat diagnosed problems.    Patient has a current master individualized treatment plan.  See Epic treatment plan for more information.    Assignment:  Return in one week, complete homework     Interactive Complexity:  There are four specific communication difficulties that complicate the work of the primary psychiatric procedure.  Interactive complexity (+[id number]) may be reported when at least one of these difficulties is present.    Communication difficulties present during current the psychiatric procedure include:  1. None.      Signature/Title:    Vance Watson, VIKTOR, [facility]

## 2022-10-07 ENCOUNTER — OFFICE VISIT (OUTPATIENT)
Dept: PSYCHOLOGY | Facility: CLINIC | Age: 32
End: 2022-10-07
Payer: COMMERCIAL

## 2022-10-07 DIAGNOSIS — F41.1 GENERALIZED ANXIETY DISORDER: ICD-10-CM

## 2022-10-07 DIAGNOSIS — F33.2 MDD (MAJOR DEPRESSIVE DISORDER), RECURRENT SEVERE, WITHOUT PSYCHOSIS (H): Primary | ICD-10-CM

## 2022-10-07 DIAGNOSIS — F91.8 CONDUCT DISORDER, UNDIFFERENTIATED TYPE: ICD-10-CM

## 2022-10-07 DIAGNOSIS — F19.90 SUBSTANCE USE DISORDER: ICD-10-CM

## 2022-10-07 PROCEDURE — 90837 PSYTX W PT 60 MINUTES: CPT | Performed by: MARRIAGE & FAMILY THERAPIST

## 2022-10-07 NOTE — PROGRESS NOTES
[facility] - Progress Note    Date of Service: 10/07/22   Name: Xavier Landon  : 1990  Medical Record Number: [id number]  Treating Provider: VIKTOR Anderson LAD  Type of Session: Individual  Present in Session: pt  Session Start and Stop Time: 4316-1844  Number of Minutes:  61    SERVICE MODALITY:  In-person    DSM-5 Diagnoses:  296.33 (F33.2) Major Depressive Disorder, Recurrent Episode, Severe _ and With anxious distress  300.02 (F41.1) Generalized Anxiety Disorder.  Attention-Deficit/Hyperactivity Disorder  314.01 (F90.9) Unspecified Attention -Deficit / Hyperactivity Disorder.  Substance-Related & Addictive Disorders Alcohol Use Disorder   303.90 (F10.20) Moderate   301.83 (F60.3) Borderline Personality Disorder      Current Reported Symptoms and Status update:  Struggling with compulsive sexual behavior  Struggling with emotional regulation    Progress Toward Treatment Goals:   Minimal progress     Therapeutic Interventions/Treatment Strategies:    Area(s) of treatment focus addressed in this session included Symptom Management and [place] Maintenance/Relapse Prevention    Processed about feeling down, lonely, unconnected with others, struggling with increased depression symptoms, continued sobriety,explored possible work opportunities, created plan for the week     Psychotherapist offered support, feedback and validation and reinforced use of skills Treatment modalities used include Motivational Interviewing Mindfulness based intervention Sexual Health and Wellness Education Interventions include Relapse Prevention: Assisted patient in identifying the challenges and barriers to participation and attendance to support groups/community resources and Relationship Skills: Discussed strategies to promote healthier understanding of interpersonal relationships  Support, Feedback, Problem Solving, Clarification and Education    Patient Response:   Patient  responded to session by accepting feedback, listening and accepting support  Possible barriers to participation / learning include: N/A    Current Mental Status Exam:   Appearance:  Appropriate   Eye Contact:  Good   Attitude / Demeanor: Cooperative   Speech      Rate / Production: Normal/ Responsive      Volume:  Normal  volume  Orientation:  All  Mood:   Depressed  Normal  Affect:   Blunted   Thought Content: Clear   Insight:   Fair       Plan/Need for Future Services:  Return for therapy in 1 weeks to treat diagnosed problems.    Patient has a current master individualized treatment plan.  See Epic treatment plan for more information.    Referral / Collaboration:  Referral to another professional/service is not indicated at this time..    Emergency Intervention needed?  No    Assignment:  returnin one week-attend [facility] recovery meeting     Interactive Complexity:  There are four specific communication difficulties that complicate the work of the primary psychiatric procedure.  Interactive complexity (+[id number]) may be reported when at least one of these difficulties is present.    Communication difficulties present during current the psychiatric procedure include:  1. None.      Signature/Title:    Vance Watson, LMFT, [facility]

## 2022-10-07 NOTE — PATIENT INSTRUCTIONS
1)Medications: No change for now.    2)See me in 6 weeks. Contact the clinic with any questions or concerns.

## 2022-10-10 DIAGNOSIS — F90.2 ATTENTION DEFICIT HYPERACTIVITY DISORDER (ADHD), COMBINED TYPE: ICD-10-CM

## 2022-10-10 DIAGNOSIS — F91.8 CONDUCT DISORDER, UNDIFFERENTIATED TYPE: ICD-10-CM

## 2022-10-10 NOTE — TELEPHONE ENCOUNTER
Requested Medication: Adderall 20 mg XR  Dose: 20mg  Quantity: 60  Refills: 0    Take 1 capsule 2 times daily    Last seen at [facility]: 9/22 - 6 wks  Next Appointment with Provider: 10/26    Lois Baird CMA

## 2022-10-11 ENCOUNTER — OFFICE VISIT (OUTPATIENT)
Dept: PSYCHOLOGY | Facility: CLINIC | Age: 32
End: 2022-10-11
Payer: COMMERCIAL

## 2022-10-11 DIAGNOSIS — F19.90 SUBSTANCE USE DISORDER: ICD-10-CM

## 2022-10-11 DIAGNOSIS — F33.2 MDD (MAJOR DEPRESSIVE DISORDER), RECURRENT SEVERE, WITHOUT PSYCHOSIS (H): Primary | ICD-10-CM

## 2022-10-11 DIAGNOSIS — F41.1 GENERALIZED ANXIETY DISORDER: ICD-10-CM

## 2022-10-11 PROCEDURE — 90853 GROUP PSYCHOTHERAPY: CPT

## 2022-10-11 RX ORDER — DEXTROAMPHETAMINE SACCHARATE, AMPHETAMINE ASPARTATE MONOHYDRATE, DEXTROAMPHETAMINE SULFATE AND AMPHETAMINE SULFATE 5; 5; 5; 5 MG/1; MG/1; MG/1; MG/1
20 CAPSULE, EXTENDED RELEASE ORAL 2 TIMES DAILY
Qty: 60 CAPSULE | Refills: 0 | Status: SHIPPED | OUTPATIENT
Start: 2022-10-11 | End: 2022-11-10

## 2022-10-12 NOTE — PROGRESS NOTES
Program in Human Sexuality  [facility]  [address], [address]  [place]  [address]    Group Progress Note  [facility] - Progress Note    Date of Service: 10/11/22   Name: Xavier Landon  : 1990  Medical Record Number: [id number]  Therapist(s) in Group: Vance Watson, LMFT, CHADD  Type of Session: Group  :  Number of Attendees:  4  Session Start and Stop Time: 630pm-830pm  Number of Minutes:  /120    SERVICE MODALITY:  In-person    DSM-5 Diagnoses:  296.33 (F33.2) Major Depressive Disorder, Recurrent Episode, Severe _ and With anxious distress  300.02 (F41.1) Generalized Anxiety Disorder.  Attention-Deficit/Hyperactivity Disorder  314.01 (F90.9) Unspecified Attention -Deficit / Hyperactivity Disorder.  Substance-Related & Addictive Disorders Alcohol Use Disorder   303.90 (F10.20) Moderate   301.83 (F60.3) Borderline Personality Disorder      Current Reported Symptoms and Status update:  Struggling with compulsive sexual behavior  Struggling with emotional regulation    Progress Toward Treatment Goals:   Minimal progress     Therapeutic Interventions/Treatment Strategies:    Area(s) of treatment focus addressed in this session included Symptom Management, [place] Maintenance/Relapse Prevention and Interpersonal Relationship Skills    Patient checked in about their mental health symptoms, healthy coping skills used over the week, negative coping skills used over the week, what sexual behaviors they engaged in-fantasy, masturbation, sex, their comfort level with their behaviors, if there were triggers, urges to violate boundaries, and violations of boundaries.  They took time to process about their week, sobriety for 28, thinking about ways to be more effective, they provided support and feedback to others in the group.    Psychotherapist offered support, feedback and validation and reinforced use of skills Treatment modalities used include  Motivational Interviewing Group Dynamic Therapy  Interventions include Behavioral Activation: Encouraged strategies to reduce individual procrastination and increase motivation by increasing goal-directed activities to enhance mood and reduce symptoms. and Relapse Prevention: Facilitated understanding of effective coping skills in response to triggers for substance use  Support, Feedback, Structured Activity and Clarification    Patient Response:   Patient responded to session by accepting feedback, giving feedback, listening, focusing on goals and accepting support  Possible barriers to participation / learning include: N/A    Current Mental Status Exam:   Appearance:  Appropriate   Eye Contact:  Good   Attitude / Demeanor: Cooperative   Speech      Rate / Production: Normal/ Responsive      Volume:  Normal  volume  Orientation:  All  Mood:   Anxious   Affect:   Appropriate   Thought Content: Clear   Insight:   Fair       Plan/Need for Future Services:  Return for therapy in 1 weeks to treat diagnosed problems.    Patient has a current master individualized treatment plan.  See Epic treatment plan for more information.    Referral / Collaboration:  Referral to another professional/service is not indicated at this time..    Emergency Intervention Needed?  No    Assignment:  wreturn in one week     Interactive Complexity:  There are four specific communication difficulties that complicate the work of the primary psychiatric procedure.  Interactive complexity (+66730) may be reported when at least one of these difficulties is present.    Communication difficulties present during current the psychiatric procedure include:  1. None.      Signature/Title:    Vance Watson, VIKTOR, [facility]

## 2022-10-14 ENCOUNTER — OFFICE VISIT (OUTPATIENT)
Dept: PSYCHOLOGY | Facility: CLINIC | Age: 32
End: 2022-10-14
Payer: COMMERCIAL

## 2022-10-14 DIAGNOSIS — F33.2 MDD (MAJOR DEPRESSIVE DISORDER), RECURRENT SEVERE, WITHOUT PSYCHOSIS (H): Primary | ICD-10-CM

## 2022-10-14 DIAGNOSIS — F41.1 GENERALIZED ANXIETY DISORDER: ICD-10-CM

## 2022-10-14 DIAGNOSIS — F19.90 SUBSTANCE USE DISORDER: ICD-10-CM

## 2022-10-14 PROCEDURE — 90837 PSYTX W PT 60 MINUTES: CPT | Performed by: MARRIAGE & FAMILY THERAPIST

## 2022-10-14 NOTE — PROGRESS NOTES
[facility] - Progress Note    Date of Service: 10/14/22   Name: Xavier Landon  : 1990  Medical Record Number: [id number]  Treating Provider: VIKTOR Anderson [facility]  Type of Session: Individual  Present in Session: pt  Session Start and Stop Time: 1000-1108a,  Number of Minutes:  68    SERVICE MODALITY:  In-person    DSM-5 Diagnoses:  296.33 (F33.2) Major Depressive Disorder, Recurrent Episode, Severe _ and With anxious distress  300.02 (F41.1) Generalized Anxiety Disorder.  Attention-Deficit/Hyperactivity Disorder  314.01 (F90.9) Unspecified Attention -Deficit / Hyperactivity Disorder.  Substance-Related & Addictive Disorders Alcohol Use Disorder   303.90 (F10.20) Moderate   301.83 (F60.3) Borderline Personality Disorder      Current Reported Symptoms and Status update:  Struggling with compulsive sexual behavior  Struggling with emotional regulation      Progress Toward Treatment Goals:   Minimal progress     Therapeutic Interventions/Treatment Strategies:    Area(s) of treatment focus addressed in this session included Symptom Management, [place] Maintenance/Relapse Prevention, Interpersonal Relationship Skills and Sexual Health and Wellness    Processed progress, use of pmr, discussed wellness and where goals are, processed feelings of inadequecy    Psychotherapist offered support, feedback and validation and reinforced use of skills Treatment modalities used include Motivational Interviewing Dialectical Behavioral Therapy Sexual Health and Wellness Education Interventions include Cognitive Restructuring:  Explored impact of ineffective thoughts / distortions on mood and activity and Relapse Prevention: Facilitated understanding the importance of awareness of factors that contribute to relapse  and Assisted patient in identifying personal vulnerabilities, thoughts, emotions, and situations that may lead to relapse   Support, Feedback, Structured Activity,  Clarification and Education    Patient Response:   Patient responded to session by accepting feedback, listening, focusing on goals and accepting support  Possible barriers to participation / learning include: N/A    Current Mental Status Exam:   Appearance:  Appropriate   Eye Contact:  Good   Attitude / Demeanor: Cooperative   Speech      Rate / Production: Normal/ Responsive      Volume:  Normal  volume  Orientation:  All  Mood:   Anxious   Affect:   Appropriate   Thought Content: Clear   Insight:   Fair       Plan/Need for Future Services:  Return for therapy in 1 weeks to treat diagnosed problems.    Patient has a current master individualized treatment plan.  See Epic treatment plan for more information.    Referral / Collaboration:  Referral to another professional/service is not indicated at this time..    Emergency Intervention needed?  No    Assignment:  Values exercise  New boundaries     Interactive Complexity:  There are four specific communication difficulties that complicate the work of the primary psychiatric procedure.  Interactive complexity (+96098) may be reported when at least one of these difficulties is present.    Communication difficulties present during current the psychiatric procedure include:  1. None.      Signature/Title:    Vance Watson, LMFT, [facility]

## 2022-10-18 ENCOUNTER — OFFICE VISIT (OUTPATIENT)
Dept: PSYCHOLOGY | Facility: CLINIC | Age: 32
End: 2022-10-18
Payer: COMMERCIAL

## 2022-10-18 DIAGNOSIS — F91.8 CONDUCT DISORDER, UNDIFFERENTIATED TYPE: ICD-10-CM

## 2022-10-18 DIAGNOSIS — F19.90 SUBSTANCE USE DISORDER: ICD-10-CM

## 2022-10-18 DIAGNOSIS — F33.2 MDD (MAJOR DEPRESSIVE DISORDER), RECURRENT SEVERE, WITHOUT PSYCHOSIS (H): Primary | ICD-10-CM

## 2022-10-18 DIAGNOSIS — F41.1 GENERALIZED ANXIETY DISORDER: ICD-10-CM

## 2022-10-18 PROCEDURE — 90853 GROUP PSYCHOTHERAPY: CPT

## 2022-10-18 NOTE — PROGRESS NOTES
Program in Human Sexuality  [facility]  [address], [address]  [place]  [address]    Group Progress Note  [place] [facility] - Progress Note    Date of Service: 10/18/22   Name: Xavier Landon  : 1990  Medical Record Number: [id number]  Therapist(s) in Group: Vance Watson, LMFT, CHADD   Type of Session: Group  :  Number of Attendees:  5  Session Start and Stop Time: 630pm-830pm  Number of Minutes:  /120    SERVICE MODALITY:  In-person    DSM-5 Diagnoses:  296.33 (F33.2) Major Depressive Disorder, Recurrent Episode, Severe _ and With anxious distress  300.02 (F41.1) Generalized Anxiety Disorder.  Attention-Deficit/Hyperactivity Disorder  314.01 (F90.9) Unspecified Attention -Deficit / Hyperactivity Disorder.  Substance-Related & Addictive Disorders Alcohol Use Disorder   303.90 (F10.20) Moderate   301.83 (F60.3) Borderline Personality Disorder      Current Reported Symptoms and Status update:  Struggling with compulsive sexual behavior  Struggling with emotional regulation    Progress Toward Treatment Goals:   Minimal progress     Therapeutic Interventions/Treatment Strategies:    Area(s) of treatment focus addressed in this session included Symptom Management and [place] Maintenance/Relapse Prevention    Patient checked in about their mental health symptoms, healthy coping skills used over the week, negative coping skills used over the week, what sexual behaviors they engaged in-fantasy, masturbation, sex, their comfort level with their behaviors, if there were triggers, urges to violate boundaries, and violations of boundaries.  They took time to process about feeling lonely and struggling with direction they provided support and feedback to others in the group.    Psychotherapist offered support, feedback and validation and reinforced use of skills Treatment modalities used include Motivational Interviewing Group Dynamic Therapy   Interventions include Relapse Prevention: Assisted patient in identifying the challenges and barriers to participation and attendance to support groups/community resources and Relationship Skills: Assisted patients in implementing more effective communication skills in their relationships and Encouraged development and maintenance  of healthy boundaries  Support, Feedback, Structured Activity and Clarification    Patient Response:   Patient responded to session by accepting feedback, giving feedback, listening and focusing on goals  Possible barriers to participation / learning include: N/A    Current Mental Status Exam:   Appearance:  Appropriate   Eye Contact:  Good   Attitude / Demeanor: Cooperative   Speech      Rate / Production: Normal/ Responsive      Volume:  Normal  volume  Orientation:  All  Mood:   Anxious  Depressed   Affect:   Constricted  Labile   Thought Content: Clear   Insight:   Fair       Plan/Need for Future Services:  Return for therapy in 1 weeks to treat diagnosed problems.    Patient has a current master individualized treatment plan.  See Epic treatment plan for more information.    Referral / Collaboration:  Referral to another professional/service is not indicated at this time..    Emergency Intervention Needed?  No    Assignment:  Return in one week     Interactive Complexity:  There are four specific communication difficulties that complicate the work of the primary psychiatric procedure.  Interactive complexity (+45700) may be reported when at least one of these difficulties is present.    Communication difficulties present during current the psychiatric procedure include:  1. None.      Signature/Title:    Vance Watson, VIKTOR, [facility]

## 2022-10-18 NOTE — TELEPHONE ENCOUNTER
Requested Medication: Adderall  Dose: 10mg  Quantity: 30  Refills: 0    Last seen at [facility]: 9/22  - 6 wks  Next Appointment with Provider: 10/26      Lois Baird CMA

## 2022-10-19 RX ORDER — NALTREXONE HYDROCHLORIDE 50 MG/1
TABLET, FILM COATED ORAL
Qty: 30 TABLET | Refills: 1 | Status: SHIPPED | OUTPATIENT
Start: 2022-10-19 | End: 2022-12-07

## 2022-10-21 ENCOUNTER — OFFICE VISIT (OUTPATIENT)
Dept: PSYCHOLOGY | Facility: CLINIC | Age: 32
End: 2022-10-21
Payer: COMMERCIAL

## 2022-10-21 DIAGNOSIS — F91.8 CONDUCT DISORDER, UNDIFFERENTIATED TYPE: ICD-10-CM

## 2022-10-21 DIAGNOSIS — F41.1 GENERALIZED ANXIETY DISORDER: ICD-10-CM

## 2022-10-21 DIAGNOSIS — F33.2 MDD (MAJOR DEPRESSIVE DISORDER), RECURRENT SEVERE, WITHOUT PSYCHOSIS (H): Primary | ICD-10-CM

## 2022-10-21 DIAGNOSIS — F19.90 SUBSTANCE USE DISORDER: ICD-10-CM

## 2022-10-21 PROCEDURE — 90837 PSYTX W PT 60 MINUTES: CPT | Performed by: MARRIAGE & FAMILY THERAPIST

## 2022-10-21 NOTE — PROGRESS NOTES
[facility] for Sexual [facility] - Progress Note    Date of Service: 10/21/22   Name: Xavier Landon  : 1990  Medical Record Number: [id number]  Treating Provider: VIKTOR Anderson, [facility]  Type of Session: Individual  Present in Session: pt   Session Start and Stop Time: 0347-4066  Number of Minutes:  60    SERVICE MODALITY:  In-person    DSM-5 Diagnoses:  296.33 (F33.2) Major Depressive Disorder, Recurrent Episode, Severe _ and With anxious distress  300.02 (F41.1) Generalized Anxiety Disorder.  Attention-Deficit/Hyperactivity Disorder  314.01 (F90.9) Unspecified Attention -Deficit / Hyperactivity Disorder.  Substance-Related & Addictive Disorders Alcohol Use Disorder   303.90 (F10.20) Moderate   301.83 (F60.3) Borderline Personality Disorder      Current Reported Symptoms and Status update:  Struggling with compulsive sexual behavior  Struggling with emotional regulation      Progress Toward Treatment Goals:   Minimal progress     Therapeutic Interventions/Treatment Strategies:    Area(s) of treatment focus addressed in this session included Symptom Management, [place] Maintenance/Relapse Prevention and Interpersonal Relationship Skills    Processed about wanting to feel better, discussed and explored externalized locus of control    Psychotherapist offered support, feedback and validation and reinforced use of skills Treatment modalities used include Motivational Interviewing Dialectical Behavioral Therapy Interventions include Behavioral Activation: Explored how behaviors effect mood and interact with thoughts and feelings and Cognitive Restructuring:  Assisted patient in formulating new neutral/positive alternatives to challenge less helpful / ineffective thoughts and Facilitated recognition of the connection between negative thoughts and negative core beliefs  Support, Feedback, Limit/Boundaries, Structured Activity and Clarification    Patient Response:   Patient responded to  session by accepting feedback, listening and accepting support  Possible barriers to participation / learning include: N/A    Current Mental Status Exam:   Appearance:  Appropriate   Eye Contact:  Good   Attitude / Demeanor: Cooperative   Speech      Rate / Production: Normal/ Responsive      Volume:  Normal  volume  Orientation:  All  Mood:   Depressed   Affect:   Blunted   Thought Content: Clear   Insight:   Fair       Plan/Need for Future Services:  Return for therapy in 1 weeks to treat diagnosed problems.    Patient has a current master individualized treatment plan.  See Epic treatment plan for more information.    Referral / Collaboration:  Referral to another professional/service is not indicated at this time..    Emergency Intervention needed?  No    Assignment:  Return in one week values homework for group     Interactive Complexity:  There are four specific communication difficulties that complicate the work of the primary psychiatric procedure.  Interactive complexity (+78186) may be reported when at least one of these difficulties is present.    Communication difficulties present during current the psychiatric procedure include:  1. None.      Signature/Title:    Vance Watson, LMFT, [facility]

## 2022-10-25 ENCOUNTER — OFFICE VISIT (OUTPATIENT)
Dept: PSYCHOLOGY | Facility: CLINIC | Age: 32
End: 2022-10-25
Payer: COMMERCIAL

## 2022-10-25 DIAGNOSIS — F33.2 MDD (MAJOR DEPRESSIVE DISORDER), RECURRENT SEVERE, WITHOUT PSYCHOSIS (H): Primary | ICD-10-CM

## 2022-10-25 DIAGNOSIS — F91.8 CONDUCT DISORDER, UNDIFFERENTIATED TYPE: ICD-10-CM

## 2022-10-25 DIAGNOSIS — F19.90 SUBSTANCE USE DISORDER: ICD-10-CM

## 2022-10-25 DIAGNOSIS — F41.1 GENERALIZED ANXIETY DISORDER: ICD-10-CM

## 2022-10-25 PROCEDURE — 90853 GROUP PSYCHOTHERAPY: CPT

## 2022-10-25 NOTE — PROGRESS NOTES
Program in Human Sexuality  [facility]  [address], [address]  [place]  [address]    Group Progress Note  [place] for Sexual and Gender Health - Progress Note    Date of Service: 10/25/22   Name: Xavier Landon  : 1990  Medical Record Number: [id number]  Therapist(s) in Group: Vance Watson, LMFT, CHADD   Type of Session: Group  :  Number of Attendees:  3  Session Start and Stop Time: 630pm-830pm  Number of Minutes:  /120    SERVICE MODALITY:  In-person    DSM-5 Diagnoses:  296.33 (F33.2) Major Depressive Disorder, Recurrent Episode, Severe _ and With anxious distress  300.02 (F41.1) Generalized Anxiety Disorder.  Attention-Deficit/Hyperactivity Disorder  314.01 (F90.9) Unspecified Attention -Deficit / Hyperactivity Disorder.  Substance-Related & Addictive Disorders Alcohol Use Disorder   303.90 (F10.20) Moderate   301.83 (F60.3) Borderline Personality Disorder      Current Reported Symptoms and Status update:  Struggling with compulsive sexual behavior  Struggling with emotional regulation    Progress Toward Treatment Goals:   Minimal progress     Therapeutic Interventions/Treatment Strategies:    Area(s) of treatment focus addressed in this session included Symptom Management, [place] Maintenance/Relapse Prevention and Sexual Health and Wellness    Patient checked in about their mental health symptoms, healthy coping skills used over the week, negative coping skills used over the week, what sexual behaviors they engaged in-fantasy, masturbation, sex, their comfort level with their behaviors, if there were triggers, urges to violate boundaries, and violations of boundaries.  They took time to process about staying on track with sobriety, mental health and using copign skills  they provided support and feedback to others in the group.    Psychotherapist offered support, feedback and validation and reinforced use of skills Treatment modalities used include  Motivational Interviewing Group Dynamic Therapy  Interventions include Behavioral Activation: Reinforced benefits/challenges of change process through applying skills to replace unwanted behaviors, Coping Skills: Assisted patient in understanding the purpose of planning / creating / participating / sharing in positive experiences and Relapse Prevention: Assisted patient in identifying personal vulnerabilities, thoughts, emotions, and situations that may lead to relapse   Support, Feedback and Structured Activity    Patient Response:   Patient responded to session by accepting feedback, giving feedback and listening  Possible barriers to participation / learning include: N/A    Current Mental Status Exam:   Appearance:  Appropriate   Eye Contact:  Good   Attitude / Demeanor: Cooperative   Speech      Rate / Production: Normal/ Responsive      Volume:  Normal  volume  Orientation:  All  Mood:   Anxious  Depressed   Affect:   Appropriate   Thought Content: Clear   Insight:   Good       Plan/Need for Future Services:  Return for therapy in 1 weeks to treat diagnosed problems.    Patient has a current master individualized treatment plan.  See Epic treatment plan for more information.    Referral / Collaboration:  Referral to another professional/service is not indicated at this time..    Emergency Intervention Needed?  No    Assignment:  Return in one week continuewith values work     Interactive Complexity:  There are four specific communication difficulties that complicate the work of the primary psychiatric procedure.  Interactive complexity (+37716) may be reported when at least one of these difficulties is present.    Communication difficulties present during current the psychiatric procedure include:  1. None.      Signature/Title:    Vance Watson, VIKTOR, [facility]

## 2022-10-26 ENCOUNTER — VIRTUAL VISIT (OUTPATIENT)
Dept: PSYCHIATRY | Facility: CLINIC | Age: 32
End: 2022-10-26
Payer: COMMERCIAL

## 2022-10-26 DIAGNOSIS — F90.2 ATTENTION DEFICIT HYPERACTIVITY DISORDER (ADHD), COMBINED TYPE: ICD-10-CM

## 2022-10-26 PROCEDURE — 99214 OFFICE O/P EST MOD 30 MIN: CPT | Mod: 95 | Performed by: PHYSICIAN ASSISTANT

## 2022-10-26 RX ORDER — BUPROPION HYDROCHLORIDE 150 MG/1
150 TABLET ORAL EVERY MORNING
Qty: 30 TABLET | Refills: 1 | Status: SHIPPED | OUTPATIENT
Start: 2022-10-26 | End: 2022-11-29

## 2022-10-26 RX ORDER — DEXTROAMPHETAMINE SACCHARATE, AMPHETAMINE ASPARTATE, DEXTROAMPHETAMINE SULFATE AND AMPHETAMINE SULFATE 2.5; 2.5; 2.5; 2.5 MG/1; MG/1; MG/1; MG/1
TABLET ORAL
Qty: 30 TABLET | Refills: 0 | Status: SHIPPED | OUTPATIENT
Start: 2022-10-26 | End: 2022-11-10

## 2022-10-26 RX ORDER — LITHIUM CARBONATE 300 MG/1
CAPSULE ORAL
Qty: 120 CAPSULE | Refills: 2 | Status: SHIPPED | OUTPATIENT
Start: 2022-10-26 | End: 2022-12-07

## 2022-10-27 ENCOUNTER — TELEPHONE (OUTPATIENT)
Dept: PSYCHIATRY | Facility: CLINIC | Age: 32
End: 2022-10-27

## 2022-10-27 ENCOUNTER — OFFICE VISIT (OUTPATIENT)
Dept: PSYCHOLOGY | Facility: CLINIC | Age: 32
End: 2022-10-27
Payer: COMMERCIAL

## 2022-10-27 DIAGNOSIS — F41.1 GENERALIZED ANXIETY DISORDER: ICD-10-CM

## 2022-10-27 DIAGNOSIS — F19.90 SUBSTANCE USE DISORDER: ICD-10-CM

## 2022-10-27 DIAGNOSIS — F91.8 CONDUCT DISORDER, UNDIFFERENTIATED TYPE: ICD-10-CM

## 2022-10-27 DIAGNOSIS — F33.2 MDD (MAJOR DEPRESSIVE DISORDER), RECURRENT SEVERE, WITHOUT PSYCHOSIS (H): Primary | ICD-10-CM

## 2022-10-27 PROCEDURE — 90834 PSYTX W PT 45 MINUTES: CPT | Performed by: MARRIAGE & FAMILY THERAPIST

## 2022-10-27 NOTE — PATIENT INSTRUCTIONS
1)Medications: No change for now.  We may consider lowering the lithium after checking a lithium level.     2)See me in 6 weeks.  Contact the clinic with any questions or concerns.

## 2022-10-27 NOTE — TELEPHONE ENCOUNTER
Called patient about pharmacy change and confirmed that the pharmacy was changed correctly and all three medications are being processed there.

## 2022-10-27 NOTE — PROGRESS NOTES
Video start time: 705  Video end time: 725    Telemedicine Visit: The patient's condition can be safely assessed and treated via synchronous audio and visual telemedicine encounter.      Reason for Telemedicine Visit: Services only offered telehealth    Originating Site (Patient Location): Patient's home    Distant Site (Provider Location): Provider Remote Setting- Home Office    Consent:  The patient/guardian has verbally consented to: the potential risks and benefits of telemedicine (video visit) versus in person care; bill my insurance or make self-payment for services provided; and responsibility for payment of non-covered services.     Mode of Communication:  Video Conference via [employer]    As the provider I attest to compliance with applicable laws and regulations related to telemedicine.    [facility] - Med Management    Name:  Xavier Landon   Aliases:  XAVIER LANDON W : HUEY LANDON  :  1990   MRN:  [id number]  Treating Provider: Kofi Martin PA-C EdD     Medications at start of visit:  Outpatient Medications Prior to Visit   Medication Sig Dispense Refill     amphetamine-dextroamphetamine (ADDERALL XR) 20 MG 24 hr capsule Take 1 capsule (20 mg) by mouth 2 times daily 60 capsule 0     amphetamine-dextroamphetamine (ADDERALL) 10 MG tablet Take 1 tablet daily in afternoon for breakthrough sx 30 tablet 0     buPROPion (WELLBUTRIN XL) 150 MG 24 hr tablet Take 1 tablet (150 mg) by mouth every morning 30 tablet 1     lithium 300 MG capsule Take 2 capsules twice daily 120 capsule 2     naltrexone (DEPADE/REVIA) 50 MG tablet Take 1 50 mg tablet daily 30 tablet 1     No facility-administered medications prior to visit.       Allergies:  No Known Allergies    Today's Visit    Current Complaint: Huey presents for a recheck.  At their last appointment no changes were made to his regimen. He is reporting overall stability of sx and states that the naltrexone has been helpful for CSB. He  reports that due to pharmacy issues he has been off of his lithium for 7 days. The patient is not endorsing suicidal/homicidal ideation, active planning, intent or means.  The patient is not endorsing s/s suggestive of hypomania/kamar or psychosis.  The patient is endorsing good compliance with and efficacy of their medication regimen without s/e.       Review of Systems: A review of the following systems was negative: Opthalmic, ENT, Cardiovascular, Gastrointestinal, Genitourinary, Musculoskeletal, Integumentary, Neurological, Endocrine, Respiratory, Hematologic, Immunologic      Chemical History: Denies usage of and cravings for alcohol, cannabis, heroin, methamphetamine, cocaine, prescription opioids/benzodiazepines.      Social History: No interval change    Mental Status Exam: The patient's orientation, memory,  Attention, language and fund of knowledge are at their usual best baseline.  Grooming/Hygiene: adequate  Eye Contact: normal  Psychomotor: normal  Observed mood and affect: appropriate  Judgment: intact, with thoughtful decision making and insight  Speech: normal rate, rhythm, tone and volume  Thought processes: normal, with normal rate of thought  Associations:  No deficiency  Abnormal Thoughts: none      Assessment: This is a 33 yo man who carries the diagnosis of episodic mood disorder, conduct disorder. The patient's questions were answered to their satisfaction regarding the plan as outlined below. Side effects, risks/benefits/alternatives regarding all psychiatric medications were discussed with the patient in detail.          Plan:    There are no Patient Instructions on file for this visit.     Total face-to-face time: 30 min    Counseling/Coordination of care greater than 50%.    Counseling/Coordination of care included: Educational counseling regarding psychiatric medications, side effects, interactions.

## 2022-10-27 NOTE — PROGRESS NOTES
[facility] - Progress Note    Date of Service: 10/27/22   Name: Xavier Landon  : 1990  Medical Record Number: [id number]  Treating Provider: VIKTOR Anderson, [facility]  Type of Session: Individual  Present in Session: pt  Session Start and Stop Time: 5288-3675  Number of Minutes:  48    SERVICE MODALITY:  In-person    DSM-5 Diagnoses:  296.33 (F33.2) Major Depressive Disorder, Recurrent Episode, Severe _ and With anxious distress  300.02 (F41.1) Generalized Anxiety Disorder.  Attention-Deficit/Hyperactivity Disorder  314.01 (F90.9) Unspecified Attention -Deficit / Hyperactivity Disorder.  Substance-Related & Addictive Disorders Alcohol Use Disorder   303.90 (F10.20) Moderate   301.83 (F60.3) Borderline Personality Disorder      Current Reported Symptoms and Status update:  Struggling with compulsive sexual behavior  Struggling with emotional regulation      Progress Toward Treatment Goals:   Minimal progress     Therapeutic Interventions/Treatment Strategies:    Area(s) of treatment focus addressed in this session included Symptom Management and [place] Maintenance/Relapse Prevention    Pt has had improvement in mood for the past week with consistent work, reports struggling with homework of pmr, working out, discussed current needs such as insurance to continue coverage    Psychotherapist offered support, feedback and validation and reinforced use of skills Treatment modalities used include Dialectical Behavioral Therapy Interventions include Behavioral Activation: Encouraged strategies to reduce individual procrastination and increase motivation by increasing goal-directed activities to enhance mood and reduce symptoms. and Relapse Prevention: Assisted patient in identifying the challenges and barriers to participation and attendance to support groups/community resources  Support, Feedback and Problem Solving    Patient Response:   Patient responded to session by  listening, focusing on goals and accepting support  Possible barriers to participation / learning include: N/A    Current Mental Status Exam:   Appearance:  Appropriate   Eye Contact:  Good   Attitude / Demeanor: Cooperative   Speech      Rate / Production: Normal/ Responsive      Volume:  Normal  volume  Orientation:  All  Mood:   Anxious   Affect:   Appropriate   Thought Content: Clear   Insight:   Fair       Plan/Need for Future Services:  Return for therapy in 1 weeks to treat diagnosed problems.    Patient has a current master individualized treatment plan.  See Epic treatment plan for more information.    Referral / Collaboration:  Referral to another professional/service is not indicated at this time..    Emergency Intervention needed?  No    Assignment:  Finish values homework    Interactive Complexity:  There are four specific communication difficulties that complicate the work of the primary psychiatric procedure.  Interactive complexity (+61433) may be reported when at least one of these difficulties is present.    Communication difficulties present during current the psychiatric procedure include:  1. None.      Signature/Title:    Vance Watson, LMFT, [facility]

## 2022-11-01 ENCOUNTER — OFFICE VISIT (OUTPATIENT)
Dept: PSYCHOLOGY | Facility: CLINIC | Age: 32
End: 2022-11-01
Payer: COMMERCIAL

## 2022-11-01 DIAGNOSIS — F41.1 GENERALIZED ANXIETY DISORDER: ICD-10-CM

## 2022-11-01 DIAGNOSIS — F91.8 CONDUCT DISORDER, UNDIFFERENTIATED TYPE: ICD-10-CM

## 2022-11-01 DIAGNOSIS — F33.2 MDD (MAJOR DEPRESSIVE DISORDER), RECURRENT SEVERE, WITHOUT PSYCHOSIS (H): Primary | ICD-10-CM

## 2022-11-01 DIAGNOSIS — F19.90 SUBSTANCE USE DISORDER: ICD-10-CM

## 2022-11-01 PROCEDURE — 90853 GROUP PSYCHOTHERAPY: CPT

## 2022-11-01 NOTE — PROGRESS NOTES
Program in [facility]uality  [facility]  [address], [address]  [place]  [address]    Group Progress Note  [place] [facility] - Progress Note    Date of Service: 22   Name: Xavier Landon  : 1990  Medical Record Number: [id number]  Therapist(s) in Group: Vance Watson, LMFT, [facility]   Type of Session: Group  :  Number of Attendees:  4  Session Start and Stop Time: 630pm-830pm  Number of Minutes:  /120    SERVICE MODALITY:  In-person    DSM-5 Diagnoses:  296.33 (F33.2) Major Depressive Disorder, Recurrent Episode, Severe _ and With anxious distress  300.02 (F41.1) Generalized Anxiety Disorder.  Attention-Deficit/Hyperactivity Disorder  314.01 (F90.9) Unspecified Attention -Deficit / Hyperactivity Disorder.  Substance-Related & Addictive Disorders Alcohol Use Disorder   303.90 (F10.20) Moderate   301.83 (F60.3) Borderline Personality Disorder      Current Reported Symptoms and Status update:  Struggling with compulsive sexual behavior  Struggling with emotional regulation    Progress Toward Treatment Goals:   Minimal progress     Therapeutic Interventions/Treatment Strategies:    Area(s) of treatment focus addressed in this session included Symptom Management, [place] Maintenance/Relapse Prevention and Interpersonal Relationship Skills    Patient checked in about their mental health symptoms, healthy coping skills used over the week, negative coping skills used over the week, what sexual behaviors they engaged in-fantasy, masturbation, sex, their comfort level with their behaviors, if there were triggers, urges to violate boundaries, and violations of boundaries.  They took time to process about struggling to stay on track with meeting their self care needs and goals they provided support and feedback to others in the group.    Psychotherapist offered support, feedback and validation and reinforced use of skills Treatment modalities used  include Motivational Interviewing Group Dynamic Therapy  Interventions include Behavioral Activation: Encouraged strategies to reduce individual procrastination and increase motivation by increasing goal-directed activities to enhance mood and reduce symptoms., Coping Skills: Assisted patient in understanding the purpose of planning / creating / participating / sharing in positive experiences and Mindfulness: Encouraged a plan to use mindfulness skills in daily life  Support, Feedback, Structured Activity and Problem Solving    Patient Response:   Patient responded to session by accepting feedback, giving feedback, listening and accepting support  Possible barriers to participation / learning include: N/A    Current Mental Status Exam:   Appearance:  Appropriate   Eye Contact:  Good   Attitude / Demeanor: Cooperative   Speech      Rate / Production: Normal/ Responsive      Volume:  Normal  volume  Orientation:  All  Mood:   Anxious   Affect:   Appropriate   Thought Content: Clear   Insight:   Good       Plan/Need for Future Services:  Return for therapy in 1 weeks to treat diagnosed problems.    Patient has a current master individualized treatment plan.  See Epic treatment plan for more information.    Referral / Collaboration:  Referral to another professional/service is not indicated at this time..    Emergency Intervention Needed?  No    Assignment:  Return in one week-work on behavior activation    Interactive Complexity:  There are four specific communication difficulties that complicate the work of the primary psychiatric procedure.  Interactive complexity (+57367) may be reported when at least one of these difficulties is present.    Communication difficulties present during current the psychiatric procedure include:  1. None.      Signature/Title:    VIKTOR Anderson, [facility]

## 2022-11-04 ENCOUNTER — OFFICE VISIT (OUTPATIENT)
Dept: PSYCHOLOGY | Facility: CLINIC | Age: 32
End: 2022-11-04
Payer: COMMERCIAL

## 2022-11-04 DIAGNOSIS — F41.1 GENERALIZED ANXIETY DISORDER: ICD-10-CM

## 2022-11-04 DIAGNOSIS — F91.8 CONDUCT DISORDER, UNDIFFERENTIATED TYPE: ICD-10-CM

## 2022-11-04 DIAGNOSIS — F33.2 MDD (MAJOR DEPRESSIVE DISORDER), RECURRENT SEVERE, WITHOUT PSYCHOSIS (H): Primary | ICD-10-CM

## 2022-11-04 DIAGNOSIS — F19.90 SUBSTANCE USE DISORDER: ICD-10-CM

## 2022-11-04 PROCEDURE — 90837 PSYTX W PT 60 MINUTES: CPT | Performed by: MARRIAGE & FAMILY THERAPIST

## 2022-11-04 NOTE — PROGRESS NOTES
[facility] - Progress Note    Date of Service: 22   Name: Xavier Landon  : 1990  Medical Record Number: [id number]  Treating Provider: VIKTOR Anderson, JESSI  Type of Session: Individual  Present in Session: pt  Session Start and Stop Time: 3538-3660  Number of Minutes:  60    SERVICE MODALITY:  In-person    DSM-5 Diagnoses:  296.33 (F33.2) Major Depressive Disorder, Recurrent Episode, Severe _ and With anxious distress  300.02 (F41.1) Generalized Anxiety Disorder.  Attention-Deficit/Hyperactivity Disorder  314.01 (F90.9) Unspecified Attention -Deficit / Hyperactivity Disorder.  Substance-Related & Addictive Disorders Alcohol Use Disorder   303.90 (F10.20) Moderate   301.83 (F60.3) Borderline Personality Disorder      Current Reported Symptoms and Status update:  Changes since last session has improved mood, some boundary violations with porn use, sober  Struggling with compulsive sexual behavior  Struggling with emotional regulation      Progress Toward Treatment Goals:   Minimal progress     Therapeutic Interventions/Treatment Strategies:    Area(s) of treatment focus addressed in this session included Symptom Management, [place] Maintenance/Relapse Prevention and Sexual Health and Wellness    Is working a lot, struggling with routine-improved mood,     Psychotherapist offered support, feedback and validation and reinforced use of skills Treatment modalities used include Dialectical Behavioral Therapy Mindfulness based intervention Interpersonal Behavioral Activation: Reinforced benefits/challenges of change process through applying skills to replace unwanted behaviors, Relapse Prevention: Facilitated understanding of effective coping skills in response to triggers for substance use and facilitated discussion about sexual health and wellness  Support, Feedback, Problem Solving, Clarification and Education    Patient Response:   Patient responded to  session by accepting feedback, listening, focusing on goals and accepting support  Possible barriers to participation / learning include: N/A    Current Mental Status Exam:   Appearance:  Appropriate   Eye Contact:  Good   Attitude / Demeanor: Cooperative   Speech      Rate / Production: Normal/ Responsive      Volume:  Normal  volume  Orientation:  All  Mood:   Anxious  Normal  Affect:   Appropriate   Thought Content: Clear   Insight:   Good       Plan/Need for Future Services:  Return for therapy in 1 weeks to treat diagnosed problems.    Patient has a current master individualized treatment plan.  See Epic treatment plan for more information.    Referral / Collaboration:  Referral to another professional/service is not indicated at this time..    No    Assignment:  Pmr, fix phone, go to gym    Interactive Complexity:  There are four specific communication difficulties that complicate the work of the primary psychiatric procedure.  Interactive complexity (+02843) may be reported when at least one of these difficulties is present.    Communication difficulties present during current the psychiatric procedure include:  1. None.      Signature/Title:    Vance Watson, VIKTOR, [facility]

## 2022-11-08 ENCOUNTER — OFFICE VISIT (OUTPATIENT)
Dept: PSYCHOLOGY | Facility: CLINIC | Age: 32
End: 2022-11-08
Payer: COMMERCIAL

## 2022-11-08 DIAGNOSIS — F19.90 SUBSTANCE USE DISORDER: ICD-10-CM

## 2022-11-08 DIAGNOSIS — F41.1 GENERALIZED ANXIETY DISORDER: ICD-10-CM

## 2022-11-08 DIAGNOSIS — F91.8 CONDUCT DISORDER, UNDIFFERENTIATED TYPE: ICD-10-CM

## 2022-11-08 DIAGNOSIS — F33.2 MDD (MAJOR DEPRESSIVE DISORDER), RECURRENT SEVERE, WITHOUT PSYCHOSIS (H): Primary | ICD-10-CM

## 2022-11-08 PROCEDURE — 90853 GROUP PSYCHOTHERAPY: CPT

## 2022-11-09 NOTE — PROGRESS NOTES
Program in Human Sexuality  [facility]  [address], [address]  [place]  [address]    Group Progress Note  [place] for [facility] - Progress Note    Date of Service: 22   Name: Xavier Landon  : 1990  Medical Record Number: [id number]  Therapist(s) in Group: Vance Watson, LMFT, [facility]   Type of Session: Group  :  Number of Attendees:  5  Session Start and Stop Time: 630pm-830pm  Number of Minutes:  /120    SERVICE MODALITY:  In-person    DSM-5 Diagnoses:  296.33 (F33.2) Major Depressive Disorder, Recurrent Episode, Severe _ and With anxious distress  300.02 (F41.1) Generalized Anxiety Disorder.  Attention-Deficit/Hyperactivity Disorder  314.01 (F90.9) Unspecified Attention -Deficit / Hyperactivity Disorder.  Substance-Related & Addictive Disorders Alcohol Use Disorder   303.90 (F10.20) Moderate   301.83 (F60.3) Borderline Personality Disorder      Current Reported Symptoms and Status update:  Changes since last session has been working more, reports low libido, some rx have not been able to take  Struggling with compulsive sexual behavior  Struggling with emotional regulation  296.33 (F33.2) Major Depressive Disorder, Recurrent Episode, Severe _ and With anxious distress  300.02 (F41.1) Generalized Anxiety Disorder.  Attention-Deficit/Hyperactivity Disorder  314.01 (F90.9) Unspecified Attention -Deficit / Hyperactivity Disorder.  Substance-Related & Addictive Disorders Alcohol Use Disorder   303.90 (F10.20) Moderate   301.83 (F60.3) Borderline Personality Disorder      Progress Toward Treatment Goals:   Minimal progress     Therapeutic Interventions/Treatment Strategies:    Area(s) of treatment focus addressed in this session included Symptom Management and [place] Maintenance/Relapse Prevention    Patient checked in about their mental health symptoms, healthy coping skills used over the week, negative coping skills used over the week, what  sexual behaviors they engaged in-fantasy, masturbation, sex, their comfort level with their behaviors, if there were triggers, urges to violate boundaries, and violations of boundaries.  They took time to process about their struggle with executive functioning they provided support and feedback to others in the group.    Psychotherapist offered support, feedback and validation and reinforced use of skills Treatment modalities used include Motivational Interviewing Group Dynamic Therapy  Behavioral Activation: Reinforced benefits/challenges of change process through applying skills to replace unwanted behaviors and Encouraged strategies to reduce individual procrastination and increase motivation by increasing goal-directed activities to enhance mood and reduce symptoms.  Support, Feedback, Structured Activity and Problem Solving    Patient Response:   Patient responded to session by accepting feedback, giving feedback, listening and focusing on goals  Possible barriers to participation / learning include: N/A    Current Mental Status Exam:   Appearance:  Appropriate   Eye Contact:  Good   Attitude / Demeanor: Cooperative   Speech      Rate / Production: Normal/ Responsive      Volume:  Normal  volume  Orientation:  All  Mood:   Normal Euthymic  Affect:   Appropriate   Thought Content: Clear   Insight:   Good       Plan/Need for Future Services:  Return for therapy in 1 weeks to treat diagnosed problems.    Patient has a current master individualized treatment plan.  See Epic treatment plan for more information.    Referral / Collaboration:  Referral to another professional/service is not indicated at this time..  Emergency Services Needed?  No    Assignment:  Return in one week     Interactive Complexity:  There are four specific communication difficulties that complicate the work of the primary psychiatric procedure.  Interactive complexity (+88905) may be reported when at least one of these difficulties is  present.    Communication difficulties present during current the psychiatric procedure include:  1. None.      Signature/Title:    VIKTOR Anderson, [facility]

## 2022-11-10 DIAGNOSIS — F91.8 CONDUCT DISORDER, UNDIFFERENTIATED TYPE: ICD-10-CM

## 2022-11-10 DIAGNOSIS — F90.2 ATTENTION DEFICIT HYPERACTIVITY DISORDER (ADHD), COMBINED TYPE: ICD-10-CM

## 2022-11-10 RX ORDER — DEXTROAMPHETAMINE SACCHARATE, AMPHETAMINE ASPARTATE, DEXTROAMPHETAMINE SULFATE AND AMPHETAMINE SULFATE 2.5; 2.5; 2.5; 2.5 MG/1; MG/1; MG/1; MG/1
TABLET ORAL
Qty: 30 TABLET | Refills: 0 | Status: SHIPPED | OUTPATIENT
Start: 2022-11-10 | End: 2022-11-11

## 2022-11-10 RX ORDER — DEXTROAMPHETAMINE SACCHARATE, AMPHETAMINE ASPARTATE MONOHYDRATE, DEXTROAMPHETAMINE SULFATE AND AMPHETAMINE SULFATE 5; 5; 5; 5 MG/1; MG/1; MG/1; MG/1
20 CAPSULE, EXTENDED RELEASE ORAL 2 TIMES DAILY
Qty: 60 CAPSULE | Refills: 0 | Status: SHIPPED | OUTPATIENT
Start: 2022-11-10 | End: 2022-11-11

## 2022-11-10 NOTE — TELEPHONE ENCOUNTER
Requested Medication: ADDERALL XR  Dose:  20 mg  Quantity: 60 capsules  Refills:  0      Take 1 capsule by mouth daily    Last seen at [facility]: 10/27/2022   Next Appointment with Provider:  Visit date not found      ---------------------------------------    Requested Medication: ADDERALL  Dose:  10 mg  Quantity: 30 tablets  Refills:0    Take 1 tablet in the afternoon or at breakfast.    Last seen at [facility]:  10/27/2022  Next Appointment with Provider:  Visit date not found

## 2022-11-11 ENCOUNTER — OFFICE VISIT (OUTPATIENT)
Dept: PSYCHOLOGY | Facility: CLINIC | Age: 32
End: 2022-11-11
Payer: COMMERCIAL

## 2022-11-11 DIAGNOSIS — F91.8 CONDUCT DISORDER, UNDIFFERENTIATED TYPE: ICD-10-CM

## 2022-11-11 DIAGNOSIS — F33.2 MDD (MAJOR DEPRESSIVE DISORDER), RECURRENT SEVERE, WITHOUT PSYCHOSIS (H): Primary | ICD-10-CM

## 2022-11-11 DIAGNOSIS — F90.2 ATTENTION DEFICIT HYPERACTIVITY DISORDER (ADHD), COMBINED TYPE: ICD-10-CM

## 2022-11-11 DIAGNOSIS — F41.1 GENERALIZED ANXIETY DISORDER: ICD-10-CM

## 2022-11-11 DIAGNOSIS — F19.90 SUBSTANCE USE DISORDER: ICD-10-CM

## 2022-11-11 PROCEDURE — 90837 PSYTX W PT 60 MINUTES: CPT | Performed by: MARRIAGE & FAMILY THERAPIST

## 2022-11-11 RX ORDER — DEXTROAMPHETAMINE SACCHARATE, AMPHETAMINE ASPARTATE, DEXTROAMPHETAMINE SULFATE AND AMPHETAMINE SULFATE 2.5; 2.5; 2.5; 2.5 MG/1; MG/1; MG/1; MG/1
TABLET ORAL
Qty: 30 TABLET | Refills: 0 | Status: SHIPPED | OUTPATIENT
Start: 2022-11-11 | End: 2022-12-07

## 2022-11-11 RX ORDER — DEXTROAMPHETAMINE SACCHARATE, AMPHETAMINE ASPARTATE MONOHYDRATE, DEXTROAMPHETAMINE SULFATE AND AMPHETAMINE SULFATE 5; 5; 5; 5 MG/1; MG/1; MG/1; MG/1
20 CAPSULE, EXTENDED RELEASE ORAL 2 TIMES DAILY
Qty: 60 CAPSULE | Refills: 0 | Status: SHIPPED | OUTPATIENT
Start: 2022-11-11 | End: 2022-12-07

## 2022-11-11 NOTE — PROGRESS NOTES
[facility] - Progress Note    Date of Service: 22   Name: Xavier Landon  : 1990  Medical Record Number: [id number]  Treating Provider: VIKTOR Anderson, [facility]  Type of Session: Individual  Present in Session: pt  Session Start and Stop Time: 9703-9297  Number of Minutes:  60    SERVICE MODALITY:  In-person    DSM-5 Diagnoses:  296.33 (F33.2) Major Depressive Disorder, Recurrent Episode, Severe _ and With anxious distress  300.02 (F41.1) Generalized Anxiety Disorder.  Attention-Deficit/Hyperactivity Disorder  314.01 (F90.9) Unspecified Attention -Deficit / Hyperactivity Disorder.  Substance-Related & Addictive Disorders Alcohol Use Disorder   303.90 (F10.20) Moderate   301.83 (F60.3) Borderline Personality Disorder    Current Reported Symptoms and Status update:  Changes since last session improved mood-still no rx, has been out, did pmr    Progress Toward Treatment Goals:   Minimal progress     Therapeutic Interventions/Treatment Strategies:    Area(s) of treatment focus addressed in this session included Symptom Management and [place] Maintenance/Relapse Prevention    Processed about the past week and struggling to not procrastinate, has started having erections in the morning again-discussed ways to stay on track     Psychotherapist offered support, feedback and validation and reinforced use of skills Treatment modalities used include Dialectical Behavioral Therapy Mindfulness: Encouraged a plan to use mindfulness skills in daily life and facilitated discussion about sexual health and wellness and explored challenging unrealistic expectations of self/others  Support, Feedback, Clarification and Education    Patient Response:   Patient responded to session by listening, focusing on goals and accepting support  Possible barriers to participation / learning include: N/A    Current Mental Status Exam:   Appearance:  Appropriate   Eye Contact:  Good    Attitude / Demeanor: Cooperative   Speech      Rate / Production: Normal/ Responsive      Volume:  Normal  volume  Orientation:  All  Mood:   Euthymic  Affect:   Bright   Thought Content: Clear   Insight:   Good       Plan/Need for Future Services:  Return for therapy in 1 weeks to treat diagnosed problems.    Patient has a current master individualized treatment plan.  See Epic treatment plan for more information.    Referral / Collaboration:  Referral to another professional/service is not indicated at this time..  Emergency Services Needed?  No    Assignment:  attendn cosme meeting Thursday  [employer] phone organization  Pay [facility] bill    Interactive Complexity:  There are four specific communication difficulties that complicate the work of the primary psychiatric procedure.  Interactive complexity (+82676) may be reported when at least one of these difficulties is present.    Communication difficulties present during current the psychiatric procedure include:  1. None.      Signature/Title:    VIKTOR Anderson, [facility]

## 2022-11-11 NOTE — TELEPHONE ENCOUNTER
Requested Medication: ADDERALL XR  Dose:  20 mg  Quantity: 60 capsules  Refills:  0       Take 1 capsule by mouth daily     Last seen at [facility]: 10/27/2022   Next Appointment with Provider:  Visit date not found        ---------------------------------------     Requested Medication: ADDERALL  Dose:  10 mg  Quantity: 30 tablets  Refills:0     Take 1 tablet in the afternoon or at breakfast.     Last seen at [facility]:  10/27/2022  Next Appointment with Provider:  Visit date not found

## 2022-11-15 ENCOUNTER — OFFICE VISIT (OUTPATIENT)
Dept: PSYCHOLOGY | Facility: CLINIC | Age: 32
End: 2022-11-15
Payer: COMMERCIAL

## 2022-11-15 DIAGNOSIS — F33.2 MDD (MAJOR DEPRESSIVE DISORDER), RECURRENT SEVERE, WITHOUT PSYCHOSIS (H): Primary | ICD-10-CM

## 2022-11-15 DIAGNOSIS — F41.1 GENERALIZED ANXIETY DISORDER: ICD-10-CM

## 2022-11-15 DIAGNOSIS — F19.90 SUBSTANCE USE DISORDER: ICD-10-CM

## 2022-11-15 PROCEDURE — 90853 GROUP PSYCHOTHERAPY: CPT

## 2022-11-16 NOTE — GROUP NOTE
Program in Human Sexuality  [facility]  [address], [address]  [place]  [address]    Group Progress Note  [facility] - Progress Note    Date of Service: 11/15/22   Name: Xavier Landon  : 1990  Medical Record Number: [id number]  START TIME:  6:30 PM  END TIME:  8:30 PM  FACILITATOR(S): Deborah Watson LMFT, [facility]  TOPIC: [facility] Group Therapy  Type of Session: Group  :  Number of Attendees:  7  Number of Minutes:  /120    SERVICE MODALITY:  In-person      DSM-5 Diagnoses:  296.33 (F33.2) Major Depressive Disorder, Recurrent Episode, Severe _ and With anxious distress  300.02 (F41.1) Generalized Anxiety Disorder.  Attention-Deficit/Hyperactivity Disorder  314.01 (F90.9) Unspecified Attention -Deficit / Hyperactivity Disorder.  Substance-Related & Addictive Disorders Alcohol Use Disorder   303.90 (F10.20) Moderate   301.83 (F60.3) Borderline Personality Disorder      Current Reported Symptoms and Status update:  Changes since last session improved mood, no depression symptoms  Struggling with compulsive sexual behavior  Struggling with emotional regulation      Progress Toward Treatment Goals:   Minimal progress     Therapeutic Interventions/Treatment Strategies:    Area(s) of treatment focus addressed in this session included Symptom Management, [place] Maintenance/Relapse Prevention, Interpersonal Relationship Skills and Sexual Health and Wellness    Patient checked in about their mental health symptoms, healthy coping skills used over the week, negative coping skills used over the week, what sexual behaviors they engaged in-fantasy, masturbation, sex, their comfort level with their behaviors, if there were triggers, urges to violate boundaries, and violations of boundaries.  They took time to process about having increased impulses to violate boundaries, being able to masturbate with fantasy only they provided support and feedback to others  in the group.    Psychotherapist offered support, feedback and validation and reinforced use of skills Treatment modalities used include [facility] THERAPY INTERVENTIONS: Motivational Interviewing Group Dynamic Therapy  Behavioral Activation: Explored how behaviors effect mood and interact with thoughts and feelings and Relapse Prevention: Assisted patient in identifying personal vulnerabilities, thoughts, emotions, and situations that may lead to relapse   Support, Feedback, Structured Activity and Clarification    Patient Response:   Patient responded to session by accepting feedback, giving feedback, listening, focusing on goals and accepting support  Possible barriers to participation / learning include: N/A    Current Mental Status Exam:   Appearance:  Appropriate   Eye Contact:  Good   Attitude / Demeanor: Cooperative   Speech      Rate / Production: Normal/ Responsive      Volume:  Normal  volume  Orientation:  All  Mood:   Euthymic  Affect:   Appropriate   Thought Content: Clear   Insight:   Good       Plan/Need for Future Services:  Return for therapy in 2 weeks to treat diagnosed problems.    Patient has a current master individualized treatment plan.  See Epic treatment plan for more information.    Referral / Collaboration:  Referral to another professional/service is not indicated at this time..  Emergency Services Needed?  No    Assignment:  Return in 2 weeks    Interactive Complexity:  There are four specific communication difficulties that complicate the work of the primary psychiatric procedure.  Interactive complexity (+31546) may be reported when at least one of these difficulties is present.    Communication difficulties present during current the psychiatric procedure include:  1. None.      Signature/Title:    Vance Watson, LMFT, [facility]

## 2022-11-28 DIAGNOSIS — F90.2 ATTENTION DEFICIT HYPERACTIVITY DISORDER (ADHD), COMBINED TYPE: ICD-10-CM

## 2022-11-28 NOTE — TELEPHONE ENCOUNTER
Requested Medication: Bupropion XL   Dose: 150 mg   Quantity: 30 tablets  Refills: 0    Take 1 tablet (150 mg) by mouth every morning.     Last seen at [facility]: 10/26/2022  Next Appointment with Provider:  Visit date not found      FAN Berry

## 2022-11-29 ENCOUNTER — OFFICE VISIT (OUTPATIENT)
Dept: PSYCHOLOGY | Facility: CLINIC | Age: 32
End: 2022-11-29
Payer: COMMERCIAL

## 2022-11-29 DIAGNOSIS — F41.1 GENERALIZED ANXIETY DISORDER: ICD-10-CM

## 2022-11-29 DIAGNOSIS — F19.90 SUBSTANCE USE DISORDER: ICD-10-CM

## 2022-11-29 DIAGNOSIS — F33.2 MDD (MAJOR DEPRESSIVE DISORDER), RECURRENT SEVERE, WITHOUT PSYCHOSIS (H): Primary | ICD-10-CM

## 2022-11-29 DIAGNOSIS — F91.8 CONDUCT DISORDER, UNDIFFERENTIATED TYPE: ICD-10-CM

## 2022-11-29 PROCEDURE — 90853 GROUP PSYCHOTHERAPY: CPT

## 2022-11-29 RX ORDER — BUPROPION HYDROCHLORIDE 150 MG/1
150 TABLET ORAL EVERY MORNING
Qty: 30 TABLET | Refills: 1 | Status: SHIPPED | OUTPATIENT
Start: 2022-11-29 | End: 2022-12-07

## 2022-11-30 NOTE — GROUP NOTE
[facility]  [address], [address]  [place]  [address]    Group Progress Note  [facility] - Progress Note    Date of Service: 22   Name: Xavier Landon  : 1990  Medical Record Number: [id number]  START TIME:  6:30 PM  END TIME:  8:30 PM  FACILITATOR(S): Deborah Watson LMFT, [facility]  TOPIC: [facility] Group Therapy  Type of Session: Group  :  Number of Attendees:  5  Number of Minutes:  /120    SERVICE MODALITY:  In-person      DSM-5 Diagnoses:  296.33 (F33.2) Major Depressive Disorder, Recurrent Episode, Severe _ and With anxious distress  300.02 (F41.1) Generalized Anxiety Disorder.  Attention-Deficit/Hyperactivity Disorder  314.01 (F90.9) Unspecified Attention -Deficit / Hyperactivity Disorder.  Substance-Related & Addictive Disorders Alcohol Use Disorder   303.90 (F10.20) Moderate   301.83 (F60.3) Borderline Personality Disorder      Current Reported Symptoms and Status update:  Changes since last session-increased depression symptoms-reports some work problems/concerns/isolation   Struggling with compulsive sexual behavior  Struggling with emotional regulation      Progress Toward Treatment Goals:   Minimal progress     Therapeutic Interventions/Treatment Strategies:    Area(s) of treatment focus addressed in this session included Symptom Management and [place] Maintenance/Relapse Prevention    Patient checked in about their mental health symptoms, healthy coping skills used over the week, negative coping skills used over the week, what sexual behaviors they engaged in-fantasy, masturbation, sex, their comfort level with their behaviors, if there were triggers, urges to violate boundaries, and violations of boundaries.  They took time to process about his struggle with behavior activation and struggling with his mood-wanting to get out of his parent's house  they provided support and feedback to others in the  group.    Psychotherapist offered support, feedback and validation and reinforced use of skills Treatment modalities used include [facility] THERAPY INTERVENTIONS: Motivational Interviewing Group Dynamic Therapy  Behavioral Activation: Encouraged strategies to reduce individual procrastination and increase motivation by increasing goal-directed activities to enhance mood and reduce symptoms., Coping Skills: Facilitated understanding of  what factors may contribute to symptom relapse and skills plan to manage symptom relapse  and Relationship Skills: Encouraged development and maintenance  of healthy boundaries  Support, Feedback, Structured Activity and Clarification    Patient Response:   Patient responded to session by accepting feedback, giving feedback, listening and accepting support  Possible barriers to participation / learning include: N/A    Current Mental Status Exam:   Appearance:  Appropriate   Eye Contact:  Good   Attitude / Demeanor: Cooperative   Speech      Rate / Production: Normal/ Responsive      Volume:  Normal  volume  Orientation:  All  Mood:   Anxious   Affect:   Appropriate  Blunted   Thought Content: Clear   Insight:   Good       Plan/Need for Future Services:  Return for therapy in 1 weeks to treat diagnosed problems.    Patient has a current master individualized treatment plan.  See Epic treatment plan for more information.    Referral / Collaboration:  Referral to another professional/service is not indicated at this time..  Emergency Services Needed?  No    Assignment:  Return in one week-work on using behavior activation strategies     Interactive Complexity:  There are four specific communication difficulties that complicate the work of the primary psychiatric procedure.  Interactive complexity (+46797) may be reported when at least one of these difficulties is present.    Communication difficulties present during current the psychiatric procedure  include:  1. None.      Signature/Title:    Vance Watson, LMFT, [facility]

## 2022-12-02 ENCOUNTER — OFFICE VISIT (OUTPATIENT)
Dept: PSYCHOLOGY | Facility: CLINIC | Age: 32
End: 2022-12-02
Payer: COMMERCIAL

## 2022-12-02 DIAGNOSIS — F91.8 CONDUCT DISORDER, UNDIFFERENTIATED TYPE: ICD-10-CM

## 2022-12-02 DIAGNOSIS — F19.90 SUBSTANCE USE DISORDER: ICD-10-CM

## 2022-12-02 DIAGNOSIS — F33.2 MDD (MAJOR DEPRESSIVE DISORDER), RECURRENT SEVERE, WITHOUT PSYCHOSIS (H): Primary | ICD-10-CM

## 2022-12-02 DIAGNOSIS — F41.1 GENERALIZED ANXIETY DISORDER: ICD-10-CM

## 2022-12-02 PROCEDURE — 90837 PSYTX W PT 60 MINUTES: CPT | Performed by: MARRIAGE & FAMILY THERAPIST

## 2022-12-02 NOTE — PROGRESS NOTES
[facility] - Progress Note    Date of Service: 22   Name: Xavier Landon  : 1990  Medical Record Number: [id number]  Treating Provider: VIKTOR Anderson LAD  Type of Session: Individual  Present in Session: pt  Session Start and Stop Time: 3699-8090  Number of Minutes:  61    SERVICE MODALITY:  In-person    DSM-5 Diagnoses:  296.33 (F33.2) Major Depressive Disorder, Recurrent Episode, Severe _ and With anxious distress  300.02 (F41.1) Generalized Anxiety Disorder.  Attention-Deficit/Hyperactivity Disorder  314.01 (F90.9) Unspecified Attention -Deficit / Hyperactivity Disorder.  Substance-Related & Addictive Disorders Alcohol Use Disorder   303.90 (F10.20) Moderate   301.83 (F60.3) Borderline Personality Disorder      Current Reported Symptoms and Status update:  Changes since last session general apathy-not keeping up with mindfulness and exercise   Struggling with compulsive sexual behavior  Struggling with emotional regulation      Progress Toward Treatment Goals:   Minimal progress     Therapeutic Interventions/Treatment Strategies:    Area(s) of treatment focus addressed in this session included Symptom Management and [place] Maintenance/Relapse Prevention    Discussed needs-budgeting-focusing on external motivators    Psychotherapist offered support, feedback and validation and reinforced use of skills Treatment modalities used include Motivational Interviewing Sexual Health and Wellness Education Behavioral Activation: Reinforced benefits/challenges of change process through applying skills to replace unwanted behaviors and facilitated discussion about sexual health and wellness and explored challenging unrealistic expectations of self/others  Support, Feedback, Problem Solving and Clarification    Patient Response:   Patient responded to session by accepting feedback, listening and accepting support  Possible barriers to participation / learning  include: N/A    Current Mental Status Exam:   Appearance:  Appropriate   Eye Contact:  Good   Attitude / Demeanor: Cooperative   Speech      Rate / Production: Normal/ Responsive      Volume:  Normal  volume  Orientation:  All  Mood:   Irritable  Normal  Affect:   Constricted   Thought Content: Clear   Insight:   Good       Plan/Need for Future Services:  Return for therapy in 1 weeks to treat diagnosed problems.    Patient has a current master individualized treatment plan.  See Epic treatment plan for more information.    Referral / Collaboration:  Referral to another professional/service is not indicated at this time..  Emergency Services Needed?  No    Assignment:  Complete sexual boundaries-specific     Interactive Complexity:  There are four specific communication difficulties that complicate the work of the primary psychiatric procedure.  Interactive complexity (+22755) may be reported when at least one of these difficulties is present.    Communication difficulties present during current the psychiatric procedure include:  1. None.      Signature/Title:    VIKTOR Anderson, [facility]

## 2022-12-06 ENCOUNTER — OFFICE VISIT (OUTPATIENT)
Dept: PSYCHOLOGY | Facility: CLINIC | Age: 32
End: 2022-12-06
Payer: COMMERCIAL

## 2022-12-06 DIAGNOSIS — F90.2 ATTENTION DEFICIT HYPERACTIVITY DISORDER (ADHD), COMBINED TYPE: ICD-10-CM

## 2022-12-06 DIAGNOSIS — F41.1 GENERALIZED ANXIETY DISORDER: ICD-10-CM

## 2022-12-06 DIAGNOSIS — F91.8 CONDUCT DISORDER, UNDIFFERENTIATED TYPE: ICD-10-CM

## 2022-12-06 DIAGNOSIS — F33.2 MDD (MAJOR DEPRESSIVE DISORDER), RECURRENT SEVERE, WITHOUT PSYCHOSIS (H): Primary | ICD-10-CM

## 2022-12-06 DIAGNOSIS — F19.90 SUBSTANCE USE DISORDER: ICD-10-CM

## 2022-12-06 DIAGNOSIS — F33.1 MODERATE EPISODE OF RECURRENT MAJOR DEPRESSIVE DISORDER (H): ICD-10-CM

## 2022-12-06 PROCEDURE — 90853 GROUP PSYCHOTHERAPY: CPT

## 2022-12-06 NOTE — TELEPHONE ENCOUNTER
Requested Medication: Adderall   Dose: 10mg  Quantity: 30  Refills: 1    Take 1 tablet daily in afternoon for breakthrough sx  _____    Requested Medication: Addreall XR 20mg  Dose: 40mg  Quantity: 60  Refills: 1    Take 1 capsule (20mg) 2 times daily  _____    Requested Medication: Wellbutrin XL  Dose: 150mg  Quantity: 30  Refills: 1    Take 1 tablet daily  _____    Requested Medication: Lithium 300mg  Dose: 600mg  Quantity: 120  Refills: 1    Take 2 capsules (600mg) twice daily    Last seen 10/26 - 6 wks  Appointment scheduled: 1/5/2023      Lois Baird CMA

## 2022-12-07 RX ORDER — BUPROPION HYDROCHLORIDE 150 MG/1
150 TABLET ORAL EVERY MORNING
Qty: 30 TABLET | Refills: 1 | Status: SHIPPED | OUTPATIENT
Start: 2022-12-07 | End: 2023-01-05

## 2022-12-07 RX ORDER — LITHIUM CARBONATE 300 MG/1
CAPSULE ORAL
Qty: 120 CAPSULE | Refills: 2 | Status: SHIPPED | OUTPATIENT
Start: 2022-12-07 | End: 2023-02-06

## 2022-12-07 RX ORDER — DEXTROAMPHETAMINE SACCHARATE, AMPHETAMINE ASPARTATE, DEXTROAMPHETAMINE SULFATE AND AMPHETAMINE SULFATE 2.5; 2.5; 2.5; 2.5 MG/1; MG/1; MG/1; MG/1
TABLET ORAL
Qty: 30 TABLET | Refills: 0 | Status: SHIPPED | OUTPATIENT
Start: 2022-12-07 | End: 2023-01-05

## 2022-12-07 RX ORDER — NALTREXONE HYDROCHLORIDE 50 MG/1
TABLET, FILM COATED ORAL
Qty: 30 TABLET | Refills: 1 | Status: SHIPPED | OUTPATIENT
Start: 2022-12-07 | End: 2023-01-05

## 2022-12-07 RX ORDER — DEXTROAMPHETAMINE SACCHARATE, AMPHETAMINE ASPARTATE MONOHYDRATE, DEXTROAMPHETAMINE SULFATE AND AMPHETAMINE SULFATE 5; 5; 5; 5 MG/1; MG/1; MG/1; MG/1
20 CAPSULE, EXTENDED RELEASE ORAL 2 TIMES DAILY
Qty: 60 CAPSULE | Refills: 0 | Status: SHIPPED | OUTPATIENT
Start: 2022-12-07 | End: 2023-01-05

## 2022-12-07 NOTE — GROUP NOTE
"[facility]  [address], [address]  [place]  [address]    Group Progress Note  [facility] - Progress Note    Date of Service: 22   Name: Xavier Landon  : 1990  Medical Record Number: [id number]  START TIME:  6:30 PM  END TIME:  8:30 PM  FACILITATOR(S): Deborah Watson LMFT, [facility]  TOPIC: [facility] Group Therapy  Type of Session: Group  :  Number of Attendees:  6  Number of Minutes:  /120    SERVICE MODALITY:  In-person      DSM-5 Diagnoses:  296.33 (F33.2) Major Depressive Disorder, Recurrent Episode, Severe _ and With anxious distress  300.02 (F41.1) Generalized Anxiety Disorder.  Attention-Deficit/Hyperactivity Disorder  314.01 (F90.9) Unspecified Attention -Deficit / Hyperactivity Disorder.  Substance-Related & Addictive Disorders Alcohol Use Disorder   303.90 (F10.20) Moderate   301.83 (F60.3) Borderline Personality Disorder      Current Reported Symptoms and Status update:  Changes since last session some isolation and over eating, increased depression symptoms   Struggling with compulsive sexual behavior  Struggling with emotional regulation      Progress Toward Treatment Goals:   Minimal progress     Therapeutic Interventions/Treatment Strategies:    Area(s) of treatment focus addressed in this session included Symptom Management, [place] Maintenance/Relapse Prevention and Interpersonal Relationship Skills    Patient checked in about their mental health symptoms, healthy coping skills used over the week, negative coping skills used over the week, what sexual behaviors they engaged in-fantasy, masturbation, sex, their comfort level with their behaviors, if there were triggers, urges to violate boundaries, and violations of boundaries.  They took time to process about their anger with how things have been going-distress over \"things not changing\" they provided support and feedback to others in the group.    Psychotherapist " offered support, feedback and validation and reinforced use of skills Treatment modalities used include [facility] THERAPY INTERVENTIONS: Motivational Interviewing Group Dynamic Therapy  Coping Skills: Addressed barriers to utilizing coping skills when in distress  Support, Feedback and Structured Activity    Patient Response:   Patient responded to session by giving feedback, listening and appearing disengaged  Possible barriers to participation / learning include: N/A    Current Mental Status Exam:   Appearance:  Appropriate   Eye Contact:  Good   Attitude / Demeanor: Cooperative   Speech      Rate / Production: Normal/ Responsive Emotional      Volume:  Loud  volume  Orientation:  All  Mood:   Angry  Depressed  Irritable   Affect:   Constricted  Labile   Thought Content: Clear   Insight:   Fair       Plan/Need for Future Services:  Return for therapy in 1 weeks to treat diagnosed problems.    Patient has a current master individualized treatment plan.  See Epic treatment plan for more information.    Referral / Collaboration:  Referral to another professional/service is not indicated at this time..  Emergency Services Needed?  No    Assignment:  Return in one week     Interactive Complexity:  There are four specific communication difficulties that complicate the work of the primary psychiatric procedure.  Interactive complexity (+18312) may be reported when at least one of these difficulties is present.    Communication difficulties present during current the psychiatric procedure include:  1. None.      Signature/Title:    Vance Watson, LMFT, [facility]

## 2022-12-09 ENCOUNTER — OFFICE VISIT (OUTPATIENT)
Dept: PSYCHOLOGY | Facility: CLINIC | Age: 32
End: 2022-12-09
Payer: COMMERCIAL

## 2022-12-09 DIAGNOSIS — F33.1 MODERATE EPISODE OF RECURRENT MAJOR DEPRESSIVE DISORDER (H): ICD-10-CM

## 2022-12-09 DIAGNOSIS — F41.1 GENERALIZED ANXIETY DISORDER: ICD-10-CM

## 2022-12-09 DIAGNOSIS — F33.2 MDD (MAJOR DEPRESSIVE DISORDER), RECURRENT SEVERE, WITHOUT PSYCHOSIS (H): Primary | ICD-10-CM

## 2022-12-09 DIAGNOSIS — F19.90 SUBSTANCE USE DISORDER: ICD-10-CM

## 2022-12-09 PROCEDURE — 90837 PSYTX W PT 60 MINUTES: CPT | Performed by: MARRIAGE & FAMILY THERAPIST

## 2022-12-09 NOTE — PROGRESS NOTES
"[facility] - Progress Note    Date of Service: 22   Name: Xavier Landon  : 1990  Medical Record Number: [id number]  Treating Provider: VIKTOR Anderson, [facility]  Type of Session: Individual  Present in Session: pt  Session Start and Stop Time: 3954-1554  Number of Minutes:  62    SERVICE MODALITY:  In-person    DSM-5 Diagnoses:  296.33 (F33.2) Major Depressive Disorder, Recurrent Episode, Severe _ and With anxious distress  300.02 (F41.1) Generalized Anxiety Disorder.  Attention-Deficit/Hyperactivity Disorder  314.01 (F90.9) Unspecified Attention -Deficit / Hyperactivity Disorder.  Substance-Related & Addictive Disorders Alcohol Use Disorder   303.90 (F10.20) Moderate   301.83 (F60.3) Borderline Personality Disorder      Current Reported Symptoms and Status update:  Changes since last session pt drank alcohol last night-had become increasingly irritable past week had a \"breaking point\"      Progress Toward Treatment Goals:   Minimal progress     Therapeutic Interventions/Treatment Strategies:    Area(s) of treatment focus addressed in this session included Symptom Management and [place] Maintenance/Relapse Prevention    Processed and explored impact of drinking and ways to mitigate consequences, continue with no drinking and sobriety-problem solved some concerns for moving forward     Psychotherapist offered support, feedback and validation and reinforced use of skills Treatment modalities used include Motivational Interviewing Solution Focused Therapy Interpersonal Relapse Prevention: Assisted patient in identifying personal vulnerabilities, thoughts, emotions, and situations that may lead to relapse  and Assisted patient in identifying the challenges and barriers to participation and attendance to support groups/community resources  Support, Feedback and Clarification    Patient Response:   Patient responded to session by accepting feedback, listening and " accepting support  Possible barriers to participation / learning include: N/A    Current Mental Status Exam:   Appearance:  Appropriate   Eye Contact:  Good   Attitude / Demeanor: Cooperative   Speech      Rate / Production: Normal/ Responsive      Volume:  Loud  volume  Orientation:  All  Mood:   Depressed  Irritable   Affect:   Constricted  Labile   Thought Content: Clear   Insight:   Fair       Plan/Need for Future Services:  Return for therapy in 1 weeks to treat diagnosed problems.    Patient has a current master individualized treatment plan.  See Epic treatment plan for more information.    Referral / Collaboration:  Referral to another professional/service is not indicated at this time..  Emergency Services Needed?  No    Assignment:  [facility]-get  positions    Interactive Complexity:  There are four specific communication difficulties that complicate the work of the primary psychiatric procedure.  Interactive complexity (+13424) may be reported when at least one of these difficulties is present.    Communication difficulties present during current the psychiatric procedure include:  1. None.      Signature/Title:    Vance Watson, VIKTOR, [facility]

## 2022-12-16 ENCOUNTER — OFFICE VISIT (OUTPATIENT)
Dept: PSYCHOLOGY | Facility: CLINIC | Age: 32
End: 2022-12-16
Payer: COMMERCIAL

## 2022-12-16 DIAGNOSIS — F19.90 SUBSTANCE USE DISORDER: ICD-10-CM

## 2022-12-16 DIAGNOSIS — F41.1 GENERALIZED ANXIETY DISORDER: ICD-10-CM

## 2022-12-16 DIAGNOSIS — F33.2 MDD (MAJOR DEPRESSIVE DISORDER), RECURRENT SEVERE, WITHOUT PSYCHOSIS (H): Primary | ICD-10-CM

## 2022-12-16 PROCEDURE — 90837 PSYTX W PT 60 MINUTES: CPT | Performed by: MARRIAGE & FAMILY THERAPIST

## 2022-12-16 NOTE — PROGRESS NOTES
"[facility] - Progress Note    Date of Service: 22   Name: Xavier Landon  : 1990  Medical Record Number: [id number]  Treating Provider: VIKTOR Anderson, [facility]  Type of Session: Individual  Present in Session: pt  Session Start and Stop Time: 6804-6620  Number of Minutes:  55    SERVICE MODALITY:  In-person    DSM-5 Diagnoses:  296.33 (F33.2) Major Depressive Disorder, Recurrent Episode, Severe _ and With anxious distress  300.02 (F41.1) Generalized Anxiety Disorder.  Attention-Deficit/Hyperactivity Disorder  314.01 (F90.9) Unspecified Attention -Deficit / Hyperactivity Disorder.  Substance-Related & Addictive Disorders Alcohol Use Disorder   303.90 (F10.20) Moderate   301.83 (F60.3) Borderline Personality Disorder      Current Reported Symptoms and Status update:  Changes since last session-improved mood-refocusing on goals  Struggling with compulsive sexual behavior  Struggling with emotional regulation      Progress Toward Treatment Goals:   Minimal progress     Therapeutic Interventions/Treatment Strategies:    Area(s) of treatment focus addressed in this session included Symptom Management and [place] Maintenance/Relapse Prevention    Processed the emotions and actions from the past week, feeling less reactive, accepting of where he is at in the process-wanting to \"have a win\" explored need for mindfulness    Psychotherapist offered support, feedback and validation and reinforced use of skills Treatment modalities used include Motivational Interviewing Dialectical Behavioral Therapy Mindfulness based intervention Behavioral Activation: Encouraged strategies to reduce individual procrastination and increase motivation by increasing goal-directed activities to enhance mood and reduce symptoms. and Mindfulness: Facilitated discussion of when/how to use mindfulness skills to benefit general health, mental health symptoms, and stressors  Support, Feedback " and Problem Solving    Patient Response:   Patient responded to session by accepting feedback, listening and accepting support  Possible barriers to participation / learning include: N/A    Current Mental Status Exam:   Appearance:  Appropriate   Eye Contact:  Good   Attitude / Demeanor: Cooperative   Speech      Rate / Production: Normal/ Responsive      Volume:  Normal  volume  Orientation:  All  Mood:   Normal Euthymic  Affect:   Appropriate  Bright   Thought Content: Clear   Insight:   Good       Plan/Need for Future Services:  Return for therapy in 1 weeks to treat diagnosed problems.    Patient has a current master individualized treatment plan.  See Epic treatment plan for more information.    Referral / Collaboration:  Referral to another professional/service is not indicated at this time..  Emergency Services Needed?  No    Assignment:  Return to group     Interactive Complexity:  There are four specific communication difficulties that complicate the work of the primary psychiatric procedure.  Interactive complexity (+60476) may be reported when at least one of these difficulties is present.    Communication difficulties present during current the psychiatric procedure include:  1. None.      Signature/Title:    Vance Watson, VIKTOR, [facility]

## 2022-12-20 ENCOUNTER — OFFICE VISIT (OUTPATIENT)
Dept: PSYCHOLOGY | Facility: CLINIC | Age: 32
End: 2022-12-20
Payer: COMMERCIAL

## 2022-12-20 DIAGNOSIS — F19.90 SUBSTANCE USE DISORDER: ICD-10-CM

## 2022-12-20 DIAGNOSIS — F41.1 GENERALIZED ANXIETY DISORDER: ICD-10-CM

## 2022-12-20 DIAGNOSIS — F90.2 ATTENTION DEFICIT HYPERACTIVITY DISORDER (ADHD), COMBINED TYPE: ICD-10-CM

## 2022-12-20 DIAGNOSIS — F91.8 CONDUCT DISORDER, UNDIFFERENTIATED TYPE: ICD-10-CM

## 2022-12-20 DIAGNOSIS — F33.2 MDD (MAJOR DEPRESSIVE DISORDER), RECURRENT SEVERE, WITHOUT PSYCHOSIS (H): Primary | ICD-10-CM

## 2022-12-20 PROCEDURE — 90853 GROUP PSYCHOTHERAPY: CPT

## 2022-12-21 NOTE — GROUP NOTE
[facility]  [address], [address]  [place]  [address]    Group Progress Note  [facility] - Progress Note    Date of Service: 22   Name: Xavier Landon  : 1990  Medical Record Number: [id number]  START TIME:  6:30 PM  END TIME:  8:30 PM  FACILITATOR(S): Deborah Watson LMFT, [facility]  TOPIC: [facility] Group Therapy  Type of Session: Group  :  Number of Attendees:  6  Number of Minutes:  /120    SERVICE MODALITY:  In-person      DSM-5 Diagnoses:  296.33 (F33.2) Major Depressive Disorder, Recurrent Episode, Severe _ and With anxious distress  300.02 (F41.1) Generalized Anxiety Disorder.  Attention-Deficit/Hyperactivity Disorder  314.01 (F90.9) Unspecified Attention -Deficit / Hyperactivity Disorder.  Substance-Related & Addictive Disorders Alcohol Use Disorder   303.90 (F10.20) Moderate   301.83 (F60.3) Borderline Personality Disorder      Current Reported Symptoms and Status update:  Changes since last session-reports some depressed mood-used some coping skills-has been working out  Struggling with compulsive sexual behavior  Struggling with emotional regulation      Progress Toward Treatment Goals:   Minimal progress     Therapeutic Interventions/Treatment Strategies:    Area(s) of treatment focus addressed in this session included Symptom Management    Patient checked in about their mental health symptoms, healthy coping skills used over the week, negative coping skills used over the week, what sexual behaviors they engaged in-fantasy, masturbation, sex, their comfort level with their behaviors, if there were triggers, urges to violate boundaries, and violations of boundaries.  They took time to process about their mood they provided support and feedback to others in the group.    Psychotherapist offered support, feedback and validation and reinforced use of skills Treatment modalities used include [facility] THERAPY INTERVENTIONS: Motivational  Interviewing Group Dynamic Therapy  Coping Skills: Facilitated understanding of  what factors may contribute to symptom relapse and skills plan to manage symptom relapse   Support, Feedback and Structured Activity    Patient Response:   Patient responded to session by giving feedback and listening  Possible barriers to participation / learning include: N/A    Current Mental Status Exam:   Appearance:  Appropriate   Eye Contact:  Good   Attitude / Demeanor: Cooperative   Speech      Rate / Production: Normal/ Responsive      Volume:  Normal  volume  Orientation:  All  Mood:   Depressed   Affect:   Blunted   Thought Content: Clear   Insight:   Fair       Plan/Need for Future Services:  Return for therapy in 1 weeks to treat diagnosed problems.    Patient has a current master individualized treatment plan.  See Epic treatment plan for more information.    Referral / Collaboration:  Referral to another professional/service is not indicated at this time..  Emergency Services Needed?  No    Assignment:  Return in one week     Interactive Complexity:  There are four specific communication difficulties that complicate the work of the primary psychiatric procedure.  Interactive complexity (+93533) may be reported when at least one of these difficulties is present.    Communication difficulties present during current the psychiatric procedure include:  1. None.      Signature/Title:    VIKTOR Anderson, [facility]

## 2022-12-23 ENCOUNTER — OFFICE VISIT (OUTPATIENT)
Dept: PSYCHOLOGY | Facility: CLINIC | Age: 32
End: 2022-12-23
Payer: COMMERCIAL

## 2022-12-23 DIAGNOSIS — F90.2 ATTENTION DEFICIT HYPERACTIVITY DISORDER (ADHD), COMBINED TYPE: ICD-10-CM

## 2022-12-23 DIAGNOSIS — F19.90 SUBSTANCE USE DISORDER: ICD-10-CM

## 2022-12-23 DIAGNOSIS — F41.1 GENERALIZED ANXIETY DISORDER: ICD-10-CM

## 2022-12-23 DIAGNOSIS — F91.8 CONDUCT DISORDER, UNDIFFERENTIATED TYPE: ICD-10-CM

## 2022-12-23 DIAGNOSIS — F33.2 MDD (MAJOR DEPRESSIVE DISORDER), RECURRENT SEVERE, WITHOUT PSYCHOSIS (H): Primary | ICD-10-CM

## 2022-12-23 PROCEDURE — 90837 PSYTX W PT 60 MINUTES: CPT | Performed by: MARRIAGE & FAMILY THERAPIST

## 2022-12-23 NOTE — PROGRESS NOTES
[facility] - Progress Note    Date of Service: 22   Name: Xavier Landon  : 1990  Medical Record Number: [id number]  Treating Provider: VIKTOR Andersno LAD  Type of Session: Individual  Present in Session: py  Session Start and Stop Time: 6842-1521  Number of Minutes:  61    SERVICE MODALITY:  In-person    DSM-5 Diagnoses:  296.33 (F33.2) Major Depressive Disorder, Recurrent Episode, Severe _ and With anxious distress  300.02 (F41.1) Generalized Anxiety Disorder.  Attention-Deficit/Hyperactivity Disorder  314.01 (F90.9) Unspecified Attention -Deficit / Hyperactivity Disorder.  Substance-Related & Addictive Disorders Alcohol Use Disorder   303.90 (F10.20) Moderate   301.83 (F60.3) Borderline Personality Disorder      Current Reported Symptoms and Status update:  Changes since last session has been workingon habit building     Progress Toward Treatment Goals:   Minimal progress     Therapeutic Interventions/Treatment Strategies:    Area(s) of treatment focus addressed in this session included Symptom Management and [place] Maintenance/Relapse Prevention    Processed and explored procrastination reduction-habit building    Psychotherapist offered support, feedback and validation and reinforced use of skills Treatment modalities used include Cognitive Behavioral Therapy Solution Focused Therapy Interpersonal Behavioral Activation: Reinforced benefits/challenges of change process through applying skills to replace unwanted behaviors and Encouraged strategies to reduce individual procrastination and increase motivation by increasing goal-directed activities to enhance mood and reduce symptoms. and Cognitive Restructuring:  Explored impact of ineffective thoughts / distortions on mood and activity  Support, Feedback, Structured Activity and Problem Solving    Patient Response:   Patient responded to session by accepting feedback, listening and accepting  support  Possible barriers to participation / learning include: N/A    Current Mental Status Exam:   Appearance:  Appropriate   Eye Contact:  Good   Attitude / Demeanor: Cooperative   Speech      Rate / Production: Normal/ Responsive      Volume:  Normal  volume  Orientation:  All  Mood:   Irritable  Normal  Affect:   Appropriate   Thought Content: Clear   Insight:   Good       Plan/Need for Future Services:  Return for therapy in 1 weeks to treat diagnosed problems.    Patient has a current master individualized treatment plan.  See Epic treatment plan for more information.    Referral / Collaboration:  Referral to another professional/service is not indicated at this time..  Emergency Services Needed?  No    Assignment:  Gym  Resume  freedom    Interactive Complexity:  There are four specific communication difficulties that complicate the work of the primary psychiatric procedure.  Interactive complexity (+07406) may be reported when at least one of these difficulties is present.    Communication difficulties present during current the psychiatric procedure include:  1. None.      Signature/Title:    VIKTOR Anderson, [facility]

## 2022-12-27 ENCOUNTER — OFFICE VISIT (OUTPATIENT)
Dept: PSYCHOLOGY | Facility: CLINIC | Age: 32
End: 2022-12-27
Payer: COMMERCIAL

## 2022-12-27 DIAGNOSIS — F90.2 ATTENTION DEFICIT HYPERACTIVITY DISORDER (ADHD), COMBINED TYPE: ICD-10-CM

## 2022-12-27 DIAGNOSIS — F91.8 CONDUCT DISORDER, UNDIFFERENTIATED TYPE: ICD-10-CM

## 2022-12-27 DIAGNOSIS — F41.1 GENERALIZED ANXIETY DISORDER: ICD-10-CM

## 2022-12-27 DIAGNOSIS — F33.2 MDD (MAJOR DEPRESSIVE DISORDER), RECURRENT SEVERE, WITHOUT PSYCHOSIS (H): Primary | ICD-10-CM

## 2022-12-27 DIAGNOSIS — F19.90 SUBSTANCE USE DISORDER: ICD-10-CM

## 2022-12-27 PROCEDURE — 90853 GROUP PSYCHOTHERAPY: CPT

## 2022-12-28 NOTE — GROUP NOTE
[facility]  [address], [address]  [place]  [address]    Group Progress Note  [facility] - Progress Note    Date of Service: 22   Name: Xavier Landon  : 1990  Medical Record Number: [id number]  START TIME:  6:30 PM  END TIME:  8:30 PM  FACILITATOR(S): Deborah Watson LMFT, [facility]  TOPIC: [facility] Group Therapy  Type of Session: Group  :  Number of Attendees:  7  Number of Minutes:  /120    SERVICE MODALITY:  In-person      DSM-5 Diagnoses:  296.33 (F33.2) Major Depressive Disorder, Recurrent Episode, Severe _ and With anxious distress  300.02 (F41.1) Generalized Anxiety Disorder.  Attention-Deficit/Hyperactivity Disorder  314.01 (F90.9) Unspecified Attention -Deficit / Hyperactivity Disorder.  Substance-Related & Addictive Disorders Alcohol Use Disorder   303.90 (F10.20) Moderate   301.83 (F60.3) Borderline Personality Disorder      Current Reported Symptoms and Status update:  Changes since last session some depression symptoms but has been working out as a coping skill  Struggling with compulsive sexual behavior  Struggling with emotional regulation      Progress Toward Treatment Goals:   Minimal progress     Therapeutic Interventions/Treatment Strategies:    Area(s) of treatment focus addressed in this session included Symptom Management and [place] Maintenance/Relapse Prevention    Patient checked in about their mental health symptoms, healthy coping skills used over the week, negative coping skills used over the week, what sexual behaviors they engaged in-fantasy, masturbation, sex, their comfort level with their behaviors, if there were triggers, urges to violate boundaries, and violations of boundaries.  They took time to process about group member farewell they provided support and feedback to others in the group.    Psychotherapist offered support, feedback and validation and reinforced use of skills Treatment modalities  used include [facility] THERAPY INTERVENTIONS: Motivational Interviewing Group Dynamic Therapy   Support, Feedback and Structured Activity    Patient Response:   Patient responded to session by accepting feedback, giving feedback and listening  Possible barriers to participation / learning include: N/A    Current Mental Status Exam:   Appearance:  Appropriate   Eye Contact:  Good   Attitude / Demeanor: Cooperative   Speech      Rate / Production: Normal/ Responsive      Volume:  Normal  volume  Orientation:  All  Mood:   Depressed   Affect:   Appropriate   Thought Content: Clear   Insight:   Good       Plan/Need for Future Services:  Return for therapy in 1 weeks to treat diagnosed problems.    Patient has a current master individualized treatment plan.  See Epic treatment plan for more information.    Referral / Collaboration:  Referral to another professional/service is not indicated at this time..  Emergency Services Needed?  No    Assignment:  Return in one week     Interactive Complexity:  There are four specific communication difficulties that complicate the work of the primary psychiatric procedure.  Interactive complexity (+71068) may be reported when at least one of these difficulties is present.    Communication difficulties present during current the psychiatric procedure include:  1. None.      Signature/Title:    Vance Watson, LMFT, [facility]

## 2022-12-30 ENCOUNTER — OFFICE VISIT (OUTPATIENT)
Dept: PSYCHOLOGY | Facility: CLINIC | Age: 32
End: 2022-12-30
Payer: COMMERCIAL

## 2022-12-30 DIAGNOSIS — F90.2 ATTENTION DEFICIT HYPERACTIVITY DISORDER (ADHD), COMBINED TYPE: ICD-10-CM

## 2022-12-30 DIAGNOSIS — F41.1 GENERALIZED ANXIETY DISORDER: ICD-10-CM

## 2022-12-30 DIAGNOSIS — F91.8 CONDUCT DISORDER, UNDIFFERENTIATED TYPE: ICD-10-CM

## 2022-12-30 DIAGNOSIS — F19.90 SUBSTANCE USE DISORDER: ICD-10-CM

## 2022-12-30 DIAGNOSIS — F33.2 MDD (MAJOR DEPRESSIVE DISORDER), RECURRENT SEVERE, WITHOUT PSYCHOSIS (H): Primary | ICD-10-CM

## 2022-12-30 PROCEDURE — 90837 PSYTX W PT 60 MINUTES: CPT | Performed by: MARRIAGE & FAMILY THERAPIST

## 2022-12-30 NOTE — PROGRESS NOTES
[facility] Progress [facility]    Date of Service: 22   Name: Xavier Landon  : 1990  Medical Record Number: [id number]  Treating Provider: VIKTOR Anderson, [facility]  Type of Session: Individual  Present in Session: pt  Session Start and Stop Time: 4676-3346  Number of Minutes:  60    SERVICE MODALITY:  In-person    DSM-5 Diagnoses:  296.33 (F33.2) Major Depressive Disorder, Recurrent Episode, Severe _ and With anxious distress  300.02 (F41.1) Generalized Anxiety Disorder.  Attention-Deficit/Hyperactivity Disorder  314.01 (F90.9) Unspecified Attention -Deficit / Hyperactivity Disorder.  Substance-Related & Addictive Disorders Alcohol Use Disorder   303.90 (F10.20) Moderate   301.83 (F60.3) Borderline Personality Disorder      Current Reported Symptoms and Status update:  Changes since last session-bought a computer-is working on building habits   Struggling with compulsive sexual behavior  Struggling with emotional regulation      Progress Toward Treatment Goals:   Minimal progress     Therapeutic Interventions/Treatment Strategies:    Area(s) of treatment focus addressed in this session included Symptom Management    Processed about improved mood in the past few days, struggling with direction and next steps to take     Psychotherapist offered support, feedback and validation and reinforced use of skills Treatment modalities used include Motivational Interviewing Interpersonal Behavioral Activation: Reinforced benefits/challenges of change process through applying skills to replace unwanted behaviors and Encouraged strategies to reduce individual procrastination and increase motivation by increasing goal-directed activities to enhance mood and reduce symptoms.  Support and Problem Solving    Patient Response:   Patient responded to session by listening, focusing on goals and accepting support  Possible barriers to participation / learning include: N/A    Current  Mental Status Exam:   Appearance:  Appropriate   Eye Contact:  Good   Attitude / Demeanor: Cooperative   Speech      Rate / Production: Normal/ Responsive      Volume:  Normal  volume  Orientation:  All  Mood:   Anxious  Irritable   Affect:   Appropriate   Thought Content: Clear   Insight:   Good       Plan/Need for Future Services:  Return for therapy in 1 weeks to treat diagnosed problems.    Patient has a current master individualized treatment plan.  See Epic treatment plan for more information.    Referral / Collaboration:  Referral to another professional/service is not indicated at this time..  Emergency Services Needed?  No    Assignment:  Complete goals for th week     Interactive Complexity:  There are four specific communication difficulties that complicate the work of the primary psychiatric procedure.  Interactive complexity (+03503) may be reported when at least one of these difficulties is present.    Communication difficulties present during current the psychiatric procedure include:  1. None.      Signature/Title:    VIKTOR Anderson, [facility]

## 2023-01-03 ENCOUNTER — VIRTUAL VISIT (OUTPATIENT)
Dept: PSYCHOLOGY | Facility: CLINIC | Age: 33
End: 2023-01-03
Payer: COMMERCIAL

## 2023-01-03 DIAGNOSIS — F90.2 ATTENTION DEFICIT HYPERACTIVITY DISORDER (ADHD), COMBINED TYPE: ICD-10-CM

## 2023-01-03 DIAGNOSIS — F41.1 GENERALIZED ANXIETY DISORDER: ICD-10-CM

## 2023-01-03 DIAGNOSIS — F33.2 MDD (MAJOR DEPRESSIVE DISORDER), RECURRENT SEVERE, WITHOUT PSYCHOSIS (H): Primary | ICD-10-CM

## 2023-01-03 DIAGNOSIS — F91.8 CONDUCT DISORDER, UNDIFFERENTIATED TYPE: ICD-10-CM

## 2023-01-03 DIAGNOSIS — F19.90 SUBSTANCE USE DISORDER: ICD-10-CM

## 2023-01-03 PROCEDURE — 90853 GROUP PSYCHOTHERAPY: CPT | Mod: 95
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
## 2023-01-05 ENCOUNTER — VIRTUAL VISIT (OUTPATIENT)
Dept: PSYCHIATRY | Facility: CLINIC | Age: 33
End: 2023-01-05
Payer: COMMERCIAL

## 2023-01-05 DIAGNOSIS — F90.2 ATTENTION DEFICIT HYPERACTIVITY DISORDER (ADHD), COMBINED TYPE: ICD-10-CM

## 2023-01-05 DIAGNOSIS — F91.8 CONDUCT DISORDER, UNDIFFERENTIATED TYPE: ICD-10-CM

## 2023-01-05 PROCEDURE — 99213 OFFICE O/P EST LOW 20 MIN: CPT | Mod: 95 | Performed by: PHYSICIAN ASSISTANT

## 2023-01-05 RX ORDER — BUPROPION HYDROCHLORIDE 150 MG/1
150 TABLET ORAL EVERY MORNING
Qty: 30 TABLET | Refills: 1 | Status: SHIPPED | OUTPATIENT
Start: 2023-01-05 | End: 2023-02-06

## 2023-01-05 RX ORDER — NALTREXONE HYDROCHLORIDE 50 MG/1
TABLET, FILM COATED ORAL
Qty: 30 TABLET | Refills: 1 | Status: SHIPPED | OUTPATIENT
Start: 2023-01-05 | End: 2023-02-06

## 2023-01-05 RX ORDER — DEXTROAMPHETAMINE SACCHARATE, AMPHETAMINE ASPARTATE, DEXTROAMPHETAMINE SULFATE AND AMPHETAMINE SULFATE 2.5; 2.5; 2.5; 2.5 MG/1; MG/1; MG/1; MG/1
TABLET ORAL
Qty: 30 TABLET | Refills: 0 | Status: SHIPPED | OUTPATIENT
Start: 2023-01-05 | End: 2023-02-06

## 2023-01-05 RX ORDER — DEXTROAMPHETAMINE SACCHARATE, AMPHETAMINE ASPARTATE MONOHYDRATE, DEXTROAMPHETAMINE SULFATE AND AMPHETAMINE SULFATE 5; 5; 5; 5 MG/1; MG/1; MG/1; MG/1
20 CAPSULE, EXTENDED RELEASE ORAL 2 TIMES DAILY
Qty: 60 CAPSULE | Refills: 0 | Status: SHIPPED | OUTPATIENT
Start: 2023-01-05 | End: 2023-02-06

## 2023-01-06 ENCOUNTER — OFFICE VISIT (OUTPATIENT)
Dept: PSYCHOLOGY | Facility: CLINIC | Age: 33
End: 2023-01-06
Payer: COMMERCIAL

## 2023-01-06 DIAGNOSIS — F19.90 SUBSTANCE USE DISORDER: ICD-10-CM

## 2023-01-06 DIAGNOSIS — F33.2 MDD (MAJOR DEPRESSIVE DISORDER), RECURRENT SEVERE, WITHOUT PSYCHOSIS (H): Primary | ICD-10-CM

## 2023-01-06 DIAGNOSIS — F41.1 GENERALIZED ANXIETY DISORDER: ICD-10-CM

## 2023-01-06 DIAGNOSIS — F90.2 ATTENTION DEFICIT HYPERACTIVITY DISORDER (ADHD), COMBINED TYPE: ICD-10-CM

## 2023-01-06 DIAGNOSIS — F91.8 CONDUCT DISORDER, UNDIFFERENTIATED TYPE: ICD-10-CM

## 2023-01-06 PROCEDURE — 90837 PSYTX W PT 60 MINUTES: CPT | Performed by: MARRIAGE & FAMILY THERAPIST

## 2023-01-06 NOTE — PROGRESS NOTES
[facility] - Progress Note    Date of Service: 23   Name: Xavier Landon  : 1990  Medical Record Number: [id number]  Treating Provider: VIKTOR Anderson, [facility]  Type of Session: Individual  Present in Session: pt  Session Start and Stop Time: 7516-0723  Number of Minutes:  60    SERVICE MODALITY:  In-person    DSM-5 Diagnoses:  296.33 (F33.2) Major Depressive Disorder, Recurrent Episode, Severe _ and With anxious distress  300.02 (F41.1) Generalized Anxiety Disorder.  Attention-Deficit/Hyperactivity Disorder  314.01 (F90.9) Unspecified Attention -Deficit / Hyperactivity Disorder.  Substance-Related & Addictive Disorders Alcohol Use Disorder   303.90 (F10.20) Moderate   301.83 (F60.3) Borderline Personality Disorder      Current Reported Symptoms and Status update:  Changes since last session-has had 3 days off work, has been working on his wellness dimensions, working on continued exercise   Struggling with compulsive sexual behavior  Struggling with emotional regulation      Progress Toward Treatment Goals:   Minimal progress     Therapeutic Interventions/Treatment Strategies:    Area(s) of treatment focus addressed in this session included Symptom Management and [place] Maintenance/Relapse Prevention    Processed about the difficulty with goal keeping-needing to work on financial goals     Psychotherapist offered support, feedback and validation and reinforced use of skills Treatment modalities used include Dialectical Behavioral Therapy Solution Focused Therapy Behavioral Activation: Reinforced benefits/challenges of change process through applying skills to replace unwanted behaviors and Encouraged strategies to reduce individual procrastination and increase motivation by increasing goal-directed activities to enhance mood and reduce symptoms. and Mindfulness: Facilitated discussion of when/how to use mindfulness skills to benefit general health, mental  health symptoms, and stressors  Support, Feedback, Structured Activity and Problem Solving    Patient Response:   Patient responded to session by listening, focusing on goals and accepting support  Possible barriers to participation / learning include: N/A    Current Mental Status Exam:   Appearance:  Appropriate   Eye Contact:  Good   Attitude / Demeanor: Cooperative   Speech      Rate / Production: Normal/ Responsive      Volume:  Normal  volume  Orientation:  All  Mood:   Irritable  Normal  Affect:   Appropriate   Thought Content: Clear   Insight:   Good       Plan/Need for Future Services:  Return for therapy in 1 weeks to treat diagnosed problems.    Patient has a current master individualized treatment plan.  See Epic treatment plan for more information.    Referral / Collaboration:  Referral to another professional/service is not indicated at this time..  Emergency Services Needed?  No    Assignment:  Complete goals for the week     Interactive Complexity:  There are four specific communication difficulties that complicate the work of the primary psychiatric procedure.  Interactive complexity (+70655) may be reported when at least one of these difficulties is present.    Communication difficulties present during current the psychiatric procedure include:  1. None.      Signature/Title:    Vance Watson, VIKTOR, [facility]

## 2023-01-06 NOTE — PATIENT INSTRUCTIONS
1)Medications: No change for now.     2)See me in 3 months.  Contact my office with any questions or concerns.

## 2023-01-06 NOTE — PROGRESS NOTES
"The following was reviewed with the patient.   \"We have found that certain health care needs can be provided without the need for a face to face visit.  This service lets us provide the care you need with a phone conversation. I will have full access to your [facility] medical record during this entire phone call.   I will be taking notes for your medical record. Since this is like an office visit, we will bill your insurance company for this service. There are potential benefits and risks of telephone visits (e.g. limits to patient confidentiality) that differ from in-person visits.?  Confidentiality still applies for telephone services, and nobody will record the visit.  It is important to be in a quiet, private space that is free of distractions (including cell phone or other devices) during the visit.??If during the course of the call I believe a telephone visit is not appropriate, you will not be charged for this service\"    Consent has been obtained for this service by care team member: Yes    [facility] - Med Management    Name:  Xavier Landon   Aliases:  XAVIER LANDON W : STEVEN LANDON  :  1990   MRN:  [id number]  Treating Provider: Kofi Martin PA-C EdD     Medications at start of visit:  Outpatient Medications Prior to Visit   Medication Sig Dispense Refill     amphetamine-dextroamphetamine (ADDERALL XR) 20 MG 24 hr capsule Take 1 capsule (20 mg) by mouth 2 times daily 60 capsule 0     amphetamine-dextroamphetamine (ADDERALL) 10 MG tablet Take 1 tablet daily in afternoon for breakthrough sx 30 tablet 0     buPROPion (WELLBUTRIN XL) 150 MG 24 hr tablet Take 1 tablet (150 mg) by mouth every morning 30 tablet 1     lithium 300 MG capsule Take 2 capsules twice daily 120 capsule 2     naltrexone (DEPADE/REVIA) 50 MG tablet Take 1 50 mg tablet daily 30 tablet 1     No facility-administered medications prior to visit.       Allergies:  No Known Allergies    Today's Visit    Current " Complaint: Huey presents for a recheck.  At their last appointment no changes were made to his regimen.  He reports that he has reduced the lithium to once daily as needed.  He reports that he has been going to the gym regularly for the last 2 weeks and he is working on quitting smoking.  The patient is not endorsing suicidal/homicidal ideation, active planning, intent or means.  The patient is not endorsing s/s suggestive of hypomania/kamar or psychosis.  The patient is endorsing good compliance with and efficacy of their medication regimen without s/e.       Review of Systems: A review of the following systems was negative: Opthalmic, ENT, Cardiovascular, Gastrointestinal, Genitourinary, Musculoskeletal, Integumentary, Neurological, Endocrine, Respiratory, Hematologic, Immunologic      Chemical History: Denies usage of and cravings for alcohol, cannabis, heroin, methamphetamine, cocaine, prescription opioids/benzodiazepines.      Social History: As above    Mental Status Exam: The patient's orientation, memory,  Attention, language and fund of knowledge are at their usual best baseline.  Grooming/Hygiene: adequate  Eye Contact: normal  Psychomotor: normal  Observed mood and affect: appropriate  Judgment: intact, with thoughtful decision making and insight  Speech: normal rate, rhythm, tone and volume  Thought processes: normal, with normal rate of thought  Associations:  No deficiency  Abnormal Thoughts: none      Assessment: This is a 33 yo man who carries the diagnosis of mood disorder nos. The patient's questions were answered to their satisfaction regarding the plan as outlined below. Side effects, risks/benefits/alternatives regarding all psychiatric medications were discussed with the patient in detail.          Plan:    There are no Patient Instructions on file for this visit.     Total face-to-face time: 30 min    Counseling/Coordination of care greater than 50%.    Counseling/Coordination of care  included: Educational counseling regarding psychiatric medications, side effects, interactions.

## 2023-01-10 ENCOUNTER — OFFICE VISIT (OUTPATIENT)
Dept: PSYCHOLOGY | Facility: CLINIC | Age: 33
End: 2023-01-10
Payer: COMMERCIAL

## 2023-01-10 DIAGNOSIS — F19.90 SUBSTANCE USE DISORDER: ICD-10-CM

## 2023-01-10 DIAGNOSIS — F41.1 GENERALIZED ANXIETY DISORDER: ICD-10-CM

## 2023-01-10 DIAGNOSIS — F90.2 ATTENTION DEFICIT HYPERACTIVITY DISORDER (ADHD), COMBINED TYPE: ICD-10-CM

## 2023-01-10 DIAGNOSIS — F91.8 CONDUCT DISORDER, UNDIFFERENTIATED TYPE: ICD-10-CM

## 2023-01-10 DIAGNOSIS — F33.2 MDD (MAJOR DEPRESSIVE DISORDER), RECURRENT SEVERE, WITHOUT PSYCHOSIS (H): Primary | ICD-10-CM

## 2023-01-10 PROCEDURE — 90853 GROUP PSYCHOTHERAPY: CPT

## 2023-01-11 NOTE — GROUP NOTE
[facility]  [address], [address]  [place]  [address]    Group Progress Note  [facility] - Progress Note    Date of Service: 1/10/23   Name: Xavier Landon  : 1990  Medical Record Number: [id number]  START TIME:  6:30 PM  END TIME:  8:30 PM  FACILITATOR(S): Deborah Watson LMFT, [facility]  TOPIC: [facility] Group Therapy  Type of Session: Group  :  Number of Attendees:  5  Number of Minutes:  /120    SERVICE MODALITY:  In-person      DSM-5 Diagnoses:  296.33 (F33.2) Major Depressive Disorder, Recurrent Episode, Severe _ and With anxious distress  300.02 (F41.1) Generalized Anxiety Disorder.  Attention-Deficit/Hyperactivity Disorder  314.01 (F90.9) Unspecified Attention -Deficit / Hyperactivity Disorder.  Substance-Related & Addictive Disorders Alcohol Use Disorder   303.90 (F10.20) Moderate   301.83 (F60.3) Borderline Personality Disorder      Current Reported Symptoms and Status update:  Changes since last session-improved coping skills has been working out daily  Struggling with compulsive sexual behavior  Struggling with emotional regulation      Progress Toward Treatment Goals:   Minimal progress     Therapeutic Interventions/Treatment Strategies:    Area(s) of treatment focus addressed in this session included Symptom Management and [place] Maintenance/Relapse Prevention    Patient checked in about their mental health symptoms, healthy coping skills used over the week, negative coping skills used over the week, what sexual behaviors they engaged in-fantasy, masturbation, sex, their comfort level with their behaviors, if there were triggers, urges to violate boundaries, and violations of boundaries.  They took time to process about about their consistency with exercise and saying goodbye to group member they provided support and feedback to others in the group.    Psychotherapist offered support, feedback and validation and reinforced use of  skills Treatment modalities used include [facility] THERAPY INTERVENTIONS: Motivational Interviewing Group Dynamic Therapy  Interpersonal Coping Skills: Assisted patient in understanding the purpose of planning / creating / participating / sharing in positive experiences  Support, Feedback and Structured Activity    Patient Response:   Patient responded to session by accepting feedback, giving feedback and listening  Possible barriers to participation / learning include: N/A    Current Mental Status Exam:   Appearance:  Appropriate   Eye Contact:  Good   Attitude / Demeanor: Cooperative   Speech      Rate / Production: Normal/ Responsive      Volume:  Normal  volume  Orientation:  All  Mood:   Normal Euthymic  Affect:   Appropriate   Thought Content: Clear   Insight:   Good       Plan/Need for Future Services:  Return for therapy in 1 weeks to treat diagnosed problems.    Patient has a current master individualized treatment plan.  See Epic treatment plan for more information.    Referral / Collaboration:  Referral to another professional/service is not indicated at this time..  Emergency Services Needed?  No    Assignment:  Return in one week     Interactive Complexity:  There are four specific communication difficulties that complicate the work of the primary psychiatric procedure.  Interactive complexity (+54259) may be reported when at least one of these difficulties is present.    Communication difficulties present during current the psychiatric procedure include:  1. None.      Signature/Title:    Vance Watson, LMFT, [facility]

## 2023-01-12 ENCOUNTER — OFFICE VISIT (OUTPATIENT)
Dept: PSYCHOLOGY | Facility: CLINIC | Age: 33
End: 2023-01-12
Payer: COMMERCIAL

## 2023-01-12 DIAGNOSIS — F19.90 SUBSTANCE USE DISORDER: ICD-10-CM

## 2023-01-12 DIAGNOSIS — F90.2 ATTENTION DEFICIT HYPERACTIVITY DISORDER (ADHD), COMBINED TYPE: ICD-10-CM

## 2023-01-12 DIAGNOSIS — F41.1 GENERALIZED ANXIETY DISORDER: ICD-10-CM

## 2023-01-12 DIAGNOSIS — F33.2 MDD (MAJOR DEPRESSIVE DISORDER), RECURRENT SEVERE, WITHOUT PSYCHOSIS (H): Primary | ICD-10-CM

## 2023-01-12 DIAGNOSIS — F91.8 CONDUCT DISORDER, UNDIFFERENTIATED TYPE: ICD-10-CM

## 2023-01-12 PROCEDURE — 90837 PSYTX W PT 60 MINUTES: CPT | Performed by: MARRIAGE & FAMILY THERAPIST

## 2023-01-12 NOTE — PROGRESS NOTES
[facility] - Progress Note    Date of Service: 23   Name: Xavier Landon  : 1990  Medical Record Number: [id number]  Treating Provider: VIKTOR Anderson, [facility]  Type of Session: Individual  Present in Session: pt  Session Start and Stop Time: 0439-0740  Number of Minutes:  56    SERVICE MODALITY:  In-person    DSM-5 Diagnoses:  296.33 (F33.2) Major Depressive Disorder, Recurrent Episode, Severe _ and With anxious distress  300.02 (F41.1) Generalized Anxiety Disorder.  Attention-Deficit/Hyperactivity Disorder  314.01 (F90.9) Unspecified Attention -Deficit / Hyperactivity Disorder.  Substance-Related & Addictive Disorders Alcohol Use Disorder   303.90 (F10.20) Moderate   301.83 (F60.3) Borderline Personality Disorder      Current Reported Symptoms and Status update:  Changes since last session has interview on Saturday -improved moodv8  Struggling with compulsive sexual behavior  Struggling with emotional regulation      Progress Toward Treatment Goals:   Minimal progress     Therapeutic Interventions/Treatment Strategies:    Area(s) of treatment focus addressed in this session included Symptom Management and [place] Maintenance/Relapse Prevention    Processed about struggling with behavior changes, has been consistent with the gym, noticible improved mood    Psychotherapist offered support, feedback and validation and reinforced use of skills Treatment modalities used include Motivational Interviewing Interpersonal Behavioral Activation: Explored how behaviors effect mood and interact with thoughts and feelings and explored challenging unrealistic expectations of self/others  Support, Structured Activity and Problem Solving    Patient Response:   Patient responded to session by listening, focusing on goals and accepting support  Possible barriers to participation / learning include: N/A    Current Mental Status Exam:   Appearance:  Appropriate   Eye  Contact:  Good   Attitude / Demeanor: Cooperative   Speech      Rate / Production: Normal/ Responsive      Volume:  Normal  volume  Orientation:  All  Mood:   Normal Euthymic  Affect:   Bright   Thought Content: Clear   Insight:   Good       Plan/Need for Future Services:  Return for therapy in 1 weeks to treat diagnosed problems.    Patient has a current master individualized treatment plan.  See Epic treatment plan for more information.    Referral / Collaboration:  Referral to another professional/service is not indicated at this time..  Emergency Services Needed?  No    Assignment:  Interview Saturday     Interactive Complexity:  There are four specific communication difficulties that complicate the work of the primary psychiatric procedure.  Interactive complexity (+18211) may be reported when at least one of these difficulties is present.    Communication difficulties present during current the psychiatric procedure include:  1. None.      Signature/Title:    VIKTOR Anderson, [facility]

## 2023-01-17 ENCOUNTER — OFFICE VISIT (OUTPATIENT)
Dept: PSYCHOLOGY | Facility: CLINIC | Age: 33
End: 2023-01-17
Payer: COMMERCIAL

## 2023-01-17 DIAGNOSIS — F90.2 ATTENTION DEFICIT HYPERACTIVITY DISORDER (ADHD), COMBINED TYPE: ICD-10-CM

## 2023-01-17 DIAGNOSIS — F41.1 GENERALIZED ANXIETY DISORDER: ICD-10-CM

## 2023-01-17 DIAGNOSIS — F91.8 CONDUCT DISORDER, UNDIFFERENTIATED TYPE: ICD-10-CM

## 2023-01-17 DIAGNOSIS — F19.90 SUBSTANCE USE DISORDER: ICD-10-CM

## 2023-01-17 DIAGNOSIS — F33.2 MDD (MAJOR DEPRESSIVE DISORDER), RECURRENT SEVERE, WITHOUT PSYCHOSIS (H): Primary | ICD-10-CM

## 2023-01-17 PROCEDURE — 90853 GROUP PSYCHOTHERAPY: CPT

## 2023-01-18 NOTE — GROUP NOTE
[facility]  [address], [address]  [place]  [address]    Group Progress Note  [facility] - Progress Note    Date of Service: 23   Name: Xavier Landon  : 1990  Medical Record Number: [id number]  START TIME:  6:30 PM  END TIME:  8:30 PM  FACILITATOR(S): Deborah Watson LMFT, [facility]  TOPIC: [facility] Group Therapy  Type of Session: Group  :  Number of Attendees:  6  Number of Minutes: /120    SERVICE MODALITY:  In-person      DSM-5 Diagnoses:  296.33 (F33.2) Major Depressive Disorder, Recurrent Episode, Severe _ and With anxious distress  300.02 (F41.1) Generalized Anxiety Disorder.  Attention-Deficit/Hyperactivity Disorder  314.01 (F90.9) Unspecified Attention -Deficit / Hyperactivity Disorder.  Substance-Related & Addictive Disorders Alcohol Use Disorder   303.90 (F10.20) Moderate   301.83 (F60.3) Borderline Personality Disorder      Current Reported Symptoms and Status update:  Changes since last session-had an interview today  Struggling with compulsive sexual behavior  Struggling with emotional regulation      Progress Toward Treatment Goals:   Minimal progress     Therapeutic Interventions/Treatment Strategies:    Area(s) of treatment focus addressed in this session included Symptom Management    Patient checked in about their mental health symptoms, healthy coping skills used over the week, negative coping skills used over the week, what sexual behaviors they engaged in-fantasy, masturbation, sex, their comfort level with their behaviors, if there were triggers, urges to violate boundaries, and violations of boundaries.  They took time to process about their impatience with wanting change and process they provided support and feedback to others in the group.    Psychotherapist offered support, feedback and validation and reinforced use of skills Treatment modalities used include [facility] THERAPY INTERVENTIONS: Motivational Interviewing  Group Dynamic Therapy  Coping Skills: Facilitated discussion on learning and applying radical acceptance skill and explored challenging unrealistic expectations of self/others  Support, Feedback and Structured Activity    Patient Response:   Patient responded to session by accepting feedback, giving feedback and listening  Possible barriers to participation / learning include: N/A    Current Mental Status Exam:   Appearance:  Appropriate   Eye Contact:  Good   Attitude / Demeanor: Cooperative   Speech      Rate / Production: Normal/ Responsive      Volume:  Normal  volume  Orientation:  All  Mood:   Irritable  Normal  Affect:   Appropriate   Thought Content: Clear   Insight:   Good       Plan/Need for Future Services:  Return for therapy in 1 weeks to treat diagnosed problems.    Patient has a current master individualized treatment plan.  See Epic treatment plan for more information.    Referral / Collaboration:  Referral to another professional/service is not indicated at this time..  Emergency Services Needed?  No    Assignment:  Return in one week     Interactive Complexity:  There are four specific communication difficulties that complicate the work of the primary psychiatric procedure.  Interactive complexity (+90299) may be reported when at least one of these difficulties is present.    Communication difficulties present during current the psychiatric procedure include:  1. None.      Signature/Title:    VIKTOR Anderson, [facility]

## 2023-01-20 ENCOUNTER — OFFICE VISIT (OUTPATIENT)
Dept: PSYCHOLOGY | Facility: CLINIC | Age: 33
End: 2023-01-20
Payer: COMMERCIAL

## 2023-01-20 DIAGNOSIS — F90.2 ATTENTION DEFICIT HYPERACTIVITY DISORDER (ADHD), COMBINED TYPE: ICD-10-CM

## 2023-01-20 DIAGNOSIS — F41.1 GENERALIZED ANXIETY DISORDER: ICD-10-CM

## 2023-01-20 DIAGNOSIS — F33.2 MDD (MAJOR DEPRESSIVE DISORDER), RECURRENT SEVERE, WITHOUT PSYCHOSIS (H): Primary | ICD-10-CM

## 2023-01-20 DIAGNOSIS — F91.8 CONDUCT DISORDER, UNDIFFERENTIATED TYPE: ICD-10-CM

## 2023-01-20 DIAGNOSIS — F19.90 SUBSTANCE USE DISORDER: ICD-10-CM

## 2023-01-20 PROCEDURE — 90837 PSYTX W PT 60 MINUTES: CPT | Performed by: MARRIAGE & FAMILY THERAPIST

## 2023-01-20 NOTE — PROGRESS NOTES
"[facility] - Progress Note    Date of Service: 23   Name: Xavier Landon  : 1990  Medical Record Number: [id number]  Treating Provider: VIKTOR Anderson [facility]  Type of Session: Individual  Present in Session: pt  Session Start and Stop Time: 9972-4052  Number of Minutes:  60    SERVICE MODALITY:  In-person    DSM-5 Diagnoses:  296.33 (F33.2) Major Depressive Disorder, Recurrent Episode, Severe _ and With anxious distress  300.02 (F41.1) Generalized Anxiety Disorder.  Attention-Deficit/Hyperactivity Disorder  314.01 (F90.9) Unspecified Attention -Deficit / Hyperactivity Disorder.  Substance-Related & Addictive Disorders Alcohol Use Disorder   303.90 (F10.20) Moderate   301.83 (F60.3) Borderline Personality Disorder      Current Reported Symptoms and Status update:  Changes since last session-is waiting on hearing from the hewing about being hired  Struggling with compulsive sexual behavior  Struggling with emotional regulation      Progress Toward Treatment Goals:   Minimal progress     Therapeutic Interventions/Treatment Strategies:    Area(s) of treatment focus addressed in this session included Symptom Management and [place] Maintenance/Relapse Prevention    Processed anger and irrtation over feeling stuck     Psychotherapist offered support, feedback and validation and reinforced use of skills Treatment modalities used include Motivational Interviewing Interpersonal Coping Skills: Discussed how the use of intentional \"in the moment\" actions can help reduce distress and Addressed barriers to utilizing coping skills when in distress and Mindfulness: Encouraged a plan to use mindfulness skills in daily life  Support, Redirection and Problem Solving    Patient Response:   Patient responded to session by listening and accepting support  Possible barriers to participation / learning include: N/A    Current Mental Status Exam:   Appearance:  Appropriate   Eye " Contact:  Good   Attitude / Demeanor: Cooperative   Speech      Rate / Production: Normal/ Responsive      Volume:  Normal  volume  Orientation:  All  Mood:   Irritable   Affect:   Appropriate  Constricted   Thought Content: Clear   Insight:   Good       Plan/Need for Future Services:  Return for therapy in 1 weeks to treat diagnosed problems.    Patient has a current master individualized treatment plan.  See Epic treatment plan for more information.    Referral / Collaboration:  Referral to another professional/service is not indicated at this time..  Emergency Services Needed?  No    Assignment:  Mindfulness, work out daily, regulate emotions    Interactive Complexity:  There are four specific communication difficulties that complicate the work of the primary psychiatric procedure.  Interactive complexity (+96636) may be reported when at least one of these difficulties is present.    Communication difficulties present during current the psychiatric procedure include:  1. None.      Signature/Title:    VIKTOR Anderson, [facility]

## 2023-01-24 ENCOUNTER — OFFICE VISIT (OUTPATIENT)
Dept: PSYCHOLOGY | Facility: CLINIC | Age: 33
End: 2023-01-24
Payer: COMMERCIAL

## 2023-01-24 DIAGNOSIS — F19.90 SUBSTANCE USE DISORDER: ICD-10-CM

## 2023-01-24 DIAGNOSIS — F33.2 MDD (MAJOR DEPRESSIVE DISORDER), RECURRENT SEVERE, WITHOUT PSYCHOSIS (H): Primary | ICD-10-CM

## 2023-01-24 DIAGNOSIS — F91.8 CONDUCT DISORDER, UNDIFFERENTIATED TYPE: ICD-10-CM

## 2023-01-24 DIAGNOSIS — F41.1 GENERALIZED ANXIETY DISORDER: ICD-10-CM

## 2023-01-24 DIAGNOSIS — F90.2 ATTENTION DEFICIT HYPERACTIVITY DISORDER (ADHD), COMBINED TYPE: ICD-10-CM

## 2023-01-24 PROCEDURE — 90853 GROUP PSYCHOTHERAPY: CPT

## 2023-01-25 NOTE — GROUP NOTE
[facility]  [address], [address]  [place]  [address]    Group Progress Note  [facility] - Progress Note    Date of Service: 23   Name: Xavier Landon  : 1990  Medical Record Number: [id number]  START TIME:  6:30 PM  END TIME:  8:30 PM  FACILITATOR(S): Deborah Watson LMFT, [facility]  TOPIC: [facility] Group Therapy  Type of Session: Group  :  Number of Attendees:  4  Number of Minutes:  120    SERVICE MODALITY:  In-person      DSM-5 Diagnoses:  296.33 (F33.2) Major Depressive Disorder, Recurrent Episode, Severe _ and With anxious distress  300.02 (F41.1) Generalized Anxiety Disorder.  Attention-Deficit/Hyperactivity Disorder  314.01 (F90.9) Unspecified Attention -Deficit / Hyperactivity Disorder.  Substance-Related & Addictive Disorders Alcohol Use Disorder   303.90 (F10.20) Moderate   301.83 (F60.3) Borderline Personality Disorder      Current Reported Symptoms and Status update:  Changes since last session-struggling with irritability and work problems  Struggling with compulsive sexual behavior  Struggling with emotional regulation      Progress Toward Treatment Goals:   Minimal progress     Therapeutic Interventions/Treatment Strategies:    Area(s) of treatment focus addressed in this session included Symptom Management and [place] Maintenance/Relapse Prevention    Patient checked in about their mental health symptoms, healthy coping skills used over the week, negative coping skills used over the week, what sexual behaviors they engaged in-fantasy, masturbation, sex, their comfort level with their behaviors, if there were triggers, urges to violate boundaries, and violations of boundaries.  They took time to process about their struggle with accepting where they are at they provided support and feedback to others in the group.    Psychotherapist offered support, feedback and validation and reinforced use of skills Treatment modalities  "used include [facility] THERAPY INTERVENTIONS: Motivational Interviewing Group Dynamic Therapy  Interpersonal Coping Skills: Facilitated discussion on learning and applying radical acceptance skill and Discussed how the use of intentional \"in the moment\" actions can help reduce distress and Other: Explored with patient how life transitions may impact mental health and functioning   Support, Feedback and Structured Activity    Patient Response:   Patient responded to session by accepting feedback, giving feedback and listening  Possible barriers to participation / learning include: N/A    Current Mental Status Exam:   Appearance:  Appropriate   Eye Contact:  Good   Attitude / Demeanor: Cooperative   Speech      Rate / Production: Normal/ Responsive      Volume:  Normal  volume  Orientation:  All  Mood:   Irritable  Normal  Affect:   Appropriate  Constricted   Thought Content: Clear   Insight:   Good       Plan/Need for Future Services:  Return for therapy in 1 weeks to treat diagnosed problems.    Patient has a current master individualized treatment plan.  See Epic treatment plan for more information.    Referral / Collaboration:  Referral to another professional/service is not indicated at this time..  Emergency Services Needed?  No    Assignment:  Return in one week     Interactive Complexity:  There are four specific communication difficulties that complicate the work of the primary psychiatric procedure.  Interactive complexity (+65151) may be reported when at least one of these difficulties is present.    Communication difficulties present during current the psychiatric procedure include:  1. None.      Signature/Title:    Vance Watson, VIKTOR, [facility]      "

## 2023-01-27 ENCOUNTER — OFFICE VISIT (OUTPATIENT)
Dept: PSYCHOLOGY | Facility: CLINIC | Age: 33
End: 2023-01-27
Payer: COMMERCIAL

## 2023-01-27 DIAGNOSIS — F19.90 SUBSTANCE USE DISORDER: ICD-10-CM

## 2023-01-27 DIAGNOSIS — F90.2 ATTENTION DEFICIT HYPERACTIVITY DISORDER (ADHD), COMBINED TYPE: ICD-10-CM

## 2023-01-27 DIAGNOSIS — F41.1 GENERALIZED ANXIETY DISORDER: ICD-10-CM

## 2023-01-27 DIAGNOSIS — F33.2 MDD (MAJOR DEPRESSIVE DISORDER), RECURRENT SEVERE, WITHOUT PSYCHOSIS (H): Primary | ICD-10-CM

## 2023-01-27 DIAGNOSIS — F91.8 CONDUCT DISORDER, UNDIFFERENTIATED TYPE: ICD-10-CM

## 2023-01-27 PROCEDURE — 90837 PSYTX W PT 60 MINUTES: CPT | Performed by: MARRIAGE & FAMILY THERAPIST

## 2023-01-27 NOTE — PROGRESS NOTES
[facility] - Progress Note    Date of Service: 23   Name: Xavier Landon  : 1990  Medical Record Number: [id number]  Treating Provider: VIKTOR Anderson, [facility]  Type of Session: Individual  Present in Session: pt  Session Start and Stop Time: 8226-3933  Number of Minutes:  53    SERVICE MODALITY:  In-person    DSM-5 Diagnoses:  296.33 (F33.2) Major Depressive Disorder, Recurrent Episode, Severe _ and With anxious distress  300.02 (F41.1) Generalized Anxiety Disorder.  Attention-Deficit/Hyperactivity Disorder  314.01 (F90.9) Unspecified Attention -Deficit / Hyperactivity Disorder.  Substance-Related & Addictive Disorders Alcohol Use Disorder   303.90 (F10.20) Moderate   301.83 (F60.3) Borderline Personality Disorder      Current Reported Symptoms and Status update:  Changes since last session was turned down for the job at [employer], feeling irritable and angry, does have phone interview lined up for next week  Struggling with compulsive sexual behavior  Struggling with emotional regulation      Progress Toward Treatment Goals:   Minimal progress     Therapeutic Interventions/Treatment Strategies:    Area(s) of treatment focus addressed in this session included Symptom Management    Processed about anger, problemsolved and use dmindfulness to center and ground     Psychotherapist offered support, feedback and validation and reinforced use of skills Treatment modalities used include Motivational Interviewing Dialectical Behavioral Therapy Coping Skills: Promoted understanding of how and when to apply grounding strategies to reduce distress and increase presence in the moment and Mindfulness: Encouraged a plan to use mindfulness skills in daily lifeSupport, Redirection and Structured Activity    Patient Response:   Patient responded to session by listening and accepting support  Possible barriers to participation / learning include: N/A    Current Mental Status  Exam:   Appearance:  Appropriate   Eye Contact:  Good   Attitude / Demeanor: Cooperative   Speech      Rate / Production: Normal/ Responsive      Volume:  Loud  volume  Orientation:  All  Mood:   Angry  Irritable   Affect:   Appropriate  Labile   Thought Content: Clear   Insight:   Fair       Plan/Need for Future Services:  Return for therapy in 1 weeks to treat diagnosed problems.    Patient has a current master individualized treatment plan.  See Epic treatment plan for more information.    Referral / Collaboration:  Referral to another professional/service is not indicated at this time..  Emergency Services Needed?  No    Assignment:  apps 1 a day, mindfulness, continue sobriety     Interactive Complexity:  There are four specific communication difficulties that complicate the work of the primary psychiatric procedure.  Interactive complexity (+00321) may be reported when at least one of these difficulties is present.    Communication difficulties present during current the psychiatric procedure include:  1. None.      Signature/Title:    VIKTOR Anderson, [facility]

## 2023-01-31 ENCOUNTER — OFFICE VISIT (OUTPATIENT)
Dept: PSYCHOLOGY | Facility: CLINIC | Age: 33
End: 2023-01-31
Payer: COMMERCIAL

## 2023-01-31 DIAGNOSIS — F91.8 CONDUCT DISORDER, UNDIFFERENTIATED TYPE: ICD-10-CM

## 2023-01-31 DIAGNOSIS — F90.2 ATTENTION DEFICIT HYPERACTIVITY DISORDER (ADHD), COMBINED TYPE: ICD-10-CM

## 2023-01-31 DIAGNOSIS — F33.2 MDD (MAJOR DEPRESSIVE DISORDER), RECURRENT SEVERE, WITHOUT PSYCHOSIS (H): Primary | ICD-10-CM

## 2023-01-31 DIAGNOSIS — F41.1 GENERALIZED ANXIETY DISORDER: ICD-10-CM

## 2023-01-31 DIAGNOSIS — F19.90 SUBSTANCE USE DISORDER: ICD-10-CM

## 2023-01-31 PROCEDURE — 90853 GROUP PSYCHOTHERAPY: CPT

## 2023-02-01 NOTE — GROUP NOTE
[facility]  [address], [address]  [place]  [address]    Group Progress Note  [facility] - Progress Note    Date of Service: 23   Name: Xavier Landon  : 1990  Medical Record Number: [id number]  START TIME:  6:30 PM  END TIME:  8:30 PM  FACILITATOR(S): Deborah Watson LMFT, [facility]  TOPIC: [facility] Group Therapy  Type of Session: Group  :  Number of Attendees:  6  Number of Minutes:  /120    SERVICE MODALITY:  In-person      DSM-5 Diagnoses:  296.33 (F33.2) Major Depressive Disorder, Recurrent Episode, Severe _ and With anxious distress  300.02 (F41.1) Generalized Anxiety Disorder.  Attention-Deficit/Hyperactivity Disorder  314.01 (F90.9) Unspecified Attention -Deficit / Hyperactivity Disorder.  Substance-Related & Addictive Disorders Alcohol Use Disorder   303.90 (F10.20) Moderate   301.83 (F60.3) Borderline Personality Disorder      Current Reported Symptoms and Status update:  Changes since last session-has been feeling a little more down   Struggling with compulsive sexual behavior  Struggling with emotional regulation      Progress Toward Treatment Goals:   Minimal progress     Therapeutic Interventions/Treatment Strategies:    Area(s) of treatment focus addressed in this session included Symptom Management and Interpersonal Relationship Skills    Patient checked in about their mental health symptoms, healthy coping skills used over the week, negative coping skills used over the week, what sexual behaviors they engaged in-fantasy, masturbation, sex, their comfort level with their behaviors, if there were triggers, urges to violate boundaries, and violations of boundaries.  They took time to process about negative self talk they provided support and feedback to others in the group.    Psychotherapist offered support, feedback and validation and reinforced use of skills Treatment modalities used include [facility] THERAPY INTERVENTIONS:  Motivational Interviewing Group Dynamic Therapy  Interpersonal Cognitive Restructuring:  Explored impact of ineffective thoughts / distortions on mood and activity and Relationship Skills: Assisted patients in implementing more effective communication skills in their relationships  Support, Feedback and Structured Activity    Patient Response:   Patient responded to session by accepting feedback, giving feedback and listening  Possible barriers to participation / learning include: N/A    Current Mental Status Exam:   Appearance:  Appropriate   Eye Contact:  Good   Attitude / Demeanor: Cooperative   Speech      Rate / Production: Normal/ Responsive      Volume:  Normal  volume  Orientation:  All  Mood:   Anxious  Irritable   Affect:   Appropriate   Thought Content: Clear   Insight:   Good       Plan/Need for Future Services:  Return for therapy in 1 weeks to treat diagnosed problems.    Patient has a current master individualized treatment plan.  See Epic treatment plan for more information.    Referral / Collaboration:  Referral to another professional/service is not indicated at this time..  Emergency Services Needed?  No    Assignment:  Return in one week     Interactive Complexity:  There are four specific communication difficulties that complicate the work of the primary psychiatric procedure.  Interactive complexity (+45911) may be reported when at least one of these difficulties is present.    Communication difficulties present during current the psychiatric procedure include:  1. None.      Signature/Title:    Vance Watson, LMFT, [facility]

## 2023-02-02 DIAGNOSIS — F91.8 CONDUCT DISORDER, UNDIFFERENTIATED TYPE: ICD-10-CM

## 2023-02-02 DIAGNOSIS — F90.2 ATTENTION DEFICIT HYPERACTIVITY DISORDER (ADHD), COMBINED TYPE: ICD-10-CM

## 2023-02-02 NOTE — TELEPHONE ENCOUNTER
Requested Medication:  Naltrexone  Dose: 50mg  Quantity: 30  Refills: 1    Take 1 tablet daily  _____    Requested Medication: Lithium 300mg  Dose: 600mg  Quantity: 120  Refills: 2  _____    Requested Medication: buPROPion  Dose: 150mg  Quantity: 30  Refills: 1  _____    Requested Medication: Adderall  Dose: 10mg  Quantity: 30  Refills: 0  _____    Requested Medication: Adderall 20mg  Dose: 20mg  Quantity: 60  Refills: 0    Take 1 capsule (20mg) twice daily    Last seen at [facility]: 1/5/2023 - 3 mo  Next Appointment with Provider:  Visit date not found      Lois Baird CMA

## 2023-02-03 ENCOUNTER — OFFICE VISIT (OUTPATIENT)
Dept: PSYCHOLOGY | Facility: CLINIC | Age: 33
End: 2023-02-03
Payer: COMMERCIAL

## 2023-02-03 DIAGNOSIS — F91.8 CONDUCT DISORDER, UNDIFFERENTIATED TYPE: ICD-10-CM

## 2023-02-03 DIAGNOSIS — F19.90 SUBSTANCE USE DISORDER: ICD-10-CM

## 2023-02-03 DIAGNOSIS — F41.1 GENERALIZED ANXIETY DISORDER: ICD-10-CM

## 2023-02-03 DIAGNOSIS — F33.2 MDD (MAJOR DEPRESSIVE DISORDER), RECURRENT SEVERE, WITHOUT PSYCHOSIS (H): Primary | ICD-10-CM

## 2023-02-03 PROCEDURE — 90837 PSYTX W PT 60 MINUTES: CPT | Performed by: MARRIAGE & FAMILY THERAPIST

## 2023-02-03 NOTE — PROGRESS NOTES
[facility] - Progress Note    Date of Service: 23   Name: Xavier Landon  : 1990  Medical Record Number: [id number]  Treating Provider: VIKTOR Anderson, [facility]  Type of Session: Individual  Present in Session: pt  Session Start and Stop Time: 4763-8012  Number of Minutes:  55    SERVICE MODALITY:  In-person    DSM-5 Diagnoses:  296.33 (F33.2) Major Depressive Disorder, Recurrent Episode, Severe _ and With anxious distress  300.02 (F41.1) Generalized Anxiety Disorder.  Attention-Deficit/Hyperactivity Disorder  314.01 (F90.9) Unspecified Attention -Deficit / Hyperactivity Disorder.  Substance-Related & Addictive Disorders Alcohol Use Disorder   303.90 (F10.20) Moderate   301.83 (F60.3) Borderline Personality Disorder      Current Reported Symptoms and Status update:  Changes since last session-frustration with work hours, finances  Struggling with compulsive sexual behavior  Struggling with emotional regulation      Progress Toward Treatment Goals:   Minimal progress     Therapeutic Interventions/Treatment Strategies:    Area(s) of treatment focus addressed in this session included Symptom Management and [place] Maintenance/Relapse Prevention    Processed about anger, problemsolved ways to address financial and work frustrations    Psychotherapist offered support, feedback and validation and reinforced use of skills Treatment modalities used include Motivational Interviewing Interpersonal Behavioral Activation: Encouraged strategies to reduce individual procrastination and increase motivation by increasing goal-directed activities to enhance mood and reduce symptoms. and Coping Skills: Addressed barriers to utilizing coping skills when in distress  Support, Feedback and Problem Solving    Patient Response:   Patient responded to session by listening and accepting support  Possible barriers to participation / learning include: N/A    Current Mental Status Exam:    Appearance:  Appropriate   Eye Contact:  Good   Attitude / Demeanor: Cooperative   Speech      Rate / Production: Normal/ Responsive      Volume:  Normal  volume  Orientation:  All  Mood:   Irritable   Affect:   Constricted   Thought Content: Clear   Insight:   Good       Plan/Need for Future Services:  Return for therapy in 1 weeks to treat diagnosed problems.    Patient has a current master individualized treatment plan.  See Epic treatment plan for more information.    Referral / Collaboration:  Referral to another professional/service is not indicated at this time..  Emergency Services Needed?  No    Assignment:  Work on utilizing the problemsolving skills     Interactive Complexity:  There are four specific communication difficulties that complicate the work of the primary psychiatric procedure.  Interactive complexity (+79346) may be reported when at least one of these difficulties is present.    Communication difficulties present during current the psychiatric procedure include:  1. None.      Signature/Title:    VIKTOR Anderson, [facility]

## 2023-02-06 RX ORDER — DEXTROAMPHETAMINE SACCHARATE, AMPHETAMINE ASPARTATE, DEXTROAMPHETAMINE SULFATE AND AMPHETAMINE SULFATE 2.5; 2.5; 2.5; 2.5 MG/1; MG/1; MG/1; MG/1
TABLET ORAL
Qty: 30 TABLET | Refills: 0 | Status: SHIPPED | OUTPATIENT
Start: 2023-02-06 | End: 2023-03-03

## 2023-02-06 RX ORDER — DEXTROAMPHETAMINE SACCHARATE, AMPHETAMINE ASPARTATE MONOHYDRATE, DEXTROAMPHETAMINE SULFATE AND AMPHETAMINE SULFATE 5; 5; 5; 5 MG/1; MG/1; MG/1; MG/1
20 CAPSULE, EXTENDED RELEASE ORAL 2 TIMES DAILY
Qty: 60 CAPSULE | Refills: 0 | Status: SHIPPED | OUTPATIENT
Start: 2023-02-06 | End: 2023-03-03

## 2023-02-06 RX ORDER — NALTREXONE HYDROCHLORIDE 50 MG/1
TABLET, FILM COATED ORAL
Qty: 30 TABLET | Refills: 1 | Status: SHIPPED | OUTPATIENT
Start: 2023-02-06 | End: 2023-04-13

## 2023-02-06 RX ORDER — LITHIUM CARBONATE 300 MG/1
CAPSULE ORAL
Qty: 120 CAPSULE | Refills: 2 | Status: SHIPPED | OUTPATIENT
Start: 2023-02-06 | End: 2023-04-13

## 2023-02-06 RX ORDER — BUPROPION HYDROCHLORIDE 150 MG/1
150 TABLET ORAL EVERY MORNING
Qty: 30 TABLET | Refills: 1 | Status: SHIPPED | OUTPATIENT
Start: 2023-02-06 | End: 2023-04-13

## 2023-02-07 ENCOUNTER — OFFICE VISIT (OUTPATIENT)
Dept: PSYCHOLOGY | Facility: CLINIC | Age: 33
End: 2023-02-07
Payer: COMMERCIAL

## 2023-02-07 DIAGNOSIS — F90.2 ATTENTION DEFICIT HYPERACTIVITY DISORDER (ADHD), COMBINED TYPE: ICD-10-CM

## 2023-02-07 DIAGNOSIS — F41.1 GENERALIZED ANXIETY DISORDER: ICD-10-CM

## 2023-02-07 DIAGNOSIS — F19.90 SUBSTANCE USE DISORDER: ICD-10-CM

## 2023-02-07 DIAGNOSIS — F91.8 CONDUCT DISORDER, UNDIFFERENTIATED TYPE: ICD-10-CM

## 2023-02-07 DIAGNOSIS — F33.2 MDD (MAJOR DEPRESSIVE DISORDER), RECURRENT SEVERE, WITHOUT PSYCHOSIS (H): Primary | ICD-10-CM

## 2023-02-07 PROCEDURE — 90853 GROUP PSYCHOTHERAPY: CPT

## 2023-02-08 NOTE — GROUP NOTE
[facility]  [address], [address]  [place]  [address]    Group Progress Note  [facility] - Progress Note    Date of Service: 23   Name: Xavier Landon  : 1990  Medical Record Number: [id number]  START TIME:  6:30 PM  END TIME:  8:30 PM  FACILITATOR(S): Deborah Watson LMFT, [facility]  TOPIC: [facility] Group Therapy  Type of Session: Group  :  Number of Attendees:  4  Number of Minutes:  /120    SERVICE MODALITY:  In-person      DSM-5 Diagnoses:  296.33 (F33.2) Major Depressive Disorder, Recurrent Episode, Severe _ and With anxious distress  300.02 (F41.1) Generalized Anxiety Disorder.  Attention-Deficit/Hyperactivity Disorder  314.01 (F90.9) Unspecified Attention -Deficit / Hyperactivity Disorder.  Substance-Related & Addictive Disorders Alcohol Use Disorder   303.90 (F10.20) Moderate   301.83 (F60.3) Borderline Personality Disorder      Current Reported Symptoms and Status update:  Changes since last session has had a little more irritability since rx ran out for a few days  Struggling with compulsive sexual behavior  Struggling with emotional regulation      Progress Toward Treatment Goals:   Minimal progress     Therapeutic Interventions/Treatment Strategies:    Area(s) of treatment focus addressed in this session included Symptom Management and Interpersonal Relationship Skills    Patient checked in about their mental health symptoms, healthy coping skills used over the week, negative coping skills used over the week, what sexual behaviors they engaged in-fantasy, masturbation, sex, their comfort level with their behaviors, if there were triggers, urges to violate boundaries, and violations of boundaries.  They took time to process about where they are at with their goals and keeping their boundares they provided support and feedback to others in the group.    Psychotherapist offered support, feedback and validation and reinforced use  of skills Treatment modalities used include [facility] THERAPY INTERVENTIONS: Motivational Interviewing Group Dynamic Therapy  Interpersonal Cognitive Restructuring:  Explored impact of ineffective thoughts / distortions on mood and activity, Relapse Prevention: Facilitated understanding the importance of awareness of factors that contribute to relapse  and Relationship Skills: Encouraged development and maintenance  of healthy boundaries  Support, Feedback and Structured Activity    Patient Response:   Patient responded to session by accepting feedback, giving feedback and accepting support  Possible barriers to participation / learning include: N/A    Current Mental Status Exam:   Appearance:  Appropriate   Eye Contact:  Good   Attitude / Demeanor: Cooperative   Speech      Rate / Production: Normal/ Responsive      Volume:  Normal  volume  Orientation:  All  Mood:   Normal  Affect:   Appropriate   Thought Content: Clear   Insight:   Good       Plan/Need for Future Services:  Return for therapy in 1 weeks to treat diagnosed problems.    Patient has a current master individualized treatment plan.  See Epic treatment plan for more information.    Referral / Collaboration:  Referral to another professional/service is not indicated at this time..  Emergency Services Needed?  No    Assignment:  Return in one week    Interactive Complexity:  There are four specific communication difficulties that complicate the work of the primary psychiatric procedure.  Interactive complexity (+90621) may be reported when at least one of these difficulties is present.    Communication difficulties present during current the psychiatric procedure include:  1. None.      Signature/Title:    Vance Watson, LMFT, [facility]

## 2023-02-09 ENCOUNTER — PATIENT OUTREACH (OUTPATIENT)
Dept: CARE COORDINATION | Facility: CLINIC | Age: 33
End: 2023-02-09
Payer: COMMERCIAL

## 2023-02-09 NOTE — LETTER
[facility] CARE COORDINATION  [facility] Clinic  February 10, 2023    Xavier Shieldsgayathri Smithstephanie  [address] [address]      Dear Xavier,        I am a clinic care coordinator who works with VIKTOR Haro with the [facility] in [place]. I wanted to introduce myself and provide you with my contact information for you to be able to call me with any questions or concerns. I have been trying to reach you recently to introduce Clinic Care Coordination. Below is a description of clinic care coordination and how I can further assist you.       The clinic care coordination team is made up of a registered nurse, , and financial resource worker who understand the health care system. The goal of clinic care coordination is to help you manage your health and improve access to the health care system. Our team works alongside your provider to assist you in determining your health and social needs. We can help you obtain health care and community resources, providing you with necessary information and education. We can work with you through any barriers and develop a care plan that helps coordinate and strengthen the communication between you and your care team.    Please feel free to contact me with any questions or concerns regarding care coordination and what we can offer.      We are focused on providing you with the highest-quality healthcare experience possible.    Sincerely,     ESTEFANY Ortiz  Social Work Care Coordinator  [facility]  [phone]  [email or web address]  Pronouns: They/He

## 2023-02-10 ENCOUNTER — OFFICE VISIT (OUTPATIENT)
Dept: PSYCHOLOGY | Facility: CLINIC | Age: 33
End: 2023-02-10
Payer: COMMERCIAL

## 2023-02-10 DIAGNOSIS — F90.2 ATTENTION DEFICIT HYPERACTIVITY DISORDER (ADHD), COMBINED TYPE: ICD-10-CM

## 2023-02-10 DIAGNOSIS — F19.90 SUBSTANCE USE DISORDER: ICD-10-CM

## 2023-02-10 DIAGNOSIS — F91.8 CONDUCT DISORDER, UNDIFFERENTIATED TYPE: ICD-10-CM

## 2023-02-10 DIAGNOSIS — F41.1 GENERALIZED ANXIETY DISORDER: ICD-10-CM

## 2023-02-10 DIAGNOSIS — F33.2 MDD (MAJOR DEPRESSIVE DISORDER), RECURRENT SEVERE, WITHOUT PSYCHOSIS (H): Primary | ICD-10-CM

## 2023-02-10 PROCEDURE — 90837 PSYTX W PT 60 MINUTES: CPT | Performed by: MARRIAGE & FAMILY THERAPIST

## 2023-02-10 NOTE — PROGRESS NOTES
Clinic Care Coordination Contact  [facility]/Voicemail       Clinical Data: Care Coordinator Outreach  Outreach attempted x 2.  Left message on patient's voicemail with call back information and requested return call.  Plan: Care Coordinator sent care coordination introduction letter on 02/10/2023 via [employer]. Care Coordinator will try to reach patient again in 10-12 business days.    ESTEFANY Ortiz  Social Work Care Coordinator  [facility]  [phone]  [email or web address]  Pronouns: They/He

## 2023-02-10 NOTE — PROGRESS NOTES
[facility] - Progress Note    Date of Service: 2/10/23   Name: Xavier Landon  : 1990  Medical Record Number: [id number]  Treating Provider: VIKTOR Anderson, [facility]  Type of Session: Individual  Present in Session: pt  Session Start and Stop Time: 9889-3273  Number of Minutes:  65    SERVICE MODALITY:  In-person    DSM-5 Diagnoses:  296.33 (F33.2) Major Depressive Disorder, Recurrent Episode, Severe _ and With anxious distress  300.02 (F41.1) Generalized Anxiety Disorder.  Attention-Deficit/Hyperactivity Disorder  314.01 (F90.9) Unspecified Attention -Deficit / Hyperactivity Disorder.  Substance-Related & Addictive Disorders Alcohol Use Disorder   303.90 (F10.20) Moderate   301.83 (F60.3) Borderline Personality Disorder      Current Reported Symptoms and Status update:  Changes since last session-improved mood, got medication figured out   Struggling with compulsive sexual behavior  Struggling with emotional regulation      Progress Toward Treatment Goals:   Minimal progress     Therapeutic Interventions/Treatment Strategies:    Area(s) of treatment focus addressed in this session included Symptom Management and [place] Maintenance/Relapse Prevention    problemsolved goals-behavior activation    Psychotherapist offered support, feedback and validation and reinforced use of skills Treatment modalities used include Motivational Interviewing Dialectical Behavioral Therapy Interpersonal Behavioral Activation: Reinforced benefits/challenges of change process through applying skills to replace unwanted behaviors and Encouraged strategies to reduce individual procrastination and increase motivation by increasing goal-directed activities to enhance mood and reduce symptoms.  Support, Feedback, Structured Activity and Problem Solving    Patient Response:   Patient responded to session by listening and accepting support  Possible barriers to participation / learning  include: N/A    Current Mental Status Exam:   Appearance:  Appropriate   Eye Contact:  Good   Attitude / Demeanor: Cooperative   Speech      Rate / Production: Normal/ Responsive      Volume:  Normal  volume  Orientation:  All  Mood:   Normal Euthymic  Affect:   Appropriate  Bright   Thought Content: Clear   Insight:   Good       Plan/Need for Future Services:  Return for therapy in 1 weeks to treat diagnosed problems.    Patient has a current master individualized treatment plan.  See Epic treatment plan for more information.    Referral / Collaboration:  Referral to another professional/service is not indicated at this time..  Emergency Services Needed?  No    Assignment:  Return in one week     Interactive Complexity:  There are four specific communication difficulties that complicate the work of the primary psychiatric procedure.  Interactive complexity (+31742) may be reported when at least one of these difficulties is present.    Communication difficulties present during current the psychiatric procedure include:  1. None.      Signature/Title:    VIKTOR Anderson, [facility]

## 2023-02-14 ENCOUNTER — OFFICE VISIT (OUTPATIENT)
Dept: PSYCHOLOGY | Facility: CLINIC | Age: 33
End: 2023-02-14
Payer: COMMERCIAL

## 2023-02-14 DIAGNOSIS — F90.2 ATTENTION DEFICIT HYPERACTIVITY DISORDER (ADHD), COMBINED TYPE: ICD-10-CM

## 2023-02-14 DIAGNOSIS — F91.8 CONDUCT DISORDER, UNDIFFERENTIATED TYPE: ICD-10-CM

## 2023-02-14 DIAGNOSIS — F33.2 MDD (MAJOR DEPRESSIVE DISORDER), RECURRENT SEVERE, WITHOUT PSYCHOSIS (H): Primary | ICD-10-CM

## 2023-02-14 DIAGNOSIS — F19.90 SUBSTANCE USE DISORDER: ICD-10-CM

## 2023-02-14 DIAGNOSIS — F41.1 GENERALIZED ANXIETY DISORDER: ICD-10-CM

## 2023-02-14 PROCEDURE — 90853 GROUP PSYCHOTHERAPY: CPT

## 2023-02-15 NOTE — GROUP NOTE
[facility]  [address], [address]  [place]  [address]    Group Progress Note  [facility] - Progress Note    Date of Service: 23   Name: Xavier Landon  : 1990  Medical Record Number: [id number]  START TIME:  6:30 PM  END TIME:  8:30 PM  FACILITATOR(S): Deborah Watson LMFT, [facility]  TOPIC: [facility] Group Therapy  Type of Session: Group  :  Number of Attendees:  4  Number of Minutes:  /120    SERVICE MODALITY:  In-person      DSM-5 Diagnoses:  296.33 (F33.2) Major Depressive Disorder, Recurrent Episode, Severe _ and With anxious distress  300.02 (F41.1) Generalized Anxiety Disorder.  Attention-Deficit/Hyperactivity Disorder  314.01 (F90.9) Unspecified Attention -Deficit / Hyperactivity Disorder.  Substance-Related & Addictive Disorders Alcohol Use Disorder   303.90 (F10.20) Moderate   301.83 (F60.3) Borderline Personality Disorder      Current Reported Symptoms and Status update:  Changes since last session-got an apt! Increased positive outlook  Struggling with compulsive sexual behavior  Struggling with emotional regulation      Progress Toward Treatment Goals:   Minimal progress     Therapeutic Interventions/Treatment Strategies:    Area(s) of treatment focus addressed in this session included Symptom Management and [place] Maintenance/Relapse Prevention    Patient checked in about their mental health symptoms, healthy coping skills used over the week, negative coping skills used over the week, what sexual behaviors they engaged in-fantasy, masturbation, sex, their comfort level with their behaviors, if there were triggers, urges to violate boundaries, and violations of boundaries.  They took time to process about the changes in their life this week and improved motivation and hope they provided support and feedback to others in the group.    Psychotherapist offered support, feedback and validation and reinforced use of skills  Treatment modalities used include [facility] THERAPY INTERVENTIONS: Motivational Interviewing Group Dynamic Therapy  Interpersonal Behavioral Activation: Encouraged strategies to reduce individual procrastination and increase motivation by increasing goal-directed activities to enhance mood and reduce symptoms. and Coping Skills: Assisted patient in understanding the purpose of planning / creating / participating / sharing in positive experiences  Support, Feedback and Structured Activity    Patient Response:   Patient responded to session by accepting feedback, giving feedback and listening  Possible barriers to participation / learning include: N/A    Current Mental Status Exam:   Appearance:  Appropriate   Eye Contact:  Good   Attitude / Demeanor: Cooperative   Speech      Rate / Production: Normal/ Responsive      Volume:  Normal  volume  Orientation:  All  Mood:   Normal Euthymic  Affect:   Appropriate  Bright   Thought Content: Clear   Insight:   Good       Plan/Need for Future Services:  Return for therapy in 1 weeks to treat diagnosed problems.    Patient has a current master individualized treatment plan.  See Epic treatment plan for more information.    Referral / Collaboration:  Referral to another professional/service is not indicated at this time..  Emergency Services Needed?  No    Assignment:  Return in one week     Interactive Complexity:  There are four specific communication difficulties that complicate the work of the primary psychiatric procedure.  Interactive complexity (+98394) may be reported when at least one of these difficulties is present.    Communication difficulties present during current the psychiatric procedure include:  1. None.      Signature/Title:    Vance Watson, LMFT, [facility]

## 2023-02-17 ENCOUNTER — OFFICE VISIT (OUTPATIENT)
Dept: PSYCHOLOGY | Facility: CLINIC | Age: 33
End: 2023-02-17
Payer: COMMERCIAL

## 2023-02-17 DIAGNOSIS — F41.1 GENERALIZED ANXIETY DISORDER: ICD-10-CM

## 2023-02-17 DIAGNOSIS — F19.90 SUBSTANCE USE DISORDER: ICD-10-CM

## 2023-02-17 DIAGNOSIS — F33.2 MDD (MAJOR DEPRESSIVE DISORDER), RECURRENT SEVERE, WITHOUT PSYCHOSIS (H): Primary | ICD-10-CM

## 2023-02-17 DIAGNOSIS — F90.2 ATTENTION DEFICIT HYPERACTIVITY DISORDER (ADHD), COMBINED TYPE: ICD-10-CM

## 2023-02-17 DIAGNOSIS — F91.8 CONDUCT DISORDER, UNDIFFERENTIATED TYPE: ICD-10-CM

## 2023-02-17 PROCEDURE — 90837 PSYTX W PT 60 MINUTES: CPT | Performed by: MARRIAGE & FAMILY THERAPIST

## 2023-02-21 ENCOUNTER — VIRTUAL VISIT (OUTPATIENT)
Dept: PSYCHOLOGY | Facility: CLINIC | Age: 33
End: 2023-02-21
Payer: COMMERCIAL

## 2023-02-21 DIAGNOSIS — F33.2 MDD (MAJOR DEPRESSIVE DISORDER), RECURRENT SEVERE, WITHOUT PSYCHOSIS (H): Primary | ICD-10-CM

## 2023-02-21 DIAGNOSIS — F19.90 SUBSTANCE USE DISORDER: ICD-10-CM

## 2023-02-21 DIAGNOSIS — F33.1 MODERATE EPISODE OF RECURRENT MAJOR DEPRESSIVE DISORDER (H): ICD-10-CM

## 2023-02-21 DIAGNOSIS — F41.1 GENERALIZED ANXIETY DISORDER: ICD-10-CM

## 2023-02-21 DIAGNOSIS — F90.2 ATTENTION DEFICIT HYPERACTIVITY DISORDER (ADHD), COMBINED TYPE: ICD-10-CM

## 2023-02-21 DIAGNOSIS — F91.8 CONDUCT DISORDER, UNDIFFERENTIATED TYPE: ICD-10-CM

## 2023-02-21 PROCEDURE — 90853 GROUP PSYCHOTHERAPY: CPT | Mod: VID

## 2023-02-22 NOTE — GROUP NOTE
[facility]  [address], [address]  [place]  [address]    Group Progress Note  [facility] - Progress Note    Date of Service: 23   Name: Xavier Landon  : 1990  Medical Record Number: [id number]  START TIME:  6:30 PM  END TIME:  8:30 PM  FACILITATOR(S): Deborah Watson LMFT, [facility]  TOPIC: [facility] Group Therapy  Type of Session: Group  :  Number of Attendees:  5  Number of Minutes: /120    SERVICE MODALITY:  Video Visit:      Provider verified identity through the following two step process.  Patient provided:  Patient is known previously to provider    Telemedicine Visit: The patient's condition can be safely assessed and treated via synchronous audio and visual telemedicine encounter.      Reason for Telemedicine Visit: Services only offered telehealth    Originating Site (Patient Location): Patient's home    Distant Site (Provider Location): Provider Remote Setting- Home Office    Consent:  The patient/guardian has verbally consented to: the potential risks and benefits of telemedicine (video visit) versus in person care; bill my insurance or make self-payment for services provided; and responsibility for payment of non-covered services.     Patient would like the video invitation sent by:  Send to e-mail at: No e-mail address on record    Mode of Communication:  Video Conference via Medical Zoom    Distant Location (Provider):  Off-site    As the provider I attest to compliance with applicable laws and regulations related to telemedicine.      DSM-5 Diagnoses:  296.33 (F33.2) Major Depressive Disorder, Recurrent Episode, Severe _ and With anxious distress  300.02 (F41.1) Generalized Anxiety Disorder.  Attention-Deficit/Hyperactivity Disorder  314.01 (F90.9) Unspecified Attention -Deficit / Hyperactivity Disorder.  Substance-Related & Addictive Disorders Alcohol Use Disorder   303.90 (F10.20) Moderate   301.83 (F60.3) Borderline  Personality Disorder      Current Reported Symptoms and Status update:  Changes since last session  Struggling with compulsive sexual behavior  Struggling with emotional regulation      Progress Toward Treatment Goals:   Minimal progress     Therapeutic Interventions/Treatment Strategies:    Area(s) of treatment focus addressed in this session included Symptom Management, [place] Maintenance/Relapse Prevention and Sexual Health and Wellness    Patient checked in about their mental health symptoms, healthy coping skills used over the week, negative coping skills used over the week, what sexual behaviors they engaged in-fantasy, masturbation, sex, their comfort level with their behaviors, if there were triggers, urges to violate boundaries, and violations of boundaries.  They took time to process about struggling with their mood they provided support and feedback to others in the group.    Psychotherapist offered support, feedback and validation and reinforced use of skills Treatment modalities used include [facility] THERAPY INTERVENTIONS: Motivational Interviewing Group Dynamic Therapy  Interpersonal Behavioral Activation: Encouraged strategies to reduce individual procrastination and increase motivation by increasing goal-directed activities to enhance mood and reduce symptoms. and Coping Skills: Assisted patient in understanding the purpose of planning / creating / participating / sharing in positive experiences and Facilitated understanding of  what factors may contribute to symptom relapse and skills plan to manage symptom relapse   Support, Feedback and Structured Activity    Patient Response:   Patient responded to session by accepting feedback, giving feedback and listening  Possible barriers to participation / learning include: N/A    Current Mental Status Exam:   Appearance:  Appropriate   Eye Contact:  Good   Attitude / Demeanor: Cooperative   Speech      Rate / Production: Normal/ Responsive       Volume:  Normal  volume  Orientation:  All  Mood:   Irritable  Normal  Affect:   Appropriate   Thought Content: Clear   Insight:   Good       Plan/Need for Future Services:  Return for therapy in 1 weeks to treat diagnosed problems.    Patient has a current master individualized treatment plan.  See Epic treatment plan for more information.    Referral / Collaboration:  Referral to another professional/service is not indicated at this time..  Emergency Services Needed?  No    Assignment:  Return in one week     Interactive Complexity:  There are four specific communication difficulties that complicate the work of the primary psychiatric procedure.  Interactive complexity (+17822) may be reported when at least one of these difficulties is present.    Communication difficulties present during current the psychiatric procedure include:  1. None.      Signature/Title:    Vance Wtason, LMFT, [facility]

## 2023-02-24 ENCOUNTER — OFFICE VISIT (OUTPATIENT)
Dept: PSYCHOLOGY | Facility: CLINIC | Age: 33
End: 2023-02-24
Payer: COMMERCIAL

## 2023-02-24 DIAGNOSIS — F90.2 ATTENTION DEFICIT HYPERACTIVITY DISORDER (ADHD), COMBINED TYPE: ICD-10-CM

## 2023-02-24 DIAGNOSIS — F41.1 GENERALIZED ANXIETY DISORDER: ICD-10-CM

## 2023-02-24 DIAGNOSIS — F33.2 MDD (MAJOR DEPRESSIVE DISORDER), RECURRENT SEVERE, WITHOUT PSYCHOSIS (H): Primary | ICD-10-CM

## 2023-02-24 DIAGNOSIS — F19.90 SUBSTANCE USE DISORDER: ICD-10-CM

## 2023-02-24 DIAGNOSIS — F91.8 CONDUCT DISORDER, UNDIFFERENTIATED TYPE: ICD-10-CM

## 2023-02-24 PROCEDURE — 90837 PSYTX W PT 60 MINUTES: CPT | Performed by: MARRIAGE & FAMILY THERAPIST

## 2023-02-24 NOTE — PROGRESS NOTES
Clinic Care Coordination Contact  [facility]/Voicemail       Clinical Data: Care Coordinator Outreach  Outreach attempted x 3.  Unable to leave a voicemail with call back information.  Plan: Care Coordinator sent care coordination introduction letter on 2/10/2023 via [employer]. Care Coordinator will try to reach patient again in 10-12 business days. Per referring therapist request.    ESTEFANY Ortiz  Social Work Care Coordinator  [facility]  [phone]  [email or web address]  Pronouns: They/He

## 2023-02-24 NOTE — PROGRESS NOTES
[facility] - Progress Note    Date of Service: 23   Name: Xavier Landon  : 1990  Medical Record Number: [id number]  Treating Provider: VIKTOR Anderson, [facility]  Type of Session: Individual  Present in Session: pt  Session Start and Stop Time: 2453-2086  Number of Minutes:  60    SERVICE MODALITY:  In-person    DSM-5 Diagnoses:  296.33 (F33.2) Major Depressive Disorder, Recurrent Episode, Severe _ and With anxious distress  300.02 (F41.1) Generalized Anxiety Disorder.  Attention-Deficit/Hyperactivity Disorder  314.01 (F90.9) Unspecified Attention -Deficit / Hyperactivity Disorder.  Substance-Related & Addictive Disorders Alcohol Use Disorder   303.90 (F10.20) Moderate   301.83 (F60.3) Borderline Personality Disorder      Current Reported Symptoms and Status update:  Changes since last session-improved mood   Struggling with compulsive sexual behavior  Struggling with emotional regulation      Progress Toward Treatment Goals:   Minimal progress     Therapeutic Interventions/Treatment Strategies:    Area(s) of treatment focus addressed in this session included Symptom Management and [place] Maintenance/Relapse Prevention    Processed about feeling stuck, struggling with behavior activation    Psychotherapist offered support, feedback and validation and reinforced use of skills Treatment modalities used include Motivational Interviewing Dialectical Behavioral Therapy Interpersonal Behavioral Activation: Reinforced benefits/challenges of change process through applying skills to replace unwanted behaviors and Encouraged strategies to reduce individual procrastination and increase motivation by increasing goal-directed activities to enhance mood and reduce symptoms.  Support, Feedback and Problem Solving    Patient Response:   Patient responded to session by accepting feedback, listening and accepting support  Possible barriers to participation / learning include:  N/A    Current Mental Status Exam:   Appearance:  Appropriate   Eye Contact:  Good   Attitude / Demeanor: Cooperative   Speech      Rate / Production: Normal/ Responsive      Volume:  Normal  volume  Orientation:  All  Mood:   Normal  Affect:   Appropriate   Thought Content: Clear   Insight:   Good       Plan/Need for Future Services:  Return for therapy in 1 weeks to treat diagnosed problems.    Patient has a current master individualized treatment plan.  See Epic treatment plan for more information.    Referral / Collaboration:  Referral to another professional/service is not indicated at this time..  Emergency Services Needed?  No    Assignment:  Do not lose sunglasses     Interactive Complexity:  There are four specific communication difficulties that complicate the work of the primary psychiatric procedure.  Interactive complexity (+00804) may be reported when at least one of these difficulties is present.    Communication difficulties present during current the psychiatric procedure include:  1. None.      Signature/Title:    VIKTOR Anderson, [facility]

## 2023-02-28 ENCOUNTER — OFFICE VISIT (OUTPATIENT)
Dept: PSYCHOLOGY | Facility: CLINIC | Age: 33
End: 2023-02-28
Payer: COMMERCIAL

## 2023-02-28 DIAGNOSIS — F33.2 MDD (MAJOR DEPRESSIVE DISORDER), RECURRENT SEVERE, WITHOUT PSYCHOSIS (H): Primary | ICD-10-CM

## 2023-02-28 DIAGNOSIS — F41.1 GENERALIZED ANXIETY DISORDER: ICD-10-CM

## 2023-02-28 DIAGNOSIS — F91.8 CONDUCT DISORDER, UNDIFFERENTIATED TYPE: ICD-10-CM

## 2023-02-28 DIAGNOSIS — F19.90 SUBSTANCE USE DISORDER: ICD-10-CM

## 2023-02-28 DIAGNOSIS — F90.2 ATTENTION DEFICIT HYPERACTIVITY DISORDER (ADHD), COMBINED TYPE: ICD-10-CM

## 2023-02-28 PROCEDURE — 90853 GROUP PSYCHOTHERAPY: CPT

## 2023-03-01 NOTE — GROUP NOTE
[facility]  [address], [address]  [place]  [address]    Group Progress Note  [facility] - Progress Note    Date of Service: 23   Name: Xavier Landon  : 1990  Medical Record Number: [id number]  START TIME:  6:30 PM  END TIME:  8:30 PM  FACILITATOR(S): Deborah Watson LMFT, [facility]  TOPIC: [facility] Group Therapy  Type of Session: Group  :  Number of Attendees:  5  Number of Minutes:  /120    SERVICE MODALITY:  In-person      DSM-5 Diagnoses:  296.33 (F33.2) Major Depressive Disorder, Recurrent Episode, Severe _ and With anxious distress  300.02 (F41.1) Generalized Anxiety Disorder.  Attention-Deficit/Hyperactivity Disorder  314.01 (F90.9) Unspecified Attention -Deficit / Hyperactivity Disorder.  Substance-Related & Addictive Disorders Alcohol Use Disorder   303.90 (F10.20) Moderate   301.83 (F60.3) Borderline Personality Disorder      Current Reported Symptoms and Status update:  Changes since last session-improved mood-has been working on creativity withkeyboard  Struggling with compulsive sexual behavior  Struggling with emotional regulation      Progress Toward Treatment Goals:   Minimal progress     Therapeutic Interventions/Treatment Strategies:    Area(s) of treatment focus addressed in this session included Symptom Management and [place] Maintenance/Relapse Prevention    Patient checked in about their mental health symptoms, healthy coping skills used over the week, negative coping skills used over the week, what sexual behaviors they engaged in-fantasy, masturbation, sex, their comfort level with their behaviors, if there were triggers, urges to violate boundaries, and violations of boundaries.  They took time to process about socializing and goals for socializing they provided support and feedback to others in the group.    Psychotherapist offered support, feedback and validation and reinforced use of skills Treatment  modalities used include [facility] THERAPY INTERVENTIONS: Motivational Interviewing Group Dynamic Therapy  Interpersonal Relationship Skills: Discussed strategies to promote healthier understanding of interpersonal relationships  Support, Feedback and Structured Activity    Patient Response:   Patient responded to session by accepting feedback, giving feedback and listening  Possible barriers to participation / learning include: N/A    Current Mental Status Exam:   Appearance:  Appropriate   Eye Contact:  Good   Attitude / Demeanor: Cooperative   Speech      Rate / Production: Normal/ Responsive      Volume:  Normal  volume  Orientation:  All  Mood:   Normal Euthymic  Affect:   Appropriate   Thought Content: Clear   Insight:   Good       Plan/Need for Future Services:  Return for therapy in 1 weeks to treat diagnosed problems.    Patient has a current master individualized treatment plan.  See Epic treatment plan for more information.    Referral / Collaboration:  Referral to another professional/service is not indicated at this time..  Emergency Services Needed?  No    Assignment:  Return in one week     Interactive Complexity:  There are four specific communication difficulties that complicate the work of the primary psychiatric procedure.  Interactive complexity (+54834) may be reported when at least one of these difficulties is present.    Communication difficulties present during current the psychiatric procedure include:  1. None.      Signature/Title:    VIKTOR Anderson, [facility]

## 2023-03-02 DIAGNOSIS — F90.2 ATTENTION DEFICIT HYPERACTIVITY DISORDER (ADHD), COMBINED TYPE: ICD-10-CM

## 2023-03-02 DIAGNOSIS — F91.8 CONDUCT DISORDER, UNDIFFERENTIATED TYPE: ICD-10-CM

## 2023-03-02 NOTE — TELEPHONE ENCOUNTER
Write for 3 months    Requested Medication: Adderall  Dose: 10mg  Quantity: 30  Refills: 0    Take 1 tablet in afternoon for breakthrough symptoms  _____    Requested Medication: Adderall XR  Dose: 20mg  Quantity: 60  Refills: 0    Take 1 capsule 2 times daily    Last seen at [facility]: 1/5/2023 -   Next Appointment with Provider: 4/13/2023      Lois Baird CMA

## 2023-03-03 ENCOUNTER — OFFICE VISIT (OUTPATIENT)
Dept: PSYCHOLOGY | Facility: CLINIC | Age: 33
End: 2023-03-03
Payer: COMMERCIAL

## 2023-03-03 DIAGNOSIS — F41.1 GENERALIZED ANXIETY DISORDER: ICD-10-CM

## 2023-03-03 DIAGNOSIS — F19.90 SUBSTANCE USE DISORDER: ICD-10-CM

## 2023-03-03 DIAGNOSIS — F33.2 MDD (MAJOR DEPRESSIVE DISORDER), RECURRENT SEVERE, WITHOUT PSYCHOSIS (H): Primary | ICD-10-CM

## 2023-03-03 PROCEDURE — 90837 PSYTX W PT 60 MINUTES: CPT | Performed by: MARRIAGE & FAMILY THERAPIST

## 2023-03-03 RX ORDER — DEXTROAMPHETAMINE SACCHARATE, AMPHETAMINE ASPARTATE MONOHYDRATE, DEXTROAMPHETAMINE SULFATE AND AMPHETAMINE SULFATE 5; 5; 5; 5 MG/1; MG/1; MG/1; MG/1
20 CAPSULE, EXTENDED RELEASE ORAL 2 TIMES DAILY
Qty: 60 CAPSULE | Refills: 0 | Status: SHIPPED | OUTPATIENT
Start: 2023-03-03 | End: 2023-04-11

## 2023-03-03 RX ORDER — DEXTROAMPHETAMINE SACCHARATE, AMPHETAMINE ASPARTATE, DEXTROAMPHETAMINE SULFATE AND AMPHETAMINE SULFATE 2.5; 2.5; 2.5; 2.5 MG/1; MG/1; MG/1; MG/1
TABLET ORAL
Qty: 30 TABLET | Refills: 0 | Status: SHIPPED | OUTPATIENT
Start: 2023-03-03 | End: 2023-04-11

## 2023-03-03 NOTE — PROGRESS NOTES
[facility] - Progress Note    Date of Service: 3/03/23   Name: Xavier Landon  : 1990  Medical Record Number: [id number]  Treating Provider: VIKTOR Anderson, [facility]  Type of Session: Individual  Present in Session: pt  Session Start and Stop Time: 3500-0299  Number of Minutes:  55    SERVICE MODALITY:  In-person    DSM-5 Diagnoses:  296.33 (F33.2) Major Depressive Disorder, Recurrent Episode, Severe _ and With anxious distress  300.02 (F41.1) Generalized Anxiety Disorder.  Attention-Deficit/Hyperactivity Disorder  314.01 (F90.9) Unspecified Attention -Deficit / Hyperactivity Disorder.  Substance-Related & Addictive Disorders Alcohol Use Disorder   303.90 (F10.20) Moderate   301.83 (F60.3) Borderline Personality Disorder      Current Reported Symptoms and Status update:  Changes since last session-improved mood, moving in 2 weeks  Struggling with compulsive sexual behavior  Struggling with emotional regulation      Progress Toward Treatment Goals:   Minimal progress     Therapeutic Interventions/Treatment Strategies:    Area(s) of treatment focus addressed in this session included Symptom Management    Processed and problemsolved work and home concerns    Psychotherapist offered support, feedback and validation and reinforced use of skills Treatment modalities used include Dialectical Behavioral Therapy Solution Focused Therapy Interpersonal Behavioral Activation: Reinforced benefits/challenges of change process through applying skills to replace unwanted behaviors and Encouraged strategies to reduce individual procrastination and increase motivation by increasing goal-directed activities to enhance mood and reduce symptoms.  Support and Feedback    Patient Response:   Patient responded to session by accepting feedback, listening and accepting support  Possible barriers to participation / learning include: N/A    Current Mental Status Exam:    Appearance:  Appropriate   Eye Contact:  Good   Attitude / Demeanor: Cooperative   Speech      Rate / Production: Normal/ Responsive      Volume:  Normal  volume  Orientation:  All  Mood:   Normal Euthymic  Affect:   Appropriate   Thought Content: Clear   Insight:   Good       Plan/Need for Future Services:  Return for therapy in 1 weeks to treat diagnosed problems.    Patient has a current master individualized treatment plan.  See Epic treatment plan for more information.    Referral / Collaboration:  Referral to another professional/service is not indicated at this time..  Emergency Services Needed?  No    Assignment:  Return in 2 weeks     Interactive Complexity:  There are four specific communication difficulties that complicate the work of the primary psychiatric procedure.  Interactive complexity (+88302) may be reported when at least one of these difficulties is present.    Communication difficulties present during current the psychiatric procedure include:  1. None.      Signature/Title:    VIKTOR Anderson, [facility]

## 2023-03-10 ENCOUNTER — OFFICE VISIT (OUTPATIENT)
Dept: PSYCHOLOGY | Facility: CLINIC | Age: 33
End: 2023-03-10
Payer: COMMERCIAL

## 2023-03-10 DIAGNOSIS — F41.1 GENERALIZED ANXIETY DISORDER: ICD-10-CM

## 2023-03-10 DIAGNOSIS — F19.90 SUBSTANCE USE DISORDER: ICD-10-CM

## 2023-03-10 DIAGNOSIS — F90.2 ATTENTION DEFICIT HYPERACTIVITY DISORDER (ADHD), COMBINED TYPE: ICD-10-CM

## 2023-03-10 DIAGNOSIS — F33.2 MDD (MAJOR DEPRESSIVE DISORDER), RECURRENT SEVERE, WITHOUT PSYCHOSIS (H): Primary | ICD-10-CM

## 2023-03-10 DIAGNOSIS — F91.8 CONDUCT DISORDER, UNDIFFERENTIATED TYPE: ICD-10-CM

## 2023-03-10 PROCEDURE — 90837 PSYTX W PT 60 MINUTES: CPT | Performed by: MARRIAGE & FAMILY THERAPIST

## 2023-03-10 NOTE — PROGRESS NOTES
Clinic Care Coordination Contact  [facility]/Voicemail       Clinical Data: Care Coordinator Outreach  Outreach attempted x 4. Unable to leave a voicemail with call back information.  Plan: Care Coordinator sent care coordination introduction letter on 2/10/23 via [employer] Care Coordinator will do no further outreaches at this time.    ESTEFANY Ortiz  Social Work Care Coordinator  [facility]  [phone]  [email or web address]  Pronouns: They/He

## 2023-03-10 NOTE — PROGRESS NOTES
[place] [facility] - Progress Note    Date of Service: 3/10/23   Name: Xavier Landon  : 1990  Medical Record Number: [id number]  Treating Provider: VIKTOR Anderson, [facility]  Type of Session: Individual  Present in Session: pt  Session Start and Stop Time: 5304-0347  Number of Minutes:  60    SERVICE MODALITY:  In-person    DSM-5 Diagnoses:  296.33 (F33.2) Major Depressive Disorder, Recurrent Episode, Severe _ and With anxious distress  300.02 (F41.1) Generalized Anxiety Disorder.  Attention-Deficit/Hyperactivity Disorder  314.01 (F90.9) Unspecified Attention -Deficit / Hyperactivity Disorder.  Substance-Related & Addictive Disorders Alcohol Use Disorder   303.90 (F10.20) Moderate   301.83 (F60.3) Borderline Personality Disorder      Current Reported Symptoms and Status update:  Changes since last session-continued improvement with mood, making progress with moving out next week, behavior activation  Struggling with compulsive sexual behavior  Struggling with emotional regulation      Progress Toward Treatment Goals:   Minimal progress     Therapeutic Interventions/Treatment Strategies:    Area(s) of treatment focus addressed in this session included Symptom Management and [place] Maintenance/Relapse Prevention    Has seen boost in confidence and mood, more behavior activation in getting things done    Psychotherapist offered support, feedback and validation and reinforced use of skills Treatment modalities used include Dialectical Behavioral Therapy Interpersonal Behavioral Activation: Reinforced benefits/challenges of change process through applying skills to replace unwanted behaviors, Mindfulness: Encouraged a plan to use mindfulness skills in daily life and explored challenging unrealistic expectations of self/others  Support, Feedback, Structured Activity and Problem Solving    Patient Response:   Patient responded to session by accepting feedback, listening,  focusing on goals and accepting support  Possible barriers to participation / learning include: N/A    Current Mental Status Exam:   Appearance:  Appropriate   Eye Contact:  Good   Attitude / Demeanor: Cooperative  Interested  Speech      Rate / Production: Normal/ Responsive      Volume:  Normal  volume  Orientation:  All  Mood:   Normal Euthymic  Affect:   Appropriate  Bright   Thought Content: Clear   Insight:   Good       Plan/Need for Future Services:  Return for therapy in 1 weeks to treat diagnosed problems.    Patient has a current master individualized treatment plan.  See Epic treatment plan for more information.    Referral / Collaboration:  Referral to another professional/service is not indicated at this time..  Emergency Services Needed?  No    Assignment:  Move next week    Interactive Complexity:  There are four specific communication difficulties that complicate the work of the primary psychiatric procedure.  Interactive complexity (+32112) may be reported when at least one of these difficulties is present.    Communication difficulties present during current the psychiatric procedure include:  1. None.      Signature/Title:    Vance Watson, VIKTOR, [facility][note 3]

## 2023-03-14 ENCOUNTER — OFFICE VISIT (OUTPATIENT)
Dept: PSYCHOLOGY | Facility: CLINIC | Age: 33
End: 2023-03-14
Payer: COMMERCIAL

## 2023-03-14 DIAGNOSIS — F41.1 GENERALIZED ANXIETY DISORDER: ICD-10-CM

## 2023-03-14 DIAGNOSIS — F19.90 SUBSTANCE USE DISORDER: ICD-10-CM

## 2023-03-14 DIAGNOSIS — F90.2 ATTENTION DEFICIT HYPERACTIVITY DISORDER (ADHD), COMBINED TYPE: ICD-10-CM

## 2023-03-14 DIAGNOSIS — F33.2 MDD (MAJOR DEPRESSIVE DISORDER), RECURRENT SEVERE, WITHOUT PSYCHOSIS (H): Primary | ICD-10-CM

## 2023-03-14 DIAGNOSIS — F91.8 CONDUCT DISORDER, UNDIFFERENTIATED TYPE: ICD-10-CM

## 2023-03-14 PROCEDURE — 90853 GROUP PSYCHOTHERAPY: CPT

## 2023-03-14 NOTE — GROUP NOTE
[facility]  [address], [address]  [place]  [address]    Group Progress Note  [facility] - Progress Note    Date of Service: 3/14/23   Name: Xavier Landon  : 1990  Medical Record Number: [id number]  START TIME:  6:30 PM  END TIME:  8:30 PM  FACILITATOR(S): Deborah Watson LMFT, [facility]  TOPIC: [facility] Group Therapy  Type of Session: Group  :  Number of Attendees:  5  Number of Minutes: 120    SERVICE MODALITY:  In-person      DSM-5 Diagnoses:  296.33 (F33.2) Major Depressive Disorder, Recurrent Episode, Severe _ and With anxious distress  300.02 (F41.1) Generalized Anxiety Disorder.  Attention-Deficit/Hyperactivity Disorder  314.01 (F90.9) Unspecified Attention -Deficit / Hyperactivity Disorder.  Substance-Related & Addictive Disorders Alcohol Use Disorder   303.90 (F10.20) Moderate   301.83 (F60.3) Borderline Personality Disorder      Current Reported Symptoms and Status update:  Changes since last session-is moving tomorrow, feeling excited about it-has been keeping up on his commitments  Struggling with compulsive sexual behavior  Struggling with emotional regulation      Progress Toward Treatment Goals:   Minimal progress     Therapeutic Interventions/Treatment Strategies:    Area(s) of treatment focus addressed in this session included Symptom Management, [place] Maintenance/Relapse Prevention and Interpersonal Relationship Skills    Patient checked in about their mental health symptoms, healthy coping skills used over the week, negative coping skills used over the week, what sexual behaviors they engaged in-fantasy, masturbation, sex, their comfort level with their behaviors, if there were triggers, urges to violate boundaries, and violations of boundaries.  They took time to process about what they are looking forward to this week- they provided support and feedback to others in the group.    Psychotherapist offered support,  feedback and validation and reinforced use of skills Treatment modalities used include [facility] THERAPY INTERVENTIONS: Motivational Interviewing Group Dynamic Therapy  Interpersonal Coping Skills: Assisted patient in understanding the purpose of planning / creating / participating / sharing in positive experiences and Symptoms Management: Promoted understanding of their diagnoses and how it impacts their functioning  Support, Feedback and Structured Activity    Patient Response:   Patient responded to session by accepting feedback, giving feedback and listening  Possible barriers to participation / learning include: N/A    Current Mental Status Exam:   Appearance:  Appropriate   Eye Contact:  Good   Attitude / Demeanor: Cooperative   Speech      Rate / Production: Normal/ Responsive      Volume:  Normal  volume  Orientation:  All  Mood:   Normal Euthymic  Affect:   Appropriate  Bright   Thought Content: Clear   Insight:   Good       Plan/Need for Future Services:  Return for therapy in 1 weeks to treat diagnosed problems.    Patient has a current master individualized treatment plan.  See Epic treatment plan for more information.    Referral / Collaboration:  Referral to another professional/service is not indicated at this time..  Emergency Services Needed?  No    Assignment:  Return in one week     Interactive Complexity:  There are four specific communication difficulties that complicate the work of the primary psychiatric procedure.  Interactive complexity (+53272) may be reported when at least one of these difficulties is present.    Communication difficulties present during current the psychiatric procedure include:  1. None.      Signature/Title:    Vance Watson, LMFT, [facility]

## 2023-03-17 ENCOUNTER — OFFICE VISIT (OUTPATIENT)
Dept: PSYCHOLOGY | Facility: CLINIC | Age: 33
End: 2023-03-17
Payer: COMMERCIAL

## 2023-03-17 DIAGNOSIS — F90.2 ATTENTION DEFICIT HYPERACTIVITY DISORDER (ADHD), COMBINED TYPE: ICD-10-CM

## 2023-03-17 DIAGNOSIS — F33.2 MDD (MAJOR DEPRESSIVE DISORDER), RECURRENT SEVERE, WITHOUT PSYCHOSIS (H): Primary | ICD-10-CM

## 2023-03-17 DIAGNOSIS — F41.1 GENERALIZED ANXIETY DISORDER: ICD-10-CM

## 2023-03-17 DIAGNOSIS — F19.90 SUBSTANCE USE DISORDER: ICD-10-CM

## 2023-03-17 DIAGNOSIS — F91.8 CONDUCT DISORDER, UNDIFFERENTIATED TYPE: ICD-10-CM

## 2023-03-17 PROCEDURE — 90837 PSYTX W PT 60 MINUTES: CPT | Performed by: MARRIAGE & FAMILY THERAPIST

## 2023-03-17 NOTE — PROGRESS NOTES
[facility] - Progress Note    Date of Service: 3/17/23   Name: Xavier Landon  : 1990  Medical Record Number: [id number]  Treating Provider: VIKTOR Anderson [facility]  Type of Session: Individual  Present in Session: pt  Session Start and Stop Time: 1000-1100am  Number of Minutes:  60    SERVICE MODALITY:  In-person    DSM-5 Diagnoses:  296.33 (F33.2) Major Depressive Disorder, Recurrent Episode, Severe _ and With anxious distress  300.02 (F41.1) Generalized Anxiety Disorder.  Attention-Deficit/Hyperactivity Disorder  314.01 (F90.9) Unspecified Attention -Deficit / Hyperactivity Disorder.  Substance-Related & Addictive Disorders Alcohol Use Disorder   303.90 (F10.20) Moderate   301.83 (F60.3) Borderline Personality Disorder      Current Reported Symptoms and Status update:  Changes since last session-moved!  Struggling with compulsive sexual behavior  Struggling with emotional regulation      Progress Toward Treatment Goals:   Minimal progress     Therapeutic Interventions/Treatment Strategies:    Area(s) of treatment focus addressed in this session included Symptom Management      Psychotherapist offered support, feedback and validation and reinforced use of skills Treatment modalities used include Motivational Interviewing Dialectical Behavioral Therapy Behavioral Activation: Reinforced benefits/challenges of change process through applying skills to replace unwanted behaviors and Cognitive Restructuring:  Assisted patient in formulating new neutral/positive alternatives to challenge less helpful / ineffective thoughts  Support, Feedback and Problem Solving    Patient Response:   Patient responded to session by listening and accepting support  Possible barriers to participation / learning include: N/A    Current Mental Status Exam:   Appearance:  Appropriate   Eye Contact:  Good   Attitude / Demeanor: Cooperative   Speech      Rate / Production: Normal/  Responsive      Volume:  Normal  volume  Orientation:  All  Mood:   Anxious   Affect:   Appropriate   Thought Content: Clear   Insight:   Good       Plan/Need for Future Services:  Return for therapy in 1 weeks to treat diagnosed problems.    Patient has a current master individualized treatment plan.  See Epic treatment plan for more information.    Referral / Collaboration:  Referral to another professional/service is not indicated at this time..  Emergency Services Needed?  No    Assignment:  Working on following up on jobs    Interactive Complexity:  There are four specific communication difficulties that complicate the work of the primary psychiatric procedure.  Interactive complexity (+17507) may be reported when at least one of these difficulties is present.    Communication difficulties present during current the psychiatric procedure include:  1. None.      Signature/Title:    Vance Watson, VIKTOR, [facility]

## 2023-03-21 ENCOUNTER — OFFICE VISIT (OUTPATIENT)
Dept: PSYCHOLOGY | Facility: CLINIC | Age: 33
End: 2023-03-21
Payer: COMMERCIAL

## 2023-03-21 DIAGNOSIS — F41.1 GENERALIZED ANXIETY DISORDER: ICD-10-CM

## 2023-03-21 DIAGNOSIS — F90.2 ATTENTION DEFICIT HYPERACTIVITY DISORDER (ADHD), COMBINED TYPE: ICD-10-CM

## 2023-03-21 DIAGNOSIS — F19.90 SUBSTANCE USE DISORDER: ICD-10-CM

## 2023-03-21 DIAGNOSIS — F91.8 CONDUCT DISORDER, UNDIFFERENTIATED TYPE: ICD-10-CM

## 2023-03-21 DIAGNOSIS — F33.2 MDD (MAJOR DEPRESSIVE DISORDER), RECURRENT SEVERE, WITHOUT PSYCHOSIS (H): Primary | ICD-10-CM

## 2023-03-21 PROCEDURE — 90853 GROUP PSYCHOTHERAPY: CPT

## 2023-03-21 NOTE — GROUP NOTE
[facility]  [address], [address]  [place]  [address]    Group Progress Note  [facility] - Progress Note    Date of Service: 3/21/23   Name: Xavier Landon  : 1990  Medical Record Number: [id number]  START TIME:  6:30 PM  END TIME:  8:30 PM  FACILITATOR(S): Deborah Watson LMFT, [facility]  TOPIC: [facility] Group Therapy  Type of Session: Group  :  Number of Attendees:  5  Number of Minutes:  /120    SERVICE MODALITY:  In-person      DSM-5 Diagnoses:  296.33 (F33.2) Major Depressive Disorder, Recurrent Episode, Severe _ and With anxious distress  300.02 (F41.1) Generalized Anxiety Disorder.  Attention-Deficit/Hyperactivity Disorder  314.01 (F90.9) Unspecified Attention -Deficit / Hyperactivity Disorder.  Substance-Related & Addictive Disorders Alcohol Use Disorder   303.90 (F10.20) Moderate   301.83 (F60.3) Borderline Personality Disorder      Current Reported Symptoms and Status update:  Changes since last session moved in to apartment, frustration with job, is working out, is feeling more confident   Struggling with compulsive sexual behavior  Struggling with emotional regulation      Progress Toward Treatment Goals:   Minimal progress     Therapeutic Interventions/Treatment Strategies:    Area(s) of treatment focus addressed in this session included Symptom Management and Interpersonal Relationship Skills    Patient checked in about their mental health symptoms, healthy coping skills used over the week, negative coping skills used over the week, what sexual behaviors they engaged in-fantasy, masturbation, sex, their comfort level with their behaviors, if there were triggers, urges to violate boundaries, and violations of boundaries.  They took time to process about increased positive mood and staying focused on goals they provided support and feedback to others in the group.    Psychotherapist offered support, feedback and validation and  reinforced use of skills Treatment modalities used include [facility] THERAPY INTERVENTIONS: Motivational Interviewing Group Dynamic Therapy  Interpersonal Behavioral Activation: Reinforced benefits/challenges of change process through applying skills to replace unwanted behaviors and Explored how behaviors effect mood and interact with thoughts and feelings and explored challenging unrealistic expectations of self/others  Support, Feedback and Structured Activity    Patient Response:   Patient responded to session by accepting feedback, giving feedback and listening  Possible barriers to participation / learning include: N/A    Current Mental Status Exam:   Appearance:  Appropriate   Eye Contact:  Good   Attitude / Demeanor: Cooperative   Speech      Rate / Production: Normal/ Responsive      Volume:  Normal  volume  Orientation:  All  Mood:   Normal Euthymic  Affect:   Appropriate  Bright   Thought Content: Clear   Insight:   Good       Plan/Need for Future Services:  Return for therapy in 1 weeks to treat diagnosed problems.    Patient has a current master individualized treatment plan.  See Epic treatment plan for more information.    Referral / Collaboration:  Referral to another professional/service is not indicated at this time..  Emergency Services Needed?  No    Assignment:  Return in one week     Interactive Complexity:  There are four specific communication difficulties that complicate the work of the primary psychiatric procedure.  Interactive complexity (+90588) may be reported when at least one of these difficulties is present.    Communication difficulties present during current the psychiatric procedure include:  1. None.      Signature/Title:    Vance Watson, LMFT, [facility]

## 2023-03-24 ENCOUNTER — OFFICE VISIT (OUTPATIENT)
Dept: PSYCHOLOGY | Facility: CLINIC | Age: 33
End: 2023-03-24
Payer: COMMERCIAL

## 2023-03-24 DIAGNOSIS — F33.2 MDD (MAJOR DEPRESSIVE DISORDER), RECURRENT SEVERE, WITHOUT PSYCHOSIS (H): Primary | ICD-10-CM

## 2023-03-24 DIAGNOSIS — F91.8 CONDUCT DISORDER, UNDIFFERENTIATED TYPE: ICD-10-CM

## 2023-03-24 DIAGNOSIS — F90.2 ATTENTION DEFICIT HYPERACTIVITY DISORDER (ADHD), COMBINED TYPE: ICD-10-CM

## 2023-03-24 DIAGNOSIS — F19.90 SUBSTANCE USE DISORDER: ICD-10-CM

## 2023-03-24 DIAGNOSIS — F41.1 GENERALIZED ANXIETY DISORDER: ICD-10-CM

## 2023-03-24 PROCEDURE — 90837 PSYTX W PT 60 MINUTES: CPT | Performed by: MARRIAGE & FAMILY THERAPIST

## 2023-03-24 NOTE — PROGRESS NOTES
[facility] - Progress Note    Date of Service: 3/24/23   Name: Xavier Landon  : 1990  Medical Record Number: [id number]  Treating Provider: VIKTOR Anderson, [facility]  Type of Session: Individual  Present in Session: pt  Session Start and Stop Time: 0538-3644  Number of Minutes:  56    SERVICE MODALITY:  In-person    DSM-5 Diagnoses:  296.33 (F33.2) Major Depressive Disorder, Recurrent Episode, Severe _ and With anxious distress  300.02 (F41.1) Generalized Anxiety Disorder.  Attention-Deficit/Hyperactivity Disorder  314.01 (F90.9) Unspecified Attention -Deficit / Hyperactivity Disorder.  Substance-Related & Addictive Disorders Alcohol Use Disorder   303.90 (F10.20) Moderate   301.83 (F60.3) Borderline Personality Disorder      Current Reported Symptoms and Status update:  Changes since last session-still working on finding a new job  Struggling with compulsive sexual behavior  Struggling with emotional regulation      Progress Toward Treatment Goals:   Minimal progress     Therapeutic Interventions/Treatment Strategies:    Area(s) of treatment focus addressed in this session included Symptom Management, Interpersonal Relationship Skills and Sexual Health and Wellness    Processed about struggle with finding a new job    Psychotherapist offered support, feedback and validation and reinforced use of skills Treatment modalities used include Dialectical Behavioral Therapy Interpersonal Behavioral Activation: Encouraged strategies to reduce individual procrastination and increase motivation by increasing goal-directed activities to enhance mood and reduce symptoms. and explored challenging unrealistic expectations of self/others  Support, Feedback and Structured Activity    Patient Response:   Patient responded to session by accepting feedback, listening and accepting support  Possible barriers to participation / learning include: N/A    Current Mental Status Exam:    Appearance:  Appropriate   Eye Contact:  Good   Attitude / Demeanor: Cooperative   Speech      Rate / Production: Normal/ Responsive      Volume:  Normal  volume  Orientation:  All  Mood:   Normal Euthymic  Affect:   Appropriate   Thought Content: Clear   Insight:   Good       Plan/Need for Future Services:  Return for therapy in 1 weeks to treat diagnosed problems.    Patient has a current master individualized treatment plan.  See Epic treatment plan for more information.    Referral / Collaboration:  Referral to another professional/service is not indicated at this time..  Emergency Services Needed?  No    Assignment:  Continue following up with job options     Interactive Complexity:  There are four specific communication difficulties that complicate the work of the primary psychiatric procedure.  Interactive complexity (+[id number]) may be reported when at least one of these difficulties is present.    Communication difficulties present during current the psychiatric procedure include:  1. None.      Signature/Title:    VIKTOR Anderson, [facility]

## 2023-03-28 ENCOUNTER — OFFICE VISIT (OUTPATIENT)
Dept: PSYCHOLOGY | Facility: CLINIC | Age: 33
End: 2023-03-28
Payer: COMMERCIAL

## 2023-03-28 DIAGNOSIS — F33.2 MDD (MAJOR DEPRESSIVE DISORDER), RECURRENT SEVERE, WITHOUT PSYCHOSIS (H): Primary | ICD-10-CM

## 2023-03-28 DIAGNOSIS — F41.1 GENERALIZED ANXIETY DISORDER: ICD-10-CM

## 2023-03-28 DIAGNOSIS — F91.8 CONDUCT DISORDER, UNDIFFERENTIATED TYPE: ICD-10-CM

## 2023-03-28 DIAGNOSIS — F90.2 ATTENTION DEFICIT HYPERACTIVITY DISORDER (ADHD), COMBINED TYPE: ICD-10-CM

## 2023-03-28 PROCEDURE — 90853 GROUP PSYCHOTHERAPY: CPT

## 2023-03-29 NOTE — GROUP NOTE
[facility]  [address], [address]  [place]  [address]    Group Progress Note  [facility] - Progress Note    Date of Service: 3/28/23   Name: Xavier Landon  : 1990  Medical Record Number: [id number]  START TIME:  6:30 PM  END TIME:  8:30 PM  FACILITATOR(S): Deborah Watson LMFT, [facility]  TOPIC: [facility] Group Therapy  Type of Session: Group  :  Number of Attendees:  6  Number of Minutes:  /120    SERVICE MODALITY:  In-person      DSM-5 Diagnoses:  296.33 (F33.2) Major Depressive Disorder, Recurrent Episode, Severe _ and With anxious distress  300.02 (F41.1) Generalized Anxiety Disorder.  Attention-Deficit/Hyperactivity Disorder  314.01 (F90.9) Unspecified Attention -Deficit / Hyperactivity Disorder.  Substance-Related & Addictive Disorders Alcohol Use Disorder   303.90 (F10.20) Moderate   301.83 (F60.3) Borderline Personality Disorder      Current Reported Symptoms and Status update:  Changes since last session some struggling with mood  Struggling with compulsive sexual behavior  Struggling with emotional regulation      Progress Toward Treatment Goals:   Minimal progress     Therapeutic Interventions/Treatment Strategies:    Area(s) of treatment focus addressed in this session included Symptom Management and Interpersonal Relationship Skills    Patient checked in about their mental health symptoms, healthy coping skills used over the week, negative coping skills used over the week, what sexual behaviors they engaged in-fantasy, masturbation, sex, their comfort level with their behaviors, if there were triggers, urges to violate boundaries, and violations of boundaries.  They took time to process about their boundaries they provided support and feedback to others in the group.    Psychotherapist offered support, feedback and validation and reinforced use of skills Treatment modalities used include [facility] THERAPY INTERVENTIONS: Motivational  Interviewing Group Dynamic Therapy  Interpersonal Behavioral Activation: Reinforced benefits/challenges of change process through applying skills to replace unwanted behaviors and Coping Skills: Facilitated understanding of  what factors may contribute to symptom relapse and skills plan to manage symptom relapse   Support, Feedback and Structured Activity    Patient Response:   Patient responded to session by accepting feedback, giving feedback and listening  Possible barriers to participation / learning include: N/A    Current Mental Status Exam:   Appearance:  Appropriate   Eye Contact:  Good   Attitude / Demeanor: Cooperative   Speech      Rate / Production: Normal/ Responsive      Volume:  Normal  volume  Orientation:  All  Mood:   Anxious  Normal  Affect:   Appropriate   Thought Content: Clear   Insight:   Good       Plan/Need for Future Services:  Return for therapy in 1 weeks to treat diagnosed problems.    Patient has a current master individualized treatment plan.  See Epic treatment plan for more information.    Referral / Collaboration:  Referral to another professional/service is not indicated at this time..  Emergency Services Needed?  No    Assignment:  Return on thursday    Interactive Complexity:  There are four specific communication difficulties that complicate the work of the primary psychiatric procedure.  Interactive complexity (+11999) may be reported when at least one of these difficulties is present.    Communication difficulties present during current the psychiatric procedure include:  1. None.      Signature/Title:    Vance Watson, LMFT, [facility]

## 2023-03-30 ENCOUNTER — OFFICE VISIT (OUTPATIENT)
Dept: PSYCHOLOGY | Facility: CLINIC | Age: 33
End: 2023-03-30
Payer: COMMERCIAL

## 2023-03-30 DIAGNOSIS — F91.8 CONDUCT DISORDER, UNDIFFERENTIATED TYPE: ICD-10-CM

## 2023-03-30 DIAGNOSIS — F19.90 SUBSTANCE USE DISORDER: ICD-10-CM

## 2023-03-30 DIAGNOSIS — F41.1 GENERALIZED ANXIETY DISORDER: ICD-10-CM

## 2023-03-30 DIAGNOSIS — F90.2 ATTENTION DEFICIT HYPERACTIVITY DISORDER (ADHD), COMBINED TYPE: ICD-10-CM

## 2023-03-30 DIAGNOSIS — F33.2 MDD (MAJOR DEPRESSIVE DISORDER), RECURRENT SEVERE, WITHOUT PSYCHOSIS (H): Primary | ICD-10-CM

## 2023-03-30 PROCEDURE — 90837 PSYTX W PT 60 MINUTES: CPT | Performed by: MARRIAGE & FAMILY THERAPIST

## 2023-03-30 NOTE — PROGRESS NOTES
[facility] - Progress Note    Date of Service: 3/30/23   Name: Xavier Landon  : 1990  Medical Record Number: [id number]  Treating Provider: VIKTOR Anderson, [facility]  Type of Session: Individual  Present in Session: pt  Session Start and Stop Time: 0900-0956am  Number of Minutes:  56    SERVICE MODALITY:  In-person    DSM-5 Diagnoses:  296.33 (F33.2) Major Depressive Disorder, Recurrent Episode, Severe _ and With anxious distress  300.02 (F41.1) Generalized Anxiety Disorder.  Attention-Deficit/Hyperactivity Disorder  314.01 (F90.9) Unspecified Attention -Deficit / Hyperactivity Disorder.  Substance-Related & Addictive Disorders Alcohol Use Disorder   303.90 (F10.20) Moderate   301.83 (F60.3) Borderline Personality Disorder      Current Reported Symptoms and Status update:  Changes since last session-got new job!  Struggling with compulsive sexual behavior  Struggling with emotional regulation      Progress Toward Treatment Goals:   Minimal progress     Therapeutic Interventions/Treatment Strategies:    Area(s) of treatment focus addressed in this session included Symptom Management and Interpersonal Relationship Skills    Processed about needs and problemsolved needs for the week    Psychotherapist offered support, feedback and validation and reinforced use of skills Treatment modalities used include Motivational Interviewing Dialectical Behavioral Therapy Interpersonal Behavioral Activation: Encouraged strategies to reduce individual procrastination and increase motivation by increasing goal-directed activities to enhance mood and reduce symptoms., Mindfulness: Encouraged a plan to use mindfulness skills in daily life and explored challenging unrealistic expectations of self/others  Support, Feedback and Problem Solving    Patient Response:   Patient responded to session by accepting feedback, listening, focusing on goals and accepting support  Possible barriers  to participation / learning include: N/A    Current Mental Status Exam:   Appearance:  Appropriate   Eye Contact:  Good   Attitude / Demeanor: Cooperative   Speech      Rate / Production: Normal/ Responsive      Volume:  Normal  volume  Orientation:  All  Mood:   Normal Euthymic  Affect:   Appropriate  Bright   Thought Content: Clear   Insight:   Good       Plan/Need for Future Services:  Return for therapy in 1 weeks to treat diagnosed problems.    Patient has a current master individualized treatment plan.  See Epic treatment plan for more information.    Referral / Collaboration:  Referral to another professional/service is not indicated at this time..  Emergency Services Needed?  No    Assignment:  Return in one week, work on no smoking and routine    Interactive Complexity:  There are four specific communication difficulties that complicate the work of the primary psychiatric procedure.  Interactive complexity (+42934) may be reported when at least one of these difficulties is present.    Communication difficulties present during current the psychiatric procedure include:  1. None.      Signature/Title:    Vance Watson, VIKTOR, [facility]

## 2023-04-04 ENCOUNTER — OFFICE VISIT (OUTPATIENT)
Dept: PSYCHOLOGY | Facility: CLINIC | Age: 33
End: 2023-04-04
Payer: COMMERCIAL

## 2023-04-04 DIAGNOSIS — F91.8 CONDUCT DISORDER, UNDIFFERENTIATED TYPE: ICD-10-CM

## 2023-04-04 DIAGNOSIS — F90.2 ATTENTION DEFICIT HYPERACTIVITY DISORDER (ADHD), COMBINED TYPE: ICD-10-CM

## 2023-04-04 DIAGNOSIS — F33.2 MDD (MAJOR DEPRESSIVE DISORDER), RECURRENT SEVERE, WITHOUT PSYCHOSIS (H): Primary | ICD-10-CM

## 2023-04-04 DIAGNOSIS — F41.1 GENERALIZED ANXIETY DISORDER: ICD-10-CM

## 2023-04-04 PROCEDURE — 90853 GROUP PSYCHOTHERAPY: CPT

## 2023-04-04 NOTE — GROUP NOTE
[facility]  [address], [address]  [place]  [address]    Group Progress Note  [facility] - Progress Note    Date of Service: 23   Name: Xavier Landon  : 1990  Medical Record Number: [id number]  START TIME:  6:30 PM  END TIME:  8:30 PM  FACILITATOR(S): Deborah Watson LMFT, [facility]  TOPIC: [facility] Group Therapy  Type of Session: Group  :  Number of Attendees:  5  Number of Minutes:  120    SERVICE MODALITY:  In-person      DSM-5 Diagnoses:  296.33 (F33.2) Major Depressive Disorder, Recurrent Episode, Severe _ and With anxious distress  300.02 (F41.1) Generalized Anxiety Disorder.  Attention-Deficit/Hyperactivity Disorder  314.01 (F90.9) Unspecified Attention -Deficit / Hyperactivity Disorder.  Substance-Related & Addictive Disorders Alcohol Use Disorder   303.90 (F10.20) Moderate   301.83 (F60.3) Borderline Personality Disorder      Current Reported Symptoms and Status update:  Changes since last session-improvement with mood, got the job, has been struggling with mindfulness  Struggling with compulsive sexual behavior  Struggling with emotional regulation      Progress Toward Treatment Goals:   Minimal progress     Therapeutic Interventions/Treatment Strategies:    Area(s) of treatment focus addressed in this session included Symptom Management, [place] Maintenance/Relapse Prevention and Interpersonal Relationship Skills    Patient checked in about their mental health symptoms, healthy coping skills used over the week, negative coping skills used over the week, what sexual behaviors they engaged in-fantasy, masturbation, sex, their comfort level with their behaviors, if there were triggers, urges to violate boundaries, and violations of boundaries.  They took time to process about experiencing positive sexual cycle masturbation,  they provided support and feedback to others in the group.    Psychotherapist offered support, feedback  and validation and reinforced use of skills Treatment modalities used include [facility] THERAPY INTERVENTIONS: Motivational Interviewing Group Dynamic Therapy  Interpersonal Mindfulness: Facilitated discussion of when/how to use mindfulness skills to benefit general health, mental health symptoms, and stressors and Encouraged a plan to use mindfulness skills in daily life  Support, Feedback and Structured Activity    Patient Response:   Patient responded to session by accepting feedback, giving feedback and listening  Possible barriers to participation / learning include: N/A    Current Mental Status Exam:   Appearance:  Appropriate   Eye Contact:  Good   Attitude / Demeanor: Cooperative   Speech      Rate / Production: Normal/ Responsive      Volume:  Normal  volume  Orientation:  All  Mood:   Anxious  Normal Euthymic  Affect:   Appropriate   Thought Content: Clear   Insight:   Good       Plan/Need for Future Services:  Return for therapy in 1 weeks to treat diagnosed problems.    Patient has a current master individualized treatment plan.  See Epic treatment plan for more information.    Referral / Collaboration:  Referral to another professional/service is not indicated at this time..  Emergency Services Needed?  No    Assignment:  Return in one week continue working on mindfulnesse    Interactive Complexity:  There are four specific communication difficulties that complicate the work of the primary psychiatric procedure.  Interactive complexity (+01283) may be reported when at least one of these difficulties is present.    Communication difficulties present during current the psychiatric procedure include:  1. None.      Signature/Title:    Vance Watson, LMFT, [facility]

## 2023-04-07 ENCOUNTER — OFFICE VISIT (OUTPATIENT)
Dept: PSYCHOLOGY | Facility: CLINIC | Age: 33
End: 2023-04-07
Payer: COMMERCIAL

## 2023-04-07 DIAGNOSIS — F33.2 MDD (MAJOR DEPRESSIVE DISORDER), RECURRENT SEVERE, WITHOUT PSYCHOSIS (H): Primary | ICD-10-CM

## 2023-04-07 DIAGNOSIS — F90.2 ATTENTION DEFICIT HYPERACTIVITY DISORDER (ADHD), COMBINED TYPE: ICD-10-CM

## 2023-04-07 DIAGNOSIS — F91.8 CONDUCT DISORDER, UNDIFFERENTIATED TYPE: ICD-10-CM

## 2023-04-07 DIAGNOSIS — F41.1 GENERALIZED ANXIETY DISORDER: ICD-10-CM

## 2023-04-07 PROCEDURE — 90837 PSYTX W PT 60 MINUTES: CPT | Performed by: MARRIAGE & FAMILY THERAPIST

## 2023-04-07 NOTE — TELEPHONE ENCOUNTER
Requested Medication: Adderall  Dose: 10mg  Quantity: 30  Refills: 0    Take 1 tablet every afternoon for breakthrough sx  _____    Requested Medication: Adderall XR  Dose: 20mg  Quantity: 60  Refills: 0    Take 1 20 mg capsule 2 times daily    Last seen at [facility]: 1/5/2023 -  Next Appointment with Provider: 4/13/2023    Lois Baird CMA

## 2023-04-07 NOTE — PROGRESS NOTES
[facility] - Progress Note    Date of Service: 23   Name: Xavier Landon  : 1990  Medical Record Number: [id number]  Treating Provider: VIKTOR Anderson, [facility][note 3]  Type of Session: Individual  Present in Session: pt  Session Start and Stop Time: 0336-6425  Number of Minutes:  62    SERVICE MODALITY:  In-person    DSM-5 Diagnoses:  296.33 (F33.2) Major Depressive Disorder, Recurrent Episode, Severe _ and With anxious distress  300.02 (F41.1) Generalized Anxiety Disorder.  Attention-Deficit/Hyperactivity Disorder  314.01 (F90.9) Unspecified Attention -Deficit / Hyperactivity Disorder.  Substance-Related & Addictive Disorders Alcohol Use Disorder   303.90 (F10.20) Moderate   301.83 (F60.3) Borderline Personality Disorder      Current Reported Symptoms and Status update:  Changes since last session-has had some good interviews has started new job  Struggling with compulsive sexual behavior  Struggling with emotional regulation      Progress Toward Treatment Goals:   Minimal progress     Therapeutic Interventions/Treatment Strategies:    Area(s) of treatment focus addressed in this session included Symptom Management, [place] Maintenance/Relapse Prevention and Sexual Health and Wellness    Processed about the past week and working on improving routine, keeping boundaries    Psychotherapist offered support, feedback and validation and reinforced use of skills Treatment modalities used include Dialectical Behavioral Therapy Interpersonal Behavioral Activation: Encouraged strategies to reduce individual procrastination and increase motivation by increasing goal-directed activities to enhance mood and reduce symptoms.  Support, Feedback and Problem Solving    Patient Response:   Patient responded to session by accepting feedback, listening, focusing on goals and accepting support  Possible barriers to participation / learning include: N/A    Current Mental Status Exam:    Appearance:  Appropriate   Eye Contact:  Good   Attitude / Demeanor: Cooperative   Speech      Rate / Production: Normal/ Responsive      Volume:  Normal  volume  Orientation:  All  Mood:   Normal Euthymic  Affect:   Appropriate   Thought Content: Clear   Insight:   Good       Plan/Need for Future Services:  Return for therapy in 1 weeks to treat diagnosed problems.    Patient has a current master individualized treatment plan.  See Epic treatment plan for more information.    Referral / Collaboration:  Referral to another professional/service is not indicated at this time..  Emergency Services Needed?  No    Assignment:  Return in one week work on financial     Interactive Complexity:  There are four specific communication difficulties that complicate the work of the primary psychiatric procedure.  Interactive complexity (+84380) may be reported when at least one of these difficulties is present.    Communication difficulties present during current the psychiatric procedure include:  1. None.      Signature/Title:    VIKTOR Anderson, [facility]

## 2023-04-11 ENCOUNTER — OFFICE VISIT (OUTPATIENT)
Dept: PSYCHOLOGY | Facility: CLINIC | Age: 33
End: 2023-04-11
Payer: COMMERCIAL

## 2023-04-11 DIAGNOSIS — F19.90 SUBSTANCE USE DISORDER: ICD-10-CM

## 2023-04-11 DIAGNOSIS — F33.2 MDD (MAJOR DEPRESSIVE DISORDER), RECURRENT SEVERE, WITHOUT PSYCHOSIS (H): Primary | ICD-10-CM

## 2023-04-11 DIAGNOSIS — F41.1 GENERALIZED ANXIETY DISORDER: ICD-10-CM

## 2023-04-11 PROCEDURE — 90853 GROUP PSYCHOTHERAPY: CPT

## 2023-04-11 RX ORDER — DEXTROAMPHETAMINE SACCHARATE, AMPHETAMINE ASPARTATE MONOHYDRATE, DEXTROAMPHETAMINE SULFATE AND AMPHETAMINE SULFATE 5; 5; 5; 5 MG/1; MG/1; MG/1; MG/1
20 CAPSULE, EXTENDED RELEASE ORAL 2 TIMES DAILY
Qty: 60 CAPSULE | Refills: 0 | Status: SHIPPED | OUTPATIENT
Start: 2023-04-11 | End: 2023-05-12

## 2023-04-11 RX ORDER — DEXTROAMPHETAMINE SACCHARATE, AMPHETAMINE ASPARTATE, DEXTROAMPHETAMINE SULFATE AND AMPHETAMINE SULFATE 2.5; 2.5; 2.5; 2.5 MG/1; MG/1; MG/1; MG/1
TABLET ORAL
Qty: 30 TABLET | Refills: 0 | Status: SHIPPED | OUTPATIENT
Start: 2023-04-11 | End: 2023-05-12

## 2023-04-12 NOTE — GROUP NOTE
[facility]  [address], [address]  [place]  [address]    Group Progress Note  [facility] - Progress Note    Date of Service: 23   Name: Xavier Landon  : 1990  Medical Record Number: [id number]  START TIME:  6:30 PM  END TIME:  8:30 PM  FACILITATOR(S): Deborah Watson LMFT, [facility]  TOPIC: [facility] Group Therapy  Type of Session: Group  :  Number of Attendees:  6  Number of Minutes: 120    SERVICE MODALITY:  In-person      DSM-5 Diagnoses:  296.33 (F33.2) Major Depressive Disorder, Recurrent Episode, Severe _ and With anxious distress  300.02 (F41.1) Generalized Anxiety Disorder.  Attention-Deficit/Hyperactivity Disorder  314.01 (F90.9) Unspecified Attention -Deficit / Hyperactivity Disorder.  Substance-Related & Addictive Disorders Alcohol Use Disorder   303.90 (F10.20) Moderate   301.83 (F60.3) Borderline Personality Disorder      Current Reported Symptoms and Status update:  Changes since last session got another job, increased loneliness   Struggling with compulsive sexual behavior  Struggling with emotional regulation      Progress Toward Treatment Goals:   Minimal progress     Therapeutic Interventions/Treatment Strategies:    Area(s) of treatment focus addressed in this session included Symptom Management and Interpersonal Relationship Skills    Patient checked in about their mental health symptoms, healthy coping skills used over the week, negative coping skills used over the week, what sexual behaviors they engaged in-fantasy, masturbation, sex, their comfort level with their behaviors, if there were triggers, urges to violate boundaries, and violations of boundaries.  They took time to process about loneliness and wanting to socialize more they provided support and feedback to others in the group.      Psychotherapist offered support, feedback and validation and reinforced use of skills Treatment modalities used include [facility]  THERAPY INTERVENTIONS: Motivational Interviewing Group Dynamic Therapy  Interpersonal Coping Skills: Assisted patient in understanding the purpose of planning / creating / participating / sharing in positive experiences and Relationship Skills: Discussed strategies to promote healthier understanding of interpersonal relationships  Support, Feedback and Structured Activity    Patient Response:   Patient responded to session by accepting feedback, giving feedback and listening  Possible barriers to participation / learning include: N/A    Current Mental Status Exam:   Appearance:  Appropriate   Eye Contact:  Good   Attitude / Demeanor: Cooperative   Speech      Rate / Production: Normal/ Responsive      Volume:  Normal  volume  Orientation:  All  Mood:   Anxious   Affect:   Appropriate   Thought Content: Clear   Insight:   Good       Plan/Need for Future Services:  Return for therapy in 1 weeks to treat diagnosed problems.    Patient has a current master individualized treatment plan.  See Epic treatment plan for more information.    Referral / Collaboration:  Referral to another professional/service is not indicated at this time..  Emergency Services Needed?  No    Assignment:  Return in one week     Interactive Complexity:  There are four specific communication difficulties that complicate the work of the primary psychiatric procedure.  Interactive complexity (+69367) may be reported when at least one of these difficulties is present.    Communication difficulties present during current the psychiatric procedure include:  1. None.      Signature/Title:    Vance Watson, LMFT, [facility]

## 2023-04-13 ENCOUNTER — VIRTUAL VISIT (OUTPATIENT)
Dept: PSYCHIATRY | Facility: CLINIC | Age: 33
End: 2023-04-13
Payer: COMMERCIAL

## 2023-04-13 DIAGNOSIS — F90.2 ATTENTION DEFICIT HYPERACTIVITY DISORDER (ADHD), COMBINED TYPE: ICD-10-CM

## 2023-04-13 DIAGNOSIS — F91.8 CONDUCT DISORDER, UNDIFFERENTIATED TYPE: ICD-10-CM

## 2023-04-13 PROCEDURE — 99443 PR PHYSICIAN TELEPHONE EVALUATION 21-30 MIN: CPT | Mod: 95 | Performed by: PHYSICIAN ASSISTANT

## 2023-04-13 RX ORDER — LITHIUM CARBONATE 300 MG/1
CAPSULE ORAL
Qty: 120 CAPSULE | Refills: 2 | Status: SHIPPED | OUTPATIENT
Start: 2023-04-13 | End: 2023-05-31

## 2023-04-13 RX ORDER — BUPROPION HYDROCHLORIDE 150 MG/1
150 TABLET ORAL EVERY MORNING
Qty: 30 TABLET | Refills: 1 | Status: SHIPPED | OUTPATIENT
Start: 2023-04-13 | End: 2023-05-31

## 2023-04-13 RX ORDER — NALTREXONE HYDROCHLORIDE 50 MG/1
TABLET, FILM COATED ORAL
Qty: 30 TABLET | Refills: 1 | Status: SHIPPED | OUTPATIENT
Start: 2023-04-13 | End: 2023-05-31

## 2023-04-13 NOTE — NURSING NOTE
Is the patient currently in the state of [place]? YES    Visit mode:VIDEO    If the visit is dropped, the patient can be reconnected by: VIDEO VISIT: Send to e-mail at: [email or web address]    Will anyone else be joining the visit? NO      How would you like to obtain your AVS? MyChart    Are changes needed to the allergy or medication list? NO    Reason for visit: Video Visit

## 2023-04-14 ENCOUNTER — OFFICE VISIT (OUTPATIENT)
Dept: PSYCHOLOGY | Facility: CLINIC | Age: 33
End: 2023-04-14
Payer: COMMERCIAL

## 2023-04-14 DIAGNOSIS — F41.1 GENERALIZED ANXIETY DISORDER: ICD-10-CM

## 2023-04-14 DIAGNOSIS — F90.2 ATTENTION DEFICIT HYPERACTIVITY DISORDER (ADHD), COMBINED TYPE: ICD-10-CM

## 2023-04-14 DIAGNOSIS — F33.2 MDD (MAJOR DEPRESSIVE DISORDER), RECURRENT SEVERE, WITHOUT PSYCHOSIS (H): Primary | ICD-10-CM

## 2023-04-14 DIAGNOSIS — F19.90 SUBSTANCE USE DISORDER: ICD-10-CM

## 2023-04-14 PROCEDURE — 90837 PSYTX W PT 60 MINUTES: CPT | Performed by: MARRIAGE & FAMILY THERAPIST

## 2023-04-14 NOTE — PROGRESS NOTES
"The following was reviewed with the patient.   \"We have found that certain health care needs can be provided without the need for a face to face visit.  This service lets us provide the care you need with a phone conversation. I will have full access to your [facility] medical record during this entire phone call.   I will be taking notes for your medical record. Since this is like an office visit, we will bill your insurance company for this service. There are potential benefits and risks of telephone visits (e.g. limits to patient confidentiality) that differ from in-person visits.?  Confidentiality still applies for telephone services, and nobody will record the visit.  It is important to be in a quiet, private space that is free of distractions (including cell phone or other devices) during the visit.??If during the course of the call I believe a telephone visit is not appropriate, you will not be charged for this service\"    Consent has been obtained for this service by care team member: Yes    [facility] - Med Management    Name:  Xavier Landon   Aliases:  XAVIER LANDON W : STEVEN LANDON  :  1990   MRN:  [id number]  Treating Provider: Kofi Martin PA-C EdD     Medications at start of visit:  Outpatient Medications Prior to Visit   Medication Sig Dispense Refill     amphetamine-dextroamphetamine (ADDERALL XR) 20 MG 24 hr capsule Take 1 capsule (20 mg) by mouth 2 times daily 60 capsule 0     amphetamine-dextroamphetamine (ADDERALL) 10 MG tablet Take 1 tablet daily in afternoon for breakthrough sx 30 tablet 0     buPROPion (WELLBUTRIN XL) 150 MG 24 hr tablet Take 1 tablet (150 mg) by mouth every morning 30 tablet 1     lithium 300 MG capsule Take 2 capsules twice daily 120 capsule 2     naltrexone (DEPADE/REVIA) 50 MG tablet Take 1 50 mg tablet daily 30 tablet 1     No facility-administered medications prior to visit.       Allergies:  No Known Allergies    Today's Visit    Current " Complaint: Huey presents for a recheck.  At their last appointment no changes were made to his regimen.  He is reporting overall stability of psychiatric sx. He reports no substance abuse and the CSB has been curbed.  The patient is not endorsing suicidal/homicidal ideation, active planning, intent or means.  The patient is not endorsing s/s suggestive of hypomania/kamar or psychosis.  The patient is endorsing good compliance with and efficacy of their medication regimen without s/e.       Review of Systems: A review of the following systems was negative: Opthalmic, ENT, Cardiovascular, Gastrointestinal, Genitourinary, Musculoskeletal, Integumentary, Neurological, Endocrine, Respiratory, Hematologic, Immunologic      Chemical History: Denies usage of and cravings for alcohol, cannabis, heroin, methamphetamine, cocaine, prescription opioids/benzodiazepines.      Social History: Huey has started a new job and has moved out of his parents house.      Mental Status Exam: The patient's orientation, memory,  Attention, language and fund of knowledge are at their usual best baseline.  Grooming/Hygiene: adequate  Eye Contact: normal  Psychomotor: normal  Observed mood and affect: appropriate  Judgment: intact, with thoughtful decision making and insight  Speech: normal rate, rhythm, tone and volume  Thought processes: normal, with normal rate of thought  Associations:  No deficiency  Abnormal Thoughts: none      Assessment: This is a 31 yo man who carries the diagnosis of MDD, SHAUNA, Hypersexual Disorder. The patient's questions were answered to their satisfaction regarding the plan as outlined below. Side effects, risks/benefits/alternatives regarding all psychiatric medications were discussed with the patient in detail.          Plan:    Patient Instructions   1)Lithium 300 mg: Ok to continue taking 2 capsules daily in the morning.     2)Bupropion 150: I recommend resuming 1 tablet once daily in the morning.      3)No other  medication changes for now.     4)I recommend following up with Vivian Clarke NP. Please contact the clinic to schedule.         Total face-to-face time: 30 min    Counseling/Coordination of care greater than 50%.    Counseling/Coordination of care included: Educational counseling regarding psychiatric medications, side effects, interactions.

## 2023-04-14 NOTE — PATIENT INSTRUCTIONS
1)Lithium 300 mg: Ok to continue taking 2 capsules daily in the morning.     2)Bupropion 150: I recommend resuming 1 tablet once daily in the morning.      3)No other medication changes for now.     4)I recommend following up with Vivian Clarke NP. Please contact the clinic to schedule.

## 2023-04-14 NOTE — PROGRESS NOTES
[facility] for Sexual [facility] Gender [facility] - Progress Note    Date of Service: 23   Name: Xavier Landon  : 1990  Medical Record Number: [id number]  Treating Provider: VIKTOR Anderson, [facility]  Type of Session: Individual  Present in Session: pt  Session Start and Stop Time: 5408-8264  Number of Minutes:  58    SERVICE MODALITY:  In-person    DSM-5 Diagnoses:  296.33 (F33.2) Major Depressive Disorder, Recurrent Episode, Severe _ and With anxious distress  300.02 (F41.1) Generalized Anxiety Disorder.  Attention-Deficit/Hyperactivity Disorder  314.01 (F90.9) Unspecified Attention -Deficit / Hyperactivity Disorder.  Substance-Related & Addictive Disorders Alcohol Use Disorder   303.90 (F10.20) Moderate   301.83 (F60.3) Borderline Personality Disorder      Current Reported Symptoms and Status update:  Changes since last session-money has been tight  Struggling with compulsive sexual behavior  Struggling with emotional regulation      Progress Toward Treatment Goals:   Minimal progress     Therapeutic Interventions/Treatment Strategies:    Area(s) of treatment focus addressed in this session included Symptom Management, [place] Maintenance/Relapse Prevention and Interpersonal Relationship Skills    Processed about struggling with distractions    Psychotherapist offered support, feedback and validation and reinforced use of skills Treatment modalities used include Motivational Interviewing Dialectical Behavioral Therapy Interpersonal Relationship Skills: Assisted patients in implementing more effective communication skills in their relationships and Encouraged development and maintenance  of healthy boundaries and explored challenging unrealistic expectations of self/others  Support, Feedback and Structured Activity    Patient Response:   Patient responded to session by accepting feedback, listening and accepting support  Possible barriers to participation / learning include: N/A    Current  Mental Status Exam:   Appearance:  Appropriate   Eye Contact:  Good   Attitude / Demeanor: Cooperative   Speech      Rate / Production: Normal/ Responsive      Volume:  Normal  volume  Orientation:  All  Mood:   Anxious  Normal  Affect:   Appropriate   Thought Content: Clear   Insight:   Good       Plan/Need for Future Services:  Return for therapy in 1 weeks to treat diagnosed problems.    Patient has a current master individualized treatment plan.  See Epic treatment plan for more information.    Referral / Collaboration:  Referral to another professional/service is not indicated at this time..  Emergency Services Needed?  No    Assignment:  Work on mindfulness    Interactive Complexity:  There are four specific communication difficulties that complicate the work of the primary psychiatric procedure.  Interactive complexity (+11747) may be reported when at least one of these difficulties is present.    Communication difficulties present during current the psychiatric procedure include:  1. None.      Signature/Title:    VIKTOR Anderson, [facility]

## 2023-04-18 ENCOUNTER — OFFICE VISIT (OUTPATIENT)
Dept: PSYCHOLOGY | Facility: CLINIC | Age: 33
End: 2023-04-18
Payer: COMMERCIAL

## 2023-04-18 DIAGNOSIS — F33.2 MDD (MAJOR DEPRESSIVE DISORDER), RECURRENT SEVERE, WITHOUT PSYCHOSIS (H): Primary | ICD-10-CM

## 2023-04-18 DIAGNOSIS — F90.2 ATTENTION DEFICIT HYPERACTIVITY DISORDER (ADHD), COMBINED TYPE: ICD-10-CM

## 2023-04-18 DIAGNOSIS — F41.1 GENERALIZED ANXIETY DISORDER: ICD-10-CM

## 2023-04-18 DIAGNOSIS — F19.90 SUBSTANCE USE DISORDER: ICD-10-CM

## 2023-04-18 PROCEDURE — 90853 GROUP PSYCHOTHERAPY: CPT

## 2023-04-18 NOTE — GROUP NOTE
[facility]  [address], [address]  [place]  [address]    Group Progress Note  [facility] - Progress Note    Date of Service: 23   Name: Xavier Landon  : 1990  Medical Record Number: [id number]  START TIME:  6:30 PM  END TIME:  8:30 PM  FACILITATOR(S): Deborah Watson LMFT, [facility]  TOPIC: [facility] Group Therapy  Type of Session: Group  :  Number of Attendees:  5  Number of Minutes:  /120    SERVICE MODALITY:  In-person      DSM-5 Diagnoses:  296.33 (F33.2) Major Depressive Disorder, Recurrent Episode, Severe _ and With anxious distress  300.02 (F41.1) Generalized Anxiety Disorder.  Attention-Deficit/Hyperactivity Disorder  314.01 (F90.9) Unspecified Attention -Deficit / Hyperactivity Disorder.  Substance-Related & Addictive Disorders Alcohol Use Disorder   303.90 (F10.20) Moderate   301.83 (F60.3) Borderline Personality Disorder      Current Reported Symptoms and Status update:  Changes since last session-has been struggling with finances  Struggling with compulsive sexual behavior  Struggling with emotional regulation      Progress Toward Treatment Goals:   Minimal progress     Therapeutic Interventions/Treatment Strategies:    Area(s) of treatment focus addressed in this session included Symptom Management    Patient checked in about their mental health symptoms, healthy coping skills used over the week, negative coping skills used over the week, what sexual behaviors they engaged in-fantasy, masturbation, sex, their comfort level with their behaviors, if there were triggers, urges to violate boundaries, and violations of boundaries.  They took time to process about struggling with practicing self care they provided support and feedback to others in the group.    Psychotherapist offered support, feedback and validation and reinforced use of skills Treatment modalities used include [facility] THERAPY INTERVENTIONS: Motivational Interviewing  "Mindfulness based intervention Coping Skills: Discussed how the use of intentional \"in the moment\" actions can help reduce distress, Reviewed patients current calming practices and discussed a more formal way of practicing and accessing skills and Assisted patient in understanding the purpose of planning / creating / participating / sharing in positive experiences  Support, Feedback and Structured Activity    Patient Response:   Patient responded to session by accepting feedback, giving feedback and listening  Possible barriers to participation / learning include: N/A    Current Mental Status Exam:   Appearance:  Appropriate   Eye Contact:  Good   Attitude / Demeanor: Cooperative   Speech      Rate / Production: Normal/ Responsive      Volume:  Normal  volume  Orientation:  All  Mood:   Anxious   Affect:   Appropriate   Thought Content: Clear   Insight:   Good       Plan/Need for Future Services:  Return for therapy in 1 weeks to treat diagnosed problems.    Patient has a current master individualized treatment plan.  See Epic treatment plan for more information.    Referral / Collaboration:  Referral to another professional/service is not indicated at this time..  Emergency Services Needed?  No    Assignment:  Return in one week    Interactive Complexity:  There are four specific communication difficulties that complicate the work of the primary psychiatric procedure.  Interactive complexity (+78486) may be reported when at least one of these difficulties is present.    Communication difficulties present during current the psychiatric procedure include:  1. None.      Signature/Title:    Vance Watson, VIKTOR, [facility]      "

## 2023-04-21 ENCOUNTER — OFFICE VISIT (OUTPATIENT)
Dept: PSYCHOLOGY | Facility: CLINIC | Age: 33
End: 2023-04-21
Payer: COMMERCIAL

## 2023-04-21 DIAGNOSIS — F41.1 GENERALIZED ANXIETY DISORDER: ICD-10-CM

## 2023-04-21 DIAGNOSIS — F19.90 SUBSTANCE USE DISORDER: ICD-10-CM

## 2023-04-21 DIAGNOSIS — F91.8 CONDUCT DISORDER, UNDIFFERENTIATED TYPE: ICD-10-CM

## 2023-04-21 DIAGNOSIS — F90.2 ATTENTION DEFICIT HYPERACTIVITY DISORDER (ADHD), COMBINED TYPE: ICD-10-CM

## 2023-04-21 DIAGNOSIS — F33.2 MDD (MAJOR DEPRESSIVE DISORDER), RECURRENT SEVERE, WITHOUT PSYCHOSIS (H): Primary | ICD-10-CM

## 2023-04-21 PROCEDURE — 90837 PSYTX W PT 60 MINUTES: CPT | Performed by: MARRIAGE & FAMILY THERAPIST

## 2023-04-21 NOTE — PROGRESS NOTES
[facility] - Progress Note    Date of Service: 23   Name: Xavier Landon  : 1990  Medical Record Number: [id number]  Treating Provider: VIKTOR Anderson, [facility]  Type of Session: Individual  Present in Session: pt  Session Start and Stop Time: 9110-4114  Number of Minutes:  55    SERVICE MODALITY:  In-person    DSM-5 Diagnoses:  296.33 (F33.2) Major Depressive Disorder, Recurrent Episode, Severe _ and With anxious distress  300.02 (F41.1) Generalized Anxiety Disorder.  Attention-Deficit/Hyperactivity Disorder  314.01 (F90.9) Unspecified Attention -Deficit / Hyperactivity Disorder.  Substance-Related & Addictive Disorders Alcohol Use Disorder   303.90 (F10.20) Moderate   301.83 (F60.3) Borderline Personality Disorder      Current Reported Symptoms and Status update:  Changes since last session-struggling with getting things done due to stress  Struggling with compulsive sexual behavior  Struggling with emotional regulation      Progress Toward Treatment Goals:   Minimal progress     Therapeutic Interventions/Treatment Strategies:    Area(s) of treatment focus addressed in this session included Symptom Management    Psychotherapist offered support, feedback and validation and reinforced use of skills Treatment modalities used include Motivational Interviewing Cognitive Behavioral Therapy Interpersonal Behavioral Activation: Encouraged strategies to reduce individual procrastination and increase motivation by increasing goal-directed activities to enhance mood and reduce symptoms. and Cognitive Restructuring:  Explored impact of ineffective thoughts / distortions on mood and activity  Support, Feedback and Problem Solving    Patient Response:   Patient responded to session by accepting feedback, listening and accepting support  Possible barriers to participation / learning include: N/A    Current Mental Status Exam:   Appearance:  Appropriate   Eye Contact:  Good    Attitude / Demeanor: Cooperative   Speech      Rate / Production: Normal/ Responsive      Volume:  Normal  volume  Orientation:  All  Mood:   Anxious  Normal  Affect:   Appropriate   Thought Content: Clear   Insight:   Good       Plan/Need for Future Services:  Return for therapy in 1 weeks to treat diagnosed problems.    Patient has a current master individualized treatment plan.  See Epic treatment plan for more information.    Referral / Collaboration:  Referral to another professional/service is not indicated at this time..  Emergency Services Needed?  No    Assignment:  Work on mindfulness, check list     Interactive Complexity:  There are four specific communication difficulties that complicate the work of the primary psychiatric procedure.  Interactive complexity (+73330) may be reported when at least one of these difficulties is present.    Communication difficulties present during current the psychiatric procedure include:  1. None.      Signature/Title:    VIKTOR Anderson, [facility]

## 2023-04-25 ENCOUNTER — OFFICE VISIT (OUTPATIENT)
Dept: PSYCHOLOGY | Facility: CLINIC | Age: 33
End: 2023-04-25
Payer: COMMERCIAL

## 2023-04-25 DIAGNOSIS — F19.90 SUBSTANCE USE DISORDER: ICD-10-CM

## 2023-04-25 DIAGNOSIS — F41.1 GENERALIZED ANXIETY DISORDER: ICD-10-CM

## 2023-04-25 DIAGNOSIS — F90.2 ATTENTION DEFICIT HYPERACTIVITY DISORDER (ADHD), COMBINED TYPE: ICD-10-CM

## 2023-04-25 DIAGNOSIS — F33.2 MDD (MAJOR DEPRESSIVE DISORDER), RECURRENT SEVERE, WITHOUT PSYCHOSIS (H): Primary | ICD-10-CM

## 2023-04-25 DIAGNOSIS — F91.8 CONDUCT DISORDER, UNDIFFERENTIATED TYPE: ICD-10-CM

## 2023-04-25 PROCEDURE — 90853 GROUP PSYCHOTHERAPY: CPT | Performed by: MARRIAGE & FAMILY THERAPIST

## 2023-04-25 NOTE — GROUP NOTE
[facility]  [address], [address]  [place]  [address]    Group Progress Note  [facility] - Progress Note    Date of Service: 23   Name: Xavier Landon  : 1990  Medical Record Number: [id number]  START TIME:  6:30 PM  END TIME:  8:30 PM  FACILITATOR(S): Deborah Watson LMFT, [facility]  TOPIC: [facility] Group Therapy  Type of Session: Group  :  Number of Attendees:  6  Number of Minutes:  /120    SERVICE MODALITY:  In-person      DSM-5 Diagnoses:  296.33 (F33.2) Major Depressive Disorder, Recurrent Episode, Severe _ and With anxious distress  300.02 (F41.1) Generalized Anxiety Disorder.  Attention-Deficit/Hyperactivity Disorder  314.01 (F90.9) Unspecified Attention -Deficit / Hyperactivity Disorder.  Substance-Related & Addictive Disorders Alcohol Use Disorder   303.90 (F10.20) Moderate   301.83 (F60.3) Borderline Personality Disorder      Current Reported Symptoms and Status update:  Changes since last session has been working, been procrastinating   Struggling with compulsive sexual behavior  Struggling with emotional regulation      Progress Toward Treatment Goals:   Minimal progress     Therapeutic Interventions/Treatment Strategies:    Area(s) of treatment focus addressed in this session included Symptom Management and [place] Maintenance/Relapse Prevention    Patient checked in about their mental health symptoms, healthy coping skills used over the week, negative coping skills used over the week, what sexual behaviors they engaged in-fantasy, masturbation, sex, their comfort level with their behaviors, if there were triggers, urges to violate boundaries, and violations of boundaries.  They took time to process about boundary violation and procrastination they provided support and feedback to others in the group.    Psychotherapist offered support, feedback and validation and reinforced use of skills Treatment modalities used include  [facility] THERAPY INTERVENTIONS: Motivational Interviewing Group Dynamic Therapy  Interpersonal Behavioral Activation: Encouraged strategies to reduce individual procrastination and increase motivation by increasing goal-directed activities to enhance mood and reduce symptoms.  Support, Feedback and Structured Activity    Patient Response:   Patient responded to session by accepting feedback, giving feedback and listening  Possible barriers to participation / learning include: N/A    Current Mental Status Exam:   Appearance:  Appropriate   Eye Contact:  Good   Attitude / Demeanor: Cooperative   Speech      Rate / Production: Normal/ Responsive      Volume:  Normal  volume  Orientation:  All  Mood:   Anxious   Affect:   Appropriate   Thought Content: Clear   Insight:   Good       Plan/Need for Future Services:  Return for therapy in 1 weeks to treat diagnosed problems.    Patient has a current master individualized treatment plan.  See Epic treatment plan for more information.    Referral / Collaboration:  Referral to another professional/service is not indicated at this time..  Emergency Services Needed?  No    Assignment:  mindfulness    Interactive Complexity:  There are four specific communication difficulties that complicate the work of the primary psychiatric procedure.  Interactive complexity (+71660) may be reported when at least one of these difficulties is present.    Communication difficulties present during current the psychiatric procedure include:  1. None.      Signature/Title:    Vance Watson, LMFT, [facility]

## 2023-04-29 ENCOUNTER — HEALTH MAINTENANCE LETTER (OUTPATIENT)
Age: 33
End: 2023-04-29

## 2023-05-05 ENCOUNTER — OFFICE VISIT (OUTPATIENT)
Dept: PSYCHOLOGY | Facility: CLINIC | Age: 33
End: 2023-05-05
Payer: COMMERCIAL

## 2023-05-05 DIAGNOSIS — F91.8 CONDUCT DISORDER, UNDIFFERENTIATED TYPE: ICD-10-CM

## 2023-05-05 DIAGNOSIS — F33.2 MDD (MAJOR DEPRESSIVE DISORDER), RECURRENT SEVERE, WITHOUT PSYCHOSIS (H): Primary | ICD-10-CM

## 2023-05-05 DIAGNOSIS — F19.90 SUBSTANCE USE DISORDER: ICD-10-CM

## 2023-05-05 DIAGNOSIS — F41.1 GENERALIZED ANXIETY DISORDER: ICD-10-CM

## 2023-05-05 DIAGNOSIS — F90.2 ATTENTION DEFICIT HYPERACTIVITY DISORDER (ADHD), COMBINED TYPE: ICD-10-CM

## 2023-05-05 PROCEDURE — 90837 PSYTX W PT 60 MINUTES: CPT | Performed by: MARRIAGE & FAMILY THERAPIST

## 2023-05-05 NOTE — PROGRESS NOTES
[facility] - Progress Note    Date of Service: 23   Name: Xavier Landon  : 1990  Medical Record Number: [id number]  Treating Provider: VIKTOR Anderson, [facility]  Type of Session: Individual  Present in Session: pt  Session Start and Stop Time: 7915-9882  Number of Minutes:  55    SERVICE MODALITY:  In-person    DSM-5 Diagnoses:  296.33 (F33.2) Major Depressive Disorder, Recurrent Episode, Severe _ and With anxious distress  300.02 (F41.1) Generalized Anxiety Disorder.  Attention-Deficit/Hyperactivity Disorder  314.01 (F90.9) Unspecified Attention -Deficit / Hyperactivity Disorder.  Substance-Related & Addictive Disorders Alcohol Use Disorder   303.90 (F10.20) Moderate   301.83 (F60.3) Borderline Personality Disorder      Current Reported Symptoms and Status update:  Changes since last session-continued financial struggles, is working on routine and getting through it   Struggling with compulsive sexual behavior  Struggling with emotional regulation      Progress Toward Treatment Goals:   Minimal progress     Therapeutic Interventions/Treatment Strategies:    Area(s) of treatment focus addressed in this session included Symptom Management and Interpersonal Relationship Skills      Psychotherapist offered support, feedback and validation and reinforced use of skills Treatment modalities used include Motivational Interviewing Dialectical Behavioral Therapy Interpersonal Behavioral Activation: Reinforced benefits/challenges of change process through applying skills to replace unwanted behaviors and Encouraged strategies to reduce individual procrastination and increase motivation by increasing goal-directed activities to enhance mood and reduce symptoms.  Support, Feedback and Problem Solving    Patient Response:   Patient responded to session by accepting feedback, listening, focusing on goals and accepting support  Possible barriers to participation / learning  include: N/A    Current Mental Status Exam:   Appearance:  Appropriate   Eye Contact:  Good   Attitude / Demeanor: Cooperative   Speech      Rate / Production: Normal/ Responsive      Volume:  Normal  volume  Orientation:  All  Mood:   Anxious  Normal  Affect:   Appropriate   Thought Content: Clear   Insight:   Good       Plan/Need for Future Services:  Return for therapy in 1 weeks to treat diagnosed problems.    Patient has a current master individualized treatment plan.  See Epic treatment plan for more information.    Referral / Collaboration:  Referral to another professional/service is not indicated at this time..  Emergency Services Needed?  No    Assignment:  Routine stretching    Interactive Complexity:  There are four specific communication difficulties that complicate the work of the primary psychiatric procedure.  Interactive complexity (+94923) may be reported when at least one of these difficulties is present.    Communication difficulties present during current the psychiatric procedure include:  1. None.      Signature/Title:    VIKTOR Anderson, [facility]

## 2023-05-09 ENCOUNTER — OFFICE VISIT (OUTPATIENT)
Dept: PSYCHOLOGY | Facility: CLINIC | Age: 33
End: 2023-05-09
Payer: COMMERCIAL

## 2023-05-09 DIAGNOSIS — F91.8 CONDUCT DISORDER, UNDIFFERENTIATED TYPE: ICD-10-CM

## 2023-05-09 DIAGNOSIS — F33.2 MDD (MAJOR DEPRESSIVE DISORDER), RECURRENT SEVERE, WITHOUT PSYCHOSIS (H): Primary | ICD-10-CM

## 2023-05-09 DIAGNOSIS — F90.2 ATTENTION DEFICIT HYPERACTIVITY DISORDER (ADHD), COMBINED TYPE: ICD-10-CM

## 2023-05-09 DIAGNOSIS — F41.1 GENERALIZED ANXIETY DISORDER: ICD-10-CM

## 2023-05-09 PROCEDURE — 90853 GROUP PSYCHOTHERAPY: CPT | Performed by: MARRIAGE & FAMILY THERAPIST

## 2023-05-09 NOTE — GROUP NOTE
[facility]  [address], [address]  [place]  [address]    Group Progress Note  [facility] - Progress Note    Date of Service: 23   Name: Xavier Landon  : 1990  Medical Record Number: [id number]  START TIME:  6:30 PM  END TIME:  8:30 PM  FACILITATOR(S): Deborah Watson LMFT, [facility]  TOPIC: [facility] Group Therapy  Type of Session: Group  :  Number of Attendees:  6  Number of Minutes:  /120    SERVICE MODALITY:  In-person      DSM-5 Diagnoses:  296.33 (F33.2) Major Depressive Disorder, Recurrent Episode, Severe _ and With anxious distress  300.02 (F41.1) Generalized Anxiety Disorder.  Attention-Deficit/Hyperactivity Disorder  314.01 (F90.9) Unspecified Attention -Deficit / Hyperactivity Disorder.  Substance-Related & Addictive Disorders Alcohol Use Disorder   303.90 (F10.20) Moderate   301.83 (F60.3) Borderline Personality Disorder      Current Reported Symptoms and Status update:  Changes since last session-had been struggling financially, is getting though it  Struggling with compulsive sexual behavior  Struggling with emotional regulation      Progress Toward Treatment Goals:   Minimal progress     Therapeutic Interventions/Treatment Strategies:    Area(s) of treatment focus addressed in this session included Symptom Management and [place] Maintenance/Relapse Prevention    Patient checked in about their mental health symptoms, healthy coping skills used over the week, negative coping skills used over the week, what sexual behaviors they engaged in-fantasy, masturbation, sex, their comfort level with their behaviors, if there were triggers, urges to violate boundaries, and violations of boundaries.  They took time to process about self care an droutine they provided support and feedback to others in the group.   Psychotherapist offered support, feedback and validation and reinforced use of skills Treatment modalities used include [facility]  THERAPY INTERVENTIONS: Motivational Interviewing Group Dynamic Therapy  Interpersonal Behavioral Activation: Encouraged strategies to reduce individual procrastination and increase motivation by increasing goal-directed activities to enhance mood and reduce symptoms.  Support, Feedback and Structured Activity    Patient Response:   Patient responded to session by accepting feedback, giving feedback and listening  Possible barriers to participation / learning include: N/A    Current Mental Status Exam:   Appearance:  Appropriate   Eye Contact:  Good   Attitude / Demeanor: Cooperative   Speech      Rate / Production: Normal/ Responsive      Volume:  Normal  volume  Orientation:  All  Mood:   Normal  Affect:   Appropriate   Thought Content: Clear   Insight:   Good       Plan/Need for Future Services:  Return for therapy in 1 weeks to treat diagnosed problems.    Patient has a current master individualized treatment plan.  See Epic treatment plan for more information.    Referral / Collaboration:  Referral to another professional/service is not indicated at this time..  Emergency Services Needed?  No    Assignment:  Return in one week     Interactive Complexity:  There are four specific communication difficulties that complicate the work of the primary psychiatric procedure.  Interactive complexity (+60503) may be reported when at least one of these difficulties is present.    Communication difficulties present during current the psychiatric procedure include:  1. None.      Signature/Title:    Vance Watson, LMFT, [facility]

## 2023-05-10 ENCOUNTER — PATIENT OUTREACH (OUTPATIENT)
Dept: CARE COORDINATION | Facility: CLINIC | Age: 33
End: 2023-05-10
Payer: COMMERCIAL

## 2023-05-10 NOTE — PROGRESS NOTES
Clinic Care Coordination Contact    Clinic Care Coordination Contact  OUTREACH    Referral Information:     Pt referred by therapist, VIKTOR Guzmán for concerns around psychiatry appointment and varicose veins. Pt previously referred for Behavioral Health Care Coordination, but was originally unable to contact pt to offer/provide resources.       Chief Complaint   Patient presents with     Clinic Care Coordination - Initial        Universal Utilization: Briefly spoke with pt and explained process for getting on psychiatry appt. And suggested getting scheduled with PCP to hopefully speed along referral process for varicose veins, this worker will also send resources for varicose vein treatment via email per pt request, and permission.      Utilization    Hospital Admissions  0             ED Visits  0             No Show Count (past year)  6                Current as of: 5/10/2023 10:59 AM              Clinical Concerns:  Current Medical Concerns:  Needs psychiatry meds within the next week    Current Behavioral Concerns: na    Education Provided to patient: discussed referral process and appt scheduling      Health Maintenance Reviewed:    Clinical Pathway: None    Medication Management:  Medication review status:   Did not review individual medications.     Functional Status:       Living Situation:       Lifestyle & Psychosocial Needs:    Social Determinants of Health     Tobacco Use: Medium Risk (4/13/2023)    Patient History      Smoking Tobacco Use: Former      Smokeless Tobacco Use: Never      Passive Exposure: Not on file   Alcohol Use: Not on file   Financial Resource Strain: Not on file   Food Insecurity: Not on file   Transportation Needs: Not on file   Physical Activity: Not on file   Stress: Not on file   Social Connections: Not on file   Intimate Partner Violence: Not on file   Depression: At risk (6/16/2022)    PHQ-2      PHQ-2 Score: 3   Housing Stability: Not on file                Resources  and Interventions:  Current Resources:             OP Mental [facility]             The patient consented via Verbal consent to have contact information and resources sent via email in an unencrypted manner.     Care Plan:      Patient/Caregiver understanding: pt reports understanding and denies any additional questions or concerns at this time. GIAN CC engaged in AIDET communication during encounter.          Future Appointments              In 2 days Deborah Watson LMFT, [facility]    In 6 days Deborah Watson LMFT, [facility]    In 1 week ShirleyDeborah zhang LMFT, [facility]    In 1 week ShirleyDeborah zhang LMFT, [facility]    In 2 weeks ShirleyDeborah zhang LMFT, [facility]    In 2 weeks ShirleyDeborah zhang LMFT, [facility]    In 3 weeks ShirleyDeborah zhang LMFT, [facility]    In 3 weeks ShirleyDeborah zhang LMFT, [facility]    In 1 month ShirleyDeborah zhang LMFT, [facility]    In 1 month ShirleyDeborah zhang LMFT, [facility]    In 1 month ShirleyDeborah zhang LMFT, [facility]    In 1 month ShirleyDeborah zhang LMFT, [facility]    In 1 month ShirleyDeborah zhang LMFT, [facility]    In 1 month ShirleyDeborah zhang LMFT, [facility]          Plan: Pt stated they did not feel that ongoing outreaches, and requested to not be enrolled in care coordination at this time.    ESTEFANY Ortiz  Social Work Care Coordinator  [facility]  [phone]  [email or web address]  Pronouns: They/He

## 2023-05-10 NOTE — PROGRESS NOTES
Clinic Care Coordination Contact  Referring therapist request this worker reach out again as today (Wednesday 5/10/23) is pt's day off. Left message for pt on their vm Inviting them to call back. This worker will wait to hear back from pt.     ESTEFANY Ortiz  Social Work Care Coordinator  [facility]  [phone]  [email or web address]  Pronouns: They/He

## 2023-05-10 NOTE — PROGRESS NOTES
Clinic Care Coordination Contact  [facility]/Voicemail       Clinical Data: Care Coordinator Outreach. Referring therapist requested this worker reach out for pharmacy help.  Outreach attempted x 4.  Unable to leave a voicemail   Plan: Care Coordinator sent care coordination introduction letter on 2/10/23 via [employer]. Care Coordinator will do no further outreaches at this time.    ESTEFANY Ortiz  Social Work Care Coordinator  [facility]  [phone]  [email or web address]  Pronouns: They/He

## 2023-05-12 ENCOUNTER — OFFICE VISIT (OUTPATIENT)
Dept: PSYCHOLOGY | Facility: CLINIC | Age: 33
End: 2023-05-12
Payer: COMMERCIAL

## 2023-05-12 DIAGNOSIS — F90.2 ATTENTION DEFICIT HYPERACTIVITY DISORDER (ADHD), COMBINED TYPE: ICD-10-CM

## 2023-05-12 DIAGNOSIS — F19.90 SUBSTANCE USE DISORDER: ICD-10-CM

## 2023-05-12 DIAGNOSIS — F41.1 GENERALIZED ANXIETY DISORDER: ICD-10-CM

## 2023-05-12 DIAGNOSIS — F91.8 CONDUCT DISORDER, UNDIFFERENTIATED TYPE: ICD-10-CM

## 2023-05-12 DIAGNOSIS — F33.2 MDD (MAJOR DEPRESSIVE DISORDER), RECURRENT SEVERE, WITHOUT PSYCHOSIS (H): Primary | ICD-10-CM

## 2023-05-12 PROCEDURE — 90837 PSYTX W PT 60 MINUTES: CPT | Performed by: MARRIAGE & FAMILY THERAPIST

## 2023-05-12 RX ORDER — DEXTROAMPHETAMINE SACCHARATE, AMPHETAMINE ASPARTATE MONOHYDRATE, DEXTROAMPHETAMINE SULFATE AND AMPHETAMINE SULFATE 5; 5; 5; 5 MG/1; MG/1; MG/1; MG/1
20 CAPSULE, EXTENDED RELEASE ORAL 2 TIMES DAILY
Qty: 60 CAPSULE | Refills: 0 | Status: SHIPPED | OUTPATIENT
Start: 2023-05-12 | End: 2023-05-26

## 2023-05-12 RX ORDER — DEXTROAMPHETAMINE SACCHARATE, AMPHETAMINE ASPARTATE, DEXTROAMPHETAMINE SULFATE AND AMPHETAMINE SULFATE 2.5; 2.5; 2.5; 2.5 MG/1; MG/1; MG/1; MG/1
10 TABLET ORAL DAILY PRN
Qty: 30 TABLET | Refills: 0 | Status: SHIPPED | OUTPATIENT
Start: 2023-05-12 | End: 2023-05-31

## 2023-05-12 NOTE — TELEPHONE ENCOUNTER
[facility] Call Center    Phone Message    May a detailed message be left on voicemail: yes     Reason for Call: Medication Refill Request    Has the patient contacted the pharmacy for the refill? Yes   Name of medication being requested: Adderall 20mg and 10mg, Wellbutrin, Lithium, and Naltrexone  Provider who prescribed the medication: jigar Bragaing care to Vivian Manzano  Pharmacy: [facility] [place]   Date medication is needed: ASAP, pt needs refills before transfer appointment with Vivian         Action Taken: Message routed to:  Other: P PSYCHIATRY NURSE-P    Travel Screening: Not Applicable

## 2023-05-12 NOTE — PROGRESS NOTES
[facility] - Progress Note    Date of Service: 23   Name: Xavier Landon  : 1990  Medical Record Number: [id number]  Treating Provider: VIKTOR Anderson, [facility]  Type of Session: Individual  Present in Session: pt  Session Start and Stop Time: 919am-1013am  Number of Minutes:  56    SERVICE MODALITY:  In-person    DSM-5 Diagnoses:  296.33 (F33.2) Major Depressive Disorder, Recurrent Episode, Severe _ and With anxious distress  300.02 (F41.1) Generalized Anxiety Disorder.  Attention-Deficit/Hyperactivity Disorder  314.01 (F90.9) Unspecified Attention -Deficit / Hyperactivity Disorder.  Substance-Related & Addictive Disorders Alcohol Use Disorder   303.90 (F10.20) Moderate   301.83 (F60.3) Borderline Personality Disorder      Current Reported Symptoms and Status update:  Changes since last session-finally got paid  Struggling with compulsive sexual behavior  Struggling with emotional regulation      Progress Toward Treatment Goals:   Minimal progress     Therapeutic Interventions/Treatment Strategies:    Area(s) of treatment focus addressed in this session included Symptom Management, Interpersonal Relationship Skills and Sexual Health and Wellness      Psychotherapist offered support, feedback and validation and reinforced use of skills Treatment modalities used include Motivational Interviewing Cognitive Behavioral Therapy Interpersonal Coping Skills: Assisted patient in understanding the purpose of planning / creating / participating / sharing in positive experiences and Relapse Prevention: Assisted patient in identifying the challenges and barriers to participation and attendance to support groups/community resources  Support, Feedback and Structured Activity    Patient Response:   Patient responded to session by accepting feedback, listening and accepting support  Possible barriers to participation / learning include: N/A    Current Mental Status Exam:    Appearance:  Appropriate   Eye Contact:  Good   Attitude / Demeanor: Cooperative   Speech      Rate / Production: Normal/ Responsive      Volume:  Normal  volume  Orientation:  All  Mood:   Normal  Affect:   Appropriate   Thought Content: Clear   Insight:   Good       Plan/Need for Future Services:  Return for therapy in 2 weeks to treat diagnosed problems.    Patient has a current master individualized treatment plan.  See Epic treatment plan for more information.    Referral / Collaboration:  Referral to another professional/service is not indicated at this time..  Emergency Services Needed?  No    Assignment:  Return to group mindfulness    Interactive Complexity:  There are four specific communication difficulties that complicate the work of the primary psychiatric procedure.  Interactive complexity (+03708) may be reported when at least one of these difficulties is present.    Communication difficulties present during current the psychiatric procedure include:  1. None.      Signature/Title:    VIKTOR Anderson, [facility]

## 2023-05-12 NOTE — TELEPHONE ENCOUNTER
Last Seen 4/13 w/ Kofi Martin  RTC Unknown  Cancel None  No-Show None    Next Appt 5/31 w/ Vivian Manzano    Incoming Refill From patient via telephone    Medication Requested   amphetamine-dextroamphetamine (ADDERALL XR) 20 MG 24 hr capsule  Directions   Take 1 capsule (20 mg) by mouth 2 times daily  Qty 60  Last Refill Per [place]  4/19 #60, 3/23 #60, 2/12 #60    Medication Requested   amphetamine-dextroamphetamine (ADDERALL) 10 MG tablet  Directions   Take 1 tablet daily in afternoon for breakthrough sx  Qty 30  Last Refill Per [place]  4/12 #30, 3/8 #30, 2/9 #30    Per [facility] pharmacy Wellbutrin, Lithium and Naltrexone all have 2 refills remaining at the pharmacy.     Medication pended and routed to provider for approval.

## 2023-05-16 ENCOUNTER — OFFICE VISIT (OUTPATIENT)
Dept: PSYCHOLOGY | Facility: CLINIC | Age: 33
End: 2023-05-16
Payer: COMMERCIAL

## 2023-05-16 DIAGNOSIS — F33.1 MODERATE EPISODE OF RECURRENT MAJOR DEPRESSIVE DISORDER (H): ICD-10-CM

## 2023-05-16 DIAGNOSIS — F19.90 SUBSTANCE USE DISORDER: ICD-10-CM

## 2023-05-16 DIAGNOSIS — F33.2 MDD (MAJOR DEPRESSIVE DISORDER), RECURRENT SEVERE, WITHOUT PSYCHOSIS (H): Primary | ICD-10-CM

## 2023-05-16 DIAGNOSIS — F41.1 GENERALIZED ANXIETY DISORDER: ICD-10-CM

## 2023-05-16 PROCEDURE — 90853 GROUP PSYCHOTHERAPY: CPT | Performed by: MARRIAGE & FAMILY THERAPIST
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
## 2023-05-19 ENCOUNTER — OFFICE VISIT (OUTPATIENT)
Dept: PSYCHOLOGY | Facility: CLINIC | Age: 33
End: 2023-05-19
Payer: COMMERCIAL

## 2023-05-19 DIAGNOSIS — F41.1 GENERALIZED ANXIETY DISORDER: ICD-10-CM

## 2023-05-19 DIAGNOSIS — F33.2 MDD (MAJOR DEPRESSIVE DISORDER), RECURRENT SEVERE, WITHOUT PSYCHOSIS (H): Primary | ICD-10-CM

## 2023-05-19 DIAGNOSIS — F19.90 SUBSTANCE USE DISORDER: ICD-10-CM

## 2023-05-19 PROCEDURE — 90837 PSYTX W PT 60 MINUTES: CPT | Performed by: MARRIAGE & FAMILY THERAPIST
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
## 2023-05-26 ENCOUNTER — OFFICE VISIT (OUTPATIENT)
Dept: PSYCHOLOGY | Facility: CLINIC | Age: 33
End: 2023-05-26
Payer: COMMERCIAL

## 2023-05-26 DIAGNOSIS — F33.2 MDD (MAJOR DEPRESSIVE DISORDER), RECURRENT SEVERE, WITHOUT PSYCHOSIS (H): Primary | ICD-10-CM

## 2023-05-26 DIAGNOSIS — F19.90 SUBSTANCE USE DISORDER: ICD-10-CM

## 2023-05-26 DIAGNOSIS — F90.2 ATTENTION DEFICIT HYPERACTIVITY DISORDER (ADHD), COMBINED TYPE: ICD-10-CM

## 2023-05-26 DIAGNOSIS — F91.8 CONDUCT DISORDER, UNDIFFERENTIATED TYPE: ICD-10-CM

## 2023-05-26 DIAGNOSIS — F41.1 GENERALIZED ANXIETY DISORDER: ICD-10-CM

## 2023-05-26 PROCEDURE — 90837 PSYTX W PT 60 MINUTES: CPT | Performed by: MARRIAGE & FAMILY THERAPIST

## 2023-05-26 RX ORDER — DEXTROAMPHETAMINE SACCHARATE, AMPHETAMINE ASPARTATE MONOHYDRATE, DEXTROAMPHETAMINE SULFATE AND AMPHETAMINE SULFATE 5; 5; 5; 5 MG/1; MG/1; MG/1; MG/1
20 CAPSULE, EXTENDED RELEASE ORAL 2 TIMES DAILY
Qty: 60 CAPSULE | Refills: 0 | Status: SHIPPED | OUTPATIENT
Start: 2023-05-26 | End: 2023-05-31

## 2023-05-26 NOTE — PROGRESS NOTES
[place] [facility] - Progress Note    Date of Service: 23   Name: Xavier Landon  : 1990  Medical Record Number: [id number]  Treating Provider: VIKTOR Anderson, [facility]  Type of Session: Individual  Present in Session: pt  Session Start and Stop Time: 9275-5094  Number of Minutes:  58    SERVICE MODALITY:  In-person    DSM-5 Diagnoses:  296.33 (F33.2) Major Depressive Disorder, Recurrent Episode, Severe _ and With anxious distress  300.02 (F41.1) Generalized Anxiety Disorder.  Attention-Deficit/Hyperactivity Disorder  314.01 (F90.9) Unspecified Attention -Deficit / Hyperactivity Disorder.  Substance-Related & Addictive Disorders Alcohol Use Disorder   303.90 (F10.20) Moderate   301.83 (F60.3) Borderline Personality Disorder      Current Reported Symptoms and Status update:  Changes since last session-rx has not been refilled   Struggling with compulsive sexual behavior  Struggling with emotional regulation      Progress Toward Treatment Goals:   Minimal progress     Therapeutic Interventions/Treatment Strategies:    Area(s) of treatment focus addressed in this session included Symptom Management and Interpersonal Relationship Skills      Psychotherapist offered support, feedback and validation and reinforced use of skills Treatment modalities used include Motivational Interviewing Dialectical Behavioral Therapy Interpersonal Behavioral Activation: Reinforced benefits/challenges of change process through applying skills to replace unwanted behaviors and Encouraged strategies to reduce individual procrastination and increase motivation by increasing goal-directed activities to enhance mood and reduce symptoms. and Coping Skills: Facilitated discussion on learning and applying radical acceptance skill and Assisted patient in understanding the purpose of planning / creating / participating / sharing in positive experiences  Support, Feedback and Problem Solving    Patient  Response:   Patient responded to session by accepting feedback and accepting support  Possible barriers to participation / learning include: N/A    Current Mental Status Exam:   Appearance:  Appropriate   Eye Contact:  Good   Attitude / Demeanor: Cooperative   Speech      Rate / Production: Normal/ Responsive      Volume:  Normal  volume  Orientation:  All  Mood:   Anxious   Affect:   Appropriate   Thought Content: Clear   Insight:   Good       Plan/Need for Future Services:  Return for therapy in 1 weeks to treat diagnosed problems.    Patient has a current master individualized treatment plan.  See Epic treatment plan for more information.    Referral / Collaboration:  Referral to another professional/service is not indicated at this time..  Emergency Services Needed?  No    Assignment:  calendars    Interactive Complexity:  There are four specific communication difficulties that complicate the work of the primary psychiatric procedure.  Interactive complexity (+[id number]) may be reported when at least one of these difficulties is present.    Communication difficulties present during current the psychiatric procedure include:  1. None.      Signature/Title:    Vance Watson, VIKTOR, [facility]

## 2023-05-26 NOTE — TELEPHONE ENCOUNTER
Vivian Manzano APRN CNP  P Psychiatry Nurse-[facility]  So this pt is supposed to transfer to me, but I have not seen the patient. Looks like refill was sent on 5/12, but therapist sent this request.  Pls send it to different pharmacy.  Thank you           Previous Messages       ----- Message -----   From: Vance Watson, LM, [facility]   Sent: 5/26/2023   9:53 AM CDT   To: YOSELIN Leary CNP   Subject: switching pharmacy                               Hi calin Park here with Huey and  his prescription for adderall xr still has not been filled because [facility] has been out since they got the request 5/12/23, would you please pass this message on to the appropriate pool to have his prescription sent to the [facility] at [address] [facility] [place] [address]     Please.  It would be the adderall xr 20mg 60 capsule fill.     k if you could do this and thank you!     Vance

## 2023-05-30 ENCOUNTER — OFFICE VISIT (OUTPATIENT)
Dept: PSYCHOLOGY | Facility: CLINIC | Age: 33
End: 2023-05-30
Payer: COMMERCIAL

## 2023-05-30 DIAGNOSIS — F90.2 ATTENTION DEFICIT HYPERACTIVITY DISORDER (ADHD), COMBINED TYPE: ICD-10-CM

## 2023-05-30 DIAGNOSIS — F19.90 SUBSTANCE USE DISORDER: ICD-10-CM

## 2023-05-30 DIAGNOSIS — F33.2 MDD (MAJOR DEPRESSIVE DISORDER), RECURRENT SEVERE, WITHOUT PSYCHOSIS (H): Primary | ICD-10-CM

## 2023-05-30 DIAGNOSIS — F41.1 GENERALIZED ANXIETY DISORDER: ICD-10-CM

## 2023-05-30 DIAGNOSIS — F91.8 CONDUCT DISORDER, UNDIFFERENTIATED TYPE: ICD-10-CM

## 2023-05-30 PROCEDURE — 90853 GROUP PSYCHOTHERAPY: CPT | Performed by: MARRIAGE & FAMILY THERAPIST

## 2023-05-30 NOTE — GROUP NOTE
[facility]  [address], [address]  [place]  [address]    Group Progress Note  [facility] - Progress Note    Date of Service: 23   Name: Xavier Landon  : 1990  Medical Record Number: [id number]  START TIME:  6:30 PM  END TIME:  8:30 PM  FACILITATOR(S): Deborah Watson LMFT, [facility]  TOPIC: [facility] Group Therapy  Type of Session: Group  :  Number of Attendees:  5  Number of Minutes:  /120    SERVICE MODALITY:  In-person      DSM-5 Diagnoses:  296.33 (F33.2) Major Depressive Disorder, Recurrent Episode, Severe _ and With anxious distress  300.02 (F41.1) Generalized Anxiety Disorder.  Attention-Deficit/Hyperactivity Disorder  314.01 (F90.9) Unspecified Attention -Deficit / Hyperactivity Disorder.  Substance-Related & Addictive Disorders Alcohol Use Disorder   303.90 (F10.20) Moderate   301.83 (F60.3) Borderline Personality Disorder      Current Reported Symptoms and Status update:  Changes since last session-  Struggling with compulsive sexual behavior  Struggling with emotional regulation      Progress Toward Treatment Goals:   Minimal progress     Therapeutic Interventions/Treatment Strategies:    Area(s) of treatment focus addressed in this session included Symptom Management    Patient checked in about their mental health symptoms, healthy coping skills used over the week, negative coping skills used over the week, what sexual behaviors they engaged in-fantasy, masturbation, sex, their comfort level with their behaviors, if there were triggers, urges to violate boundaries, and violations of boundaries.  They took time to process about feeling alienated from group members they provided support and feedback to others in the group.    Psychotherapist offered support, feedback and validation and reinforced use of skills Treatment modalities used include [facility] THERAPY INTERVENTIONS: Motivational Interviewing Group Dynamic Therapy   Interpersonal Coping Skills: Discussed use of self-soothe skills to decrease distress in the body and Assisted patient in identifying 1-2 healthy distraction skills to reduce overall distress  Support, Redirection, Limit/Boundaries and Structured Activity    Patient Response:   Patient responded to session by listening, appearing disengaged, appearing distracted and required support for self-regulation  Possible barriers to participation / learning include: contextual issues    Current Mental Status Exam:   Appearance:  Appropriate   Eye Contact:  Fair   Attitude / Demeanor: Belligerent  Hostile  Speech      Rate / Production: Pressured       Volume:  Loud  volume  Orientation:  Situation  Mood:   Angry  Irritable   Affect:   Labile   Thought Content: Clear   Insight:   Fair       Plan/Need for Future Services:  Return for therapy in 1 weeks to treat diagnosed problems.    Patient has a current master individualized treatment plan.  See Epic treatment plan for more information.    Referral / Collaboration:  Referral to another professional/service is not indicated at this time..  Emergency Services Needed?  No    Assignment:  Continue w self regulation    Interactive Complexity:  There are four specific communication difficulties that complicate the work of the primary psychiatric procedure.  Interactive complexity (+30677) may be reported when at least one of these difficulties is present.    Communication difficulties present during current the psychiatric procedure include:  1. None.      Signature/Title:    Vance Watson, LMFT, [facility]

## 2023-05-31 ENCOUNTER — VIRTUAL VISIT (OUTPATIENT)
Dept: PSYCHIATRY | Facility: CLINIC | Age: 33
End: 2023-05-31
Attending: NURSE PRACTITIONER
Payer: COMMERCIAL

## 2023-05-31 DIAGNOSIS — F91.8 CONDUCT DISORDER, UNDIFFERENTIATED TYPE: ICD-10-CM

## 2023-05-31 DIAGNOSIS — F41.1 GENERALIZED ANXIETY DISORDER: ICD-10-CM

## 2023-05-31 DIAGNOSIS — F33.41 RECURRENT MAJOR DEPRESSIVE DISORDER, IN PARTIAL REMISSION (H): Primary | ICD-10-CM

## 2023-05-31 DIAGNOSIS — F90.2 ATTENTION DEFICIT HYPERACTIVITY DISORDER (ADHD), COMBINED TYPE: ICD-10-CM

## 2023-05-31 PROCEDURE — 90792 PSYCH DIAG EVAL W/MED SRVCS: CPT | Mod: VID | Performed by: NURSE PRACTITIONER

## 2023-05-31 RX ORDER — DEXTROAMPHETAMINE SACCHARATE, AMPHETAMINE ASPARTATE MONOHYDRATE, DEXTROAMPHETAMINE SULFATE AND AMPHETAMINE SULFATE 5; 5; 5; 5 MG/1; MG/1; MG/1; MG/1
40 CAPSULE, EXTENDED RELEASE ORAL EVERY MORNING
Qty: 60 CAPSULE | Refills: 0 | Status: SHIPPED | OUTPATIENT
Start: 2023-05-31 | End: 2023-06-29 | Stop reason: DRUGHIGH

## 2023-05-31 RX ORDER — BUPROPION HYDROCHLORIDE 150 MG/1
150 TABLET ORAL EVERY MORNING
Qty: 30 TABLET | Refills: 1 | Status: SHIPPED | OUTPATIENT
Start: 2023-05-31 | End: 2023-06-28

## 2023-05-31 RX ORDER — NALTREXONE HYDROCHLORIDE 50 MG/1
TABLET, FILM COATED ORAL
Qty: 30 TABLET | Refills: 1 | Status: SHIPPED | OUTPATIENT
Start: 2023-05-31 | End: 2023-06-28

## 2023-05-31 RX ORDER — DEXTROAMPHETAMINE SACCHARATE, AMPHETAMINE ASPARTATE, DEXTROAMPHETAMINE SULFATE AND AMPHETAMINE SULFATE 2.5; 2.5; 2.5; 2.5 MG/1; MG/1; MG/1; MG/1
10 TABLET ORAL DAILY PRN
Qty: 30 TABLET | Refills: 0 | Status: SHIPPED | OUTPATIENT
Start: 2023-06-09 | End: 2023-06-28

## 2023-05-31 ASSESSMENT — PATIENT HEALTH QUESTIONNAIRE - PHQ9
SUM OF ALL RESPONSES TO PHQ QUESTIONS 1-9: 11
SUM OF ALL RESPONSES TO PHQ QUESTIONS 1-9: 11
10. IF YOU CHECKED OFF ANY PROBLEMS, HOW DIFFICULT HAVE THESE PROBLEMS MADE IT FOR YOU TO DO YOUR WORK, TAKE CARE OF THINGS AT HOME, OR GET ALONG WITH OTHER PEOPLE: SOMEWHAT DIFFICULT

## 2023-05-31 NOTE — PROGRESS NOTES
Virtual Visit Details    Type of service:  Video Visit     Originating Location (pt. Location): Home  Distant Location (provider location):  Off-site  Platform used for Video Visit: [facility] Transfer of Care Progress Note                                                                  Patient Name: Xavier Landon  YOB: 1990  MRN: [id number]  Date of Service:  5/31/2023  Last Seen:4/13/2023 by Kofi Martin    Xavier Landon is a 32 year old person assigned male at birth, identifies as cisgender male who uses the name Huey and pronoun wojciech.      Huey Landon is a 32 year old year old adult with episodic mood disorder, SHAUNA, MDD, CSB, BPD and alcohol use d/o, who presents for transfer of psychiatric care from [facility].  Huey Landon was last seen on 4/13/2023.   At that time,     Plan:     Patient Instructions   1)Lithium 300 mg: Ok to continue taking 2 capsules daily in the morning.      2)Bupropion 150: I recommend resuming 1 tablet once daily in the morning.       3)No other medication changes for now.      4)I recommend following up with Vivian Clarke NP. Please contact the clinic to schedule.      Pertinent Background:  Depression, anxiety and CSB started around high school. ADHD also diagnosed in HS.  SI in 20's, but no SIB or SA hx. ETOH started in early 20's and heaviest through 20's, last use 9/2022. Occasional cannabis seltzer use for recreation.  Psych critical item history includes suicidal ideation and substance use: alcohol and cannabis.     Previous medication trial: Lithium (600 mg not effective), Vyvance, Prozac, Zoloft.    Therapist: Deborah Watson (weekly)      Interim History                                                                                                        4, 4     Since the last visit,  -Reports tapered off lithium as pt did not feel it improved mood significantly. But also reports lithium was  prescribed for CSB and has not noted any improvement as this has been stable with Naltrexone.  Also reports discussed with therapist and coming off of lithium was recommended due to sexual side effects.  -Has been off lithium completely for 1 week. Noticed slightly more irritable, but overall, mood is ok, denies SI, SIB or HI.    -Irritability is a sign of depression, but denies any other symptoms of depression. Continues to regularly work out.  -Sleeping well since discontinued alcohol use in 9/2022. Goes to bed anywhere between 10pm-1am depending on work schedule and wakes up 6/8am.  Easily falls asleep and sleep throughout the night, denies nightmares.  -Anxiety is also well managed. Historically depression has been more significant than anxiety. If anxious, will have rumination.  -Emphasized stress with not being able to get Adderall refills on time due to national shortage. Takes Adderall XR and IR daily.  Wants to consider increasing it to 50 mg daily as pt heard Adderall XR 25 mg is easier to obtain than 20 mg.    -Has not had BP monitored recently, does not have BP monitoring means at home.  -Very difficult to get through a day without Adderall.    Denies any symptoms suggestive of hypomania or psychosis.    Current Suicidality/Hx of Suicide Attempts: Denies both  CoCominent Medical concerns: Denies    Medication Side Effects: The patient denies all medication side effects.    Medical Review of Systems     Apart from the symptoms mentioned int he HPI, the 14 point review of systems, including constitutional, HEENT, cardiovascular, respiratory, gastrointestinal, genitourinary, musculoskeletal, integumentary, endocrine, neurological, hematologic and allergic is entirely negative.    Substance Use     Denies alcohol use since 9/2022, this is substance of choice.  Occasional cannabis seltzer use as a recreation.  Previous provider note indicates hx of infrequent hallucinogen and MDMA use    Substance treatment x  "2, last in 2020.      Medical / Surgical History                                                                                                                  Patient Active Problem List   Diagnosis     MDD (major depressive disorder), recurrent severe, without psychosis (H)     Major depressive disorder, recurrent episode, severe with anxious distress (H)     Other Specified Disruptive, Impulse Control, and Conduct Disorder (Hypersexual Disorder)       Generalized anxiety disorder     Attention deficit hyperactivity disorder (ADHD), combined type     Substance use disorder     ADD (attention deficit disorder)     Depression, major, recurrent (H)       Past Surgical History:   Procedure Laterality Date     GENITOURINARY SURGERY          Social/ Family History                                  [per patient report]                                 1ea,1ea     Living arrangements: lives alone and feels safe.  Social Support:friends, therapy group peers, parents  Access to gun: tono  Born in [place], moved to [place] at age 15, grew up with 2 parents and felt safe.  Works as FT , does not feel this is difficult as he is trying to abstain from alcohol use.    Family med hx:  HTN: F, DM: MGM, Cardiac: MGF, Dementia: MGF, Depression: F, Anxiety: mother, ADHD: M, MA.  ETOH: MU \"my famly likes to drink.\"    Allergy                                Patient has no known allergies.    Current Medications                                                                                                       Current Outpatient Medications   Medication Sig Dispense Refill     amphetamine-dextroamphetamine (ADDERALL XR) 20 MG 24 hr capsule Take 1 capsule (20 mg) by mouth 2 times daily 60 capsule 0     amphetamine-dextroamphetamine (ADDERALL) 10 MG tablet Take 1 tablet (10 mg) by mouth daily as needed (in the afternoon for breakthrough sx) 30 tablet 0     buPROPion (WELLBUTRIN XL) 150 MG 24 hr tablet Take 1 tablet (150 mg) by mouth " "every morning 30 tablet 1     lithium 300 MG capsule Take 2 capsules twice daily 120 capsule 2     naltrexone (DEPADE/REVIA) 50 MG tablet Take 1 50 mg tablet daily 30 tablet 1         Vitals                                                                                                                       3, 3   There were no vitals taken for this visit.        Mental Status Exam                                                                                   9, 14 cog        Alertness: alert  and oriented  Appearance:  Casually dressed and Adequately groomed  Behavior/Demeanor: cooperative and somewhat interruptive, possibly anxious, with good  eye contact   Speech: slightly pressured  Mood :  \"good\"  Affect: mostly euthymic, slightly elevated; was congruent to mood; was congruent to content  Thought Process (Associations):  Linear and Goal directed  Thought process (Rate):  Slightly rapid  Thought content:  no overt psychosis, denies suicidal ideation, intent or thoughts and patient does not appear to be responding to internal stimuli  Perception:  Reports none;  Denies auditory hallucinations, visual hallucinations, depersonalization and derealization  Attention/Concentration:  Fair  Memory:  Immediate recall intact, Short-term memory intact and Long-term memory intact  Language: intact  Fund of Knowledge/Intelligence:  Average  Abstraction:  Normal  Insight:  Fair  Judgment:  Fair  Cognition: (6) does  appear grossly intact; formal cognitive testing was not done      Physical Exam     Motor activity/EPS:  Normal  Psychomotor: normal or unremarkable    Labs and Results      Pertinent findings on review include: Review of records with relevant information reported in the HPI.  Reviewed pt's past medical record and obtained collateral information.    [place] PRESCRIPTION MONITORING PROGRAM [] was checked today:  indicates Adderall IR 5/12, Adderall XR 4/19.    Answers for HPI/ROS submitted by the patient on " 5/31/2023  If you checked off any problems, how difficult have these problems made it for you to do your work, take care of things at home, or get along with other people?: Somewhat difficult  PHQ9 TOTAL SCORE: 11        4/25/2022     1:01 PM 6/6/2022     1:47 PM 6/16/2022     2:42 PM   PHQ   PHQ-9 Total Score 14 12 14   Q9: Thoughts of better off dead/self-harm past 2 weeks More than half the days Several days Several days   F/U: Thoughts of suicide or self-harm No No No   F/U: Safety concerns No No No       SHAUNA 7 Today: N/A      Recent Labs   Lab Test 06/01/22  1123 05/09/22  0855 04/26/22  1333   CR 0.84 0.86 0.78   GFRESTIMATED >90 >90 >90     Recent Labs   Lab Test 06/01/22  1123 05/09/22  0855   AST 19 83*   ALT 55 240*   ALKPHOS 68 79       PSYCHOTROPIC DRUG INTERACTIONS:    Adderall---Wellbutrin: Concurrent use of AMPHETAMINES and CYP2D6 INHIBITORS may result in increased amphetamine exposure and increased risk of serotonin syndrome.  MANAGEMENT:  Monitoring for adverse effects and patient is aware of risks    Impression/Assessment     Huey Landon is a 32 year old adult  who presents for transfer of care. Pt appears to be slightly elevated with slightly pressured speech, but pt may be anxious, denies Si, SIB or HI during the appointment. Pt denied any hx of hypomania though previous psych provider indicated episodic mood d/o while therapist has MDD with BPD diagnosed. Pt also noted he stopped lithium 1 week ago with taper down as he has not noted any improvement in mood or CSB and noted some irritability, but not other sxs. This was not the instruction from previous provider, but it appears pt has been tapering down lithium last 6-8 months on his own without significant mood exacerbation. Encouraged pt to monitor irritability closely as he noted some changes, but will discontinue lithium as he is no longer on the medication.  Pt noted his mood and anxiety are well managed.  Pt emphasized stress of not  being able to get Adderall supplies. Pt wanted to increase Adderall XR to 50 mg daily as he has tried 60 mg daily in the past and also has heard 25 mg capsule is easier to find.  Discussed pt does not have recent BP and limited BP in 2020 and 2022 were slightly elevated. Pt noted he was not on any stimulant in 2020, but was on Adderall in 2022. But he has stopped drinking since.  Recommended to monitor BP x2/week for 2 weeks and report back to this writer if he is considering to increase Adderall dosage. But discussed other pharmacy may have supplies of Adderall XR 20 mg capsule.  LFT WNL in 6/2022. Will continue all other medication regimen for now as there's no recent BP. Will discuss LFT check in next appt. Also therapist requested [employer] testing which was not discussed today.  Will discuss this in next appt.    Diagnosis                                                                   Mood disorder (MDD vs episodic mood d/o)  SHAUNA  CSB  ADHD  BPD per therapist    Treatment Recommendation & Plan       Medication Ordered/Consults/Labs/tests Ordered:     Medication:   -Lithium is discontinued as you stopped the medication. Monitor irritability closely.  -Continue all other medication regimen.  If you want to increase Adderall, please check your blood pressure 2 times a week for next 2 weeks and send it to [facility] via [employer]. If your blood pressure is within a normal range, will consider increasing Adderall XR in the future.  OTC Recommendations: none  Lab Orders:  none  Referrals: none  Release of Information: none  Future Treatment Considerations: per symptoms. [employer] and LFT in next visit to discuss  Return for Follow Up: in 4 weeks    -Discussed safety plan for suicidal thoughts  -Discussed plan for suicidality  -Discussed available emergency services  -Patient agrees with the treatment plan  -Encouraged to continue outpatient therapy to gain more coping mechanism for stress.      -Pt understood that after  stopping Naltrexone, they may be more sensitive to the effects of heroin and any other narcotics.  The amount of heroin or narcotics pt may have been using on a routine basis before pt started naltrexone might now cause overdose and death and pt fully understands the nature and seriousness of this possible consequences.  If patient is not sure if they can avoid opioid use, pt understands that pt can be referred to alternative treatment programs such as methadone maintenance, which is an effective treatment for opioid dependence and has a reduced risk of fatal overdose.      Treatment Risk Statement: Discussed with the patient my impressions, as well as recommended studies. I educated patient on the differential diagnosis and prognosis. I discussed with the patient the risks and benefits of medications versus no interventions, including efficacy, dose, possible side effects and length of treatment and the importance of medication compliance.  The patient understands the risks, benefits, adverse effects and alternatives. Agrees to treatment with the capacity to do so. No medical contraindications to treatment. The patient also understands the risks of using street drugs or alcohol.      CRISIS NUMBERS:   Provided routinely in AVS.       Vivian Manzano CNP,  5/31/2023

## 2023-05-31 NOTE — NURSING NOTE
Is the patient currently in the state of [place]? YES    Visit mode:VIDEO    If the visit is dropped, the patient can be reconnected by: VIDEO VISIT: Send to e-mail at: [email or web address]    Will anyone else be joining the visit? NO      How would you like to obtain your AVS? MyChart    Are changes needed to the allergy or medication list? NO    Reason for visit: Consult    Pt will work on last qnr [email or web address] and join video closer to appt time.    CATHRYN Mena on 5/31/2023 at 9:42 AM

## 2023-05-31 NOTE — PATIENT INSTRUCTIONS
-Lithium is discontinued as you stopped the medication. Monitor irritability closely.  -Continue all other medication regimen.  If you want to increase Adderall, please check your blood pressure 2 times a week for next 2 weeks and send it to carolyn via [employer]. If your blood pressure is within a normal range, will consider increasing Adderall XR in the future.    Your next appointment is scheduled on 6/28/2023 (Wed) at 9am.      **For crisis resources, please see the information at the end of this document**   Patient Education    Thank you for coming to the [facility] [place] [facility].     Lab Testing:  If you had lab testing today and your results are reassuring or normal they will be mailed to you or sent through [facility] within 7 days. If the lab tests need quick action we will call you with the results. The phone number we will call with results is # [phone]. If this is not the best number please call our clinic and change the number.     Medication Refills:  If you need any refills please call your pharmacy and they will contact us. Our fax number for refills is [phone].   Three business days of notice are needed for general medication refill requests.   Five business days of notice are needed for controlled substance refill requests.   If you need to change to a different pharmacy, please contact the new pharmacy directly. The new pharmacy will help you get your medications transferred.     Contact Us:  Please call [phone] during business hours (8-5:00 M-F).   If you have medication related questions after clinic hours, or on the weekend, please call [phone].     Financial Assistance [phone]   Medical Records [phone]       MENTAL HEALTH CRISIS RESOURCES:  For a emergency help, please call 911 or go to the nearest Emergency Department.     Emergency Walk-In Options:   EmPATH Unit @ [facility] [place] ([place]): [phone] - Specialized mental  health emergency area designed to be calming  [facility] [place] ([place]): [phone]  [facility] ([place]): [phone]  [facility] ([place]): [phone]    [facility] Crisis Information:   [place]: [phone]  [place]: [phone]  [place] ([place]) - Adult: [phone]     Child: [phone]  Sae - Adult: [phone]     Child: [phone]  [place]: [phone]  List of all [facility] resources:   https://[place].gov/dhs/people-we-serve/adults/health-care/mental-health/resources/crisis-contacts.jsp    National Crisis Information:   Crisis Text Line: Text  [place]  to 448439  Suicide & Crisis Lifeline: 988  National Suicide Prevention Lifeline: 0-030-909-TALK (1-989.275.4414)       For online chat options, visit https://suicidepreventionlifeline.org/chat/  Poison Control Center: 1-453.444.6973  Trans Lifeline: 1-425.806.8509 - Hotline for transgender people of all ages  The [employer] Project: 5-563-827-9284 - Hotline for LGBT youth     For Non-Emergency Support:   Fast Tracker: Mental Health & Substance Use Disorder Resources -   https://www.JB TherapeuticstracktwtMobn.org/

## 2023-06-02 ENCOUNTER — OFFICE VISIT (OUTPATIENT)
Dept: PSYCHOLOGY | Facility: CLINIC | Age: 33
End: 2023-06-02
Payer: COMMERCIAL

## 2023-06-02 DIAGNOSIS — F91.8 CONDUCT DISORDER, UNDIFFERENTIATED TYPE: ICD-10-CM

## 2023-06-02 DIAGNOSIS — F33.2 MDD (MAJOR DEPRESSIVE DISORDER), RECURRENT SEVERE, WITHOUT PSYCHOSIS (H): Primary | ICD-10-CM

## 2023-06-02 DIAGNOSIS — F19.90 SUBSTANCE USE DISORDER: ICD-10-CM

## 2023-06-02 DIAGNOSIS — F90.2 ATTENTION DEFICIT HYPERACTIVITY DISORDER (ADHD), COMBINED TYPE: ICD-10-CM

## 2023-06-02 PROCEDURE — 90837 PSYTX W PT 60 MINUTES: CPT | Performed by: MARRIAGE & FAMILY THERAPIST

## 2023-06-02 NOTE — PROGRESS NOTES
[facility] - Progress Note    Date of Service: 23   Name: Xavier Landon  : 1990  Medical Record Number: [id number]  Treating Provider: VIKTOR Anderson, [facility]  Type of Session: Individual  Present in Session: pt  Session Start and Stop Time: 0900-959  Number of Minutes:  59    SERVICE MODALITY:  In person      DSM-5 Diagnoses:  296.33 (F33.2) Major Depressive Disorder, Recurrent Episode, Severe _ and With anxious distress  300.02 (F41.1) Generalized Anxiety Disorder.  Attention-Deficit/Hyperactivity Disorder  314.01 (F90.9) Unspecified Attention -Deficit / Hyperactivity Disorder.  Substance-Related & Addictive Disorders Alcohol Use Disorder   303.90 (F10.20) Moderate   301.83 (F60.3) Borderline Personality Disorder      Current Reported Symptoms and Status update:  Changes since last session-got rx filled   Struggling with compulsive sexual behavior  Struggling with emotional regulation      Progress Toward Treatment Goals:   Minimal progress     Therapeutic Interventions/Treatment Strategies:    Area(s) of treatment focus addressed in this session included Symptom Management and Sexual Health and Wellness  explroed sex history    Psychotherapist offered support, feedback and validation and reinforced use of skills Treatment modalities used include Motivational Interviewing Dialectical Behavioral Therapy Sexual Health and Wellness Education explored challenging unrealistic expectations of self/others, explored comfort with sexual communication and facilitated discussion about sexual pleasure and sexual satisfaction  Support, Feedback and Structured Activity    Patient Response:   Patient responded to session by accepting feedback, listening, focusing on goals and accepting support  Possible barriers to participation / learning include: N/A    Current Mental Status Exam:   Appearance:  Appropriate   Eye Contact:  Good   Attitude / Demeanor: Cooperative    Speech      Rate / Production: Normal/ Responsive Emotional      Volume:  Normal  volume  Orientation:  All  Mood:   Anxious  Normal  Affect:   Appropriate   Thought Content: Clear   Insight:   Good       Plan/Need for Future Services:  Return for therapy in 1 weeks to treat diagnosed problems.    Patient has a current master individualized treatment plan.  See Epic treatment plan for more information.    Referral / Collaboration:  Referral to another professional/service is not indicated at this time..  Emergency Services Needed?  No    Assignment:  Masturbation in condoms losing and regaining erections    Interactive Complexity:  There are four specific communication difficulties that complicate the work of the primary psychiatric procedure.  Interactive complexity (+55621) may be reported when at least one of these difficulties is present.    Communication difficulties present during current the psychiatric procedure include:  1. None.      Signature/Title:    Vance Watson, VIKTOR, [facility]

## 2023-06-06 ENCOUNTER — OFFICE VISIT (OUTPATIENT)
Dept: PSYCHOLOGY | Facility: CLINIC | Age: 33
End: 2023-06-06
Payer: COMMERCIAL

## 2023-06-06 DIAGNOSIS — F33.2 MDD (MAJOR DEPRESSIVE DISORDER), RECURRENT SEVERE, WITHOUT PSYCHOSIS (H): Primary | ICD-10-CM

## 2023-06-06 DIAGNOSIS — F90.2 ATTENTION DEFICIT HYPERACTIVITY DISORDER (ADHD), COMBINED TYPE: ICD-10-CM

## 2023-06-06 DIAGNOSIS — F91.8 CONDUCT DISORDER, UNDIFFERENTIATED TYPE: ICD-10-CM

## 2023-06-06 DIAGNOSIS — F19.90 SUBSTANCE USE DISORDER: ICD-10-CM

## 2023-06-06 PROCEDURE — 90853 GROUP PSYCHOTHERAPY: CPT | Performed by: MARRIAGE & FAMILY THERAPIST

## 2023-06-06 NOTE — GROUP NOTE
[facility]  [address], [address]  [place]  [address]    Group Progress Note  [facility] - Progress Note    Date of Service: 23   Name: Xavier Landon  : 1990  Medical Record Number: [id number]  START TIME:  6:30 PM  END TIME:  8:30 PM  FACILITATOR(S): Deborah Watson LMFT, [facility]  TOPIC: [facility] Group Therapy  Type of Session: Group  :  Number of Attendees:  6  Number of Minutes:  120    SERVICE MODALITY:  In-person      DSM-5 Diagnoses:  296.33 (F33.2) Major Depressive Disorder, Recurrent Episode, Severe _ and With anxious distress  300.02 (F41.1) Generalized Anxiety Disorder.  Attention-Deficit/Hyperactivity Disorder  314.01 (F90.9) Unspecified Attention -Deficit / Hyperactivity Disorder.  Substance-Related & Addictive Disorders Alcohol Use Disorder   303.90 (F10.20) Moderate   301.83 (F60.3) Borderline Personality Disorder      Current Reported Symptoms and Status update:  Changes since last session-improvement in sexual functioning  Struggling with compulsive sexual behavior  Struggling with emotional regulation      Progress Toward Treatment Goals:   Minimal progress     Therapeutic Interventions/Treatment Strategies:    Area(s) of treatment focus addressed in this session included Symptom Management and Sexual Health and Wellness    Patient checked in about their mental health symptoms, healthy coping skills used over the week, negative coping skills used over the week, what sexual behaviors they engaged in-fantasy, masturbation, sex, their comfort level with their behaviors, if there were triggers, urges to violate boundaries, and violations of boundaries.  They took time to process about sexual functioning and getting back into  they provided support and feedback to others in the group.    Psychotherapist offered support, feedback and validation and reinforced use of skills Treatment modalities used include [facility] THERAPY  INTERVENTIONS: Motivational Interviewing Group Dynamic Therapy  Interpersonal Behavioral Activation: Encouraged strategies to reduce individual procrastination and increase motivation by increasing goal-directed activities to enhance mood and reduce symptoms. and facilitated discussion about sexual health and wellness and explored challenging unrealistic expectations of self/others  Support, Feedback and Structured Activity    Patient Response:   Patient responded to session by accepting feedback, giving feedback and listening  Possible barriers to participation / learning include: N/A    Current Mental Status Exam:   Appearance:  Appropriate   Eye Contact:  Good   Attitude / Demeanor: Cooperative   Speech      Rate / Production: Normal/ Responsive      Volume:  Normal  volume  Orientation:  All  Mood:   Normal  Affect:   Appropriate   Thought Content: Clear   Insight:   Good       Plan/Need for Future Services:  Return for therapy in 1 weeks to treat diagnosed problems.    Patient has a current master individualized treatment plan.  See Epic treatment plan for more information.    Referral / Collaboration:  Referral to another professional/service is not indicated at this time..  Emergency Services Needed?  No    Assignment:  Return in one week     Interactive Complexity:  There are four specific communication difficulties that complicate the work of the primary psychiatric procedure.  Interactive complexity (+92677) may be reported when at least one of these difficulties is present.    Communication difficulties present during current the psychiatric procedure include:  1. None.      Signature/Title:    Vance Watson, LMFT, [facility]

## 2023-06-13 ENCOUNTER — OFFICE VISIT (OUTPATIENT)
Dept: PSYCHOLOGY | Facility: CLINIC | Age: 33
End: 2023-06-13
Payer: COMMERCIAL

## 2023-06-13 DIAGNOSIS — F19.90 SUBSTANCE USE DISORDER: ICD-10-CM

## 2023-06-13 DIAGNOSIS — F91.8 CONDUCT DISORDER, UNDIFFERENTIATED TYPE: ICD-10-CM

## 2023-06-13 DIAGNOSIS — F33.2 MDD (MAJOR DEPRESSIVE DISORDER), RECURRENT SEVERE, WITHOUT PSYCHOSIS (H): Primary | ICD-10-CM

## 2023-06-13 DIAGNOSIS — F90.2 ATTENTION DEFICIT HYPERACTIVITY DISORDER (ADHD), COMBINED TYPE: ICD-10-CM

## 2023-06-13 PROCEDURE — 90853 GROUP PSYCHOTHERAPY: CPT | Performed by: MARRIAGE & FAMILY THERAPIST

## 2023-06-13 NOTE — GROUP NOTE
[facility]  [address], [address]  [place]  [address]    Group Progress Note  [facility] - Progress Note    Date of Service: 23   Name: Xavier Landon  : 1990  Medical Record Number: [id number]  START TIME:  6:30 PM  END TIME:  8:30 PM  FACILITATOR(S): Deborah Watson LMFT, [facility]  TOPIC: [facility] Group Therapy  Type of Session: Group  :  Number of Attendees:  6  Number of Minutes:  /120    SERVICE MODALITY:  In-person      DSM-5 Diagnoses:  296.33 (F33.2) Major Depressive Disorder, Recurrent Episode, Severe _ and With anxious distress  300.02 (F41.1) Generalized Anxiety Disorder.  Attention-Deficit/Hyperactivity Disorder  314.01 (F90.9) Unspecified Attention -Deficit / Hyperactivity Disorder.  Substance-Related & Addictive Disorders Alcohol Use Disorder   303.90 (F10.20) Moderate   301.83 (F60.3) Borderline Personality Disorder      Current Reported Symptoms and Status update:  Changes since last session-struggling with feeling stuck  Struggling with compulsive sexual behavior  Struggling with emotional regulation      Progress Toward Treatment Goals:   Minimal progress     Therapeutic Interventions/Treatment Strategies:    Area(s) of treatment focus addressed in this session included Symptom Management and [place] Maintenance/Relapse Prevention    Patient checked in about their mental health symptoms, healthy coping skills used over the week, negative coping skills used over the week, what sexual behaviors they engaged in-fantasy, masturbation, sex, their comfort level with their behaviors, if there were triggers, urges to violate boundaries, and violations of boundaries.  They took time to process about boundary violation and feeling stuck they provided support and feedback to others in the group.    Psychotherapist offered support, feedback and validation and reinforced use of skills Treatment modalities used include [facility] THERAPY  INTERVENTIONS: Motivational Interviewing Group Dynamic Therapy  Interpersonal Coping Skills: Assisted patient in understanding the purpose of planning / creating / participating / sharing in positive experiences and explored challenging unrealistic expectations of self/others  Support, Feedback and Structured Activity    Patient Response:   Patient responded to session by accepting feedback, giving feedback and listening  Possible barriers to participation / learning include: N/A    Current Mental Status Exam:   Appearance:  Appropriate   Eye Contact:  Good   Attitude / Demeanor: Cooperative   Speech      Rate / Production: Normal/ Responsive      Volume:  Normal  volume  Orientation:  All  Mood:   Anxious  Normal  Affect:   Appropriate   Thought Content: Clear   Insight:   Good       Plan/Need for Future Services:  Return for therapy in 1 weeks to treat diagnosed problems.    Patient has a current master individualized treatment plan.  See Epic treatment plan for more information.    Referral / Collaboration:  Referral to another professional/service is not indicated at this time..  Emergency Services Needed?  No    Assignment:  Return in one week     Interactive Complexity:  There are four specific communication difficulties that complicate the work of the primary psychiatric procedure.  Interactive complexity (+95290) may be reported when at least one of these difficulties is present.    Communication difficulties present during current the psychiatric procedure include:  1. None.      Signature/Title:    Vance Watson, LMFT, [facility]

## 2023-06-16 ENCOUNTER — OFFICE VISIT (OUTPATIENT)
Dept: PSYCHOLOGY | Facility: CLINIC | Age: 33
End: 2023-06-16
Payer: COMMERCIAL

## 2023-06-16 DIAGNOSIS — F33.2 MDD (MAJOR DEPRESSIVE DISORDER), RECURRENT SEVERE, WITHOUT PSYCHOSIS (H): Primary | ICD-10-CM

## 2023-06-16 DIAGNOSIS — F90.2 ATTENTION DEFICIT HYPERACTIVITY DISORDER (ADHD), COMBINED TYPE: ICD-10-CM

## 2023-06-16 DIAGNOSIS — F19.90 SUBSTANCE USE DISORDER: ICD-10-CM

## 2023-06-16 DIAGNOSIS — F91.8 CONDUCT DISORDER, UNDIFFERENTIATED TYPE: ICD-10-CM

## 2023-06-16 PROCEDURE — 90837 PSYTX W PT 60 MINUTES: CPT | Performed by: MARRIAGE & FAMILY THERAPIST

## 2023-06-16 NOTE — PROGRESS NOTES
[place] [facility] - Progress Note    Date of Service: 23   Name: Xavier Landon  : 1990  Medical Record Number: [id number]  Treating Provider: VIKTOR Anderson, [facility]  Type of Session: Individual  Present in Session: pt  Session Start and Stop Time: 8353-6171  Number of Minutes:  58    SERVICE MODALITY:  In-person    DSM-5 Diagnoses:  296.33 (F33.2) Major Depressive Disorder, Recurrent Episode, Severe _ and With anxious distress  300.02 (F41.1) Generalized Anxiety Disorder.  Attention-Deficit/Hyperactivity Disorder  314.01 (F90.9) Unspecified Attention -Deficit / Hyperactivity Disorder.  Substance-Related & Addictive Disorders Alcohol Use Disorder   303.90 (F10.20) Moderate   301.83 (F60.3) Borderline Personality Disorder      Current Reported Symptoms and Status update:  Changes since last session is going to have a set sched    Progress Toward Treatment Goals:   Minimal progress     Therapeutic Interventions/Treatment Strategies:    Area(s) of treatment focus addressed in this session included Symptom Management, [place] Maintenance/Relapse Prevention, Interpersonal Relationship Skills and Sexual Health and Wellness    Psychotherapist offered support, feedback and validation and reinforced use of skills Treatment modalities used include Motivational Interviewing Dialectical Behavioral Therapy Interpersonal Behavioral Activation: Reinforced benefits/challenges of change process through applying skills to replace unwanted behaviors and Explored how behaviors effect mood and interact with thoughts and feelings, Relationship Skills: Encouraged development and maintenance  of healthy boundaries and discussed application of self compassion and explored challenging unrealistic expectations of self/others  Support, Feedback, Structured Activity, Problem Solving and Education    Patient Response:   Patient responded to session by accepting feedback, listening and  accepting support  Possible barriers to participation / learning include: N/A    Current Mental Status Exam:   Appearance:  Appropriate   Eye Contact:  Good   Attitude / Demeanor: Cooperative   Speech      Rate / Production: Normal/ Responsive      Volume:  Normal  volume  Orientation:  All  Mood:   Normal Euthymic  Affect:   Appropriate   Thought Content: Clear   Insight:   Good       Plan/Need for Future Services:  Return for therapy in 1 weeks to treat diagnosed problems.    Patient has a current master individualized treatment plan.  See Epic treatment plan for more information.    Referral / Collaboration:  Was/were discussed and patient will pursue.  Emergency Services Needed?  No    Assignment:  sched w primary care for cialis and blood pressure check for carolyn  Call for rx refill    Interactive Complexity:  There are four specific communication difficulties that complicate the work of the primary psychiatric procedure.  Interactive complexity (+75120) may be reported when at least one of these difficulties is present.    Communication difficulties present during current the psychiatric procedure include:  1. None.      Signature/Title:    Vance Watson, VIKTOR, [facility]

## 2023-06-20 ENCOUNTER — TELEPHONE (OUTPATIENT)
Dept: VASCULAR SURGERY | Facility: CLINIC | Age: 33
End: 2023-06-20

## 2023-06-20 ENCOUNTER — TELEPHONE (OUTPATIENT)
Dept: FAMILY MEDICINE | Facility: CLINIC | Age: 33
End: 2023-06-20

## 2023-06-20 ENCOUNTER — OFFICE VISIT (OUTPATIENT)
Dept: FAMILY MEDICINE | Facility: CLINIC | Age: 33
End: 2023-06-20
Payer: COMMERCIAL

## 2023-06-20 ENCOUNTER — OFFICE VISIT (OUTPATIENT)
Dept: PSYCHOLOGY | Facility: CLINIC | Age: 33
End: 2023-06-20
Payer: COMMERCIAL

## 2023-06-20 VITALS
BODY MASS INDEX: 24.25 KG/M2 | DIASTOLIC BLOOD PRESSURE: 79 MMHG | TEMPERATURE: 98 F | HEART RATE: 73 BPM | WEIGHT: 195 LBS | HEIGHT: 75 IN | RESPIRATION RATE: 16 BRPM | SYSTOLIC BLOOD PRESSURE: 121 MMHG | OXYGEN SATURATION: 98 %

## 2023-06-20 DIAGNOSIS — Z13.220 LIPID SCREENING: ICD-10-CM

## 2023-06-20 DIAGNOSIS — F91.8 CONDUCT DISORDER, UNDIFFERENTIATED TYPE: ICD-10-CM

## 2023-06-20 DIAGNOSIS — F33.2 MDD (MAJOR DEPRESSIVE DISORDER), RECURRENT SEVERE, WITHOUT PSYCHOSIS (H): Primary | ICD-10-CM

## 2023-06-20 DIAGNOSIS — Z13.1 SCREENING FOR DIABETES MELLITUS: ICD-10-CM

## 2023-06-20 DIAGNOSIS — Z13.29 SCREENING FOR THYROID DISORDER: ICD-10-CM

## 2023-06-20 DIAGNOSIS — I83.812 VARICOSE VEINS OF LEFT LOWER EXTREMITY WITH PAIN: ICD-10-CM

## 2023-06-20 DIAGNOSIS — N52.1 ERECTILE DYSFUNCTION DUE TO DISEASES CLASSIFIED ELSEWHERE: ICD-10-CM

## 2023-06-20 DIAGNOSIS — F90.2 ATTENTION DEFICIT HYPERACTIVITY DISORDER (ADHD), COMBINED TYPE: ICD-10-CM

## 2023-06-20 DIAGNOSIS — F19.90 SUBSTANCE USE DISORDER: ICD-10-CM

## 2023-06-20 DIAGNOSIS — Z00.00 ROUTINE GENERAL MEDICAL EXAMINATION AT A HEALTH CARE FACILITY: Primary | ICD-10-CM

## 2023-06-20 DIAGNOSIS — Z13.0 SCREENING FOR IRON DEFICIENCY ANEMIA: ICD-10-CM

## 2023-06-20 LAB
ALBUMIN SERPL BCG-MCNC: 4.5 G/DL (ref 3.5–5.2)
ALP SERPL-CCNC: 80 U/L (ref 40–129)
ALT SERPL W P-5'-P-CCNC: 37 U/L (ref 0–70)
ANION GAP SERPL CALCULATED.3IONS-SCNC: 14 MMOL/L (ref 7–15)
AST SERPL W P-5'-P-CCNC: 30 U/L (ref 0–45)
BILIRUB SERPL-MCNC: 0.4 MG/DL
BUN SERPL-MCNC: 16.2 MG/DL (ref 6–20)
CALCIUM SERPL-MCNC: 9.7 MG/DL (ref 8.6–10)
CHLORIDE SERPL-SCNC: 106 MMOL/L (ref 98–107)
CHOLEST SERPL-MCNC: 146 MG/DL
CREAT SERPL-MCNC: 1.13 MG/DL (ref 0.67–1.17)
DEPRECATED HCO3 PLAS-SCNC: 22 MMOL/L (ref 22–29)
ERYTHROCYTE [DISTWIDTH] IN BLOOD BY AUTOMATED COUNT: 13 % (ref 10–15)
GFR SERPL CREATININE-BSD FRML MDRD: 89 ML/MIN/1.73M2
GLUCOSE SERPL-MCNC: 93 MG/DL (ref 70–99)
HBV SURFACE AB SERPL IA-ACNC: 54.48 M[IU]/ML
HBV SURFACE AB SERPL IA-ACNC: REACTIVE M[IU]/ML
HCT VFR BLD AUTO: 46.1 % (ref 40–53)
HDLC SERPL-MCNC: 64 MG/DL
HGB BLD-MCNC: 14.9 G/DL (ref 13.3–17.7)
LDLC SERPL CALC-MCNC: 67 MG/DL
MCH RBC QN AUTO: 28.5 PG (ref 26.5–33)
MCHC RBC AUTO-ENTMCNC: 32.3 G/DL (ref 31.5–36.5)
MCV RBC AUTO: 88 FL (ref 78–100)
NONHDLC SERPL-MCNC: 82 MG/DL
PLATELET # BLD AUTO: 259 10E3/UL (ref 150–450)
POTASSIUM SERPL-SCNC: 4 MMOL/L (ref 3.4–5.3)
PROT SERPL-MCNC: 6.7 G/DL (ref 6.4–8.3)
RBC # BLD AUTO: 5.23 10E6/UL (ref 4.4–5.9)
SODIUM SERPL-SCNC: 142 MMOL/L (ref 136–145)
TRIGL SERPL-MCNC: 74 MG/DL
TSH SERPL DL<=0.005 MIU/L-ACNC: 1.8 UIU/ML (ref 0.3–4.2)
WBC # BLD AUTO: 4.9 10E3/UL (ref 4–11)

## 2023-06-20 PROCEDURE — 80061 LIPID PANEL: CPT | Performed by: NURSE PRACTITIONER

## 2023-06-20 PROCEDURE — 85027 COMPLETE CBC AUTOMATED: CPT | Performed by: NURSE PRACTITIONER

## 2023-06-20 PROCEDURE — 84443 ASSAY THYROID STIM HORMONE: CPT | Performed by: NURSE PRACTITIONER

## 2023-06-20 PROCEDURE — 86706 HEP B SURFACE ANTIBODY: CPT | Performed by: NURSE PRACTITIONER

## 2023-06-20 PROCEDURE — 99395 PREV VISIT EST AGE 18-39: CPT | Performed by: NURSE PRACTITIONER

## 2023-06-20 PROCEDURE — 90853 GROUP PSYCHOTHERAPY: CPT | Performed by: MARRIAGE & FAMILY THERAPIST

## 2023-06-20 PROCEDURE — 36415 COLL VENOUS BLD VENIPUNCTURE: CPT | Performed by: NURSE PRACTITIONER

## 2023-06-20 PROCEDURE — 80053 COMPREHEN METABOLIC PANEL: CPT | Performed by: NURSE PRACTITIONER

## 2023-06-20 PROCEDURE — 99213 OFFICE O/P EST LOW 20 MIN: CPT | Mod: 25 | Performed by: NURSE PRACTITIONER

## 2023-06-20 RX ORDER — SILDENAFIL CITRATE 20 MG/1
20 TABLET ORAL 3 TIMES DAILY
Qty: 30 TABLET | Refills: 3 | Status: SHIPPED | OUTPATIENT
Start: 2023-06-20 | End: 2024-04-17

## 2023-06-20 RX ORDER — BUPROPION HYDROCHLORIDE 150 MG/1
150 TABLET ORAL EVERY MORNING
Qty: 30 TABLET | Refills: 1 | Status: CANCELLED | OUTPATIENT
Start: 2023-06-20

## 2023-06-20 RX ORDER — NALTREXONE HYDROCHLORIDE 50 MG/1
TABLET, FILM COATED ORAL
Qty: 30 TABLET | Refills: 1 | Status: CANCELLED | OUTPATIENT
Start: 2023-06-20

## 2023-06-20 RX ORDER — DEXTROAMPHETAMINE SACCHARATE, AMPHETAMINE ASPARTATE MONOHYDRATE, DEXTROAMPHETAMINE SULFATE AND AMPHETAMINE SULFATE 5; 5; 5; 5 MG/1; MG/1; MG/1; MG/1
40 CAPSULE, EXTENDED RELEASE ORAL EVERY MORNING
Qty: 60 CAPSULE | Refills: 0 | Status: CANCELLED | OUTPATIENT
Start: 2023-06-20

## 2023-06-20 RX ORDER — DEXTROAMPHETAMINE SACCHARATE, AMPHETAMINE ASPARTATE, DEXTROAMPHETAMINE SULFATE AND AMPHETAMINE SULFATE 2.5; 2.5; 2.5; 2.5 MG/1; MG/1; MG/1; MG/1
10 TABLET ORAL DAILY PRN
Qty: 30 TABLET | Refills: 0 | Status: CANCELLED | OUTPATIENT
Start: 2023-06-20

## 2023-06-20 ASSESSMENT — ENCOUNTER SYMPTOMS
CONSTIPATION: 1
NAUSEA: 0
DIZZINESS: 0
ARTHRALGIAS: 0
NERVOUS/ANXIOUS: 1
WEAKNESS: 0
FREQUENCY: 0
SHORTNESS OF BREATH: 0
FEVER: 0
MYALGIAS: 1
SORE THROAT: 0
HEADACHES: 0
HEMATURIA: 0
ABDOMINAL PAIN: 0
CHILLS: 0
EYE PAIN: 0
PARESTHESIAS: 0
HEARTBURN: 0
DYSURIA: 0
DIARRHEA: 0
HEMATOCHEZIA: 0
PALPITATIONS: 0
JOINT SWELLING: 0
COUGH: 0

## 2023-06-20 ASSESSMENT — ANXIETY QUESTIONNAIRES
GAD7 TOTAL SCORE: 6
7. FEELING AFRAID AS IF SOMETHING AWFUL MIGHT HAPPEN: NOT AT ALL
8. IF YOU CHECKED OFF ANY PROBLEMS, HOW DIFFICULT HAVE THESE MADE IT FOR YOU TO DO YOUR WORK, TAKE CARE OF THINGS AT HOME, OR GET ALONG WITH OTHER PEOPLE?: SOMEWHAT DIFFICULT
3. WORRYING TOO MUCH ABOUT DIFFERENT THINGS: SEVERAL DAYS
6. BECOMING EASILY ANNOYED OR IRRITABLE: MORE THAN HALF THE DAYS
1. FEELING NERVOUS, ANXIOUS, OR ON EDGE: SEVERAL DAYS
GAD7 TOTAL SCORE: 6
4. TROUBLE RELAXING: SEVERAL DAYS
2. NOT BEING ABLE TO STOP OR CONTROL WORRYING: SEVERAL DAYS
IF YOU CHECKED OFF ANY PROBLEMS ON THIS QUESTIONNAIRE, HOW DIFFICULT HAVE THESE PROBLEMS MADE IT FOR YOU TO DO YOUR WORK, TAKE CARE OF THINGS AT HOME, OR GET ALONG WITH OTHER PEOPLE: SOMEWHAT DIFFICULT
5. BEING SO RESTLESS THAT IT IS HARD TO SIT STILL: NOT AT ALL
7. FEELING AFRAID AS IF SOMETHING AWFUL MIGHT HAPPEN: NOT AT ALL

## 2023-06-20 ASSESSMENT — PAIN SCALES - GENERAL: PAINLEVEL: NO PAIN (0)

## 2023-06-20 NOTE — TELEPHONE ENCOUNTER
PA started for sildenafil (REVATIO) 20 MG tablet.  Log into [email or web address] and enter info from below:    Key: [id number]  Patient last name: Barbi  BECKIE: [birth year]

## 2023-06-20 NOTE — PROGRESS NOTES
SUBJECTIVE:   CC: Huey is an 32 year old who presents for preventative health visit.       6/20/2023     7:41 AM   Additional Questions   Roomed by Torrie KIRK     Healthy Habits:    Getting at least 3 servings of Calcium per day:  Yes    Bi-annual eye exam:  NO    Dental care twice a year:  NO    Sleep apnea or symptoms of sleep apnea:  None    Diet:  Regular (no restrictions)    Frequency of exercise:  6-7 days/week    Duration of exercise:  45-60 minutes    Taking medications regularly:  Yes    Medication side effects:  Not applicable    PHQ-2 Total Score:    Additional concerns today:  No    Have you ever done Advance Care Planning? (For example, a Health Directive, POLST, or a discussion with a medical provider or your loved ones about your wishes): No, advance care planning information given to patient to review.  Patient declined advance care planning discussion at this time.    Social History     Tobacco Use     Smoking status: Former     Packs/day: 0.50     Types: Cigarettes     Start date: 12/2021     Smokeless tobacco: Never   Vaping Use     Vaping status: Some Days     Substances: Nicotine, Flavoring     Devices: Disposable   Substance Use Topics     Alcohol use: Not Currently             6/20/2023     7:35 AM   Alcohol Use   Prescreen: >3 drinks/day or >7 drinks/week? Not Applicable          View : No data to display.                Last PSA: No results found for: PSA    Reviewed orders with patient. Reviewed health maintenance and updated orders accordingly - Yes  Lab work is in process    Reviewed and updated as needed this visit by clinical staff   Tobacco  Allergies  Meds              Reviewed and updated as needed this visit by Provider                 Has a painful varicose vein on left inner thigh.  No history of trauma, mother has varicose veins.  He dose experience slightly more swelling and heaviness in that leg    His adderall and psych meds are managed by psychiatry.  He has been having  "issues filling the 20mg with the current shortages.  Hoping his psychiatrist will increase him up to 25mg.  They would like to monitor his BP for a couple weeks to make sure it would be appropriate.     Review of Systems   Constitutional: Negative for chills and fever.   HENT: Positive for hearing loss. Negative for congestion, ear pain and sore throat.    Eyes: Negative for pain and visual disturbance.   Respiratory: Negative for cough and shortness of breath.    Cardiovascular: Negative for chest pain, palpitations and peripheral edema.   Gastrointestinal: Positive for constipation. Negative for abdominal pain, diarrhea, heartburn, hematochezia and nausea.   Genitourinary: Positive for impotence. Negative for dysuria, frequency, genital sores, hematuria, penile discharge and urgency.   Musculoskeletal: Positive for myalgias. Negative for arthralgias and joint swelling.   Skin: Negative for rash.   Neurological: Negative for dizziness, weakness, headaches and paresthesias.   Psychiatric/Behavioral: Positive for mood changes. The patient is nervous/anxious.          OBJECTIVE:   /79 (BP Location: Right arm, Patient Position: Sitting, Cuff Size: Adult Regular)   Pulse 73   Temp 98  F (36.7  C) (Temporal)   Resp 16   Ht 1.905 m (6' 3\")   Wt 88.5 kg (195 lb)   SpO2 98%   BMI 24.37 kg/m      Physical Exam  GENERAL: healthy, alert and no distress  EYES: Eyes grossly normal to inspection, PERRL and conjunctivae and sclerae normal  HENT: ear canals and TM's normal, nose and mouth without ulcers or lesions  NECK: no adenopathy, no asymmetry, masses, or scars and thyroid normal to palpation  RESP: lungs clear to auscultation - no rales, rhonchi or wheezes  CV: regular rate and rhythm, normal S1 S2, no S3 or S4, no murmur, click or rub, no peripheral edema and peripheral pulses strong  ABDOMEN: soft, nontender, no hepatosplenomegaly, no masses and bowel sounds normal   (male): normal male genitalia without " lesions or urethral discharge, no hernia  MS: no gross musculoskeletal defects noted, no edema.  Left inner thigh varicose vein.    SKIN: no suspicious lesions or rashes  NEURO: Normal strength and tone, mentation intact and speech normal  PSYCH: mentation appears normal, affect normal/bright        ASSESSMENT/PLAN:   (Z00.00) Routine general medical examination at a health care facility  (primary encounter diagnosis)  Comment: Reviewed medical/social/family history and health maintenance  Plan: Hepatitis B Surface Antibody            (F90.2) Attention deficit hyperactivity disorder (ADHD), combined type  Comment: Managed by psychiatry.  His BP is great today, would be reasonable to increase to 25mg if they feel appropriate.  Plan:     (I83.812) Varicose veins of left lower extremity with pain  Comment: Associated with pain and some mild LLE edema/heaviness.  Will refer to vascular for further work up and vein comp if they feel appropriate.    Plan: Vascular Surgery Referral            (Z13.220) Lipid screening  Comment:   Plan: Lipid panel reflex to direct LDL Non-fasting            (Z13.29) Screening for thyroid disorder  Comment:   Plan: TSH with free T4 reflex            (Z13.0) Screening for iron deficiency anemia  Comment:   Plan: CBC with platelets            (Z13.1) Screening for diabetes mellitus  Comment:   Plan: Comprehensive metabolic panel (BMP + Alb, Alk         Phos, ALT, AST, Total. Bili, TP)            (N52.1) Erectile dysfunction due to diseases classified elsewhere  Comment: Likely multifactorial.  He was on lithium previously, also endorses some psychological component.    Plan: sildenafil (REVATIO) 20 MG tablet              Patient has been advised of split billing requirements and indicates understanding: Yes      COUNSELING:   Reviewed preventive health counseling, as reflected in patient instructions        He reports that he has quit smoking. His smoking use included cigarettes. He started  smoking about 18 months ago. He smoked an average of .5 packs per day. He has never used smokeless tobacco.            YOSELIN Benjamin CNP  [facility]  Answers for HPI/ROS submitted by the patient on 6/20/2023  SHAUNA 7 TOTAL SCORE: 6

## 2023-06-20 NOTE — GROUP NOTE
[facility]  [address], [address]  [place]  [address]    Group Progress Note  [facility] - Progress Note    Date of Service: 23   Name: Xavier Landon  : 1990  Medical Record Number: [id number]  START TIME:  6:30 PM  END TIME:  8:30 PM  FACILITATOR(S): Deborah Watson LMFT, [facility]  TOPIC: [facility] Group Therapy  Type of Session: Group  :  Number of Attendees:  6  Number of Minutes:  /120    SERVICE MODALITY:  In-person      DSM-5 Diagnoses:  296.33 (F33.2) Major Depressive Disorder, Recurrent Episode, Severe _ and With anxious distress  300.02 (F41.1) Generalized Anxiety Disorder.  Attention-Deficit/Hyperactivity Disorder  314.01 (F90.9) Unspecified Attention -Deficit / Hyperactivity Disorder.  Substance-Related & Addictive Disorders Alcohol Use Disorder   303.90 (F10.20) Moderate   301.83 (F60.3) Borderline Personality Disorder      Current Reported Symptoms and Status update:  Changes since last session saw  today   Struggling with compulsive sexual behavior  Struggling with emotional regulation      Progress Toward Treatment Goals:   Minimal progress     Therapeutic Interventions/Treatment Strategies:    Area(s) of treatment focus addressed in this session included Symptom Management, [place] Maintenance/Relapse Prevention and Sexual Health and Wellness    Patient checked in about their mental health symptoms, healthy coping skills used over the week, negative coping skills used over the week, what sexual behaviors they engaged in-fantasy, masturbation, sex, their comfort level with their behaviors, if there were triggers, urges to violate boundaries, and violations of boundaries.  They took time to process about porn use and meaning they make they provided support and feedback to others in the group.    Psychotherapist offered support, feedback and validation and reinforced use of skills Treatment modalities used include [facility]  THERAPY INTERVENTIONS: Motivational Interviewing Group Dynamic Therapy  Interpersonal facilitated discussion about sexual health and wellness and explored challenging unrealistic expectations of self/others  Support, Feedback and Structured Activity    Patient Response:   Patient responded to session by accepting feedback, giving feedback and listening  Possible barriers to participation / learning include: N/A    Current Mental Status Exam:   Appearance:  Appropriate   Eye Contact:  Good   Attitude / Demeanor: Cooperative   Speech      Rate / Production: Normal/ Responsive      Volume:  Normal  volume  Orientation:  All  Mood:   Normal Euthymic  Affect:   Appropriate   Thought Content: Clear   Insight:   Good       Plan/Need for Future Services:  Return for therapy in 1 weeks to treat diagnosed problems.    Patient has a current master individualized treatment plan.  See Epic treatment plan for more information.    Referral / Collaboration:  Referral to another professional/service is not indicated at this time..  Emergency Services Needed?  No    Assignment:  Return in one week     Interactive Complexity:  There are four specific communication difficulties that complicate the work of the primary psychiatric procedure.  Interactive complexity (+85119) may be reported when at least one of these difficulties is present.    Communication difficulties present during current the psychiatric procedure include:  1. None.      Signature/Title:    Vance Watson, LMFT, [facility]

## 2023-06-23 ENCOUNTER — OFFICE VISIT (OUTPATIENT)
Dept: PSYCHOLOGY | Facility: CLINIC | Age: 33
End: 2023-06-23
Payer: COMMERCIAL

## 2023-06-23 DIAGNOSIS — F19.90 SUBSTANCE USE DISORDER: ICD-10-CM

## 2023-06-23 DIAGNOSIS — F33.2 MDD (MAJOR DEPRESSIVE DISORDER), RECURRENT SEVERE, WITHOUT PSYCHOSIS (H): Primary | ICD-10-CM

## 2023-06-23 DIAGNOSIS — F91.8 CONDUCT DISORDER, UNDIFFERENTIATED TYPE: ICD-10-CM

## 2023-06-23 DIAGNOSIS — F90.2 ATTENTION DEFICIT HYPERACTIVITY DISORDER (ADHD), COMBINED TYPE: ICD-10-CM

## 2023-06-23 PROCEDURE — 90837 PSYTX W PT 60 MINUTES: CPT | Performed by: MARRIAGE & FAMILY THERAPIST

## 2023-06-23 NOTE — PROGRESS NOTES
[facility] - Progress Note    Date of Service: 23   Name: Xavier Landon  : 1990  Medical Record Number: [id number]  Treating Provider: VIKTOR Anderson, [facility]  Type of Session: Individual  Present in Session: pt  Session Start and Stop Time: 2195-3210  Number of Minutes:  56    SERVICE MODALITY:  In-person    DSM-5 Diagnoses:  296.33 (F33.2) Major Depressive Disorder, Recurrent Episode, Severe _ and With anxious distress  300.02 (F41.1) Generalized Anxiety Disorder.  Attention-Deficit/Hyperactivity Disorder  314.01 (F90.9) Unspecified Attention -Deficit / Hyperactivity Disorder.  Substance-Related & Addictive Disorders Alcohol Use Disorder   303.90 (F10.20) Moderate   301.83 (F60.3) Borderline Personality Disorder      Current Reported Symptoms and Status update:  Changes since last session-better for sexual functioning   Struggling with compulsive sexual behavior  Struggling with emotional regulation      Progress Toward Treatment Goals:   Minimal progress     Therapeutic Interventions/Treatment Strategies:    Area(s) of treatment focus addressed in this session included Symptom Management, Interpersonal Relationship Skills and Sexual Health and Wellness      Psychotherapist offered support, feedback and validation and reinforced use of skills Treatment modalities used include Motivational Interviewing Dialectical Behavioral Therapy Interpersonal Behavioral Activation: Encouraged strategies to reduce individual procrastination and increase motivation by increasing goal-directed activities to enhance mood and reduce symptoms. and facilitated discussion about sexual health and wellness and explored challenging unrealistic expectations of self/others  Support, Feedback, Structured Activity and Problem Solving    Patient Response:   Patient responded to session by accepting feedback, listening and accepting support  Possible barriers to participation / learning  include: N/A    Current Mental Status Exam:   Appearance:  Appropriate   Eye Contact:  Good   Attitude / Demeanor: Cooperative   Speech      Rate / Production: Normal/ Responsive      Volume:  Normal  volume  Orientation:  All  Mood:   Normal Euthymic  Affect:   Appropriate   Thought Content: Clear   Insight:   Good       Plan/Need for Future Services:  Return for therapy in 1 weeks to treat diagnosed problems.    Patient has a current master individualized treatment plan.  See Epic treatment plan for more information.    Referral / Collaboration:  Referral to another professional/service is not indicated at this time..  Emergency Services Needed?  No    Assignment:  Budgeting  scheduling    Interactive Complexity:  There are four specific communication difficulties that complicate the work of the primary psychiatric procedure.  Interactive complexity (+13897) may be reported when at least one of these difficulties is present.    Communication difficulties present during current the psychiatric procedure include:  1. None.      Signature/Title:    VIKTOR Anderson, [facility]

## 2023-06-24 NOTE — TELEPHONE ENCOUNTER
Central Prior Authorization Team   Phone: [phone]    PA Initiation    Medication: sildenafil (REVATIO) 20 MG tablet, rec 6-19-23  Insurance Company: [employer] - Phone [phone] Fax [phone]  Pharmacy Filling the Rx: [id number] - [place], [place] - [address]  Filling Pharmacy Phone: [phone]  Filling Pharmacy Fax:    Start Date: 6/24/2023

## 2023-06-27 ENCOUNTER — OFFICE VISIT (OUTPATIENT)
Dept: PSYCHOLOGY | Facility: CLINIC | Age: 33
End: 2023-06-27
Payer: COMMERCIAL

## 2023-06-27 DIAGNOSIS — F33.2 MDD (MAJOR DEPRESSIVE DISORDER), RECURRENT SEVERE, WITHOUT PSYCHOSIS (H): Primary | ICD-10-CM

## 2023-06-27 DIAGNOSIS — F91.8 CONDUCT DISORDER, UNDIFFERENTIATED TYPE: ICD-10-CM

## 2023-06-27 DIAGNOSIS — F19.90 SUBSTANCE USE DISORDER: ICD-10-CM

## 2023-06-27 DIAGNOSIS — F90.2 ATTENTION DEFICIT HYPERACTIVITY DISORDER (ADHD), COMBINED TYPE: ICD-10-CM

## 2023-06-27 PROCEDURE — 90853 GROUP PSYCHOTHERAPY: CPT | Performed by: MARRIAGE & FAMILY THERAPIST

## 2023-06-27 NOTE — GROUP NOTE
[facility]  [address], [address]  [place]  [address]    Group Progress Note  [facility] - Progress Note    Date of Service: 23   Name: Xavier Landon  : 1990  Medical Record Number: [id number]  START TIME:  6:30 PM  END TIME:  8:30 PM  FACILITATOR(S): Deborah Watson LMFT, [facility]  TOPIC: [facility] Group Therapy  Type of Session: Group  :  Number of Attendees:  4  Number of Minutes: 120    SERVICE MODALITY:  In-person      DSM-5 Diagnoses:  296.33 (F33.2) Major Depressive Disorder, Recurrent Episode, Severe _ and With anxious distress  300.02 (F41.1) Generalized Anxiety Disorder.  Attention-Deficit/Hyperactivity Disorder  314.01 (F90.9) Unspecified Attention -Deficit / Hyperactivity Disorder.  Substance-Related & Addictive Disorders Alcohol Use Disorder   303.90 (F10.20) Moderate   301.83 (F60.3) Borderline Personality Disorder      Current Reported Symptoms and Status update:  Changes since last session-noticing positive cycle   Struggling with compulsive sexual behavior  Struggling with emotional regulation      Progress Toward Treatment Goals:   Minimal progress     Therapeutic Interventions/Treatment Strategies:    Area(s) of treatment focus addressed in this session included Symptom Management, Interpersonal Relationship Skills and Sexual Health and Wellness    Patient checked in about their mental health symptoms, healthy coping skills used over the week, negative coping skills used over the week, what sexual behaviors they engaged in-fantasy, masturbation, sex, their comfort level with their behaviors, if there were triggers, urges to violate boundaries, and violations of boundaries.  They took time to process about improvement with mental health and sexual functioning they provided support and feedback to others in the group.    Psychotherapist offered support, feedback and validation and reinforced use of skills Treatment  modalities used include [facility] THERAPY INTERVENTIONS: Motivational Interviewing Group Dynamic Therapy  Interpersonal Cognitive Restructuring:  Assisted patient in formulating new neutral/positive alternatives to challenge less helpful / ineffective thoughts and Relationship Skills: Assisted patients in implementing more effective communication skills in their relationships and Discussed strategies to promote healthier understanding of interpersonal relationships  Support, Feedback and Structured Activity    Patient Response:   Patient responded to session by accepting feedback, giving feedback, listening and accepting support  Possible barriers to participation / learning include: N/A    Current Mental Status Exam:   Appearance:  Appropriate   Eye Contact:  Good   Attitude / Demeanor: Cooperative   Speech      Rate / Production: Normal/ Responsive      Volume:  Normal  volume  Orientation:  All  Mood:   Normal Euthymic  Affect:   Appropriate   Thought Content: Clear   Insight:   Good       Plan/Need for Future Services:  Return for therapy in 1 weeks to treat diagnosed problems.    Patient has a current master individualized treatment plan.  See Epic treatment plan for more information.    Referral / Collaboration:  Referral to another professional/service is not indicated at this time..  Emergency Services Needed?  No    Assignment:  Return in one week    Interactive Complexity:  There are four specific communication difficulties that complicate the work of the primary psychiatric procedure.  Interactive complexity (+43377) may be reported when at least one of these difficulties is present.    Communication difficulties present during current the psychiatric procedure include:  1. None.      Signature/Title:    Vance Watson, LMFT, [facility]

## 2023-06-28 ENCOUNTER — VIRTUAL VISIT (OUTPATIENT)
Dept: PSYCHIATRY | Facility: CLINIC | Age: 33
End: 2023-06-28
Attending: NURSE PRACTITIONER
Payer: COMMERCIAL

## 2023-06-28 DIAGNOSIS — F90.2 ATTENTION DEFICIT HYPERACTIVITY DISORDER (ADHD), COMBINED TYPE: Primary | ICD-10-CM

## 2023-06-28 DIAGNOSIS — F91.8 CONDUCT DISORDER, UNDIFFERENTIATED TYPE: ICD-10-CM

## 2023-06-28 PROCEDURE — 99214 OFFICE O/P EST MOD 30 MIN: CPT | Mod: VID | Performed by: NURSE PRACTITIONER

## 2023-06-28 RX ORDER — DEXTROAMPHETAMINE SACCHARATE, AMPHETAMINE ASPARTATE MONOHYDRATE, DEXTROAMPHETAMINE SULFATE AND AMPHETAMINE SULFATE 6.25; 6.25; 6.25; 6.25 MG/1; MG/1; MG/1; MG/1
25 CAPSULE, EXTENDED RELEASE ORAL EVERY MORNING
Qty: 30 CAPSULE | Refills: 0 | Status: SHIPPED | OUTPATIENT
Start: 2023-06-28 | End: 2023-06-29

## 2023-06-28 RX ORDER — NALTREXONE HYDROCHLORIDE 50 MG/1
TABLET, FILM COATED ORAL
Qty: 90 TABLET | Refills: 0 | Status: SHIPPED | OUTPATIENT
Start: 2023-06-28 | End: 2023-07-28

## 2023-06-28 RX ORDER — DEXTROAMPHETAMINE SACCHARATE, AMPHETAMINE ASPARTATE, DEXTROAMPHETAMINE SULFATE AND AMPHETAMINE SULFATE 2.5; 2.5; 2.5; 2.5 MG/1; MG/1; MG/1; MG/1
10 TABLET ORAL DAILY PRN
Qty: 30 TABLET | Refills: 0 | Status: SHIPPED | OUTPATIENT
Start: 2023-07-18 | End: 2023-07-28

## 2023-06-28 RX ORDER — BUPROPION HYDROCHLORIDE 150 MG/1
150 TABLET ORAL EVERY MORNING
Qty: 90 TABLET | Refills: 0 | Status: SHIPPED | OUTPATIENT
Start: 2023-06-28 | End: 2023-07-28

## 2023-06-28 NOTE — NURSING NOTE
Is the patient currently in the state of [place]? YES    Visit mode:VIDEO    If the visit is dropped, the patient can be reconnected by: VIDEO VISIT: Text to cell phone: [phone]    Will anyone else be joining the visit? NO      How would you like to obtain your AVS? MyChart    Are changes needed to the allergy or medication list? NO     PHQ was not assigned, not done per department protocol    Reason for visit: RECHPUSHPA Elizalde [facility]F

## 2023-06-28 NOTE — PATIENT INSTRUCTIONS
-Increase Adderall XR to 25 mg daily for ADHD.  Monitor for changes in anxiety and sleep. Also please check your blood pressure 2 times a week next 2 weeks and send readings to carolyn via [employer]. If your blood pressure is consistently over 140/90, please follow up with primary care.  -Continue all other medication regimen.    Your next appointment is scheduled on 7/26/2023 (Wed) at 9am.      **For crisis resources, please see the information at the end of this document**   Patient Education    Thank you for coming to the [facility] [place] [facility].     Lab Testing:  If you had lab testing today and your results are reassuring or normal they will be mailed to you or sent through [employer] within 7 days. If the lab tests need quick action we will call you with the results. The phone number we will call with results is # [phone]. If this is not the best number please call our clinic and change the number.     Medication Refills:  If you need any refills please call your pharmacy and they will contact us. Our fax number for refills is [phone].   Three business days of notice are needed for general medication refill requests.   Five business days of notice are needed for controlled substance refill requests.   If you need to change to a different pharmacy, please contact the new pharmacy directly. The new pharmacy will help you get your medications transferred.     Contact Us:  Please call [phone] during business hours (8-5:00 M-F).   If you have medication related questions after clinic hours, or on the weekend, please call [phone].     Financial Assistance [phone]   Medical Records [phone]       MENTAL HEALTH CRISIS RESOURCES:  For a emergency help, please call 911 or go to the nearest Emergency Department.     Emergency Walk-In Options:   EmPATH Unit @ [facility] ([place]): [phone] - Specialized mental health emergency area designed to be  [facility] ([place]): [phone]  [facility] [facility] ([place]): [phone]  [facility] ([place]): [phone]    County Crisis Information:   [place]: [phone]  [place]: [phone]  [place] (CLARA) - Adult: [phone]     Child: [phone]  [place] - Adult: [phone]     Child: [phone]  [place]: [phone]  List of all [facility] resources:   https://[place].gov/dhs/people-we-serve/adults/health-care/mental-health/resources/crisis-contacts.jsp    National Crisis Information:   Crisis Text Line: Text  [place]  to 350625  Suicide & Crisis Lifeline: 988  National Suicide Prevention Lifeline: 2-637-184-TALK (1-881.842.2342)       For online chat options, visit https://suicidepreventionlifeline.org/chat/  Poison Control Center: 1-938.161.9409  Trans Lifeline: 1-650.367.3040 - Hotline for transgender people of all ages  The [place] [employer]: 0-757-167-2838 - Hotline for LGBT youth     For Non-Emergency Support:   Fast Tracker: Mental Health & Substance Use Disorder Resources -   https://www.[employer]IMETn.org/

## 2023-06-28 NOTE — PROGRESS NOTES
Virtual Visit Details    Type of service:  Video Visit     Originating Location (pt. Location): Home  Distant Location (provider location):  Off-site  Platform used for Video Visit: [facility]        Psychiatry Clinic Progress Note                                                                  Patient Name: Xavier Landon  YOB: 1990  MRN: [id number]  Date of Service:  06/28/2023  Last Seen:5/31/2023       Xavier Landon is a 32 year old person assigned male at birth, identifies as cisgender male who uses the name Huey and pronoun wojciech.      Huey Landon is a 32 year old year old adult who presents for ongoing psychiatric care.  Huey Landon was last seen on 5/31/2023.     At that time,     Medication Ordered/Consults/Labs/tests Ordered:      Medication:   -Lithium is discontinued as you stopped the medication. Monitor irritability closely.  -Continue all other medication regimen.  If you want to increase Adderall, please check your blood pressure 2 times a week for next 2 weeks and send it to [facility] via Collect. If your blood pressure is within a normal range, will consider increasing Adderall XR in the future.  OTC Recommendations: none  Lab Orders:  none  Referrals: none  Release of Information: none  Future Treatment Considerations: per symptoms. Genesight and LFT in next visit to discuss  Return for Follow Up: in 4 weeks        Pertinent Background:  Depression, anxiety and CSB started around high school. ADHD also diagnosed in HS.  SI in 20's, but no SIB or SA hx. ETOH started in early 20's and heaviest through 20's, last use 9/2022. Occasional cannabis seltzer use for recreation.  Psych critical item history includes suicidal ideation and substance use: alcohol and cannabis.      Previous medication trial: Lithium (600 mg not effective), Vyvance, Prozac, Zoloft.     Therapist: Deborah Watson (weekly)       Interim History                                "                                                                         4, 4     Since the last visit,  -Continues to do well, mood and anxiety are manageable, denies SI, SIB or HI.  -Feels slightly irritable, but expected, but this is manageable. Since discontinuing Lithium, able to \"feel\" things without numbness.  -Still interested in increasing Adderall XR as BP was WNL at recent PCP visit since it's difficult to obtain Adderall XR 20 mg supply and was told 25 mg was easier to get supply.  -No ETOH use.  -Last few days, waking up few times a night, but overall, has been sleeping 8 hours without a problem.    Denies any symptoms suggestive of hypomania or psychosis.    Current Suicidality/Hx of Suicide Attempts: Denies both  CoCominent Medical concerns: Denies    Medication Side Effects: The patient denies all medication side effects.      Medical Review of Systems     Apart from the symptoms mentioned int he HPI, the 14 point review of systems, including constitutional, HEENT, cardiovascular, respiratory, gastrointestinal, genitourinary, musculoskeletal, integumentary, endocrine, neurological, hematologic and allergic is entirely negative.    Substance Use     Denies alcohol use since 9/2022, this is substance of choice.  Occasional cannabis seltzer use as a recreation.  Previous provider note indicates hx of infrequent hallucinogen and MDMA use     Substance treatment x 2, last in 2020.    Social/ Family History                                  [per patient report]                                 1ea,1ea     Living arrangements: lives alone and feels safe.  Social Support:friends, therapy group peers, parents  Access to gun: denies  Born in [place], moved to [place] at age 15, grew up with 2 parents and felt safe.  Works as FT , does not feel this is difficult as he is trying to abstain from alcohol use.    Allergy                                Patient has no known allergies.    Current Medications              "                                                                                          Current Outpatient Medications   Medication Sig Dispense Refill    amphetamine-dextroamphetamine (ADDERALL XR) 20 MG 24 hr capsule Take 2 capsules (40 mg) by mouth every morning 60 capsule 0    amphetamine-dextroamphetamine (ADDERALL) 10 MG tablet Take 1 tablet (10 mg) by mouth daily as needed (in the afternoon for breakthrough sx) 30 tablet 0    buPROPion (WELLBUTRIN XL) 150 MG 24 hr tablet Take 1 tablet (150 mg) by mouth every morning 30 tablet 1    naltrexone (DEPADE/REVIA) 50 MG tablet Take 1 50 mg tablet daily 30 tablet 1    sildenafil (REVATIO) 20 MG tablet Take 1 tablet (20 mg) by mouth 3 times daily 30 tablet 3         Vitals                                                                                                                       3, 3   There were no vitals taken for this visit.        Mental Status Exam                                                                                   9, 14 cog      Alertness: alert  and oriented  Appearance:  Casually dressed and Adequately groomed  Behavior/Demeanor: cooperative and calm, with good  eye contact   Speech: regular rate and rhythm  Mood :  better  Affect:  mostly euthymic ; was congruent to mood; was congruent to content  Thought Process (Associations):  Linear and Goal directed  Thought process (Rate):  Normal  Thought content:  no overt psychosis, denies suicidal ideation, intent or thoughts and patient does not appear to be responding to internal stimuli  Perception:  Reports none;  Denies auditory hallucinations and visual hallucinations  Attention/Concentration:  Fair  Memory:  Immediate recall intact and Short-term memory intact  Language: intact  Fund of Knowledge/Intelligence:  Average  Abstraction:  Normal  Insight:  Fair  Judgment:  Fair  Cognition: (6) does  appear grossly intact; formal cognitive testing was not done    Physical Exam     Motor  activity/EPS:  Normal  Psychomotor: normal or unremarkable    Labs and Results      Pertinent findings on review include: Review of records with relevant information reported in the HPI.  Reviewed pt's past medical record and obtained collateral information.      [place] PRESCRIPTION MONITORING PROGRAM [] was checked today:  indicates Adderall 6/18 (10mg IR), 5/31 (20 mg XR) .    PHQ9 Today:  N/A      6/6/2022     1:47 PM 6/16/2022     2:42 PM 5/31/2023     9:38 AM   PHQ   PHQ-9 Total Score 12 14 11   Q9: Thoughts of better off dead/self-harm past 2 weeks Several days Several days Not at all   F/U: Thoughts of suicide or self-harm No No    F/U: Safety concerns No No        SHAUNA 7 Today: N/A      2/24/2022     2:07 PM 4/6/2022     7:00 PM 6/20/2023     7:33 AM   SHAUNA-7 SCORE   Total Score  10 (moderate anxiety) 6 (mild anxiety)   Total Score 11 10 6       Recent Labs   Lab Test 06/20/23  0818 06/01/22  1123 05/09/22  0855   CR 1.13 0.84 0.86   GFRESTIMATED 89 >90 >90     Recent Labs   Lab Test 06/20/23  0818 06/01/22  1123   AST 30 19   ALT 37 55   ALKPHOS 80 68     PSYCHOTROPIC DRUG INTERACTIONS:    Adderall---Wellbutrin: Concurrent use of AMPHETAMINES and CYP2D6 INHIBITORS may result in increased amphetamine exposure and increased risk of serotonin syndrome.  MANAGEMENT:  Monitoring for adverse effects and patient is aware of risks    Impression/Assessment      Huey Landon is a 32 year old adult  who presents for med management follow up.  Pt appears mostly stable in his mood and anxiety, denies SI, SIB or HI during the appointment.  Pt noted slight increase in irritability since discontinuation of Lithium, but resolved numbness.  Feels irritability is still manageable. BP on recent PCP visit on 6/20, 121/79. OK to increase Adderall XR to 50 mg daily while monitoring for changes in anxiety and mood.  Pt was instructed to monitor BP x2/week next 2 weeks and report back to this writer. If consistently >140/90  to follow up with PCP.  Will continue all other medication regimen.    Discussed Genesight testing option as recommended by therapist.  Pt was not sure about this and will discuss this with therapist.    Diagnosis                                                                   Mood disorder (MDD vs episodic mood d/o)  SHAUNA  CSB  ADHD  BPD per therapist    Treatment Recommendation & Plan       Medication Ordered/Consults/Labs/tests Ordered:     Medication:   -Increase Adderall XR to 50 mg daily for ADHD.  Monitor for changes in anxiety and sleep. Also please check your blood pressure 2 times a week next 2 weeks and send readings to [facility] via [facility]. If your blood pressure is consistently over 140/90, please follow up with primary care.  -Continue all other medication regimen.  OTC Recommendations: none  Lab Orders:  none  Referrals: none  Release of Information: none  Future Treatment Considerations: Per symptoms. Genesight if pt decides  Return for Follow Up: in 4 weeks    -Discussed safety plan for suicidal thoughts  -Discussed plan for suicidality  -Discussed available emergency services  -Patient agrees with the treatment plan  -Encouraged to continue outpatient therapy to gain more coping mechanism for stress.      -Pt understood that after stopping Naltrexone, they may be more sensitive to the effects of heroin and any other narcotics.  The amount of heroin or narcotics pt may have been using on a routine basis before pt started naltrexone might now cause overdose and death and pt fully understands the nature and seriousness of this possible consequences.  If patient is not sure if they can avoid opioid use, pt understands that pt can be referred to alternative treatment programs such as methadone maintenance, which is an effective treatment for opioid dependence and has a reduced risk of fatal overdose.      Treatment Risk Statement: Discussed with the patient my impressions, as well as recommended studies.  I educated patient on the differential diagnosis and prognosis. I discussed with the patient the risks and benefits of medications versus no interventions, including efficacy, dose, possible side effects and length of treatment and the importance of medication compliance.  The patient understands the risks, benefits, adverse effects and alternatives. Agrees to treatment with the capacity to do so. No medical contraindications to treatment. The patient also understands the risks of using street drugs or alcohol.     CRISIS NUMBERS:   Provided routinely in AVS.      Vivian Manzano CNP,  06/28/2023

## 2023-06-29 DIAGNOSIS — F90.2 ATTENTION DEFICIT HYPERACTIVITY DISORDER (ADHD), COMBINED TYPE: ICD-10-CM

## 2023-06-29 RX ORDER — DEXTROAMPHETAMINE SACCHARATE, AMPHETAMINE ASPARTATE MONOHYDRATE, DEXTROAMPHETAMINE SULFATE AND AMPHETAMINE SULFATE 6.25; 6.25; 6.25; 6.25 MG/1; MG/1; MG/1; MG/1
50 CAPSULE, EXTENDED RELEASE ORAL EVERY MORNING
Qty: 60 CAPSULE | Refills: 0 | Status: SHIPPED | OUTPATIENT
Start: 2023-06-29 | End: 2023-07-26

## 2023-06-29 NOTE — TELEPHONE ENCOUNTER
Writer responded via [employer]  Writer also sent a message to patient via [employer]     SHIRLEY OliverN RUSTY  [facility]

## 2023-06-29 NOTE — TELEPHONE ENCOUNTER
Cyn-Please advise if patient aware he would likely need to pay out of pocket for this medication?    Thank you!  SHIRLEY MadrigalN, RN-BC  [facility]

## 2023-06-30 ENCOUNTER — OFFICE VISIT (OUTPATIENT)
Dept: PSYCHOLOGY | Facility: CLINIC | Age: 33
End: 2023-06-30
Payer: COMMERCIAL

## 2023-06-30 DIAGNOSIS — F41.1 GENERALIZED ANXIETY DISORDER: ICD-10-CM

## 2023-06-30 DIAGNOSIS — F90.2 ATTENTION DEFICIT HYPERACTIVITY DISORDER (ADHD), COMBINED TYPE: ICD-10-CM

## 2023-06-30 DIAGNOSIS — F91.8 CONDUCT DISORDER, UNDIFFERENTIATED TYPE: ICD-10-CM

## 2023-06-30 DIAGNOSIS — F33.2 MDD (MAJOR DEPRESSIVE DISORDER), RECURRENT SEVERE, WITHOUT PSYCHOSIS (H): Primary | ICD-10-CM

## 2023-06-30 DIAGNOSIS — F19.90 SUBSTANCE USE DISORDER: ICD-10-CM

## 2023-06-30 PROCEDURE — 90837 PSYTX W PT 60 MINUTES: CPT | Performed by: MARRIAGE & FAMILY THERAPIST

## 2023-06-30 NOTE — PROGRESS NOTES
[facility] - Progress Note    Date of Service: 23   Name: Xavier Landon  : 1990  Medical Record Number: [id number]  Treating Provider: VIKTOR Anderson, [facility]  Type of Session: Individual  Present in Session: pt  Session Start and Stop Time: 0905-959am  Number of Minutes:  54    SERVICE MODALITY:  In-person    DSM-5 Diagnoses:  296.33 (F33.2) Major Depressive Disorder, Recurrent Episode, Severe _ and With anxious distress  300.02 (F41.1) Generalized Anxiety Disorder.  Attention-Deficit/Hyperactivity Disorder  314.01 (F90.9) Unspecified Attention -Deficit / Hyperactivity Disorder.  Substance-Related & Addictive Disorders Alcohol Use Disorder   303.90 (F10.20) Moderate   301.83 (F60.3) Borderline Personality Disorder      Current Reported Symptoms and Status update:  Changes since last session-saw a girl from the past few years, that was exciting   Struggling with compulsive sexual behavior  Struggling with emotional regulation      Progress Toward Treatment Goals:   Minimal progress     Therapeutic Interventions/Treatment Strategies:    Area(s) of treatment focus addressed in this session included Symptom Management and Sexual Health and Wellness      Psychotherapist offered support, feedback and validation and reinforced use of skills Treatment modalities used include Motivational Interviewing Cognitive Behavioral Therapy Interpersonal Behavioral Activation: Reinforced benefits/challenges of change process through applying skills to replace unwanted behaviors and Encouraged strategies to reduce individual procrastination and increase motivation by increasing goal-directed activities to enhance mood and reduce symptoms. and Relationship Skills: Discussed strategies to promote healthier understanding of interpersonal relationships  Support, Feedback, Structured Activity and Problem Solving    Patient Response:   Patient responded to session by accepting  feedback, listening and accepting support  Possible barriers to participation / learning include: N/A    Current Mental Status Exam:   Appearance:  Appropriate   Eye Contact:  Good   Attitude / Demeanor: Cooperative   Speech      Rate / Production: Normal/ Responsive      Volume:  Normal  volume  Orientation:  All  Mood:   Normal Euthymic  Affect:   Appropriate   Thought Content: Clear   Insight:   Good       Plan/Need for Future Services:  Return for therapy in 1 weeks to treat diagnosed problems.    Patient has a current master individualized treatment plan.  See Epic treatment plan for more information.    Referral / Collaboration:  Referral to another professional/service is not indicated at this time..  Emergency Services Needed?  No    Assignment:  Work on sched    Interactive Complexity:  There are four specific communication difficulties that complicate the work of the primary psychiatric procedure.  Interactive complexity (+[id number]) may be reported when at least one of these difficulties is present.    Communication difficulties present during current the psychiatric procedure include:  1. None.      Signature/Title:    Vance Watson, VIKTOR, [facility]

## 2023-07-07 ENCOUNTER — OFFICE VISIT (OUTPATIENT)
Dept: PSYCHOLOGY | Facility: CLINIC | Age: 33
End: 2023-07-07
Payer: COMMERCIAL

## 2023-07-07 DIAGNOSIS — F41.1 GENERALIZED ANXIETY DISORDER: ICD-10-CM

## 2023-07-07 DIAGNOSIS — F91.8 CONDUCT DISORDER, UNDIFFERENTIATED TYPE: ICD-10-CM

## 2023-07-07 DIAGNOSIS — F33.2 MDD (MAJOR DEPRESSIVE DISORDER), RECURRENT SEVERE, WITHOUT PSYCHOSIS (H): Primary | ICD-10-CM

## 2023-07-07 DIAGNOSIS — F19.90 SUBSTANCE USE DISORDER: ICD-10-CM

## 2023-07-07 DIAGNOSIS — F90.2 ATTENTION DEFICIT HYPERACTIVITY DISORDER (ADHD), COMBINED TYPE: ICD-10-CM

## 2023-07-07 PROCEDURE — 90837 PSYTX W PT 60 MINUTES: CPT | Performed by: MARRIAGE & FAMILY THERAPIST

## 2023-07-07 NOTE — PROGRESS NOTES
[facility] - Progress Note    Date of Service: 23   Name: Xavier Landon  : 1990  Medical Record Number: [id number]  Treating Provider: VIKTOR Anderson, [facility]  Type of Session: Individual  Present in Session: pt  Session Start and Stop Time: 2396-9170  Number of Minutes:  54    SERVICE MODALITY:  In-person    DSM-5 Diagnoses:  296.33 (F33.2) Major Depressive Disorder, Recurrent Episode, Severe _ and With anxious distress  300.02 (F41.1) Generalized Anxiety Disorder.  Attention-Deficit/Hyperactivity Disorder  314.01 (F90.9) Unspecified Attention -Deficit / Hyperactivity Disorder.  Substance-Related & Addictive Disorders Alcohol Use Disorder   303.90 (F10.20) Moderate   301.83 (F60.3) Borderline Personality Disorder      Current Reported Symptoms and Status update:  Changes since last session-didn't stick to sched this week, feeling very lonely  Struggling with compulsive sexual behavior  Struggling with emotional regulation      Progress Toward Treatment Goals:   Minimal progress     Therapeutic Interventions/Treatment Strategies:    Area(s) of treatment focus addressed in this session included Symptom Management, Interpersonal Relationship Skills and Sexual Health and Wellness    strategized about relationships and connection    Psychotherapist offered support, feedback and validation and reinforced use of skills Treatment modalities used include Motivational Interviewing Dialectical Behavioral Therapy Sexual Health and Wellness Education Interpersonal Behavioral Activation: Reinforced benefits/challenges of change process through applying skills to replace unwanted behaviors and Encouraged strategies to reduce individual procrastination and increase motivation by increasing goal-directed activities to enhance mood and reduce symptoms., Coping Skills: Assisted patient in understanding the purpose of planning / creating / participating / sharing in positive  experiences, Relationship Skills: Discussed strategies to promote healthier understanding of interpersonal relationships and explored challenging unrealistic expectations of self/others and explored comfort with sexual communication  Support, Feedback, Problem Solving and Education    Patient Response:   Patient responded to session by accepting feedback, listening, focusing on goals and accepting support  Possible barriers to participation / learning include: N/A    Current Mental Status Exam:   Appearance:  Appropriate   Eye Contact:  Good   Attitude / Demeanor: Cooperative   Speech      Rate / Production: Normal/ Responsive      Volume:  Normal  volume  Orientation:  All  Mood:   Normal  Affect:   Appropriate   Thought Content: Clear   Insight:   Good       Plan/Need for Future Services:  Return for therapy in 1 weeks to treat diagnosed problems.    Patient has a current master individualized treatment plan.  See Epic treatment plan for more information.    Referral / Collaboration:  Referral to another professional/service is not indicated at this time..  Emergency Services Needed?  No    Assignment:  Return in one week, work on sched    Interactive Complexity:  There are four specific communication difficulties that complicate the work of the primary psychiatric procedure.  Interactive complexity (+[id number]) may be reported when at least one of these difficulties is present.    Communication difficulties present during current the psychiatric procedure include:  1. None.      Signature/Title:    Vance Watson, VIKTOR, [facility]

## 2023-07-11 ENCOUNTER — OFFICE VISIT (OUTPATIENT)
Dept: PSYCHOLOGY | Facility: CLINIC | Age: 33
End: 2023-07-11
Payer: COMMERCIAL

## 2023-07-11 DIAGNOSIS — F33.2 MDD (MAJOR DEPRESSIVE DISORDER), RECURRENT SEVERE, WITHOUT PSYCHOSIS (H): Primary | ICD-10-CM

## 2023-07-11 DIAGNOSIS — F19.90 SUBSTANCE USE DISORDER: ICD-10-CM

## 2023-07-11 DIAGNOSIS — F90.2 ATTENTION DEFICIT HYPERACTIVITY DISORDER (ADHD), COMBINED TYPE: ICD-10-CM

## 2023-07-11 DIAGNOSIS — F41.1 GENERALIZED ANXIETY DISORDER: ICD-10-CM

## 2023-07-11 DIAGNOSIS — F91.8 CONDUCT DISORDER, UNDIFFERENTIATED TYPE: ICD-10-CM

## 2023-07-11 PROCEDURE — 90853 GROUP PSYCHOTHERAPY: CPT | Performed by: MARRIAGE & FAMILY THERAPIST

## 2023-07-11 NOTE — GROUP NOTE
[facility]  [address], [address]  [place]  [address]    Group Progress Note  [facility] - Progress Note    Date of Service: 23   Name: Xavier Landon  : 1990  Medical Record Number: [id number]  START TIME:  6:30 PM  END TIME:  8:30 PM  FACILITATOR(S): Deborah Watson LMFT, [facility]  TOPIC: [facility] Group Therapy  Type of Session: Group  :  Number of Attendees:  4  Number of Minutes:  /120    SERVICE MODALITY:  In-person      DSM-5 Diagnoses:  296.33 (F33.2) Major Depressive Disorder, Recurrent Episode, Severe _ and With anxious distress  300.02 (F41.1) Generalized Anxiety Disorder.  Attention-Deficit/Hyperactivity Disorder  314.01 (F90.9) Unspecified Attention -Deficit / Hyperactivity Disorder.  Substance-Related & Addictive Disorders Alcohol Use Disorder   303.90 (F10.20) Moderate   301.83 (F60.3) Borderline Personality Disorder      Current Reported Symptoms and Status update:  Changes since last session-has been working a lot, feeling lonely still  Struggling with compulsive sexual behavior  Struggling with emotional regulation      Progress Toward Treatment Goals:   Minimal progress     Therapeutic Interventions/Treatment Strategies:    Area(s) of treatment focus addressed in this session included Symptom Management, Interpersonal Relationship Skills and Sexual Health and Wellness    Patient checked in about their mental health symptoms, healthy coping skills used over the week, negative coping skills used over the week, what sexual behaviors they engaged in-fantasy, masturbation, sex, their comfort level with their behaviors, if there were triggers, urges to violate boundaries, and violations of boundaries.  They took time to process about improvement and also feeling lonely still they provided support and feedback to others in the group.    Psychotherapist offered support, feedback and validation and reinforced use of skills  Treatment modalities used include [facility] THERAPY INTERVENTIONS: Motivational Interviewing Group Dynamic Therapy  Interpersonal Relationship Skills: Assisted patients in implementing more effective communication skills in their relationships and Discussed strategies to promote healthier understanding of interpersonal relationships  Support, Feedback and Structured Activity    Patient Response:   Patient responded to session by accepting feedback, giving feedback and listening  Possible barriers to participation / learning include: N/A    Current Mental Status Exam:   Appearance:  Appropriate   Eye Contact:  Good   Attitude / Demeanor: Cooperative   Speech      Rate / Production: Normal/ Responsive      Volume:  Normal  volume  Orientation:  All  Mood:   Normal Euthymic  Affect:   Appropriate   Thought Content: Clear   Insight:   Good       Plan/Need for Future Services:  Return for therapy in 1 weeks to treat diagnosed problems.    Patient has a current master individualized treatment plan.  See Epic treatment plan for more information.    Referral / Collaboration:  Referral to another professional/service is not indicated at this time..  Emergency Services Needed?  No    Assignment:  Return in one week     Interactive Complexity:  There are four specific communication difficulties that complicate the work of the primary psychiatric procedure.  Interactive complexity (+[id number]) may be reported when at least one of these difficulties is present.    Communication difficulties present during current the psychiatric procedure include:  1. None.      Signature/Title:    Vance Watson, LMFT, [facility]

## 2023-07-14 ENCOUNTER — OFFICE VISIT (OUTPATIENT)
Dept: PSYCHOLOGY | Facility: CLINIC | Age: 33
End: 2023-07-14
Payer: COMMERCIAL

## 2023-07-14 DIAGNOSIS — F91.8 CONDUCT DISORDER, UNDIFFERENTIATED TYPE: ICD-10-CM

## 2023-07-14 DIAGNOSIS — F33.2 MDD (MAJOR DEPRESSIVE DISORDER), RECURRENT SEVERE, WITHOUT PSYCHOSIS (H): Primary | ICD-10-CM

## 2023-07-14 DIAGNOSIS — F90.2 ATTENTION DEFICIT HYPERACTIVITY DISORDER (ADHD), COMBINED TYPE: ICD-10-CM

## 2023-07-14 DIAGNOSIS — F19.90 SUBSTANCE USE DISORDER: ICD-10-CM

## 2023-07-14 DIAGNOSIS — F41.1 GENERALIZED ANXIETY DISORDER: ICD-10-CM

## 2023-07-14 PROCEDURE — 90837 PSYTX W PT 60 MINUTES: CPT | Performed by: MARRIAGE & FAMILY THERAPIST

## 2023-07-14 NOTE — PROGRESS NOTES
[facility] - Progress Note    Date of Service: 23   Name: Xavier Landon  : 1990  Medical Record Number: [id number]  Treating Provider: VIKTOR Anderson, [facility]  Type of Session: Individual  Present in Session: pt  Session Start and Stop Time: 8809-7355  Number of Minutes:  59    SERVICE MODALITY:  In-person    DSM-5 Diagnoses:  296.33 (F33.2) Major Depressive Disorder, Recurrent Episode, Severe _ and With anxious distress  300.02 (F41.1) Generalized Anxiety Disorder.  Attention-Deficit/Hyperactivity Disorder  314.01 (F90.9) Unspecified Attention -Deficit / Hyperactivity Disorder.  Substance-Related & Addictive Disorders Alcohol Use Disorder   303.90 (F10.20) Moderate   301.83 (F60.3) Borderline Personality Disorder      Current Reported Symptoms and Status update:  Changes since last session-has been keeping up with routine  Struggling with compulsive sexual behavior  Struggling with emotional regulation      Progress Toward Treatment Goals:   Minimal progress     Therapeutic Interventions/Treatment Strategies:    Area(s) of treatment focus addressed in this session included Symptom Management and [place] Maintenance/Relapse Prevention    Psychotherapist offered support, feedback and validation and reinforced use of skills Treatment modalities used include Motivational Interviewing Dialectical Behavioral Therapy Interpersonal Relationship Skills: Assisted patients in implementing more effective communication skills in their relationships and Discussed strategies to promote healthier understanding of interpersonal relationships  Support, Feedback and Problem Solving    Patient Response:   Patient responded to session by accepting feedback, listening, focusing on goals and accepting support  Possible barriers to participation / learning include: N/A    Current Mental Status Exam:   Appearance:  Appropriate   Eye Contact:  Good   Attitude /  Demeanor: Cooperative   Speech      Rate / Production: Normal/ Responsive      Volume:  Normal  volume  Orientation:  All  Mood:   Normal  Affect:   Appropriate   Thought Content: Clear   Insight:   Good       Plan/Need for Future Services:  Return for therapy in 1 weeks to treat diagnosed problems.    Patient has a current master individualized treatment plan.  See Epic treatment plan for more information.    Referral / Collaboration:  Referral to another professional/service is not indicated at this time..  Emergency Services Needed?  No    Assignment:  Return in one week continue w routine for mindfulness    Interactive Complexity:  There are four specific communication difficulties that complicate the work of the primary psychiatric procedure.  Interactive complexity (+20712) may be reported when at least one of these difficulties is present.    Communication difficulties present during current the psychiatric procedure include:  1. None.      Signature/Title:    VIKTOR Anderson, [facility]

## 2023-07-18 ENCOUNTER — OFFICE VISIT (OUTPATIENT)
Dept: PSYCHOLOGY | Facility: CLINIC | Age: 33
End: 2023-07-18
Payer: COMMERCIAL

## 2023-07-18 DIAGNOSIS — F19.90 SUBSTANCE USE DISORDER: ICD-10-CM

## 2023-07-18 DIAGNOSIS — F91.8 CONDUCT DISORDER, UNDIFFERENTIATED TYPE: ICD-10-CM

## 2023-07-18 DIAGNOSIS — F90.2 ATTENTION DEFICIT HYPERACTIVITY DISORDER (ADHD), COMBINED TYPE: ICD-10-CM

## 2023-07-18 DIAGNOSIS — F33.2 MDD (MAJOR DEPRESSIVE DISORDER), RECURRENT SEVERE, WITHOUT PSYCHOSIS (H): Primary | ICD-10-CM

## 2023-07-18 DIAGNOSIS — F41.1 GENERALIZED ANXIETY DISORDER: ICD-10-CM

## 2023-07-18 PROCEDURE — 90853 GROUP PSYCHOTHERAPY: CPT | Performed by: MARRIAGE & FAMILY THERAPIST

## 2023-07-18 NOTE — GROUP NOTE
[facility]  [address], [address]  [place]  [address]    Group Progress Note  [facility] - Progress Note    Date of Service: 23   Name: Xavier Landon  : 1990  Medical Record Number: [id number]  START TIME:  6:30 PM  END TIME:  8:30 PM  FACILITATOR(S): Deborah Watson LMFT, [facility]  TOPIC: [facility] Group Therapy  Type of Session: Group  :  Number of Attendees:  6  Number of Minutes:  /120    SERVICE MODALITY:  In-person      DSM-5 Diagnoses:  296.33 (F33.2) Major Depressive Disorder, Recurrent Episode, Severe _ and With anxious distress  300.02 (F41.1) Generalized Anxiety Disorder.  Attention-Deficit/Hyperactivity Disorder  314.01 (F90.9) Unspecified Attention -Deficit / Hyperactivity Disorder.  Substance-Related & Addictive Disorders Alcohol Use Disorder   303.90 (F10.20) Moderate   301.83 (F60.3) Borderline Personality Disorder      Current Reported Symptoms and Status update:  Changes since last session-things continue to go well  Struggling with compulsive sexual behavior  Struggling with emotional regulation      Progress Toward Treatment Goals:   Minimal progress     Therapeutic Interventions/Treatment Strategies:    Area(s) of treatment focus addressed in this session included Symptom Management, [place] Maintenance/Relapse Prevention and Interpersonal Relationship Skills    Patient checked in about their mental health symptoms, healthy coping skills used over the week, negative coping skills used over the week, what sexual behaviors they engaged in-fantasy, masturbation, sex, their comfort level with their behaviors, if there were triggers, urges to violate boundaries, and violations of boundaries.  They took time to process about struggling with scheduling  they provided support and feedback to others in the group.    Psychotherapist offered support, feedback and validation and reinforced use of skills Treatment modalities used  include [facility] THERAPY INTERVENTIONS: Motivational Interviewing Group Dynamic Therapy  Interpersonal Behavioral Activation: Encouraged strategies to reduce individual procrastination and increase motivation by increasing goal-directed activities to enhance mood and reduce symptoms.  Support, Feedback and Structured Activity    Patient Response:   Patient responded to session by accepting feedback, giving feedback and listening  Possible barriers to participation / learning include: N/A    Current Mental Status Exam:   Appearance:  Appropriate   Eye Contact:  Good   Attitude / Demeanor: Cooperative   Speech      Rate / Production: Normal/ Responsive      Volume:  Normal  volume  Orientation:  All  Mood:   Normal  Affect:   Appropriate   Thought Content: Clear   Insight:   Good       Plan/Need for Future Services:  Return for therapy in 1 weeks to treat diagnosed problems.    Patient has a current master individualized treatment plan.  See Epic treatment plan for more information.    Referral / Collaboration:  Referral to another professional/service is not indicated at this time..  Emergency Services Needed?  No    Assignment:  Return in one week     Interactive Complexity:  There are four specific communication difficulties that complicate the work of the primary psychiatric procedure.  Interactive complexity (+47486) may be reported when at least one of these difficulties is present.    Communication difficulties present during current the psychiatric procedure include:  1. None.      Signature/Title:    Vance Watson, LMFT, [facility]

## 2023-07-20 ENCOUNTER — OFFICE VISIT (OUTPATIENT)
Dept: PSYCHOLOGY | Facility: CLINIC | Age: 33
End: 2023-07-20
Payer: COMMERCIAL

## 2023-07-20 DIAGNOSIS — F90.2 ATTENTION DEFICIT HYPERACTIVITY DISORDER (ADHD), COMBINED TYPE: ICD-10-CM

## 2023-07-20 DIAGNOSIS — F19.90 SUBSTANCE USE DISORDER: ICD-10-CM

## 2023-07-20 DIAGNOSIS — F41.1 GENERALIZED ANXIETY DISORDER: ICD-10-CM

## 2023-07-20 DIAGNOSIS — F33.2 MDD (MAJOR DEPRESSIVE DISORDER), RECURRENT SEVERE, WITHOUT PSYCHOSIS (H): Primary | ICD-10-CM

## 2023-07-20 DIAGNOSIS — F91.8 CONDUCT DISORDER, UNDIFFERENTIATED TYPE: ICD-10-CM

## 2023-07-20 PROCEDURE — 90834 PSYTX W PT 45 MINUTES: CPT | Performed by: MARRIAGE & FAMILY THERAPIST

## 2023-07-20 NOTE — PROGRESS NOTES
[facility] - Progress Note    Date of Service: 23   Name: Xavier Landon  : 1990  Medical Record Number: [id number]  Treating Provider: VIKTOR Anderson, [facility]  Type of Session: Individual  Present in Session: pt  Session Start and Stop Time: 115pm-159pm  Number of Minutes:  44    SERVICE MODALITY:  In-person    DSM-5 Diagnoses:  296.33 (F33.2) Major Depressive Disorder, Recurrent Episode, Severe _ and With anxious distress  300.02 (F41.1) Generalized Anxiety Disorder.  Attention-Deficit/Hyperactivity Disorder  314.01 (F90.9) Unspecified Attention -Deficit / Hyperactivity Disorder.  Substance-Related & Addictive Disorders Alcohol Use Disorder   303.90 (F10.20) Moderate   301.83 (F60.3) Borderline Personality Disorder      Current Reported Symptoms and Status update:  Changes since last session-has continued to work on sched, struggling with consistency   Struggling with compulsive sexual behavior  Struggling with emotional regulation      Progress Toward Treatment Goals:   Minimal progress     Therapeutic Interventions/Treatment Strategies:    Area(s) of treatment focus addressed in this session included Symptom Management, [place] Maintenance/Relapse Prevention, Interpersonal Relationship Skills and Sexual Health and Wellness      Psychotherapist offered support, feedback and validation and reinforced use of skills Treatment modalities used include Motivational Interviewing Dialectical Behavioral Therapy Interpersonal Behavioral Activation: Reinforced benefits/challenges of change process through applying skills to replace unwanted behaviors and Encouraged strategies to reduce individual procrastination and increase motivation by increasing goal-directed activities to enhance mood and reduce symptoms.  Support, Feedback, Structured Activity and Problem Solving    Patient Response:   Patient responded to session by accepting feedback, listening and accepting  support  Possible barriers to participation / learning include: N/A    Current Mental Status Exam:   Appearance:  Appropriate   Eye Contact:  Good   Attitude / Demeanor: Cooperative   Speech      Rate / Production: Normal/ Responsive      Volume:  Normal  volume  Orientation:  All  Mood:   Anxious  Normal  Affect:   Appropriate   Thought Content: Clear   Insight:   Good       Plan/Need for Future Services:  Return for therapy in 1 weeks to treat diagnosed problems.    Patient has a current master individualized treatment plan.  See Epic treatment plan for more information.    Referral / Collaboration:  Referral to another professional/service is not indicated at this time..  Emergency Services Needed?  No    Assignment:  Routine, pmr and shower daily, grocery on Monday, socializing     Interactive Complexity:  There are four specific communication difficulties that complicate the work of the primary psychiatric procedure.  Interactive complexity (+62941) may be reported when at least one of these difficulties is present.    Communication difficulties present during current the psychiatric procedure include:  1. None.      Signature/Title:    Vance Watson, VIKTOR, [facility]

## 2023-07-25 ENCOUNTER — OFFICE VISIT (OUTPATIENT)
Dept: PSYCHOLOGY | Facility: CLINIC | Age: 33
End: 2023-07-25
Payer: COMMERCIAL

## 2023-07-25 DIAGNOSIS — F90.2 ATTENTION DEFICIT HYPERACTIVITY DISORDER (ADHD), COMBINED TYPE: ICD-10-CM

## 2023-07-25 DIAGNOSIS — F91.8 CONDUCT DISORDER, UNDIFFERENTIATED TYPE: ICD-10-CM

## 2023-07-25 DIAGNOSIS — F41.1 GENERALIZED ANXIETY DISORDER: ICD-10-CM

## 2023-07-25 DIAGNOSIS — F33.2 MDD (MAJOR DEPRESSIVE DISORDER), RECURRENT SEVERE, WITHOUT PSYCHOSIS (H): Primary | ICD-10-CM

## 2023-07-25 DIAGNOSIS — F19.90 SUBSTANCE USE DISORDER: ICD-10-CM

## 2023-07-25 PROCEDURE — 90853 GROUP PSYCHOTHERAPY: CPT | Performed by: MARRIAGE & FAMILY THERAPIST

## 2023-07-26 ENCOUNTER — DOCUMENTATION ONLY (OUTPATIENT)
Dept: PSYCHIATRY | Facility: CLINIC | Age: 33
End: 2023-07-26
Payer: COMMERCIAL

## 2023-07-26 DIAGNOSIS — F90.2 ATTENTION DEFICIT HYPERACTIVITY DISORDER (ADHD), COMBINED TYPE: ICD-10-CM

## 2023-07-26 DIAGNOSIS — F91.8 CONDUCT DISORDER, UNDIFFERENTIATED TYPE: ICD-10-CM

## 2023-07-26 RX ORDER — DEXTROAMPHETAMINE SACCHARATE, AMPHETAMINE ASPARTATE MONOHYDRATE, DEXTROAMPHETAMINE SULFATE AND AMPHETAMINE SULFATE 6.25; 6.25; 6.25; 6.25 MG/1; MG/1; MG/1; MG/1
50 CAPSULE, EXTENDED RELEASE ORAL EVERY MORNING
Qty: 60 CAPSULE | Refills: 0 | Status: SHIPPED | OUTPATIENT
Start: 2023-07-26 | End: 2023-07-28

## 2023-07-26 NOTE — PROGRESS NOTES
No response to [employer] invites. Pt was no show at 0915. Vivian Manzano, CNP, 7/26/2023

## 2023-07-26 NOTE — TELEPHONE ENCOUNTER
[facility] Call Center    Phone Message    May a detailed message be left on voicemail: yes     Reason for Call: Other: Medication Refill Request     Caller stated he missed his appointment today with provider but rescheduled to Friday, 7/28/23. Caller stated he will be out of his Adderall 10mg and Adderall 25mg by tomorrow and asked for a refill on these. Caller also stated he is not almost out of his other medications but would like to request refills for these as well. buPROPion 150mg, naltrexone 50mg, sildenafil 20mg.    [id number] - [place], [place] - [address]     Action Taken: Message routed to:  Other: [facility] Nurse pool    Travel Screening: Not Applicable

## 2023-07-26 NOTE — TELEPHONE ENCOUNTER
Last seen: 06/28/2023  RTC: 4 weeks   Cancel: 07/26/2023  No-show: None  Next appt: 07/28/2023     Incoming refill from Patient via phone    Medication requested:   Pending Prescriptions:                       Disp   Refills    buPROPion (WELLBUTRIN XL) 150 MG 24 hr ta*90 tab*0            Sig: Take 1 tablet (150 mg) by mouth every morning    naltrexone (DEPADE/REVIA) 50 MG tablet    90 tab*0            Sig: Take 1 50 mg tablet daily    amphetamine-dextroamphetamine (ADDERALL X*60 cap*0            Sig: Take 2 capsules (50 mg) by mouth every morning    amphetamine-dextroamphetamine (ADDERALL) *30 tab*0            Sig: Take 1 tablet (10 mg) by mouth daily as needed           (in the afternoon for breakthrough sx)        Last refill per           From chart note:   -Increase Adderall XR to 50 mg daily for ADHD.  Monitor for changes in anxiety and sleep. Also please check your blood pressure 2 times a week next 2 weeks and send readings to carolyn via [employer] If your blood pressure is consistently over 140/90, please follow up with primary care.  -Continue all other medication regimen.       Medication sent to provider for review.

## 2023-07-28 ENCOUNTER — VIRTUAL VISIT (OUTPATIENT)
Dept: PSYCHIATRY | Facility: CLINIC | Age: 33
End: 2023-07-28
Attending: NURSE PRACTITIONER
Payer: COMMERCIAL

## 2023-07-28 ENCOUNTER — OFFICE VISIT (OUTPATIENT)
Dept: PSYCHOLOGY | Facility: CLINIC | Age: 33
End: 2023-07-28
Payer: COMMERCIAL

## 2023-07-28 DIAGNOSIS — F19.90 SUBSTANCE USE DISORDER: ICD-10-CM

## 2023-07-28 DIAGNOSIS — F33.41 RECURRENT MAJOR DEPRESSIVE DISORDER, IN PARTIAL REMISSION (H): ICD-10-CM

## 2023-07-28 DIAGNOSIS — F90.2 ATTENTION DEFICIT HYPERACTIVITY DISORDER (ADHD), COMBINED TYPE: Primary | ICD-10-CM

## 2023-07-28 DIAGNOSIS — F41.1 GENERALIZED ANXIETY DISORDER: ICD-10-CM

## 2023-07-28 DIAGNOSIS — F33.2 MDD (MAJOR DEPRESSIVE DISORDER), RECURRENT SEVERE, WITHOUT PSYCHOSIS (H): Primary | ICD-10-CM

## 2023-07-28 DIAGNOSIS — F90.2 ATTENTION DEFICIT HYPERACTIVITY DISORDER (ADHD), COMBINED TYPE: ICD-10-CM

## 2023-07-28 DIAGNOSIS — F91.8 CONDUCT DISORDER, UNDIFFERENTIATED TYPE: ICD-10-CM

## 2023-07-28 PROCEDURE — 99214 OFFICE O/P EST MOD 30 MIN: CPT | Mod: VID | Performed by: NURSE PRACTITIONER

## 2023-07-28 PROCEDURE — 90837 PSYTX W PT 60 MINUTES: CPT | Performed by: MARRIAGE & FAMILY THERAPIST

## 2023-07-28 RX ORDER — DEXTROAMPHETAMINE SACCHARATE, AMPHETAMINE ASPARTATE MONOHYDRATE, DEXTROAMPHETAMINE SULFATE AND AMPHETAMINE SULFATE 6.25; 6.25; 6.25; 6.25 MG/1; MG/1; MG/1; MG/1
50 CAPSULE, EXTENDED RELEASE ORAL DAILY
Qty: 60 CAPSULE | Refills: 0 | Status: SHIPPED | OUTPATIENT
Start: 2023-08-23 | End: 2023-09-22

## 2023-07-28 RX ORDER — DEXTROAMPHETAMINE SACCHARATE, AMPHETAMINE ASPARTATE MONOHYDRATE, DEXTROAMPHETAMINE SULFATE AND AMPHETAMINE SULFATE 6.25; 6.25; 6.25; 6.25 MG/1; MG/1; MG/1; MG/1
50 CAPSULE, EXTENDED RELEASE ORAL DAILY
Qty: 60 CAPSULE | Refills: 0 | Status: SHIPPED | OUTPATIENT
Start: 2023-10-22 | End: 2023-10-27

## 2023-07-28 RX ORDER — DEXTROAMPHETAMINE SACCHARATE, AMPHETAMINE ASPARTATE, DEXTROAMPHETAMINE SULFATE AND AMPHETAMINE SULFATE 2.5; 2.5; 2.5; 2.5 MG/1; MG/1; MG/1; MG/1
10 TABLET ORAL DAILY PRN
Qty: 30 TABLET | Refills: 0 | Status: SHIPPED | OUTPATIENT
Start: 2023-09-14 | End: 2023-10-14

## 2023-07-28 RX ORDER — DEXTROAMPHETAMINE SACCHARATE, AMPHETAMINE ASPARTATE MONOHYDRATE, DEXTROAMPHETAMINE SULFATE AND AMPHETAMINE SULFATE 6.25; 6.25; 6.25; 6.25 MG/1; MG/1; MG/1; MG/1
50 CAPSULE, EXTENDED RELEASE ORAL DAILY
Qty: 60 CAPSULE | Refills: 0 | Status: SHIPPED | OUTPATIENT
Start: 2023-09-22 | End: 2023-10-22

## 2023-07-28 RX ORDER — DEXTROAMPHETAMINE SACCHARATE, AMPHETAMINE ASPARTATE, DEXTROAMPHETAMINE SULFATE AND AMPHETAMINE SULFATE 2.5; 2.5; 2.5; 2.5 MG/1; MG/1; MG/1; MG/1
10 TABLET ORAL DAILY PRN
Qty: 30 TABLET | Refills: 0 | Status: SHIPPED | OUTPATIENT
Start: 2023-10-14 | End: 2023-10-27

## 2023-07-28 RX ORDER — BUPROPION HYDROCHLORIDE 150 MG/1
150 TABLET ORAL EVERY MORNING
Qty: 90 TABLET | Refills: 0 | Status: SHIPPED | OUTPATIENT
Start: 2023-07-28 | End: 2023-10-27

## 2023-07-28 RX ORDER — NALTREXONE HYDROCHLORIDE 50 MG/1
TABLET, FILM COATED ORAL
Qty: 90 TABLET | Refills: 0 | Status: SHIPPED | OUTPATIENT
Start: 2023-07-28 | End: 2023-10-27

## 2023-07-28 RX ORDER — DEXTROAMPHETAMINE SACCHARATE, AMPHETAMINE ASPARTATE, DEXTROAMPHETAMINE SULFATE AND AMPHETAMINE SULFATE 2.5; 2.5; 2.5; 2.5 MG/1; MG/1; MG/1; MG/1
10 TABLET ORAL DAILY PRN
Qty: 30 TABLET | Refills: 0 | Status: SHIPPED | OUTPATIENT
Start: 2023-08-15 | End: 2023-09-14

## 2023-07-28 ASSESSMENT — PAIN SCALES - GENERAL: PAINLEVEL: NO PAIN (0)

## 2023-07-28 NOTE — PROGRESS NOTES
Virtual Visit Details    Type of service:  Video Visit     Originating Location (pt. Location): Home  Distant Location (provider location):  Off-site  Platform used for Video Visit: [facility]        Psychiatry Clinic Progress Note                                                                  Patient Name: Xavier Landon  YOB: 1990  MRN: [id number]  Date of Service:  07/28/2023  Last Seen:6/28/2023       Xavier Landon is a 32 year old person assigned male at birth, identifies as cisgender male who uses the name Huey and pronoun wojciech.      Huey Landon is a 32 year old year old adult who presents for ongoing psychiatric care.  Huey Landon was last seen on 6/28/2023.     At that time,     Medication Ordered/Consults/Labs/tests Ordered:     Medication:   -Increase Adderall XR to 50 mg daily for ADHD.  Monitor for changes in anxiety and sleep. Also please check your blood pressure 2 times a week next 2 weeks and send readings to [facility] via [employer]. If your blood pressure is consistently over 140/90, please follow up with primary care.  -Continue all other medication regimen.  OTC Recommendations: none  Lab Orders:  none  Referrals: none  Release of Information: none  Future Treatment Considerations: Per symptoms. [facility] if pt decides  Return for Follow Up: in 4 weeks    Pertinent Background:  Depression, anxiety and CSB started around high school. ADHD also diagnosed in HS.  SI in 20's, but no SIB or SA hx. ETOH started in early 20's and heaviest through 20's, last use 9/2022. Occasional cannabis seltzer use for recreation.  Psych critical item history includes suicidal ideation and substance use: alcohol and cannabis.      Previous medication trial: Lithium (600 mg not effective), Vyvance, Prozac, Zoloft.     Therapist: Deborah Watson (weekly)       Interim History                                                                                                         4, 4     On 7/26/2023, pt was no show.    Since the last visit,  -Continues to do well, mood and anxiety are manageable, denies SI, SIB or HI.  -No exacerbation on irritability, feels this is at baseline.  -No significant changes with increase in Adderall on anxiety, sleep or irritability.  -Sleeping well 6-7 hours/night. May wake up early occasionally, but typically then go to gym and come back and sleep more hours.  -No ETOH use.  -Wants to continue on current medication regimen as he has been doing well.    Denies any symptoms suggestive of hypomania or psychosis.    Current Suicidality/Hx of Suicide Attempts: Denies both  CoCominent Medical concerns: Denies    Medication Side Effects: The patient denies all medication side effects.      Medical Review of Systems     Apart from the symptoms mentioned int he HPI, the 14 point review of systems, including constitutional, HEENT, cardiovascular, respiratory, gastrointestinal, genitourinary, musculoskeletal, integumentary, endocrine, neurological, hematologic and allergic is entirely negative.    Substance Use     Denies alcohol use since 9/2022, this is substance of choice.  Occasional cannabis seltzer use as a recreation.  Previous provider note indicates hx of infrequent hallucinogen and MDMA use     Substance treatment x 2, last in 2020.    Social/ Family History                                  [per patient report]                                 1ea,1ea     Living arrangements: lives alone and feels safe.  Social Support:friends, therapy group peers, parents  Access to gun: tono  Born in [place], moved to [place] at age 15, grew up with 2 parents and felt safe.  Works as FT , does not feel this is difficult as he is trying to abstain from alcohol use.    Allergy                                Patient has no known allergies.    Current Medications                                                                                                        Current Outpatient Medications   Medication Sig Dispense Refill    amphetamine-dextroamphetamine (ADDERALL XR) 25 MG 24 hr capsule Take 2 capsules (50 mg) by mouth every morning 60 capsule 0    amphetamine-dextroamphetamine (ADDERALL) 10 MG tablet Take 1 tablet (10 mg) by mouth daily as needed (in the afternoon for breakthrough sx) 30 tablet 0    buPROPion (WELLBUTRIN XL) 150 MG 24 hr tablet Take 1 tablet (150 mg) by mouth every morning 90 tablet 0    naltrexone (DEPADE/REVIA) 50 MG tablet Take 1 50 mg tablet daily 90 tablet 0    sildenafil (REVATIO) 20 MG tablet Take 1 tablet (20 mg) by mouth 3 times daily 30 tablet 3         Vitals                                                                                                                       3, 3   There were no vitals taken for this visit.        Mental Status Exam                                                                                   9, 14 cog      Alertness: alert  and oriented  Appearance:  Casually dressed and Adequately groomed  Behavior/Demeanor: cooperative and calm, with good  eye contact   Speech: regular rate and rhythm  Mood :  good  Affect:  euthymic ; was congruent to mood; was congruent to content  Thought Process (Associations):  Linear and Goal directed  Thought process (Rate):  Normal  Thought content:  no overt psychosis, denies suicidal ideation, intent or thoughts and patient does not appear to be responding to internal stimuli  Perception:  Reports none;  Denies auditory hallucinations and visual hallucinations  Attention/Concentration:  Fair  Memory:  Immediate recall intact and Short-term memory intact  Language: intact  Fund of Knowledge/Intelligence:  Average  Abstraction:  Normal  Insight:  Fair  Judgment:  Fair  Cognition: (6) does  appear grossly intact; formal cognitive testing was not done    Physical Exam     Motor activity/EPS:  Normal  Psychomotor: normal or unremarkable    Labs and Results      Pertinent  findings on review include: Review of records with relevant information reported in the HPI.  Reviewed pt's past medical record and obtained collateral information.      [place] PRESCRIPTION MONITORING PROGRAM [] was checked today:  Adderall 7/26 (25mg XR), 7/18 (10mg IR).    PHQ9 Today:  N/A      6/6/2022     1:47 PM 6/16/2022     2:42 PM 5/31/2023     9:38 AM   PHQ   PHQ-9 Total Score 12 14 11   Q9: Thoughts of better off dead/self-harm past 2 weeks Several days Several days Not at all   F/U: Thoughts of suicide or self-harm No No    F/U: Safety concerns No No        SHAUNA 7 Today: N/A      2/24/2022     2:07 PM 4/6/2022     7:00 PM 6/20/2023     7:33 AM   SHAUNA-7 SCORE   Total Score  10 (moderate anxiety) 6 (mild anxiety)   Total Score 11 10 6       Recent Labs   Lab Test 06/20/23  0818 06/01/22  1123 05/09/22  0855   CR 1.13 0.84 0.86   GFRESTIMATED 89 >90 >90     Recent Labs   Lab Test 06/20/23  0818 06/01/22  1123   AST 30 19   ALT 37 55   ALKPHOS 80 68     PSYCHOTROPIC DRUG INTERACTIONS:    Adderall---Wellbutrin: Concurrent use of AMPHETAMINES and CYP2D6 INHIBITORS may result in increased amphetamine exposure and increased risk of serotonin syndrome.  MANAGEMENT:  Monitoring for adverse effects and patient is aware of risks    Impression/Assessment      Huey Landon is a 32 year old adult  who presents for med management follow up.  Pt appears stable in his mood and anxiety, denies SI, SIB or HI during the appointment. Pt has not experience any changes in irritability, anxiety or sleep with increased Adderall.  Relieved to have consistent medication supply. Pt has not checked BP, but was instructed to monitor BP x2/week next 2 weeks and report back to this writer. If consistently >140/90 to follow up with PCP.  OK to continue on current medication regimen as he is stable. Last BP on 6/20, WNL, but this was prior to increase in Adderall.  Since it was 121/79, likely his BP will remain WNL.    Pt was not  interested in [facility].      Diagnosis                                                                   Mood disorder (MDD vs episodic mood d/o)  SHAUNA  CSB  ADHD  BPD per therapist    Treatment Recommendation & Plan       Medication Ordered/Consults/Labs/tests Ordered:     Medication: Continue on current medication regimen.  OTC Recommendations: none  Lab Orders:  none  Referrals: none  Release of Information: none  Future Treatment Considerations: Per symptoms.   Return for Follow Up: in 3 months    -Discussed safety plan for suicidal thoughts  -Discussed plan for suicidality  -Discussed available emergency services  -Patient agrees with the treatment plan  -Encouraged to continue outpatient therapy to gain more coping mechanism for stress.      -Pt understood that after stopping Naltrexone, they may be more sensitive to the effects of heroin and any other narcotics.  The amount of heroin or narcotics pt may have been using on a routine basis before pt started naltrexone might now cause overdose and death and pt fully understands the nature and seriousness of this possible consequences.  If patient is not sure if they can avoid opioid use, pt understands that pt can be referred to alternative treatment programs such as methadone maintenance, which is an effective treatment for opioid dependence and has a reduced risk of fatal overdose.      Treatment Risk Statement: Discussed with the patient my impressions, as well as recommended studies. I educated patient on the differential diagnosis and prognosis. I discussed with the patient the risks and benefits of medications versus no interventions, including efficacy, dose, possible side effects and length of treatment and the importance of medication compliance.  The patient understands the risks, benefits, adverse effects and alternatives. Agrees to treatment with the capacity to do so. No medical contraindications to treatment. The patient also understands the risks  of using street drugs or alcohol.     CRISIS NUMBERS:   Provided routinely in AVS.      Vivian Manzano, VI,  07/28/2023

## 2023-07-28 NOTE — PATIENT INSTRUCTIONS
-Continue on current medication regimen. Recommend to check your blood pressure 2 times a week next 2 weeks and send readings to carolyn via [employer]. If your blood pressure is consistently over 140/90, please follow up with primary care.    Your next appointment is scheduled on 10/27/2023 (Fri) at 9:30am.      **For crisis resources, please see the information at the end of this document**   Patient Education    Thank you for coming to the [facility] [place] [facility].     Lab Testing:  If you had lab testing today and your results are reassuring or normal they will be mailed to you or sent through [employer] within 7 days. If the lab tests need quick action we will call you with the results. The phone number we will call with results is # [phone]. If this is not the best number please call our clinic and change the number.     Medication Refills:  If you need any refills please call your pharmacy and they will contact us. Our fax number for refills is [phone].   Three business days of notice are needed for general medication refill requests.   Five business days of notice are needed for controlled substance refill requests.   If you need to change to a different pharmacy, please contact the new pharmacy directly. The new pharmacy will help you get your medications transferred.     Contact Us:  Please call [phone] during business hours (8-5:00 M-F).   If you have medication related questions after clinic hours, or on the weekend, please call [phone].     Financial Assistance [phone]   Medical Records [phone]       MENTAL HEALTH CRISIS RESOURCES:  For a emergency help, please call 911 or go to the nearest Emergency Department.     Emergency Walk-In Options:   Em[facility] Unit @ [facility] ([place]): [phone] - Specialized mental health emergency area designed to be calming  [facility] ([place]): [phone]  [facility] ([place]): [phone]  [facility] ([address]): [phone]    [facility] Crisis Information:   Himanshu: [phone]  Praveen: [phone]  Mary Jo (CLARA) - Adult: [phone]     Child: [phone]  Sae - Adult: [phone]     Child: [phone]  [place]: [phone]  List of all [facility] resources:   https://[place].[facility]/dhs/people-we-serve/adults/health-care/mental-health/resources/crisis-contacts.jsp    National Crisis Information:   Crisis Text Line: Text  [place]  to 406907  Suicide & Crisis Lifeline: 988  National Suicide Prevention Lifeline: 3-042-164-TALK (1-496.314.1201)       For online chat options, visit https://suicidepreventionlifeline.org/chat/  Poison Control Center: 1-504.569.2940  Trans Lifeline: 1-826.450.9822 - Hotline for transgender people of all ages  The [employer]: 3-674-687-7711 - Hotline for LGBT youth     For Non-Emergency Support:   Fast Tracker: Mental Health & Substance Use Disorder Resources -   https://www.[facility].org/

## 2023-07-28 NOTE — PROGRESS NOTES
[facility] - Progress Note    Date of Service: 23   Name: Xavier Landon  : 1990  Medical Record Number: [id number]  Treating Provider: VIKTOR Anderson, JESSI  Type of Session: Individual  Present in Session: pt  Session Start and Stop Time: 7298-9584  Number of Minutes:  55    SERVICE MODALITY:  In-person    DSM-5 Diagnoses:  296.33 (F33.2) Major Depressive Disorder, Recurrent Episode, Severe _ and With anxious distress  300.02 (F41.1) Generalized Anxiety Disorder.  Attention-Deficit/Hyperactivity Disorder  314.01 (F90.9) Unspecified Attention -Deficit / Hyperactivity Disorder.  Substance-Related & Addictive Disorders Alcohol Use Disorder   303.90 (F10.20) Moderate   301.83 (F60.3) Borderline Personality Disorder    Current Reported Symptoms and Status update:  Changes since last session-concerned about a new hire at work   Struggling with compulsive sexual behavior  Struggling with emotional regulation    Progress Toward Treatment Goals:   Minimal progress     Therapeutic Interventions/Treatment Strategies:    Area(s) of treatment focus addressed in this session included Symptom Management, [place] Maintenance/Relapse Prevention, and Sexual Health and Wellness      Psychotherapist offered support, feedback and validation and reinforced use of skills Treatment modalities used include Motivational Interviewing Dialectical Behavioral Therapy Interpersonal Relapse Prevention: Assisted patient in identifying the challenges and barriers to participation and attendance to support groups/community resources and discussed application of self compassion, explored challenging unrealistic expectations of self/others, and explored comfort with sexual communication  Support, Feedback, and Problem Solving    Patient Response:   Patient responded to session by accepting feedback, listening, focusing on goals, and accepting support  Possible barriers to participation /  learning include: N/A    Current Mental Status Exam:   Appearance:  Appropriate   Eye Contact:  Good   Attitude / Demeanor: Cooperative   Speech      Rate / Production: Normal/ Responsive      Volume:  Normal  volume  Orientation:  All  Mood:   Normal Euthymic  Affect:   Appropriate   Thought Content: Clear   Insight:   Good       Plan/Need for Future Services:  Return for therapy in 1 weeks to treat diagnosed problems.    Patient has a current master individualized treatment plan.  See Epic treatment plan for more information.    Referral / Collaboration:  Referral to another professional/service is not indicated at this time..  Emergency Services Needed?  No    Assignment:  Email  Grocery shopping     Interactive Complexity:  There are four specific communication difficulties that complicate the work of the primary psychiatric procedure.  Interactive complexity (+58908) may be reported when at least one of these difficulties is present.    Communication difficulties present during current the psychiatric procedure include:  None.      Signature/Title:    VIKTOR Anderson, [facility]

## 2023-07-28 NOTE — NURSING NOTE
Is the patient currently in the state of [place]? YES    Visit mode:VIDEO    If the visit is dropped, the patient can be reconnected by: VIDEO VISIT: Text to cell phone: [phone]    Will anyone else be joining the visit? NO      How would you like to obtain your AVS? MyChart    Are changes needed to the allergy or medication list? NO    Reason for visit: RECHECK

## 2023-08-04 ENCOUNTER — OFFICE VISIT (OUTPATIENT)
Dept: PSYCHOLOGY | Facility: CLINIC | Age: 33
End: 2023-08-04
Payer: COMMERCIAL

## 2023-08-04 DIAGNOSIS — F90.2 ATTENTION DEFICIT HYPERACTIVITY DISORDER (ADHD), COMBINED TYPE: ICD-10-CM

## 2023-08-04 DIAGNOSIS — F91.8 CONDUCT DISORDER, UNDIFFERENTIATED TYPE: ICD-10-CM

## 2023-08-04 DIAGNOSIS — F33.2 MDD (MAJOR DEPRESSIVE DISORDER), RECURRENT SEVERE, WITHOUT PSYCHOSIS (H): Primary | ICD-10-CM

## 2023-08-04 DIAGNOSIS — F19.90 SUBSTANCE USE DISORDER: ICD-10-CM

## 2023-08-04 PROCEDURE — 90837 PSYTX W PT 60 MINUTES: CPT | Performed by: MARRIAGE & FAMILY THERAPIST

## 2023-08-04 NOTE — PROGRESS NOTES
[place] [facility] - Progress Note    Date of Service: 23   Name: Xavier Landon  : 1990  Medical Record Number: [id number]  Treating Provider: VIKTOR Anderson LAD  Type of Session: Individual  Present in Session: pt  Session Start and Stop Time: 2179-6142  Number of Minutes:  59    SERVICE MODALITY:  In-person    DSM-5 Diagnoses:  296.33 (F33.2) Major Depressive Disorder, Recurrent Episode, Severe _ and With anxious distress  300.02 (F41.1) Generalized Anxiety Disorder.  Attention-Deficit/Hyperactivity Disorder  314.01 (F90.9) Unspecified Attention -Deficit / Hyperactivity Disorder.  Substance-Related & Addictive Disorders Alcohol Use Disorder   303.90 (F10.20) Moderate   301.83 (F60.3) Borderline Personality Disorder    Current Reported Symptoms and Status update:  Changes since last session-increased loneliness   Struggling with compulsive sexual behavior  Struggling with emotional regulation    Progress Toward Treatment Goals:   Minimal progress     Therapeutic Interventions/Treatment Strategies:    Area(s) of treatment focus addressed in this session included Symptom Management and Interpersonal Relationship Skills      Psychotherapist offered support, feedback and validation and reinforced use of skills Treatment modalities used include Motivational Interviewing Dialectical Behavioral Therapy Interpersonal Relationship Skills: Assisted patients in implementing more effective communication skills in their relationships  Support, Feedback, Structured Activity, and Problem Solving    Patient Response:   Patient responded to session by accepting feedback, listening, focusing on goals, and accepting support  Possible barriers to participation / learning include: N/A    Current Mental Status Exam:   Appearance:  Appropriate   Eye Contact:  Good   Attitude / Demeanor: Cooperative   Speech      Rate / Production: Normal/ Responsive      Volume:  Normal   volume  Orientation:  All  Mood:   Normal Euthymic  Affect:   Appropriate   Thought Content: Clear   Insight:   Good       Plan/Need for Future Services:  Return for therapy in 2 weeks to treat diagnosed problems.    Patient has a current master individualized treatment plan.  See Epic treatment plan for more information.    Referral / Collaboration:  Referral to another professional/service is not indicated at this time..  Emergency Services Needed?  No    Assignment:  Returnin 2 weeksstart volleyball    Interactive Complexity:  There are four specific communication difficulties that complicate the work of the primary psychiatric procedure.  Interactive complexity (+33913) may be reported when at least one of these difficulties is present.    Communication difficulties present during current the psychiatric procedure include:  None.      Signature/Title:    Vance Watson, LMFT, [facility]

## 2023-08-15 ENCOUNTER — OFFICE VISIT (OUTPATIENT)
Dept: PSYCHOLOGY | Facility: CLINIC | Age: 33
End: 2023-08-15
Payer: COMMERCIAL

## 2023-08-15 DIAGNOSIS — F90.2 ATTENTION DEFICIT HYPERACTIVITY DISORDER (ADHD), COMBINED TYPE: ICD-10-CM

## 2023-08-15 DIAGNOSIS — F91.8 CONDUCT DISORDER, UNDIFFERENTIATED TYPE: ICD-10-CM

## 2023-08-15 DIAGNOSIS — F19.90 SUBSTANCE USE DISORDER: ICD-10-CM

## 2023-08-15 DIAGNOSIS — F33.2 MDD (MAJOR DEPRESSIVE DISORDER), RECURRENT SEVERE, WITHOUT PSYCHOSIS (H): Primary | ICD-10-CM

## 2023-08-15 PROCEDURE — 90853 GROUP PSYCHOTHERAPY: CPT | Performed by: MARRIAGE & FAMILY THERAPIST

## 2023-08-15 NOTE — GROUP NOTE
[facility]  [address], [address]  [place]  [address]    Group Progress Note  [facility] - Progress Note    Date of Service: 8/15/23   Name: Xavier Landon  : 1990  Medical Record Number: [id number]  START TIME:  6:30 PM  END TIME:  8:30 PM  FACILITATOR(S): Deborah Watson LMFT, [facility]  TOPIC: [facility] Group Therapy  Type of Session: Group  :  Number of Attendees:  5  Number of Minutes:  /120    SERVICE MODALITY:  In-person      DSM-5 Diagnoses:  296.33 (F33.2) Major Depressive Disorder, Recurrent Episode, Severe _ and With anxious distress  300.02 (F41.1) Generalized Anxiety Disorder.  Attention-Deficit/Hyperactivity Disorder  314.01 (F90.9) Unspecified Attention -Deficit / Hyperactivity Disorder.  Substance-Related & Addictive Disorders Alcohol Use Disorder   303.90 (F10.20) Moderate   301.83 (F60.3) Borderline Personality Disorder    Current Reported Symptoms and Status update:  Changes since last session-negative cycle behaviors  Struggling with compulsive sexual behavior  Struggling with emotional regulation    Progress Toward Treatment Goals:   Minimal progress     Therapeutic Interventions/Treatment Strategies:    Area(s) of treatment focus addressed in this session included Symptom Management, [place] Maintenance/Relapse Prevention, and Sexual Health and Wellness    Patient checked in about their mental health symptoms, healthy coping skills used over the week, negative coping skills used over the week, what sexual behaviors they engaged in-fantasy, masturbation, sex, their comfort level with their behaviors, if there were triggers, urges to violate boundaries, and violations of boundaries.  They took time to process about negative cycle and getting out of it they provided support and feedback to others in the group.      Psychotherapist offered support, feedback and validation and reinforced use of skills Treatment modalities used  include [facility] THERAPY INTERVENTIONS: Motivational Interviewing Group Dynamic Therapy  Interpersonal Behavioral Activation: Reinforced benefits/challenges of change process through applying skills to replace unwanted behaviors, Coping Skills: Facilitated discussion on learning and applying radical acceptance skill, and explored challenging unrealistic expectations of self/others  Support, Feedback, and Structured Activity    Patient Response:   Patient responded to session by accepting feedback, giving feedback, and listening  Possible barriers to participation / learning include: N/A    Current Mental Status Exam:   Appearance:  Appropriate   Eye Contact:  Good   Attitude / Demeanor: Cooperative   Speech      Rate / Production: Normal/ Responsive      Volume:  Normal  volume  Orientation:  All  Mood:   Irritable   Affect:   Appropriate   Thought Content: Clear   Insight:   Good       Plan/Need for Future Services:  Return for therapy in 1 weeks to treat diagnosed problems.    Patient has a current master individualized treatment plan.  See Epic treatment plan for more information.    Referral / Collaboration:  Referral to another professional/service is not indicated at this time..  Emergency Services Needed?  No    Assignment:  Return in one week     Interactive Complexity:  There are four specific communication difficulties that complicate the work of the primary psychiatric procedure.  Interactive complexity (+34952) may be reported when at least one of these difficulties is present.    Communication difficulties present during current the psychiatric procedure include:  None.      Signature/Title:    Vance Watson, LMFT, [facility]

## 2023-08-18 ENCOUNTER — OFFICE VISIT (OUTPATIENT)
Dept: PSYCHOLOGY | Facility: CLINIC | Age: 33
End: 2023-08-18
Payer: COMMERCIAL

## 2023-08-18 DIAGNOSIS — F90.2 ATTENTION DEFICIT HYPERACTIVITY DISORDER (ADHD), COMBINED TYPE: ICD-10-CM

## 2023-08-18 DIAGNOSIS — F41.1 GENERALIZED ANXIETY DISORDER: ICD-10-CM

## 2023-08-18 DIAGNOSIS — N52.1 ERECTILE DYSFUNCTION DUE TO DISEASES CLASSIFIED ELSEWHERE: ICD-10-CM

## 2023-08-18 DIAGNOSIS — F33.2 MDD (MAJOR DEPRESSIVE DISORDER), RECURRENT SEVERE, WITHOUT PSYCHOSIS (H): Primary | ICD-10-CM

## 2023-08-18 DIAGNOSIS — F19.90 SUBSTANCE USE DISORDER: ICD-10-CM

## 2023-08-18 DIAGNOSIS — F91.8 CONDUCT DISORDER, UNDIFFERENTIATED TYPE: ICD-10-CM

## 2023-08-18 PROCEDURE — 90837 PSYTX W PT 60 MINUTES: CPT | Performed by: MARRIAGE & FAMILY THERAPIST

## 2023-08-18 NOTE — PROGRESS NOTES
[facility] - Progress Note    Date of Service: 23   Name: Xavier Landon  : 1990  Medical Record Number: [id number]  Treating Provider: VIKTOR Anderson [facility]  Type of Session: Individual  Present in Session: pt  Session Start and Stop Time:   Number of Minutes:  57    SERVICE MODALITY:  In-person    DSM-5 Diagnoses:  296.33 (F33.2) Major Depressive Disorder, Recurrent Episode, Severe _ and With anxious distress  300.02 (F41.1) Generalized Anxiety Disorder.  Attention-Deficit/Hyperactivity Disorder  314.01 (F90.9) Unspecified Attention -Deficit / Hyperactivity Disorder.  Substance-Related & Addictive Disorders Alcohol Use Disorder   303.90 (F10.20) Moderate   301.83 (F60.3) Borderline Personality Disorder    Current Reported Symptoms and Status update:  Changes since last session-no porn use  Struggling with compulsive sexual behavior  Struggling with emotional regulation    Progress Toward Treatment Goals:   Minimal progress     Therapeutic Interventions/Treatment Strategies:    Area(s) of treatment focus addressed in this session included Symptom Management and Sexual Health and Wellness    Psychotherapist offered support, feedback and validation and reinforced use of skills Treatment modalities used include Motivational Interviewing Sex Therapy Interpersonal Coping Skills: Facilitated discussion on learning and applying radical acceptance skill and facilitated discussion about sexual health and wellness, explored challenging unrealistic expectations of self/others, and explored comfort with sexual communication  Feedback and Education    Patient Response:   Patient responded to session by listening, interrupting, and required support for self-regulation  Possible barriers to participation / learning include: N/A    Current Mental Status Exam:   Appearance:  Appropriate   Eye Contact:  Good   Attitude / Demeanor: Hostile  Speech      Rate /  Production: Normal/ Responsive      Volume:  Loud  volume  Orientation:  All  Mood:   Angry  Irritable   Affect:   Appropriate  Labile   Thought Content: Clear   Insight:   Fair       Plan/Need for Future Services:  Return for therapy in 1 weeks to treat diagnosed problems.    Patient has a current master individualized treatment plan.  See Epic treatment plan for more information.    Referral / Collaboration:  Referral to another professional/service is not indicated at this time..  Emergency Services Needed?  No    Assignment:  Pmr cold showers mindfulness    Interactive Complexity:  There are four specific communication difficulties that complicate the work of the primary psychiatric procedure.  Interactive complexity (+32486) may be reported when at least one of these difficulties is present.    Communication difficulties present during current the psychiatric procedure include:  None.      Signature/Title:    Vance Watson, LMFT, [facility]

## 2023-08-22 ENCOUNTER — OFFICE VISIT (OUTPATIENT)
Dept: PSYCHOLOGY | Facility: CLINIC | Age: 33
End: 2023-08-22
Payer: COMMERCIAL

## 2023-08-22 DIAGNOSIS — F41.1 GENERALIZED ANXIETY DISORDER: ICD-10-CM

## 2023-08-22 DIAGNOSIS — N52.1 ERECTILE DYSFUNCTION DUE TO DISEASES CLASSIFIED ELSEWHERE: ICD-10-CM

## 2023-08-22 DIAGNOSIS — F90.2 ATTENTION DEFICIT HYPERACTIVITY DISORDER (ADHD), COMBINED TYPE: ICD-10-CM

## 2023-08-22 DIAGNOSIS — F19.90 SUBSTANCE USE DISORDER: ICD-10-CM

## 2023-08-22 DIAGNOSIS — F91.8 CONDUCT DISORDER, UNDIFFERENTIATED TYPE: ICD-10-CM

## 2023-08-22 DIAGNOSIS — F33.2 MDD (MAJOR DEPRESSIVE DISORDER), RECURRENT SEVERE, WITHOUT PSYCHOSIS (H): Primary | ICD-10-CM

## 2023-08-22 PROCEDURE — 90853 GROUP PSYCHOTHERAPY: CPT | Performed by: MARRIAGE & FAMILY THERAPIST

## 2023-08-23 NOTE — GROUP NOTE
[facility]  [address], [address]  [place]  [address]    Group Progress Note  [facility] - Progress Note    Date of Service: 23   Name: Xavier Landon  : 1990  Medical Record Number: [id number]  START TIME:  6:30 PM  END TIME:  8:30 PM  FACILITATOR(S): Deborah Watson LMFT, [facility]  TOPIC: [facility] Group Therapy  Type of Session: Group  :  Number of Attendees:  6  Number of Minutes:  /120    SERVICE MODALITY:  In-person      DSM-5 Diagnoses:  296.33 (F33.2) Major Depressive Disorder, Recurrent Episode, Severe _ and With anxious distress  300.02 (F41.1) Generalized Anxiety Disorder.  Attention-Deficit/Hyperactivity Disorder  314.01 (F90.9) Unspecified Attention -Deficit / Hyperactivity Disorder.  Substance-Related & Addictive Disorders Alcohol Use Disorder   303.90 (F10.20) Moderate   301.83 (F60.3) Borderline Personality Disorder    Current Reported Symptoms and Status update:  Changes since last session improved mood and goal directed behavior    Progress Toward Treatment Goals:   Minimal progress     Therapeutic Interventions/Treatment Strategies:    Area(s) of treatment focus addressed in this session included Symptom Management, Interpersonal Relationship Skills, and Sexual Health and Wellness    Patient checked in about their mental health symptoms, healthy coping skills used over the week, negative coping skills used over the week, what sexual behaviors they engaged in-fantasy, masturbation, sex, their comfort level with their behaviors, if there were triggers, urges to violate boundaries, and violations of boundaries.  They took time to process about improved mood and being able to engage in homework they provided support and feedback to others in the group.    Psychotherapist offered support, feedback and validation and reinforced use of skills Treatment modalities used include [facility] THERAPY INTERVENTIONS: Motivational Interviewing  Group Dynamic Therapy  Interpersonal facilitated discussion about sexual health and wellness and explored challenging unrealistic expectations of self/others  Support, Feedback, and Structured Activity    Patient Response:   Patient responded to session by accepting feedback, giving feedback, and listening  Possible barriers to participation / learning include: N/A    Current Mental Status Exam:   Appearance:  Appropriate   Eye Contact:  Good   Attitude / Demeanor: Cooperative   Speech      Rate / Production: Normal/ Responsive      Volume:  Normal  volume  Orientation:  All  Mood:   Normal Euthymic  Affect:   Appropriate   Thought Content: Clear   Insight:   Good       Plan/Need for Future Services:  Return for therapy in 1 weeks to treat diagnosed problems.    Patient has a current master individualized treatment plan.  See Epic treatment plan for more information.    Referral / Collaboration:  Referral to another professional/service is not indicated at this time..  Emergency Services Needed?  No    Assignment:  Return in one week     Interactive Complexity:  There are four specific communication difficulties that complicate the work of the primary psychiatric procedure.  Interactive complexity (+38169) may be reported when at least one of these difficulties is present.    Communication difficulties present during current the psychiatric procedure include:  None.      Signature/Title:    Vance Watson, LMFT, [facility]

## 2023-08-25 ENCOUNTER — OFFICE VISIT (OUTPATIENT)
Dept: PSYCHOLOGY | Facility: CLINIC | Age: 33
End: 2023-08-25
Payer: COMMERCIAL

## 2023-08-25 DIAGNOSIS — F41.1 GENERALIZED ANXIETY DISORDER: ICD-10-CM

## 2023-08-25 DIAGNOSIS — F91.8 CONDUCT DISORDER, UNDIFFERENTIATED TYPE: ICD-10-CM

## 2023-08-25 DIAGNOSIS — N52.1 ERECTILE DYSFUNCTION DUE TO DISEASES CLASSIFIED ELSEWHERE: ICD-10-CM

## 2023-08-25 DIAGNOSIS — F90.2 ATTENTION DEFICIT HYPERACTIVITY DISORDER (ADHD), COMBINED TYPE: ICD-10-CM

## 2023-08-25 DIAGNOSIS — F33.2 MDD (MAJOR DEPRESSIVE DISORDER), RECURRENT SEVERE, WITHOUT PSYCHOSIS (H): Primary | ICD-10-CM

## 2023-08-25 PROCEDURE — 90837 PSYTX W PT 60 MINUTES: CPT | Performed by: MARRIAGE & FAMILY THERAPIST

## 2023-08-25 NOTE — PROGRESS NOTES
[facility] - Progress Note    Date of Service: 23   Name: Xavier Landon  : 1990  Medical Record Number: [id number]  Treating Provider: VIKTOR Anderson, [facility]  Type of Session: Individual  Present in Session: pt  Session Start and Stop Time: 901-1000  Number of Minutes:  59    SERVICE MODALITY:  In-person    DSM-5 Diagnoses:  296.33 (F33.2) Major Depressive Disorder, Recurrent Episode, Severe _ and With anxious distress  300.02 (F41.1) Generalized Anxiety Disorder.  Attention-Deficit/Hyperactivity Disorder  314.01 (F90.9) Unspecified Attention -Deficit / Hyperactivity Disorder.  Substance-Related & Addictive Disorders Alcohol Use Disorder   303.90 (F10.20) Moderate   301.83 (F60.3) Borderline Personality Disorder    Current Reported Symptoms and Status update:  Changes since last session-watched porn that is uncomfortable-spiraling   Struggling with compulsive sexual behavior  Struggling with emotional regulation    Progress Toward Treatment Goals:   Minimal progress     Therapeutic Interventions/Treatment Strategies:    Area(s) of treatment focus addressed in this session included Symptom Management, [place] Maintenance/Relapse Prevention, and Sexual Health and Wellness    Creating goals and plan for implementation for the next 3 months-    Psychotherapist offered support, feedback and validation and reinforced use of skills facilitated discussion about sexual health and wellness, explored challenging unrealistic expectations of self/others, and facilitated discussion about sexual pleasure and sexual satisfaction  Support, Feedback, Structured Activity, and Problem Solving    Patient Response:   Patient responded to session by accepting feedback, listening, focusing on goals, and accepting support  Possible barriers to participation / learning include: N/A    Current Mental Status Exam:   Appearance:  Appropriate   Eye Contact:  Good   Attitude /  Demeanor: Cooperative   Speech      Rate / Production: Normal/ Responsive      Volume:  Normal  volume  Orientation:  All  Mood:   Normal  Affect:   Appropriate   Thought Content: Clear   Insight:   Good       Plan/Need for Future Services:  Return for therapy in 1 weeks to treat diagnosed problems.    Patient has a current master individualized treatment plan.  See Epic treatment plan for more information.    Referral / Collaboration:  Referral to another professional/service is not indicated at this time..  Emergency Services Needed?  No    Assignment:  Coding camp    Interactive Complexity:  There are four specific communication difficulties that complicate the work of the primary psychiatric procedure.  Interactive complexity (+10626) may be reported when at least one of these difficulties is present.    Communication difficulties present during current the psychiatric procedure include:  None.      Signature/Title:    Vance Watson, LMFT, [facility]

## 2023-08-29 ENCOUNTER — OFFICE VISIT (OUTPATIENT)
Dept: PSYCHOLOGY | Facility: CLINIC | Age: 33
End: 2023-08-29
Payer: COMMERCIAL

## 2023-08-29 DIAGNOSIS — F33.2 MDD (MAJOR DEPRESSIVE DISORDER), RECURRENT SEVERE, WITHOUT PSYCHOSIS (H): Primary | ICD-10-CM

## 2023-08-29 DIAGNOSIS — F41.1 GENERALIZED ANXIETY DISORDER: ICD-10-CM

## 2023-08-29 DIAGNOSIS — F91.8 CONDUCT DISORDER, UNDIFFERENTIATED TYPE: ICD-10-CM

## 2023-08-29 DIAGNOSIS — F90.2 ATTENTION DEFICIT HYPERACTIVITY DISORDER (ADHD), COMBINED TYPE: ICD-10-CM

## 2023-08-29 DIAGNOSIS — N52.1 ERECTILE DYSFUNCTION DUE TO DISEASES CLASSIFIED ELSEWHERE: ICD-10-CM

## 2023-08-29 PROCEDURE — 90853 GROUP PSYCHOTHERAPY: CPT | Performed by: MARRIAGE & FAMILY THERAPIST

## 2023-08-29 NOTE — GROUP NOTE
[facility]  [address], [address]  [place]  [address]    Group Progress Note  [facility] - Progress Note    Date of Service: 23   Name: Xavier Landon  : 1990  Medical Record Number: [id number]  START TIME:  6:30 PM  END TIME:  8:30 PM  FACILITATOR(S): Deborah Watson LMFT, [facility]  TOPIC: [facility] Group Therapy  Type of Session: Group  :  Number of Attendees:  6  Number of Minutes: /120    SERVICE MODALITY:  In-person      DSM-5 Diagnoses:  296.33 (F33.2) Major Depressive Disorder, Recurrent Episode, Severe _ and With anxious distress  300.02 (F41.1) Generalized Anxiety Disorder.  Attention-Deficit/Hyperactivity Disorder  314.01 (F90.9) Unspecified Attention -Deficit / Hyperactivity Disorder.  Substance-Related & Addictive Disorders Alcohol Use Disorder   303.90 (F10.20) Moderate   301.83 (F60.3) Borderline Personality Disorder    Current Reported Symptoms and Status update:  Changes since last session  Struggling with compulsive sexual behavior  Struggling with emotional regulation    Progress Toward Treatment Goals:   Minimal progress     Therapeutic Interventions/Treatment Strategies:    Area(s) of treatment focus addressed in this session included Symptom Management and Sexual Health and Wellness    Patient checked in about their mental health symptoms, healthy coping skills used over the week, negative coping skills used over the week, what sexual behaviors they engaged in-fantasy, masturbation, sex, their comfort level with their behaviors, if there were triggers, urges to violate boundaries, and violations of boundaries.  They took time to process about where they are at w doing the work in the program they provided support and feedback to others in the group.    Psychotherapist offered support, feedback and validation and reinforced use of skills Treatment modalities used include [facility] THERAPY INTERVENTIONS: Motivational  Interviewing Group Dynamic Therapy  Interpersonal Behavioral Activation: Encouraged strategies to reduce individual procrastination and increase motivation by increasing goal-directed activities to enhance mood and reduce symptoms. and Cognitive Restructuring:  Explored impact of ineffective thoughts / distortions on mood and activity  Support, Feedback, and Structured Activity    Patient Response:   Patient responded to session by accepting feedback, giving feedback, and listening  Possible barriers to participation / learning include: N/A    Current Mental Status Exam:   Appearance:  Appropriate   Eye Contact:  Good   Attitude / Demeanor: Cooperative   Speech      Rate / Production: Normal/ Responsive      Volume:  Normal  volume  Orientation:  All  Mood:   Normal Euthymic  Affect:   Appropriate   Thought Content: Clear   Insight:   Good       Plan/Need for Future Services:  Return for therapy in 3 weeks to treat diagnosed problems.    Patient has a current master individualized treatment plan.  See Epic treatment plan for more information.    Referral / Collaboration:  Referral to another professional/service is not indicated at this time..  Emergency Services Needed?  No    Assignment:  Return in 3 weeks    Interactive Complexity:  There are four specific communication difficulties that complicate the work of the primary psychiatric procedure.  Interactive complexity (+25503) may be reported when at least one of these difficulties is present.    Communication difficulties present during current the psychiatric procedure include:  None.      Signature/Title:    Vance Watson, LMFT, [facility]

## 2023-08-30 NOTE — PROGRESS NOTES
[place] [facility] - Progress Note    Date of Service: 23   Name: Xavier Landon  : 1990  Medical Record Number: [id number]  Treating Provider: VIKTOR Anderson [facility]  Type of Session: Individual  Present in Session: pt  Session Start and Stop Time: 6820-9567  Number of Minutes:  60    SERVICE MODALITY:  In-person    DSM-5 Diagnoses:  296.33 (F33.2) Major Depressive Disorder, Recurrent Episode, Severe _ and With anxious distress  300.02 (F41.1) Generalized Anxiety Disorder.  Attention-Deficit/Hyperactivity Disorder  314.01 (F90.9) Unspecified Attention -Deficit / Hyperactivity Disorder.  Substance-Related & Addictive Disorders Alcohol Use Disorder   303.90 (F10.20) Moderate   301.83 (F60.3) Borderline Personality Disorder      Current Reported Symptoms and Status update:  Changes since last session-improved mood  Struggling with compulsive sexual behavior  Struggling with emotional regulation      Progress Toward Treatment Goals:   Minimal progress     Therapeutic Interventions/Treatment Strategies:    Area(s) of treatment focus addressed in this session included Symptom Management    Explored boundaries-using pornography, staying on track with goals     Psychotherapist offered support, feedback and validation and reinforced use of skills Treatment modalities used include Motivational Interviewing Dialectical Behavioral Therapy Interpersonal Relationship Skills: Encouraged development and maintenance  of healthy boundaries and explored challenging unrealistic expectations of self/others  Support, Feedback and Problem Solving    Patient Response:   Patient responded to session by accepting feedback, listening and accepting support  Possible barriers to participation / learning include: N/A    Current Mental Status Exam:   Appearance:  Appropriate   Eye Contact:  Good   Attitude / Demeanor: Cooperative   Speech      Rate / Production: Normal/ Responsive       Volume:  Normal  volume  Orientation:  All  Mood:   Normal Euthymic  Affect:   Appropriate  Bright   Thought Content: Clear   Insight:   Good       Plan/Need for Future Services:  Return for therapy in 1 weeks to treat diagnosed problems.    Patient has a current master individualized treatment plan.  See Epic treatment plan for more information.    Referral / Collaboration:  Referral to another professional/service is not indicated at this time..  Emergency Services Needed?  No    Assignment:  Continue with boundaries, workon connection    Interactive Complexity:  There are four specific communication difficulties that complicate the work of the primary psychiatric procedure.  Interactive complexity (+58372) may be reported when at least one of these difficulties is present.    Communication difficulties present during current the psychiatric procedure include:  1. None.      Signature/Title:    Vance Watson, VIKTOR, [facility]        DISPLAY PLAN FREE TEXT

## 2023-09-01 ENCOUNTER — OFFICE VISIT (OUTPATIENT)
Dept: PSYCHOLOGY | Facility: CLINIC | Age: 33
End: 2023-09-01
Payer: COMMERCIAL

## 2023-09-01 DIAGNOSIS — F91.8 CONDUCT DISORDER, UNDIFFERENTIATED TYPE: ICD-10-CM

## 2023-09-01 DIAGNOSIS — F90.2 ATTENTION DEFICIT HYPERACTIVITY DISORDER (ADHD), COMBINED TYPE: ICD-10-CM

## 2023-09-01 DIAGNOSIS — F33.2 MDD (MAJOR DEPRESSIVE DISORDER), RECURRENT SEVERE, WITHOUT PSYCHOSIS (H): Primary | ICD-10-CM

## 2023-09-01 DIAGNOSIS — N52.1 ERECTILE DYSFUNCTION DUE TO DISEASES CLASSIFIED ELSEWHERE: ICD-10-CM

## 2023-09-01 DIAGNOSIS — F41.1 GENERALIZED ANXIETY DISORDER: ICD-10-CM

## 2023-09-01 PROCEDURE — 90834 PSYTX W PT 45 MINUTES: CPT | Performed by: MARRIAGE & FAMILY THERAPIST

## 2023-09-01 NOTE — PROGRESS NOTES
[place] for [facility] - Progress Note    Date of Service: 23   Name: Xavier Landon  : 1990  Medical Record Number: [id number]  Treating Provider: VIKTOR Anderson, JESSI  Type of Session: Individual  Present in Session: pt  Session Start and Stop Time: 8832-5552  Number of Minutes:  45    SERVICE MODALITY:  In-person    DSM-5 Diagnoses:  296.33 (F33.2) Major Depressive Disorder, Recurrent Episode, Severe _ and With anxious distress  300.02 (F41.1) Generalized Anxiety Disorder.  Attention-Deficit/Hyperactivity Disorder  314.01 (F90.9) Unspecified Attention -Deficit / Hyperactivity Disorder.  Substance-Related & Addictive Disorders Alcohol Use Disorder   303.90 (F10.20) Moderate   301.83 (F60.3) Borderline Personality Disorder    Current Reported Symptoms and Status update:  Changes since last session-applying to coding camp  Struggling with compulsive sexual behavior  Struggling with emotional regulation    Progress Toward Treatment Goals:   Minimal progress     Therapeutic Interventions/Treatment Strategies:    Area(s) of treatment focus addressed in this session included Symptom Management and Interpersonal Relationship Skills      Psychotherapist offered support, feedback and validation and reinforced use of skills Treatment modalities used include Motivational Interviewing Interpersonal Behavioral Activation: Encouraged strategies to reduce individual procrastination and increase motivation by increasing goal-directed activities to enhance mood and reduce symptoms.  Support, Feedback, and Problem Solving    Patient Response:   Patient responded to session by listening and accepting support  Possible barriers to participation / learning include: N/A    Current Mental Status Exam:   Appearance:  Appropriate   Eye Contact:  Good   Attitude / Demeanor: Cooperative   Speech      Rate / Production: Normal/ Responsive      Volume:  Normal   volume  Orientation:  All  Mood:   Normal Euthymic  Affect:   Appropriate   Thought Content: Clear   Insight:   Good       Plan/Need for Future Services:  Return for therapy in 2 weeks to treat diagnosed problems.    Patient has a current master individualized treatment plan.  See Epic treatment plan for more information.    Referral / Collaboration:  Referral to another professional/service is not indicated at this time..  Emergency Services Needed?  No    Assignment:  Return in 2 weeks    Interactive Complexity:  There are four specific communication difficulties that complicate the work of the primary psychiatric procedure.  Interactive complexity (+21048) may be reported when at least one of these difficulties is present.    Communication difficulties present during current the psychiatric procedure include:  None.      Signature/Title:    Vance Watson, LMFT, [facility]

## 2023-09-19 ENCOUNTER — OFFICE VISIT (OUTPATIENT)
Dept: PSYCHOLOGY | Facility: CLINIC | Age: 33
End: 2023-09-19
Payer: COMMERCIAL

## 2023-09-19 DIAGNOSIS — N52.1 ERECTILE DYSFUNCTION DUE TO DISEASES CLASSIFIED ELSEWHERE: ICD-10-CM

## 2023-09-19 DIAGNOSIS — F41.1 GENERALIZED ANXIETY DISORDER: ICD-10-CM

## 2023-09-19 DIAGNOSIS — F33.2 MDD (MAJOR DEPRESSIVE DISORDER), RECURRENT SEVERE, WITHOUT PSYCHOSIS (H): Primary | ICD-10-CM

## 2023-09-19 DIAGNOSIS — F19.90 SUBSTANCE USE DISORDER: ICD-10-CM

## 2023-09-19 DIAGNOSIS — F90.2 ATTENTION DEFICIT HYPERACTIVITY DISORDER (ADHD), COMBINED TYPE: ICD-10-CM

## 2023-09-19 DIAGNOSIS — F91.8 CONDUCT DISORDER, UNDIFFERENTIATED TYPE: ICD-10-CM

## 2023-09-19 PROCEDURE — 90853 GROUP PSYCHOTHERAPY: CPT | Performed by: MARRIAGE & FAMILY THERAPIST

## 2023-09-19 NOTE — GROUP NOTE
[facility]  [address], [address]  [place]  [address]    Group Progress Note  [facility] - Progress Note    Date of Service: 23   Name: Xavier Landon  : 1990  Medical Record Number: [id number]  START TIME:  6:30 PM  END TIME:  8:30 PM  FACILITATOR(S): Deborah Watson LMFT, [facility]  TOPIC: [facility] Group Therapy  Type of Session: Group  :  Number of Attendees:  7  Number of Minutes:  /120    SERVICE MODALITY:  In-person      DSM-5 Diagnoses:  296.33 (F33.2) Major Depressive Disorder, Recurrent Episode, Severe _ and With anxious distress  300.02 (F41.1) Generalized Anxiety Disorder.  Attention-Deficit/Hyperactivity Disorder  314.01 (F90.9) Unspecified Attention -Deficit / Hyperactivity Disorder.  Substance-Related & Addictive Disorders Alcohol Use Disorder   303.90 (F10.20) Moderate   301.83 (F60.3) Borderline Personality Disorder    Current Reported Symptoms and Status update:  Changes since last session-improved mood-in positive cycle   Struggling with compulsive sexual behavior  Struggling with emotional regulation    Progress Toward Treatment Goals:   Minimal progress     Therapeutic Interventions/Treatment Strategies:    Area(s) of treatment focus addressed in this session included Symptom Management, Stevens Maintenance/Relapse Prevention, and Sexual Health and Wellness    Patient checked in about their mental health symptoms, healthy coping skills used over the week, negative coping skills used over the week, what sexual behaviors they engaged in-fantasy, masturbation, sex, their comfort level with their behaviors, if there were triggers, urges to violate boundaries, and violations of boundaries.  They took time to process about past few weeks and positive cycle they provided support and feedback to others in the group.    Psychotherapist offered support, feedback and validation and reinforced use of skills Treatment modalities  used include [facility] THERAPY INTERVENTIONS: Motivational Interviewing Group Dynamic Therapy  Interpersonal Relationship Skills: Assisted patients in implementing more effective communication skills in their relationships and Discussed strategies to promote healthier understanding of interpersonal relationships  Support, Feedback, and Structured Activity    Patient Response:   Patient responded to session by accepting feedback, giving feedback, and listening  Possible barriers to participation / learning include: N/A    Current Mental Status Exam:   Appearance:  Appropriate   Eye Contact:  Good   Attitude / Demeanor: Cooperative   Speech      Rate / Production: Normal/ Responsive      Volume:  Normal  volume  Orientation:  All  Mood:   Normal Euthymic  Affect:   Appropriate   Thought Content: Clear   Insight:   Good       Plan/Need for Future Services:  Return for therapy in 1 weeks to treat diagnosed problems.    Patient has a current master individualized treatment plan.  See Epic treatment plan for more information.    Referral / Collaboration:  Referral to another professional/service is not indicated at this time..  Emergency Services Needed?  No    Assignment:  Return in one week     Interactive Complexity:  There are four specific communication difficulties that complicate the work of the primary psychiatric procedure.  Interactive complexity (+55110) may be reported when at least one of these difficulties is present.    Communication difficulties present during current the psychiatric procedure include:  None.      Signature/Title:    Vance Watson, LMFT, [facility]

## 2023-09-22 ENCOUNTER — OFFICE VISIT (OUTPATIENT)
Dept: PSYCHOLOGY | Facility: CLINIC | Age: 33
End: 2023-09-22
Payer: COMMERCIAL

## 2023-09-22 DIAGNOSIS — N52.1 ERECTILE DYSFUNCTION DUE TO DISEASES CLASSIFIED ELSEWHERE: ICD-10-CM

## 2023-09-22 DIAGNOSIS — F41.1 GENERALIZED ANXIETY DISORDER: ICD-10-CM

## 2023-09-22 DIAGNOSIS — N52.1 ERECTILE DYSFUNCTION DUE TO DISEASES CLASSIFIED ELSEWHERE: Primary | ICD-10-CM

## 2023-09-22 DIAGNOSIS — F19.90 SUBSTANCE USE DISORDER: ICD-10-CM

## 2023-09-22 DIAGNOSIS — F91.8 CONDUCT DISORDER, UNDIFFERENTIATED TYPE: ICD-10-CM

## 2023-09-22 DIAGNOSIS — F33.2 MDD (MAJOR DEPRESSIVE DISORDER), RECURRENT SEVERE, WITHOUT PSYCHOSIS (H): Primary | ICD-10-CM

## 2023-09-22 DIAGNOSIS — F90.2 ATTENTION DEFICIT HYPERACTIVITY DISORDER (ADHD), COMBINED TYPE: ICD-10-CM

## 2023-09-22 PROCEDURE — 90837 PSYTX W PT 60 MINUTES: CPT | Performed by: MARRIAGE & FAMILY THERAPIST

## 2023-09-22 RX ORDER — TADALAFIL 5 MG/1
5 TABLET ORAL EVERY 24 HOURS
Qty: 90 TABLET | Refills: 0 | Status: SHIPPED | OUTPATIENT
Start: 2023-09-22 | End: 2023-12-01

## 2023-09-22 NOTE — PROGRESS NOTES
[place] for Sexual and Gender Health - Progress Note    Date of Service: 23   Name: Xavier Landon  : 1990  Medical Record Number: [id number]  Treating Provider: VIKTOR Anderson, JESSI  Type of Session: Individual  Present in Session: pt  Session Start and Stop Time:   Number of Minutes:  58    SERVICE MODALITY:  In-person    DSM-5 Diagnoses:  296.33 (F33.2) Major Depressive Disorder, Recurrent Episode, Severe _ and With anxious distress  300.02 (F41.1) Generalized Anxiety Disorder.  Attention-Deficit/Hyperactivity Disorder  314.01 (F90.9) Unspecified Attention -Deficit / Hyperactivity Disorder.  Substance-Related & Addictive Disorders Alcohol Use Disorder   303.90 (F10.20) Moderate   301.83 (F60.3) Borderline Personality Disorder    Current Reported Symptoms and Status update:  Changes since last session-starting coding in december  Struggling with compulsive sexual behavior  Struggling with emotional regulation  Progress Toward Treatment Goals:   Minimal progress     Therapeutic Interventions/Treatment Strategies:    Area(s) of treatment focus addressed in this session included Symptom Management, [place] Maintenance/Relapse Prevention, Interpersonal Relationship Skills, and Sexual Health and Wellness      Psychotherapist offered support, feedback and validation Treatment modalities used include Motivational Interviewing Dialectical Behavioral Therapy Sexual Health and Wellness Education Sex Therapy Interpersonal Relationship Skills: Encouraged development and maintenance  of healthy boundaries and Discussed strategies to promote healthier understanding of interpersonal relationships and facilitated discussion about sexual health and wellness, discussed application of self compassion, explored challenging unrealistic expectations of self/others, and facilitated discussion about sexual pleasure and sexual satisfaction  Support    Patient Response:   Patient responded to  session by accepting feedback, listening, and accepting support  Possible barriers to participation / learning include: N/A    Current Mental Status Exam:   Appearance:  Appropriate   Eye Contact:  Good   Attitude / Demeanor: Cooperative   Speech      Rate / Production: Normal/ Responsive      Volume:  Normal  volume  Orientation:  All  Mood:   Anxious   Affect:   Appropriate   Thought Content: Clear   Insight:   Good       Plan/Need for Future Services:  Return for therapy in 1 weeks to treat diagnosed problems.    Patient has a current master individualized treatment plan.  See Epic treatment plan for more information.    Referral / Collaboration:  Referral to another professional/service is not indicated at this time..  Emergency Services Needed?  No    Assignment:  Call and text people to hang out     Interactive Complexity:  There are four specific communication difficulties that complicate the work of the primary psychiatric procedure.  Interactive complexity (+85489) may be reported when at least one of these difficulties is present.    Communication difficulties present during current the psychiatric procedure include:  None.      Signature/Title:    Vance Watson, LMFT, [facility]

## 2023-09-22 NOTE — TELEPHONE ENCOUNTER
Cyn: pended request from [facility]; please advise on follow up    Hi Cyn,     I am treating da for erectile dysfunction [facility].  It looks like when you saw him in June you were going to prescribe tadalafil but his insurance does not cover it so he was prescribed viagra instead-he has seen some benefit of viagra however I would prefer he be on a once a day was we work on confidence and ability to obtain erections.  As part of sex therapy treatment for his ED would you be willing to prescribed Da tadalafil 5m once per day a 90 day supply-I worked w him to download good rx and  he would be able to pay out of pocket for it-we looked at it last session and he would want it sent to [facility] pharmacy   Vance Watson [facility]     Pauly WILSON RN  [facility]

## 2023-09-26 ENCOUNTER — OFFICE VISIT (OUTPATIENT)
Dept: PSYCHOLOGY | Facility: CLINIC | Age: 33
End: 2023-09-26
Payer: COMMERCIAL

## 2023-09-26 DIAGNOSIS — F91.8 CONDUCT DISORDER, UNDIFFERENTIATED TYPE: ICD-10-CM

## 2023-09-26 DIAGNOSIS — F41.1 GENERALIZED ANXIETY DISORDER: ICD-10-CM

## 2023-09-26 DIAGNOSIS — F33.2 MDD (MAJOR DEPRESSIVE DISORDER), RECURRENT SEVERE, WITHOUT PSYCHOSIS (H): Primary | ICD-10-CM

## 2023-09-26 DIAGNOSIS — F19.90 SUBSTANCE USE DISORDER: ICD-10-CM

## 2023-09-26 PROCEDURE — 90853 GROUP PSYCHOTHERAPY: CPT | Performed by: MARRIAGE & FAMILY THERAPIST

## 2023-09-26 NOTE — GROUP NOTE
[facility]  [address], [address]  [place]  [address]    Group Progress Note  [facility] - Progress Note    Date of Service: 23   Name: Xavier Landon  : 1990  Medical Record Number: [id number]  START TIME:  6:30 PM  END TIME:  8:30 PM  FACILITATOR(S): Deborah Watson LMFT, [facility]  TOPIC: [facility] Group Therapy  Type of Session: Group  :  Number of Attendees:  5  Number of Minutes:  /120    SERVICE MODALITY:  In-person      DSM-5 Diagnoses:  296.33 (F33.2) Major Depressive Disorder, Recurrent Episode, Severe _ and With anxious distress  300.02 (F41.1) Generalized Anxiety Disorder.  Attention-Deficit/Hyperactivity Disorder  314.01 (F90.9) Unspecified Attention -Deficit / Hyperactivity Disorder.  Substance-Related & Addictive Disorders Alcohol Use Disorder   303.90 (F10.20) Moderate   301.83 (F60.3) Borderline Personality Disorder    Current Reported Symptoms and Status update:  Changes since last session-increased loneliness  Struggling with compulsive sexual behavior  Struggling with emotional regulation    Progress Toward Treatment Goals:   Minimal progress     Therapeutic Interventions/Treatment Strategies:    Area(s) of treatment focus addressed in this session included Symptom Management and Interpersonal Relationship Skills    Patient checked in about their mental health symptoms, healthy coping skills used over the week, negative coping skills used over the week, what sexual behaviors they engaged in-fantasy, masturbation, sex, their comfort level with their behaviors, if there were triggers, urges to violate boundaries, and violations of boundaries.  They took time to process about doroteo search for meaning and how it applies to their life they provided support and feedback to others in the group.    Psychotherapist offered support, feedback and validation and reinforced use of skills Treatment modalities used include [facility] THERAPY  INTERVENTIONS: Motivational Interviewing Group Dynamic Therapy  Interpersonal Relationship Skills: Discussed strategies to promote healthier understanding of interpersonal relationships and explored challenging unrealistic expectations of self/others  Support, Feedback, and Structured Activity    Patient Response:   Patient responded to session by accepting feedback, giving feedback, and listening  Possible barriers to participation / learning include: N/A    Current Mental Status Exam:   Appearance:  Appropriate   Eye Contact:  Good   Attitude / Demeanor: Cooperative   Speech      Rate / Production: Normal/ Responsive      Volume:  Normal  volume  Orientation:  All  Mood:   Irritable   Affect:   Appropriate   Thought Content: Clear   Insight:   Good       Plan/Need for Future Services:  Return for therapy in 1 weeks to treat diagnosed problems.    Patient has a current master individualized treatment plan.  See Epic treatment plan for more information.    Referral / Collaboration:  Referral to another professional/service is not indicated at this time..  Emergency Services Needed?  No    Assignment:  Return in one week start alvin tapia    Interactive Complexity:  There are four specific communication difficulties that complicate the work of the primary psychiatric procedure.  Interactive complexity (+51981) may be reported when at least one of these difficulties is present.    Communication difficulties present during current the psychiatric procedure include:  None.      Signature/Title:    Vance Watson, LMFT, [facility]

## 2023-09-29 ENCOUNTER — OFFICE VISIT (OUTPATIENT)
Dept: PSYCHOLOGY | Facility: CLINIC | Age: 33
End: 2023-09-29
Payer: COMMERCIAL

## 2023-09-29 DIAGNOSIS — F33.2 MDD (MAJOR DEPRESSIVE DISORDER), RECURRENT SEVERE, WITHOUT PSYCHOSIS (H): Primary | ICD-10-CM

## 2023-09-29 DIAGNOSIS — F19.90 SUBSTANCE USE DISORDER: ICD-10-CM

## 2023-09-29 DIAGNOSIS — F90.2 ATTENTION DEFICIT HYPERACTIVITY DISORDER (ADHD), COMBINED TYPE: ICD-10-CM

## 2023-09-29 DIAGNOSIS — F91.8 CONDUCT DISORDER, UNDIFFERENTIATED TYPE: ICD-10-CM

## 2023-09-29 DIAGNOSIS — F41.1 GENERALIZED ANXIETY DISORDER: ICD-10-CM

## 2023-09-29 PROCEDURE — 90837 PSYTX W PT 60 MINUTES: CPT | Performed by: MARRIAGE & FAMILY THERAPIST

## 2023-09-29 NOTE — PROGRESS NOTES
[place] for Sexual and Gender Health - Progress Note    Date of Service: 23   Name: Xavier Landon  : 1990  Medical Record Number: [id number]  Treating Provider: VIKTOR Anderson [facility]  Type of Session: Individual  Present in Session: pt  Session Start and Stop Time:   Number of Minutes:  53    SERVICE MODALITY:  In-person    DSM-5 Diagnoses:  296.33 (F33.2) Major Depressive Disorder, Recurrent Episode, Severe _ and With anxious distress  300.02 (F41.1) Generalized Anxiety Disorder.  Attention-Deficit/Hyperactivity Disorder  314.01 (F90.9) Unspecified Attention -Deficit / Hyperactivity Disorder.  Substance-Related & Addictive Disorders Alcohol Use Disorder   303.90 (F10.20) Moderate   301.83 (F60.3) Borderline Personality Disorder    Current Reported Symptoms and Status update:  Changes since last session-feeling down from lack of connection  Struggling with compulsive sexual behavior  Struggling with emotional regulation    Progress Toward Treatment Goals:   Minimal progress     Therapeutic Interventions/Treatment Strategies:    Area(s) of treatment focus addressed in this session included Symptom Management and Interpersonal Relationship Skills    Psychotherapist offered support, feedback and validation and reinforced use of skills Treatment modalities used include Motivational Interviewing Dialectical Behavioral Therapy Interpersonal Behavioral Activation: Explored how behaviors effect mood and interact with thoughts and feelings and Cognitive Restructuring:  Explored impact of ineffective thoughts / distortions on mood and activity  Support, Redirection, Feedback, and Structured Activity    Patient Response:   Patient responded to session by listening, accepting support, and interrupting  Possible barriers to participation / learning include: N/A    Current Mental Status Exam:   Appearance:  Appropriate   Eye Contact:  Good   Attitude / Demeanor: Cooperative   Speech       Rate / Production: Normal/ Responsive      Volume:  Loud  volume  Orientation:  All  Mood:   Irritable   Affect:   Appropriate  Constricted   Thought Content: Clear   Insight:   Good       Plan/Need for Future Services:  Return for therapy in 1 weeks to treat diagnosed problems.    Patient has a current master individualized treatment plan.  See Epic treatment plan for more information.    Referral / Collaboration:  Referral to another professional/service is not indicated at this time..  Emergency Services Needed?  No    Assignment:  Talk to people at gym    Interactive Complexity:  There are four specific communication difficulties that complicate the work of the primary psychiatric procedure.  Interactive complexity (+91274) may be reported when at least one of these difficulties is present.    Communication difficulties present during current the psychiatric procedure include:  None.      Signature/Title:    Vance Watson, LMFT, [facility]

## 2023-10-03 ENCOUNTER — OFFICE VISIT (OUTPATIENT)
Dept: PSYCHOLOGY | Facility: CLINIC | Age: 33
End: 2023-10-03
Payer: COMMERCIAL

## 2023-10-03 DIAGNOSIS — F91.8 CONDUCT DISORDER, UNDIFFERENTIATED TYPE: ICD-10-CM

## 2023-10-03 DIAGNOSIS — F41.1 GENERALIZED ANXIETY DISORDER: ICD-10-CM

## 2023-10-03 DIAGNOSIS — F90.2 ATTENTION DEFICIT HYPERACTIVITY DISORDER (ADHD), COMBINED TYPE: ICD-10-CM

## 2023-10-03 DIAGNOSIS — F19.90 SUBSTANCE USE DISORDER: ICD-10-CM

## 2023-10-03 DIAGNOSIS — F33.2 MDD (MAJOR DEPRESSIVE DISORDER), RECURRENT SEVERE, WITHOUT PSYCHOSIS (H): Primary | ICD-10-CM

## 2023-10-03 PROCEDURE — 90853 GROUP PSYCHOTHERAPY: CPT | Performed by: MARRIAGE & FAMILY THERAPIST

## 2023-10-03 NOTE — GROUP NOTE
[facility]  [address], [address]  [place]  [address]    Group Progress Note  [facility] - Progress Note    Date of Service: 10/03/23   Name: Xavier Landon  : 1990  Medical Record Number: [id number]  START TIME:  6:30 PM  END TIME:  8:30 PM  FACILITATOR(S): Deborah Watson LMFT, [facility]  TOPIC: [facility] Group Therapy  Type of Session: Group  :  Number of Attendees:  6  Number of Minutes:  /120    SERVICE MODALITY:  In-person      DSM-5 Diagnoses:  296.33 (F33.2) Major Depressive Disorder, Recurrent Episode, Severe _ and With anxious distress  300.02 (F41.1) Generalized Anxiety Disorder.  Attention-Deficit/Hyperactivity Disorder  314.01 (F90.9) Unspecified Attention -Deficit / Hyperactivity Disorder.  Substance-Related & Addictive Disorders Alcohol Use Disorder   303.90 (F10.20) Moderate   301.83 (F60.3) Borderline Personality Disorder    Current Reported Symptoms and Status update:  Changes since last session-lonely, reading brene brown book  Struggling with compulsive sexual behavior  Struggling with emotional regulation    Progress Toward Treatment Goals:   Satisfactory progress     Therapeutic Interventions/Treatment Strategies:    Area(s) of treatment focus addressed in this session included Symptom Management, [place] Maintenance/Relapse Prevention, and Interpersonal Relationship Skills    Patient checked in about their mental health symptoms, healthy coping skills used over the week, negative coping skills used over the week, what sexual behaviors they engaged in-fantasy, masturbation, sex, their comfort level with their behaviors, if there were triggers, urges to violate boundaries, and violations of boundaries.  They took time to process about Cy[employer] book they provided support and feedback to others in the group.      Psychotherapist offered support, feedback and validation and reinforced use of skills Treatment modalities used  include [facility] THERAPY INTERVENTIONS: Motivational Interviewing Dialectical Behavioral Therapy Group Dynamic Therapy  Interpersonal Relationship Skills: Assisted patients in implementing more effective communication skills in their relationships and vulnerability   Support, Feedback, and Structured Activity    Patient Response:   Patient responded to session by accepting feedback, giving feedback, and listening  Possible barriers to participation / learning include: N/A    Current Mental Status Exam:   Appearance:  Appropriate   Eye Contact:  Good   Attitude / Demeanor: Cooperative   Speech      Rate / Production: Normal/ Responsive      Volume:  Normal  volume  Orientation:  All  Mood:   Normal  Affect:   Appropriate   Thought Content: Clear   Insight:   Good       Plan/Need for Future Services:  Return for therapy in 1 weeks to treat diagnosed problems.    Patient has a current master individualized treatment plan.  See Epic treatment plan for more information.    Referral / Collaboration:  Referral to another professional/service is not indicated at this time..  Emergency Services Needed?  No    Assignment:  Will do handout     Interactive Complexity:  There are four specific communication difficulties that complicate the work of the primary psychiatric procedure.  Interactive complexity (+42842) may be reported when at least one of these difficulties is present.    Communication difficulties present during current the psychiatric procedure include:  None.      Signature/Title:    Vance Watson, LMFT, [facility]

## 2023-10-06 ENCOUNTER — OFFICE VISIT (OUTPATIENT)
Dept: PSYCHOLOGY | Facility: CLINIC | Age: 33
End: 2023-10-06
Payer: COMMERCIAL

## 2023-10-06 DIAGNOSIS — N52.1 ERECTILE DYSFUNCTION DUE TO DISEASES CLASSIFIED ELSEWHERE: ICD-10-CM

## 2023-10-06 DIAGNOSIS — F91.8 CONDUCT DISORDER, UNDIFFERENTIATED TYPE: ICD-10-CM

## 2023-10-06 DIAGNOSIS — F19.90 SUBSTANCE USE DISORDER: ICD-10-CM

## 2023-10-06 DIAGNOSIS — F33.2 MDD (MAJOR DEPRESSIVE DISORDER), RECURRENT SEVERE, WITHOUT PSYCHOSIS (H): Primary | ICD-10-CM

## 2023-10-06 DIAGNOSIS — F90.2 ATTENTION DEFICIT HYPERACTIVITY DISORDER (ADHD), COMBINED TYPE: ICD-10-CM

## 2023-10-06 DIAGNOSIS — F41.1 GENERALIZED ANXIETY DISORDER: ICD-10-CM

## 2023-10-06 PROCEDURE — 90837 PSYTX W PT 60 MINUTES: CPT | Performed by: MARRIAGE & FAMILY THERAPIST

## 2023-10-06 NOTE — PROGRESS NOTES
[facility] - Progress Note    Date of Service: 10/06/23   Name: Xavier Landon  : 1990  Medical Record Number: [id number]  Treating Provider: VIKTOR Anderson, [facility]  Type of Session: Individual  Present in Session: pt  Session Start and Stop Time: 6869-7672  Number of Minutes:  53    SERVICE MODALITY:  In-person    DSM-5 Diagnoses:  296.33 (F33.2) Major Depressive Disorder, Recurrent Episode, Severe _ and With anxious distress  300.02 (F41.1) Generalized Anxiety Disorder.  Attention-Deficit/Hyperactivity Disorder  314.01 (F90.9) Unspecified Attention -Deficit / Hyperactivity Disorder.  Substance-Related & Addictive Disorders Alcohol Use Disorder   303.90 (F10.20) Moderate   301.83 (F60.3) Borderline Personality Disorder    Current Reported Symptoms and Status update:  Changes since last sessiong has been working on reading daring greatly-improvement w erections    Progress Toward Treatment Goals:   Minimal progress     Therapeutic Interventions/Treatment Strategies:    Area(s) of treatment focus addressed in this session included Symptom Management sexual health     Improvement w erections     Psychotherapist offered support, feedback and validation Treatment modalities used include Motivational Interviewing sex therapy, interpersonal   Support, Feedback, Structured Activity, and Problem Solving    Patient Response:   Patient responded to session by accepting feedback, listening, and accepting support  Possible barriers to participation / learning include: N/A    Current Mental Status Exam:   Appearance:  Appropriate   Eye Contact:  Good   Attitude / Demeanor: Cooperative   Speech      Rate / Production: Normal/ Responsive      Volume:  Normal  volume  Orientation:  All  Mood:   Normal Euthymic  Affect:   Appropriate   Thought Content: Clear   Insight:   Good       Plan/Need for Future Services:  Return for therapy in 1 weeks to treat diagnosed problems.     Patient has a current master individualized treatment plan.  See Epic treatment plan for more information.    Referral / Collaboration:  Referral to another professional/service is not indicated at this time..  Emergency Services Needed?  No    Assignment:  Daring greatly packet    Interactive Complexity:  There are four specific communication difficulties that complicate the work of the primary psychiatric procedure.  Interactive complexity (+73850) may be reported when at least one of these difficulties is present.    Communication difficulties present during current the psychiatric procedure include:  None.      Signature/Title:    VIKTOR Anderson, [facility]

## 2023-10-10 ENCOUNTER — OFFICE VISIT (OUTPATIENT)
Dept: PSYCHOLOGY | Facility: CLINIC | Age: 33
End: 2023-10-10
Payer: COMMERCIAL

## 2023-10-10 DIAGNOSIS — F33.2 MDD (MAJOR DEPRESSIVE DISORDER), RECURRENT SEVERE, WITHOUT PSYCHOSIS (H): Primary | ICD-10-CM

## 2023-10-10 DIAGNOSIS — F91.8 CONDUCT DISORDER, UNDIFFERENTIATED TYPE: ICD-10-CM

## 2023-10-10 DIAGNOSIS — F41.1 GENERALIZED ANXIETY DISORDER: ICD-10-CM

## 2023-10-10 DIAGNOSIS — N52.1 ERECTILE DYSFUNCTION DUE TO DISEASES CLASSIFIED ELSEWHERE: ICD-10-CM

## 2023-10-10 DIAGNOSIS — F19.90 SUBSTANCE USE DISORDER: ICD-10-CM

## 2023-10-10 PROCEDURE — 90853 GROUP PSYCHOTHERAPY: CPT | Performed by: MARRIAGE & FAMILY THERAPIST

## 2023-10-11 NOTE — GROUP NOTE
[facility]  [address], [address]  [place]  [address]    Group Progress Note  [facility] - Progress Note    Date of Service: 10/10/23   Name: Xavier Landon  : 1990  Medical Record Number: [id number]  START TIME:  6:30 PM  END TIME:  8:30 PM  FACILITATOR(S): Deborah Watson LMFT, [facility]  TOPIC: [facility] Group Therapy  Type of Session: Group  :  Number of Attendees:  6  Number of Minutes:  /120    SERVICE MODALITY:  In-person      DSM-5 Diagnoses:  296.33 (F33.2) Major Depressive Disorder, Recurrent Episode, Severe _ and With anxious distress  300.02 (F41.1) Generalized Anxiety Disorder.  Attention-Deficit/Hyperactivity Disorder  314.01 (F90.9) Unspecified Attention -Deficit / Hyperactivity Disorder.  Substance-Related & Addictive Disorders Alcohol Use Disorder   303.90 (F10.20) Moderate   301.83 (F60.3) Borderline Personality Disorder    Current Reported Symptoms and Status update:  Changes since last session-working on accepting social things he needs to do   Struggling with compulsive sexual behavior  Struggling with emotional regulation    Progress Toward Treatment Goals:   Satisfactory progress     Therapeutic Interventions/Treatment Strategies:    Area(s) of treatment focus addressed in this session included Symptom Management, [place] Maintenance/Relapse Prevention, and Interpersonal Relationship Skills    Patient checked in about their mental health symptoms, healthy coping skills used over the week, negative coping skills used over the week, what sexual behaviors they engaged in-fantasy, masturbation, sex, their comfort level with their behaviors, if there were triggers, urges to violate boundaries, and violations of boundaries.  They took time to process about past week  they provided support and feedback to others in the group.    Psychotherapist offered support, feedback and validation and reinforced use of skills Treatment  modalities used include [facility] THERAPY INTERVENTIONS: Motivational Interviewing Group Dynamic Therapy  Interpersonal discussed application of self compassion and explored challenging unrealistic expectations of self/others  Support, Feedback, and Structured Activity    Patient Response:   Patient responded to session by accepting feedback, giving feedback, and listening  Possible barriers to participation / learning include: N/A    Current Mental Status Exam:   Appearance:  Appropriate   Eye Contact:  Good   Attitude / Demeanor: Cooperative   Speech      Rate / Production: Normal/ Responsive      Volume:  Normal  volume  Orientation:  All  Mood:   Normal Euthymic  Affect:   Appropriate   Thought Content: Clear   Insight:   Good       Plan/Need for Future Services:  Return for therapy in 1 weeks to treat diagnosed problems.    Patient has a current master individualized treatment plan.  See Epic treatment plan for more information.    Referral / Collaboration:  Referral to another professional/service is not indicated at this time..  Emergency Services Needed?  No    Assignment:  Return in one week     Interactive Complexity:  There are four specific communication difficulties that complicate the work of the primary psychiatric procedure.  Interactive complexity (+46231) may be reported when at least one of these difficulties is present.    Communication difficulties present during current the psychiatric procedure include:  None.      Signature/Title:    Vance Watson, LMFT, [facility]

## 2023-10-13 ENCOUNTER — OFFICE VISIT (OUTPATIENT)
Dept: PSYCHOLOGY | Facility: CLINIC | Age: 33
End: 2023-10-13
Payer: COMMERCIAL

## 2023-10-13 DIAGNOSIS — F91.8 CONDUCT DISORDER, UNDIFFERENTIATED TYPE: ICD-10-CM

## 2023-10-13 DIAGNOSIS — F19.90 SUBSTANCE USE DISORDER: ICD-10-CM

## 2023-10-13 DIAGNOSIS — F33.2 MDD (MAJOR DEPRESSIVE DISORDER), RECURRENT SEVERE, WITHOUT PSYCHOSIS (H): Primary | ICD-10-CM

## 2023-10-13 DIAGNOSIS — N52.1 ERECTILE DYSFUNCTION DUE TO DISEASES CLASSIFIED ELSEWHERE: ICD-10-CM

## 2023-10-13 DIAGNOSIS — F41.1 GENERALIZED ANXIETY DISORDER: ICD-10-CM

## 2023-10-13 PROCEDURE — 90837 PSYTX W PT 60 MINUTES: CPT | Performed by: MARRIAGE & FAMILY THERAPIST

## 2023-10-13 NOTE — PROGRESS NOTES
"[facility] - Progress Note    Date of Service: 10/13/23   Name: Xavier Landon  : 1990  Medical Record Number: [id number]  Treating Provider: VIKTOR Anderson LAD  Type of Session: Individual  Present in Session: pt  Session Start and Stop Time: 5013-1508  Number of Minutes:  54    SERVICE MODALITY:  In-person    DSM-5 Diagnoses:  296.33 (F33.2) Major Depressive Disorder, Recurrent Episode, Severe _ and With anxious distress  300.02 (F41.1) Generalized Anxiety Disorder.  Attention-Deficit/Hyperactivity Disorder  314.01 (F90.9) Unspecified Attention -Deficit / Hyperactivity Disorder.  Substance-Related & Addictive Disorders Alcohol Use Disorder   303.90 (F10.20) Moderate   301.83 (F60.3) Borderline Personality Disorder    Current Reported Symptoms and Status update:  Changes since last session-increased loneliness  Struggling with compulsive sexual behavior  Struggling with emotional regulation    Progress Toward Treatment Goals:   Satisfactory progress     Therapeutic Interventions/Treatment Strategies:    Area(s) of treatment focus addressed in this session included Symptom Management and Interpersonal Relationship Skills    Psychotherapist offered support, feedback and validation and reinforced use of skills Treatment modalities used include Motivational Interviewing Dialectical Behavioral Therapy Sexual Health and Wellness Education Interpersonal Coping Skills: Discussed how the use of intentional \"in the moment\" actions can help reduce distress, Assisted patient in understanding the purpose of planning / creating / participating / sharing in positive experiences, and Addressed barriers to utilizing coping skills when in distress and Relationship Skills: Discussed strategies to promote healthier understanding of interpersonal relationships  Support, Feedback, Limit/Boundaries, and Structured Activity    Patient Response:   Patient responded to session by " accepting feedback, listening, interrupting, and required support for self-regulation  Possible barriers to participation / learning include: N/A    Current Mental Status Exam:   Appearance:  Appropriate   Eye Contact:  Good   Attitude / Demeanor: Cooperative   Speech      Rate / Production: Normal/ Responsive Emotional      Volume:  Normal  volume  Orientation:  All  Mood:   Anxious  Irritable   Affect:   Appropriate  Constricted   Thought Content: Clear   Insight:   Good       Plan/Need for Future Services:  Return for therapy in 1 weeks to treat diagnosed problems.    Patient has a current master individualized treatment plan.  See Epic treatment plan for more information.    Referral / Collaboration:  Referral to another professional/service is not indicated at this time..  Emergency Services Needed?  No    Assignment:  Return in one week, emotional regualtion    Interactive Complexity:  There are four specific communication difficulties that complicate the work of the primary psychiatric procedure.  Interactive complexity (+54303) may be reported when at least one of these difficulties is present.    Communication difficulties present during current the psychiatric procedure include:  None.      Signature/Title:    Vance Wtason, LMFT, [facility]

## 2023-10-17 ENCOUNTER — OFFICE VISIT (OUTPATIENT)
Dept: PSYCHOLOGY | Facility: CLINIC | Age: 33
End: 2023-10-17
Payer: COMMERCIAL

## 2023-10-17 DIAGNOSIS — F41.1 GENERALIZED ANXIETY DISORDER: ICD-10-CM

## 2023-10-17 DIAGNOSIS — F90.2 ATTENTION DEFICIT HYPERACTIVITY DISORDER (ADHD), COMBINED TYPE: ICD-10-CM

## 2023-10-17 DIAGNOSIS — N52.1 ERECTILE DYSFUNCTION DUE TO DISEASES CLASSIFIED ELSEWHERE: ICD-10-CM

## 2023-10-17 DIAGNOSIS — F19.90 SUBSTANCE USE DISORDER: ICD-10-CM

## 2023-10-17 DIAGNOSIS — F33.2 MDD (MAJOR DEPRESSIVE DISORDER), RECURRENT SEVERE, WITHOUT PSYCHOSIS (H): Primary | ICD-10-CM

## 2023-10-17 DIAGNOSIS — F91.8 CONDUCT DISORDER, UNDIFFERENTIATED TYPE: ICD-10-CM

## 2023-10-17 PROCEDURE — 90853 GROUP PSYCHOTHERAPY: CPT | Performed by: MARRIAGE & FAMILY THERAPIST

## 2023-10-17 NOTE — GROUP NOTE
[facility]  [address], [address]  [place]  [address]    Group Progress Note  [facility] - Progress Note    Date of Service: 10/17/23   Name: Xavier Landon  : 1990  Medical Record Number: [id number]  START TIME:  6:30 PM  END TIME:  8:30 PM  FACILITATOR(S): Deborah Watson LMFT, [facility]  TOPIC: [facility] Group Therapy  Type of Session: Group  :  Number of Attendees:  6  Number of Minutes:  /120    SERVICE MODALITY:  In-person      DSM-5 Diagnoses:  296.33 (F33.2) Major Depressive Disorder, Recurrent Episode, Severe _ and With anxious distress  300.02 (F41.1) Generalized Anxiety Disorder.  Attention-Deficit/Hyperactivity Disorder  314.01 (F90.9) Unspecified Attention -Deficit / Hyperactivity Disorder.  Substance-Related & Addictive Disorders Alcohol Use Disorder   303.90 (F10.20) Moderate   301.83 (F60.3) Borderline Personality Disorder    Current Reported Symptoms and Status update:  Changes since last session-improved mood, increased socializing   Struggling with compulsive sexual behavior  Struggling with emotional regulation    Progress Toward Treatment Goals:   Satisfactory progress     Therapeutic Interventions/Treatment Strategies:    Area(s) of treatment focus addressed in this session included Symptom Management and Interpersonal Relationship Skills    Patient checked in about their mental health symptoms, healthy coping skills used over the week, negative coping skills used over the week, what sexual behaviors they engaged in-fantasy, masturbation, sex, their comfort level with their behaviors, if there were triggers, urges to violate boundaries, and violations of boundaries.  They took time to process about socializing and getting a date they provided support and feedback to others in the group.    Psychotherapist offered support, feedback and validation and reinforced use of skills Treatment modalities used include [facility] THERAPY  INTERVENTIONS: Motivational Interviewing Group Dynamic Therapy  Interpersonal Behavioral Activation: Explored how behaviors effect mood and interact with thoughts and feelings and Relationship Skills: Assisted patients in implementing more effective communication skills in their relationships  Support, Feedback, and Structured Activity    Patient Response:   Patient responded to session by accepting feedback, giving feedback, and listening  Possible barriers to participation / learning include: N/A    Current Mental Status Exam:   Appearance:  Appropriate   Eye Contact:  Good   Attitude / Demeanor: Cooperative   Speech      Rate / Production: Normal/ Responsive      Volume:  Normal  volume  Orientation:  All  Mood:   Normal Euthymic  Affect:   Appropriate   Thought Content: Clear   Insight:   Good       Plan/Need for Future Services:  Return for therapy in 1 weeks to treat diagnosed problems.    Patient has a current master individualized treatment plan.  See Epic treatment plan for more information.    Referral / Collaboration:  Referral to another professional/service is not indicated at this time..  Emergency Services Needed?  No    Assignment:  Return in one week     Interactive Complexity:  There are four specific communication difficulties that complicate the work of the primary psychiatric procedure.  Interactive complexity (+75088) may be reported when at least one of these difficulties is present.    Communication difficulties present during current the psychiatric procedure include:  None.      Signature/Title:    Vance Watson, LMFT, [facility]

## 2023-10-20 ENCOUNTER — OFFICE VISIT (OUTPATIENT)
Dept: PSYCHOLOGY | Facility: CLINIC | Age: 33
End: 2023-10-20
Payer: COMMERCIAL

## 2023-10-20 DIAGNOSIS — F19.90 SUBSTANCE USE DISORDER: ICD-10-CM

## 2023-10-20 DIAGNOSIS — F91.8 CONDUCT DISORDER, UNDIFFERENTIATED TYPE: ICD-10-CM

## 2023-10-20 DIAGNOSIS — N52.1 ERECTILE DYSFUNCTION DUE TO DISEASES CLASSIFIED ELSEWHERE: ICD-10-CM

## 2023-10-20 DIAGNOSIS — F41.1 GENERALIZED ANXIETY DISORDER: ICD-10-CM

## 2023-10-20 DIAGNOSIS — F90.2 ATTENTION DEFICIT HYPERACTIVITY DISORDER (ADHD), COMBINED TYPE: ICD-10-CM

## 2023-10-20 DIAGNOSIS — F33.2 MDD (MAJOR DEPRESSIVE DISORDER), RECURRENT SEVERE, WITHOUT PSYCHOSIS (H): Primary | ICD-10-CM

## 2023-10-20 PROCEDURE — 90834 PSYTX W PT 45 MINUTES: CPT | Performed by: MARRIAGE & FAMILY THERAPIST

## 2023-10-20 NOTE — PROGRESS NOTES
[facility] - Progress Note    Date of Service: 10/20/23   Name: Xavier Landon  : 1990  Medical Record Number: [id number]  Treating Provider: VIKTOR Anderson, JESSI  Type of Session: Individual  Present in Session: pt  Session Start and Stop Time: 9889-0989  Number of Minutes:  44    SERVICE MODALITY:  In-person    DSM-5 Diagnoses:  296.33 (F33.2) Major Depressive Disorder, Recurrent Episode, Severe _ and With anxious distress  300.02 (F41.1) Generalized Anxiety Disorder.  Attention-Deficit/Hyperactivity Disorder  314.01 (F90.9) Unspecified Attention -Deficit / Hyperactivity Disorder.  Substance-Related & Addictive Disorders Alcohol Use Disorder   303.90 (F10.20) Moderate   301.83 (F60.3) Borderline Personality Disorder    Current Reported Symptoms and Status update:  Changes since last session-went on a date  Struggling with compulsive sexual behavior  Struggling with emotional regulation    Progress Toward Treatment Goals:   Satisfactory progress     Therapeutic Interventions/Treatment Strategies:    Area(s) of treatment focus addressed in this session included Symptom Management, Interpersonal Relationship Skills, and Sexual Health and Wellness      Psychotherapist offered support, feedback and validation and reinforced use of skills Treatment modalities used include Motivational Interviewing Dialectical Behavioral Therapy Sexual Health and Wellness Education Sex Therapy Relationship Skills: Discussed strategies to promote healthier understanding of interpersonal relationships and facilitated discussion about sexual health and wellness, explored challenging unrealistic expectations of self/others, and explored comfort with sexual communication  Support, Feedback, Structured Activity, and Problem Solving    Patient Response:   Patient responded to session by accepting feedback, listening, accepting support, and verbalizing understanding  Possible barriers to  participation / learning include: N/A    Current Mental Status Exam:   Appearance:  Appropriate   Eye Contact:  Good   Attitude / Demeanor: Cooperative   Speech      Rate / Production: Normal/ Responsive      Volume:  Normal  volume  Orientation:  All  Mood:   Normal  Affect:   Appropriate   Thought Content: Clear   Insight:   Good       Plan/Need for Future Services:  Return for therapy in 1 weeks to treat diagnosed problems.    Patient has a current master individualized treatment plan.  See Epic treatment plan for more information.    Referral / Collaboration:  Referral to another professional/service is not indicated at this time..  Emergency Services Needed?  No    Assignment:  Return in one week     Interactive Complexity:  There are four specific communication difficulties that complicate the work of the primary psychiatric procedure.  Interactive complexity (+08237) may be reported when at least one of these difficulties is present.    Communication difficulties present during current the psychiatric procedure include:  None.      Signature/Title:    VIKTOR Anderson, [facility]

## 2023-10-24 ENCOUNTER — OFFICE VISIT (OUTPATIENT)
Dept: PSYCHOLOGY | Facility: CLINIC | Age: 33
End: 2023-10-24
Payer: COMMERCIAL

## 2023-10-24 DIAGNOSIS — F19.90 SUBSTANCE USE DISORDER: ICD-10-CM

## 2023-10-24 DIAGNOSIS — F41.1 GENERALIZED ANXIETY DISORDER: ICD-10-CM

## 2023-10-24 DIAGNOSIS — N52.1 ERECTILE DYSFUNCTION DUE TO DISEASES CLASSIFIED ELSEWHERE: ICD-10-CM

## 2023-10-24 DIAGNOSIS — F91.8 CONDUCT DISORDER, UNDIFFERENTIATED TYPE: ICD-10-CM

## 2023-10-24 DIAGNOSIS — F90.2 ATTENTION DEFICIT HYPERACTIVITY DISORDER (ADHD), COMBINED TYPE: ICD-10-CM

## 2023-10-24 DIAGNOSIS — F33.2 MDD (MAJOR DEPRESSIVE DISORDER), RECURRENT SEVERE, WITHOUT PSYCHOSIS (H): Primary | ICD-10-CM

## 2023-10-24 PROCEDURE — 90853 GROUP PSYCHOTHERAPY: CPT | Performed by: MARRIAGE & FAMILY THERAPIST

## 2023-10-24 NOTE — GROUP NOTE
[facility]  [address], [address]  [place]  [address]    Group Progress Note  [facility] - Progress Note    Date of Service: 10/24/23   Name: Xavier Landon  : 1990  Medical Record Number: [id number]  START TIME:  6:30 PM  END TIME:  8:30 PM  FACILITATOR(S): Deborah Watson LMFT, [facility]  TOPIC: [facility] Group Therapy  Type of Session: Group  :  Number of Attendees:  4  Number of Minutes:  /120    SERVICE MODALITY:  In-person      DSM-5 Diagnoses:  296.33 (F33.2) Major Depressive Disorder, Recurrent Episode, Severe _ and With anxious distress  300.02 (F41.1) Generalized Anxiety Disorder.  Attention-Deficit/Hyperactivity Disorder  314.01 (F90.9) Unspecified Attention -Deficit / Hyperactivity Disorder.  Substance-Related & Addictive Disorders Alcohol Use Disorder   303.90 (F10.20) Moderate   301.83 (F60.3) Borderline Personality Disorder    Current Reported Symptoms and Status update:  Changes since last session-some negative cycle behaviors-no use of pornography though  Struggling with compulsive sexual behavior  Struggling with emotional regulation    Progress Toward Treatment Goals:   Satisfactory progress     Therapeutic Interventions/Treatment Strategies:    Area(s) of treatment focus addressed in this session included Symptom Management, [place] Maintenance/Relapse Prevention, Interpersonal Relationship Skills, and Sexual Health and Wellness    Patient checked in about their mental health symptoms, healthy coping skills used over the week, negative coping skills used over the week, what sexual behaviors they engaged in-fantasy, masturbation, sex, their comfort level with their behaviors, if there were triggers, urges to violate boundaries, and violations of boundaries.  They took time to process about boundary violation and improved sexual functioning they provided support and feedback to others in the group.    Psychotherapist offered  support, feedback and validation and reinforced use of skills Treatment modalities used include [facility] THERAPY INTERVENTIONS: Motivational Interviewing Group Dynamic Therapy  Interpersonal Behavioral Activation: Reinforced benefits/challenges of change process through applying skills to replace unwanted behaviors and Encouraged strategies to reduce individual procrastination and increase motivation by increasing goal-directed activities to enhance mood and reduce symptoms.  Support, Feedback, and Structured Activity    Patient Response:   Patient responded to session by accepting feedback, giving feedback, and listening  Possible barriers to participation / learning include: N/A    Current Mental Status Exam:   Appearance:  Appropriate   Eye Contact:  Good   Attitude / Demeanor: Cooperative   Speech      Rate / Production: Normal/ Responsive      Volume:  Normal  volume  Orientation:  All  Mood:   Normal  Affect:   Appropriate   Thought Content: Clear   Insight:   Good       Plan/Need for Future Services:  Return for therapy in 1 weeks to treat diagnosed problems.    Patient has a current master individualized treatment plan.  See Epic treatment plan for more information.    Referral / Collaboration:  Referral to another professional/service is not indicated at this time..  Emergency Services Needed?  No    Assignment:  Return in one week     Interactive Complexity:  There are four specific communication difficulties that complicate the work of the primary psychiatric procedure.  Interactive complexity (+69960) may be reported when at least one of these difficulties is present.    Communication difficulties present during current the psychiatric procedure include:  None.      Signature/Title:    Vance Watson, LMFT, [facility]

## 2023-10-27 ENCOUNTER — OFFICE VISIT (OUTPATIENT)
Dept: PSYCHOLOGY | Facility: CLINIC | Age: 33
End: 2023-10-27
Payer: COMMERCIAL

## 2023-10-27 ENCOUNTER — VIRTUAL VISIT (OUTPATIENT)
Dept: PSYCHIATRY | Facility: CLINIC | Age: 33
End: 2023-10-27
Attending: NURSE PRACTITIONER
Payer: COMMERCIAL

## 2023-10-27 DIAGNOSIS — F91.8 CONDUCT DISORDER, UNDIFFERENTIATED TYPE: ICD-10-CM

## 2023-10-27 DIAGNOSIS — N52.1 ERECTILE DYSFUNCTION DUE TO DISEASES CLASSIFIED ELSEWHERE: ICD-10-CM

## 2023-10-27 DIAGNOSIS — F19.90 SUBSTANCE USE DISORDER: ICD-10-CM

## 2023-10-27 DIAGNOSIS — F41.1 GENERALIZED ANXIETY DISORDER: ICD-10-CM

## 2023-10-27 DIAGNOSIS — F90.2 ATTENTION DEFICIT HYPERACTIVITY DISORDER (ADHD), COMBINED TYPE: Primary | ICD-10-CM

## 2023-10-27 DIAGNOSIS — F33.2 MDD (MAJOR DEPRESSIVE DISORDER), RECURRENT SEVERE, WITHOUT PSYCHOSIS (H): Primary | ICD-10-CM

## 2023-10-27 DIAGNOSIS — F39 MOOD DISORDER (H): ICD-10-CM

## 2023-10-27 DIAGNOSIS — F90.2 ATTENTION DEFICIT HYPERACTIVITY DISORDER (ADHD), COMBINED TYPE: ICD-10-CM

## 2023-10-27 PROCEDURE — 90837 PSYTX W PT 60 MINUTES: CPT | Performed by: MARRIAGE & FAMILY THERAPIST

## 2023-10-27 PROCEDURE — 99214 OFFICE O/P EST MOD 30 MIN: CPT | Mod: 95 | Performed by: NURSE PRACTITIONER

## 2023-10-27 RX ORDER — BUPROPION HYDROCHLORIDE 150 MG/1
150 TABLET ORAL EVERY MORNING
Qty: 90 TABLET | Refills: 0 | Status: SHIPPED | OUTPATIENT
Start: 2023-10-27 | End: 2023-12-05

## 2023-10-27 RX ORDER — DEXTROAMPHETAMINE SACCHARATE, AMPHETAMINE ASPARTATE, DEXTROAMPHETAMINE SULFATE AND AMPHETAMINE SULFATE 2.5; 2.5; 2.5; 2.5 MG/1; MG/1; MG/1; MG/1
10 TABLET ORAL DAILY PRN
Qty: 30 TABLET | Refills: 0 | Status: SHIPPED | OUTPATIENT
Start: 2023-11-11 | End: 2023-12-05

## 2023-10-27 RX ORDER — DEXTROAMPHETAMINE SACCHARATE, AMPHETAMINE ASPARTATE MONOHYDRATE, DEXTROAMPHETAMINE SULFATE AND AMPHETAMINE SULFATE 6.25; 6.25; 6.25; 6.25 MG/1; MG/1; MG/1; MG/1
50 CAPSULE, EXTENDED RELEASE ORAL DAILY
Qty: 60 CAPSULE | Refills: 0 | Status: SHIPPED | OUTPATIENT
Start: 2023-11-19 | End: 2023-12-05

## 2023-10-27 RX ORDER — NALTREXONE HYDROCHLORIDE 50 MG/1
TABLET, FILM COATED ORAL
Qty: 90 TABLET | Refills: 0 | Status: SHIPPED | OUTPATIENT
Start: 2023-10-27 | End: 2023-12-05

## 2023-10-27 ASSESSMENT — PAIN SCALES - GENERAL: PAINLEVEL: NO PAIN (0)

## 2023-10-27 NOTE — PATIENT INSTRUCTIONS
-Continue on current medication regimen. Please check your blood pressure 2 times within this month and send them to carolyn. If your blood pressure is consistently over 140/90, please follow up with primary care.  Until carolyn receives your blood pressure readings, you will only have 1 month of refills on Adderall.    Your next appointment is scheduled on 4/26/2024 (Fri) at 9:30am.        **For crisis resources, please see the information at the end of this document**   Patient Education    Thank you for coming to the [facility] [place] [facility].     Lab Testing:  If you had lab testing today and your results are reassuring or normal they will be mailed to you or sent through [employer] within 7 days. If the lab tests need quick action we will call you with the results. The phone number we will call with results is # [phone]. If this is not the best number please call our clinic and change the number.     Medication Refills:  If you need any refills please call your pharmacy and they will contact us. Our fax number for refills is [phone].   Three business days of notice are needed for general medication refill requests.   Five business days of notice are needed for controlled substance refill requests.   If you need to change to a different pharmacy, please contact the new pharmacy directly. The new pharmacy will help you get your medications transferred.     Contact Us:  Please call [phone] during business hours (8-5:00 M-F).   If you have medication related questions after clinic hours, or on the weekend, please call [phone].     Financial Assistance [phone]   Medical Records [phone]       MENTAL HEALTH CRISIS RESOURCES:  For a emergency help, please call 911 or go to the nearest Emergency Department.     Emergency Walk-In Options:   EmPATH Unit @ [facility] ([place]): [phone] - Specialized mental health emergency area designed to be  [facility] ([place]): [phone]  [facility] ([place]): [phone]  [facility] ([place]): [phone]    County Crisis Information:   [place]: [phone]  [place]: [phone]  [place] (CLARA) - Adult: [phone]     Child: [phone]  [place] - Adult: [phone]     Child: [phone]  [place]: [phone]  List of all [facility] resources:   https://[place].gov/dhs/people-we-serve/adults/health-care/mental-health/resources/crisis-contacts.jsp    National Crisis Information:   Crisis Text Line: Text  [place]  to 831584  Suicide & Crisis Lifeline: 988  National Suicide Prevention Lifeline: 2-891-860-TALK (1-937.778.6308)       For online chat options, visit https://suicidepreventionlifeline.org/chat/  Poison Control Center: 1-618.349.5204  Trans Lifeline: 1-382.728.5535 - Hotline for transgender people of all ages  The [employer]: 3-013-916-9949 - Hotline for LGBT youth     For Non-Emergency Support:   Fast Tracker: Mental Health & Substance Use Disorder Resources -   https://www.[employer].org/

## 2023-10-27 NOTE — PROGRESS NOTES
[facility] - Progress Note    Date of Service: 10/27/23   Name: Xavier Landon  : 1990  Medical Record Number: [id number]  Treating Provider: VIKTOR Anderson, [facility]  Type of Session: Individual  Present in Session: pt  Session Start and Stop Time: 0345-5989  Number of Minutes:  55    SERVICE MODALITY:  In-person    DSM-5 Diagnoses:  296.33 (F33.2) Major Depressive Disorder, Recurrent Episode, Severe _ and With anxious distress  300.02 (F41.1) Generalized Anxiety Disorder.  Attention-Deficit/Hyperactivity Disorder  314.01 (F90.9) Unspecified Attention -Deficit / Hyperactivity Disorder.  Substance-Related & Addictive Disorders Alcohol Use Disorder   303.90 (F10.20) Moderate   301.83 (F60.3) Borderline Personality Disorder    Current Reported Symptoms and Status update:  Changes since last session bad mood this week, no working out  Struggling with compulsive sexual behavior  Struggling with emotional regulation    Progress Toward Treatment Goals:   Satisfactory progress     Therapeutic Interventions/Treatment Strategies:    Area(s) of treatment focus addressed in this session included Symptom Management and Interpersonal Relationship Skills    Psychotherapist offered support, feedback and validation and reinforced use of skills Treatment modalities used include Motivational Interviewing Dialectical Behavioral Therapy Interpersonal Behavioral Activation: Explored how behaviors effect mood and interact with thoughts and feelings and Encouraged strategies to reduce individual procrastination and increase motivation by increasing goal-directed activities to enhance mood and reduce symptoms.  Support, Feedback, Structured Activity, and Problem Solving    Patient Response:   Patient responded to session by listening, accepting support, and verbalizing understanding  Possible barriers to participation / learning include: N/A    Current Mental Status Exam:    Appearance:  Appropriate   Eye Contact:  Good   Attitude / Demeanor: Cooperative   Speech      Rate / Production: Normal/ Responsive      Volume:  Normal  volume  Orientation:  All  Mood:   Normal  Affect:   Appropriate   Thought Content: Clear   Insight:   Good       Plan/Need for Future Services:  Return for therapy in 2 weeks to treat diagnosed problems.    Patient has a current master individualized treatment plan.  See Epic treatment plan for more information.    Referral / Collaboration:  Referral to another professional/service is not indicated at this time..  Emergency Services Needed?  No    Assignment:  routine    Interactive Complexity:  There are four specific communication difficulties that complicate the work of the primary psychiatric procedure.  Interactive complexity (+29292) may be reported when at least one of these difficulties is present.    Communication difficulties present during current the psychiatric procedure include:  None.      Signature/Title:    VIKTOR Anderson, [facility]

## 2023-10-27 NOTE — NURSING NOTE
Is the patient currently in the state of [place]? YES    Visit mode:VIDEO    If the visit is dropped, the patient can be reconnected by: VIDEO VISIT: Text to cell phone:   Telephone Information:   Mobile [phone]       Will anyone else be joining the visit? NO  (If patient encounters technical issues they should call [phone]    How would you like to obtain your AVS? MyChart    Are changes needed to the allergy or medication list? No    Will do QNRS on own through mychart    Reason for visit: ANDERSON BAUMF

## 2023-10-27 NOTE — PROGRESS NOTES
Virtual Visit Details    Type of service:  Video Visit     Originating Location (pt. Location): Home  Distant Location (provider location):  Off-site  Platform used for Video Visit: [facility]        Psychiatry Clinic Progress Note                                                                  Patient Name: Xavier Landon  YOB: 1990  MRN: [id number]  Date of Service:  10/27/2023  Last Seen:7/28/2023       Xavier Landon is a 33 year old person assigned male at birth, identifies as cisgender male who uses the name Huey and pronoun wojciech.      Huey Landon is a 89year old year old adult who presents for ongoing psychiatric care.  Huey Landon was last seen on 7/28/2023.     At that time,     Medication Ordered/Consults/Labs/tests Ordered:     Medication: Continue on current medication regimen.  OTC Recommendations: none  Lab Orders:  none  Referrals: none  Release of Information: none  Future Treatment Considerations: Per symptoms.   Return for Follow Up: in 3 months    Pertinent Background:  Depression, anxiety and CSB started around high school. ADHD also diagnosed in HS.  SI in 20's, but no SIB or SA hx. ETOH started in early 20's and heaviest through 20's, last use 9/2022. Occasional cannabis seltzer use for recreation.  Psych critical item history includes suicidal ideation and substance use: alcohol and cannabis.      Previous medication trial: Lithium (600 mg not effective), Vyvance, Prozac, Zoloft.     Therapist: Deborah Watson (weekly)       Interim History                                                                                                        4, 4     Since the last visit,  -Reports doing great. mood and anxiety are manageable, denies SI, SIB or HI.  -Has not checked blood pressure at all.  -Irritability has subsided.  -Continues to sleep well, 6-7 hours/night.  -No substance use.  -Wants to continue on current medication regimen as  he has been doing well.    Denies any symptoms suggestive of hypomania or psychosis.    Current Suicidality/Hx of Suicide Attempts: Denies both  CoCominent Medical concerns: Denies    Medication Side Effects: The patient denies all medication side effects.      Medical Review of Systems     Apart from the symptoms mentioned int he HPI, the 14 point review of systems, including constitutional, HEENT, cardiovascular, respiratory, gastrointestinal, genitourinary, musculoskeletal, integumentary, endocrine, neurological, hematologic and allergic is entirely negative.    Substance Use     Denies alcohol use since 9/2022, this is substance of choice.  Occasional cannabis seltzer use as a recreation.  Previous provider note indicates hx of infrequent hallucinogen and MDMA use     Substance treatment x 2, last in 2020.    Social/ Family History                                  [per patient report]                                 1ea,1ea     Living arrangements: lives alone and feels safe.  Social Support:friends, therapy group peers, parents  Access to gun: tono  Born in [place], moved to [place] age 15, grew up with 2 parents and felt safe.  Works as FT , does not feel this is difficult as he is trying to abstain from alcohol use.    Allergy                                Patient has no known allergies.    Current Medications                                                                                                       Current Outpatient Medications   Medication Sig Dispense Refill    amphetamine-dextroamphetamine (ADDERALL XR) 25 MG 24 hr capsule Take 2 capsules (50 mg) by mouth daily for 30 days 60 capsule 0    amphetamine-dextroamphetamine (ADDERALL) 10 MG tablet Take 1 tablet (10 mg) by mouth daily as needed (for breatkthrough symptoms in afternoon) 30 tablet 0    buPROPion (WELLBUTRIN XL) 150 MG 24 hr tablet Take 1 tablet (150 mg) by mouth every morning 90 tablet 0    naltrexone (DEPADE/REVIA) 50 MG tablet  Take 1 50 mg tablet daily 90 tablet 0    sildenafil (REVATIO) 20 MG tablet Take 1 tablet (20 mg) by mouth 3 times daily 30 tablet 3    tadalafil (CIALIS) 5 MG tablet Take 1 tablet (5 mg) by mouth every 24 hours 90 tablet 0         Vitals                                                                                                                       3, 3   There were no vitals taken for this visit.        Mental Status Exam                                                                                   9, 14 cog      Alertness: alert  and oriented  Appearance:  Casually dressed and Adequately groomed  Behavior/Demeanor: cooperative and calm, with good  eye contact   Speech: regular rate and rhythm  Mood :  good  Affect:  euthymic ; was congruent to mood; was congruent to content  Thought Process (Associations):  Linear and Goal directed  Thought process (Rate):  Normal  Thought content:  no overt psychosis, denies suicidal ideation, intent or thoughts and patient does not appear to be responding to internal stimuli  Perception:  Reports none;  Denies auditory hallucinations and visual hallucinations  Attention/Concentration:  Fair  Memory:  Immediate recall intact and Short-term memory intact  Language: intact  Fund of Knowledge/Intelligence:  Average  Abstraction:  Normal  Insight:  Fair  Judgment:  Fair  Cognition: (6) does  appear grossly intact; formal cognitive testing was not done    Physical Exam     Motor activity/EPS:  Normal  Psychomotor: normal or unremarkable    Labs and Results      Pertinent findings on review include: Review of records with relevant information reported in the HPI.  Reviewed pt's past medical record and obtained collateral information.      [place] PRESCRIPTION MONITORING PROGRAM [] was checked today:  Adderall 10/22 (XR), 10/14 (IR), 9/22 (XR), 9/14 (IR), 8/23 (XR), 8/15 (IR).     PHQ9 Today:  N/A      6/6/2022     1:47 PM 6/16/2022     2:42 PM 5/31/2023     9:38 AM   PHQ   PHQ-9  Total Score 12 14 11   Q9: Thoughts of better off dead/self-harm past 2 weeks Several days Several days Not at all   F/U: Thoughts of suicide or self-harm No No    F/U: Safety concerns No No        SHAUNA 7 Today: N/A      2/24/2022     2:07 PM 4/6/2022     7:00 PM 6/20/2023     7:33 AM   SHAUNA-7 SCORE   Total Score  10 (moderate anxiety) 6 (mild anxiety)   Total Score 11 10 6       Recent Labs   Lab Test 06/20/23  0818 06/01/22  1123 05/09/22  0855   CR 1.13 0.84 0.86   GFRESTIMATED 89 >90 >90     Recent Labs   Lab Test 06/20/23  0818 06/01/22  1123   AST 30 19   ALT 37 55   ALKPHOS 80 68     PSYCHOTROPIC DRUG INTERACTIONS:    Adderall---Wellbutrin: Concurrent use of AMPHETAMINES and CYP2D6 INHIBITORS may result in increased amphetamine exposure and increased risk of serotonin syndrome.  MANAGEMENT:  Monitoring for adverse effects and patient is aware of risks    Impression/Assessment      Huey Landon is a 33 year old adult  who presents for med management follow up.  Pt appears stable in his mood and anxiety, denies SI, SIB or HI during the appointment. Pt noted irritability resolved. Pt has not checked BP since last seen despite of instruction. Pt agreed to monitor BP x2 this month and send it to this writer. Will send one month refill on Adderall XR and IR, but without hearing BP, will not provide further refills on stimulant and pt agreed with the treatment plan. Updated therapist on treatment plan. Reviewed labs. Ok to continue on current medication regimen for now.    Diagnosis                                                                   Mood disorder (MDD vs episodic mood d/o)  SHAUNA  CSB  ADHD  BPD per therapist    Treatment Recommendation & Plan       Medication Ordered/Consults/Labs/tests Ordered:     Medication: Continue on current medication regimen. Please check your blood pressure 2 times within this month and send them to carolyn. If your blood pressure is consistently over 140/90, please  follow up with primary care.  Until carolyn receives your blood pressure readings, you will only have 1 month of refills on Adderall.  OTC Recommendations: none  Lab Orders:  none  Referrals: none  Release of Information: none  Future Treatment Considerations: Per symptoms.   Return for Follow Up: in 6 months    -Discussed safety plan for suicidal thoughts  -Discussed plan for suicidality  -Discussed available emergency services  -Patient agrees with the treatment plan  -Encouraged to continue outpatient therapy to gain more coping mechanism for stress.      -Pt understood that after stopping Naltrexone, they may be more sensitive to the effects of heroin and any other narcotics.  The amount of heroin or narcotics pt may have been using on a routine basis before pt started naltrexone might now cause overdose and death and pt fully understands the nature and seriousness of this possible consequences.  If patient is not sure if they can avoid opioid use, pt understands that pt can be referred to alternative treatment programs such as methadone maintenance, which is an effective treatment for opioid dependence and has a reduced risk of fatal overdose.      Treatment Risk Statement: Discussed with the patient my impressions, as well as recommended studies. I educated patient on the differential diagnosis and prognosis. I discussed with the patient the risks and benefits of medications versus no interventions, including efficacy, dose, possible side effects and length of treatment and the importance of medication compliance.  The patient understands the risks, benefits, adverse effects and alternatives. Agrees to treatment with the capacity to do so. No medical contraindications to treatment. The patient also understands the risks of using street drugs or alcohol.     CRISIS NUMBERS:   Provided routinely in AVS.      Carolyn Manzano CNP,  10/27/2023

## 2023-11-07 ENCOUNTER — OFFICE VISIT (OUTPATIENT)
Dept: PSYCHOLOGY | Facility: CLINIC | Age: 33
End: 2023-11-07
Payer: COMMERCIAL

## 2023-11-07 DIAGNOSIS — N52.1 ERECTILE DYSFUNCTION DUE TO DISEASES CLASSIFIED ELSEWHERE: ICD-10-CM

## 2023-11-07 DIAGNOSIS — F90.2 ATTENTION DEFICIT HYPERACTIVITY DISORDER (ADHD), COMBINED TYPE: ICD-10-CM

## 2023-11-07 DIAGNOSIS — F91.8 CONDUCT DISORDER, UNDIFFERENTIATED TYPE: ICD-10-CM

## 2023-11-07 DIAGNOSIS — F41.1 GENERALIZED ANXIETY DISORDER: ICD-10-CM

## 2023-11-07 DIAGNOSIS — F19.90 SUBSTANCE USE DISORDER: ICD-10-CM

## 2023-11-07 DIAGNOSIS — F33.2 MDD (MAJOR DEPRESSIVE DISORDER), RECURRENT SEVERE, WITHOUT PSYCHOSIS (H): Primary | ICD-10-CM

## 2023-11-07 PROCEDURE — 90853 GROUP PSYCHOTHERAPY: CPT | Performed by: MARRIAGE & FAMILY THERAPIST

## 2023-11-08 NOTE — GROUP NOTE
[facility]  [address], [address]  [place]  [address]    Group Progress Note  [facility] - Progress Note    Date of Service: 23   Name: Xavier Landon  : 1990  Medical Record Number: [id number]  START TIME:  6:30 PM  END TIME:  8:30 PM  FACILITATOR(S): Deborah Watson LMFT, [facility]  TOPIC: [facility] Group Therapy  Type of Session: Group  :  Number of Attendees:  4  Number of Minutes:  /120    SERVICE MODALITY:  In-person      DSM-5 Diagnoses:  296.33 (F33.2) Major Depressive Disorder, Recurrent Episode, Severe _ and With anxious distress  300.02 (F41.1) Generalized Anxiety Disorder.  Attention-Deficit/Hyperactivity Disorder  314.01 (F90.9) Unspecified Attention -Deficit / Hyperactivity Disorder.  Substance-Related & Addictive Disorders Alcohol Use Disorder   303.90 (F10.20) Moderate   301.83 (F60.3) Borderline Personality Disorder    Current Reported Symptoms and Status update:  Changes since last session struggling with mood due to increased loneliness  Struggling with compulsive sexual behavior  Struggling with emotional regulation    Progress Toward Treatment Goals:   Satisfactory progress     Therapeutic Interventions/Treatment Strategies:    Area(s) of treatment focus addressed in this session included Symptom Management and Interpersonal Relationship Skills    Patient checked in about their mental health symptoms, healthy coping skills used over the week, negative coping skills used over the week, what sexual behaviors they engaged in-fantasy, masturbation, sex, their comfort level with their behaviors, if there were triggers, urges to violate boundaries, and violations of boundaries.  They took time to process about vulnerability handout they provided support and feedback to others in the group.    Psychotherapist offered support, feedback and validation Treatment modalities used include [facility] THERAPY INTERVENTIONS: Motivational  Interviewing Relationship Skills: Discussed strategies to promote healthier understanding of interpersonal relationships  Support, Feedback, and Structured Activity    Patient Response:   Patient responded to session by accepting feedback, giving feedback, and listening  Possible barriers to participation / learning include: N/A    Current Mental Status Exam:   Appearance:  Appropriate   Eye Contact:  Good   Attitude / Demeanor: Cooperative   Speech      Rate / Production: Normal/ Responsive      Volume:  Normal  volume  Orientation:  All  Mood:   Irritable   Affect:   Appropriate   Thought Content: Clear   Insight:   Good       Plan/Need for Future Services:  Return for therapy in 1 weeks to treat diagnosed problems.    Patient has a current master individualized treatment plan.  See Epic treatment plan for more information.    Referral / Collaboration:  Referral to another professional/service is not indicated at this time..  Emergency Services Needed?  No    Assignment:  Return in one week     Interactive Complexity:  There are four specific communication difficulties that complicate the work of the primary psychiatric procedure.  Interactive complexity (+45211) may be reported when at least one of these difficulties is present.    Communication difficulties present during current the psychiatric procedure include:  None.      Signature/Title:    VIKTOR Anderson, [facility]

## 2023-11-09 ENCOUNTER — OFFICE VISIT (OUTPATIENT)
Dept: PSYCHOLOGY | Facility: CLINIC | Age: 33
End: 2023-11-09
Payer: COMMERCIAL

## 2023-11-09 DIAGNOSIS — F33.2 MDD (MAJOR DEPRESSIVE DISORDER), RECURRENT SEVERE, WITHOUT PSYCHOSIS (H): Primary | ICD-10-CM

## 2023-11-09 DIAGNOSIS — F91.8 CONDUCT DISORDER, UNDIFFERENTIATED TYPE: ICD-10-CM

## 2023-11-09 DIAGNOSIS — F41.1 GENERALIZED ANXIETY DISORDER: ICD-10-CM

## 2023-11-09 DIAGNOSIS — F19.90 SUBSTANCE USE DISORDER: ICD-10-CM

## 2023-11-09 DIAGNOSIS — F90.2 ATTENTION DEFICIT HYPERACTIVITY DISORDER (ADHD), COMBINED TYPE: ICD-10-CM

## 2023-11-09 DIAGNOSIS — N52.1 ERECTILE DYSFUNCTION DUE TO DISEASES CLASSIFIED ELSEWHERE: ICD-10-CM

## 2023-11-09 PROCEDURE — 90837 PSYTX W PT 60 MINUTES: CPT | Performed by: MARRIAGE & FAMILY THERAPIST

## 2023-11-09 NOTE — PROGRESS NOTES
[facility] Progress [facility]    Date of Service: 23   Name: Xavier Landon  : 1990  Medical Record Number: [id number]  Treating Provider: VIKTOR Anderson [facility]  Type of Session: Individual  Present in Session: pt  Session Start and Stop Time: 3613-4750  Number of Minutes:  60    SERVICE MODALITY:  In-person    DSM-5 Diagnoses:  296.33 (F33.2) Major Depressive Disorder, Recurrent Episode, Severe _ and With anxious distress  300.02 (F41.1) Generalized Anxiety Disorder.  Attention-Deficit/Hyperactivity Disorder  314.01 (F90.9) Unspecified Attention -Deficit / Hyperactivity Disorder.  Substance-Related & Addictive Disorders Alcohol Use Disorder   303.90 (F10.20) Moderate   301.83 (F60.3) Borderline Personality Disorder    Current Reported Symptoms and Status update:  Changes since last session-increased loneliness  Struggling with compulsive sexual behavior  Struggling with emotional regulation    Progress Toward Treatment Goals:   Satisfactory progress     Therapeutic Interventions/Treatment Strategies:    Area(s) of treatment focus addressed in this session included Symptom Management and Sexual Health and Wellness    Psychotherapist offered support, feedback and validation and reinforced use of skills Treatment modalities used include Motivational Interviewing Dialectical Behavioral Therapy Interpersonal Coping Skills: Facilitated discussion on learning and applying radical acceptance skill  Support, Feedback, Structured Activity, and Clarification    Patient Response:   Patient responded to session by listening  Possible barriers to participation / learning include: N/A    Current Mental Status Exam:   Appearance:  Appropriate   Eye Contact:  Good   Attitude / Demeanor: Cooperative   Speech      Rate / Production: Normal/ Responsive      Volume:  Normal  volume  Orientation:  All  Mood:   Irritable  Normal  Affect:   Appropriate   Thought Content: Clear    Insight:   Good       Plan/Need for Future Services:  Return for therapy in 1 weeks to treat diagnosed problems.    Patient has a current master individualized treatment plan.  See Epic treatment plan for more information.    Referral / Collaboration:  Referral to another professional/service is not indicated at this time..  Emergency Services Needed?  No    Assignment:  Return in one week-emotional myths    Interactive Complexity:  There are four specific communication difficulties that complicate the work of the primary psychiatric procedure.  Interactive complexity (+04566) may be reported when at least one of these difficulties is present.    Communication difficulties present during current the psychiatric procedure include:  None.      Signature/Title:    Vance Watson, LMFT, [facility]

## 2023-11-13 ENCOUNTER — MYC REFILL (OUTPATIENT)
Dept: PSYCHIATRY | Facility: CLINIC | Age: 33
End: 2023-11-13
Payer: COMMERCIAL

## 2023-11-13 DIAGNOSIS — F90.2 ATTENTION DEFICIT HYPERACTIVITY DISORDER (ADHD), COMBINED TYPE: ICD-10-CM

## 2023-11-13 RX ORDER — DEXTROAMPHETAMINE SACCHARATE, AMPHETAMINE ASPARTATE, DEXTROAMPHETAMINE SULFATE AND AMPHETAMINE SULFATE 2.5; 2.5; 2.5; 2.5 MG/1; MG/1; MG/1; MG/1
10 TABLET ORAL DAILY PRN
Qty: 30 TABLET | Refills: 0 | Status: CANCELLED | OUTPATIENT
Start: 2023-11-13

## 2023-11-17 ENCOUNTER — OFFICE VISIT (OUTPATIENT)
Dept: PSYCHOLOGY | Facility: CLINIC | Age: 33
End: 2023-11-17
Payer: COMMERCIAL

## 2023-11-17 DIAGNOSIS — F19.90 SUBSTANCE USE DISORDER: ICD-10-CM

## 2023-11-17 DIAGNOSIS — F91.8 CONDUCT DISORDER, UNDIFFERENTIATED TYPE: ICD-10-CM

## 2023-11-17 DIAGNOSIS — F41.1 GENERALIZED ANXIETY DISORDER: ICD-10-CM

## 2023-11-17 DIAGNOSIS — F33.2 MDD (MAJOR DEPRESSIVE DISORDER), RECURRENT SEVERE, WITHOUT PSYCHOSIS (H): Primary | ICD-10-CM

## 2023-11-17 DIAGNOSIS — N52.1 ERECTILE DYSFUNCTION DUE TO DISEASES CLASSIFIED ELSEWHERE: ICD-10-CM

## 2023-11-17 PROCEDURE — 90837 PSYTX W PT 60 MINUTES: CPT | Performed by: MARRIAGE & FAMILY THERAPIST

## 2023-11-17 NOTE — PROGRESS NOTES
[facility] - Progress Note    Date of Service: 23   Name: Xavier Landon  : 1990  Medical Record Number: [id number]  Treating Provider: VIKTOR Anderson, [facility]  Type of Session: Individual  Present in Session: pt  Session Start and Stop Time: 3695-1065  Number of Minutes:  59    SERVICE MODALITY:  In-person    DSM-5 Diagnoses:  296.33 (F33.2) Major Depressive Disorder, Recurrent Episode, Severe _ and With anxious distress  300.02 (F41.1) Generalized Anxiety Disorder.  Attention-Deficit/Hyperactivity Disorder  314.01 (F90.9) Unspecified Attention -Deficit / Hyperactivity Disorder.  Substance-Related & Addictive Disorders Alcohol Use Disorder   303.90 (F10.20) Moderate   301.83 (F60.3) Borderline Personality Disorder    Current Reported Symptoms and Status update:  Changes since last session-improved mood went on date  Struggling with compulsive sexual behavior  Struggling with emotional regulation    Progress Toward Treatment Goals:   Satisfactory progress     Therapeutic Interventions/Treatment Strategies:    Area(s) of treatment focus addressed in this session included Symptom Management, Interpersonal Relationship Skills, and Sexual Health and Wellness    Psychotherapist offered support, feedback and validation and reinforced use of skills Treatment modalities used include Dialectical Behavioral Therapy Interpersonal Relationship Skills: Encouraged development and maintenance  of healthy boundaries and Discussed strategies to promote healthier understanding of interpersonal relationships  Support, Feedback, and Problem Solving    Patient Response:   Patient responded to session by accepting feedback, listening, accepting support, and actively engaged  Possible barriers to participation / learning include: N/A    Current Mental Status Exam:   Appearance:  Appropriate   Eye Contact:  Good   Attitude / Demeanor: Cooperative   Speech      Rate /  Production: Normal/ Responsive      Volume:  Normal  volume  Orientation:  All  Mood:   Euthymic  Affect:   Appropriate   Thought Content: Clear   Insight:   Good       Plan/Need for Future Services:  Return for therapy in 1 weeks to treat diagnosed problems.    Patient has a current master individualized treatment plan.  See Epic treatment plan for more information.    Referral / Collaboration:  Referral to another professional/service is not indicated at this time..  Emergency Services Needed?  No    Assignment:  Return in one week     Interactive Complexity:  There are four specific communication difficulties that complicate the work of the primary psychiatric procedure.  Interactive complexity (+53378) may be reported when at least one of these difficulties is present.    Communication difficulties present during current the psychiatric procedure include:  None.      Signature/Title:    Vance Watson, LMFT, [facility]

## 2023-11-21 ENCOUNTER — OFFICE VISIT (OUTPATIENT)
Dept: PSYCHOLOGY | Facility: CLINIC | Age: 33
End: 2023-11-21
Payer: COMMERCIAL

## 2023-11-21 DIAGNOSIS — F33.2 MDD (MAJOR DEPRESSIVE DISORDER), RECURRENT SEVERE, WITHOUT PSYCHOSIS (H): Primary | ICD-10-CM

## 2023-11-21 DIAGNOSIS — F41.1 GENERALIZED ANXIETY DISORDER: ICD-10-CM

## 2023-11-21 DIAGNOSIS — F91.8 CONDUCT DISORDER, UNDIFFERENTIATED TYPE: ICD-10-CM

## 2023-11-21 DIAGNOSIS — F19.90 SUBSTANCE USE DISORDER: ICD-10-CM

## 2023-11-21 DIAGNOSIS — N52.1 ERECTILE DYSFUNCTION DUE TO DISEASES CLASSIFIED ELSEWHERE: ICD-10-CM

## 2023-11-21 PROCEDURE — 90853 GROUP PSYCHOTHERAPY: CPT | Performed by: MARRIAGE & FAMILY THERAPIST

## 2023-11-22 NOTE — GROUP NOTE
[facility]  [address], [address]  [place]  [address]    Group Progress Note  [facility] - Progress Note    Date of Service: 23   Name: Xavier Landon  : 1990  Medical Record Number: [id number]  START TIME:  6:30 PM  END TIME:  8:30 PM  FACILITATOR(S): Deborah Watson LMFT, [facility]  TOPIC: [facility] Group Therapy  Type of Session: Group  :  Number of Attendees:  4  Number of Minutes:  /120    SERVICE MODALITY:  In-person      DSM-5 Diagnoses:  296.33 (F33.2) Major Depressive Disorder, Recurrent Episode, Severe _ and With anxious distress  300.02 (F41.1) Generalized Anxiety Disorder.  Attention-Deficit/Hyperactivity Disorder  314.01 (F90.9) Unspecified Attention -Deficit / Hyperactivity Disorder.  Substance-Related & Addictive Disorders Alcohol Use Disorder   303.90 (F10.20) Moderate   301.83 (F60.3) Borderline Personality Disorder    Current Reported Symptoms and Status update:  Changes since last session-improved mood, dating and talking to women  Struggling with compulsive sexual behavior  Struggling with emotional regulation    Progress Toward Treatment Goals:   Satisfactory progress     Therapeutic Interventions/Treatment Strategies:    Area(s) of treatment focus addressed in this session included Symptom Management, Interpersonal Relationship Skills, and Sexual Health and Wellness    Patient checked in about their mental health symptoms, healthy coping skills used over the week, negative coping skills used over the week, what sexual behaviors they engaged in-fantasy, masturbation, sex, their comfort level with their behaviors, if there were triggers, urges to violate boundaries, and violations of boundaries.  They took time to process about feeling better about self w female attention, working on goals effectively  they provided support and feedback to others in the group.      Psychotherapist offered support, feedback and  validation and reinforced use of skills Treatment modalities used include [facility] THERAPY INTERVENTIONS: Motivational Interviewing Group Dynamic Therapy  Interpersonal Relationship Skills: Assisted patients in implementing more effective communication skills in their relationships and discussed application of self compassion and explored challenging unrealistic expectations of self/others  Support, Feedback, and Structured Activity    Patient Response:   Patient responded to session by accepting feedback, giving feedback, and listening  Possible barriers to participation / learning include: N/A    Current Mental Status Exam:   Appearance:  Appropriate   Eye Contact:  Good   Attitude / Demeanor: Cooperative   Speech      Rate / Production: Normal/ Responsive      Volume:  Normal  volume  Orientation:  All  Mood:   Normal  Affect:   Appropriate   Thought Content: Clear   Insight:   Good       Plan/Need for Future Services:  Return for therapy in 1 weeks to treat diagnosed problems.    Patient has a current master individualized treatment plan.  See Epic treatment plan for more information.    Referral / Collaboration:  Referral to another professional/service is not indicated at this time..  Emergency Services Needed?  No    Assignment:  Return in one week     Interactive Complexity:  There are four specific communication difficulties that complicate the work of the primary psychiatric procedure.  Interactive complexity (+83453) may be reported when at least one of these difficulties is present.    Communication difficulties present during current the psychiatric procedure include:  None.      Signature/Title:    Vance Watson, LMFT, [facility]

## 2023-11-24 ENCOUNTER — OFFICE VISIT (OUTPATIENT)
Dept: PSYCHOLOGY | Facility: CLINIC | Age: 33
End: 2023-11-24
Payer: COMMERCIAL

## 2023-11-24 DIAGNOSIS — F91.8 CONDUCT DISORDER, UNDIFFERENTIATED TYPE: ICD-10-CM

## 2023-11-24 DIAGNOSIS — N52.1 ERECTILE DYSFUNCTION DUE TO DISEASES CLASSIFIED ELSEWHERE: ICD-10-CM

## 2023-11-24 DIAGNOSIS — F19.90 SUBSTANCE USE DISORDER: ICD-10-CM

## 2023-11-24 DIAGNOSIS — F33.2 MDD (MAJOR DEPRESSIVE DISORDER), RECURRENT SEVERE, WITHOUT PSYCHOSIS (H): Primary | ICD-10-CM

## 2023-11-24 DIAGNOSIS — F41.1 GENERALIZED ANXIETY DISORDER: ICD-10-CM

## 2023-11-24 PROCEDURE — 90837 PSYTX W PT 60 MINUTES: CPT | Performed by: MARRIAGE & FAMILY THERAPIST

## 2023-11-24 NOTE — PROGRESS NOTES
[facility] - Progress Note    Date of Service: 23   Name: Xavier Landon  : 1990  Medical Record Number: [id number]  Treating Provider: VIKTOR Anderson, [facility]  Type of Session: Individual  Present in Session: pt  Session Start and Stop Time: 5281-3453  Number of Minutes:  56    SERVICE MODALITY:  In-person    DSM-5 Diagnoses:  296.33 (F33.2) Major Depressive Disorder, Recurrent Episode, Severe _ and With anxious distress  300.02 (F41.1) Generalized Anxiety Disorder.  Attention-Deficit/Hyperactivity Disorder  314.01 (F90.9) Unspecified Attention -Deficit / Hyperactivity Disorder.  Substance-Related & Addictive Disorders Alcohol Use Disorder   303.90 (F10.20) Moderate   301.83 (F60.3) Borderline Personality Disorder    Current Reported Symptoms and Status update:  Changes since last session-doing well  Struggling with compulsive sexual behavior  Struggling with emotional regulation    Progress Toward Treatment Goals:   Satisfactory progress     Therapeutic Interventions/Treatment Strategies:    Area(s) of treatment focus addressed in this session included Symptom Management, Interpersonal Relationship Skills, and Sexual Health and Wellness      Psychotherapist offered support, feedback and validation and reinforced use of skills Treatment modalities used include Motivational Interviewing Cognitive Behavioral Therapy Sexual Health and Wellness Education Interpersonal Behavioral Activation: Encouraged strategies to reduce individual procrastination and increase motivation by increasing goal-directed activities to enhance mood and reduce symptoms., Relationship Skills: Discussed strategies to promote healthier understanding of interpersonal relationships, and explored comfort with sexual communication and facilitated discussion about sexual pleasure and sexual satisfaction  Support, Feedback, and Education    Patient Response:   Patient responded to session by  accepting feedback, listening, verbalizing understanding, and actively engaged  Possible barriers to participation / learning include: N/A    Current Mental Status Exam:   Appearance:  Appropriate   Eye Contact:  Good   Attitude / Demeanor: Cooperative   Speech      Rate / Production: Normal/ Responsive      Volume:  Normal  volume  Orientation:  All  Mood:   Normal Euthymic  Affect:   Appropriate   Thought Content: Clear   Insight:   Good       Plan/Need for Future Services:  Return for therapy in 1 weeks to treat diagnosed problems.    Patient has a current master individualized treatment plan.  See Epic treatment plan for more information.    Referral / Collaboration:  Referral to another professional/service is not indicated at this time..  Emergency Services Needed?  No    Assignment:  Return in one week     Interactive Complexity:  There are four specific communication difficulties that complicate the work of the primary psychiatric procedure.  Interactive complexity (+13896) may be reported when at least one of these difficulties is present.    Communication difficulties present during current the psychiatric procedure include:  None.      Signature/Title:    Vance Watson, LMFT, [facility]

## 2023-11-28 ENCOUNTER — OFFICE VISIT (OUTPATIENT)
Dept: PSYCHOLOGY | Facility: CLINIC | Age: 33
End: 2023-11-28
Payer: COMMERCIAL

## 2023-11-28 DIAGNOSIS — F91.8 CONDUCT DISORDER, UNDIFFERENTIATED TYPE: ICD-10-CM

## 2023-11-28 DIAGNOSIS — F33.2 MDD (MAJOR DEPRESSIVE DISORDER), RECURRENT SEVERE, WITHOUT PSYCHOSIS (H): Primary | ICD-10-CM

## 2023-11-28 DIAGNOSIS — F19.90 SUBSTANCE USE DISORDER: ICD-10-CM

## 2023-11-28 DIAGNOSIS — F41.1 GENERALIZED ANXIETY DISORDER: ICD-10-CM

## 2023-11-28 DIAGNOSIS — N52.1 ERECTILE DYSFUNCTION DUE TO DISEASES CLASSIFIED ELSEWHERE: ICD-10-CM

## 2023-11-28 PROCEDURE — 90853 GROUP PSYCHOTHERAPY: CPT | Performed by: MARRIAGE & FAMILY THERAPIST

## 2023-11-29 NOTE — GROUP NOTE
[facility]  [address], [address]  [place]  [address]    Group Progress Note  [facility] - Progress Note    Date of Service: 23   Name: Xavier Landon  : 1990  Medical Record Number: [id number]  START TIME:  6:30 PM  END TIME:  8:30 PM  FACILITATOR(S): Deborah Watson LMFT, [facility]  TOPIC: [facility] Group Therapy  Type of Session: Group  :  Number of Attendees:  6  Number of Minutes:  /120    SERVICE MODALITY:  In-person      DSM-5 Diagnoses:  296.33 (F33.2) Major Depressive Disorder, Recurrent Episode, Severe _ and With anxious distress  300.02 (F41.1) Generalized Anxiety Disorder.  Attention-Deficit/Hyperactivity Disorder  314.01 (F90.9) Unspecified Attention -Deficit / Hyperactivity Disorder.  Substance-Related & Addictive Disorders Alcohol Use Disorder   303.90 (F10.20) Moderate   301.83 (F60.3) Borderline Personality Disorder    Current Reported Symptoms and Status update:  Changes since last session-things have been improving  Struggling with compulsive sexual behavior  Struggling with emotional regulation    Progress Toward Treatment Goals:   Satisfactory progress     Therapeutic Interventions/Treatment Strategies:    Area(s) of treatment focus addressed in this session included Symptom Management, Interpersonal Relationship Skills, and Sexual Health and Wellness    Psychotherapist offered support, feedback and validation and reinforced use of skills Treatment modalities used include [facility] THERAPY INTERVENTIONS: Motivational Interviewing Group Dynamic Therapy  Interpersonal Relationship Skills: Assisted patients in implementing more effective communication skills in their relationships and Discussed strategies to promote healthier understanding of interpersonal relationships  Support, Feedback, and Structured Activity    Patient Response:   Patient responded to session by accepting feedback, giving feedback, and listening  Possible  barriers to participation / learning include: N/A    Current Mental Status Exam:   Appearance:  Appropriate   Eye Contact:  Good   Attitude / Demeanor: Cooperative   Speech      Rate / Production: Normal/ Responsive      Volume:  Normal  volume  Orientation:  All  Mood:   Normal Euthymic  Affect:   Appropriate   Thought Content: Clear   Insight:   Good       Plan/Need for Future Services:  Return for therapy in 1 weeks to treat diagnosed problems.    Patient has a current master individualized treatment plan.  See Epic treatment plan for more information.    Referral / Collaboration:  Referral to another professional/service is not indicated at this time..  Emergency Services Needed?  No    Assignment:  Return in one week     Interactive Complexity:  There are four specific communication difficulties that complicate the work of the primary psychiatric procedure.  Interactive complexity (+56908) may be reported when at least one of these difficulties is present.    Communication difficulties present during current the psychiatric procedure include:  None.      Signature/Title:    VIKTOR Anderson, [facility]

## 2023-11-30 ENCOUNTER — OFFICE VISIT (OUTPATIENT)
Dept: PSYCHOLOGY | Facility: CLINIC | Age: 33
End: 2023-11-30
Payer: COMMERCIAL

## 2023-11-30 DIAGNOSIS — N52.1 ERECTILE DYSFUNCTION DUE TO DISEASES CLASSIFIED ELSEWHERE: ICD-10-CM

## 2023-11-30 DIAGNOSIS — F33.2 MDD (MAJOR DEPRESSIVE DISORDER), RECURRENT SEVERE, WITHOUT PSYCHOSIS (H): Primary | ICD-10-CM

## 2023-11-30 DIAGNOSIS — F91.8 CONDUCT DISORDER, UNDIFFERENTIATED TYPE: ICD-10-CM

## 2023-11-30 DIAGNOSIS — F41.1 GENERALIZED ANXIETY DISORDER: ICD-10-CM

## 2023-11-30 PROCEDURE — 90837 PSYTX W PT 60 MINUTES: CPT | Performed by: MARRIAGE & FAMILY THERAPIST

## 2023-11-30 NOTE — PROGRESS NOTES
[facility] - Progress Note    Date of Service: 23   Name: Xavier Landon  : 1990  Medical Record Number: [id number]  Treating Provider: VIKTOR Anderson, [facility]  Type of Session: Individual  Present in Session: pt  Session Start and Stop Time: 3791-7527  Number of Minutes:  56    SERVICE MODALITY:  In-person    DSM-5 Diagnoses:  296.33 (F33.2) Major Depressive Disorder, Recurrent Episode, Severe _ and With anxious distress  300.02 (F41.1) Generalized Anxiety Disorder.  Attention-Deficit/Hyperactivity Disorder  314.01 (F90.9) Unspecified Attention -Deficit / Hyperactivity Disorder.  Substance-Related & Addictive Disorders Alcohol Use Disorder   303.90 (F10.20) Moderate   301.83 (F60.3) Borderline Personality Disorder    Current Reported Symptoms and Status update:  Changes since last sessionmore insecure about sexual encounters     Progress Toward Treatment Goals:   Satisfactory progress     Therapeutic Interventions/Treatment Strategies:    Area(s) of treatment focus addressed in this session included Symptom Management, Interpersonal Relationship Skills, and Sexual Health and Wellness    Psychotherapist offered support, feedback and validation and reinforced use of skills Treatment modalities used include Motivational Interviewing Dialectical Behavioral Therapy Sexual Health and Wellness Education Sex Therapy Cognitive Restructuring:  Explored impact of ineffective thoughts / distortions on mood and activity and Assisted patient in formulating new neutral/positive alternatives to challenge less helpful / ineffective thoughts  Support, Feedback, Structured Activity, and Education    Patient Response:   Patient responded to session by focusing on goals and interrupting  Possible barriers to participation / learning include: N/A    Current Mental Status Exam:   Appearance:  Appropriate   Eye Contact:  Good   Attitude / Demeanor: Cooperative   Speech      Rate /  Production: Normal/ Responsive      Volume:  Normal  volume  Orientation:  All  Mood:   Irritable   Affect:   Appropriate  Constricted   Thought Content: Clear   Insight:   Good       Plan/Need for Future Services:  Return for therapy in 1 weeks to treat diagnosed problems.    Patient has a current master individualized treatment plan.  See Epic treatment plan for more information.    Referral / Collaboration:  Referral to another professional/service is not indicated at this time..  Emergency Services Needed?  No    Assignment:  Return in one week     Interactive Complexity:  There are four specific communication difficulties that complicate the work of the primary psychiatric procedure.  Interactive complexity (+07281) may be reported when at least one of these difficulties is present.    Communication difficulties present during current the psychiatric procedure include:  None.      Signature/Title:    Vance Watson, LMFT, [facility]

## 2023-12-01 DIAGNOSIS — N52.1 ERECTILE DYSFUNCTION DUE TO DISEASES CLASSIFIED ELSEWHERE: ICD-10-CM

## 2023-12-01 RX ORDER — TADALAFIL 5 MG/1
5 TABLET ORAL DAILY
Qty: 90 TABLET | Refills: 0 | Status: SHIPPED | OUTPATIENT
Start: 2023-12-01 | End: 2024-03-08

## 2023-12-05 ENCOUNTER — OFFICE VISIT (OUTPATIENT)
Dept: PSYCHOLOGY | Facility: CLINIC | Age: 33
End: 2023-12-05
Payer: COMMERCIAL

## 2023-12-05 ENCOUNTER — MYC REFILL (OUTPATIENT)
Dept: PSYCHIATRY | Facility: CLINIC | Age: 33
End: 2023-12-05
Payer: COMMERCIAL

## 2023-12-05 DIAGNOSIS — N52.1 ERECTILE DYSFUNCTION DUE TO DISEASES CLASSIFIED ELSEWHERE: ICD-10-CM

## 2023-12-05 DIAGNOSIS — F19.90 SUBSTANCE USE DISORDER: ICD-10-CM

## 2023-12-05 DIAGNOSIS — F41.1 GENERALIZED ANXIETY DISORDER: ICD-10-CM

## 2023-12-05 DIAGNOSIS — F90.2 ATTENTION DEFICIT HYPERACTIVITY DISORDER (ADHD), COMBINED TYPE: ICD-10-CM

## 2023-12-05 DIAGNOSIS — F91.8 CONDUCT DISORDER, UNDIFFERENTIATED TYPE: ICD-10-CM

## 2023-12-05 DIAGNOSIS — F33.2 MDD (MAJOR DEPRESSIVE DISORDER), RECURRENT SEVERE, WITHOUT PSYCHOSIS (H): Primary | ICD-10-CM

## 2023-12-05 PROCEDURE — 90853 GROUP PSYCHOTHERAPY: CPT | Performed by: MARRIAGE & FAMILY THERAPIST

## 2023-12-06 RX ORDER — BUPROPION HYDROCHLORIDE 150 MG/1
150 TABLET ORAL EVERY MORNING
Qty: 90 TABLET | Refills: 0 | Status: SHIPPED | OUTPATIENT
Start: 2023-12-06 | End: 2024-03-09

## 2023-12-06 RX ORDER — NALTREXONE HYDROCHLORIDE 50 MG/1
TABLET, FILM COATED ORAL
Qty: 90 TABLET | Refills: 0 | Status: SHIPPED | OUTPATIENT
Start: 2023-12-06 | End: 2024-03-09

## 2023-12-06 RX ORDER — DEXTROAMPHETAMINE SACCHARATE, AMPHETAMINE ASPARTATE MONOHYDRATE, DEXTROAMPHETAMINE SULFATE AND AMPHETAMINE SULFATE 6.25; 6.25; 6.25; 6.25 MG/1; MG/1; MG/1; MG/1
50 CAPSULE, EXTENDED RELEASE ORAL DAILY
Qty: 60 CAPSULE | Refills: 0 | Status: SHIPPED | OUTPATIENT
Start: 2023-12-19 | End: 2024-01-19

## 2023-12-06 RX ORDER — DEXTROAMPHETAMINE SACCHARATE, AMPHETAMINE ASPARTATE, DEXTROAMPHETAMINE SULFATE AND AMPHETAMINE SULFATE 2.5; 2.5; 2.5; 2.5 MG/1; MG/1; MG/1; MG/1
10 TABLET ORAL DAILY PRN
Qty: 30 TABLET | Refills: 0 | Status: SHIPPED | OUTPATIENT
Start: 2023-12-14 | End: 2024-01-22

## 2023-12-06 NOTE — TELEPHONE ENCOUNTER
Last seen: 10/27/23  RTC: 6 mo  Cancel: none  No-show: none  Next appt: none     Incoming refill from Patient via mychart    Medication requested:   Pending Prescriptions:                       Disp   Refills    amphetamine-dextroamphetamine (ADDERALL X*60 cap*0            Sig: Take 2 capsules (50 mg) by mouth daily    amphetamine-dextroamphetamine (ADDERALL) *30 tab*0            Sig: Take 1 tablet (10 mg) by mouth daily as needed           (for breatkthrough symptoms in afternoon)    buPROPion (WELLBUTRIN XL) 150 MG 24 hr ta*90 tab*0            Sig: Take 1 tablet (150 mg) by mouth every morning    naltrexone (DEPADE/REVIA) 50 MG tablet    90 tab*0            Sig: Take 1 50 mg tablet daily        Last refill per                Medication sent to provider for review.

## 2023-12-06 NOTE — GROUP NOTE
[facility]  [address], [address]  [place]  [address]    Group Progress Note  [facility] - Progress Note    Date of Service: 23   Name: Xavier Landon  : 1990  Medical Record Number: [id number]  START TIME:  6:30 PM  END TIME:  8:30 PM  FACILITATOR(S): Deborah Watson LMFT, [facility]  TOPIC: [facility] Group Therapy  Type of Session: Group  :  Number of Attendees:  4  Number of Minutes:  /120    SERVICE MODALITY:  In-person      DSM-5 Diagnoses:  296.33 (F33.2) Major Depressive Disorder, Recurrent Episode, Severe _ and With anxious distress  300.02 (F41.1) Generalized Anxiety Disorder.  Attention-Deficit/Hyperactivity Disorder  314.01 (F90.9) Unspecified Attention -Deficit / Hyperactivity Disorder.  Substance-Related & Addictive Disorders Alcohol Use Disorder   303.90 (F10.20) Moderate   301.83 (F60.3) Borderline Personality Disorder    Current Reported Symptoms and Status update:  Changes since last session-ups and down w dysregulation this week   Struggling with compulsive sexual behavior  Struggling with emotional regulation    Progress Toward Treatment Goals:   Satisfactory progress     Therapeutic Interventions/Treatment Strategies:    Area(s) of treatment focus addressed in this session included Symptom Management and Interpersonal Relationship Skills    Patient checked in about their mental health symptoms, healthy coping skills used over the week, negative coping skills used over the week, what sexual behaviors they engaged in-fantasy, masturbation, sex, their comfort level with their behaviors, if there were triggers, urges to violate boundaries, and violations of boundaries.  They took time to process about emotional myths they provided support and feedback to others in the group.    Psychotherapist offered support, feedback and validation and reinforced use of skills Treatment modalities used include [facility] THERAPY INTERVENTIONS:  Dialectical Behavioral Therapy Group Dynamic Therapy  Interpersonal Cognitive Restructuring:  Explored impact of ineffective thoughts / distortions on mood and activity and Facilitated recognition of the connection between negative thoughts and negative core beliefs and Coping Skills: Facilitated understanding of  what factors may contribute to symptom relapse and skills plan to manage symptom relapse   Support and Feedback    Patient Response:   Patient responded to session by accepting feedback, giving feedback, and listening  Possible barriers to participation / learning include: N/A    Current Mental Status Exam:   Appearance:  Appropriate   Eye Contact:  Good   Attitude / Demeanor: Cooperative   Speech      Rate / Production: Normal/ Responsive      Volume:  Normal  volume  Orientation:  All  Mood:   Depressed   Affect:   Appropriate   Thought Content: Clear   Insight:   Good       Plan/Need for Future Services:  Return for therapy in 1 weeks to treat diagnosed problems.    Patient has a current master individualized treatment plan.  See Epic treatment plan for more information.    Referral / Collaboration:  Referral to another professional/service is not indicated at this time..  Emergency Services Needed?  No    Assignment:  Return in one week     Interactive Complexity:  There are four specific communication difficulties that complicate the work of the primary psychiatric procedure.  Interactive complexity (+28995) may be reported when at least one of these difficulties is present.    Communication difficulties present during current the psychiatric procedure include:  None.      Signature/Title:    Vance Watson, LMFT, [facility]     The patient is a 66y Female complaining of fall.

## 2023-12-08 ENCOUNTER — OFFICE VISIT (OUTPATIENT)
Dept: PSYCHOLOGY | Facility: CLINIC | Age: 33
End: 2023-12-08
Payer: COMMERCIAL

## 2023-12-08 DIAGNOSIS — N52.1 ERECTILE DYSFUNCTION DUE TO DISEASES CLASSIFIED ELSEWHERE: ICD-10-CM

## 2023-12-08 DIAGNOSIS — F91.8 CONDUCT DISORDER, UNDIFFERENTIATED TYPE: ICD-10-CM

## 2023-12-08 DIAGNOSIS — F90.2 ATTENTION DEFICIT HYPERACTIVITY DISORDER (ADHD), COMBINED TYPE: ICD-10-CM

## 2023-12-08 DIAGNOSIS — F41.1 GENERALIZED ANXIETY DISORDER: ICD-10-CM

## 2023-12-08 DIAGNOSIS — F33.2 MDD (MAJOR DEPRESSIVE DISORDER), RECURRENT SEVERE, WITHOUT PSYCHOSIS (H): Primary | ICD-10-CM

## 2023-12-08 PROCEDURE — 90834 PSYTX W PT 45 MINUTES: CPT | Performed by: MARRIAGE & FAMILY THERAPIST

## 2023-12-08 NOTE — PROGRESS NOTES
[facility] - Progress Note    Date of Service: 23   Name: Xavier Landon  : 1990  Medical Record Number: [id number]  Treating Provider: VIKTOR Anderson, JESSI  Type of Session: Individual  Present in Session: pt  Session Start and Stop Time: 1795-6388  Number of Minutes:  49    SERVICE MODALITY:  In-person    DSM-5 Diagnoses:  296.33 (F33.2) Major Depressive Disorder, Recurrent Episode, Severe _ and With anxious distress  300.02 (F41.1) Generalized Anxiety Disorder.  Attention-Deficit/Hyperactivity Disorder  314.01 (F90.9) Unspecified Attention -Deficit / Hyperactivity Disorder.  Substance-Related & Addictive Disorders Alcohol Use Disorder   303.90 (F10.20) Moderate   301.83 (F60.3) Borderline Personality Disorder    Current Reported Symptoms and Status update:  Changes since last session-improved mood due to talking to women  Struggling with compulsive sexual behavior  Struggling with emotional regulation    Progress Toward Treatment Goals:   Satisfactory progress     Therapeutic Interventions/Treatment Strategies:    Area(s) of treatment focus addressed in this session included Symptom Management, [place] Maintenance/Relapse Prevention, Interpersonal Relationship Skills, and Sexual Health and Wellness    Psychotherapist offered support, feedback and validation Relationship Skills: Discussed strategies to promote healthier understanding of interpersonal relationships and explored challenging unrealistic expectations of self/others and explored comfort with sexual communication2  Support, Feedback, Structured Activity, and Problem Solving    Patient Response:   Patient responded to session by accepting feedback, listening, accepting support, verbalizing understanding, and actively engaged  Possible barriers to participation / learning include: N/A    Current Mental Status Exam:   Appearance:  Appropriate   Eye Contact:  Good   Attitude /  Demeanor: Cooperative   Speech      Rate / Production: Normal/ Responsive      Volume:  Normal  volume  Orientation:  All  Mood:   Euthymic  Affect:   Appropriate   Thought Content: Clear   Insight:   Good       Plan/Need for Future Services:  Return for therapy in 1 weeks to treat diagnosed problems.    Patient has a current master individualized treatment plan.  See Epic treatment plan for more information.    Referral / Collaboration:  Referral to another professional/service is not indicated at this time..  Emergency Services Needed?  No    Assignment:  Return in one week  Intentional about actions    Interactive Complexity:  There are four specific communication difficulties that complicate the work of the primary psychiatric procedure.  Interactive complexity (+94081) may be reported when at least one of these difficulties is present.    Communication difficulties present during current the psychiatric procedure include:  None.      Signature/Title:    Vance Watson, LMFT, [facility]

## 2023-12-15 ENCOUNTER — OFFICE VISIT (OUTPATIENT)
Dept: PSYCHOLOGY | Facility: CLINIC | Age: 33
End: 2023-12-15
Payer: COMMERCIAL

## 2023-12-15 DIAGNOSIS — F41.1 GENERALIZED ANXIETY DISORDER: ICD-10-CM

## 2023-12-15 DIAGNOSIS — N52.1 ERECTILE DYSFUNCTION DUE TO DISEASES CLASSIFIED ELSEWHERE: ICD-10-CM

## 2023-12-15 DIAGNOSIS — F91.8 CONDUCT DISORDER, UNDIFFERENTIATED TYPE: ICD-10-CM

## 2023-12-15 DIAGNOSIS — F33.2 MDD (MAJOR DEPRESSIVE DISORDER), RECURRENT SEVERE, WITHOUT PSYCHOSIS (H): Primary | ICD-10-CM

## 2023-12-15 DIAGNOSIS — F90.2 ATTENTION DEFICIT HYPERACTIVITY DISORDER (ADHD), COMBINED TYPE: ICD-10-CM

## 2023-12-15 PROCEDURE — 90837 PSYTX W PT 60 MINUTES: CPT | Performed by: MARRIAGE & FAMILY THERAPIST

## 2023-12-15 NOTE — PROGRESS NOTES
[facility] - Progress Note    Date of Service: 12/15/23   Name: Xavier Landon  : 1990  Medical Record Number: [id number]  Treating Provider: VIKTOR Anderson LAD  Type of Session: Individual  Present in Session: pt  Session Start and Stop Time: 9179-5051  Number of Minutes:  50    SERVICE MODALITY:  In-person    DSM-5 Diagnoses:  296.33 (F33.2) Major Depressive Disorder, Recurrent Episode, Severe _ and With anxious distress  300.02 (F41.1) Generalized Anxiety Disorder.  Attention-Deficit/Hyperactivity Disorder  314.01 (F90.9) Unspecified Attention -Deficit / Hyperactivity Disorder.  Substance-Related & Addictive Disorders Alcohol Use Disorder   303.90 (F10.20) Moderate   301.83 (F60.3) Borderline Personality Disorder    Current Reported Symptoms and Status update:  Changes since last session-has not been keeping up w needs-distrubed sleep  Struggling with compulsive sexual behavior  Struggling with emotional regulation    Progress Toward Treatment Goals:   Satisfactory progress     Therapeutic Interventions/Treatment Strategies:    Area(s) of treatment focus addressed in this session included Symptom Management and Sexual Health and Wellness    Psychotherapist offered support, feedback and validation and reinforced use of skills Treatment modalities used include Motivational Interviewing Dialectical Behavioral Therapy Interpersonal facilitated discussion about sexual health and wellness and explored comfort with sexual communication  Support, Feedback, and Education    Patient Response:   Patient responded to session by listening and accepting support  Possible barriers to participation / learning include: N/A    Current Mental Status Exam:   Appearance:  Appropriate   Eye Contact:  Good   Attitude / Demeanor: Cooperative   Speech      Rate / Production: Normal/ Responsive      Volume:  Normal  volume  Orientation:  All  Mood:   Normal  Affect:   Appropriate    Thought Content: Clear   Insight:   Good       Plan/Need for Future Services:  Return for therapy in 1 weeks to treat diagnosed problems.    Patient has a current master individualized treatment plan.  See Epic treatment plan for more information.    Referral / Collaboration:  Referral to another professional/service is not indicated at this time..  Emergency Services Needed?  No    Assignment:  Return in one week     Interactive Complexity:  There are four specific communication difficulties that complicate the work of the primary psychiatric procedure.  Interactive complexity (+25248) may be reported when at least one of these difficulties is present.    Communication difficulties present during current the psychiatric procedure include:  None.      Signature/Title:    Vance Watson, LMFT, [facility]

## 2023-12-19 ENCOUNTER — OFFICE VISIT (OUTPATIENT)
Dept: PSYCHOLOGY | Facility: CLINIC | Age: 33
End: 2023-12-19
Payer: COMMERCIAL

## 2023-12-19 DIAGNOSIS — F41.1 GENERALIZED ANXIETY DISORDER: ICD-10-CM

## 2023-12-19 DIAGNOSIS — N52.1 ERECTILE DYSFUNCTION DUE TO DISEASES CLASSIFIED ELSEWHERE: ICD-10-CM

## 2023-12-19 DIAGNOSIS — F90.2 ATTENTION DEFICIT HYPERACTIVITY DISORDER (ADHD), COMBINED TYPE: ICD-10-CM

## 2023-12-19 DIAGNOSIS — F91.8 CONDUCT DISORDER, UNDIFFERENTIATED TYPE: ICD-10-CM

## 2023-12-19 DIAGNOSIS — F33.2 MDD (MAJOR DEPRESSIVE DISORDER), RECURRENT SEVERE, WITHOUT PSYCHOSIS (H): Primary | ICD-10-CM

## 2023-12-19 PROCEDURE — 90853 GROUP PSYCHOTHERAPY: CPT | Performed by: MARRIAGE & FAMILY THERAPIST

## 2023-12-20 NOTE — GROUP NOTE
[facility]  [address], [address]  [place]  [address]    Group Progress Note  [facility] - Progress Note    Date of Service: 23   Name: Xavier Landon  : 1990  Medical Record Number: [id number]  START TIME:  6:30 PM  END TIME:  8:30 PM  FACILITATOR(S): Deborah Watson LMFT, [facility]  TOPIC: [facility] Group Therapy  Type of Session: Group  :  Number of Attendees:  5  Number of Minutes:  /120    SERVICE MODALITY:  In-person      DSM-5 Diagnoses:  296.33 (F33.2) Major Depressive Disorder, Recurrent Episode, Severe _ and With anxious distress  300.02 (F41.1) Generalized Anxiety Disorder.  Attention-Deficit/Hyperactivity Disorder  314.01 (F90.9) Unspecified Attention -Deficit / Hyperactivity Disorder.  Substance-Related & Addictive Disorders Alcohol Use Disorder   303.90 (F10.20) Moderate   301.83 (F60.3) Borderline Personality Disorder    Current Reported Symptoms and Status update:  Changes since last session-has been struggling to keep up w self care and time boundaries  Struggling with compulsive sexual behavior  Struggling with emotional regulation    Progress Toward Treatment Goals:   Satisfactory progress     Therapeutic Interventions/Treatment Strategies:    Area(s) of treatment focus addressed in this session included Symptom Management, Interpersonal Relationship Skills, and Sexual Health and Wellness    Patient checked in about their mental health symptoms, healthy coping skills used over the week, negative coping skills used over the week, what sexual behaviors they engaged in-fantasy, masturbation, sex, their comfort level with their behaviors, if there were triggers, urges to violate boundaries, and violations of boundaries.  They took time to process about not prioritizing self they provided support and feedback to others in the group.    Psychotherapist offered support, feedback and validation and reinforced use of skills  Treatment modalities used include [facility] THERAPY INTERVENTIONS: Motivational Interviewing Dialectical Behavioral Therapy Group Dynamic Therapy  Interpersonal Behavioral Activation: Explored how behaviors effect mood and interact with thoughts and feelings and Encouraged strategies to reduce individual procrastination and increase motivation by increasing goal-directed activities to enhance mood and reduce symptoms. and Coping Skills: Assisted patient in understanding the purpose of planning / creating / participating / sharing in positive experiences and Facilitated understanding of  what factors may contribute to symptom relapse and skills plan to manage symptom relapse   Support, Feedback, and Structured Activity    Patient Response:   Patient responded to session by accepting feedback, giving feedback, and listening  Possible barriers to participation / learning include: N/A    Current Mental Status Exam:   Appearance:  Appropriate   Eye Contact:  Good   Attitude / Demeanor: Cooperative   Speech      Rate / Production: Normal/ Responsive      Volume:  Normal  volume  Orientation:  All  Mood:   Normal  Affect:   Appropriate   Thought Content: Clear   Insight:   Good       Plan/Need for Future Services:  Return for therapy in 1 weeks to treat diagnosed problems.    Patient has a current master individualized treatment plan.  See Epic treatment plan for more information.    Referral / Collaboration:  Referral to another professional/service is not indicated at this time..  Emergency Services Needed?  No    Assignment:  Return in one week     Interactive Complexity:  There are four specific communication difficulties that complicate the work of the primary psychiatric procedure.  Interactive complexity (+58839) may be reported when at least one of these difficulties is present.    Communication difficulties present during current the psychiatric procedure include:  None.      Signature/Title:    Vance Watson,  LMFT, LADC

## 2023-12-21 NOTE — GROUP NOTE
Process Group Note    PATIENT'S NAME: Xavier Landon  MRN:   [id number]  :   1990  ACCT. NUMBER: [id number]  DATE OF SERVICE: 21  START TIME: 10:00 AM  END TIME: 10:50 AM  FACILITATOR: Deborah Watson LMFT  TOPIC:  Process Group    Diagnoses:  296.33 (F33.2) Major Depressive Disorder, Recurrent Episode, Severe _ and With anxious distress  300.02 (F41.1) Generalized Anxiety Disorder.  4. Other Diagnoses that is relevant to services:   Attention-Deficit/Hyperactivity Disorder  314.01 (F90.9) Unspecified Attention -Deficit / Hyperactivity Disorder.  5. Provisional Diagnosis: R/O Substance-Related & Addictive Disorders Alcohol Use Disorder   303.90 (F10.20) [facility] Adult Dual Diagnosis Day Treatment  TRACK: 2    NUMBER OF PARTICIPANTS: 2    Telemedicine Visit: The patient's condition can be safely assessed and treated via synchronous audio and visual telemedicine encounter.      Reason for Telemedicine Visit: Services only offered telehealth    Originating Site (Patient Location): Patient's home    Distant Site (Provider Location): Provider Remote Setting    Consent:  The patient/guardian has verbally consented to: the potential risks and benefits of telemedicine (video visit) versus in person care; bill my insurance or make self-payment for services provided; and responsibility for payment of non-covered services.     Mode of Communication:  Video Conference via [employer]    As the provider I attest to compliance with applicable laws and regulations related to telemedicine.           Data:    Session content: At the start of this group, patients were invited to check in by identifying themselves, describing their current emotional status, and identifying issues to address in this group.   Area(s) of treatment focus addressed in this session included Symptom Management, Personal Safety and Abstinence/Relapse Prevention.    Huey is feeling relieved-trepidation until  his rx is filled, he met with his provider this morning, is getting rx today and cleaning up today-finishing up employer paperwork, is feeling motivated and will have more focus when he takes his rx, called a few people for therapy referrals-is struggling with call backs, reports passive si today-can follow plan, denies chemical use, is taking rx as prescribed, is proud that he has been taking action and following up with providers-finding new individual therapist-feeling hurt that therapist hasnt followed up with him    Therapeutic Interventions/Treatment Strategies:  Psychotherapist offered support, feedback and validation and reinforced use of skills. Treatment modalities used include Cognitive Behavioral Therapy. Interventions include Behavioral Activation: Explored how behaviors effect mood and interact with thoughts and feelings and Coping Skills: Facilitated understanding of  what factors may contribute to symptom relapse and skills plan to manage symptom relapse .    Assessment:    Patient response:   Patient responded to session by listening, being attentive and appearing alert    Possible barriers to participation / learning include: and no barriers identified    Health Issues:   None reported       Substance Use Review:   Substance Use: Last use: 1/20    Mental Status/Behavioral Observations  Appearance:   Appropriate   Eye Contact:   Good   Psychomotor Behavior: Normal   Attitude:   Cooperative   Orientation:   All  Speech   Rate / Production: Normal    Volume:  Normal   Mood:    Anxious   Affect:    Constricted   Thought Content:   Clear and Safety reports  presence of suicidal ideation passive suicidal ideation   Thought Form:  Coherent  Logical     Insight:    Good     Plan:     Safety Plan: Committed to safety and agreed to follow previously developed safety coping plan.      Barriers to treatment: None identified    Patient Contracts (see media tab):  None    Substance Use: Provided encouragement  towards sobriety    Provided support and affirmation for steps taken towards sobriety      Continue or Discharge: Patient will continue in Adult Dual Disorder Program (DDP) as planned. Patient is likely to benefit from learning and using skills as they work toward the goals identified in their treatment plan.      Vance Watson, LMFT  January 26, 2021   Relaxed

## 2023-12-22 ENCOUNTER — OFFICE VISIT (OUTPATIENT)
Dept: PSYCHOLOGY | Facility: CLINIC | Age: 33
End: 2023-12-22
Payer: COMMERCIAL

## 2023-12-22 DIAGNOSIS — F33.2 MDD (MAJOR DEPRESSIVE DISORDER), RECURRENT SEVERE, WITHOUT PSYCHOSIS (H): Primary | ICD-10-CM

## 2023-12-22 DIAGNOSIS — F41.1 GENERALIZED ANXIETY DISORDER: ICD-10-CM

## 2023-12-22 DIAGNOSIS — F91.8 CONDUCT DISORDER, UNDIFFERENTIATED TYPE: ICD-10-CM

## 2023-12-22 DIAGNOSIS — F90.2 ATTENTION DEFICIT HYPERACTIVITY DISORDER (ADHD), COMBINED TYPE: ICD-10-CM

## 2023-12-22 PROCEDURE — 90837 PSYTX W PT 60 MINUTES: CPT | Performed by: MARRIAGE & FAMILY THERAPIST

## 2023-12-22 NOTE — PROGRESS NOTES
[facility] - Progress Note    Date of Service: 23   Name: Xavier Landon  : 1990  Medical Record Number: [id number]  Treating Provider: VIKTOR Anderson LADC  Type of Session: individual   Present in Session: pt  Session Start and Stop Time: 9106-4408  Number of Minutes:  59    SERVICE MODALITY:  In-person    DSM-5 Diagnoses:  296.33 (F33.2) Major Depressive Disorder, Recurrent Episode, Severe _ and With anxious distress  300.02 (F41.1) Generalized Anxiety Disorder.  Attention-Deficit/Hyperactivity Disorder  314.01 (F90.9) Unspecified Attention -Deficit / Hyperactivity Disorder.  Substance-Related & Addictive Disorders Alcohol Use Disorder   303.90 (F10.20) Moderate   301.83 (F60.3) Borderline Personality Disorder    Current Reported Symptoms and Status update:  Changes since last session-struggling w discipline   Struggling with compulsive sexual behavior  Struggling with emotional regulation    Progress Toward Treatment Goals:   Satisfactory progress     Therapeutic Interventions/Treatment Strategies:    Area(s) of treatment focus addressed in this session included Symptom Management, Interpersonal Relationship Skills, and Sexual Health and Wellness    Psychotherapist offered support, feedback and validation and reinforced use of skillsc reated plan for discipline explored continued goal setting   Support, Feedback, Structured Activity, and Problem Solving    Patient Response:   Patient responded to session by listening, accepting support, and actively engaged  Possible barriers to participation / learning include: N/A    Current Mental Status Exam:   Appearance:  Appropriate   Eye Contact:  Good   Attitude / Demeanor: Cooperative   Speech      Rate / Production: Normal/ Responsive      Volume:  Normal  volume  Orientation:  All  Mood:   Anxious  Normal  Affect:   Appropriate   Thought Content: Clear   Insight:   Good       Plan/Need for Future  Services:  Return for therapy in 1 weeks to treat diagnosed problems.    Patient has a current master individualized treatment plan.  See Epic treatment plan for more information.    Referral / Collaboration:  Referral to another professional/service is not indicated at this time..  Emergency Services Needed?  No    Assignment:  Retur I one week discipline sched     Interactive Complexity:  There are four specific communication difficulties that complicate the work of the primary psychiatric procedure.  Interactive complexity (+79095) may be reported when at least one of these difficulties is present.    Communication difficulties present during current the psychiatric procedure include:  None.      Signature/Title:    Vance Watson, LMFT, [facility]

## 2023-12-26 ENCOUNTER — MYC MEDICAL ADVICE (OUTPATIENT)
Dept: FAMILY MEDICINE | Facility: CLINIC | Age: 33
End: 2023-12-26
Payer: COMMERCIAL

## 2023-12-27 NOTE — CONFIDENTIAL NOTE
Called pt - no answer. Could not leave VM due to voicemail box being full.    Sent [employer] message to call [phone] if still interested in [facility].    Hoda Gonzalez RN  [facility]

## 2023-12-27 NOTE — TELEPHONE ENCOUNTER
Routing to treatment hub.  Writer replied to patient via [employer].    SHIRLEY FunesN, RN  [facility]

## 2023-12-29 ENCOUNTER — OFFICE VISIT (OUTPATIENT)
Dept: PSYCHOLOGY | Facility: CLINIC | Age: 33
End: 2023-12-29
Payer: COMMERCIAL

## 2023-12-29 DIAGNOSIS — F90.2 ATTENTION DEFICIT HYPERACTIVITY DISORDER (ADHD), COMBINED TYPE: ICD-10-CM

## 2023-12-29 DIAGNOSIS — N52.1 ERECTILE DYSFUNCTION DUE TO DISEASES CLASSIFIED ELSEWHERE: ICD-10-CM

## 2023-12-29 DIAGNOSIS — F41.1 GENERALIZED ANXIETY DISORDER: ICD-10-CM

## 2023-12-29 DIAGNOSIS — F91.8 CONDUCT DISORDER, UNDIFFERENTIATED TYPE: ICD-10-CM

## 2023-12-29 DIAGNOSIS — F33.2 MDD (MAJOR DEPRESSIVE DISORDER), RECURRENT SEVERE, WITHOUT PSYCHOSIS (H): Primary | ICD-10-CM

## 2023-12-29 PROCEDURE — 90837 PSYTX W PT 60 MINUTES: CPT | Performed by: MARRIAGE & FAMILY THERAPIST

## 2023-12-29 NOTE — PROGRESS NOTES
[facility] - Progress Note    Date of Service: 23   Name: Xavier Landon  : 1990  Medical Record Number: [id number]  Treating Provider: VIKTOR Anderson, JESSI  Type of Session: Individual  Present in Session: pt  Session Start and Stop Time: 6413-9651  Number of Minutes:  59    SERVICE MODALITY:  In-person    DSM-5 Diagnoses:  296.33 (F33.2) Major Depressive Disorder, Recurrent Episode, Severe _ and With anxious distress  300.02 (F41.1) Generalized Anxiety Disorder.  Attention-Deficit/Hyperactivity Disorder  314.01 (F90.9) Unspecified Attention -Deficit / Hyperactivity Disorder.  Substance-Related & Addictive Disorders Alcohol Use Disorder   303.90 (F10.20) Moderate   301.83 (F60.3) Borderline Personality Disorder    Current Reported Symptoms and Status update:  Changes since last session-was sick this week  Struggling with compulsive sexual behavior  Struggling with emotional regulation    Progress Toward Treatment Goals:   Satisfactory progress     Therapeutic Interventions/Treatment Strategies:    Area(s) of treatment focus addressed in this session included Symptom Management, Interpersonal Relationship Skills, and Sexual Health and Wellness      Psychotherapist offered support, feedback and validation and reinforced use of skills Treatment modalities used include Motivational Interviewing Dialectical Behavioral Therapy Interpersonal Relationship Skills: Assisted patients in implementing more effective communication skills in their relationships, Encouraged development and maintenance  of healthy boundaries, and Discussed strategies to promote healthier understanding of interpersonal relationships  Support, Feedback, Structured Activity, and Problem Solving    Patient Response:   Patient responded to session by accepting feedback, listening, accepting support, verbalizing understanding, and actively engaged  Possible barriers to participation / learning  include: N/A    Current Mental Status Exam:   Appearance:  Appropriate   Eye Contact:  Good   Attitude / Demeanor: Cooperative   Speech      Rate / Production: Normal/ Responsive      Volume:  Normal  volume  Orientation:  All  Mood:   Normal  Affect:   Appropriate   Thought Content: Clear   Insight:   Good       Plan/Need for Future Services:  Return for therapy in 1 weeks to treat diagnosed problems.    Patient has a current master individualized treatment plan.  See Epic treatment plan for more information.    Referral / Collaboration:  Referral to another professional/service is not indicated at this time..  Emergency Services Needed?  No    Assignment:  Keeping a sched    Interactive Complexity:  There are four specific communication difficulties that complicate the work of the primary psychiatric procedure.  Interactive complexity (+48487) may be reported when at least one of these difficulties is present.    Communication difficulties present during current the psychiatric procedure include:  None.      Signature/Title:    VIKTOR Anderson, [facility]

## 2024-01-02 ENCOUNTER — OFFICE VISIT (OUTPATIENT)
Dept: PSYCHOLOGY | Facility: CLINIC | Age: 34
End: 2024-01-02
Payer: COMMERCIAL

## 2024-01-02 DIAGNOSIS — F91.8 CONDUCT DISORDER, UNDIFFERENTIATED TYPE: ICD-10-CM

## 2024-01-02 DIAGNOSIS — F33.2 MDD (MAJOR DEPRESSIVE DISORDER), RECURRENT SEVERE, WITHOUT PSYCHOSIS (H): Primary | ICD-10-CM

## 2024-01-02 DIAGNOSIS — F41.1 GENERALIZED ANXIETY DISORDER: ICD-10-CM

## 2024-01-02 DIAGNOSIS — N52.1 ERECTILE DYSFUNCTION DUE TO DISEASES CLASSIFIED ELSEWHERE: ICD-10-CM

## 2024-01-02 DIAGNOSIS — F90.2 ATTENTION DEFICIT HYPERACTIVITY DISORDER (ADHD), COMBINED TYPE: ICD-10-CM

## 2024-01-02 PROCEDURE — 90853 GROUP PSYCHOTHERAPY: CPT | Performed by: MARRIAGE & FAMILY THERAPIST

## 2024-01-03 NOTE — GROUP NOTE
[facility]  [address], [address]  [place]  [address]    Group Progress Note  [facility] - Progress Note    Date of Service: 24   Name: Xavier Landon  : 1990  Medical Record Number: [id number]  START TIME:  6:30 PM  END TIME:  8:30 PM  FACILITATOR(S): Deborah Watson LMFT, [facility]  TOPIC: [facility] Group Therapy  Type of Session: Group  :  Number of Attendees:  6  Number of Minutes:  /120    SERVICE MODALITY:  In-person      DSM-5 Diagnoses:  296.33 (F33.2) Major Depressive Disorder, Recurrent Episode, Severe _ and With anxious distress  300.02 (F41.1) Generalized Anxiety Disorder.  Attention-Deficit/Hyperactivity Disorder  314.01 (F90.9) Unspecified Attention -Deficit / Hyperactivity Disorder.  Substance-Related & Addictive Disorders Alcohol Use Disorder   303.90 (F10.20) Moderate   301.83 (F60.3) Borderline Personality Disorder    Current Reported Symptoms and Status update:  Changes since last session-went on date, increased disconnection feeling  Struggling with compulsive sexual behavior  Struggling with emotional regulation    Progress Toward Treatment Goals:   Satisfactory progress     Therapeutic Interventions/Treatment Strategies:    Area(s) of treatment focus addressed in this session included Symptom Management and Interpersonal Relationship Skills    Patient checked in about their mental health symptoms, healthy coping skills used over the week, negative coping skills used over the week, what sexual behaviors they engaged in-fantasy, masturbation, sex, their comfort level with their behaviors, if there were triggers, urges to violate boundaries, and violations of boundaries.  They took time to process about dissatisfaction w relationships they provided support and feedback to others in the group.    Psychotherapist offered support, feedback and validation and reinforced use of skills Treatment modalities used include [facility]  THERAPY INTERVENTIONS: Motivational Interviewing Group Dynamic Therapy  Interpersonal Behavioral Activation: Reinforced benefits/challenges of change process through applying skills to replace unwanted behaviors and Encouraged strategies to reduce individual procrastination and increase motivation by increasing goal-directed activities to enhance mood and reduce symptoms. and Cognitive Restructuring:  Explored impact of ineffective thoughts / distortions on mood and activity  Support, Feedback, and Structured Activity    Patient Response:   Patient responded to session by accepting feedback, giving feedback, listening, and accepting support  Possible barriers to participation / learning include: N/A    Current Mental Status Exam:   Appearance:  Appropriate   Eye Contact:  Good   Attitude / Demeanor: Cooperative   Speech      Rate / Production: Normal/ Responsive      Volume:  Normal  volume  Orientation:  All  Mood:   Depressed  Irritable   Affect:   Appropriate   Thought Content: Clear   Insight:   Good       Plan/Need for Future Services:  Return for therapy in 1 weeks to treat diagnosed problems.    Patient has a current master individualized treatment plan.  See Epic treatment plan for more information.    Referral / Collaboration:  Referral to another professional/service is not indicated at this time..  Emergency Services Needed?  No    Assignment:  Return in one week     Interactive Complexity:  There are four specific communication difficulties that complicate the work of the primary psychiatric procedure.  Interactive complexity (+82309) may be reported when at least one of these difficulties is present.    Communication difficulties present during current the psychiatric procedure include:  None.      Signature/Title:    Vance Watson, LMFT, [facility]

## 2024-01-09 ENCOUNTER — OFFICE VISIT (OUTPATIENT)
Dept: PSYCHOLOGY | Facility: CLINIC | Age: 34
End: 2024-01-09
Payer: COMMERCIAL

## 2024-01-09 DIAGNOSIS — F91.8 CONDUCT DISORDER, UNDIFFERENTIATED TYPE: ICD-10-CM

## 2024-01-09 DIAGNOSIS — N52.1 ERECTILE DYSFUNCTION DUE TO DISEASES CLASSIFIED ELSEWHERE: ICD-10-CM

## 2024-01-09 DIAGNOSIS — F41.1 GENERALIZED ANXIETY DISORDER: ICD-10-CM

## 2024-01-09 DIAGNOSIS — F90.2 ATTENTION DEFICIT HYPERACTIVITY DISORDER (ADHD), COMBINED TYPE: ICD-10-CM

## 2024-01-09 DIAGNOSIS — F33.2 MDD (MAJOR DEPRESSIVE DISORDER), RECURRENT SEVERE, WITHOUT PSYCHOSIS (H): Primary | ICD-10-CM

## 2024-01-09 PROCEDURE — 90853 GROUP PSYCHOTHERAPY: CPT | Performed by: MARRIAGE & FAMILY THERAPIST

## 2024-01-10 NOTE — GROUP NOTE
[facility]  [address], [address]  [place]  [address]    Group Progress Note  [facility] - Progress Note    Date of Service: 24   Name: Xavier Landon  : 1990  Medical Record Number: [id number]  START TIME:  6:30 PM  END TIME:  8:30 PM  FACILITATOR(S): Deborah Watson LMFT, [facility]  TOPIC: [facility] Group Therapy  Type of Session: Group  :  Number of Attendees:  6  Number of Minutes:  /120    SERVICE MODALITY:  In-person      DSM-5 Diagnoses:  296.33 (F33.2) Major Depressive Disorder, Recurrent Episode, Severe _ and With anxious distress  300.02 (F41.1) Generalized Anxiety Disorder.  Attention-Deficit/Hyperactivity Disorder  314.01 (F90.9) Unspecified Attention -Deficit / Hyperactivity Disorder.  Substance-Related & Addictive Disorders Alcohol Use Disorder   303.90 (F10.20) Moderate   301.83 (F60.3) Borderline Personality Disorder    Current Reported Symptoms and Status update:  Changes since last session-noticing relationship w thc more  Struggling with compulsive sexual behavior  Struggling with emotional regulation    Progress Toward Treatment Goals:   Satisfactory progress     Therapeutic Interventions/Treatment Strategies:    Area(s) of treatment focus addressed in this session included Symptom Management and [place] Maintenance/Relapse Prevention    Patient checked in about their mental health symptoms, healthy coping skills used over the week, negative coping skills used over the week, what sexual behaviors they engaged in-fantasy, masturbation, sex, their comfort level with their behaviors, if there were triggers, urges to violate boundaries, and violations of boundaries.  They took time to process about relationship w thc they provided support and feedback to others in the group.    Psychotherapist offered support, feedback and validation and reinforced use of skills Treatment modalities used include [facility] THERAPY INTERVENTIONS:  Motivational Interviewing Group Dynamic Therapy  Interpersonal Relapse Prevention: Assisted patient in identifying personal vulnerabilities, thoughts, emotions, and situations that may lead to relapse   Support, Feedback, and Structured Activity    Patient Response:   Patient responded to session by accepting feedback, giving feedback, and listening  Possible barriers to participation / learning include: N/A    Current Mental Status Exam:   Appearance:  Appropriate   Eye Contact:  Good   Attitude / Demeanor: Cooperative   Speech      Rate / Production: Normal/ Responsive      Volume:  Normal  volume  Orientation:  All  Mood:   Normal  Affect:   Appropriate   Thought Content: Clear   Insight:   Good       Plan/Need for Future Services:  Return for therapy in 1 weeks to treat diagnosed problems.    Patient has a current master individualized treatment plan.  See Epic treatment plan for more information.    Referral / Collaboration:  Referral to another professional/service is not indicated at this time..  Emergency Services Needed?  No    Assignment:  Return in one week     Interactive Complexity:  There are four specific communication difficulties that complicate the work of the primary psychiatric procedure.  Interactive complexity (+96577) may be reported when at least one of these difficulties is present.    Communication difficulties present during current the psychiatric procedure include:  None.      Signature/Title:    VIKTOR Anderson, [facility]

## 2024-01-12 ENCOUNTER — OFFICE VISIT (OUTPATIENT)
Dept: PSYCHOLOGY | Facility: CLINIC | Age: 34
End: 2024-01-12
Payer: COMMERCIAL

## 2024-01-12 DIAGNOSIS — F41.1 GENERALIZED ANXIETY DISORDER: ICD-10-CM

## 2024-01-12 DIAGNOSIS — F33.2 MDD (MAJOR DEPRESSIVE DISORDER), RECURRENT SEVERE, WITHOUT PSYCHOSIS (H): Primary | ICD-10-CM

## 2024-01-12 DIAGNOSIS — F91.8 CONDUCT DISORDER, UNDIFFERENTIATED TYPE: ICD-10-CM

## 2024-01-12 DIAGNOSIS — N52.1 ERECTILE DYSFUNCTION DUE TO DISEASES CLASSIFIED ELSEWHERE: ICD-10-CM

## 2024-01-12 DIAGNOSIS — F90.2 ATTENTION DEFICIT HYPERACTIVITY DISORDER (ADHD), COMBINED TYPE: ICD-10-CM

## 2024-01-12 PROCEDURE — 90834 PSYTX W PT 45 MINUTES: CPT | Performed by: MARRIAGE & FAMILY THERAPIST

## 2024-01-12 NOTE — PROGRESS NOTES
[facility] - Progress Note    Date of Service: 24   Name: Xavier Landon  : 1990  Medical Record Number: [id number]  Treating Provider: VIKTOR Anderson, [facility]  Type of Session: Individual  Present in Session: pt  Session Start and Stop Time: 4317-2217  Number of Minutes:  52    SERVICE MODALITY:  In-person    DSM-5 Diagnoses:  296.33 (F33.2) Major Depressive Disorder, Recurrent Episode, Severe _ and With anxious distress  300.02 (F41.1) Generalized Anxiety Disorder.  Attention-Deficit/Hyperactivity Disorder  314.01 (F90.9) Unspecified Attention -Deficit / Hyperactivity Disorder.  Substance-Related & Addictive Disorders Alcohol Use Disorder   303.90 (F10.20) Moderate   301.83 (F60.3) Borderline Personality Disorder    Current Reported Symptoms and Status update:  Changes since last session-is working on discipline and consistency  Struggling with compulsive sexual behavior  Struggling with emotional regulation    Progress Toward Treatment Goals:   Satisfactory progress     Therapeutic Interventions/Treatment Strategies:    Area(s) of treatment focus addressed in this session included Symptom Management, [place] Maintenance/Relapse Prevention, Interpersonal Relationship Skills, and Sexual Health and Wellness      Psychotherapist offered support, feedback and validation and reinforced use of skills Treatment modalities used include Motivational Interviewing Solution Focused Therapy Sex Therapy Relationship Skills: Assisted patients in implementing more effective communication skills in their relationships and Discussed strategies to promote healthier understanding of interpersonal relationships  Support, Feedback, Structured Activity, and Problem Solving    Patient Response:   Patient responded to session by accepting feedback, listening, accepting support, verbalizing understanding, and actively engaged  Possible barriers to participation / learning include:  N/A    Current Mental Status Exam:   Appearance:  Appropriate   Eye Contact:  Good   Attitude / Demeanor: Cooperative   Speech      Rate / Production: Normal/ Responsive      Volume:  Normal  volume  Orientation:  All  Mood:   Irritable  Normal  Affect:   Appropriate   Thought Content: Clear   Insight:   Good       Plan/Need for Future Services:  Return for therapy in 1 weeks to treat diagnosed problems.    Patient has a current master individualized treatment plan.  See Epic treatment plan for more information.    Referral / Collaboration:  Referral to another professional/service is not indicated at this time..  Emergency Services Needed?  No    Assignment:  Follow through on skills for discipline     Interactive Complexity:  There are four specific communication difficulties that complicate the work of the primary psychiatric procedure.  Interactive complexity (+17230) may be reported when at least one of these difficulties is present.    Communication difficulties present during current the psychiatric procedure include:  None.      Signature/Title:    Vance Watson, LMFT, [facility]

## 2024-01-16 ENCOUNTER — OFFICE VISIT (OUTPATIENT)
Dept: PSYCHOLOGY | Facility: CLINIC | Age: 34
End: 2024-01-16
Payer: COMMERCIAL

## 2024-01-16 DIAGNOSIS — F33.2 MDD (MAJOR DEPRESSIVE DISORDER), RECURRENT SEVERE, WITHOUT PSYCHOSIS (H): Primary | ICD-10-CM

## 2024-01-16 DIAGNOSIS — F41.1 GENERALIZED ANXIETY DISORDER: ICD-10-CM

## 2024-01-16 DIAGNOSIS — F90.2 ATTENTION DEFICIT HYPERACTIVITY DISORDER (ADHD), COMBINED TYPE: ICD-10-CM

## 2024-01-16 DIAGNOSIS — F91.8 CONDUCT DISORDER, UNDIFFERENTIATED TYPE: ICD-10-CM

## 2024-01-16 DIAGNOSIS — N52.1 ERECTILE DYSFUNCTION DUE TO DISEASES CLASSIFIED ELSEWHERE: ICD-10-CM

## 2024-01-16 PROCEDURE — 90853 GROUP PSYCHOTHERAPY: CPT | Performed by: MARRIAGE & FAMILY THERAPIST

## 2024-01-17 NOTE — GROUP NOTE
[facility]  [address], [address]  [place]  [address]    Group Progress Note  [facility] - Progress Note    Date of Service: 24   Name: Xavier Landon  : 1990  Medical Record Number: [id number]  START TIME:  6:30 PM  END TIME:  8:30 PM  FACILITATOR(S): Deborah Watson LMFT, [facility]  TOPIC: [facility] Group Therapy  Type of Session: Group  :  Number of Attendees:  6  Number of Minutes:  /120    SERVICE MODALITY:  In-person      DSM-5 Diagnoses:  296.33 (F33.2) Major Depressive Disorder, Recurrent Episode, Severe _ and With anxious distress  300.02 (F41.1) Generalized Anxiety Disorder.  Attention-Deficit/Hyperactivity Disorder  314.01 (F90.9) Unspecified Attention -Deficit / Hyperactivity Disorder.  Substance-Related & Addictive Disorders Alcohol Use Disorder   303.90 (F10.20) Moderate   301.83 (F60.3) Borderline Personality Disorder    Current Reported Symptoms and Status update:  Changes since last session-feeling lonely  Struggling with compulsive sexual behavior  Struggling with emotional regulation    Progress Toward Treatment Goals:   Satisfactory progress     Therapeutic Interventions/Treatment Strategies:    Area(s) of treatment focus addressed in this session included Symptom Management, [place] Maintenance/Relapse Prevention, Interpersonal Relationship Skills, and Sexual Health and Wellness    Patient checked in about their mental health symptoms, healthy coping skills used over the week, negative coping skills used over the week, what sexual behaviors they engaged in-fantasy, masturbation, sex, their comfort level with their behaviors, if there were triggers, urges to violate boundaries, and violations of boundaries.  They took time to process about goals for  they provided support and feedback to others in the group.    Psychotherapist offered support, feedback and validation and reinforced use of skills Treatment modalities  used include [facility] THERAPY INTERVENTIONS: Motivational Interviewing Dialectical Behavioral Therapy Group Dynamic Therapy  Interpersonal Behavioral Activation: Encouraged strategies to reduce individual procrastination and increase motivation by increasing goal-directed activities to enhance mood and reduce symptoms. and Cognitive Restructuring:  Explored impact of ineffective thoughts / distortions on mood and activity  Support, Feedback, and Structured Activity    Patient Response:   Patient responded to session by accepting feedback, giving feedback, listening, verbalizing understanding, and actively engaged  Possible barriers to participation / learning include: N/A    Current Mental Status Exam:   Appearance:  Appropriate   Eye Contact:  Good   Attitude / Demeanor: Cooperative   Speech      Rate / Production: Normal/ Responsive      Volume:  Normal  volume  Orientation:  All  Mood:   Irritable   Affect:   Appropriate   Thought Content: Clear   Insight:   Good       Plan/Need for Future Services:  Return for therapy in 1 weeks to treat diagnosed problems.    Patient has a current master individualized treatment plan.  See Epic treatment plan for more information.    Referral / Collaboration:  Referral to another professional/service is not indicated at this time..  Emergency Services Needed?  No    Assignment:  Return in one week     Interactive Complexity:  There are four specific communication difficulties that complicate the work of the primary psychiatric procedure.  Interactive complexity (+55800) may be reported when at least one of these difficulties is present.    Communication difficulties present during current the psychiatric procedure include:  None.      Signature/Title:    Vance Watson, LMFT, [facility]

## 2024-01-18 DIAGNOSIS — F90.2 ATTENTION DEFICIT HYPERACTIVITY DISORDER (ADHD), COMBINED TYPE: ICD-10-CM

## 2024-01-19 ENCOUNTER — OFFICE VISIT (OUTPATIENT)
Dept: PSYCHOLOGY | Facility: CLINIC | Age: 34
End: 2024-01-19
Payer: COMMERCIAL

## 2024-01-19 DIAGNOSIS — F41.1 GENERALIZED ANXIETY DISORDER: ICD-10-CM

## 2024-01-19 DIAGNOSIS — F90.2 ATTENTION DEFICIT HYPERACTIVITY DISORDER (ADHD), COMBINED TYPE: ICD-10-CM

## 2024-01-19 DIAGNOSIS — N52.1 ERECTILE DYSFUNCTION DUE TO DISEASES CLASSIFIED ELSEWHERE: ICD-10-CM

## 2024-01-19 DIAGNOSIS — F91.8 CONDUCT DISORDER, UNDIFFERENTIATED TYPE: ICD-10-CM

## 2024-01-19 DIAGNOSIS — F19.90 SUBSTANCE USE DISORDER: ICD-10-CM

## 2024-01-19 DIAGNOSIS — F33.2 MDD (MAJOR DEPRESSIVE DISORDER), RECURRENT SEVERE, WITHOUT PSYCHOSIS (H): Primary | ICD-10-CM

## 2024-01-19 PROCEDURE — 90837 PSYTX W PT 60 MINUTES: CPT | Performed by: MARRIAGE & FAMILY THERAPIST

## 2024-01-19 RX ORDER — DEXTROAMPHETAMINE SACCHARATE, AMPHETAMINE ASPARTATE MONOHYDRATE, DEXTROAMPHETAMINE SULFATE AND AMPHETAMINE SULFATE 6.25; 6.25; 6.25; 6.25 MG/1; MG/1; MG/1; MG/1
50 CAPSULE, EXTENDED RELEASE ORAL DAILY
Qty: 60 CAPSULE | Refills: 0 | Status: SHIPPED | OUTPATIENT
Start: 2024-01-19 | End: 2024-02-16

## 2024-01-19 NOTE — TELEPHONE ENCOUNTER
Date of Last Office Visit: 10/27/2023  [facility]     Carolyn Manzano, APRN CNP     Date of Next Office Visit: 4/26/2024   No shows since last visit: 0  Cancellations since last visit: 0  ------------------------------  Medication requested:  amphetamine-dextroamphetamine (ADDERALL XR) 25 MG 24 hr capsule  Date last ordered: 12/19/2023 Qty: 60 Refills: 0             Sig - Route: Take 2 capsules (50 mg) by mouth daily - Oral  Sent to pharmacy as: Amphetamine-Dextroamphet ER 25 MG Oral Capsule Extended Release 24 Hour (Adderall XR)  Class: E-Prescribe  ------------------------------     Lapse in medication adherence greater than 5 days?: no  If yes, call patient and gather details: -  Medication refill request verified as identical to current order?: yes       Last Visit Treatment Plan     1) PSYCHOTROPIC MEDICATIONS:  Medication: Continue on current medication regimen. Please check your blood pressure 2 times within this month and send them to carolyn. If your blood pressure is consistently over 140/90, please follow up with primary care.  Until carolyn receives your blood pressure readings, you will only have 1 month of refills on Adderall.   -buPROPion (WELLBUTRIN XL) 150 MG 24 hr tablet 1 tab qam  -naltrexone (DEPADE/REVIA) 50 MG tablet 1 po daily     2) THERAPY:  n/a     3) MONITORING:  none     4) REFERRALS:  none      5) RTC: 6 months        Refill decision: Refill pended and routed to the provider for review/determination due to the following criteria not met: controlled med    Medication unable to be refilled by RN due to criteria not met as indicated.                 []Eligibility - not seen in the last year              []Supervision - no future appointment              []Compliance - no shows, cancellations or lapse in therapy              []Verification - order discrepancy              [x]Controlled medication              []Medication not included in  policy              []90-day supply request              []Other:

## 2024-01-19 NOTE — PROGRESS NOTES
[facility] - Progress Note    Date of Service: 24   Name: Xavier Landon  : 1990  Medical Record Number: [id number]  Treating Provider: VIKTOR Anderson, [facility]  Type of Session: Individual  Present in Session: pt  Session Start and Stop Time: 0763-9351  Number of Minutes:  59    SERVICE MODALITY:  In-person    DSM-5 Diagnoses:  296.33 (F33.2) Major Depressive Disorder, Recurrent Episode, Severe _ and With anxious distress  300.02 (F41.1) Generalized Anxiety Disorder.  Attention-Deficit/Hyperactivity Disorder  314.01 (F90.9) Unspecified Attention -Deficit / Hyperactivity Disorder.  Substance-Related & Addictive Disorders Alcohol Use Disorder   303.90 (F10.20) Moderate   301.83 (F60.3) Borderline Personality Disorder    Current Reported Symptoms and Status update:  Changes since last session-low mood  Struggling with compulsive sexual behavior  Struggling with emotional regulation    Progress Toward Treatment Goals:   Satisfactory progress     Therapeutic Interventions/Treatment Strategies:    Area(s) of treatment focus addressed in this session included Symptom Management, [place] Maintenance/Relapse Prevention, Interpersonal Relationship Skills, and Sexual Health and Wellness    Psychotherapist offered support, feedback and validation and reinforced use of skills  Support    Patient Response:   Patient responded to session by accepting feedback  Possible barriers to participation / learning include: N/A    Current Mental Status Exam:   Appearance:  Appropriate   Eye Contact:  Good   Attitude / Demeanor: Cooperative   Speech      Rate / Production: Normal/ Responsive      Volume:  Normal  volume  Orientation:  All  Mood:   Irritable  Normal  Affect:   Appropriate   Thought Content: Clear   Insight:   Good       Plan/Need for Future Services:  Return for therapy in 1 weeks to treat diagnosed problems.    Patient has a current master individualized treatment  plan.  See Epic treatment plan for more information.    Referral / Collaboration:  Referral to another professional/service is not indicated at this time..  Emergency Services Needed?  No    Assignment:  Return in one week, discipline     Interactive Complexity:  There are four specific communication difficulties that complicate the work of the primary psychiatric procedure.  Interactive complexity (+61083) may be reported when at least one of these difficulties is present.    Communication difficulties present during current the psychiatric procedure include:  None.      Signature/Title:    VIKTOR Anderson, [facility]

## 2024-01-22 RX ORDER — DEXTROAMPHETAMINE SACCHARATE, AMPHETAMINE ASPARTATE, DEXTROAMPHETAMINE SULFATE AND AMPHETAMINE SULFATE 2.5; 2.5; 2.5; 2.5 MG/1; MG/1; MG/1; MG/1
10 TABLET ORAL DAILY PRN
Qty: 30 TABLET | Refills: 0 | Status: SHIPPED | OUTPATIENT
Start: 2024-01-22 | End: 2024-02-22

## 2024-01-22 NOTE — TELEPHONE ENCOUNTER
Medication requested: amphetamine-dextroamphetamine (ADDERALL) 10 MG tablet   Last refilled: 12/14/23  Qty: 30      Last seen: 10/27/23  RTC: 4/26/24  Cancel: 0  No-show: 0  Next appt: 4/26/24    Refill decision:   Refill pended and routed to the provider for review/determination due to   Controlled substance

## 2024-01-23 ENCOUNTER — VIRTUAL VISIT (OUTPATIENT)
Dept: PSYCHOLOGY | Facility: CLINIC | Age: 34
End: 2024-01-23
Payer: COMMERCIAL

## 2024-01-23 ENCOUNTER — OFFICE VISIT (OUTPATIENT)
Dept: PSYCHOLOGY | Facility: CLINIC | Age: 34
End: 2024-01-23
Payer: COMMERCIAL

## 2024-01-23 DIAGNOSIS — F33.2 MDD (MAJOR DEPRESSIVE DISORDER), RECURRENT SEVERE, WITHOUT PSYCHOSIS (H): Primary | ICD-10-CM

## 2024-01-23 DIAGNOSIS — F91.8 CONDUCT DISORDER, UNDIFFERENTIATED TYPE: ICD-10-CM

## 2024-01-23 DIAGNOSIS — F90.2 ATTENTION DEFICIT HYPERACTIVITY DISORDER (ADHD), COMBINED TYPE: ICD-10-CM

## 2024-01-23 DIAGNOSIS — F19.90 SUBSTANCE USE DISORDER: ICD-10-CM

## 2024-01-23 DIAGNOSIS — F41.1 GENERALIZED ANXIETY DISORDER: ICD-10-CM

## 2024-01-23 DIAGNOSIS — N52.1 ERECTILE DYSFUNCTION DUE TO DISEASES CLASSIFIED ELSEWHERE: ICD-10-CM

## 2024-01-23 PROCEDURE — 90853 GROUP PSYCHOTHERAPY: CPT | Performed by: MARRIAGE & FAMILY THERAPIST

## 2024-01-23 PROCEDURE — 90847 FAMILY PSYTX W/PT 50 MIN: CPT | Mod: 95 | Performed by: MARRIAGE & FAMILY THERAPIST

## 2024-01-23 NOTE — PROGRESS NOTES
[facility] - Progress Note    Date of Service: 24   Name: Xavier Landon  : 1990  Medical Record Number: [id number]  Treating Provider: VIKTOR Anderson, [facility]  Type of Session: Family with client present  Present in Session: mother and father and pt  Session Start and Stop Time: 258pm-347pm  Number of Minutes:  49    SERVICE MODALITY:  Video Visit:      Provider verified identity through the following two step process.  Patient provided:  Patient is known previously to provider    Telemedicine Visit: The patient's condition can be safely assessed and treated via synchronous audio and visual telemedicine encounter.      Reason for Telemedicine Visit: Services only offered telehealth    Originating Site (Patient Location):  parents    Distant Site (Provider Location): [facility]    Consent:  The patient/guardian has verbally consented to: the potential risks and benefits of telemedicine (video visit) versus in person care; bill my insurance or make self-payment for services provided; and responsibility for payment of non-covered services.     Patient would like the video invitation sent by:   zoom meetings    Mode of Communication:  Video Conference via Medical Zoom    Distant Location (Provider):  Off-site    As the provider I attest to compliance with applicable laws and regulations related to telemedicine.    DSM-5 Diagnoses:  296.33 (F33.2) Major Depressive Disorder, Recurrent Episode, Severe _ and With anxious distress  300.02 (F41.1) Generalized Anxiety Disorder.  Attention-Deficit/Hyperactivity Disorder  314.01 (F90.9) Unspecified Attention -Deficit / Hyperactivity Disorder.  Substance-Related & Addictive Disorders Alcohol Use Disorder   303.90 (F10.20) Moderate   301.83 (F60.3) Borderline Personality Disorder    Current Reported Symptoms and Status update:  Changes since last session-NA  Struggling with compulsive  sexual behavior  Struggling with emotional regulation    Progress Toward Treatment Goals:   Satisfactory progress     Therapeutic Interventions/Treatment Strategies:    Area(s) of treatment focus addressed in this session included Interpersonal Relationship Skills    Discussed family dynamics and communication styles    Psychotherapist offered support, feedback and validation and reinforced use of skills Treatment modalities used include Systemic Relational Therapy Interpersonal Relationship Skills: Assisted patients in implementing more effective communication skills in their relationships and Discussed strategies to promote healthier understanding of interpersonal relationships  Support, Structured Activity, Clarification, and Education    Patient Response:   Patient responded to session by listening, verbalizing understanding, and actively engaged  Possible barriers to participation / learning include: N/A    Current Mental Status Exam:   Appearance:  Appropriate   Eye Contact:  Good   Attitude / Demeanor: Cooperative   Speech      Rate / Production: Normal/ Responsive      Volume:  Normal  volume  Orientation:  All  Mood:   Normal  Affect:   Appropriate   Thought Content: Clear   Insight:   Good       Plan/Need for Future Services:  Return for therapy in 1 weeks to treat diagnosed problems.    Patient has a current master individualized treatment plan.  See Epic treatment plan for more information.    Referral / Collaboration:  Referral to another professional/service is not indicated at this time..  Emergency Services Needed?  No    Assignment:  Return in one week     Interactive Complexity:  There are four specific communication difficulties that complicate the work of the primary psychiatric procedure.  Interactive complexity (+57636) may be reported when at least one of these difficulties is present.    Communication difficulties present during current the psychiatric procedure  include:  None.      Signature/Title:    Vance Watson, LMFT, [facility]

## 2024-01-24 NOTE — GROUP NOTE
[facility]  [address], [address]  [place]  [address]    Group Progress Note  [facility] - Progress Note    Date of Service: 24   Name: Xavier Landon  : 1990  Medical Record Number: [id number]  START TIME:  6:30 PM  END TIME:  8:30 PM  FACILITATOR(S): Deborah Watson LMFT, [facility]  TOPIC: [facility] Group Therapy  Type of Session: Group  :  Number of Attendees:  3  Number of Minutes:  /120    SERVICE MODALITY:  In-person      DSM-5 Diagnoses:  296.33 (F33.2) Major Depressive Disorder, Recurrent Episode, Severe _ and With anxious distress  300.02 (F41.1) Generalized Anxiety Disorder.  Attention-Deficit/Hyperactivity Disorder  314.01 (F90.9) Unspecified Attention -Deficit / Hyperactivity Disorder.  Substance-Related & Addictive Disorders Alcohol Use Disorder   303.90 (F10.20) Moderate   301.83 (F60.3) Borderline Personality Disorder    Current Reported Symptoms and Status update:  Changes since last sessionStruggling with compulsive sexual behavior  Struggling with emotional regulation    Progress Toward Treatment Goals:   Satisfactory progress     Therapeutic Interventions/Treatment Strategies:  2  Area(s) of treatment focus addressed in this session included Symptom Management and [place] Maintenance/Relapse Prevention    Patient checked in about their mental health symptoms, healthy coping skills used over the week, negative coping skills used over the week, what sexual behaviors they engaged in-fantasy, masturbation, sex, their comfort level with their behaviors, if there were triggers, urges to violate boundaries, and violations of boundaries.  They took time to process about boundary violation and struggling w mood a a result they provided support and feedback to others in the group.    Psychotherapist offered support, feedback and validation Treatment modalities used include [facility] THERAPY INTERVENTIONS: Motivational Interviewing Group  Dynamic Therapy  Interpersonal Coping Skills: Facilitated discussion on learning and applying radical acceptance skill and Addressed barriers to utilizing coping skills when in distress  Support, Redirection, Feedback, and Structured Activity    Patient Response:   Patient responded to session by giving feedback and listening  Possible barriers to participation / learning include: N/A    Current Mental Status Exam:   Appearance:  Appropriate   Eye Contact:  Good   Attitude / Demeanor: Cooperative  Belligerent   Speech      Rate / Production: Emotional      Volume:  Loud  volume  Orientation:  All  Mood:   Irritable   Affect:   Constricted   Thought Content: Clear   Insight:   Good       Plan/Need for Future Services:  Return for therapy in 1 weeks to treat diagnosed problems.    Patient has a current master individualized treatment plan.  See Epic treatment plan for more information.    Referral / Collaboration:  Referral to another professional/service is not indicated at this time..  Emergency Services Needed?  No    Assignment:  Return in one week     Interactive Complexity:  There are four specific communication difficulties that complicate the work of the primary psychiatric procedure.  Interactive complexity (+69816) may be reported when at least one of these difficulties is present.    Communication difficulties present during current the psychiatric procedure include:  None.      Signature/Title:    Vance Watson, LMFT, [facility]

## 2024-01-26 ENCOUNTER — OFFICE VISIT (OUTPATIENT)
Dept: PSYCHOLOGY | Facility: CLINIC | Age: 34
End: 2024-01-26
Payer: COMMERCIAL

## 2024-01-26 DIAGNOSIS — F19.90 SUBSTANCE USE DISORDER: ICD-10-CM

## 2024-01-26 DIAGNOSIS — F90.2 ATTENTION DEFICIT HYPERACTIVITY DISORDER (ADHD), COMBINED TYPE: ICD-10-CM

## 2024-01-26 DIAGNOSIS — N52.1 ERECTILE DYSFUNCTION DUE TO DISEASES CLASSIFIED ELSEWHERE: ICD-10-CM

## 2024-01-26 DIAGNOSIS — F33.2 MDD (MAJOR DEPRESSIVE DISORDER), RECURRENT SEVERE, WITHOUT PSYCHOSIS (H): Primary | ICD-10-CM

## 2024-01-26 DIAGNOSIS — F91.8 CONDUCT DISORDER, UNDIFFERENTIATED TYPE: ICD-10-CM

## 2024-01-26 DIAGNOSIS — F41.1 GENERALIZED ANXIETY DISORDER: ICD-10-CM

## 2024-01-26 PROCEDURE — 90837 PSYTX W PT 60 MINUTES: CPT | Performed by: MARRIAGE & FAMILY THERAPIST

## 2024-01-26 NOTE — PROGRESS NOTES
[facility] - Progress Note    Date of Service: 24   Name: Xavier Landon  : 1990  Medical Record Number: [id number]  Treating Provider: VIKTOR Anderson, [facility]  Type of Session: Individual  Present in Session: pt  Session Start and Stop Time: 2037-4017  Number of Minutes:  58    SERVICE MODALITY:  In-person    DSM-5 Diagnoses:  296.33 (F33.2) Major Depressive Disorder, Recurrent Episode, Severe _ and With anxious distress  300.02 (F41.1) Generalized Anxiety Disorder.  Attention-Deficit/Hyperactivity Disorder  314.01 (F90.9) Unspecified Attention -Deficit / Hyperactivity Disorder.  Substance-Related & Addictive Disorders Alcohol Use Disorder   303.90 (F10.20) Moderate   301.83 (F60.3) Borderline Personality Disorder    Current Reported Symptoms and Status update:  Changes since last sessio-a little more regulated  nStruggling with compulsive sexual behavior  Struggling with emotional regulation    Progress Toward Treatment Goals:   Satisfactory progress     Therapeutic Interventions/Treatment Strategies:    Area(s) of treatment focus addressed in this session included Symptom Management, Interpersonal Relationship Skills, and Sexual Health and Wellness    Worked on smart goals     Psychotherapist offered support, feedback and validation and reinforced use of skills Treatment modalities used include Motivational Interviewing Solution Focused Therapy Interpersonal Behavioral Activation: Reinforced benefits/challenges of change process through applying skills to replace unwanted behaviors and Encouraged strategies to reduce individual procrastination and increase motivation by increasing goal-directed activities to enhance mood and reduce symptoms. and Coping Skills: Facilitated understanding of  what factors may contribute to symptom relapse and skills plan to manage symptom relapse   Support, Structured Activity, and Problem Solving    Patient Response:    Patient responded to session by listening, accepting support, and actively engaged  Possible barriers to participation / learning include: N/A    Current Mental Status Exam:   Appearance:  Appropriate   Eye Contact:  Good   Attitude / Demeanor: Cooperative   Speech      Rate / Production: Normal/ Responsive      Volume:  Normal  volume  Orientation:  All  Mood:   Normal  Affect:   Appropriate   Thought Content: Clear   Insight:   Good       Plan/Need for Future Services:  Return for therapy in 1 weeks to treat diagnosed problems.    Patient has a current master individualized treatment plan.  See Epic treatment plan for more information.    Referral / Collaboration:  Referral to another professional/service is not indicated at this time..  Emergency Services Needed?  No    Assignment:  Smart girnarsoft work     Interactive Complexity:  There are four specific communication difficulties that complicate the work of the primary psychiatric procedure.  Interactive complexity (+55322) may be reported when at least one of these difficulties is present.    Communication difficulties present during current the psychiatric procedure include:  None.      Signature/Title:    Vance Watson, LMFT, [facility]

## 2024-01-30 ENCOUNTER — OFFICE VISIT (OUTPATIENT)
Dept: PSYCHOLOGY | Facility: CLINIC | Age: 34
End: 2024-01-30
Payer: COMMERCIAL

## 2024-01-30 DIAGNOSIS — F91.8 CONDUCT DISORDER, UNDIFFERENTIATED TYPE: ICD-10-CM

## 2024-01-30 DIAGNOSIS — F33.2 MDD (MAJOR DEPRESSIVE DISORDER), RECURRENT SEVERE, WITHOUT PSYCHOSIS (H): Primary | ICD-10-CM

## 2024-01-30 DIAGNOSIS — F90.2 ATTENTION DEFICIT HYPERACTIVITY DISORDER (ADHD), COMBINED TYPE: ICD-10-CM

## 2024-01-30 DIAGNOSIS — F41.1 GENERALIZED ANXIETY DISORDER: ICD-10-CM

## 2024-01-30 DIAGNOSIS — N52.1 ERECTILE DYSFUNCTION DUE TO DISEASES CLASSIFIED ELSEWHERE: ICD-10-CM

## 2024-01-30 PROCEDURE — 90853 GROUP PSYCHOTHERAPY: CPT | Performed by: MARRIAGE & FAMILY THERAPIST

## 2024-01-31 NOTE — GROUP NOTE
[facility]  [address], [address]  [place]  [address]    Group Progress Note  [facility] - Progress Note    Date of Service: 24   Name: Xavier Landon  : 1990  Medical Record Number: [id number]  START TIME:  6:30 PM  END TIME:  8:30 PM  FACILITATOR(S): Deborah Watson LMFT, [facility]  TOPIC: [facility] Group Therapy  Type of Session: Group  :  Number of Attendees:  6  Number of Minutes:  /120    SERVICE MODALITY:  In-person      DSM-5 Diagnoses:  296.33 (F33.2) Major Depressive Disorder, Recurrent Episode, Severe _ and With anxious distress  300.02 (F41.1) Generalized Anxiety Disorder.  Attention-Deficit/Hyperactivity Disorder  314.01 (F90.9) Unspecified Attention -Deficit / Hyperactivity Disorder.  Substance-Related & Addictive Disorders Alcohol Use Disorder   303.90 (F10.20) Moderate   301.83 (F60.3) Borderline Personality Disorder    Current Reported Symptoms and Status update:  Changes since last session-isolation  Struggling with compulsive sexual behavior  Struggling with emotional regulation    Progress Toward Treatment Goals:   Satisfactory progress     Therapeutic Interventions/Treatment Strategies:    Area(s) of treatment focus addressed in this session included Symptom Management, [place] Maintenance/Relapse Prevention, and Interpersonal Relationship Skills    Patient checked in about their mental health symptoms, healthy coping skills used over the week, negative coping skills used over the week, what sexual behaviors they engaged in-fantasy, masturbation, sex, their comfort level with their behaviors, if there were triggers, urges to violate boundaries, and violations of boundaries.  They took time to process about creating smart goals with education they provided support and feedback to others in the group.      Psychotherapist offered support, feedback and validation and reinforced use of skills Treatment modalities used  include [facility] THERAPY INTERVENTIONS: Motivational Interviewing Group Dynamic Therapy  Interpersonal Behavioral Activation: Encouraged strategies to reduce individual procrastination and increase motivation by increasing goal-directed activities to enhance mood and reduce symptoms.  Support, Feedback, and Structured Activity    Patient Response:   Patient responded to session by accepting feedback, giving feedback, and listening  Possible barriers to participation / learning include: N/A    Current Mental Status Exam:   Appearance:  Appropriate   Eye Contact:  Good   Attitude / Demeanor: Cooperative   Speech      Rate / Production: Normal/ Responsive      Volume:  Normal  volume  Orientation:  All  Mood:   Depressed  Irritable   Affect:   Appropriate   Thought Content: Clear   Insight:   Good       Plan/Need for Future Services:  Return for therapy in 1 weeks to treat diagnosed problems.    Patient has a current master individualized treatment plan.  See Epic treatment plan for more information.    Referral / Collaboration:  Referral to another professional/service is not indicated at this time..  Emergency Services Needed?  No    Assignment:  Return I none week continue w smart goals     Interactive Complexity:  There are four specific communication difficulties that complicate the work of the primary psychiatric procedure.  Interactive complexity (+37307) may be reported when at least one of these difficulties is present.    Communication difficulties present during current the psychiatric procedure include:  None.      Signature/Title:    Vance Watson, LMFT, [facility]

## 2024-02-02 ENCOUNTER — OFFICE VISIT (OUTPATIENT)
Dept: PSYCHOLOGY | Facility: CLINIC | Age: 34
End: 2024-02-02
Payer: COMMERCIAL

## 2024-02-02 DIAGNOSIS — N52.1 ERECTILE DYSFUNCTION DUE TO DISEASES CLASSIFIED ELSEWHERE: ICD-10-CM

## 2024-02-02 DIAGNOSIS — F90.2 ATTENTION DEFICIT HYPERACTIVITY DISORDER (ADHD), COMBINED TYPE: ICD-10-CM

## 2024-02-02 DIAGNOSIS — F33.2 MDD (MAJOR DEPRESSIVE DISORDER), RECURRENT SEVERE, WITHOUT PSYCHOSIS (H): Primary | ICD-10-CM

## 2024-02-02 DIAGNOSIS — F91.8 CONDUCT DISORDER, UNDIFFERENTIATED TYPE: ICD-10-CM

## 2024-02-02 DIAGNOSIS — F41.1 GENERALIZED ANXIETY DISORDER: ICD-10-CM

## 2024-02-02 PROCEDURE — 90837 PSYTX W PT 60 MINUTES: CPT | Performed by: MARRIAGE & FAMILY THERAPIST

## 2024-02-02 NOTE — PROGRESS NOTES
[facility] - Progress Note    Date of Service: 24   Name: Xavier Landon  : 1990  Medical Record Number: [id number]  Treating Provider: VIKTOR Anderson, [facility]  Type of Session: Individual  Present in Session: pt  Session Start and Stop Time: 1139-3171  Number of Minutes:  59    SERVICE MODALITY:  In-person    DSM-5 Diagnoses:  296.33 (F33.2) Major Depressive Disorder, Recurrent Episode, Severe _ and With anxious distress  300.02 (F41.1) Generalized Anxiety Disorder.  Attention-Deficit/Hyperactivity Disorder  314.01 (F90.9) Unspecified Attention -Deficit / Hyperactivity Disorder.  Substance-Related & Addictive Disorders Alcohol Use Disorder   303.90 (F10.20) Moderate   301.83 (F60.3) Borderline Personality Disorder    Current Reported Symptoms and Status update:  Changes since last session-struggling w financials  Struggling with compulsive sexual behavior  Struggling with emotional regulation    Progress Toward Treatment Goals:   Satisfactory progress     Therapeutic Interventions/Treatment Strategies:    Area(s) of treatment focus addressed in this session included Symptom Management, [place] Maintenance/Relapse Prevention, and Interpersonal Relationship Skills      Psychotherapist offered support, feedback and validation and reinforced use of skills Treatment modalities used include Cognitive Behavioral Therapy Interpersonal Behavioral Activation: Reinforced benefits/challenges of change process through applying skills to replace unwanted behaviors, Explored how behaviors effect mood and interact with thoughts and feelings, and Encouraged strategies to reduce individual procrastination and increase motivation by increasing goal-directed activities to enhance mood and reduce symptoms. and Coping Skills: Assisted patient in understanding the purpose of planning / creating / participating / sharing in positive experiences  Support, Feedback, and Structured  Activity    Patient Response:   Patient responded to session by listening, focusing on goals, accepting support, verbalizing understanding, and actively engaged  Possible barriers to participation / learning include: N/A    Current Mental Status Exam:   Appearance:  Appropriate   Eye Contact:  Good   Attitude / Demeanor: Cooperative   Speech      Rate / Production: Normal/ Responsive      Volume:  Normal  volume  Orientation:  All  Mood:   Anxious  Normal  Affect:   Appropriate   Thought Content: Clear   Insight:   Good       Plan/Need for Future Services:  Return for therapy in 1 weeks to treat diagnosed problems.    Patient has a current master individualized treatment plan.  See Epic treatment plan for more information.    Referral / Collaboration:  Referral to another professional/service is not indicated at this time..  Emergency Services Needed?  No    Assignment:  Work on budget    Interactive Complexity:  There are four specific communication difficulties that complicate the work of the primary psychiatric procedure.  Interactive complexity (+56817) may be reported when at least one of these difficulties is present.    Communication difficulties present during current the psychiatric procedure include:  None.      Signature/Title:    Vance Watson, LMFT, [facility]

## 2024-02-16 ENCOUNTER — OFFICE VISIT (OUTPATIENT)
Dept: PSYCHOLOGY | Facility: CLINIC | Age: 34
End: 2024-02-16
Payer: COMMERCIAL

## 2024-02-16 DIAGNOSIS — F90.2 ATTENTION DEFICIT HYPERACTIVITY DISORDER (ADHD), COMBINED TYPE: ICD-10-CM

## 2024-02-16 DIAGNOSIS — F41.1 GENERALIZED ANXIETY DISORDER: ICD-10-CM

## 2024-02-16 DIAGNOSIS — N52.1 ERECTILE DYSFUNCTION DUE TO DISEASES CLASSIFIED ELSEWHERE: ICD-10-CM

## 2024-02-16 DIAGNOSIS — F33.2 MDD (MAJOR DEPRESSIVE DISORDER), RECURRENT SEVERE, WITHOUT PSYCHOSIS (H): Primary | ICD-10-CM

## 2024-02-16 DIAGNOSIS — F91.8 CONDUCT DISORDER, UNDIFFERENTIATED TYPE: ICD-10-CM

## 2024-02-16 PROCEDURE — 90837 PSYTX W PT 60 MINUTES: CPT | Performed by: MARRIAGE & FAMILY THERAPIST

## 2024-02-16 RX ORDER — DEXTROAMPHETAMINE SACCHARATE, AMPHETAMINE ASPARTATE MONOHYDRATE, DEXTROAMPHETAMINE SULFATE AND AMPHETAMINE SULFATE 6.25; 6.25; 6.25; 6.25 MG/1; MG/1; MG/1; MG/1
50 CAPSULE, EXTENDED RELEASE ORAL DAILY
Qty: 60 CAPSULE | Refills: 0 | Status: SHIPPED | OUTPATIENT
Start: 2024-02-16 | End: 2024-03-11

## 2024-02-16 NOTE — TELEPHONE ENCOUNTER
Medication requested: Amphetamine-Dextroamphet ER Oral Capsule Extended Release 24 Hour 25 MG   Last refilled: 1-19-24  Qty: 60      Last seen: 10-27-23  RTC: Your next appointment is scheduled on 4/26/2024 (Fri) at 9:30am  Cancel: 0  No-show: 0  Next appt: 4-26-24, 9 AM    Refill decision:   Controlled med - to clinic

## 2024-02-16 NOTE — PROGRESS NOTES
[facility] - Progress Note    Date of Service: 24   Name: Xavier Landon  : 1990  Medical Record Number: [id number]  Treating Provider: VIKTOR Anderson, [facility]  Type of Session: Individual  Present in Session: pt  Session Start and Stop Time: 5281-5795  Number of Minutes:  58    SERVICE MODALITY:  In-person    DSM-5 Diagnoses:  296.33 (F33.2) Major Depressive Disorder, Recurrent Episode, Severe _ and With anxious distress  300.02 (F41.1) Generalized Anxiety Disorder.  Attention-Deficit/Hyperactivity Disorder  314.01 (F90.9) Unspecified Attention -Deficit / Hyperactivity Disorder.  Substance-Related & Addictive Disorders Alcohol Use Disorder   303.90 (F10.20) Moderate   301.83 (F60.3) Borderline Personality Disorder    Current Reported Symptoms and Status update:  Changes since last session-increased depression  Struggling with compulsive sexual behavior  Struggling with emotional regulation    Progress Toward Treatment Goals:   Satisfactory progress     Therapeutic Interventions/Treatment Strategies:    Area(s) of treatment focus addressed in this session included Symptom Management    Discussed behavior chart to meet behaviors    Psychotherapist offered support, feedback and validation and reinforced use of skills Treatment modalities used include Motivational Interviewing Cognitive Behavioral Therapy Interpersonal Behavioral Activation: Reinforced benefits/challenges of change process through applying skills to replace unwanted behaviors and Encouraged strategies to reduce individual procrastination and increase motivation by increasing goal-directed activities to enhance mood and reduce symptoms.  Support and Structured Activity    Patient Response:   Patient responded to session by listening and actively engaged  Possible barriers to participation / learning include: N/A    Current Mental Status Exam:   Appearance:  Appropriate   Eye Contact:  Good    Attitude / Demeanor: Cooperative   Speech      Rate / Production: Normal/ Responsive      Volume:  Normal  volume  Orientation:  All  Mood:   Depressed   Affect:   Appropriate   Thought Content: Clear   Insight:   Good       Plan/Need for Future Services:  Return for therapy in 1 weeks to treat diagnosed problems.    Patient has a current master individualized treatment plan.  See Epic treatment plan for more information.    Referral / Collaboration:  Referral to another professional/service is not indicated at this time..  Emergency Services Needed?  No    Assignment:  Behavior chart for the week     Interactive Complexity:  There are four specific communication difficulties that complicate the work of the primary psychiatric procedure.  Interactive complexity (+22685) may be reported when at least one of these difficulties is present.    Communication difficulties present during current the psychiatric procedure include:  None.      Signature/Title:    Vance Watson, LMFT, [facility]

## 2024-02-16 NOTE — TELEPHONE ENCOUNTER
Per last visit note:  Continue on current medication regimen. Please check your blood pressure 2 times within this month and send them to carolyn. If your blood pressure is consistently over 140/90, please follow up with primary care.  Until carolyn receives your blood pressure readings, you will only have 1 month of refills on Adderall.

## 2024-02-20 ENCOUNTER — OFFICE VISIT (OUTPATIENT)
Dept: PSYCHOLOGY | Facility: CLINIC | Age: 34
End: 2024-02-20
Payer: COMMERCIAL

## 2024-02-20 DIAGNOSIS — F90.2 ATTENTION DEFICIT HYPERACTIVITY DISORDER (ADHD), COMBINED TYPE: ICD-10-CM

## 2024-02-20 DIAGNOSIS — N52.1 ERECTILE DYSFUNCTION DUE TO DISEASES CLASSIFIED ELSEWHERE: ICD-10-CM

## 2024-02-20 DIAGNOSIS — F41.1 GENERALIZED ANXIETY DISORDER: ICD-10-CM

## 2024-02-20 DIAGNOSIS — F19.90 SUBSTANCE USE DISORDER: ICD-10-CM

## 2024-02-20 DIAGNOSIS — F33.2 MDD (MAJOR DEPRESSIVE DISORDER), RECURRENT SEVERE, WITHOUT PSYCHOSIS (H): Primary | ICD-10-CM

## 2024-02-20 DIAGNOSIS — F91.8 CONDUCT DISORDER, UNDIFFERENTIATED TYPE: ICD-10-CM

## 2024-02-20 PROCEDURE — 90853 GROUP PSYCHOTHERAPY: CPT | Performed by: MARRIAGE & FAMILY THERAPIST

## 2024-02-20 NOTE — GROUP NOTE
[facility]  [address], [address]  [place]  [address]    Group Progress Note  [facility] - Progress Note    Date of Service: 23   Name: Xavier Landon  : 1990  Medical Record Number: [id number]  START TIME:  6:30 PM  END TIME:  8:30 PM  FACILITATOR(S): Deborah Watson LMFT, [facility]  TOPIC: [facility] Group Therapy  Type of Session: Group  :  Number of Attendees:  7  Number of Minutes:  /120    SERVICE MODALITY:  In-person      DSM-5 Diagnoses:  296.33 (F33.2) Major Depressive Disorder, Recurrent Episode, Severe _ and With anxious distress  300.02 (F41.1) Generalized Anxiety Disorder.  Attention-Deficit/Hyperactivity Disorder  314.01 (F90.9) Unspecified Attention -Deficit / Hyperactivity Disorder.  Substance-Related & Addictive Disorders Alcohol Use Disorder   303.90 (F10.20) Moderate   301.83 (F60.3) Borderline Personality Disorder    Current Reported Symptoms and Status update:  Changes since last session-has been able to see friend visiting from [place]  Struggling with compulsive sexual behavior  Struggling with emotional regulation    Progress Toward Treatment Goals:   Minimal progress     Therapeutic Interventions/Treatment Strategies:    Area(s) of treatment focus addressed in this session included Symptom Management and Interpersonal Relationship Skills    Patient checked in about their mental health symptoms, healthy coping skills used over the week, negative coping skills used over the week, what sexual behaviors they engaged in-fantasy, masturbation, sex, their comfort level with their behaviors, if there were triggers, urges to violate boundaries, and violations of boundaries.  They took time to process about confidence in talking to women they provided support and feedback to others in the group.    Psychotherapist offered support, feedback and validation and reinforced use of skills Treatment modalities used include [facility] THERAPY  see her in 6 months.      Chief Complaint     Follow-up regarding a few head and neck issues      History of Present Illness    I have been following this patient for many years. She does have known thyroid nodules and thyroiditis. She is hypothyroid for which she is on Synthroid. She had a TSH in July 2023 which was normal. She had a thyroid ultrasound that was done in March 2021 that showed no changes from the prior ultrasound. She also has chronic laryngitis from gastroesophageal reflux disease and for a while used omeprazole 40 mg twice a day.  She has had a bad cold around the holidays and continues to cough up some thicker secretions.  She still smokes more than 1 pack of cigarettes a day.    Physical Exam    On examination palpation of the neck and thyroid field shows some nodularity especially on the right side. There is no worrisome adenopathies. Examination of the oral cavity and oropharynx is within normal limits. There is no mucosal lesions. There is good mobility of the tongue and palate. There is good mandibular excursion.      A fiberoptic laryngoscopy was carried out. Under topical Xylocaine and Ochoa-Synephrine the scope was introduced through the nostril. The nasopharynx, base of tongue, hypopharynx, and larynx are visualized. Both cords are normally mobile. She does have significant puffiness and redness of the supraglottic larynx.  She also has some mucosal hyperplasia around the posterior commissure.  I do not see any suspicious lesion.   INTERVENTIONS: Motivational Interviewing Group Dynamic Therapy  Interpersonal Relationship Skills: Assisted patients in implementing more effective communication skills in their relationships and Discussed strategies to promote healthier understanding of interpersonal relationships  Support, Feedback, and Structured Activity    Patient Response:   Patient responded to session by accepting feedback, giving feedback, and listening  Possible barriers to participation / learning include: N/A    Current Mental Status Exam:   Appearance:  Appropriate   Eye Contact:  Good   Attitude / Demeanor: Cooperative   Speech      Rate / Production: Normal/ Responsive      Volume:  Normal  volume  Orientation:  All  Mood:   Normal  Affect:   Appropriate   Thought Content: Clear   Insight:   Good       Plan/Need for Future Services:  Return for therapy in 1 weeks to treat diagnosed problems.    Patient has a current master individualized treatment plan.  See Epic treatment plan for more information.    Referral / Collaboration:  Referral to another professional/service is not indicated at this time..  Emergency Services Needed?  No    Assignment:  Return in one week     Interactive Complexity:  There are four specific communication difficulties that complicate the work of the primary psychiatric procedure.  Interactive complexity (+28243) may be reported when at least one of these difficulties is present.    Communication difficulties present during current the psychiatric procedure include:  None.      Signature/Title:    Vance Watson, LMFT, [facility]

## 2024-02-21 NOTE — GROUP NOTE
[facility]  [address], [address]  [place]  [address]    Group Progress Note  [facility] - Progress Note    Date of Service: 24   Name: Xavier Landon  : 1990  Medical Record Number: [id number]  START TIME:  6:30 PM  END TIME:  8:30 PM  FACILITATOR(S): Deborah Watson LMFT, [facility]  TOPIC: [facility] Group Therapy  Type of Session: Group  :  Number of Attendees:  5  Number of Minutes:  /120    SERVICE MODALITY:  In-person      DSM-5 Diagnoses:  296.33 (F33.2) Major Depressive Disorder, Recurrent Episode, Severe _ and With anxious distress  300.02 (F41.1) Generalized Anxiety Disorder.  Attention-Deficit/Hyperactivity Disorder  314.01 (F90.9) Unspecified Attention -Deficit / Hyperactivity Disorder.  Substance-Related & Addictive Disorders Alcohol Use Disorder   303.90 (F10.20) Moderate   301.83 (F60.3) Borderline Personality Disorder    Current Reported Symptoms and Status update:  Changes since last session-struggling with getting off track   Struggling with compulsive sexual behavior  Struggling with emotional regulation    Progress Toward Treatment Goals:   Satisfactory progress     Therapeutic Interventions/Treatment Strategies:    Area(s) of treatment focus addressed in this session included Symptom Management    Patient checked in about their mental health symptoms, healthy coping skills used over the week, negative coping skills used over the week, what sexual behaviors they engaged in-fantasy, masturbation, sex, their comfort level with their behaviors, if there were triggers, urges to violate boundaries, and violations of boundaries.  They took time to process about increased depression symptoms and struggling to stay on track they provided support and feedback to others in the group.    Psychotherapist offered support, feedback and validation and reinforced use of skills Treatment modalities used include [facility] THERAPY INTERVENTIONS:  Motivational Interviewing Group Dynamic Therapy  Interpersonal Behavioral Activation: Reinforced benefits/challenges of change process through applying skills to replace unwanted behaviors and Encouraged strategies to reduce individual procrastination and increase motivation by increasing goal-directed activities to enhance mood and reduce symptoms.  Support, Feedback, and Structured Activity    Patient Response:   Patient responded to session by accepting feedback, giving feedback, and listening  Possible barriers to participation / learning include: N/A    Current Mental Status Exam:   Appearance:  Appropriate   Eye Contact:  Good   Attitude / Demeanor: Cooperative   Speech      Rate / Production: Normal/ Responsive Emotional      Volume:  Normal  volume  Orientation:  All  Mood:   Irritable   Affect:   Appropriate   Thought Content: Clear   Insight:   Good       Plan/Need for Future Services:  Return for therapy in 1 weeks to treat diagnosed problems.    Patient has a current master individualized treatment plan.  See Epic treatment plan for more information.    Referral / Collaboration:  Referral to another professional/service is not indicated at this time..  Emergency Services Needed?  No    Assignment:  Return in one week     Interactive Complexity:  There are four specific communication difficulties that complicate the work of the primary psychiatric procedure.  Interactive complexity (+66996) may be reported when at least one of these difficulties is present.    Communication difficulties present during current the psychiatric procedure include:  None.      Signature/Title:    Vance Watson, LMFT, [facility]

## 2024-02-22 DIAGNOSIS — F90.2 ATTENTION DEFICIT HYPERACTIVITY DISORDER (ADHD), COMBINED TYPE: ICD-10-CM

## 2024-02-22 RX ORDER — DEXTROAMPHETAMINE SACCHARATE, AMPHETAMINE ASPARTATE, DEXTROAMPHETAMINE SULFATE AND AMPHETAMINE SULFATE 2.5; 2.5; 2.5; 2.5 MG/1; MG/1; MG/1; MG/1
10 TABLET ORAL DAILY PRN
Qty: 30 TABLET | Refills: 0 | Status: SHIPPED | OUTPATIENT
Start: 2024-02-22 | End: 2024-02-28

## 2024-02-22 NOTE — TELEPHONE ENCOUNTER
Last Seen 10/27  RTC 6 months  Cancel None  No-Show None    Next Appt 4/26    Incoming Refill From [facility] via fax    Medication Requested  amphetamine-dextroamphetamine (ADDERALL) 10 MG tablet      Directions Take 1 tablet (10 mg) by mouth daily as needed (for breatkthrough symptoms in afternoon)     Qty 30    Last Refill Per [place]  1/23 #30, 12/19 #30, 11/14 #30      Medication pended and routed to provider for approval.

## 2024-02-22 NOTE — TELEPHONE ENCOUNTER
Writer called and updated patient that script script for Adderall 10 mg was sent to the [facility] pharmacy but unfortunately [facility] systems are down and they were unable to received this. Patient verbalized that they were fine waiting until tomorrow (2/23/24) to see if systems were back up and script can be sent then.

## 2024-02-23 ENCOUNTER — OFFICE VISIT (OUTPATIENT)
Dept: PSYCHOLOGY | Facility: CLINIC | Age: 34
End: 2024-02-23
Payer: COMMERCIAL

## 2024-02-23 DIAGNOSIS — F33.2 MDD (MAJOR DEPRESSIVE DISORDER), RECURRENT SEVERE, WITHOUT PSYCHOSIS (H): Primary | ICD-10-CM

## 2024-02-23 DIAGNOSIS — F19.90 SUBSTANCE USE DISORDER: ICD-10-CM

## 2024-02-23 DIAGNOSIS — N52.1 ERECTILE DYSFUNCTION DUE TO DISEASES CLASSIFIED ELSEWHERE: ICD-10-CM

## 2024-02-23 DIAGNOSIS — F91.8 CONDUCT DISORDER, UNDIFFERENTIATED TYPE: ICD-10-CM

## 2024-02-23 DIAGNOSIS — F41.1 GENERALIZED ANXIETY DISORDER: ICD-10-CM

## 2024-02-23 PROCEDURE — 90837 PSYTX W PT 60 MINUTES: CPT | Performed by: MARRIAGE & FAMILY THERAPIST

## 2024-02-23 NOTE — TELEPHONE ENCOUNTER
Writer called [facility] pharmacy and was informed that their systems are still currently down. Writer called patient to update them of this situation and to verify a plan to get a script for patient. There was no answer and writer left a voice message requesting a call back.

## 2024-02-23 NOTE — PROGRESS NOTES
[facility] - Progress Note    Date of Service: 24   Name: Xavier Landon  : 1990  Medical Record Number: [id number]  Treating Provider: VIKTOR Anderson LAD  Type of Session: Individual  Present in Session: pt  Session Start and Stop Time: 1527-4196  Number of Minutes:  54    SERVICE MODALITY:  In-person    DSM-5 Diagnoses:  296.33 (F33.2) Major Depressive Disorder, Recurrent Episode, Severe _ and With anxious distress  300.02 (F41.1) Generalized Anxiety Disorder.  Attention-Deficit/Hyperactivity Disorder  314.01 (F90.9) Unspecified Attention -Deficit / Hyperactivity Disorder.  Substance-Related & Addictive Disorders Alcohol Use Disorder   303.90 (F10.20) Moderate   301.83 (F60.3) Borderline Personality Disorder    Current Reported Symptoms and Status update:  Changes since last session-improved regulation w breathing and refocusing   Struggling with compulsive sexual behavior  Struggling with emotional regulation    Progress Toward Treatment Goals:   Satisfactory progress     Therapeutic Interventions/Treatment Strategies:    Area(s) of treatment focus addressed in this session included Symptom Management and Sexual Health and Wellness    Has been able to refocus on breath using skills     Psychotherapist offered support, feedback and validation and reinforced use of skills Treatment modalities used include Motivational Interviewing Dialectical Behavioral Therapy  Support, Feedback, and Problem Solving    Patient Response:   Patient responded to session by accepting feedback, listening, and accepting support  Possible barriers to participation / learning include: N/A    Current Mental Status Exam:   Appearance:  Appropriate   Eye Contact:  Good   Attitude / Demeanor: Cooperative   Speech      Rate / Production: Normal/ Responsive      Volume:  Normal  volume  Orientation:  All  Mood:   Euthymic  Affect:   Appropriate   Thought Content: Clear    Insight:   Good       Plan/Need for Future Services:  Return for therapy in 1 weeks to treat diagnosed problems.    Patient has a current master individualized treatment plan.  See Epic treatment plan for more information.    Referral / Collaboration:  Referral to another professional/service is not indicated at this time..  Emergency Services Needed?  No    Assignment:  Sched for week    Interactive Complexity:  There are four specific communication difficulties that complicate the work of the primary psychiatric procedure.  Interactive complexity (+77694) may be reported when at least one of these difficulties is present.    Communication difficulties present during current the psychiatric procedure include:  None.      Signature/Title:    Vance Watson, LMFT, [facility]

## 2024-02-27 ENCOUNTER — OFFICE VISIT (OUTPATIENT)
Dept: PSYCHOLOGY | Facility: CLINIC | Age: 34
End: 2024-02-27
Payer: COMMERCIAL

## 2024-02-27 DIAGNOSIS — N52.1 ERECTILE DYSFUNCTION DUE TO DISEASES CLASSIFIED ELSEWHERE: ICD-10-CM

## 2024-02-27 DIAGNOSIS — F90.2 ATTENTION DEFICIT HYPERACTIVITY DISORDER (ADHD), COMBINED TYPE: ICD-10-CM

## 2024-02-27 DIAGNOSIS — F33.2 MDD (MAJOR DEPRESSIVE DISORDER), RECURRENT SEVERE, WITHOUT PSYCHOSIS (H): Primary | ICD-10-CM

## 2024-02-27 DIAGNOSIS — F91.8 CONDUCT DISORDER, UNDIFFERENTIATED TYPE: ICD-10-CM

## 2024-02-27 DIAGNOSIS — F19.90 SUBSTANCE USE DISORDER: ICD-10-CM

## 2024-02-27 DIAGNOSIS — F41.1 GENERALIZED ANXIETY DISORDER: ICD-10-CM

## 2024-02-27 PROCEDURE — 90853 GROUP PSYCHOTHERAPY: CPT | Performed by: MARRIAGE & FAMILY THERAPIST

## 2024-02-28 ENCOUNTER — TELEPHONE (OUTPATIENT)
Dept: PSYCHIATRY | Facility: CLINIC | Age: 34
End: 2024-02-28

## 2024-02-28 DIAGNOSIS — F90.2 ATTENTION DEFICIT HYPERACTIVITY DISORDER (ADHD), COMBINED TYPE: ICD-10-CM

## 2024-02-28 RX ORDER — DEXTROAMPHETAMINE SACCHARATE, AMPHETAMINE ASPARTATE, DEXTROAMPHETAMINE SULFATE AND AMPHETAMINE SULFATE 2.5; 2.5; 2.5; 2.5 MG/1; MG/1; MG/1; MG/1
10 TABLET ORAL DAILY PRN
Qty: 30 TABLET | Refills: 0 | Status: SHIPPED | OUTPATIENT
Start: 2024-02-28 | End: 2024-04-10

## 2024-02-28 NOTE — TELEPHONE ENCOUNTER
[facility] Call Center    Phone Message    May a detailed message be left on voicemail: yes     Reason for Call: Other: Medication Refill / Hard copy of prescription order     Caller stated due to their pharmacy's fax machine or the clinic's fax machine not working at the moment, they would like a hard copy prescription of their Adderall 10mg as soon as possible. Caller stated they are completely out of this medication and would like a call back from RN.    [id number] [place], [place] - [address]    Action Taken: Message routed to:  Other: [facility] Nurse pool    Travel Screening: Not Applicable

## 2024-02-28 NOTE — TELEPHONE ENCOUNTER
Writer spoke to patient and he states he rather have the hard copy and he can  from our clinic and take to [facility] pharmacy .    Maria Eugenia Ambrosio on 2/28/2024 at 1:58 PM

## 2024-02-28 NOTE — TELEPHONE ENCOUNTER
Writer called patient let him know script is ready for  . Patient states he will  tomorrow around 830 am .    Maria Eugenia Ambrosio on 2/28/2024 at 3:52 PM

## 2024-02-28 NOTE — GROUP NOTE
[facility]  [address], [address]  [place]  [address]    Group Progress Note  [facility] - Progress Note    Date of Service: 24   Name: Xavier Landon  : 1990  Medical Record Number: [id number]  START TIME:  6:30 PM  END TIME:  8:30 PM  FACILITATOR(S): Deborah Watson LMFT, [facility]  TOPIC: [facility] Group Therapy  Type of Session: Group  :  Number of Attendees:  5  Number of Minutes:  /120    SERVICE MODALITY:  In-person      DSM-5 Diagnoses:  296.33 (F33.2) Major Depressive Disorder, Recurrent Episode, Severe _ and With anxious distress  300.02 (F41.1) Generalized Anxiety Disorder.  Attention-Deficit/Hyperactivity Disorder  314.01 (F90.9) Unspecified Attention -Deficit / Hyperactivity Disorder.  Substance-Related & Addictive Disorders Alcohol Use Disorder   303.90 (F10.20) Moderate   301.83 (F60.3) Borderline Personality Disorder    Current Reported Symptoms and Status update:  Changes since last session-has been getting back into routine  Struggling with compulsive sexual behavior  Struggling with emotional regulation    Progress Toward Treatment Goals:   Satisfactory progress     Therapeutic Interventions/Treatment Strategies:    Area(s) of treatment focus addressed in this session included Symptom Management, Interpersonal Relationship Skills, and Sexual Health and Wellness    Patient checked in about their mental health symptoms, healthy coping skills used over the week, negative coping skills used over the week, what sexual behaviors they engaged in-fantasy, masturbation, sex, their comfort level with their behaviors, if there were triggers, urges to violate boundaries, and violations of boundaries.  They took time to process about being more intentional with working out and breathing they provided support and feedback to others in the group.    Psychotherapist offered support, feedback and validation and reinforced use of skills  Treatment modalities used include [facility] THERAPY INTERVENTIONS: Motivational Interviewing Group Dynamic Therapy  Interpersonal Behavioral Activation: Encouraged strategies to reduce individual procrastination and increase motivation by increasing goal-directed activities to enhance mood and reduce symptoms. and Coping Skills: Assisted patient in understanding the purpose of planning / creating / participating / sharing in positive experiences  Support, Feedback, and Structured Activity    Patient Response:   Patient responded to session by accepting feedback, giving feedback, and listening  Possible barriers to participation / learning include: N/A    Current Mental Status Exam:   Appearance:  Appropriate   Eye Contact:  Good   Attitude / Demeanor: Cooperative   Speech      Rate / Production: Normal/ Responsive      Volume:  Normal  volume  Orientation:  All  Mood:   Anxious  Normal  Affect:   Appropriate   Thought Content: Clear   Insight:   Good       Plan/Need for Future Services:  Return for therapy in 1 weeks to treat diagnosed problems.    Patient has a current master individualized treatment plan.  See Epic treatment plan for more information.    Referral / Collaboration:  Referral to another professional/service is not indicated at this time..  Emergency Services Needed?  No    Assignment:  Return in one week     Interactive Complexity:  There are four specific communication difficulties that complicate the work of the primary psychiatric procedure.  Interactive complexity (+30779) may be reported when at least one of these difficulties is present.    Communication difficulties present during current the psychiatric procedure include:  None.      Signature/Title:    Vance Watson, LMFT, [facility]

## 2024-02-29 NOTE — TELEPHONE ENCOUNTER
Was notified by Rubio that patient picked up their script today .    Maria Eugenia Ambrosio on 2/29/2024 at 11:07 AM

## 2024-03-01 ENCOUNTER — OFFICE VISIT (OUTPATIENT)
Dept: PSYCHOLOGY | Facility: CLINIC | Age: 34
End: 2024-03-01
Payer: COMMERCIAL

## 2024-03-01 DIAGNOSIS — F19.90 SUBSTANCE USE DISORDER: ICD-10-CM

## 2024-03-01 DIAGNOSIS — F33.2 MDD (MAJOR DEPRESSIVE DISORDER), RECURRENT SEVERE, WITHOUT PSYCHOSIS (H): Primary | ICD-10-CM

## 2024-03-01 DIAGNOSIS — F41.1 GENERALIZED ANXIETY DISORDER: ICD-10-CM

## 2024-03-01 DIAGNOSIS — F90.2 ATTENTION DEFICIT HYPERACTIVITY DISORDER (ADHD), COMBINED TYPE: ICD-10-CM

## 2024-03-01 DIAGNOSIS — F91.8 CONDUCT DISORDER, UNDIFFERENTIATED TYPE: ICD-10-CM

## 2024-03-01 DIAGNOSIS — N52.1 ERECTILE DYSFUNCTION DUE TO DISEASES CLASSIFIED ELSEWHERE: ICD-10-CM

## 2024-03-01 PROCEDURE — 90834 PSYTX W PT 45 MINUTES: CPT | Performed by: MARRIAGE & FAMILY THERAPIST

## 2024-03-01 NOTE — PROGRESS NOTES
[facility] - Progress [facility]    Date of Service: 3/01/24   Name: Xavier Landon  : 1990  Medical Record Number: [id number]  Treating Provider: VIKTOR Anderson LAD  Type of Session: Individual  Present in Session: pt  Session Start and Stop Time: 6007-1593  Number of Minutes:  51    SERVICE MODALITY:  In-person    DSM-5 Diagnoses:  296.33 (F33.2) Major Depressive Disorder, Recurrent Episode, Severe _ and With anxious distress  300.02 (F41.1) Generalized Anxiety Disorder.  Attention-Deficit/Hyperactivity Disorder  314.01 (F90.9) Unspecified Attention -Deficit / Hyperactivity Disorder.  Substance-Related & Addictive Disorders Alcohol Use Disorder   303.90 (F10.20) Moderate   301.83 (F60.3) Borderline Personality Disorder    Current Reported Symptoms and Status update:  Changes since last session  Struggling with compulsive sexual behavior  Struggling with emotional regulation    Progress Toward Treatment Goals:   Satisfactory progress     Therapeutic Interventions/Treatment Strategies:    Area(s) of treatment focus addressed in this session included Symptom Management and Andrews Maintenance/Relapse Prevention    Psychotherapist offered support, feedback and validation and reinforced use of skills Treatment modalities used include Dialectical Behavioral Therapy Interpersonal  Support, Structured Activity, and Problem Solving    Patient Response:   Patient responded to session by listening, focusing on goals, and accepting support  Possible barriers to participation / learning include: N/A    Current Mental Status Exam:   Appearance:  Appropriate   Eye Contact:  Good   Attitude / Demeanor: Cooperative   Speech      Rate / Production: Normal/ Responsive      Volume:  Normal  volume  Orientation:  All  Mood:   Normal  Affect:   Appropriate   Thought Content: Clear   Insight:   Good       Plan/Need for Future Services:  Return for therapy in 1 weeks to treat diagnosed  problems.    Patient has a current master individualized treatment plan.  See Epic treatment plan for more information.    Referral / Collaboration:  Referral to another professional/service is not indicated at this time..  Emergency Services Needed?  No    Assignment:  Return in one week continue w consistency    Interactive Complexity:  There are four specific communication difficulties that complicate the work of the primary psychiatric procedure.  Interactive complexity (+36686) may be reported when at least one of these difficulties is present.    Communication difficulties present during current the psychiatric procedure include:  None.      Signature/Title:    VIKTOR Anderson, [facility]

## 2024-03-08 ENCOUNTER — OFFICE VISIT (OUTPATIENT)
Dept: PSYCHOLOGY | Facility: CLINIC | Age: 34
End: 2024-03-08
Payer: COMMERCIAL

## 2024-03-08 DIAGNOSIS — N52.1 ERECTILE DYSFUNCTION DUE TO DISEASES CLASSIFIED ELSEWHERE: ICD-10-CM

## 2024-03-08 DIAGNOSIS — F41.1 GENERALIZED ANXIETY DISORDER: ICD-10-CM

## 2024-03-08 DIAGNOSIS — F33.2 MDD (MAJOR DEPRESSIVE DISORDER), RECURRENT SEVERE, WITHOUT PSYCHOSIS (H): Primary | ICD-10-CM

## 2024-03-08 DIAGNOSIS — F91.8 CONDUCT DISORDER, UNDIFFERENTIATED TYPE: ICD-10-CM

## 2024-03-08 DIAGNOSIS — F90.2 ATTENTION DEFICIT HYPERACTIVITY DISORDER (ADHD), COMBINED TYPE: ICD-10-CM

## 2024-03-08 DIAGNOSIS — F19.90 SUBSTANCE USE DISORDER: ICD-10-CM

## 2024-03-08 PROCEDURE — 90837 PSYTX W PT 60 MINUTES: CPT | Performed by: MARRIAGE & FAMILY THERAPIST

## 2024-03-08 RX ORDER — TADALAFIL 5 MG/1
TABLET ORAL
Qty: 90 TABLET | Refills: 0 | Status: SHIPPED | OUTPATIENT
Start: 2024-03-08 | End: 2024-06-12

## 2024-03-08 NOTE — PROGRESS NOTES
[facility] - Progress Note    Date of Service: 3/08/24   Name: Xavier Landon  : 1990  Medical Record Number: [id number]  Treating Provider: VIKTOR Anderson, [facility]  Type of Session: Individual  Present in Session: pt  Session Start and Stop Time: 3233-5059  Number of Minutes:  55    SERVICE MODALITY:  In-person    DSM-5 Diagnoses:  296.33 (F33.2) Major Depressive Disorder, Recurrent Episode, Severe _ and With anxious distress  300.02 (F41.1) Generalized Anxiety Disorder.  Attention-Deficit/Hyperactivity Disorder  314.01 (F90.9) Unspecified Attention -Deficit / Hyperactivity Disorder.  Substance-Related & Addictive Disorders Alcohol Use Disorder   303.90 (F10.20) Moderate   301.83 (F60.3) Borderline Personality Disorder    Current Reported Symptoms and Status update:  Changes since last session has been feeling more isolated this week  Struggling with compulsive sexual behavior  Struggling with emotional regulation    Progress Toward Treatment Goals:   Satisfactory progress     Therapeutic Interventions/Treatment Strategies:    Area(s) of treatment focus addressed in this session included Symptom Management, Interpersonal Relationship Skills, and Sexual Health and Wellness    Masturbated a few times    Psychotherapist offered support, feedback and validation and reinforced use of skills Treatment modalities used include Motivational Interviewing Solution Focused Therapy Interpersonal Behavioral Activation: Encouraged strategies to reduce individual procrastination and increase motivation by increasing goal-directed activities to enhance mood and reduce symptoms. and Coping Skills: Facilitated discussion on learning and applying radical acceptance skill  Support    Patient Response:   Patient responded to session by listening, verbalizing understanding, and actively engaged  Possible barriers to participation / learning include: N/A    Current Mental Status Exam:    Appearance:  Appropriate   Eye Contact:  Good   Attitude / Demeanor: Cooperative   Speech      Rate / Production: Normal/ Responsive      Volume:  Normal  volume  Orientation:  All  Mood:   Normal Euthymic  Affect:   Appropriate   Thought Content: Clear   Insight:   Good       Plan/Need for Future Services:  Return for therapy in 1 weeks to treat diagnosed problems.    Patient has a current master individualized treatment plan.  See Epic treatment plan for more information.    Referral / Collaboration:  Referral to another professional/service is not indicated at this time..  Emergency Services Needed?  No    Assignment:  Return in one week   Continue w routine    Interactive Complexity:  There are four specific communication difficulties that complicate the work of the primary psychiatric procedure.  Interactive complexity (+79300) may be reported when at least one of these difficulties is present.    Communication difficulties present during current the psychiatric procedure include:  None.      Signature/Title:    VIKTOR Anderson, [facility]

## 2024-03-09 ENCOUNTER — MYC REFILL (OUTPATIENT)
Dept: FAMILY MEDICINE | Facility: CLINIC | Age: 34
End: 2024-03-09

## 2024-03-09 ENCOUNTER — MYC REFILL (OUTPATIENT)
Dept: PSYCHIATRY | Facility: CLINIC | Age: 34
End: 2024-03-09

## 2024-03-09 DIAGNOSIS — N52.1 ERECTILE DYSFUNCTION DUE TO DISEASES CLASSIFIED ELSEWHERE: ICD-10-CM

## 2024-03-09 DIAGNOSIS — F91.8 CONDUCT DISORDER, UNDIFFERENTIATED TYPE: ICD-10-CM

## 2024-03-09 DIAGNOSIS — F90.2 ATTENTION DEFICIT HYPERACTIVITY DISORDER (ADHD), COMBINED TYPE: ICD-10-CM

## 2024-03-11 ENCOUNTER — MYC REFILL (OUTPATIENT)
Dept: PSYCHIATRY | Facility: CLINIC | Age: 34
End: 2024-03-11

## 2024-03-11 DIAGNOSIS — F90.2 ATTENTION DEFICIT HYPERACTIVITY DISORDER (ADHD), COMBINED TYPE: ICD-10-CM

## 2024-03-11 RX ORDER — TADALAFIL 5 MG/1
TABLET ORAL
Qty: 90 TABLET | Refills: 0 | OUTPATIENT
Start: 2024-03-11

## 2024-03-11 NOTE — TELEPHONE ENCOUNTER
Last seen: 10/27/2023  RTC: 6 months  Cancel: 0  No-show: 0  Next appt: 04/26/2024     Incoming refill from Patient via [employer]    Medication requested:   Pending Prescriptions:                       Disp   Refills    amphetamine-dextroamphetamine (ADDERALL X*60 cap*0            Sig: Take 2 capsules (50 mg) by mouth daily        Last refill per       From chart note:    Continue on current medication regimen.      Medication unable to be refilled by RN due to criteria not met as indicated.                 []Eligibility - not seen in the last year              []Supervision - no future appointment              []Compliance - no shows, cancellations or lapse in therapy              []Verification - order discrepancy              [x]Controlled medication              []Medication not included in policy              []90-day supply request              []Other:

## 2024-03-11 NOTE — TELEPHONE ENCOUNTER
Last seen: 10/27/2024  RTC: 6 months  Cancel: 0  No-show: 0  Next appt: 04/26/2024     Incoming refill from Patient via [employer]    Medication requested:   Pending Prescriptions:                       Disp   Refills    buPROPion (WELLBUTRIN XL) 150 MG 24 hr ta*90 tab*0            Sig: Take 1 tablet (150 mg) by mouth every morning    naltrexone (DEPADE/REVIA) 50 MG tablet    90 tab*0            Sig: Take 1 50 mg tablet daily      From chart note:   Continue on current medication regimen     Medication unable to be refilled by RN due to criteria not met as indicated.                 []Eligibility - not seen in the last year              []Supervision - no future appointment              []Compliance - no shows, cancellations or lapse in therapy              []Verification - order discrepancy              []Controlled medication              []Medication not included in policy              [x]90-day supply request              []Other:

## 2024-03-12 ENCOUNTER — OFFICE VISIT (OUTPATIENT)
Dept: PSYCHOLOGY | Facility: CLINIC | Age: 34
End: 2024-03-12
Payer: COMMERCIAL

## 2024-03-12 DIAGNOSIS — F90.2 ATTENTION DEFICIT HYPERACTIVITY DISORDER (ADHD), COMBINED TYPE: ICD-10-CM

## 2024-03-12 DIAGNOSIS — F91.8 CONDUCT DISORDER, UNDIFFERENTIATED TYPE: ICD-10-CM

## 2024-03-12 DIAGNOSIS — F19.90 SUBSTANCE USE DISORDER: ICD-10-CM

## 2024-03-12 DIAGNOSIS — F33.2 MDD (MAJOR DEPRESSIVE DISORDER), RECURRENT SEVERE, WITHOUT PSYCHOSIS (H): Primary | ICD-10-CM

## 2024-03-12 DIAGNOSIS — N52.1 ERECTILE DYSFUNCTION DUE TO DISEASES CLASSIFIED ELSEWHERE: ICD-10-CM

## 2024-03-12 DIAGNOSIS — F41.1 GENERALIZED ANXIETY DISORDER: ICD-10-CM

## 2024-03-12 PROCEDURE — 90853 GROUP PSYCHOTHERAPY: CPT | Performed by: MARRIAGE & FAMILY THERAPIST

## 2024-03-12 RX ORDER — DEXTROAMPHETAMINE SACCHARATE, AMPHETAMINE ASPARTATE MONOHYDRATE, DEXTROAMPHETAMINE SULFATE AND AMPHETAMINE SULFATE 6.25; 6.25; 6.25; 6.25 MG/1; MG/1; MG/1; MG/1
50 CAPSULE, EXTENDED RELEASE ORAL DAILY
Qty: 60 CAPSULE | Refills: 0 | Status: SHIPPED | OUTPATIENT
Start: 2024-03-17 | End: 2024-04-10

## 2024-03-12 RX ORDER — BUPROPION HYDROCHLORIDE 150 MG/1
150 TABLET ORAL EVERY MORNING
Qty: 90 TABLET | Refills: 0 | Status: SHIPPED | OUTPATIENT
Start: 2024-03-12 | End: 2024-03-27

## 2024-03-12 RX ORDER — NALTREXONE HYDROCHLORIDE 50 MG/1
TABLET, FILM COATED ORAL
Qty: 90 TABLET | Refills: 0 | Status: SHIPPED | OUTPATIENT
Start: 2024-03-12 | End: 2024-06-20

## 2024-03-12 NOTE — GROUP NOTE
[facility]  [address], [address]  [place]  [address]    Group Progress Note  [facility] - Progress Note    Date of Service: 3/12/24   Name: Xavier Landon  : 1990  Medical Record Number: [id number]  START TIME:  6:30 PM  END TIME:  8:30 PM  FACILITATOR(S): Deborah Watson LMFT, [facility]  TOPIC: [facility] Group Therapy  Type of Session: Group  :  Number of Attendees:  6  Number of Minutes:  /120    SERVICE MODALITY:  In-person      DSM-5 Diagnoses:  296.33 (F33.2) Major Depressive Disorder, Recurrent Episode, Severe _ and With anxious distress  300.02 (F41.1) Generalized Anxiety Disorder.  Attention-Deficit/Hyperactivity Disorder  314.01 (F90.9) Unspecified Attention -Deficit / Hyperactivity Disorder.  Substance-Related & Addictive Disorders Alcohol Use Disorder   303.90 (F10.20) Moderate   301.83 (F60.3) Borderline Personality Disorder    Current Reported Symptoms and Status update:  Changes since last session-working on figuring out school option  Struggling with compulsive sexual behavior  Struggling with emotional regulation    Progress Toward Treatment Goals:   Satisfactory progress     Therapeutic Interventions/Treatment Strategies:    Area(s) of treatment focus addressed in this session included Symptom Management and Sexual Health and Wellness    Patient checked in about their mental health symptoms, healthy coping skills used over the week, negative coping skills used over the week, what sexual behaviors they engaged in-fantasy, masturbation, sex, their comfort level with their behaviors, if there were triggers, urges to violate boundaries, and violations of boundaries.  They took time to process about discipline and school-focusing on goals they provided support and feedback to others in the group.    Psychotherapist offered support, feedback and validation and reinforced use of skills Treatment modalities used include [facility] THERAPY  INTERVENTIONS: Group Dynamic Therapy  Interpersonal Behavioral Activation: Reinforced benefits/challenges of change process through applying skills to replace unwanted behaviors and facilitated discussion about sexual health and wellness  Support, Feedback, and Structured Activity    Patient Response:   Patient responded to session by accepting feedback, giving feedback, and listening  Possible barriers to participation / learning include: N/A    Current Mental Status Exam:   Appearance:  Appropriate   Eye Contact:  Good   Attitude / Demeanor: Cooperative   Speech      Rate / Production: Normal/ Responsive      Volume:  Normal  volume  Orientation:  All  Mood:   Anxious  Normal  Affect:   Appropriate   Thought Content: Clear   Insight:   Good       Plan/Need for Future Services:  Return for therapy in 1 weeks to treat diagnosed problems.    Patient has a current master individualized treatment plan.  See Epic treatment plan for more information.    Referral / Collaboration:  Referral to another professional/service is not indicated at this time..  Emergency Services Needed?  No    Assignment:  Return in one week     Interactive Complexity:  There are four specific communication difficulties that complicate the work of the primary psychiatric procedure.  Interactive complexity (+44734) may be reported when at least one of these difficulties is present.    Communication difficulties present during current the psychiatric procedure include:  None.      Signature/Title:    Vance Watson, LMFT, [facility]

## 2024-03-15 ENCOUNTER — VIRTUAL VISIT (OUTPATIENT)
Dept: PSYCHOLOGY | Facility: CLINIC | Age: 34
End: 2024-03-15
Payer: COMMERCIAL

## 2024-03-15 DIAGNOSIS — F91.8 CONDUCT DISORDER, UNDIFFERENTIATED TYPE: ICD-10-CM

## 2024-03-15 DIAGNOSIS — F41.1 GENERALIZED ANXIETY DISORDER: ICD-10-CM

## 2024-03-15 DIAGNOSIS — F90.2 ATTENTION DEFICIT HYPERACTIVITY DISORDER (ADHD), COMBINED TYPE: ICD-10-CM

## 2024-03-15 DIAGNOSIS — N52.1 ERECTILE DYSFUNCTION DUE TO DISEASES CLASSIFIED ELSEWHERE: ICD-10-CM

## 2024-03-15 DIAGNOSIS — F33.2 MDD (MAJOR DEPRESSIVE DISORDER), RECURRENT SEVERE, WITHOUT PSYCHOSIS (H): Primary | ICD-10-CM

## 2024-03-15 PROCEDURE — 90837 PSYTX W PT 60 MINUTES: CPT | Mod: 93 | Performed by: MARRIAGE & FAMILY THERAPIST

## 2024-03-15 NOTE — PROGRESS NOTES
"[facility] - Progress Note    Date of Service: 3/15/24   Name: Xavier Landon  : 1990  Medical Record Number: [id number]  Treating Provider: VIKTOR Anderson, [facility]  Type of Session: Individual  Present in Session: pt  Session Start and Stop Time: 5858-8410  Number of Minutes:  54    SERVICE MODALITY:  Phone Visit:      Provider verified identity through the following two step process.  Patient provided:  Patient is known previously to provider    Telephone Visit: The patient's condition can be safely assessed and treated via synchronous audio telemedicine encounter.      Reason for Audio Telemedicine Visit: Patient unable to travel    Originating Site (Patient Location): Patient's home    Distant Site (Provider Location): Provider Remote Setting- Home Office    Consent:  The patient/guardian has verbally consented to:     1. The potential risks and benefits of telemedicine (telephone visit) versus in person care;    The patient has been notified of the following:      \"We have found that certain health care needs can be provided without the need for a face to face visit.  This service lets us provide the care you need with a phone conversation.       I will have full access to your [facility] medical record during this entire phone call.   I will be taking notes for your medical record.      Since this is like an office visit, we will bill your insurance company for this service.       There are potential benefits and risks of telephone visits (e.g. limits to patient confidentiality) that differ from in-person visits.?Confidentiality still applies for telephone services, and nobody will record the visit.  It is important to be in a quiet, private space that is free of distractions (including cell phone or other devices) during the visit.??      If during the course of the call I believe a telephone visit is not appropriate, you will not be charged for this " "service\"     Consent has been obtained for this service by care team member: Yes     DSM-5 Diagnoses:  296.33 (F33.2) Major Depressive Disorder, Recurrent Episode, Severe _ and With anxious distress  300.02 (F41.1) Generalized Anxiety Disorder.  Attention-Deficit/Hyperactivity Disorder  314.01 (F90.9) Unspecified Attention -Deficit / Hyperactivity Disorder.  Substance-Related & Addictive Disorders Alcohol Use Disorder   303.90 (F10.20) Moderate   301.83 (F60.3) Borderline Personality Disorder    Current Reported Symptoms and Status update:  Changes since last session-is worried about issue w starting school, also having car problems   Struggling with compulsive sexual behavior  Struggling with emotional regulation    Progress Toward Treatment Goals:   Satisfactory progress     Therapeutic Interventions/Treatment Strategies:    Area(s) of treatment focus addressed in this session included Symptom Management and Sexual Health and Wellness    Increased anxiety about school and car costs, budgeting    Psychotherapist offered support, feedback and validation and reinforced use of skills Treatment modalities used include Dialectical Behavioral Therapy Interpersonal Behavioral Activation: Encouraged strategies to reduce individual procrastination and increase motivation by increasing goal-directed activities to enhance mood and reduce symptoms., Coping Skills: Facilitated understanding of  what factors may contribute to symptom relapse and skills plan to manage symptom relapse , and discussed application of self compassion and explored challenging unrealistic expectations of self/others  Support, Feedback, Problem Solving, and Clarification    Patient Response:   Patient responded to session by listening, accepting support, and verbalizing understanding  Possible barriers to participation / learning include: N/A    Current Mental Status Exam:   Appearance:  Phone call   Eye Contact:  Phone call   Attitude / " Demeanor: Cooperative   Speech      Rate / Production: Normal/ Responsive      Volume:  Normal  volume  Orientation:  All  Mood:   Anxious   Affect:   Appropriate   Thought Content: Clear   Insight:   Good       Plan/Need for Future Services:  Return for therapy in 1 weeks to treat diagnosed problems.    Patient has a current master individualized treatment plan.  See Epic treatment plan for more information.    Referral / Collaboration:  Referral to another professional/service is not indicated at this time..  Emergency Services Needed?  No    Assignment:  Will work on budgeting keeping up w discipline activities     Interactive Complexity:  There are four specific communication difficulties that complicate the work of the primary psychiatric procedure.  Interactive complexity (+93139) may be reported when at least one of these difficulties is present.    Communication difficulties present during current the psychiatric procedure include:  None.      Signature/Title:    VIKTOR Anderson, [facility]

## 2024-03-19 ENCOUNTER — OFFICE VISIT (OUTPATIENT)
Dept: PSYCHOLOGY | Facility: CLINIC | Age: 34
End: 2024-03-19
Payer: COMMERCIAL

## 2024-03-19 DIAGNOSIS — F91.8 CONDUCT DISORDER, UNDIFFERENTIATED TYPE: ICD-10-CM

## 2024-03-19 DIAGNOSIS — F41.1 GENERALIZED ANXIETY DISORDER: ICD-10-CM

## 2024-03-19 DIAGNOSIS — N52.1 ERECTILE DYSFUNCTION DUE TO DISEASES CLASSIFIED ELSEWHERE: ICD-10-CM

## 2024-03-19 DIAGNOSIS — F33.2 MDD (MAJOR DEPRESSIVE DISORDER), RECURRENT SEVERE, WITHOUT PSYCHOSIS (H): Primary | ICD-10-CM

## 2024-03-19 DIAGNOSIS — F90.2 ATTENTION DEFICIT HYPERACTIVITY DISORDER (ADHD), COMBINED TYPE: ICD-10-CM

## 2024-03-19 PROCEDURE — 90853 GROUP PSYCHOTHERAPY: CPT | Performed by: MARRIAGE & FAMILY THERAPIST

## 2024-03-19 NOTE — GROUP NOTE
[facility]  [address], [address]  [place]  [address]    Group Progress Note  [facility] - Progress Note    Date of Service: 3/19/24   Name: Xavier Landon  : 1990  Medical Record Number: [id number]  START TIME:  6:30 PM  END TIME:  8:30 PM  FACILITATOR(S): Deborah Watson LMFT, [facility]  TOPIC: [facility] Group Therapy  Type of Session: Group  :  Number of Attendees:  5  Number of Minutes:  /120    SERVICE MODALITY:  In-person      DSM-5 Diagnoses:  296.33 (F33.2) Major Depressive Disorder, Recurrent Episode, Severe _ and With anxious distress  300.02 (F41.1) Generalized Anxiety Disorder.  Attention-Deficit/Hyperactivity Disorder  314.01 (F90.9) Unspecified Attention -Deficit / Hyperactivity Disorder.  Substance-Related & Addictive Disorders Alcohol Use Disorder   303.90 (F10.20) Moderate   301.83 (F60.3) Borderline Personality Disorder    Current Reported Symptoms and Status update:  Changes since last session-reports has been keeping discipline up in routine  Struggling with compulsive sexual behavior  Struggling with emotional regulation    Progress Toward Treatment Goals:   Satisfactory progress     Therapeutic Interventions/Treatment Strategies:    Area(s) of treatment focus addressed in this session included Symptom Management and Sexual Health and Wellness    Patient checked in about their mental health symptoms, healthy coping skills used over the week, negative coping skills used over the week, what sexual behaviors they engaged in-fantasy, masturbation, sex, their comfort level with their behaviors, if there were triggers, urges to violate boundaries, and violations of boundaries.  They took time to process about saying goodbye to pt from group they provided support and feedback to others in the group.    Psychotherapist offered support, feedback and validation and reinforced use of skills Treatment modalities used include [facility]  INTERVENTIONS: Group Dynamic Therapy  Interpersonal Behavioral Activation: Encouraged strategies to reduce individual procrastination and increase motivation by increasing goal-directed activities to enhance mood and reduce symptoms.  Support, Feedback, and Structured Activity    Patient Response:   Patient responded to session by accepting feedback, giving feedback, and listening  Possible barriers to participation / learning include: N/A    Current Mental Status Exam:   Appearance:  Appropriate   Eye Contact:  Good   Attitude / Demeanor: Cooperative   Speech      Rate / Production: Normal/ Responsive      Volume:  Normal  volume  Orientation:  All  Mood:   Euthymic  Affect:   Appropriate   Thought Content: Clear   Insight:   Good       Plan/Need for Future Services:  Return for therapy in 1 weeks to treat diagnosed problems.    Patient has a current master individualized treatment plan.  See Epic treatment plan for more information.    Referral / Collaboration:  Referral to another professional/service is not indicated at this time..  Emergency Services Needed?  No    Assignment:  Return in one week     Interactive Complexity:  There are four specific communication difficulties that complicate the work of the primary psychiatric procedure.  Interactive complexity (+[id number]) may be reported when at least one of these difficulties is present.    Communication difficulties present during current the psychiatric procedure include:  None.      Signature/Title:    Vance Watson, LMFT, [facility]

## 2024-03-22 ENCOUNTER — OFFICE VISIT (OUTPATIENT)
Dept: PSYCHOLOGY | Facility: CLINIC | Age: 34
End: 2024-03-22
Payer: COMMERCIAL

## 2024-03-22 DIAGNOSIS — F33.2 MDD (MAJOR DEPRESSIVE DISORDER), RECURRENT SEVERE, WITHOUT PSYCHOSIS (H): Primary | ICD-10-CM

## 2024-03-22 DIAGNOSIS — F91.8 CONDUCT DISORDER, UNDIFFERENTIATED TYPE: ICD-10-CM

## 2024-03-22 DIAGNOSIS — N52.1 ERECTILE DYSFUNCTION DUE TO DISEASES CLASSIFIED ELSEWHERE: ICD-10-CM

## 2024-03-22 DIAGNOSIS — F41.1 GENERALIZED ANXIETY DISORDER: ICD-10-CM

## 2024-03-22 DIAGNOSIS — F90.2 ATTENTION DEFICIT HYPERACTIVITY DISORDER (ADHD), COMBINED TYPE: ICD-10-CM

## 2024-03-22 PROCEDURE — 90834 PSYTX W PT 45 MINUTES: CPT | Performed by: MARRIAGE & FAMILY THERAPIST

## 2024-03-22 NOTE — PROGRESS NOTES
[facility] - Progress Note    Date of Service: 3/22/24   Name: Xavier Landon  : 1990  Medical Record Number: [id number]  Treating Provider: VIKTOR Anderson, [facility]  Type of Session: Individual  Present in Session: pt  Session Start and Stop Time: 4594-5562  Number of Minutes:  50    SERVICE MODALITY:  In-person    DSM-5 Diagnoses:  296.33 (F33.2) Major Depressive Disorder, Recurrent Episode, Severe _ and With anxious distress  300.02 (F41.1) Generalized Anxiety Disorder.  Attention-Deficit/Hyperactivity Disorder  314.01 (F90.9) Unspecified Attention -Deficit / Hyperactivity Disorder.  Substance-Related & Addictive Disorders Alcohol Use Disorder   303.90 (F10.20) Moderate   301.83 (F60.3) Borderline Personality Disorder    Current Reported Symptoms and Status update:  Changes since last session-keeping up w discipline at times  Struggling with compulsive sexual behavior  Struggling with emotional regulation    Progress Toward Treatment Goals:   Satisfactory progress     Therapeutic Interventions/Treatment Strategies:    Area(s) of treatment focus addressed in this session included Symptom Management and Sexual Health and Wellness    Discussed setting up school sched and how it will affect mh symptoms-routine that is manageable     Psychotherapist offered support, feedback and validation and reinforced use of skills Treatment modalities used include Dialectical Behavioral Therapy Solution Focused Therapy Interpersonal Behavioral Activation: Reinforced benefits/challenges of change process through applying skills to replace unwanted behaviors, Explored how behaviors effect mood and interact with thoughts and feelings, and Encouraged strategies to reduce individual procrastination and increase motivation by increasing goal-directed activities to enhance mood and reduce symptoms.  Support, Structured Activity, and Problem Solving    Patient Response:   Patient  responded to session by listening and verbalizing understanding  Possible barriers to participation / learning include: N/A    Current Mental Status Exam:   Appearance:  Appropriate   Eye Contact:  Good   Attitude / Demeanor: Cooperative   Speech      Rate / Production: Normal/ Responsive      Volume:  Normal  volume  Orientation:  All  Mood:   Normal Euthymic  Affect:   Appropriate   Thought Content: Clear   Insight:   Good       Plan/Need for Future Services:  Return for therapy in 1 weeks to treat diagnosed problems.    Patient has a current master individualized treatment plan.  See Epic treatment plan for more information.    Referral / Collaboration:  Referral to another professional/service is not indicated at this time..  Emergency Services Needed?  No    Assignment:  Masturbation  Mindfulness of sched     Interactive Complexity:  There are four specific communication difficulties that complicate the work of the primary psychiatric procedure.  Interactive complexity (+[id number]) may be reported when at least one of these difficulties is present.    Communication difficulties present during current the psychiatric procedure include:  None.      Signature/Title:    Vance Watson, LMFT, [facility]

## 2024-03-26 ENCOUNTER — OFFICE VISIT (OUTPATIENT)
Dept: PSYCHOLOGY | Facility: CLINIC | Age: 34
End: 2024-03-26
Payer: COMMERCIAL

## 2024-03-26 DIAGNOSIS — N52.1 ERECTILE DYSFUNCTION DUE TO DISEASES CLASSIFIED ELSEWHERE: ICD-10-CM

## 2024-03-26 DIAGNOSIS — F90.2 ATTENTION DEFICIT HYPERACTIVITY DISORDER (ADHD), COMBINED TYPE: ICD-10-CM

## 2024-03-26 DIAGNOSIS — F41.1 GENERALIZED ANXIETY DISORDER: ICD-10-CM

## 2024-03-26 DIAGNOSIS — F33.2 MDD (MAJOR DEPRESSIVE DISORDER), RECURRENT SEVERE, WITHOUT PSYCHOSIS (H): Primary | ICD-10-CM

## 2024-03-26 DIAGNOSIS — F91.8 CONDUCT DISORDER, UNDIFFERENTIATED TYPE: ICD-10-CM

## 2024-03-26 PROCEDURE — 90853 GROUP PSYCHOTHERAPY: CPT | Performed by: MARRIAGE & FAMILY THERAPIST

## 2024-03-26 NOTE — GROUP NOTE
[facility]  [address], [address]  [place]  [address]    Group Progress Note  [facility] - Progress Note    Date of Service: 3/26/24   Name: Xavier Landon  : 1990  Medical Record Number: [id number]  START TIME:  6:30 PM  END TIME:  8:30 PM  FACILITATOR(S): Deborah Watson LMFT, [facility]  TOPIC: [facility] Group Therapy  Type of Session: Group  :  Number of Attendees:  4  Number of Minutes:  /120    SERVICE MODALITY:  In-person      DSM-5 Diagnoses:  296.33 (F33.2) Major Depressive Disorder, Recurrent Episode, Severe _ and With anxious distress  300.02 (F41.1) Generalized Anxiety Disorder.  Attention-Deficit/Hyperactivity Disorder  314.01 (F90.9) Unspecified Attention -Deficit / Hyperactivity Disorder.  Substance-Related & Addictive Disorders Alcohol Use Disorder   303.90 (F10.20) Moderate   301.83 (F60.3) Borderline Personality Disorder    Current Reported Symptoms and Status update:  Changes since last session-more regulation and discipline this week  Struggling with compulsive sexual behavior  Struggling with emotional regulation    Progress Toward Treatment Goals:   Satisfactory progress     Therapeutic Interventions/Treatment Strategies:    Area(s) of treatment focus addressed in this session included Symptom Management and Sexual Health and Wellness    Patient checked in about their mental health symptoms, healthy coping skills used over the week, negative coping skills used over the week, what sexual behaviors they engaged in-fantasy, masturbation, sex, their comfort level with their behaviors, if there were triggers, urges to violate boundaries, and violations of boundaries.  They took time to process about where they are at with their goal progress they provided support and feedback to others in the group.    Psychotherapist offered support, feedback and validation and reinforced use of skills Treatment modalities used include [facility]  THERAPY INTERVENTIONS: Group Dynamic Therapy  Interpersonal Behavioral Activation: Explored how behaviors effect mood and interact with thoughts and feelings and Encouraged strategies to reduce individual procrastination and increase motivation by increasing goal-directed activities to enhance mood and reduce symptoms.  Support, Feedback, and Structured Activity    Patient Response:   Patient responded to session by accepting feedback, giving feedback, and listening  Possible barriers to participation / learning include: N/A    Current Mental Status Exam:   Appearance:  Appropriate   Eye Contact:  Good   Attitude / Demeanor: Cooperative   Speech      Rate / Production: Normal/ Responsive      Volume:  Normal  volume  Orientation:  All  Mood:   Euthymic  Affect:   Appropriate   Thought Content: Clear   Insight:   Good       Plan/Need for Future Services:  Return for therapy in 1 weeks to treat diagnosed problems.    Patient has a current master individualized treatment plan.  See Epic treatment plan for more information.    Referral / Collaboration:  Referral to another professional/service is not indicated at this time..  Emergency Services Needed?  No    Assignment:  Return I none week     Interactive Complexity:  There are four specific communication difficulties that complicate the work of the primary psychiatric procedure.  Interactive complexity (+[id number]) may be reported when at least one of these difficulties is present.    Communication difficulties present during current the psychiatric procedure include:  None.      Signature/Title:    Vance Watson, LMFT, [facility]

## 2024-03-27 ENCOUNTER — OFFICE VISIT (OUTPATIENT)
Dept: PSYCHIATRY | Facility: CLINIC | Age: 34
End: 2024-03-27
Payer: COMMERCIAL

## 2024-03-27 VITALS
SYSTOLIC BLOOD PRESSURE: 110 MMHG | DIASTOLIC BLOOD PRESSURE: 67 MMHG | BODY MASS INDEX: 25.86 KG/M2 | HEIGHT: 75 IN | WEIGHT: 208 LBS | HEART RATE: 67 BPM

## 2024-03-27 DIAGNOSIS — F41.1 GENERALIZED ANXIETY DISORDER: ICD-10-CM

## 2024-03-27 DIAGNOSIS — F90.2 ATTENTION DEFICIT HYPERACTIVITY DISORDER (ADHD), COMBINED TYPE: ICD-10-CM

## 2024-03-27 DIAGNOSIS — F90.9 ATTENTION DEFICIT HYPERACTIVITY DISORDER (ADHD), UNSPECIFIED ADHD TYPE: Primary | ICD-10-CM

## 2024-03-27 DIAGNOSIS — F33.2 MAJOR DEPRESSIVE DISORDER, RECURRENT EPISODE, SEVERE WITH ANXIOUS DISTRESS (H): ICD-10-CM

## 2024-03-27 PROCEDURE — 99204 OFFICE O/P NEW MOD 45 MIN: CPT | Performed by: STUDENT IN AN ORGANIZED HEALTH CARE EDUCATION/TRAINING PROGRAM

## 2024-03-27 RX ORDER — BUPROPION HYDROCHLORIDE 300 MG/1
300 TABLET ORAL EVERY MORNING
Qty: 30 TABLET | Refills: 1 | Status: SHIPPED | OUTPATIENT
Start: 2024-03-27 | End: 2024-06-06

## 2024-03-27 NOTE — PROGRESS NOTES
Samm Stratton PA-C  Psychiatric Services  [facility]  [address]  [place] [address]  [phone]          PSYCHIATRIC DIAGNOSTIC ASSESSMENT      Name:  Xavier Landon  : 1990    Xavier Landon is a 33 year old male         Referred by: Deborah Watson LMFT, CHADD  Patient Care Team:  No Ref-Primary, Physician as PCP - General  Deborah Watson LMFT, LADC as Assigned Behavioral Health Provider  Cyn Landaverde APRN CNP as Assigned PCP  Therapist:     History was provided by patient who was a good historian(s).    Patient attended the session in clinic.     RECORDS AVAILABLE FOR REVIEW: EHR records through [employer] .                                            CHIEF COMPLAINT   Patient is a 33 year old,  White Not  or  male  who presents for initial psychiatric evaluation. Referred by Deborah Watson LMFT, LADC  Note by referring provider:   hat is the reason for the referral? (Example: patient has a non-[facility] psychiatric provider and would like to have all care within system)__da used to see cosme, was transferred to [facility] after cosme left, it would be great for him to be back with perez   What are your goals and the patient's goals for this evaluation? __da will need adhd, depression, erectile dysfunction, and csb (naltrexone)   Is patient currently taking any prescribed medications for mental health concerns? __yes  Who is the prescriber? __[facility]   Has the patient been seen by a medical provider for this issue outside of the [facility]?__no If so, where __ NA Have those records been requested or are already in their Chart? __NA     Any additional comments or concerns (including any that may be informal/not part of the chart) that is important for Samm Stratton to be aware of? __da can be emotionally dysregulated at times when talking about the erectile dysfunction and the pain is causes him.  He is on naltrexone and has been sober for 1.5 years. Severe adhd. Cialis once a day has been good for him for doing the sex therapy part of homework     HISTORY OF PRESENT ILLNESS   Reports past diagnosis of depression, compulsive sexual behavior, attention deficit disorder, generalized anxiety disorder.     First sought treatment     PSYCHIATRIC HISTORY:   Previous psychiatry: Multiple providers.   Previous therapist: multiple providers at the institute.     History of Interventions:  counseling, MI / CD day treatment, medication(s) from physician / PCP, and psychiatry    History of Psychiatric Hospitalizations:   - Inpatient: None  - IOP/PHP/Day treatment: denies.   History of Suicidal Ideation: yes, currently. Passive, no intent/plan   History of Suicide Attempts:  denies  History of Self-injurious Behavior: Denies a history of SIB.  Current:  No  History of Violence/Aggression: denies  History of Commitment? denies  Electroconvulsive Therapy (ECT) or Transcranial Magnetic Stimulation (TMS): denies.  PharmacogenomicTesting (such as GeneSight): denies.     PSYCHIATRIC REVIEW OF SYSTEMS:   Sleep: good. 6-8 hours.   Diet: No Restrictions  Exercise: yes, interval training.   Depression: Rates depression a 4/10 on a ten point worsening scale. Shuts down, isolates, lara, in head, self destructive.   Suicidal ideation:  passive, no intent or plan  Anxiety: Rates anxiety a a 4/10 on a ten point worsening scale currently. Ruminations, paralyzing.   Panic: hx of one in college.   Social anxiety: denies.   PTSD: denies    OCD: Denies hx of obsessions or compulsions irresistible urges to do things repeatedly such as counting, washing hands, checking, etc. Denies hoarding.  No current symptoms can get particular.   Specific fears: slade potties.   Mood lability:  Could not elicit true manic symptoms, extended periods of decreased need for sleep, extreme high level of energy, or grandiosity. Denies any symptoms consistent  with hypomania.  Psychosis: Denies thought disturbance symptoms or hx of AH, VH, TH, or OH. and Denies having periods of feeling others were plotting to harm them, people reading their mind, reading others mind, receiving special messages from TV, computer, etc.   ADD / ADHD: Reports previously diagnosed  .  task completion, hyperfocuses, easily distracted, feeling useless without stimulants.   Autism symptoms: denies  Eating Disorder:  Denies concerns with weight or body image beyond normal concern.  Denies restricting or purging behaviors or excessive exercise for weight control. Notes eating to cope, stress eat.         ASSESSMENT SCALES:      6/6/2022     1:47 PM 6/16/2022     2:42 PM 5/31/2023     9:38 AM   PHQ-9 SCORE   PHQ-9 Total Score MyChart 12 (Moderate depression) 14 (Moderate depression) 11 (Moderate depression)   PHQ-9 Total Score 12 14 11           5/31/2023     9:38 AM   Last PHQ-9   1.  Little interest or pleasure in doing things 1   2.  Feeling down, depressed, or hopeless 1   3.  Trouble falling or staying asleep, or sleeping too much 1   4.  Feeling tired or having little energy 1   5.  Poor appetite or overeating 1   6.  Feeling bad about yourself 3   7.  Trouble concentrating 3   8.  Moving slowly or restless 0   Q9: Thoughts of better off dead/self-harm past 2 weeks 0   PHQ-9 Total Score 11     PHQ9 score is Not completed today  Suicidal ideation:  passive, no intent or plan        2/24/2022     2:07 PM 4/6/2022     7:00 PM 6/20/2023     7:33 AM   SHAUNA-7 SCORE   Total Score  10 (moderate anxiety) 6 (mild anxiety)   Total Score 11 10 6         2/4/2021     8:57 AM 8/12/2021     5:18 PM 10/14/2021     3:37 PM 1/19/2022     5:26 PM 2/24/2022     2:07 PM 4/6/2022     7:00 PM 6/20/2023     7:33 AM   SHAUNA-7   [employer], 2002; Used with Permission)   1. Feeling nervous, anxious, or on edge    Several days  More than half the days Several days   2. Not being able to stop or control worrying    Several  days  Several days Several days   3. Worrying too much about different things    Several days  More than half the days Several days   4. Trouble relaxing    Not at all  Several days Several days   5. Being so restless that it is hard to sit still    Not at all  Not at all Not at all   6. Becoming easily annoyed or irritable    Several days  More than half the days More than half the days   7. Feeling afraid, as if something awful might happen    Several days  More than half the days Not at all   SHAUNA 7 TOTAL SCORE    5 (mild anxiety)  10 (moderate anxiety) 6 (mild anxiety)   1. Feeling nervous, anxious, or on edge 2 3 2 1 2 2 1   2. Not being able to stop or control worrying 2 3 2 1 1 1 1   3. Worrying too much about different things 2 3 2 1 2 2 1   4. Trouble relaxing 2 2 2 0 2 1 1   5. Being so restless that it is hard to sit still 0 0 0 0 1 0 0   6. Becoming easily annoyed or irritable 3 2 2 1 1 2 2   7. Feeling afraid, as if something awful might happen 2 3 2 1 2 2 0   SHAUNA-7 Total Score 13 16 12 5 11 10 6   If you checked any problems, how difficult have they made it for you to do your work, take care of things at home, or get along with other people?  Somewhat difficult     Somewhat difficult     GAD7 score is Not completed today     The following assessments were completed by patient for this visit:  None completed today.     All other ROS negative.     FAMILY, MEDICAL, SURGICAL HISTORY REVIEWED.  MEDICATION HAVE BEEN REVIEWED AND ARE CURRENT TO THE BEST OF MY KNOWLEDGE AND ABILITY.      MEDICATIONS                                                                                                Current Outpatient Medications   Medication Sig    amphetamine-dextroamphetamine (ADDERALL XR) 25 MG 24 hr capsule Take 2 capsules (50 mg) by mouth daily    amphetamine-dextroamphetamine (ADDERALL) 10 MG tablet Take 1 tablet (10 mg) by mouth daily as needed (for breatkthrough symptoms in afternoon)    buPROPion (WELLBUTRIN  "XL) 300 MG 24 hr tablet Take 1 tablet (300 mg) by mouth every morning for 60 days    naltrexone (DEPADE/REVIA) 50 MG tablet Take 1 50 mg tablet daily    tadalafil (CIALIS) 5 MG tablet TAKE ONE TABLET BY MOUTH EVERY 24 HOURS    sildenafil (REVATIO) 20 MG tablet Take 1 tablet (20 mg) by mouth 3 times daily (Patient not taking: Reported on 3/27/2024)     No current facility-administered medications for this visit.       DRUG MONITORING:  [place] Prescription Monitoring Program evaluating controlled substances in the last year in [place]:  [place] Prescription Monitoring Program [] was checked today:  indicates that controlled prescriptions have been filled as prescribed..    CURRENT MEDICATION SIDE EFFECTS REPORTED:  Denies    NOTES ABOUT CURRENT PSYCHOTROPIC MEDICATIONS:   Notes wellbutrin is partially effective.     Supplements: vitamins in the AM.     PAST PSYCHOTROPIC MEDICATIONS:  Various, SSRIs.    VITALS   /67 (BP Location: Left arm, Patient Position: Sitting, Cuff Size: Adult Large)   Pulse 67   Ht 1.905 m (6' 3\")   Wt 94.3 kg (208 lb)   BMI 26.00 kg/m       BP Readings from Last 1 Encounters:   03/27/24 110/67     Pulse Readings from Last 1 Encounters:   03/27/24 67     Wt Readings from Last 1 Encounters:   03/27/24 94.3 kg (208 lb)     Ht Readings from Last 1 Encounters:   03/27/24 1.905 m (6' 3\")     Estimated body mass index is 26 kg/m  as calculated from the following:    Height as of this encounter: 1.905 m (6' 3\").    Weight as of this encounter: 94.3 kg (208 lb).          ALLERGY & IMMUNIZATIONS     No Known Allergies      PERTINENT HISTORY     Patient Active Problem List   Diagnosis    MDD (major depressive disorder), recurrent severe, without psychosis (H)    Major depressive disorder, recurrent episode, severe with anxious distress (H)    Other Specified Disruptive, Impulse Control, and Conduct Disorder (Hypersexual Disorder)      Generalized anxiety disorder    Attention deficit hyperactivity " disorder (ADHD), combined type    Substance use disorder    ADD (attention deficit disorder)    Depression, major, recurrent (H24)    Erectile dysfunction due to diseases classified elsewhere        Seizures or Head Injury: Denies history of head injury. Denies history of seizures.  Denies cardiac history.      Past Surgical History:   Procedure Laterality Date    GENITOURINARY SURGERY          SOCIAL HISTORY  [place].     Relationship status: single  Children: denies  Highest education level was high school graduate.    Service: denies  Employment status: [employer], .     Trauma history: Denies  ACES (Adverse Childhood Experiences):  None.  Grew up in an intact home with all basic needs being met       LEGAL:  Denies: substance.    SUBSTANCE USE HISTORY  Social History     Tobacco Use    Smoking status: Former     Packs/day: 0.50     Years: 10.00     Additional pack years: 0.00     Total pack years: 5.00     Types: Cigarettes     Start date: 12/2021    Smokeless tobacco: Never   Substance Use Topics    Alcohol use: Not Currently       CAGE:       2/4/2021     8:57 AM 10/14/2021     3:37 PM 1/19/2022     5:27 PM 2/24/2022     2:07 PM   CAGE-AID Flowsheet   Have you ever felt you should Cut down on your drinking or drug use? 1 1 1 1   Have people Annoyed you by criticizing your drinking or drug use? 0 0 0 0   Have you ever felt bad or Guilty about your drinking or drug use? 1 1 1 1   Have you ever had a drink or used drugs first thing in the morning to steady your nerves or to get rid of a hangover? (Eye opener) 1 1 0 0   CAGE-AID SCORE 3 3 2 2   Have you ever felt you should Cut down on your drinking or drug use?   Yes    Have people Annoyed you by criticizing your drinking or drug use?   No    Have you ever felt bad or Guilty about your drinking or drug use?   Yes    Have you ever had a drink or used drugs first thing in the morning to steady your nerves or to get rid of a hangover? (Eye opener)    No    CAGE-AID SCORE   2 (A total score of 2 or greater is considered clinically significant)        Caffeine: energy drinks a lot of caffeine. Attempting to wean, 400mg.   Alcohol: none. Yes in the past.   Other substance use: cannabis.   Past use alcohol/substance use: various substances in the past.     Chemical dependency history: Patient  outpatient at [facility] 2021.        Family History   Problem Relation Age of Onset    Anxiety Disorder Mother     Depression Father     Attention Deficit Disorder Maternal Aunt             PERTINENT FAMILY PSYCHIATRIC HISTORY NOTES    Maternal: Aunt ADHD.   Paternal: denies  Substance use history in family: both sides alcohol.   Family suicide history: denies.   Medications family responded to: Mother uses Xanax.    Based on the clinical interview, there  are not indications of drug or alcohol abuse. Continue to monitor. Currently in the stage of Maintenance (Maintaining the behavior change) .     MEDICAL REVIEW OF SYSTEMS:   Ten system review was completed with pertinent positives noted above    MENTAL STATUS EXAM:   General/Constitutional:  Appearance:  awake, alert, adequately groomed, appeared stated age and no apparent distress  Attitude:   cooperative   Eye Contact:  good  Musculoskeletal:  Psychomotor Behavior:  no evidence of tardive dyskinesia, dystonia, or tics from the head up  Psychiatric:  Speech:  clear, coherent, regular rate, rhythm, and volume,  No pressure speech noted.  Associations:  no loose associations  Thought Process:  logical, linear and goal oriented  Thought Content:   No evidence of suicidal ideation or homicidal ideation, no evidence of psychotic thought, no auditory hallucinations present and no visual hallucinations present  Mood:  good  Affect:  full range/stable (normal variation of emotions during exam) and was congruent to speech content.  Insight:  good  Judgment:  intact, adequate for safety  Impulse Control:   intact  Neurological:  Oriented to:  person, place, time, and situation  Attention Span and Concentration:  Able to attend to the interview     Language: intact    Recent and Remote Memory:  Intact to interview. Not formally assessed. No amnesia.   Fund of Knowledge: appropriate         SAFETY   Feels safe in home: Yes   Suicidal ideation: passive, no intent or plan  History of suicide attempts:  No   Hx of impulsivity: No   Hope for the future: present   Hx of Command hallucinations or current psychosis: No  History of Self-injurious behaviors: No Current:  No  Family member  by suicide:  No    SAFETY ASSESSMENT:   Based on all available evidence including the factors cited above, overall Risk for harm is low and is appropriate for outpatient level of care.   Recommended that patient call 911 or go to the local ED should there be a change in any of these risk factors. and Recommended that legal guardian/parent call 911 or go to the local ED should there be a change in any of these risk factors.    Suicide Risk Factors: diagnosis of a mental disorder (especially depression or mood disorders), male, and Active suicidal ideation  Risk is mitigated by the following protective factors: access to behavioral health care, active involvement in treatment, health seeking behaviors, forward thinking, future oriented, stable housing, and employed      LANGUAGE OR COMMUNICATION BARRIERS   Are there language or communication issues or need for modification in treatment? No   Are there ethnic, cultural or Pentecostal factors that may be relevant for therapy? No  Client identified their preferred language to be fluent English in conversational context  Does the client need the assistance of an  or other support involved in therapy? No    DSM 5 DIAGNOSIS:   296.33 (F33.2) Major Depressive Disorder, Recurrent Episode, Severe _ and With anxious distress  300.02 (F41.1) Generalized Anxiety  "Disorder.  Attention-Deficit/Hyperactivity Disorder  314.01 (F90.9) Unspecified Attention -Deficit / Hyperactivity Disorder.  Substance-Related & Addictive Disorders Alcohol Use Disorder   303.90 (F10.20) Moderate   301.83 (F60.3) Borderline Personality Disorder    MEETS CRITERIA PER DSM 5 AS FOLLOWS:  Previously diagnosed. Most recent note from therapist 3/26/24:   \"Changes since last session-keeping up w discipline at times  Struggling with compulsive sexual behavior  Struggling with emotional regulation\"    MEDICAL COMORBIDITY IMPACTING CLINICAL PICTURE: None noted. Known issue that I take into account for their medical decisions, no current exacerbations or new concerns      ASSESSMENT AND PLAN    Xavier Landon is a 33 year old White Not  or  male presenting for psychiatric evaluation and medication management. Information is obtained from patient and available records.  Reports history of Depression, generalized anxiety disorder, ADHD, substance related and addictive disorders alcohol use disorder, borderline personality disorder, compulsive sexual behavior. Denies prior psychiatric hospitalizations. Hx of suicidal ideation, no suicide attempts. No history of self-injurious behaviors. Genetically loaded for  ADHD and substance use. Grew up in an intact home with all basic needs being met.     Not exposed to multiple Adverse childhood experiences (ACEs). ACEs are strongly related to the development and prevalence of a wide range of health problems throughout a person s lifespan, including those associated with substance misuse. These events are likely playing into the clinical picture.     Patient is in agreement to have consistent care within the institute receiving therapy and psychiatric medications. We dediced to increase the Wellbutrin to further target anxiety and depression. He does state ADHD is well controlled. No additional medication changes at this time.          Problem " List Items Addressed This Visit       Major depressive disorder, recurrent episode, severe with anxious distress (H): not controlled. Follow up in one month.     Relevant Medications    buPROPion (WELLBUTRIN XL) 300 MG 24 hr tablet    Generalized anxiety disorder: not controlled. Follow up in one month.     Relevant Medications    buPROPion (WELLBUTRIN XL) 300 MG 24 hr tablet    Attention deficit hyperactivity disorder (ADHD), combined type. Controlled. No medication changes at this time.     ADD (attention deficit disorder) - Primary          CONSULTS/REFERRALS:   Continue therapy   Coordinate care with therapist as needed     MEDICAL:   None at this time  Coordinate care with PCP (No Ref-Primary, Physician) as needed  Follow up with primary care provider as planned or for acute medical concerns.    PSYCHOEDUCATION:  Medication side effects and alternatives reviewed. Health promotion activities recommended and reviewed today. All questions addressed. Education and counseling completed regarding risks and benefits of medications and psychotherapy options.  Consent provided by patient/guardian  Call the psychiatric nurse line with medication questions or concerns at [phone].  [employer] may be used to communicate with your provider, but this is not intended to be used for emergencies.  Medlineplus.gov is information for patients.  It is run by the [employer] and it contains information about all disorders, diseases and all medications.      COMMUNITY RESOURCES:    CRISIS NUMBERS: Provided in AVS 3/27/2024  National Suicide Prevention Lifeline: 4-877-803-talk (864.680.2779)  [employer]/resources for a list of additional resources (SOS)            [facility] - [phone]   Urgent Care Adult Mental Sywbcf-[phone] mobile unit/ 24/7 crisis line  [facility] -[phone]   COPE 24/7 Lead Mobile Team -[phone] (adults)/ [phone] (child)  Poison  Control Center - [phone]    OR  go to nearest ER  Crisis Text Line for any crisis 24/7 send this-   To: 370273   [facility] ([facility]) [facility]  [phone]  National Suicide Prevention Lifeline: 711.727.7616 (TTY: 782.897.5744). Call anytime for help.  (www.suicidepreventionlifeline.org)  National [place] on Mental Illness (www.laura.org): 680.575.1187 or 572-145-3400.   Mental Health Association (www.mentalhealth.org): 572.624.3029 or 364-658-6585.  [place] Crisis Text Line: Text [place] to 590466  Suicide LifeLine Chat: suicideEx[facility] Medical.org/chat    ADMINISTRATIVE BILLING:     Time spent interviewing patient, reviewing referral documents, obtaining and reviewing outside records, communication with other health specialists, and preparing this report.      Greater than 50% of time was spent in counseling and coordination of care regarding above diagnoses and treatment plan.    Patient Status: Patient will continue to be seen for ongoing consultation and stabilization.    Signed:   Samm Stratton PA-C

## 2024-03-27 NOTE — NURSING NOTE
"Chief Complaint   Patient presents with    Eval/Assessment       /67 (BP Location: Left arm, Patient Position: Sitting, Cuff Size: Adult Large)   Pulse 67   Ht 1.905 m (6' 3\")   Wt 94.3 kg (208 lb)   BMI 26.00 kg/m      Jannet Villarreal RN  March 27, 2024    "

## 2024-03-29 ENCOUNTER — OFFICE VISIT (OUTPATIENT)
Dept: PSYCHOLOGY | Facility: CLINIC | Age: 34
End: 2024-03-29
Payer: COMMERCIAL

## 2024-03-29 DIAGNOSIS — F91.8 CONDUCT DISORDER, UNDIFFERENTIATED TYPE: ICD-10-CM

## 2024-03-29 DIAGNOSIS — F41.1 GENERALIZED ANXIETY DISORDER: ICD-10-CM

## 2024-03-29 DIAGNOSIS — N52.1 ERECTILE DYSFUNCTION DUE TO DISEASES CLASSIFIED ELSEWHERE: ICD-10-CM

## 2024-03-29 DIAGNOSIS — F33.2 MDD (MAJOR DEPRESSIVE DISORDER), RECURRENT SEVERE, WITHOUT PSYCHOSIS (H): Primary | ICD-10-CM

## 2024-03-29 DIAGNOSIS — F90.2 ATTENTION DEFICIT HYPERACTIVITY DISORDER (ADHD), COMBINED TYPE: ICD-10-CM

## 2024-03-29 PROCEDURE — 90837 PSYTX W PT 60 MINUTES: CPT | Performed by: MARRIAGE & FAMILY THERAPIST

## 2024-03-29 NOTE — PROGRESS NOTES
[facility] - Progress Note    Date of Service: 3/29/24   Name: Xavier Landon  : 1990  Medical Record Number: [id number]  Treating Provider: VIKTOR Anderson LAD  Type of Session: Individual  Present in Session: pt  Session Start and Stop Time: 1858-5801  Number of Minutes:  55    SERVICE MODALITY:  In-person    DSM-5 Diagnoses:  296.33 (F33.2) Major Depressive Disorder, Recurrent Episode, Severe _ and With anxious distress  300.02 (F41.1) Generalized Anxiety Disorder.  Attention-Deficit/Hyperactivity Disorder  314.01 (F90.9) Unspecified Attention -Deficit / Hyperactivity Disorder.  Substance-Related & Addictive Disorders Alcohol Use Disorder   303.90 (F10.20) Moderate   301.83 (F60.3) Borderline Personality Disorder    Current Reported Symptoms and Status update:  Changes since last session-frustration w lack of progress  Struggling with compulsive sexual behavior  Struggling with emotional regulation    Progress Toward Treatment Goals:   Satisfactory progress     Therapeutic Interventions/Treatment Strategies:    Area(s) of treatment focus addressed in this session included [place] Maintenance/Relapse Prevention and Sexual Health and Wellness      Psychotherapist offered support, feedback and validation and reinforced use of skills Treatment modalities used include Cognitive Behavioral Therapy Sexual Health and Wellness Education Sex Therapy Interpersonal explored challenging unrealistic expectations of self/others, provided education and discussion about sensate focus, and facilitated discussion about sexual pleasure and sexual satisfaction  Support, Structured Activity, Problem Solving, and Education    Patient Response:   Patient responded to session by verbalizing understanding and actively engaged  Possible barriers to participation / learning include: N/A    Current Mental Status Exam:   Appearance:  Appropriate   Eye Contact:  Good   Attitude /  Demeanor: Cooperative   Speech      Rate / Production: Emotional      Volume:  Loud  volume  Orientation:  All  Mood:   Irritable   Affect:   Appropriate  Constricted   Thought Content: Clear   Insight:   Good       Plan/Need for Future Services:  Return for therapy in 2 weeks to treat diagnosed problems.    Patient has a current master individualized treatment plan.  See Epic treatment plan for more information.    Referral / Collaboration:  Referral to another professional/service is not indicated at this time..  Emergency Services Needed?  No    Assignment:  Masturbation daily w pmr    Interactive Complexity:  There are four specific communication difficulties that complicate the work of the primary psychiatric procedure.  Interactive complexity (+[id number]) may be reported when at least one of these difficulties is present.    Communication difficulties present during current the psychiatric procedure include:  None.      Signature/Title:    Vance Watson, LMFT, [facility]

## 2024-04-02 ENCOUNTER — OFFICE VISIT (OUTPATIENT)
Dept: PSYCHOLOGY | Facility: CLINIC | Age: 34
End: 2024-04-02
Payer: COMMERCIAL

## 2024-04-02 DIAGNOSIS — F90.2 ATTENTION DEFICIT HYPERACTIVITY DISORDER (ADHD), COMBINED TYPE: ICD-10-CM

## 2024-04-02 DIAGNOSIS — F91.8 CONDUCT DISORDER, UNDIFFERENTIATED TYPE: ICD-10-CM

## 2024-04-02 DIAGNOSIS — F33.2 MDD (MAJOR DEPRESSIVE DISORDER), RECURRENT SEVERE, WITHOUT PSYCHOSIS (H): Primary | ICD-10-CM

## 2024-04-02 DIAGNOSIS — N52.1 ERECTILE DYSFUNCTION DUE TO DISEASES CLASSIFIED ELSEWHERE: ICD-10-CM

## 2024-04-02 DIAGNOSIS — F41.1 GENERALIZED ANXIETY DISORDER: ICD-10-CM

## 2024-04-02 PROCEDURE — 90853 GROUP PSYCHOTHERAPY: CPT | Performed by: MARRIAGE & FAMILY THERAPIST

## 2024-04-02 NOTE — GROUP NOTE
[facility]  [address], [address]  [place]  [address]    Group Progress Note  [facility] - Progress Note    Date of Service: 24   Name: Xavier Landon  : 1990  Medical Record Number: [id number]  START TIME:  6:30 PM  END TIME:  8:30 PM  FACILITATOR(S): Deborah Watson LMFT, [facility]  TOPIC: [facility] Group Therapy  Type of Session: Group  :  Number of Attendees:  4  Number of Minutes:  /120    SERVICE MODALITY:  In-person      DSM-5 Diagnoses:  296.33 (F33.2) Major Depressive Disorder, Recurrent Episode, Severe _ and With anxious distress  300.02 (F41.1) Generalized Anxiety Disorder.  Attention-Deficit/Hyperactivity Disorder  314.01 (F90.9) Unspecified Attention -Deficit / Hyperactivity Disorder.  Substance-Related & Addictive Disorders Alcohol Use Disorder   303.90 (F10.20) Moderate   301.83 (F60.3) Borderline Personality Disorder    Current Reported Symptoms and Status update:  Changes since last session reports has been keeping up w homework and discipline  Struggling with compulsive sexual behavior  Struggling with emotional regulation    Progress Toward Treatment Goals:   Satisfactory progress     Therapeutic Interventions/Treatment Strategies:    Area(s) of treatment focus addressed in this session included Symptom Management    Patient checked in about their mental health symptoms, healthy coping skills used over the week, negative coping skills used over the week, what sexual behaviors they engaged in-fantasy, masturbation, sex, their comfort level with their behaviors, if there were triggers, urges to violate boundaries, and violations of boundaries.  They took time to process about discipline and starting school next week they provided support and feedback to others in the group.    Psychotherapist offered support, feedback and validation and reinforced use of skills Treatment modalities used include [facility] THERAPY INTERVENTIONS:  Solution Focused Therapy Group Dynamic Therapy  Interpersonal Behavioral Activation: Encouraged strategies to reduce individual procrastination and increase motivation by increasing goal-directed activities to enhance mood and reduce symptoms.  Support, Feedback, and Structured Activity    Patient Response:   Patient responded to session by accepting feedback, giving feedback, and listening  Possible barriers to participation / learning include: N/A    Current Mental Status Exam:   Appearance:  Appropriate   Eye Contact:  Good   Attitude / Demeanor: Cooperative   Speech      Rate / Production: Normal/ Responsive      Volume:  Normal  volume  Orientation:  All  Mood:   Normal  Affect:   Appropriate   Thought Content: Clear   Insight:   Good       Plan/Need for Future Services:  Return for therapy in 1 weeks to treat diagnosed problems.    Patient has a current master individualized treatment plan.  See Epic treatment plan for more information.    Referral / Collaboration:  Referral to another professional/service is not indicated at this time..  Emergency Services Needed?  No    Assignment:  Return in one week  Keep up w routine    Interactive Complexity:  There are four specific communication difficulties that complicate the work of the primary psychiatric procedure.  Interactive complexity (+25100) may be reported when at least one of these difficulties is present.    Communication difficulties present during current the psychiatric procedure include:  None.      Signature/Title:    Vance Watson, LMFT, [facility]

## 2024-04-09 ENCOUNTER — OFFICE VISIT (OUTPATIENT)
Dept: PSYCHOLOGY | Facility: CLINIC | Age: 34
End: 2024-04-09
Payer: COMMERCIAL

## 2024-04-09 DIAGNOSIS — F41.1 GENERALIZED ANXIETY DISORDER: ICD-10-CM

## 2024-04-09 DIAGNOSIS — F90.2 ATTENTION DEFICIT HYPERACTIVITY DISORDER (ADHD), COMBINED TYPE: ICD-10-CM

## 2024-04-09 DIAGNOSIS — F33.2 MDD (MAJOR DEPRESSIVE DISORDER), RECURRENT SEVERE, WITHOUT PSYCHOSIS (H): Primary | ICD-10-CM

## 2024-04-09 DIAGNOSIS — F91.8 CONDUCT DISORDER, UNDIFFERENTIATED TYPE: ICD-10-CM

## 2024-04-09 DIAGNOSIS — N52.1 ERECTILE DYSFUNCTION DUE TO DISEASES CLASSIFIED ELSEWHERE: ICD-10-CM

## 2024-04-09 PROCEDURE — 90853 GROUP PSYCHOTHERAPY: CPT | Performed by: MARRIAGE & FAMILY THERAPIST

## 2024-04-09 NOTE — GROUP NOTE
[facility]  [address], [address]  [place]  [address]    Group Progress Note  [facility] - Progress Note    Date of Service: 24   Name: Xavier Landon  : 1990  Medical Record Number: [id number]  START TIME:  6:30 PM  END TIME:  8:30 PM  FACILITATOR(S): Deborah Watson LMFT, [facility]  TOPIC: [facility] Group Therapy  Type of Session: Group  :  Number of Attendees:  4  Number of Minutes:  /120    SERVICE MODALITY:  In-person      DSM-5 Diagnoses:  296.33 (F33.2) Major Depressive Disorder, Recurrent Episode, Severe _ and With anxious distress  300.02 (F41.1) Generalized Anxiety Disorder.  Attention-Deficit/Hyperactivity Disorder  314.01 (F90.9) Unspecified Attention -Deficit / Hyperactivity Disorder.  Substance-Related & Addictive Disorders Alcohol Use Disorder   303.90 (F10.20) Moderate   301.83 (F60.3) Borderline Personality Disorder    Current Reported Symptoms and Status update:  Changes since last session-has fallen off routine  Struggling with compulsive sexual behavior  Struggling with emotional regulation    Progress Toward Treatment Goals:   Satisfactory progress     Therapeutic Interventions/Treatment Strategies:    Area(s) of treatment focus addressed in this session included Symptom Management and Craig Maintenance/Relapse Prevention    Patient checked in about their mental health symptoms, healthy coping skills used over the week, negative coping skills used over the week, what sexual behaviors they engaged in-fantasy, masturbation, sex, their comfort level with their behaviors, if there were triggers, urges to violate boundaries, and violations of boundaries.  They took time to process about values and discipline in routine they provided support and feedback to others in the group.    Psychotherapist offered support, feedback and validation and reinforced use of skills Treatment modalities used include [facility] THERAPY  INTERVENTIONS: Group Dynamic Therapy  Interpersonal Behavioral Activation: Encouraged strategies to reduce individual procrastination and increase motivation by increasing goal-directed activities to enhance mood and reduce symptoms.  Support, Feedback, and Structured Activity    Patient Response:   Patient responded to session by accepting feedback, giving feedback, and listening  Possible barriers to participation / learning include: N/A    Current Mental Status Exam:   Appearance:  Appropriate   Eye Contact:  Good   Attitude / Demeanor: Cooperative   Speech      Rate / Production: Normal/ Responsive      Volume:  Normal  volume  Orientation:  All  Mood:   Anxious   Affect:   Appropriate   Thought Content: Clear   Insight:   Good       Plan/Need for Future Services:  Return for therapy in 1 weeks to treat diagnosed problems.    Patient has a current master individualized treatment plan.  See Epic treatment plan for more information.    Referral / Collaboration:  Referral to another professional/service is not indicated at this time..  Emergency Services Needed?  No    Assignment:  Return in one week     Interactive Complexity:  There are four specific communication difficulties that complicate the work of the primary psychiatric procedure.  Interactive complexity (+26580) may be reported when at least one of these difficulties is present.    Communication difficulties present during current the psychiatric procedure include:  None.      Signature/Title:    Vance Watson, LMFT, [facility]

## 2024-04-10 ENCOUNTER — MYC REFILL (OUTPATIENT)
Dept: PSYCHIATRY | Facility: CLINIC | Age: 34
End: 2024-04-10

## 2024-04-10 DIAGNOSIS — F33.2 MAJOR DEPRESSIVE DISORDER, RECURRENT EPISODE, SEVERE WITH ANXIOUS DISTRESS (H): ICD-10-CM

## 2024-04-10 DIAGNOSIS — F41.1 GENERALIZED ANXIETY DISORDER: ICD-10-CM

## 2024-04-10 DIAGNOSIS — F90.2 ATTENTION DEFICIT HYPERACTIVITY DISORDER (ADHD), COMBINED TYPE: ICD-10-CM

## 2024-04-10 RX ORDER — DEXTROAMPHETAMINE SACCHARATE, AMPHETAMINE ASPARTATE, DEXTROAMPHETAMINE SULFATE AND AMPHETAMINE SULFATE 2.5; 2.5; 2.5; 2.5 MG/1; MG/1; MG/1; MG/1
10 TABLET ORAL DAILY PRN
Qty: 30 TABLET | Refills: 0 | Status: SHIPPED | OUTPATIENT
Start: 2024-04-10 | End: 2024-04-17

## 2024-04-10 RX ORDER — DEXTROAMPHETAMINE SACCHARATE, AMPHETAMINE ASPARTATE MONOHYDRATE, DEXTROAMPHETAMINE SULFATE AND AMPHETAMINE SULFATE 6.25; 6.25; 6.25; 6.25 MG/1; MG/1; MG/1; MG/1
50 CAPSULE, EXTENDED RELEASE ORAL DAILY
Qty: 60 CAPSULE | Refills: 0 | Status: SHIPPED | OUTPATIENT
Start: 2024-04-10 | End: 2024-04-17

## 2024-04-12 ENCOUNTER — OFFICE VISIT (OUTPATIENT)
Dept: PSYCHOLOGY | Facility: CLINIC | Age: 34
End: 2024-04-12
Payer: COMMERCIAL

## 2024-04-12 DIAGNOSIS — F90.2 ATTENTION DEFICIT HYPERACTIVITY DISORDER (ADHD), COMBINED TYPE: ICD-10-CM

## 2024-04-12 DIAGNOSIS — N52.1 ERECTILE DYSFUNCTION DUE TO DISEASES CLASSIFIED ELSEWHERE: ICD-10-CM

## 2024-04-12 DIAGNOSIS — F33.2 MDD (MAJOR DEPRESSIVE DISORDER), RECURRENT SEVERE, WITHOUT PSYCHOSIS (H): Primary | ICD-10-CM

## 2024-04-12 DIAGNOSIS — F91.8 CONDUCT DISORDER, UNDIFFERENTIATED TYPE: ICD-10-CM

## 2024-04-12 PROCEDURE — 90834 PSYTX W PT 45 MINUTES: CPT | Performed by: MARRIAGE & FAMILY THERAPIST

## 2024-04-12 NOTE — PROGRESS NOTES
[facility] - Progress Note    Date of Service: 24   Name: Xavier Landon  : 1990  Medical Record Number: [id number]  Treating Provider: VIKTOR Anderson, [facility]  Type of Session: Individual  Present in Session: pt  Session Start and Stop Time: 9120-2042  Number of Minutes:  47    SERVICE MODALITY:  In-person    DSM-5 Diagnoses:  296.33 (F33.2) Major Depressive Disorder, Recurrent Episode, Severe _ and With anxious distress  300.02 (F41.1) Generalized Anxiety Disorder.  Attention-Deficit/Hyperactivity Disorder  314.01 (F90.9) Unspecified Attention -Deficit / Hyperactivity Disorder.  Substance-Related & Addictive Disorders Alcohol Use Disorder   303.90 (F10.20) Moderate   301.83 (F60.3) Borderline Personality Disorder    Current Reported Symptoms and Status update:  Changes since last session-started school-noticing falling into bad habits of procrastination  Struggling with compulsive sexual behavior  Struggling with emotional regulation    Progress Toward Treatment Goals:   Satisfactory progress     Therapeutic Interventions/Treatment Strategies:    Area(s) of treatment focus addressed in this session included Symptom Management      Psychotherapist offered support, feedback and validation and reinforced use of skills Treatment modalities used include Dialectical Behavioral Therapy Interpersonal Behavioral Activation: Reinforced benefits/challenges of change process through applying skills to replace unwanted behaviors and Encouraged strategies to reduce individual procrastination and increase motivation by increasing goal-directed activities to enhance mood and reduce symptoms.  Support, Feedback, and Problem Solving    Patient Response:   Patient responded to session by listening, accepting support, and actively engaged  Possible barriers to participation / learning include: N/A    Current Mental Status Exam:   Appearance:  Appropriate   Eye Contact:  Good    Attitude / Demeanor: Cooperative   Speech      Rate / Production: Normal/ Responsive      Volume:  Normal  volume  Orientation:  All  Mood:   Anxious   Affect:   Appropriate   Thought Content: Clear   Insight:   Good       Plan/Need for Future Services:  Return for therapy in 1 weeks to treat diagnosed problems.    Patient has a current master individualized treatment plan.  See Epic treatment plan for more information.    Referral / Collaboration:  Referral to another professional/service is not indicated at this time..  Emergency Services Needed?  No    Assignment:  Procrastination reduction strategies    Interactive Complexity:  There are four specific communication difficulties that complicate the work of the primary psychiatric procedure.  Interactive complexity (+55606) may be reported when at least one of these difficulties is present.    Communication difficulties present during current the psychiatric procedure include:  None.      Signature/Title:    Vance Watson, LMFT, [facility]

## 2024-04-17 ENCOUNTER — VIRTUAL VISIT (OUTPATIENT)
Dept: PSYCHIATRY | Facility: CLINIC | Age: 34
End: 2024-04-17
Payer: COMMERCIAL

## 2024-04-17 DIAGNOSIS — F33.2 MAJOR DEPRESSIVE DISORDER, RECURRENT EPISODE, SEVERE WITH ANXIOUS DISTRESS (H): ICD-10-CM

## 2024-04-17 DIAGNOSIS — F90.2 ATTENTION DEFICIT HYPERACTIVITY DISORDER (ADHD), COMBINED TYPE: ICD-10-CM

## 2024-04-17 DIAGNOSIS — F41.1 GENERALIZED ANXIETY DISORDER: ICD-10-CM

## 2024-04-17 PROCEDURE — 99214 OFFICE O/P EST MOD 30 MIN: CPT | Mod: 95 | Performed by: STUDENT IN AN ORGANIZED HEALTH CARE EDUCATION/TRAINING PROGRAM

## 2024-04-17 RX ORDER — DEXTROAMPHETAMINE SACCHARATE, AMPHETAMINE ASPARTATE, DEXTROAMPHETAMINE SULFATE AND AMPHETAMINE SULFATE 2.5; 2.5; 2.5; 2.5 MG/1; MG/1; MG/1; MG/1
10 TABLET ORAL DAILY PRN
Qty: 30 TABLET | Refills: 0 | Status: SHIPPED | OUTPATIENT
Start: 2024-05-10 | End: 2024-06-07

## 2024-04-17 RX ORDER — BUPROPION HYDROCHLORIDE 300 MG/1
300 TABLET ORAL EVERY MORNING
Qty: 30 TABLET | Refills: 1 | Status: CANCELLED | OUTPATIENT
Start: 2024-04-17

## 2024-04-17 RX ORDER — DEXTROAMPHETAMINE SACCHARATE, AMPHETAMINE ASPARTATE MONOHYDRATE, DEXTROAMPHETAMINE SULFATE AND AMPHETAMINE SULFATE 6.25; 6.25; 6.25; 6.25 MG/1; MG/1; MG/1; MG/1
50 CAPSULE, EXTENDED RELEASE ORAL DAILY
Qty: 60 CAPSULE | Refills: 0 | Status: SHIPPED | OUTPATIENT
Start: 2024-05-10 | End: 2024-06-07

## 2024-04-17 NOTE — NURSING NOTE
Is the patient currently in the state of [place]? YES    Visit mode:VIDEO    If the visit is dropped, the patient can be reconnected by: VIDEO VISIT: Send to e-mail at: [email or web address]    Will anyone else be joining the visit? NO  (If patient encounters technical issues they should call [phone]    How would you like to obtain your AVS? MyChart    Are changes needed to the allergy or medication list? N/A    Are refills needed on medications prescribed by this physician? YES  Per pt all medications need to be refilled  Reason for visit: RECHECK    Jessica DE LEON

## 2024-04-17 NOTE — PROGRESS NOTES
Virtual Visit Details    Type of service:  Video Visit     Originating Location (pt. Location): Home    Distant Location (provider location):  Off-site  Platform used for Video Visit: [employer]    PSYCHIATRIC MEDICATION FOLLOW UP APPT     Name:  Xavier Landon  : 1990    Patient attended the phone/video session alone.    Last seen for outpatient psychiatry Consultation on 3/27/24.      FOLLOWING PLAN PUT INTO PLACE: yes    INTERIM HISTORY     COMMUNICATIONS FROM PATIENT VIA: none.     RECORDS AVAILABLE FOR REVIEW: EHR records through [employer] .     HISTORY OF PRESENT ILLNESS   Initial psychiatric consult consult in 3/27/24.      Wanted consistent care within institute as he receives mental health care as well. Increased Wellbutrin to 300mg to further target anxiety and depression. States ADHD was well controlled current regimen. On naltrexone as well for impulsive sexual behavior. Today he has not additional concerns. Would like medication refills. States medications are at good doses.              Problem List Items Addressed This Visit         Major depressive disorder, recurrent episode, severe with anxious distress (H): not controlled. Follow up in one month.      Relevant Medications     buPROPion (WELLBUTRIN XL) 300 MG 24 hr tablet     Generalized anxiety disorder: not controlled. Follow up in one month.      Relevant Medications     buPROPion (WELLBUTRIN XL) 300 MG 24 hr tablet     Attention deficit hyperactivity disorder (ADHD), combined type. Controlled. No medication changes at this time.      ADD (attention deficit disorder) - Primary        FAMILY, MEDICAL, SURGICAL HISTORY REVIEWED.  MEDICATION HAVE BEEN REVIEWED AND ARE CURRENT TO THE BEST OF MY KNOWLEDGE AND ABILITY.    MEDICATIONS                                                                                                Current Outpatient Medications   Medication Sig Dispense Refill    amphetamine-dextroamphetamine (ADDERALL XR) 25  MG 24 hr capsule Take 2 capsules (50 mg) by mouth daily for 30 days 60 capsule 0    amphetamine-dextroamphetamine (ADDERALL) 10 MG tablet Take 1 tablet (10 mg) by mouth daily as needed (for breatkthrough symptoms in afternoon) 30 tablet 0    buPROPion (WELLBUTRIN XL) 300 MG 24 hr tablet Take 1 tablet (300 mg) by mouth every morning for 60 days 30 tablet 1    naltrexone (DEPADE/REVIA) 50 MG tablet Take 1 50 mg tablet daily 90 tablet 0    sildenafil (REVATIO) 20 MG tablet Take 1 tablet (20 mg) by mouth 3 times daily (Patient not taking: Reported on 3/27/2024) 30 tablet 3    tadalafil (CIALIS) 5 MG tablet TAKE ONE TABLET BY MOUTH EVERY 24 HOURS 90 tablet 0     No current facility-administered medications for this visit.         PSYCHOTROPIC DRUG INTERACTIONS                                         Additive dopaminergic  Additive noradrenergic    MANAGEMENT:  routine monitoring    TODAY PATIENT REPORTS THE FOLLOWING PSYCHIATRIC ROS:       EXERCISE: Adequate  SIDE EFFECTS:  tolerating medications without reported side effects  COMPLIANCE:  states Adherent to medication regimen  REPORTS THE FOLLOWING NEW MEDICAL ISSUES:  none    PROBLEM: DEPRESSION: Stable       5/31/2023     9:38 AM   Last PHQ-9   1.  Little interest or pleasure in doing things 1   2.  Feeling down, depressed, or hopeless 1   3.  Trouble falling or staying asleep, or sleeping too much 1   4.  Feeling tired or having little energy 1   5.  Poor appetite or overeating 1   6.  Feeling bad about yourself 3   7.  Trouble concentrating 3   8.  Moving slowly or restless 0   Q9: Thoughts of better off dead/self-harm past 2 weeks 0   PHQ-9 Total Score 11         6/6/2022     1:47 PM 6/16/2022     2:42 PM 5/31/2023     9:38 AM   PHQ-9 SCORE   PHQ-9 Total Score MyChart 12 (Moderate depression) 14 (Moderate depression) 11 (Moderate depression)   PHQ-9 Total Score 12 14 11     PHQ9 score is Not completed today  Suicidal ideation:  No     PROBLEM: ANXIETY: Stable.  "  GAD7 score is Not completed today      2/24/2022     2:07 PM 4/6/2022     7:00 PM 6/20/2023     7:33 AM   SHAUNA-7 SCORE   Total Score  10 (moderate anxiety) 6 (mild anxiety)   Total Score 11 10 6       PROBLEM: CHRONIC SUICIDAL IDEATIONS: current: No     PROBLEM: SLEEP/INSOMNIA: Stable      PERTINENT PAST MEDICAL AND SURGICAL HISTORY     Past Medical History:   Diagnosis Date    ADHD (attention deficit hyperactivity disorder)        VITALS     BP Readings from Last 1 Encounters:   03/27/24 110/67     Pulse Readings from Last 1 Encounters:   03/27/24 67     Wt Readings from Last 1 Encounters:   03/27/24 94.3 kg (208 lb)     Ht Readings from Last 1 Encounters:   03/27/24 1.905 m (6' 3\")     Estimated body mass index is 26 kg/m  as calculated from the following:    Height as of 3/27/24: 1.905 m (6' 3\").    Weight as of 3/27/24: 94.3 kg (208 lb).    LABS & IMAGING                                                                                                                  Recent Labs   Lab Test 06/20/23  0818   WBC 4.9   HGB 14.9   HCT 46.1   MCV 88        Recent Labs   Lab Test 06/20/23  0818      POTASSIUM 4.0   CHLORIDE 106   CO2 22   GLC 93   JANAE 9.7   BUN 16.2   CR 1.13   GFRESTIMATED 89   ALBUMIN 4.5   PROTTOTAL 6.7   AST 30   ALT 37   ALKPHOS 80   BILITOTAL 0.4     Recent Labs   Lab Test 06/20/23  0818   CHOL 146   LDL 67   HDL 64   TRIG 74     Recent Labs   Lab Test 06/20/23  0818   TSH 1.80     No results found for: \"GZL251\", \"FGZA614\", \"CKBA31QANYN\", \"VITD3\", \"D2VIT\", \"D3VIT\", \"DTOT\", \"[id number]\", \"[id number]\", \"[id number]\", \"[id number]\", \"[id number]\", \"[id number]\"     ALLERGY & IMMUNIZATIONS     No Known Allergies    MEDICAL REVIEW OF SYSTEMS:   Ten system review was completed with pertinent positives noted     MENTAL STATUS EXAM:   General/Constitutional:  Appearance:  awake, alert, adequately groomed, appeared stated age and no apparent distress  Attitude:   cooperative   Eye Contact:  " good  Musculoskeletal:  Psychomotor Behavior:  no evidence of tardive dyskinesia, dystonia, or tics from the head up  Psychiatric:  Speech:  clear, coherent, regular rate, rhythm, and volume,  No pressure speech noted.  Associations:  no loose associations  Thought Process:  logical, linear and goal oriented  Thought Content:   No evidence of suicidal ideation or homicidal ideation, no evidence of psychotic thought, no auditory hallucinations present and no visual hallucinations present  Mood:  good  Affect:  full range/stable (normal variation of emotions during exam) and was congruent to speech content.  Insight:  good  Judgment:  intact, adequate for safety  Impulse Control:  intact  Neurological:  Oriented to:  person, place, time, and situation  Attention Span and Concentration:  Able to attend to the interview     Language: intact    Recent and Remote Memory:  Intact to interview. Not formally assessed. No amnesia.   Fund of Knowledge: appropriate         DSM 5 DIAGNOSIS:      Attention deficit hyperactivity disorder (ADHD), combined type  Generalized anxiety disorder  Major depressive disorder, recurrent episode, severe with anxious distress (H)    MEDICAL COMORBIDITY IMPACTING CLINICAL PICTURE: None noted.  Known issue that I take into account for their medical decisions, no current exacerbations or new concerns      ASSESSMENT AND PLAN      Problem List as of 4/17/2024 Reviewed: 8/12/2021  5:42 PM by Brad Garcia MD            Noted    1. Major depressive disorder, recurrent episode, severe with anxious distress (H) 1/11/2021    2. Generalized anxiety disorder 2/14/2021    3. Attention deficit hyperactivity disorder (ADHD), combined type 2/14/2021      Depression, anxiety, and ADHD are controlled. Provided refills for patient to have for another 3 months for follow up.   HIGH RISK MEDICATION:  No         Patient Status:  Patient will continue to be seen for ongoing consultation and  stabilization.    Signed:   Samm Stratton PA-C

## 2024-04-19 ENCOUNTER — OFFICE VISIT (OUTPATIENT)
Dept: PSYCHOLOGY | Facility: CLINIC | Age: 34
End: 2024-04-19
Payer: COMMERCIAL

## 2024-04-19 DIAGNOSIS — F33.2 MDD (MAJOR DEPRESSIVE DISORDER), RECURRENT SEVERE, WITHOUT PSYCHOSIS (H): Primary | ICD-10-CM

## 2024-04-19 DIAGNOSIS — F91.8 CONDUCT DISORDER, UNDIFFERENTIATED TYPE: ICD-10-CM

## 2024-04-19 DIAGNOSIS — N52.1 ERECTILE DYSFUNCTION DUE TO DISEASES CLASSIFIED ELSEWHERE: ICD-10-CM

## 2024-04-19 PROCEDURE — 90834 PSYTX W PT 45 MINUTES: CPT | Performed by: MARRIAGE & FAMILY THERAPIST

## 2024-04-19 NOTE — PROGRESS NOTES
[facility] - Progress Note    Date of Service: 24   Name: Xavier Landon  : 1990  Medical Record Number: [id number]  Treating Provider: VIKTOR Anderson, [facility]  Type of Session: Individual  Present in Session: pt  Session Start and Stop Time: 1292-1144  Number of Minutes:  51    SERVICE MODALITY:  In-person    DSM-5 Diagnoses:  296.33 (F33.2) Major Depressive Disorder, Recurrent Episode, Severe _ and With anxious distress  300.02 (F41.1) Generalized Anxiety Disorder.  Attention-Deficit/Hyperactivity Disorder  314.01 (F90.9) Unspecified Attention -Deficit / Hyperactivity Disorder.  Substance-Related & Addictive Disorders Alcohol Use Disorder   303.90 (F10.20) Moderate   301.83 (F60.3) Borderline Personality Disorder    Current Reported Symptoms and Status update:  Changes since last session struggling w routine and motivation    Progress Toward Treatment Goals:   Satisfactory progress     Therapeutic Interventions/Treatment Strategies:    Area(s) of treatment focus addressed in this session included Symptom Management    Struggling with procrastination again and keeping up w things    Psychotherapist offered support, feedback and validation and reinforced use of skills Treatment modalities used include Motivational Interviewing Cognitive Behavioral Therapy Interpersonal Behavioral Activation: Reinforced benefits/challenges of change process through applying skills to replace unwanted behaviors, Explored how behaviors effect mood and interact with thoughts and feelings, and Encouraged strategies to reduce individual procrastination and increase motivation by increasing goal-directed activities to enhance mood and reduce symptoms. and Cognitive Restructuring:  Explored impact of ineffective thoughts / distortions on mood and activity  Support redirection feedback     Patient Response:   Patient responded to session by listening and accepting support  Possible  barriers to participation / learning include: N/A    Current Mental Status Exam:   Appearance:  Appropriate   Eye Contact:  Good   Attitude / Demeanor: Cooperative   Speech      Rate / Production: Normal/ Responsive      Volume:  Normal  volume  Orientation:  All  Mood:   Depressed   Affect:   Appropriate   Thought Content: Clear   Insight:   Good       Plan/Need for Future Services:  Return for therapy in 2 weeks to treat diagnosed problems.    Patient has a current master individualized treatment plan.  See Epic treatment plan for more information.    Referral / Collaboration:  Referral to another professional/service is not indicated at this time..  Emergency Services Needed?  No    Assignment:routine and discipline    Interactive Complexity:  There are four specific communication difficulties that complicate the work of the primary psychiatric procedure.  Interactive complexity (+[id number]) may be reported when at least one of these difficulties is present.    Communication difficulties present during current the psychiatric procedure include:  None.      Signature/Title:    VIKTOR Anderson, [facility]

## 2024-04-30 ENCOUNTER — OFFICE VISIT (OUTPATIENT)
Dept: PSYCHOLOGY | Facility: CLINIC | Age: 34
End: 2024-04-30
Payer: COMMERCIAL

## 2024-04-30 DIAGNOSIS — F90.2 ATTENTION DEFICIT HYPERACTIVITY DISORDER (ADHD), COMBINED TYPE: ICD-10-CM

## 2024-04-30 DIAGNOSIS — F33.2 MDD (MAJOR DEPRESSIVE DISORDER), RECURRENT SEVERE, WITHOUT PSYCHOSIS (H): Primary | ICD-10-CM

## 2024-04-30 DIAGNOSIS — N52.1 ERECTILE DYSFUNCTION DUE TO DISEASES CLASSIFIED ELSEWHERE: ICD-10-CM

## 2024-04-30 DIAGNOSIS — F91.8 CONDUCT DISORDER, UNDIFFERENTIATED TYPE: ICD-10-CM

## 2024-04-30 PROCEDURE — 90853 GROUP PSYCHOTHERAPY: CPT | Performed by: MARRIAGE & FAMILY THERAPIST

## 2024-04-30 NOTE — GROUP NOTE
[facility]  [address], [address]  [place]  [address]    Group Progress Note  [facility] - Progress Note    Date of Service: 24   Name: Xavier Landon  : 1990  Medical Record Number: [id number]  START TIME:  6:30 PM  END TIME:  8:30 PM  FACILITATOR(S): Deborah Watson LMFT, [facility]  TOPIC: [facility] Group Therapy  Type of Session: Group  :  Number of Attendees:  4  Number of Minutes:  /120    SERVICE MODALITY:  In-person      DSM-5 Diagnoses:  296.33 (F33.2) Major Depressive Disorder, Recurrent Episode, Severe _ and With anxious distress  300.02 (F41.1) Generalized Anxiety Disorder.  Attention-Deficit/Hyperactivity Disorder  314.01 (F90.9) Unspecified Attention -Deficit / Hyperactivity Disorder.  Substance-Related & Addictive Disorders Alcohol Use Disorder   303.90 (F10.20) Moderate   301.83 (F60.3) Borderline Personality Disorder    Current Reported Symptoms and Status update:  Changes since last session-been more depressed this week   Struggling with compulsive sexual behavior  Struggling with emotional regulation    Progress Toward Treatment Goals:   Satisfactory progress     Therapeutic Interventions/Treatment Strategies:    Area(s) of treatment focus addressed in this session included Symptom Management    Patient checked in about their mental health symptoms, healthy coping skills used over the week, negative coping skills used over the week, what sexual behaviors they engaged in-fantasy, masturbation, sex, their comfort level with their behaviors, if there were triggers, urges to violate boundaries, and violations of boundaries.  They took time to process about not keeping up with needs they provided support and feedback to others in the group.    Psychotherapist offered support, feedback and validation and reinforced use of skills Treatment modalities used include [facility] THERAPY INTERVENTIONS: Group Dynamic Therapy  Interpersonal  Cognitive Restructuring:  Explored impact of ineffective thoughts / distortions on mood and activity and Facilitated recognition of the connection between negative thoughts and negative core beliefs  Support, Feedback, and Structured Activity    Patient Response:   Patient responded to session by giving feedback, listening, and accepting support  Possible barriers to participation / learning include: N/A    Current Mental Status Exam:   Appearance:  Appropriate   Eye Contact:  Good   Attitude / Demeanor: Cooperative   Speech      Rate / Production: Normal/ Responsive Emotional      Volume:  Normal  volume  Orientation:  All  Mood:   Depressed  Irritable   Affect:   Constricted   Thought Content: Clear   Insight:   Fair       Plan/Need for Future Services:  Return for therapy in 1 weeks to treat diagnosed problems.    Patient has a current master individualized treatment plan.  See Epic treatment plan for more information.    Referral / Collaboration:  Referral to another professional/service is not indicated at this time..  Emergency Services Needed?  No    Assignment:  Return in one week     Interactive Complexity:  There are four specific communication difficulties that complicate the work of the primary psychiatric procedure.  Interactive complexity (+52546) may be reported when at least one of these difficulties is present.    Communication difficulties present during current the psychiatric procedure include:  None.      Signature/Title:    Vance Watson, LMFT, [facility]

## 2024-05-03 ENCOUNTER — OFFICE VISIT (OUTPATIENT)
Dept: PSYCHOLOGY | Facility: CLINIC | Age: 34
End: 2024-05-03
Payer: COMMERCIAL

## 2024-05-03 DIAGNOSIS — F90.2 ATTENTION DEFICIT HYPERACTIVITY DISORDER (ADHD), COMBINED TYPE: ICD-10-CM

## 2024-05-03 DIAGNOSIS — N52.1 ERECTILE DYSFUNCTION DUE TO DISEASES CLASSIFIED ELSEWHERE: ICD-10-CM

## 2024-05-03 DIAGNOSIS — F33.2 MDD (MAJOR DEPRESSIVE DISORDER), RECURRENT SEVERE, WITHOUT PSYCHOSIS (H): Primary | ICD-10-CM

## 2024-05-03 DIAGNOSIS — F91.8 CONDUCT DISORDER, UNDIFFERENTIATED TYPE: ICD-10-CM

## 2024-05-03 DIAGNOSIS — F41.1 GENERALIZED ANXIETY DISORDER: ICD-10-CM

## 2024-05-03 PROCEDURE — 90837 PSYTX W PT 60 MINUTES: CPT | Performed by: MARRIAGE & FAMILY THERAPIST

## 2024-05-03 NOTE — PROGRESS NOTES
[facility] - Progress Note    Date of Service: 24   Name: Xavier Landon  : 1990  Medical Record Number: [id number]  Treating Provider: VIKTOR Anderson, [facility]  Type of Session: Individual  Present in Session: pt  Session Start and Stop Time: 8249-2968  Number of Minutes:  57    SERVICE MODALITY:  In-person    DSM-5 Diagnoses:  296.33 (F33.2) Major Depressive Disorder, Recurrent Episode, Severe _ and With anxious distress  300.02 (F41.1) Generalized Anxiety Disorder.  Attention-Deficit/Hyperactivity Disorder  314.01 (F90.9) Unspecified Attention -Deficit / Hyperactivity Disorder.  Substance-Related & Addictive Disorders Alcohol Use Disorder   303.90 (F10.20) Moderate   301.83 (F60.3) Borderline Personality Disorder    Current Reported Symptoms and Status update:  Changes since last session-has been able to get some things back on track   Struggling with compulsive sexual behavior  Struggling with emotional regulation    Progress Toward Treatment Goals:   Satisfactory progress     Therapeutic Interventions/Treatment Strategies:    Area(s) of treatment focus addressed in this session included Symptom Management and Sexual Health and Wellness    Psychotherapist offered support, feedback and validation and reinforced use of skills Treatment modalities used include Dialectical Behavioral Therapy Solution Focused Therapy Interpersonal Relationship Skills: Assisted patients in implementing more effective communication skills in their relationships and Discussed strategies to promote healthier understanding of interpersonal relationships and facilitated discussion about sexual health and wellness and explored challenging unrealistic expectations of self/others  Structured Activity and Problem Solving    Patient Response:   Patient responded to session by verbalizing understanding and required support for self-regulation  Possible barriers to participation / learning  include: N/A    Current Mental Status Exam:   Appearance:  Appropriate   Eye Contact:  Good   Attitude / Demeanor: Belligerent   Speech      Rate / Production: Emotional      Volume:  Loud  volume  Orientation:  All  Mood:   Angry  Irritable   Affect:   Constricted   Thought Content: Clear   Insight:   Fair       Plan/Need for Future Services:  Return for therapy in 1 weeks to treat diagnosed problems.    Patient has a current master individualized treatment plan.  See Epic treatment plan for more information.    Referral / Collaboration:  Referral to another professional/service is not indicated at this time..  Emergency Services Needed?  No    Assignment:  Car-  Masturbation     Interactive Complexity:  There are four specific communication difficulties that complicate the work of the primary psychiatric procedure.  Interactive complexity (+60684) may be reported when at least one of these difficulties is present.    Communication difficulties present during current the psychiatric procedure include:  None.      Signature/Title:    VIKTOR Anderson, [facility]

## 2024-05-10 ENCOUNTER — OFFICE VISIT (OUTPATIENT)
Dept: PSYCHOLOGY | Facility: CLINIC | Age: 34
End: 2024-05-10
Payer: COMMERCIAL

## 2024-05-10 DIAGNOSIS — N52.1 ERECTILE DYSFUNCTION DUE TO DISEASES CLASSIFIED ELSEWHERE: ICD-10-CM

## 2024-05-10 DIAGNOSIS — F33.2 MDD (MAJOR DEPRESSIVE DISORDER), RECURRENT SEVERE, WITHOUT PSYCHOSIS (H): ICD-10-CM

## 2024-05-10 DIAGNOSIS — F91.8 CONDUCT DISORDER, UNDIFFERENTIATED TYPE: Primary | ICD-10-CM

## 2024-05-10 DIAGNOSIS — F41.1 GENERALIZED ANXIETY DISORDER: ICD-10-CM

## 2024-05-10 DIAGNOSIS — F90.2 ATTENTION DEFICIT HYPERACTIVITY DISORDER (ADHD), COMBINED TYPE: ICD-10-CM

## 2024-05-10 PROCEDURE — 90834 PSYTX W PT 45 MINUTES: CPT | Performed by: MARRIAGE & FAMILY THERAPIST

## 2024-05-10 NOTE — PROGRESS NOTES
[facility] - Progress Note    Date of Service: 5/10/24   Name: Xavier Landon  : 1990  Medical Record Number: [id number]  Treating Provider: VIKTOR Anderson, [facility]  Type of Session: Individual  Present in Session: pt  Session Start and Stop Time: 5115-2292  Number of Minutes:  45    SERVICE MODALITY:  In-person    DSM-5 Diagnoses:  296.33 (F33.2) Major Depressive Disorder, Recurrent Episode, Severe _ and With anxious distress  300.02 (F41.1) Generalized Anxiety Disorder.  Attention-Deficit/Hyperactivity Disorder  314.01 (F90.9) Unspecified Attention -Deficit / Hyperactivity Disorder.  Substance-Related & Addictive Disorders Alcohol Use Disorder   303.90 (F10.20) Moderate   301.83 (F60.3) Borderline Personality Disorder    Current Reported Symptoms and Status update:  Changes since last session-tried masturbating once  Struggling with compulsive sexual behavior  Struggling with emotional regulation    Progress Toward Treatment Goals:   Satisfactory progress     Therapeutic Interventions/Treatment Strategies:    Area(s) of treatment focus addressed in this session included Symptom Management and Sexual Health and Wellness    Psychotherapist offered support, feedback and validation and reinforced use of skills Treatment modalities used include Dialectical Behavioral Therapy Sex Therapy Interpersonal Cognitive Restructuring:  Explored impact of ineffective thoughts / distortions on mood and activity and Assisted patient in formulating new neutral/positive alternatives to challenge less helpful / ineffective thoughts and explored challenging unrealistic expectations of self/others  Support, Redirection, Feedback, and Problem Solving    Patient Response:   Patient responded to session by listening and accepting support  Possible barriers to participation / learning include: N/A    Current Mental Status Exam:   Appearance:  Appropriate   Eye Contact:  Good   Attitude /  Demeanor: Cooperative   Speech      Rate / Production: Normal/ Responsive      Volume:  Normal  volume  Orientation:  All  Mood:   Normal  Affect:   Appropriate   Thought Content: Clear   Insight:   Good       Plan/Need for Future Services:  Return for therapy in 2 weeks to treat diagnosed problems.    Patient has a current master individualized treatment plan.  See Epic treatment plan for more information.    Referral / Collaboration:  Referral to another professional/service is not indicated at this time..  Emergency Services Needed?  No    Assignment:  Challenging thoughts    Interactive Complexity:  There are four specific communication difficulties that complicate the work of the primary psychiatric procedure.  Interactive complexity (+20524) may be reported when at least one of these difficulties is present.    Communication difficulties present during current the psychiatric procedure include:  None.      Signature/Title:    Vance Watson, LMFT, [facility]

## 2024-05-28 ENCOUNTER — OFFICE VISIT (OUTPATIENT)
Dept: PSYCHOLOGY | Facility: CLINIC | Age: 34
End: 2024-05-28
Payer: COMMERCIAL

## 2024-05-28 DIAGNOSIS — F90.2 ATTENTION DEFICIT HYPERACTIVITY DISORDER (ADHD), COMBINED TYPE: ICD-10-CM

## 2024-05-28 DIAGNOSIS — F41.1 GENERALIZED ANXIETY DISORDER: ICD-10-CM

## 2024-05-28 DIAGNOSIS — F33.2 MDD (MAJOR DEPRESSIVE DISORDER), RECURRENT SEVERE, WITHOUT PSYCHOSIS (H): Primary | ICD-10-CM

## 2024-05-28 DIAGNOSIS — N52.1 ERECTILE DYSFUNCTION DUE TO DISEASES CLASSIFIED ELSEWHERE: ICD-10-CM

## 2024-05-28 DIAGNOSIS — F91.8 CONDUCT DISORDER, UNDIFFERENTIATED TYPE: ICD-10-CM

## 2024-05-28 PROCEDURE — 90853 GROUP PSYCHOTHERAPY: CPT | Performed by: MARRIAGE & FAMILY THERAPIST

## 2024-05-28 NOTE — GROUP NOTE
[facility]  [address], [address]  [place]  [address]    Group Progress Note  [facility] - Progress Note    Date of Service: 24   Name: Xavier Landon  : 1990  Medical Record Number: [id number]  START TIME:  6:30 PM  END TIME:  8:30 PM  FACILITATOR(S): Deborah Watson LMFT, [facility]  TOPIC: [facility] Group Therapy  Type of Session: Group  :  Number of Attendees:  5  Number of Minutes:  /120    SERVICE MODALITY:  In-person      DSM-5 Diagnoses:  296.33 (F33.2) Major Depressive Disorder, Recurrent Episode, Severe _ and With anxious distress  300.02 (F41.1) Generalized Anxiety Disorder.  Attention-Deficit/Hyperactivity Disorder  314.01 (F90.9) Unspecified Attention -Deficit / Hyperactivity Disorder.  Substance-Related & Addictive Disorders Alcohol Use Disorder   303.90 (F10.20) Moderate   301.83 (F60.3) Borderline Personality Disorder    Current Reported Symptoms and Status update:  Changes since last session-has been struggling with consistency   Struggling with compulsive sexual behavior  Struggling with emotional regulation    Progress Toward Treatment Goals:   Satisfactory progress     Therapeutic Interventions/Treatment Strategies:    Area(s) of treatment focus addressed in this session included Symptom Management and Interpersonal Relationship Skills    Patient checked in about their mental health symptoms, healthy coping skills used over the week, negative coping skills used over the week, what sexual behaviors they engaged in-fantasy, masturbation, sex, their comfort level with their behaviors, if there were triggers, urges to violate boundaries, and violations of boundaries.  They took time to process about barriers to participation in therapies they provided support and feedback to others in the group.    Psychotherapist offered support, feedback and validation, provided redirection, and reinforced use of skills Treatment modalities  used include [facility] THERAPY INTERVENTIONS: Cognitive Behavioral Therapy Group Dynamic Therapy  Interpersonal Humanist Cognitive Restructuring:  Assisted patient in formulating new neutral/positive alternatives to challenge less helpful / ineffective thoughts and Facilitated recognition of the connection between negative thoughts and negative core beliefs and Relapse Prevention: Assisted patient in identifying the challenges and barriers to participation and attendance to support groups/community resources  Support, Feedback, and Structured Activity    Patient Response:   Patient responded to session by listening, accepting support, and verbalizing understanding  Possible barriers to participation / learning include: N/A    Current Mental Status Exam:   Appearance:  Appropriate   Eye Contact:  Good   Attitude / Demeanor: Cooperative  Belligerent   Speech      Rate / Production: Emotional      Volume:  Loud  volume  Orientation:  All  Mood:   Irritable   Affect:   Appropriate  Constricted   Thought Content: Clear   Insight:   Good       Plan/Need for Future Services:  Return for therapy in 1 weeks to treat diagnosed problems.    Patient has a current master individualized treatment plan.  See Epic treatment plan for more information.    Referral / Collaboration:  Referral to another professional/service is not indicated at this time..  Emergency Services Needed?  No    Assignment:  Start sex history     Interactive Complexity:  There are four specific communication difficulties that complicate the work of the primary psychiatric procedure.  Interactive complexity (+98335) may be reported when at least one of these difficulties is present.    Communication difficulties present during current the psychiatric procedure include:  None.      Signature/Title:    Vance Watson, LMFT, [facility]

## 2024-05-31 ENCOUNTER — OFFICE VISIT (OUTPATIENT)
Dept: PSYCHOLOGY | Facility: CLINIC | Age: 34
End: 2024-05-31
Payer: COMMERCIAL

## 2024-05-31 DIAGNOSIS — N52.1 ERECTILE DYSFUNCTION DUE TO DISEASES CLASSIFIED ELSEWHERE: ICD-10-CM

## 2024-05-31 DIAGNOSIS — F90.2 ATTENTION DEFICIT HYPERACTIVITY DISORDER (ADHD), COMBINED TYPE: ICD-10-CM

## 2024-05-31 DIAGNOSIS — F41.1 GENERALIZED ANXIETY DISORDER: ICD-10-CM

## 2024-05-31 DIAGNOSIS — F91.8 CONDUCT DISORDER, UNDIFFERENTIATED TYPE: ICD-10-CM

## 2024-05-31 DIAGNOSIS — F33.2 MDD (MAJOR DEPRESSIVE DISORDER), RECURRENT SEVERE, WITHOUT PSYCHOSIS (H): Primary | ICD-10-CM

## 2024-05-31 PROCEDURE — 90834 PSYTX W PT 45 MINUTES: CPT | Performed by: MARRIAGE & FAMILY THERAPIST

## 2024-05-31 NOTE — PROGRESS NOTES
[facility] - Progress Note    Date of Service: 24   Name: Xavier Landon  : 1990  Medical Record Number: [id number]  Treating Provider: VIKTOR Anderson, JESSI  Type of Session: Individual  Present in Session: pt  Session Start and Stop Time: 0622-8741  Number of Minutes:  48    SERVICE MODALITY:  In-person    DSM-5 Diagnoses:  296.33 (F33.2) Major Depressive Disorder, Recurrent Episode, Severe _ and With anxious distress  300.02 (F41.1) Generalized Anxiety Disorder.  Attention-Deficit/Hyperactivity Disorder  314.01 (F90.9) Unspecified Attention -Deficit / Hyperactivity Disorder.  Substance-Related & Addictive Disorders Alcohol Use Disorder   303.90 (F10.20) Moderate   301.83 (F60.3) Borderline Personality Disorder    Current Reported Symptoms and Status update:  Changes since last session has talked to school about the coding     Progress Toward Treatment Goals:   Satisfactory progress     Therapeutic Interventions/Treatment Strategies:    Area(s) of treatment focus addressed in this session included Symptom Management, Interpersonal Relationship Skills, and Sexual Health and Wellness    Has been been masturbating w o porn   Improved mood    Psychotherapist offered support, feedback and validation and reinforced use of skills Treatment modalities used include Sexual Health and Wellness Education Sex Therapy Interpersonal Humanist Behavioral Activation: Reinforced benefits/challenges of change process through applying skills to replace unwanted behaviors, Relationship Skills: Discussed strategies to promote healthier understanding of interpersonal relationships, and explored challenging unrealistic expectations of self/others and explored comfort with sexual communication  Support, Feedback, and Structured Activity    Patient Response:   Patient responded to session by focusing on goals, accepting support, verbalizing understanding, and actively  engaged  Possible barriers to participation / learning include: N/A    Current Mental Status Exam:   Appearance:  Appropriate   Eye Contact:  Good   Attitude / Demeanor: Cooperative   Speech      Rate / Production: Normal/ Responsive      Volume:  Normal  volume  Orientation:  All  Mood:   Normal Euthymic  Affect:   Appropriate   Thought Content: Clear   Insight:   Good       Plan/Need for Future Services:  Return for therapy in 2 weeks to treat diagnosed problems.    Patient has a current master individualized treatment plan.  See Epic treatment plan for more information.    Referral / Collaboration:  Referral to another professional/service is not indicated at this time..  Emergency Services Needed?  No    Assignment:  School routine, discipline cultivation    Interactive Complexity:  There are four specific communication difficulties that complicate the work of the primary psychiatric procedure.  Interactive complexity (+70488) may be reported when at least one of these difficulties is present.    Communication difficulties present during current the psychiatric procedure include:  None.      Signature/Title:    Vance Watson, LMFT, [facility]

## 2024-06-04 ENCOUNTER — TELEPHONE (OUTPATIENT)
Dept: BEHAVIORAL HEALTH | Facility: CLINIC | Age: 34
End: 2024-06-04

## 2024-06-04 NOTE — TELEPHONE ENCOUNTER
[facility] Call Center    Phone Message    May a detailed message be left on voicemail: no     Reason for Call: Other: Notified pt that today's group was canceled

## 2024-06-06 DIAGNOSIS — F33.2 MAJOR DEPRESSIVE DISORDER, RECURRENT EPISODE, SEVERE WITH ANXIOUS DISTRESS (H): ICD-10-CM

## 2024-06-06 DIAGNOSIS — F41.1 GENERALIZED ANXIETY DISORDER: ICD-10-CM

## 2024-06-06 RX ORDER — BUPROPION HYDROCHLORIDE 300 MG/1
300 TABLET ORAL EVERY MORNING
Qty: 30 TABLET | Refills: 1 | Status: SHIPPED | OUTPATIENT
Start: 2024-06-06 | End: 2024-09-05

## 2024-06-06 NOTE — TELEPHONE ENCOUNTER
Requested Medication:  Wellbutrin XL  Dose:   300 mg  Quantity:  30  Refills:  1    Take 1 (300 mg) tablet daily    Last seen at [facility]:  4/2024 - 3 mo  Next Appointment with Provider:  Visit date not found       Lois Baird CMA

## 2024-06-07 ENCOUNTER — TELEPHONE (OUTPATIENT)
Dept: PSYCHIATRY | Facility: CLINIC | Age: 34
End: 2024-06-07

## 2024-06-07 ENCOUNTER — MYC MEDICAL ADVICE (OUTPATIENT)
Dept: PSYCHIATRY | Facility: CLINIC | Age: 34
End: 2024-06-07

## 2024-06-07 DIAGNOSIS — F90.2 ATTENTION DEFICIT HYPERACTIVITY DISORDER (ADHD), COMBINED TYPE: ICD-10-CM

## 2024-06-07 RX ORDER — DEXTROAMPHETAMINE SACCHARATE, AMPHETAMINE ASPARTATE, DEXTROAMPHETAMINE SULFATE AND AMPHETAMINE SULFATE 2.5; 2.5; 2.5; 2.5 MG/1; MG/1; MG/1; MG/1
10 TABLET ORAL DAILY PRN
Qty: 30 TABLET | Refills: 0 | Status: SHIPPED | OUTPATIENT
Start: 2024-07-07 | End: 2024-08-06

## 2024-06-07 RX ORDER — DEXTROAMPHETAMINE SACCHARATE, AMPHETAMINE ASPARTATE MONOHYDRATE, DEXTROAMPHETAMINE SULFATE AND AMPHETAMINE SULFATE 6.25; 6.25; 6.25; 6.25 MG/1; MG/1; MG/1; MG/1
50 CAPSULE, EXTENDED RELEASE ORAL DAILY
Qty: 60 CAPSULE | Refills: 0 | Status: SHIPPED | OUTPATIENT
Start: 2024-07-07 | End: 2024-08-06

## 2024-06-07 RX ORDER — DEXTROAMPHETAMINE SACCHARATE, AMPHETAMINE ASPARTATE, DEXTROAMPHETAMINE SULFATE AND AMPHETAMINE SULFATE 2.5; 2.5; 2.5; 2.5 MG/1; MG/1; MG/1; MG/1
10 TABLET ORAL DAILY PRN
Qty: 30 TABLET | Refills: 0 | Status: SHIPPED | OUTPATIENT
Start: 2024-06-07 | End: 2024-07-07

## 2024-06-07 RX ORDER — DEXTROAMPHETAMINE SACCHARATE, AMPHETAMINE ASPARTATE MONOHYDRATE, DEXTROAMPHETAMINE SULFATE AND AMPHETAMINE SULFATE 6.25; 6.25; 6.25; 6.25 MG/1; MG/1; MG/1; MG/1
50 CAPSULE, EXTENDED RELEASE ORAL DAILY
Qty: 60 CAPSULE | Refills: 0 | Status: SHIPPED | OUTPATIENT
Start: 2024-06-07 | End: 2024-07-07

## 2024-06-07 NOTE — TELEPHONE ENCOUNTER
Adderall Refill Request    Date of Last Office Visit: 4/17/2024  Date of Next Office Visit: 6/13/2024  No shows since last visit: 0  Cancellations since last visit: 0    Medication requested:  Adderall (amphetamine-dextroamphetamine 10 mg, 50 mg)  Date last ordered: 4/17/2024 Qty: 1 month supply Refills: 0     Lapse in medication adherence greater than 5 days?: No  Medication refill request verified as identical to current order?: Yes    Last visit treatment plan: Provided refills for patient to have for another 3 months for follow up.    Nurse Action: Per chart review, it looks like pt was only Rx'd a 1 month supply of medication, but last visit treatment plan states 3 month of refills were provided. RN tasking provider Chayito for review of request.    []Medication refilled per  Medication Refill in Ambulatory Care  policy.  [x]Medication unable to be refilled by RN due to criteria not met as indicated below:    []Eligibility - not seen in the last year   []Supervision - no future appointment   []Compliance - no shows, cancellations or lapse in therapy   []Verification - order discrepancy   [x]Controlled medication   []Medication not included in policy   []90-day supply request   []Other

## 2024-06-07 NOTE — TELEPHONE ENCOUNTER
[facility] Call Center    Phone Message    May a detailed message be left on voicemail: yes     Reason for Call: Medication Refill Request    Has the patient contacted the pharmacy for the refill? Yes   Name of medication being requested: amphetamine-dextroamphetamine 10 mg, 50 mg  Provider who prescribed the medication: Samm Stratton PA-C  Pharmacy:    [id number] - [place], [place] - [address]      Date medication is needed: Tuesday       Action Taken: Other: Message routed to [facility] Nurse Pool    Travel Screening: Not Applicable     Date of Service:

## 2024-06-11 ENCOUNTER — OFFICE VISIT (OUTPATIENT)
Dept: PSYCHOLOGY | Facility: CLINIC | Age: 34
End: 2024-06-11
Payer: COMMERCIAL

## 2024-06-11 DIAGNOSIS — N52.1 ERECTILE DYSFUNCTION DUE TO DISEASES CLASSIFIED ELSEWHERE: ICD-10-CM

## 2024-06-11 DIAGNOSIS — F91.8 CONDUCT DISORDER, UNDIFFERENTIATED TYPE: ICD-10-CM

## 2024-06-11 DIAGNOSIS — F33.2 MDD (MAJOR DEPRESSIVE DISORDER), RECURRENT SEVERE, WITHOUT PSYCHOSIS (H): Primary | ICD-10-CM

## 2024-06-11 DIAGNOSIS — F90.2 ATTENTION DEFICIT HYPERACTIVITY DISORDER (ADHD), COMBINED TYPE: ICD-10-CM

## 2024-06-11 PROCEDURE — 90853 GROUP PSYCHOTHERAPY: CPT | Performed by: MARRIAGE & FAMILY THERAPIST

## 2024-06-11 NOTE — GROUP NOTE
[facility]  [address], [address]  [place]  [address]    Group Progress Note  [facility] - Progress Note    Date of Service: 24   Name: Xavier Landon  : 1990  Medical Record Number: [id number]  START TIME:  6:30 PM  END TIME:  8:30 PM  FACILITATOR(S): Deborah Watson LMFT, [facility]  TOPIC: [facility] Group Therapy  Type of Session: Group  :  Number of Attendees:  4  Number of Minutes:  /120    SERVICE MODALITY:  In-person      DSM-5 Diagnoses:  296.33 (F33.2) Major Depressive Disorder, Recurrent Episode, Severe _ and With anxious distress  300.02 (F41.1) Generalized Anxiety Disorder.  Attention-Deficit/Hyperactivity Disorder  314.01 (F90.9) Unspecified Attention -Deficit / Hyperactivity Disorder.  Substance-Related & Addictive Disorders Alcohol Use Disorder   303.90 (F10.20) Moderate   301.83 (F60.3) Borderline Personality Disorder    Current Reported Symptoms and Status update:  Changes since last session-struggling w socializing   Struggling with compulsive sexual behavior  Struggling with emotional regulation    Progress Toward Treatment Goals:   Satisfactory progress     Therapeutic Interventions/Treatment Strategies:    Area(s) of treatment focus addressed in this session included Symptom Management and Interpersonal Relationship Skills    Patient checked in about their mental health symptoms, healthy coping skills used over the week, negative coping skills used over the week, what sexual behaviors they engaged in-fantasy, masturbation, sex, their comfort level with their behaviors, if there were triggers, urges to violate boundaries, and violations of boundaries.  They took time to process about struggle for social connection they provided support and feedback to others in the group.    Psychotherapist offered support, feedback and validation and reinforced use of skills Treatment modalities used include [facility] THERAPY INTERVENTIONS:  Group Dynamic Therapy  Interpersonal Coping Skills: Assisted patient in understanding the purpose of planning / creating / participating / sharing in positive experiences and Relationship Skills: Discussed strategies to promote healthier understanding of interpersonal relationships  Support, Feedback, and Structured Activity    Patient Response:   Patient responded to session by listening  Possible barriers to participation / learning include: N/A    Current Mental Status Exam:   Appearance:  Appropriate   Eye Contact:  Good   Attitude / Demeanor: Indifferent  Speech      Rate / Production: Emotional      Volume:  Loud  volume  Orientation:  All  Mood:   Irritable   Affect:   Appropriate   Thought Content: Clear   Insight:   Fair       Plan/Need for Future Services:  Return for therapy in 1 weeks to treat diagnosed problems.    Patient has a current master individualized treatment plan.  See Epic treatment plan for more information.    Referral / Collaboration:  Referral to another professional/service is not indicated at this time..  Emergency Services Needed?  No    Assignment:  Do homework    Interactive Complexity:  There are four specific communication difficulties that complicate the work of the primary psychiatric procedure.  Interactive complexity (+00244) may be reported when at least one of these difficulties is present.    Communication difficulties present during current the psychiatric procedure include:  None.      Signature/Title:    Vance Watson, LMFT, [facility]

## 2024-06-12 ENCOUNTER — MYC REFILL (OUTPATIENT)
Dept: PSYCHIATRY | Facility: CLINIC | Age: 34
End: 2024-06-12

## 2024-06-12 ENCOUNTER — MYC REFILL (OUTPATIENT)
Dept: FAMILY MEDICINE | Facility: CLINIC | Age: 34
End: 2024-06-12

## 2024-06-12 DIAGNOSIS — N52.1 ERECTILE DYSFUNCTION DUE TO DISEASES CLASSIFIED ELSEWHERE: ICD-10-CM

## 2024-06-12 DIAGNOSIS — F91.8 CONDUCT DISORDER, UNDIFFERENTIATED TYPE: ICD-10-CM

## 2024-06-12 RX ORDER — NALTREXONE HYDROCHLORIDE 50 MG/1
TABLET, FILM COATED ORAL
Qty: 90 TABLET | Refills: 0 | OUTPATIENT
Start: 2024-06-12

## 2024-06-12 RX ORDER — TADALAFIL 5 MG/1
5 TABLET ORAL DAILY
Qty: 90 TABLET | Refills: 0 | Status: SHIPPED | OUTPATIENT
Start: 2024-06-12

## 2024-06-13 ENCOUNTER — TELEPHONE (OUTPATIENT)
Dept: PSYCHIATRY | Facility: CLINIC | Age: 34
End: 2024-06-13

## 2024-06-13 NOTE — TELEPHONE ENCOUNTER
[facility] Call Center    Phone Message    May a detailed message be left on voicemail: no     Reason for Call: Appointment Reschedule  Referring Provider Name: Samm Stratton    Pt called requesting to have their virtual appointment on 6/13 rescheduled to a later day due to the provider not showing up for their session. Pt informed me that they were waiting for the provider to show up for almost 20 minutes but never did.    Action Taken: Other: Appointment was rescheduled and provider was informed about missing today's schedule    Travel Screening: Not Applicable     Date of Service: 06/13/2024 CF

## 2024-06-14 ENCOUNTER — OFFICE VISIT (OUTPATIENT)
Dept: PSYCHOLOGY | Facility: CLINIC | Age: 34
End: 2024-06-14
Payer: COMMERCIAL

## 2024-06-14 DIAGNOSIS — F33.2 MDD (MAJOR DEPRESSIVE DISORDER), RECURRENT SEVERE, WITHOUT PSYCHOSIS (H): Primary | ICD-10-CM

## 2024-06-14 DIAGNOSIS — N52.1 ERECTILE DYSFUNCTION DUE TO DISEASES CLASSIFIED ELSEWHERE: ICD-10-CM

## 2024-06-14 DIAGNOSIS — F91.8 CONDUCT DISORDER, UNDIFFERENTIATED TYPE: ICD-10-CM

## 2024-06-14 DIAGNOSIS — F90.2 ATTENTION DEFICIT HYPERACTIVITY DISORDER (ADHD), COMBINED TYPE: ICD-10-CM

## 2024-06-14 DIAGNOSIS — F41.1 GENERALIZED ANXIETY DISORDER: ICD-10-CM

## 2024-06-14 PROCEDURE — 90837 PSYTX W PT 60 MINUTES: CPT | Performed by: MARRIAGE & FAMILY THERAPIST

## 2024-06-14 NOTE — PROGRESS NOTES
[facility] - Progress Note    Date of Service: 24   Name: Xavier Landon  : 1990  Medical Record Number: [id number]  Treating Provider: VIKTOR Anderson LAD  Type of Session: Individual  Present in Session: pt  Session Start and Stop Time: 7343-0284v  Number of Minutes:  56    SERVICE MODALITY:  In-person    DSM-5 Diagnoses:  296.33 (F33.2) Major Depressive Disorder, Recurrent Episode, Severe _ and With anxious distress  300.02 (F41.1) Generalized Anxiety Disorder.  Attention-Deficit/Hyperactivity Disorder  314.01 (F90.9) Unspecified Attention -Deficit / Hyperactivity Disorder.  Substance-Related & Addictive Disorders Alcohol Use Disorder   303.90 (F10.20) Moderate   301.83 (F60.3) Borderline Personality Disorder    Current Reported Symptoms and Status update:  Changes since last session-dissatisfaction w social and sexual   Struggling with compulsive sexual behavior  Struggling with emotional regulation    Progress Toward Treatment Goals:   Satisfactory progress     Therapeutic Interventions/Treatment Strategies:    Area(s) of treatment focus addressed in this session included Symptom Management    Psychotherapist offered support, feedback and validation and reinforced use of skills Treatment modalities used include Dialectical Behavioral Therapy Interpersonal Behavioral Activation: Explored how behaviors effect mood and interact with thoughts and feelings and Cognitive Restructuring:  Explored impact of ineffective thoughts / distortions on mood and activity  Support, Feedback, and Problem Solving    Patient Response:   Patient responded to session by listening, interrupting, and required support for self-regulation  Possible barriers to participation / learning include: severity of symptoms    Current Mental Status Exam:   Appearance:  Appropriate   Eye Contact:  Good   Attitude / Demeanor: Belligerent   Speech      Rate / Production: Emotional       Volume:  Loud  volume  Orientation:  All  Mood:   Angry  Irritable   Affect:   Constricted   Thought Content: Clear   Insight:   Fair       Plan/Need for Future Services:  Return for therapy in 1 weeks to treat diagnosed problems.    Patient has a current master individualized treatment plan.  See Epic treatment plan for more information.    Referral / Collaboration:  Referral to another professional/service is not indicated at this time..  Emergency Services Needed?  No    Assignment:  Return in one week-homework     Interactive Complexity:  There are four specific communication difficulties that complicate the work of the primary psychiatric procedure.  Interactive complexity (+06726) may be reported when at least one of these difficulties is present.    Communication difficulties present during current the psychiatric procedure include:  None.      Signature/Title:    Vance Watson, LMFT, [facility]

## 2024-06-18 ENCOUNTER — OFFICE VISIT (OUTPATIENT)
Dept: PSYCHOLOGY | Facility: CLINIC | Age: 34
End: 2024-06-18
Payer: COMMERCIAL

## 2024-06-18 DIAGNOSIS — F91.8 CONDUCT DISORDER, UNDIFFERENTIATED TYPE: ICD-10-CM

## 2024-06-18 DIAGNOSIS — N52.1 ERECTILE DYSFUNCTION DUE TO DISEASES CLASSIFIED ELSEWHERE: ICD-10-CM

## 2024-06-18 DIAGNOSIS — F90.2 ATTENTION DEFICIT HYPERACTIVITY DISORDER (ADHD), COMBINED TYPE: ICD-10-CM

## 2024-06-18 DIAGNOSIS — F41.1 GENERALIZED ANXIETY DISORDER: ICD-10-CM

## 2024-06-18 DIAGNOSIS — F33.2 MDD (MAJOR DEPRESSIVE DISORDER), RECURRENT SEVERE, WITHOUT PSYCHOSIS (H): Primary | ICD-10-CM

## 2024-06-18 PROCEDURE — 90853 GROUP PSYCHOTHERAPY: CPT | Performed by: MARRIAGE & FAMILY THERAPIST

## 2024-06-18 NOTE — GROUP NOTE
[facility]  [address], [address]  [place]  [address]    Group Progress Note  [facility] - Progress Note    Date of Service: 24   Name: Xavier Landon  : 1990  Medical Record Number: [id number]  START TIME:  6:30 PM  END TIME:  8:30 PM  FACILITATOR(S): Deborah Watson LMFT, [facility]  TOPIC: [facility] Group Therapy  Type of Session: Group  :  Number of Attendees:  5  Number of Minutes: /120    SERVICE MODALITY:  In-person      DSM-5 Diagnoses:  296.33 (F33.2) Major Depressive Disorder, Recurrent Episode, Severe _ and With anxious distress  300.02 (F41.1) Generalized Anxiety Disorder.  Attention-Deficit/Hyperactivity Disorder  314.01 (F90.9) Unspecified Attention -Deficit / Hyperactivity Disorder.  Substance-Related & Addictive Disorders Alcohol Use Disorder   303.90 (F10.20) Moderate   301.83 (F60.3) Borderline Personality Disorder    Current Reported Symptoms and Status update:  Changes since last session struggling w keeping routine    Progress Toward Treatment Goals:   Satisfactory progress     Therapeutic Interventions/Treatment Strategies:    Area(s) of treatment focus addressed in this session included Symptom Management and Interpersonal Relationship Skills    Patient checked in about their mental health symptoms, healthy coping skills used over the week, negative coping skills used over the week, what sexual behaviors they engaged in-fantasy, masturbation, sex, their comfort level with their behaviors, if there were triggers, urges to violate boundaries, and violations of boundaries.  They took time to process about sex history  they provided support and feedback to others in the group.    Psychotherapist offered support, feedback and validation and reinforced use of skills Treatment modalities used include [facility] THERAPY INTERVENTIONS: Group Dynamic Therapy  Interpersonal Relationship Skills: Discussed strategies to promote healthier  understanding of interpersonal relationships  Support, Feedback, and Structured Activity    Patient Response:   Patient responded to session by accepting feedback, giving feedback, and listening  Possible barriers to participation / learning include: N/A    Current Mental Status Exam:   Appearance:  Appropriate   Eye Contact:  Good   Attitude / Demeanor: Cooperative   Speech      Rate / Production: Normal/ Responsive      Volume:  Normal  volume  Orientation:  All  Mood:   Normal  Affect:   Appropriate   Thought Content: Clear   Insight:   Good       Plan/Need for Future Services:  Return for therapy in 1 weeks to treat diagnosed problems.    Patient has a current master individualized treatment plan.  See Epic treatment plan for more information.    Referral / Collaboration:  Referral to another professional/service is not indicated at this time..  Emergency Services Needed?  No    Assignment:  Return in one week     Interactive Complexity:  There are four specific communication difficulties that complicate the work of the primary psychiatric procedure.  Interactive complexity (+92565) may be reported when at least one of these difficulties is present.    Communication difficulties present during current the psychiatric procedure include:  None.      Signature/Title:    VIKTOR Anderson, [facility]

## 2024-06-20 ENCOUNTER — VIRTUAL VISIT (OUTPATIENT)
Dept: PSYCHIATRY | Facility: CLINIC | Age: 34
End: 2024-06-20
Payer: COMMERCIAL

## 2024-06-20 DIAGNOSIS — F33.2 MAJOR DEPRESSIVE DISORDER, RECURRENT EPISODE, SEVERE WITH ANXIOUS DISTRESS (H): ICD-10-CM

## 2024-06-20 DIAGNOSIS — F91.8 CONDUCT DISORDER, UNDIFFERENTIATED TYPE: ICD-10-CM

## 2024-06-20 DIAGNOSIS — F90.2 ATTENTION DEFICIT HYPERACTIVITY DISORDER (ADHD), COMBINED TYPE: ICD-10-CM

## 2024-06-20 DIAGNOSIS — F41.1 GENERALIZED ANXIETY DISORDER: Primary | ICD-10-CM

## 2024-06-20 PROCEDURE — 99214 OFFICE O/P EST MOD 30 MIN: CPT | Mod: 95 | Performed by: STUDENT IN AN ORGANIZED HEALTH CARE EDUCATION/TRAINING PROGRAM

## 2024-06-20 RX ORDER — NALTREXONE HYDROCHLORIDE 50 MG/1
TABLET, FILM COATED ORAL
Qty: 90 TABLET | Refills: 0 | Status: SHIPPED | OUTPATIENT
Start: 2024-06-20 | End: 2024-09-05

## 2024-06-20 ASSESSMENT — PATIENT HEALTH QUESTIONNAIRE - PHQ9
SUM OF ALL RESPONSES TO PHQ QUESTIONS 1-9: 11
10. IF YOU CHECKED OFF ANY PROBLEMS, HOW DIFFICULT HAVE THESE PROBLEMS MADE IT FOR YOU TO DO YOUR WORK, TAKE CARE OF THINGS AT HOME, OR GET ALONG WITH OTHER PEOPLE: SOMEWHAT DIFFICULT
SUM OF ALL RESPONSES TO PHQ QUESTIONS 1-9: 11
SUM OF ALL RESPONSES TO PHQ QUESTIONS 1-9: 11
10. IF YOU CHECKED OFF ANY PROBLEMS, HOW DIFFICULT HAVE THESE PROBLEMS MADE IT FOR YOU TO DO YOUR WORK, TAKE CARE OF THINGS AT HOME, OR GET ALONG WITH OTHER PEOPLE: SOMEWHAT DIFFICULT
SUM OF ALL RESPONSES TO PHQ QUESTIONS 1-9: 11

## 2024-06-20 ASSESSMENT — PAIN SCALES - GENERAL: PAINLEVEL: NO PAIN (0)

## 2024-06-20 NOTE — PROGRESS NOTES
Virtual Visit Details    Type of service:  Video Visit     Originating Location (pt. Location): Home    Distant Location (provider location):  Off-site  Platform used for Video Visit: Velma  Start: 11:30   End: 12:00p    PSYCHIATRIC MEDICATION FOLLOW UP APPT     Name:  Xavier Landon  : 1990    Patient attended the phone/video session alone.    Last seen for outpatient psychiatry Consultation on 3/27/24.      FOLLOWING PLAN PUT INTO PLACE: yes    INTERIM HISTORY     COMMUNICATIONS FROM PATIENT VIA: none.     RECORDS AVAILABLE FOR REVIEW: EHR records through [employer] .     HISTORY OF PRESENT ILLNESS   Initial psychiatric consult consult in 3/27/24.    Wanted consistent care within institute as he receives mental health care as well. Increased Wellbutrin to 300mg to further target anxiety and depression. States ADHD was well controlled current regimen. On naltrexone as well for impulsive sexual behavior. Today he has not additional concerns. Would like medication refills. States medications are at good doses.     Today: 24: States doing well on current medication regimen.     FAMILY, MEDICAL, SURGICAL HISTORY REVIEWED.  MEDICATION HAVE BEEN REVIEWED AND ARE CURRENT TO THE BEST OF MY KNOWLEDGE AND ABILITY.    MEDICATIONS                                                                                                Current Outpatient Medications   Medication Sig Dispense Refill    [START ON 2024] amphetamine-dextroamphetamine (ADDERALL XR) 25 MG 24 hr capsule Take 2 capsules (50 mg) by mouth daily for 30 days 60 capsule 0    amphetamine-dextroamphetamine (ADDERALL XR) 25 MG 24 hr capsule Take 2 capsules (50 mg) by mouth daily for 30 days 60 capsule 0    [START ON 2024] amphetamine-dextroamphetamine (ADDERALL) 10 MG tablet Take 1 tablet (10 mg) by mouth daily as needed (for breatkthrough symptoms in afternoon) 30 tablet 0    amphetamine-dextroamphetamine (ADDERALL) 10 MG tablet Take 1  tablet (10 mg) by mouth daily as needed (for breatkthrough symptoms in afternoon) 30 tablet 0    buPROPion (WELLBUTRIN XL) 300 MG 24 hr tablet Take 1 tablet (300 mg) by mouth every morning 30 tablet 1    naltrexone (DEPADE/REVIA) 50 MG tablet Take 1 50 mg tablet daily 90 tablet 0    tadalafil (CIALIS) 5 MG tablet Take 1 tablet (5 mg) by mouth daily TAKE ONE TABLET BY MOUTH EVERY 24 HOURS 90 tablet 0     No current facility-administered medications for this visit.         PSYCHOTROPIC DRUG INTERACTIONS                                         Additive dopaminergic  Additive noradrenergic    MANAGEMENT:  routine monitoring    TODAY PATIENT REPORTS THE FOLLOWING PSYCHIATRIC ROS:       EXERCISE: Adequate  SIDE EFFECTS:  tolerating medications without reported side effects  COMPLIANCE:  states Adherent to medication regimen  REPORTS THE FOLLOWING NEW MEDICAL ISSUES: none    PROBLEM: DEPRESSION: Stable       6/20/2024    11:26 AM   Last PHQ-9   1.  Little interest or pleasure in doing things 1   2.  Feeling down, depressed, or hopeless 3   3.  Trouble falling or staying asleep, or sleeping too much 0   4.  Feeling tired or having little energy 1   5.  Poor appetite or overeating 1   6.  Feeling bad about yourself 3   7.  Trouble concentrating 0   8.  Moving slowly or restless 0   Q9: Thoughts of better off dead/self-harm past 2 weeks 2   PHQ-9 Total Score 11    11   In the past two weeks have you had thoughts of suicide or self harm? Yes   Do you have concerns about your personal safety or the safety of others? No   In the past 2 weeks have you thought about a plan or had intention to harm yourself? No   In the past 2 weeks have you acted on these thoughts in any way? No         6/16/2022     2:42 PM 5/31/2023     9:38 AM 6/20/2024    11:26 AM   PHQ-9 SCORE   PHQ-9 Total Score MyChart 14 (Moderate depression) 11 (Moderate depression) 11 (Moderate depression)   PHQ-9 Total Score 14 11 11    11     PHQ9 score is 11  "indicating moderate depression.  Suicidal ideation:  No     PROBLEM: ANXIETY: Improving  Stable.   GAD7 score is is 6 indicating mild anxiety.       2/24/2022     2:07 PM 4/6/2022     7:00 PM 6/20/2023     7:33 AM   SHAUNA-7 SCORE   Total Score  10 (moderate anxiety) 6 (mild anxiety)   Total Score 11 10 6       PROBLEM: CHRONIC SUICIDAL IDEATIONS: current: No     PROBLEM: SLEEP/INSOMNIA: Stable      PERTINENT PAST MEDICAL AND SURGICAL HISTORY     Past Medical History:   Diagnosis Date    ADHD (attention deficit hyperactivity disorder)        VITALS     BP Readings from Last 1 Encounters:   03/27/24 110/67     Pulse Readings from Last 1 Encounters:   03/27/24 67     Wt Readings from Last 1 Encounters:   03/27/24 94.3 kg (208 lb)     Ht Readings from Last 1 Encounters:   03/27/24 1.905 m (6' 3\")     Estimated body mass index is 26 kg/m  as calculated from the following:    Height as of 3/27/24: 1.905 m (6' 3\").    Weight as of 3/27/24: 94.3 kg (208 lb).    LABS & IMAGING                                                                                                                  Recent Labs   Lab Test 06/20/23  0818   WBC 4.9   HGB 14.9   HCT 46.1   MCV 88        Recent Labs   Lab Test 06/20/23  0818      POTASSIUM 4.0   CHLORIDE 106   CO2 22   GLC 93   JANAE 9.7   BUN 16.2   CR 1.13   GFRESTIMATED 89   ALBUMIN 4.5   PROTTOTAL 6.7   AST 30   ALT 37   ALKPHOS 80   BILITOTAL 0.4     Recent Labs   Lab Test 06/20/23  0818   CHOL 146   LDL 67   HDL 64   TRIG 74     Recent Labs   Lab Test 06/20/23  0818   TSH 1.80     No results found for: \"GRI260\", \"RAGP002\", \"LRDW79BNNUZ\", \"VITD3\", \"D2VIT\", \"D3VIT\", \"DTOT\", \"[id number]\", \"[id number]\", \"[id number]\", \"[id number]\", \"[id number]\", \"[id number]\"     ALLERGY & IMMUNIZATIONS     No Known Allergies    MEDICAL REVIEW OF SYSTEMS:   Ten system review was completed with pertinent positives noted     MENTAL STATUS EXAM:   General/Constitutional:  Appearance:  awake, alert, " adequately groomed, appeared stated age and no apparent distress  Attitude:   cooperative   Eye Contact:  good  Musculoskeletal:  Psychomotor Behavior:  no evidence of tardive dyskinesia, dystonia, or tics from the head up  Psychiatric:  Speech:  clear, coherent, regular rate, rhythm, and volume,  No pressure speech noted.  Associations:  no loose associations  Thought Process:  logical, linear and goal oriented  Thought Content:   No evidence of suicidal ideation or homicidal ideation, no evidence of psychotic thought, no auditory hallucinations present and no visual hallucinations present  Mood:  good  Affect:  full range/stable (normal variation of emotions during exam) and was congruent to speech content.  Insight:  good  Judgment:  intact, adequate for safety  Impulse Control:  intact  Neurological:  Oriented to:  person, place, time, and situation  Attention Span and Concentration:  Able to attend to the interview     Language: intact    Recent and Remote Memory:  Intact to interview. Not formally assessed. No amnesia.   Fund of Knowledge: appropriate         DSM 5 DIAGNOSIS:      296.33 (F33.2) Major Depressive Disorder, Recurrent Episode, Severe _ and With anxious distress  300.02 (F41.1) Generalized Anxiety Disorder.  Attention-Deficit/Hyperactivity Disorder  314.01 (F90.9) Unspecified Attention -Deficit / Hyperactivity Disorder.  Substance-Related & Addictive Disorders Alcohol Use Disorder   303.90 (F10.20) Moderate   301.83 (F60.3) Borderline Personality Disorder    MEDICAL COMORBIDITY IMPACTING CLINICAL PICTURE: None noted.  Known issue that I take into account for their medical decisions, no current exacerbations or new concerns      ASSESSMENT AND PLAN    Huey was seen today for recheck.    Depression and anxiety are controlled. Continuing wellbutrin, naltrexone for impulse, and anxiety and depression. Follow up in 3 months.   Diagnoses and all orders for this visit:    Other Specified Disruptive,  Impulse Control, and Conduct Disorder (Hypersexual Disorder)    -     naltrexone (DEPADE/REVIA) 50 MG tablet; Take 1 50 mg tablet daily        HIGH RISK MEDICATION:  No         Patient Status:  Patient will continue to be seen for ongoing consultation and stabilization.    Signed:   Samm Stratton PA-C

## 2024-06-21 ENCOUNTER — OFFICE VISIT (OUTPATIENT)
Dept: PSYCHOLOGY | Facility: CLINIC | Age: 34
End: 2024-06-21
Payer: COMMERCIAL

## 2024-06-21 DIAGNOSIS — F91.8 CONDUCT DISORDER, UNDIFFERENTIATED TYPE: ICD-10-CM

## 2024-06-21 DIAGNOSIS — F41.1 GENERALIZED ANXIETY DISORDER: ICD-10-CM

## 2024-06-21 DIAGNOSIS — F33.2 MDD (MAJOR DEPRESSIVE DISORDER), RECURRENT SEVERE, WITHOUT PSYCHOSIS (H): Primary | ICD-10-CM

## 2024-06-21 DIAGNOSIS — N52.1 ERECTILE DYSFUNCTION DUE TO DISEASES CLASSIFIED ELSEWHERE: ICD-10-CM

## 2024-06-21 DIAGNOSIS — F90.2 ATTENTION DEFICIT HYPERACTIVITY DISORDER (ADHD), COMBINED TYPE: ICD-10-CM

## 2024-06-21 PROCEDURE — 90837 PSYTX W PT 60 MINUTES: CPT | Performed by: MARRIAGE & FAMILY THERAPIST

## 2024-06-21 NOTE — PROGRESS NOTES
[facility] - Progress Note    Date of Service: 24   Name: Xavier Landon  : 1990  Medical Record Number: [id number]  Treating Provider: VIKTOR Anderson, [facility]  Type of Session: Individual  Present in Session: pt  Session Start and Stop Time: 5530-1662  Number of Minutes:  58    SERVICE MODALITY:  In-person    DSM-5 Diagnoses:  296.33 (F33.2) Major Depressive Disorder, Recurrent Episode, Severe _ and With anxious distress  300.02 (F41.1) Generalized Anxiety Disorder.  Attention-Deficit/Hyperactivity Disorder  314.01 (F90.9) Unspecified Attention -Deficit / Hyperactivity Disorder.  Substance-Related & Addictive Disorders Alcohol Use Disorder   303.90 (F10.20) Moderate   301.83 (F60.3) Borderline Personality Disorder    Current Reported Symptoms and Status update:  Changes since last session-has been doing sex therapy homework   Struggling with compulsive sexual behavior  Struggling with emotional regulation    Progress Toward Treatment Goals:   Satisfactory progress     Therapeutic Interventions/Treatment Strategies:    Area(s) of treatment focus addressed in this session included Symptom Management    Barriers to task initiation    Psychotherapist reinforced use of skills Behavioral Activation: Reinforced benefits/challenges of change process through applying skills to replace unwanted behaviors and Encouraged strategies to reduce individual procrastination and increase motivation by increasing goal-directed activities to enhance mood and reduce symptoms. and Cognitive Restructuring:  Explored impact of ineffective thoughts / distortions on mood and activity  Support, Feedback, Structured Activity, and Problem Solving    Patient Response:   Patient responded to session by accepting feedback, listening, accepting support, verbalizing understanding, and actively engaged  Possible barriers to participation / learning include: N/A    Current Mental Status Exam:    Appearance:  Appropriate   Eye Contact:  Good   Attitude / Demeanor: Cooperative   Speech      Rate / Production: Normal/ Responsive      Volume:  Normal  volume  Orientation:  All  Mood:   Irritable   Affect:   Appropriate   Thought Content: Clear   Insight:   Good       Plan/Need for Future Services:  Return for therapy in 1 weeks to treat diagnosed problems.    Patient has a current master individualized treatment plan.  See Epic treatment plan for more information.    Referral / Collaboration:  Referral to another professional/service is not indicated at this time..  Emergency Services Needed?  No    Assignment:  Return in one week sex therapy homework     Interactive Complexity:  There are four specific communication difficulties that complicate the work of the primary psychiatric procedure.  Interactive complexity (+27034) may be reported when at least one of these difficulties is present.    Communication difficulties present during current the psychiatric procedure include:  None.      Signature/Title:    VIKTOR Anderson, [facility]     Answers submitted by the patient for this visit:  Patient Health Questionnaire (Submitted on 6/20/2024)  If you checked off any problems, how difficult have these problems made it for you to do your work, take care of things at home, or get along with other people?: Somewhat difficult  PHQ9 TOTAL SCORE: 11

## 2024-06-21 NOTE — GROUP NOTE
Psychoeducation Group Note    PATIENT'S NAME: Xavier Landon  MRN:   [id number]  :   1990  ACCT. NUMBER: [id number]  DATE OF SERVICE: 20  START TIME:  1:00 PM  END TIME:  1:50 PM  FACILITATOR: Merlene Walker OTR/L  TOPIC: [facility] OT Group: Lifestyle Balance and Structure  [facility] Adult Partial Hospitalization Program  TRACK: 1    NUMBER OF PARTICIPANTS: 8  Telemedicine Visit: The patient's condition can be safely assessed and treated via synchronous audio and visual telemedicine encounter.      Reason for Telemedicine Visit: Services only offered telehealth    Originating Site (Patient Location): Patient's home    Distant Site (Provider Location): [facility]: [facility]    Consent:  The patient/guardian has verbally consented to: the potential risks and benefits of telemedicine (video visit) versus in person care; bill my insurance or make self-payment for services provided; and responsibility for payment of non-covered services.     Mode of Communication:  Video Conference via [employer]    As the provider I attest to compliance with applicable laws and regulations related to telemedicine.      Summary of Group / Topics Discussed:  Lifestyle Balance and Structure:  OT Goal-setting & integration: To support progress towards treatment goals and psychiatric recovery, group members were led through a weekly structured process to reflect on progress, integrate new learning/skills, and emerging self-awareness into their daily and weekly life. Provided psychoeducation on the neuroscience of change, self-compassion, and the anatomy of a SMART goal to the group. Facilitated the sharing of individual goals with validation, support, and feedback provided.    Patient Session Goals / Objectives:    Reflected on and create a vision for recovery and wellbeing    Identified and wrote 3 SMART goals for the week    Identified and problem solved barriers to achieving identified  goals     Identified plan to support follow through on goals and reflection on progress made        Patient Participation / Response:  Fully participated with the group by sharing personal reflections / insights and openly received / provided feedback with other participants.    Patient presentation: shared his struggle with his mental health at this time and thoughts that positive reflections does not work for him nor other skills to help manage at this time; did listen to others and accpeted support; did not his goal for the week was to travel to visit a friend in [place] this weekend , Verbalized understanding of content and Patient would benefit from additional opportunities to practice the content to be able to generalize it to their everyday life with increased intentionality, consistency, and efficacy in support of their psychiatric recovery    Treatment Plan:  Patient has an initial individualized treatment plan that was created as part of their diagnostic assessment / admission process.  A master individualized treatment plan is in the process of being developed with the patient and multi-disciplinary care team.    Merlene Walker OTR/L   Oriented - self; Oriented - place; Oriented - time

## 2024-06-25 ENCOUNTER — OFFICE VISIT (OUTPATIENT)
Dept: PSYCHOLOGY | Facility: CLINIC | Age: 34
End: 2024-06-25
Payer: COMMERCIAL

## 2024-06-25 DIAGNOSIS — N52.1 ERECTILE DYSFUNCTION DUE TO DISEASES CLASSIFIED ELSEWHERE: ICD-10-CM

## 2024-06-25 DIAGNOSIS — F41.1 GENERALIZED ANXIETY DISORDER: ICD-10-CM

## 2024-06-25 DIAGNOSIS — F33.2 MDD (MAJOR DEPRESSIVE DISORDER), RECURRENT SEVERE, WITHOUT PSYCHOSIS (H): Primary | ICD-10-CM

## 2024-06-25 DIAGNOSIS — F91.8 CONDUCT DISORDER, UNDIFFERENTIATED TYPE: ICD-10-CM

## 2024-06-25 DIAGNOSIS — F90.2 ATTENTION DEFICIT HYPERACTIVITY DISORDER (ADHD), COMBINED TYPE: ICD-10-CM

## 2024-06-25 PROCEDURE — 90853 GROUP PSYCHOTHERAPY: CPT | Performed by: MARRIAGE & FAMILY THERAPIST

## 2024-06-25 NOTE — GROUP NOTE
[facility]  [address], [address]  [place]  [address]    Group Progress Note  [facility] - Progress Note    Date of Service: 24   Name: Xavier Landon  : 1990  Medical Record Number: [id number]  START TIME:  6:30 PM  END TIME:  8:30 PM  FACILITATOR(S): Deborah Watson LMFT, [facility]  TOPIC: [facility] Group Therapy  Type of Session: Group  :  Number of Attendees:  4  Number of Minutes:  /120    SERVICE MODALITY:  In-person      DSM-5 Diagnoses:  296.33 (F33.2) Major Depressive Disorder, Recurrent Episode, Severe _ and With anxious distress  300.02 (F41.1) Generalized Anxiety Disorder.  Attention-Deficit/Hyperactivity Disorder  314.01 (F90.9) Unspecified Attention -Deficit / Hyperactivity Disorder.  Substance-Related & Addictive Disorders Alcohol Use Disorder   303.90 (F10.20) Moderate   301.83 (F60.3) Borderline Personality Disorder    Current Reported Symptoms and Status update:  Changes since last session-struggling with routine  Struggling with compulsive sexual behavior  Struggling with emotional regulation    Progress Toward Treatment Goals:   Satisfactory progress     Therapeutic Interventions/Treatment Strategies:    Area(s) of treatment focus addressed in this session included Symptom Management and Interpersonal Relationship Skills    Patient checked in about their mental health symptoms, healthy coping skills used over the week, negative coping skills used over the week, what sexual behaviors they engaged in-fantasy, masturbation, sex, their comfort level with their behaviors, if there were triggers, urges to violate boundaries, and violations of boundaries.  They took time to process about self sabatoge they provided support and feedback to others in the group.    Psychotherapist offered support, feedback and validation and reinforced use of skills Treatment modalities used include [facility] THERAPY INTERVENTIONS: Cognitive Behavioral  Therapy Group Dynamic Therapy  Coping Skills: Reviewed patients current calming practices and discussed a more formal way of practicing and accessing skills  Support, Feedback, and Structured Activity    Patient Response:   Patient responded to session by accepting feedback and giving feedback  Possible barriers to participation / learning include: N/A    Current Mental Status Exam:   Appearance:  Appropriate   Eye Contact:  Good   Attitude / Demeanor: Cooperative   Speech      Rate / Production: Normal/ Responsive      Volume:  Normal  volume  Orientation:  All  Mood:   Normal Euthymic  Affect:   Appropriate   Thought Content: Clear   Insight:   Good       Plan/Need for Future Services:  Return for therapy in 1 weeks to treat diagnosed problems.    Patient has a current master individualized treatment plan.  See Epic treatment plan for more information.    Referral / Collaboration:  Referral to another professional/service is not indicated at this time..  Emergency Services Needed?  No    Assignment:  Return in one week     Interactive Complexity:  There are four specific communication difficulties that complicate the work of the primary psychiatric procedure.  Interactive complexity (+34900) may be reported when at least one of these difficulties is present.    Communication difficulties present during current the psychiatric procedure include:  None.      Signature/Title:    VIKTOR Anderson, [facility]

## 2024-06-28 ENCOUNTER — OFFICE VISIT (OUTPATIENT)
Dept: PSYCHOLOGY | Facility: CLINIC | Age: 34
End: 2024-06-28
Payer: COMMERCIAL

## 2024-06-28 DIAGNOSIS — F91.8 CONDUCT DISORDER, UNDIFFERENTIATED TYPE: ICD-10-CM

## 2024-06-28 DIAGNOSIS — F41.1 GENERALIZED ANXIETY DISORDER: ICD-10-CM

## 2024-06-28 DIAGNOSIS — F33.2 MDD (MAJOR DEPRESSIVE DISORDER), RECURRENT SEVERE, WITHOUT PSYCHOSIS (H): Primary | ICD-10-CM

## 2024-06-28 DIAGNOSIS — F90.2 ATTENTION DEFICIT HYPERACTIVITY DISORDER (ADHD), COMBINED TYPE: ICD-10-CM

## 2024-06-28 DIAGNOSIS — N52.1 ERECTILE DYSFUNCTION DUE TO DISEASES CLASSIFIED ELSEWHERE: ICD-10-CM

## 2024-06-28 PROCEDURE — 90837 PSYTX W PT 60 MINUTES: CPT | Performed by: MARRIAGE & FAMILY THERAPIST

## 2024-06-28 NOTE — PROGRESS NOTES
[facility] - Progress Note    Date of Service: 24   Name: Xavier Landon  : 1990  Medical Record Number: [id number]  Treating Provider: VIKTOR Anderson LAD  Type of Session: Individual  Present in Session: pt  Session Start and Stop Time:   Number of Minutes:  54    SERVICE MODALITY:  In-person    DSM-5 Diagnoses:  296.33 (F33.2) Major Depressive Disorder, Recurrent Episode, Severe _ and With anxious distress  300.02 (F41.1) Generalized Anxiety Disorder.  Attention-Deficit/Hyperactivity Disorder  314.01 (F90.9) Unspecified Attention -Deficit / Hyperactivity Disorder.  Substance-Related & Addictive Disorders Alcohol Use Disorder   303.90 (F10.20) Moderate   301.83 (F60.3) Borderline Personality Disorder    Current Reported Symptoms and Status update:  Changes since last session-self sabatoge w using porn uncomfortable w   Struggling with compulsive sexual behavior  Struggling with emotional regulation    Progress Toward Treatment Goals:   Satisfactory progress     Therapeutic Interventions/Treatment Strategies:    Area(s) of treatment focus addressed in this session included Symptom Management and Sexual Health and Wellness    Discussed continue self sabatoge w reuglation of sexual thoughts    Psychotherapist offered support, feedback and validation and reinforced use of skills Treatment modalities used include Cognitive Behavioral Therapy Interpersonal discussed application of self compassion  Support, Feedback, and Education    Patient Response:   Patient responded to session by listening and interrupting  Possible barriers to participation / learning include: N/A    Current Mental Status Exam:   Appearance:  Appropriate   Eye Contact:  Good   Attitude / Demeanor: Cooperative   Speech      Rate / Production: Normal/ Responsive      Volume:  Normal  volume  Orientation:  All  Mood:   Irritable   Affect:   Appropriate   Thought Content: Clear    Insight:   Good       Plan/Need for Future Services:  Return for therapy in 1 weeks to treat diagnosed problems.    Patient has a current master individualized treatment plan.  See Epic treatment plan for more information.    Referral / Collaboration:  Referral to another professional/service is not indicated at this time..  Emergency Services Needed?  No    Assignment:  Return in one week keep system    Interactive Complexity:  There are four specific communication difficulties that complicate the work of the primary psychiatric procedure.  Interactive complexity (+93303) may be reported when at least one of these difficulties is present.    Communication difficulties present during current the psychiatric procedure include:  None.      Signature/Title:    Vance Watson, LMFT, [facility]

## 2024-07-02 ENCOUNTER — OFFICE VISIT (OUTPATIENT)
Dept: PSYCHOLOGY | Facility: CLINIC | Age: 34
End: 2024-07-02
Payer: COMMERCIAL

## 2024-07-02 DIAGNOSIS — F91.8 CONDUCT DISORDER, UNDIFFERENTIATED TYPE: ICD-10-CM

## 2024-07-02 DIAGNOSIS — F90.2 ATTENTION DEFICIT HYPERACTIVITY DISORDER (ADHD), COMBINED TYPE: ICD-10-CM

## 2024-07-02 DIAGNOSIS — N52.1 ERECTILE DYSFUNCTION DUE TO DISEASES CLASSIFIED ELSEWHERE: ICD-10-CM

## 2024-07-02 DIAGNOSIS — F33.2 MDD (MAJOR DEPRESSIVE DISORDER), RECURRENT SEVERE, WITHOUT PSYCHOSIS (H): Primary | ICD-10-CM

## 2024-07-02 DIAGNOSIS — F41.1 GENERALIZED ANXIETY DISORDER: ICD-10-CM

## 2024-07-02 PROCEDURE — 90853 GROUP PSYCHOTHERAPY: CPT | Performed by: MARRIAGE & FAMILY THERAPIST

## 2024-07-02 NOTE — GROUP NOTE
[facility]  [address], [address]  [place]  [address]    Group Progress Note  [facility] - Progress Note    Date of Service: 24   Name: Xavier Landon  : 1990  Medical Record Number: [id number]  START TIME:  6:30 PM  END TIME:  8:30 PM  FACILITATOR(S): Deborah Watson LMFT, [facility]  TOPIC: [facility] Group Therapy  Type of Session: Group  :  Number of Attendees:  3  Number of Minutes:  /120    SERVICE MODALITY:  In-person      DSM-5 Diagnoses:  296.33 (F33.2) Major Depressive Disorder, Recurrent Episode, Severe _ and With anxious distress  300.02 (F41.1) Generalized Anxiety Disorder.  Attention-Deficit/Hyperactivity Disorder  314.01 (F90.9) Unspecified Attention -Deficit / Hyperactivity Disorder.  Substance-Related & Addictive Disorders Alcohol Use Disorder   303.90 (F10.20) Moderate   301.83 (F60.3) Borderline Personality Disorder    Current Reported Symptoms and Status update:  Changes since last session-has been doing school work but deadline for first sprint is next tuesday  Struggling with compulsive sexual behavior  Struggling with emotional regulation    Progress Toward Treatment Goals:   Satisfactory progress     Therapeutic Interventions/Treatment Strategies:    Area(s) of treatment focus addressed in this session included Symptom Management and Interpersonal Relationship Skills    Patient checked in about their mental health symptoms, healthy coping skills used over the week, negative coping skills used over the week, what sexual behaviors they engaged in-fantasy, masturbation, sex, their comfort level with their behaviors, if there were triggers, urges to violate boundaries, and violations of boundaries.  They took time to process about buckling down and decision to figure out school they provided support and feedback to others in the group.    Psychotherapist offered support, feedback and validation and reinforced use of skills  Treatment modalities used include [facility] THERAPY INTERVENTIONS: Cognitive Behavioral Therapy Group Dynamic Therapy  Interpersonal Relationship Skills: Discussed strategies to promote healthier understanding of interpersonal relationships  Support, Feedback, and Structured Activity    Patient Response:   Patient responded to session by accepting feedback and giving feedback  Possible barriers to participation / learning include: N/A    Current Mental Status Exam:   Appearance:  Appropriate   Eye Contact:  Good   Attitude / Demeanor: Cooperative   Speech      Rate / Production: Normal/ Responsive      Volume:  Normal  volume  Orientation:  All  Mood:   Anxious   Affect:   Appropriate   Thought Content: Clear   Insight:   Good       Plan/Need for Future Services:  Return for therapy in 1 weeks to treat diagnosed problems.    Patient has a current master individualized treatment plan.  See Epic treatment plan for more information.    Referral / Collaboration:  Referral to another professional/service is not indicated at this time..  Emergency Services Needed?  No    Assignment:  Return in one week     Interactive Complexity:  There are four specific communication difficulties that complicate the work of the primary psychiatric procedure.  Interactive complexity (+22766) may be reported when at least one of these difficulties is present.    Communication difficulties present during current the psychiatric procedure include:  None.      Signature/Title:    Vance Watson, LMFT, [facility]

## 2024-07-09 ENCOUNTER — OFFICE VISIT (OUTPATIENT)
Dept: PSYCHOLOGY | Facility: CLINIC | Age: 34
End: 2024-07-09
Payer: COMMERCIAL

## 2024-07-09 DIAGNOSIS — F91.8 CONDUCT DISORDER, UNDIFFERENTIATED TYPE: ICD-10-CM

## 2024-07-09 DIAGNOSIS — F90.2 ATTENTION DEFICIT HYPERACTIVITY DISORDER (ADHD), COMBINED TYPE: ICD-10-CM

## 2024-07-09 DIAGNOSIS — F33.2 MDD (MAJOR DEPRESSIVE DISORDER), RECURRENT SEVERE, WITHOUT PSYCHOSIS (H): Primary | ICD-10-CM

## 2024-07-09 DIAGNOSIS — F41.1 GENERALIZED ANXIETY DISORDER: ICD-10-CM

## 2024-07-09 PROCEDURE — 90853 GROUP PSYCHOTHERAPY: CPT | Performed by: MARRIAGE & FAMILY THERAPIST

## 2024-07-09 NOTE — GROUP NOTE
[facility]  [address], [address]  [place]  [address]    Group Progress Note  [facility] - Progress Note    Date of Service: 24   Name: Xavier Landon  : 1990  Medical Record Number: [id number]  START TIME:  6:30 PM  END TIME:  8:30 PM  FACILITATOR(S): Deborah Watson LMFT, [facility]  TOPIC: [facility] Group Therapy  Type of Session: Group  :  Number of Attendees:  3  Number of Minutes:  /120    SERVICE MODALITY:  In-person      DSM-5 Diagnoses:  296.33 (F33.2) Major Depressive Disorder, Recurrent Episode, Severe _ and With anxious distress  300.02 (F41.1) Generalized Anxiety Disorder.  Attention-Deficit/Hyperactivity Disorder  314.01 (F90.9) Unspecified Attention -Deficit / Hyperactivity Disorder.  Substance-Related & Addictive Disorders Alcohol Use Disorder   303.90 (F10.20) Moderate   301.83 (F60.3) Borderline Personality Disorder    Current Reported Symptoms and Status update:  Changes since last session-is going to withdraw from school  Struggling with compulsive sexual behavior  Struggling with emotional regulation    Progress Toward Treatment Goals:   Satisfactory progress     Therapeutic Interventions/Treatment Strategies:    Area(s) of treatment focus addressed in this session included Symptom Management and Sexual Health and Wellness    Patient checked in about their mental health symptoms, healthy coping skills used over the week, negative coping skills used over the week, what sexual behaviors they engaged in-fantasy, masturbation, sex, their comfort level with their behaviors, if there were triggers, urges to violate boundaries, and violations of boundaries.  They took time to process about school and next steps they provided support and feedback to others in the group.    Psychotherapist offered support, feedback and validation and reinforced use of skills Treatment modalities used include [facility] THERAPY INTERVENTIONS: Cognitive  Behavioral Therapy Group Dynamic Therapy  Interpersonal Behavioral Activation: Explored how behaviors effect mood and interact with thoughts and feelings and Cognitive Restructuring:  Facilitated recognition of the connection between negative thoughts and negative core beliefs  Support, Feedback, and Structured Activity    Patient Response:   Patient responded to session by accepting feedback, giving feedback, and listening  Possible barriers to participation / learning include: N/A    Current Mental Status Exam:   Appearance:  Appropriate   Eye Contact:  Good   Attitude / Demeanor: Cooperative   Speech      Rate / Production: Normal/ Responsive      Volume:  Normal  volume  Orientation:  All  Mood:   Irritable  Normal  Affect:   Appropriate   Thought Content: Clear   Insight:   Good       Plan/Need for Future Services:  Return for therapy in 2 weeks to treat diagnosed problems.    Patient has a current master individualized treatment plan.  See Epic treatment plan for more information.    Referral / Collaboration:  Referral to another professional/service is not indicated at this time..  Emergency Services Needed?  No    Assignment:  Return in one week     Interactive Complexity:  There are four specific communication difficulties that complicate the work of the primary psychiatric procedure.  Interactive complexity (+21100) may be reported when at least one of these difficulties is present.    Communication difficulties present during current the psychiatric procedure include:  None.      Signature/Title:    Vance Watson, LMFT, [facility]

## 2024-07-12 ENCOUNTER — OFFICE VISIT (OUTPATIENT)
Dept: PSYCHOLOGY | Facility: CLINIC | Age: 34
End: 2024-07-12
Payer: COMMERCIAL

## 2024-07-12 DIAGNOSIS — N52.1 ERECTILE DYSFUNCTION DUE TO DISEASES CLASSIFIED ELSEWHERE: ICD-10-CM

## 2024-07-12 DIAGNOSIS — F91.8 CONDUCT DISORDER, UNDIFFERENTIATED TYPE: ICD-10-CM

## 2024-07-12 DIAGNOSIS — F90.2 ATTENTION DEFICIT HYPERACTIVITY DISORDER (ADHD), COMBINED TYPE: ICD-10-CM

## 2024-07-12 DIAGNOSIS — F41.1 GENERALIZED ANXIETY DISORDER: ICD-10-CM

## 2024-07-12 DIAGNOSIS — F33.2 MDD (MAJOR DEPRESSIVE DISORDER), RECURRENT SEVERE, WITHOUT PSYCHOSIS (H): Primary | ICD-10-CM

## 2024-07-12 PROCEDURE — 90837 PSYTX W PT 60 MINUTES: CPT | Performed by: MARRIAGE & FAMILY THERAPIST

## 2024-07-12 NOTE — PROGRESS NOTES
[facility] - Progress Note    Date of Service: 24   Name: Xavier Landon  : 1990  Medical Record Number: [id number]  Treating Provider: VIKTOR Anderson, [facility]  Type of Session: Individual  Present in Session: pt  Session Start and Stop Time: 7742-1667  Number of Minutes:  59    SERVICE MODALITY:  In-person    DSM-5 Diagnoses:  296.33 (F33.2) Major Depressive Disorder, Recurrent Episode, Severe _ and With anxious distress  300.02 (F41.1) Generalized Anxiety Disorder.  Attention-Deficit/Hyperactivity Disorder  314.01 (F90.9) Unspecified Attention -Deficit / Hyperactivity Disorder.  Substance-Related & Addictive Disorders Alcohol Use Disorder   303.90 (F10.20) Moderate   301.83 (F60.3) Borderline Personality Disorder    Current Reported Symptoms and Status update:  Changes since last session may have to unenroll from school  Struggling with compulsive sexual behavior  Struggling with emotional regulation  Progress Toward Treatment Goals:   Satisfactory progress     Therapeutic Interventions/Treatment Strategies:    Area(s) of treatment focus addressed in this session included Symptom Management and Sexual Health and Wellness      Psychotherapist offered support, feedback and validation and reinforced use of skills Treatment modalities used include Dialectical Behavioral Therapy Interpersonal Behavioral Activation: Reinforced benefits/challenges of change process through applying skills to replace unwanted behaviors and Encouraged strategies to reduce individual procrastination and increase motivation by increasing goal-directed activities to enhance mood and reduce symptoms. and Relationship Skills: Discussed strategies to promote healthier understanding of interpersonal relationships  Support, Feedback, and Structured Activity    Patient Response:   Patient responded to session by listening, accepting support, and actively engaged  Possible barriers to  participation / learning include: N/A    Current Mental Status Exam:   Appearance:  Appropriate   Eye Contact:  Good   Attitude / Demeanor: Cooperative   Speech      Rate / Production: Normal/ Responsive      Volume:  Normal  volume  Orientation:  All  Mood:   Normal  Affect:   Appropriate   Thought Content: Clear   Insight:   Good       Plan/Need for Future Services:  Return for therapy in 1 weeks to treat diagnosed problems.    Patient has a current master individualized treatment plan.  See Epic treatment plan for more information.    Referral / Collaboration:  Referral to another professional/service is not indicated at this time..  Emergency Services Needed?  No    Assignment:  Return in one week talk to school    Interactive Complexity:  There are four specific communication difficulties that complicate the work of the primary psychiatric procedure.  Interactive complexity (+07226) may be reported when at least one of these difficulties is present.    Communication difficulties present during current the psychiatric procedure include:  None.      Signature/Title:    VIKTOR Anderson, [facility]

## 2024-07-19 ENCOUNTER — OFFICE VISIT (OUTPATIENT)
Dept: PSYCHOLOGY | Facility: CLINIC | Age: 34
End: 2024-07-19
Payer: COMMERCIAL

## 2024-07-19 DIAGNOSIS — F41.1 GENERALIZED ANXIETY DISORDER: ICD-10-CM

## 2024-07-19 DIAGNOSIS — N52.1 ERECTILE DYSFUNCTION DUE TO DISEASES CLASSIFIED ELSEWHERE: ICD-10-CM

## 2024-07-19 DIAGNOSIS — F90.2 ATTENTION DEFICIT HYPERACTIVITY DISORDER (ADHD), COMBINED TYPE: ICD-10-CM

## 2024-07-19 DIAGNOSIS — F33.2 MDD (MAJOR DEPRESSIVE DISORDER), RECURRENT SEVERE, WITHOUT PSYCHOSIS (H): Primary | ICD-10-CM

## 2024-07-19 DIAGNOSIS — F91.8 CONDUCT DISORDER, UNDIFFERENTIATED TYPE: ICD-10-CM

## 2024-07-19 PROCEDURE — 90837 PSYTX W PT 60 MINUTES: CPT | Performed by: MARRIAGE & FAMILY THERAPIST

## 2024-07-19 NOTE — PROGRESS NOTES
[facility] - Progress Note    Date of Service: 24   Name: Xavier Landon  : 1990  Medical Record Number: [id number]  Treating Provider: VIKTOR Anderson, [facility]  Type of Session: Individual  Present in Session: pt  Session Start and Stop Time: 3720-5485  Number of Minutes:  60    SERVICE MODALITY:  In-person    DSM-5 Diagnoses:  296.33 (F33.2) Major Depressive Disorder, Recurrent Episode, Severe _ and With anxious distress  300.02 (F41.1) Generalized Anxiety Disorder.  Attention-Deficit/Hyperactivity Disorder  314.01 (F90.9) Unspecified Attention -Deficit / Hyperactivity Disorder.  Substance-Related & Addictive Disorders Alcohol Use Disorder   303.90 (F10.20) Moderate   301.83 (F60.3) Borderline Personality Disorder    Current Reported Symptoms and Status update:  Changes since last session-continued thinking about goal for school  Struggling with compulsive sexual behavior  Struggling with emotional regulation    Progress Toward Treatment Goals:   Satisfactory progress     Therapeutic Interventions/Treatment Strategies:    Area(s) of treatment focus addressed in this session included Symptom Management      Psychotherapist offered support, feedback and validation and reinforced use of skills Treatment modalities used include Dialectical Behavioral Therapy Interpersonal Cognitive Restructuring:  Explored impact of ineffective thoughts / distortions on mood and activity and Assisted patient in formulating new neutral/positive alternatives to challenge less helpful / ineffective thoughts and Coping Skills: Assisted patient in understanding the purpose of planning / creating / participating / sharing in positive experiences, Facilitated understanding of  what factors may contribute to symptom relapse and skills plan to manage symptom relapse , and Addressed barriers to utilizing coping skills when in distress  Support, Redirection, Feedback, and Problem  Solving    Patient Response:   Patient responded to session by listening and accepting support  Possible barriers to participation / learning include: N/A    Current Mental Status Exam:   Appearance:  Appropriate   Eye Contact:  Good   Attitude / Demeanor: Cooperative   Speech      Rate / Production: Normal/ Responsive Emotional      Volume:  Normal  volume  Orientation:  All  Mood:   Depressed  Irritable   Affect:   Appropriate  Tearful  Thought Content: Clear   Insight:   Good       Plan/Need for Future Services:  Return for therapy in 2 weeks to treat diagnosed problems.    Patient has a current master individualized treatment plan.  See Epic treatment plan for more information.    Referral / Collaboration:  Referral to another professional/service is not indicated at this time..  Emergency Services Needed?  No    Assignment:  Return in 2 weeks-working on focusing on meeting sexual goals     Interactive Complexity:  There are four specific communication difficulties that complicate the work of the primary psychiatric procedure.  Interactive complexity (+04474) may be reported when at least one of these difficulties is present.    Communication difficulties present during current the psychiatric procedure include:  None.      Signature/Title:    Vance Watson, LMFT, [facility]

## 2024-07-23 ENCOUNTER — OFFICE VISIT (OUTPATIENT)
Dept: PSYCHOLOGY | Facility: CLINIC | Age: 34
End: 2024-07-23
Payer: COMMERCIAL

## 2024-07-23 DIAGNOSIS — F91.8 CONDUCT DISORDER, UNDIFFERENTIATED TYPE: ICD-10-CM

## 2024-07-23 DIAGNOSIS — F41.1 GENERALIZED ANXIETY DISORDER: ICD-10-CM

## 2024-07-23 DIAGNOSIS — F90.2 ATTENTION DEFICIT HYPERACTIVITY DISORDER (ADHD), COMBINED TYPE: ICD-10-CM

## 2024-07-23 DIAGNOSIS — F33.2 MDD (MAJOR DEPRESSIVE DISORDER), RECURRENT SEVERE, WITHOUT PSYCHOSIS (H): Primary | ICD-10-CM

## 2024-07-23 PROCEDURE — 90853 GROUP PSYCHOTHERAPY: CPT | Performed by: MARRIAGE & FAMILY THERAPIST

## 2024-07-24 NOTE — GROUP NOTE
[facility]  [address], [address]  [place]  [address]    Group Progress Note  [facility] - Progress Note    Date of Service: 24   Name: Xavier Landon  : 1990  Medical Record Number: [id number]  START TIME:  6:30 PM  END TIME:  8:30 PM  FACILITATOR(S): Deborah Watson LMFT, [facility]  TOPIC: [facility] Group Therapy  Type of Session: Group  :  Number of Attendees:  5  Number of Minutes:  /120    SERVICE MODALITY:  In-person      DSM-5 Diagnoses:  296.33 (F33.2) Major Depressive Disorder, Recurrent Episode, Severe _ and With anxious distress  300.02 (F41.1) Generalized Anxiety Disorder.  Attention-Deficit/Hyperactivity Disorder  314.01 (F90.9) Unspecified Attention -Deficit / Hyperactivity Disorder.  Substance-Related & Addictive Disorders Alcohol Use Disorder   303.90 (F10.20) Moderate   301.83 (F60.3) Borderline Personality Disorder    Current Reported Symptoms and Status update:  Changes since last session-has been working on regulation  Struggling with compulsive sexual behavior  Struggling with emotional regulation    Progress Toward Treatment Goals:   Satisfactory progress     Therapeutic Interventions/Treatment Strategies:    Area(s) of treatment focus addressed in this session included Symptom Management and Interpersonal Relationship Skills    Patient checked in about their mental health symptoms, healthy coping skills used over the week, negative coping skills used over the week, what sexual behaviors they engaged in-fantasy, masturbation, sex, their comfort level with their behaviors, if there were triggers, urges to violate boundaries, and violations of boundaries.  They took time to process about struggling with staying in the moment-managing thoughts they provided support and feedback to others in the group.    Psychotherapist offered support, feedback and validation and reinforced use of skills Treatment modalities used include  [facility] THERAPY INTERVENTIONS: Group Dynamic Therapy  Interpersonal Behavioral Activation: Explored how behaviors effect mood and interact with thoughts and feelings and Cognitive Restructuring:  Assisted patient in formulating new neutral/positive alternatives to challenge less helpful / ineffective thoughts  Support, Feedback, and Structured Activity    Patient Response:   Patient responded to session by accepting feedback, giving feedback, and listening  Possible barriers to participation / learning include: N/A    Current Mental Status Exam:   Appearance:  Appropriate   Eye Contact:  Good   Attitude / Demeanor: Cooperative   Speech      Rate / Production: Normal/ Responsive      Volume:  Normal  volume  Orientation:  All  Mood:   Depressed  Normal  Affect:   Appropriate   Thought Content: Clear   Insight:   Good       Plan/Need for Future Services:  Return for therapy in 1 weeks to treat diagnosed problems.    Patient has a current master individualized treatment plan.  See Epic treatment plan for more information.    Referral / Collaboration:  Referral to another professional/service is not indicated at this time..  Emergency Services Needed?  No    Assignment:  Return in one week-    Interactive Complexity:  There are four specific communication difficulties that complicate the work of the primary psychiatric procedure.  Interactive complexity (+24247) may be reported when at least one of these difficulties is present.    Communication difficulties present during current the psychiatric procedure include:  None.      Signature/Title:    Vance Watson, LMFT, [facility] [place]

## 2024-07-30 ENCOUNTER — OFFICE VISIT (OUTPATIENT)
Dept: PSYCHOLOGY | Facility: CLINIC | Age: 34
End: 2024-07-30
Payer: COMMERCIAL

## 2024-07-30 DIAGNOSIS — F41.1 GENERALIZED ANXIETY DISORDER: ICD-10-CM

## 2024-07-30 DIAGNOSIS — F91.8 CONDUCT DISORDER, UNDIFFERENTIATED TYPE: ICD-10-CM

## 2024-07-30 DIAGNOSIS — F90.2 ATTENTION DEFICIT HYPERACTIVITY DISORDER (ADHD), COMBINED TYPE: ICD-10-CM

## 2024-07-30 DIAGNOSIS — F33.2 MDD (MAJOR DEPRESSIVE DISORDER), RECURRENT SEVERE, WITHOUT PSYCHOSIS (H): Primary | ICD-10-CM

## 2024-07-30 PROCEDURE — 90853 GROUP PSYCHOTHERAPY: CPT | Performed by: MARRIAGE & FAMILY THERAPIST

## 2024-07-30 NOTE — GROUP NOTE
[facility]  [address], [address]  [place]  [address]    Group Progress Note  [facility] - Progress Note    Date of Service: 24   Name: Xavier Landon  : 1990  Medical Record Number: [id number]  START TIME:  6:30 PM  END TIME:  8:30 PM  FACILITATOR(S): Deborah Watson LMFT, [facility]  TOPIC: [facility] Group Therapy  Type of Session: Group  :  Number of Attendees:  3  Number of Minutes:  /120    SERVICE MODALITY:  In-person      DSM-5 Diagnoses:  296.33 (F33.2) Major Depressive Disorder, Recurrent Episode, Severe _ and With anxious distress  300.02 (F41.1) Generalized Anxiety Disorder.  Attention-Deficit/Hyperactivity Disorder  314.01 (F90.9) Unspecified Attention -Deficit / Hyperactivity Disorder.  Substance-Related & Addictive Disorders Alcohol Use Disorder   303.90 (F10.20) Moderate   301.83 (F60.3) Borderline Personality Disorder    Current Reported Symptoms and Status update:  Changes since last session-some loneliness  Struggling with compulsive sexual behavior  Struggling with emotional regulation    Progress Toward Treatment Goals:   Satisfactory progress     Therapeutic Interventions/Treatment Strategies:    Area(s) of treatment focus addressed in this session included Symptom Management and Sexual Health and Wellness    Patient checked in about their mental health symptoms, healthy coping skills used over the week, negative coping skills used over the week, what sexual behaviors they engaged in-fantasy, masturbation, sex, their comfort level with their behaviors, if there were triggers, urges to violate boundaries, and violations of boundaries.  They took time to process about struggling with feelign hopeful they provided support and feedback to others in the group.    Psychotherapist offered support, feedback and validation and reinforced use of skills Treatment modalities used include [facility] THERAPY INTERVENTIONS: Cognitive  Behavioral Therapy Group Dynamic Therapy  Interpersonal Coping Skills: Facilitated discussion on learning and applying radical acceptance skill and explored challenging unrealistic expectations of self/others  Support, Feedback, and Structured Activity    Patient Response:   Patient responded to session by accepting feedback, giving feedback, and listening  Possible barriers to participation / learning include: N/A    Current Mental Status Exam:   Appearance:  Appropriate   Eye Contact:  Good   Attitude / Demeanor: Cooperative   Speech      Rate / Production: Normal/ Responsive      Volume:  Normal  volume  Orientation:  All  Mood:   Anxious  Normal  Affect:   Appropriate   Thought Content:2 Clear   Insight:   Good       Plan/Need for Future Services:  Return for therapy in 1 weeks to treat diagnosed problems.    Patient has a current master individualized treatment plan.  See Epic treatment plan for more information.    Referral / Collaboration:  Referral to another professional/service is not indicated at this time..  Emergency Services Needed?  No    Assignment:  Return in one week    Interactive Complexity:  There are four specific communication difficulties that complicate the work of the primary psychiatric procedure.  Interactive complexity (+97108) may be reported when at least one of these difficulties is present.    Communication difficulties present during current the psychiatric procedure include:  None.      Signature/Title:    Vance Watson, LMFT, [facility]

## 2024-08-06 ENCOUNTER — OFFICE VISIT (OUTPATIENT)
Dept: PSYCHOLOGY | Facility: CLINIC | Age: 34
End: 2024-08-06
Payer: COMMERCIAL

## 2024-08-06 DIAGNOSIS — F33.2 MDD (MAJOR DEPRESSIVE DISORDER), RECURRENT SEVERE, WITHOUT PSYCHOSIS (H): Primary | ICD-10-CM

## 2024-08-06 DIAGNOSIS — F41.1 GENERALIZED ANXIETY DISORDER: ICD-10-CM

## 2024-08-06 DIAGNOSIS — F90.2 ATTENTION DEFICIT HYPERACTIVITY DISORDER (ADHD), COMBINED TYPE: ICD-10-CM

## 2024-08-06 DIAGNOSIS — F91.8 CONDUCT DISORDER, UNDIFFERENTIATED TYPE: ICD-10-CM

## 2024-08-06 PROCEDURE — 90853 GROUP PSYCHOTHERAPY: CPT | Performed by: MARRIAGE & FAMILY THERAPIST

## 2024-08-06 NOTE — GROUP NOTE
[facility]  [address], [address]  [place]  [address]    Group Progress Note  [facility] - Progress Note    Date of Service: 24   Name: Xavier Landon  : 1990  Medical Record Number: [id number]  START TIME:  6:30 PM  END TIME:  8:30 PM  FACILITATOR(S): Deborah Watson LMFT, [facility]  TOPIC: [facility] Group Therapy  Type of Session: Group  :  Number of Attendees:  4  Number of Minutes: /120    SERVICE MODALITY:  In-person      DSM-5 Diagnoses:  296.33 (F33.2) Major Depressive Disorder, Recurrent Episode, Severe _ and With anxious distress  300.02 (F41.1) Generalized Anxiety Disorder.  Attention-Deficit/Hyperactivity Disorder  314.01 (F90.9) Unspecified Attention -Deficit / Hyperactivity Disorder.  Substance-Related & Addictive Disorders Alcohol Use Disorder   303.90 (F10.20) Moderate   301.83 (F60.3) Borderline Personality Disorder    Current Reported Symptoms and Status update:  Changes since last session-feeling disconnected  Struggling with compulsive sexual behavior  Struggling with emotional regulation    Progress Toward Treatment Goals:   Satisfactory progress     Therapeutic Interventions/Treatment Strategies:    Area(s) of treatment focus addressed in this session included Symptom Management and [facility] Maintenance/Relapse Prevention    Patient checked in about their mental health symptoms, healthy coping skills used over the week, negative coping skills used over the week, what sexual behaviors they engaged in-fantasy, masturbation, sex, their comfort level with their behaviors, if there were triggers, urges to violate boundaries, and violations of boundaries.  They took time to process about struggling with feeling detatchd they provided support and feedback to others in the group.    Psychotherapist offered support, feedback and validation and reinforced use of skills Treatment modalities used include [facility] THERAPY INTERVENTIONS:  Cognitive Behavioral Therapy Group Dynamic Therapy  Interpersonal Coping Skills: Facilitated understanding of  what factors may contribute to symptom relapse and skills plan to manage symptom relapse   Support, Feedback, and Structured Activity    Patient Response:   Patient responded to session by giving feedback and listening  Possible barriers to participation / learning include: N/A    Current Mental Status Exam:   Appearance:  Appropriate   Eye Contact:  Good   Attitude / Demeanor: Cooperative   Speech      Rate / Production: Normal/ Responsive      Volume:  Normal  volume  Orientation:  All  Mood:   Normal Ambivalence  Affect:   Appropriate   Thought Content: Clear   Insight:   Good       Plan/Need for Future Services:  Return for therapy in 1 weeks to treat diagnosed problems.    Patient has a current master individualized treatment plan.  See Epic treatment plan for more information.    Referral / Collaboration:  Referral to another professional/service is not indicated at this time..  Emergency Services Needed?  No    Assignment:  Return in one week     Interactive Complexity:  There are four specific communication difficulties that complicate the work of the primary psychiatric procedure.  Interactive complexity (+75847) may be reported when at least one of these difficulties is present.    Communication difficulties present during current the psychiatric procedure include:  None.      Signature/Title:    Vance Watson, LMFT, [facility]

## 2024-08-06 NOTE — TELEPHONE ENCOUNTER
Requested Medication:  Amphetamine Dextroamphet ER 25 MG  Dose:   50 mg  Quantity:  60  Refills:  0    Take 2 capsules (50 mg) daily    Last seen at [facility]:  6/2024 - 3 mo  Next Appointment with Provider:  Visit date not found       Lois Baird CMA

## 2024-08-07 ENCOUNTER — TELEPHONE (OUTPATIENT)
Dept: PSYCHOLOGY | Facility: CLINIC | Age: 34
End: 2024-08-07

## 2024-08-07 DIAGNOSIS — F90.2 ATTENTION DEFICIT HYPERACTIVITY DISORDER (ADHD), COMBINED TYPE: Primary | ICD-10-CM

## 2024-08-07 RX ORDER — DEXTROAMPHETAMINE SACCHARATE, AMPHETAMINE ASPARTATE MONOHYDRATE, DEXTROAMPHETAMINE SULFATE AND AMPHETAMINE SULFATE 6.25; 6.25; 6.25; 6.25 MG/1; MG/1; MG/1; MG/1
50 CAPSULE, EXTENDED RELEASE ORAL DAILY
Qty: 60 CAPSULE | Refills: 0 | Status: SHIPPED | OUTPATIENT
Start: 2024-08-07

## 2024-08-07 NOTE — TELEPHONE ENCOUNTER
[facility] Center    Phone Message    May a detailed message be left on voicemail: no     Reason for Call: Medication Refill Request    Has the patient contacted the pharmacy for the refill? Yes   Name of medication being requested:   amphetamine-dextroamphetamine (25mg)  amphetamine-dextroamphetamine (10mg)  Provider who prescribed the medication: Samm Stratton  [facility] [place]  Date medication is needed: ASAP    Action Taken: Message routed to: Samm Stratton    Date of Service: 8/7/2024 Rachelle Thrasher

## 2024-08-08 RX ORDER — DEXTROAMPHETAMINE SACCHARATE, AMPHETAMINE ASPARTATE, DEXTROAMPHETAMINE SULFATE AND AMPHETAMINE SULFATE 2.5; 2.5; 2.5; 2.5 MG/1; MG/1; MG/1; MG/1
10 TABLET ORAL DAILY
Qty: 30 TABLET | Refills: 0 | Status: SHIPPED | OUTPATIENT
Start: 2024-08-08 | End: 2024-09-07

## 2024-09-05 ENCOUNTER — VIRTUAL VISIT (OUTPATIENT)
Dept: PSYCHIATRY | Facility: CLINIC | Age: 34
End: 2024-09-05
Payer: COMMERCIAL

## 2024-09-05 DIAGNOSIS — F41.1 GENERALIZED ANXIETY DISORDER: ICD-10-CM

## 2024-09-05 DIAGNOSIS — F33.2 MAJOR DEPRESSIVE DISORDER, RECURRENT EPISODE, SEVERE WITH ANXIOUS DISTRESS (H): ICD-10-CM

## 2024-09-05 DIAGNOSIS — F91.8 CONDUCT DISORDER, UNDIFFERENTIATED TYPE: ICD-10-CM

## 2024-09-05 DIAGNOSIS — F90.2 ATTENTION DEFICIT HYPERACTIVITY DISORDER (ADHD), COMBINED TYPE: Primary | ICD-10-CM

## 2024-09-05 PROCEDURE — 99214 OFFICE O/P EST MOD 30 MIN: CPT | Mod: 95 | Performed by: STUDENT IN AN ORGANIZED HEALTH CARE EDUCATION/TRAINING PROGRAM

## 2024-09-05 PROCEDURE — G2211 COMPLEX E/M VISIT ADD ON: HCPCS | Mod: 95 | Performed by: STUDENT IN AN ORGANIZED HEALTH CARE EDUCATION/TRAINING PROGRAM

## 2024-09-05 RX ORDER — DEXTROAMPHETAMINE SACCHARATE, AMPHETAMINE ASPARTATE MONOHYDRATE, DEXTROAMPHETAMINE SULFATE AND AMPHETAMINE SULFATE 6.25; 6.25; 6.25; 6.25 MG/1; MG/1; MG/1; MG/1
50 CAPSULE, EXTENDED RELEASE ORAL DAILY
Qty: 60 CAPSULE | Refills: 0 | Status: SHIPPED | OUTPATIENT
Start: 2024-11-04 | End: 2024-12-04

## 2024-09-05 RX ORDER — DEXTROAMPHETAMINE SACCHARATE, AMPHETAMINE ASPARTATE MONOHYDRATE, DEXTROAMPHETAMINE SULFATE AND AMPHETAMINE SULFATE 6.25; 6.25; 6.25; 6.25 MG/1; MG/1; MG/1; MG/1
50 CAPSULE, EXTENDED RELEASE ORAL DAILY
Qty: 60 CAPSULE | Refills: 0 | Status: SHIPPED | OUTPATIENT
Start: 2024-09-05 | End: 2024-10-05

## 2024-09-05 RX ORDER — NALTREXONE HYDROCHLORIDE 50 MG/1
TABLET, FILM COATED ORAL
Qty: 90 TABLET | Refills: 0 | Status: SHIPPED | OUTPATIENT
Start: 2024-09-05

## 2024-09-05 RX ORDER — BUPROPION HYDROCHLORIDE 300 MG/1
300 TABLET ORAL EVERY MORNING
Qty: 90 TABLET | Refills: 0 | Status: SHIPPED | OUTPATIENT
Start: 2024-09-05 | End: 2024-12-04

## 2024-09-05 RX ORDER — DEXTROAMPHETAMINE SACCHARATE, AMPHETAMINE ASPARTATE MONOHYDRATE, DEXTROAMPHETAMINE SULFATE AND AMPHETAMINE SULFATE 6.25; 6.25; 6.25; 6.25 MG/1; MG/1; MG/1; MG/1
50 CAPSULE, EXTENDED RELEASE ORAL DAILY
Qty: 60 CAPSULE | Refills: 0 | Status: SHIPPED | OUTPATIENT
Start: 2024-10-05 | End: 2024-11-04

## 2024-09-05 RX ORDER — DEXTROAMPHETAMINE SACCHARATE, AMPHETAMINE ASPARTATE, DEXTROAMPHETAMINE SULFATE AND AMPHETAMINE SULFATE 2.5; 2.5; 2.5; 2.5 MG/1; MG/1; MG/1; MG/1
10 TABLET ORAL DAILY
Qty: 30 TABLET | Refills: 0 | Status: SHIPPED | OUTPATIENT
Start: 2024-09-05 | End: 2024-10-05

## 2024-09-05 RX ORDER — DEXTROAMPHETAMINE SACCHARATE, AMPHETAMINE ASPARTATE, DEXTROAMPHETAMINE SULFATE AND AMPHETAMINE SULFATE 2.5; 2.5; 2.5; 2.5 MG/1; MG/1; MG/1; MG/1
10 TABLET ORAL DAILY
Qty: 30 TABLET | Refills: 0 | Status: SHIPPED | OUTPATIENT
Start: 2024-10-05 | End: 2024-11-04

## 2024-09-05 RX ORDER — DEXTROAMPHETAMINE SACCHARATE, AMPHETAMINE ASPARTATE, DEXTROAMPHETAMINE SULFATE AND AMPHETAMINE SULFATE 2.5; 2.5; 2.5; 2.5 MG/1; MG/1; MG/1; MG/1
10 TABLET ORAL DAILY
Qty: 30 TABLET | Refills: 0 | Status: SHIPPED | OUTPATIENT
Start: 2024-11-04 | End: 2024-12-04

## 2024-09-05 RX ORDER — BUPROPION HYDROCHLORIDE 150 MG/1
150 TABLET ORAL EVERY MORNING
Qty: 90 TABLET | Refills: 0 | Status: SHIPPED | OUTPATIENT
Start: 2024-09-05 | End: 2024-12-04

## 2024-09-05 ASSESSMENT — ANXIETY QUESTIONNAIRES
GAD7 TOTAL SCORE: 9
7. FEELING AFRAID AS IF SOMETHING AWFUL MIGHT HAPPEN: SEVERAL DAYS
GAD7 TOTAL SCORE: 9
8. IF YOU CHECKED OFF ANY PROBLEMS, HOW DIFFICULT HAVE THESE MADE IT FOR YOU TO DO YOUR WORK, TAKE CARE OF THINGS AT HOME, OR GET ALONG WITH OTHER PEOPLE?: SOMEWHAT DIFFICULT
GAD7 TOTAL SCORE: 9

## 2024-09-05 ASSESSMENT — PATIENT HEALTH QUESTIONNAIRE - PHQ9
10. IF YOU CHECKED OFF ANY PROBLEMS, HOW DIFFICULT HAVE THESE PROBLEMS MADE IT FOR YOU TO DO YOUR WORK, TAKE CARE OF THINGS AT HOME, OR GET ALONG WITH OTHER PEOPLE: SOMEWHAT DIFFICULT
SUM OF ALL RESPONSES TO PHQ QUESTIONS 1-9: 13
SUM OF ALL RESPONSES TO PHQ QUESTIONS 1-9: 13

## 2024-09-05 ASSESSMENT — PAIN SCALES - GENERAL: PAINLEVEL: NO PAIN (0)

## 2024-09-05 NOTE — NURSING NOTE
Is the patient currently in the state of [place]? YES    Current patient location: [address] [place] [id number]    Visit mode:VIDEO    If the visit is dropped, the patient can be reconnected by: VIDEO VISIT: Text to cell phone:   Telephone Information:   Mobile [phone]       Will anyone else be joining the visit? No  (If patient encounters technical issues they should call [phone])    How would you like to obtain your AVS? MyChart    Are changes needed to the allergy or medication list? Yes No longer taking naltrexone    Are refills needed on medications prescribed by this physician? YES    Rooming Documentation: Questionnaire(s) completed.    Reason for visit: ANDERSON Dunaway, F      Care team has reviewed attendance agreement with patient. Patient advised that two failed appointments within 6 months may lead to termination of current episode of care.

## 2024-09-05 NOTE — PROGRESS NOTES
Virtual Visit Details    Type of service:  Video Visit     Originating Location (pt. Location): Home    Distant Location (provider location):  Off-site  Platform used for Video Visit: Velma  Start: 11a  End: 11:10a    PSYCHIATRIC MEDICATION FOLLOW UP APPT     Name:  Xavier Landno  : 1990    Patient attended the phone/video session alone.    Last seen for outpatient psychiatry Return Visit on 24.      FOLLOWING PLAN PUT INTO PLACE: yes    INTERIM HISTORY     COMMUNICATIONS FROM PATIENT VIA:  none    RECORDS AVAILABLE FOR REVIEW: EHR records through [employer] .   HISTORY OF PRESENT ILLNESS   Initial psychiatric consult consult in 3/27/24.    Wanted consistent care within institute as he receives mental health care as well. Increased Wellbutrin to 300mg to further target anxiety and depression. States ADHD was well controlled current regimen. On naltrexone as well for impulsive sexual behavior. Today he has not additional concerns. Would like medication refills. States medications are at good doses.      Today: 24: States doing well on current medication regimen.     Huey was seen today for recheck.     Depression and anxiety are controlled. Continuing wellbutrin, naltrexone for impulse, and anxiety and depression. Follow up in 3 months.     Today: 24:     Doing pretty well. Feels wellbutrin is not having an effect on his depression. Went off of naltrexone a couple months ago. Notes no issues with adderall.    FAMILY, MEDICAL, SURGICAL HISTORY REVIEWED.  MEDICATION HAVE BEEN REVIEWED AND ARE CURRENT TO THE BEST OF MY KNOWLEDGE AND ABILITY.  MEDICATIONS                                                                                                Current Outpatient Medications   Medication Sig Dispense Refill    amphetamine-dextroamphetamine (ADDERALL XR) 25 MG 24 hr capsule Take 2 capsules (50 mg) by mouth daily 60 capsule 0    amphetamine-dextroamphetamine (ADDERALL) 10 MG tablet Take 1  tablet (10 mg) by mouth daily for 30 days 30 tablet 0    buPROPion (WELLBUTRIN XL) 300 MG 24 hr tablet Take 1 tablet (300 mg) by mouth every morning 30 tablet 1    tadalafil (CIALIS) 5 MG tablet Take 1 tablet (5 mg) by mouth daily TAKE ONE TABLET BY MOUTH EVERY 24 HOURS 90 tablet 0    naltrexone (DEPADE/REVIA) 50 MG tablet Take 1 50 mg tablet daily (Patient not taking: Reported on 9/5/2024) 90 tablet 0     No current facility-administered medications for this visit.        PSYCHOTROPIC DRUG INTERACTIONS                                         Additive dopaminergic  Additive noradrenergic  MANAGEMENT:  routine monitoring    TODAY PATIENT REPORTS THE FOLLOWING PSYCHIATRIC ROS:     EXERCISE: Adequate  SIDE EFFECTS:  tolerating medications without reported side effects  COMPLIANCE:  states Adherent to medication regimen  REPORTS THE FOLLOWING NEW MEDICAL ISSUES:  none    PROBLEM: DEPRESSION: No change       9/5/2024    10:55 AM   Last PHQ-9   1.  Little interest or pleasure in doing things 2   2.  Feeling down, depressed, or hopeless 2   3.  Trouble falling or staying asleep, or sleeping too much 1   4.  Feeling tired or having little energy 1   5.  Poor appetite or overeating 1   6.  Feeling bad about yourself 3   7.  Trouble concentrating 2   8.  Moving slowly or restless 0   Q9: Thoughts of better off dead/self-harm past 2 weeks 1   PHQ-9 Total Score 13    13   In the past two weeks have you had thoughts of suicide or self harm? Yes   Do you have concerns about your personal safety or the safety of others? No   In the past 2 weeks have you thought about a plan or had intention to harm yourself? No   In the past 2 weeks have you acted on these thoughts in any way? No         5/31/2023     9:38 AM 6/20/2024    11:26 AM 9/5/2024    10:55 AM   PHQ-9 SCORE   PHQ-9 Total Score MyChart 11 (Moderate depression) 11 (Moderate depression) 13 (Moderate depression)   PHQ-9 Total Score 11 11    11 13    13     PHQ9 score is 13  "indicating moderate depression.  Suicidal ideation:  No     PROBLEM: ANXIETY: Worsening.   GAD7 score is is 9 indicating mild anxiety.       4/6/2022     7:00 PM 6/20/2023     7:33 AM 9/5/2024    10:56 AM   SHAUNA-7 SCORE   Total Score 10 (moderate anxiety) 6 (mild anxiety) 9 (mild anxiety)   Total Score 10 6 9    9       PROBLEM: CHRONIC SUICIDAL IDEATIONS: current: No     PROBLEM: SLEEP/INSOMNIA: Stable.     PERTINENT PAST MEDICAL AND SURGICAL HISTORY     Past Medical History:   Diagnosis Date    ADHD (attention deficit hyperactivity disorder)        VITALS     BP Readings from Last 1 Encounters:   03/27/24 110/67     Pulse Readings from Last 1 Encounters:   03/27/24 67     Wt Readings from Last 1 Encounters:   03/27/24 94.3 kg (208 lb)     Ht Readings from Last 1 Encounters:   03/27/24 1.905 m (6' 3\")     Estimated body mass index is 26 kg/m  as calculated from the following:    Height as of 3/27/24: 1.905 m (6' 3\").    Weight as of 3/27/24: 94.3 kg (208 lb).    LABS & IMAGING                                                                                                                No labs today.   Recent Labs   Lab Test 06/20/23  0818   WBC 4.9   HGB 14.9   HCT 46.1   MCV 88        Recent Labs   Lab Test 06/20/23  0818      POTASSIUM 4.0   CHLORIDE 106   CO2 22   GLC 93   JANAE 9.7   BUN 16.2   CR 1.13   GFRESTIMATED 89   ALBUMIN 4.5   PROTTOTAL 6.7   AST 30   ALT 37   ALKPHOS 80   BILITOTAL 0.4     Recent Labs   Lab Test 06/20/23  0818   CHOL 146   LDL 67   HDL 64   TRIG 74     Recent Labs   Lab Test 06/20/23  0818   TSH 1.80     No results found for: \"KPD846\", \"CNQA847\", \"TAMP99WJUET\", \"VITD3\", \"D2VIT\", \"D3VIT\", \"DTOT\", \"KE30780986\", \"[id number]\", \"[id number]\", \"[id number]\", \"[id number]\", \"[id number]\"     ALLERGY & IMMUNIZATIONS     No Known Allergies    MEDICAL REVIEW OF SYSTEMS:   Ten system review was completed with pertinent positives noted     MENTAL STATUS EXAM: "   General/Constitutional:  Appearance:  awake, alert, adequately groomed, appeared stated age and no apparent distress  Attitude:   cooperative   Eye Contact:  good  Musculoskeletal:  Psychomotor Behavior:  no evidence of tardive dyskinesia, dystonia, or tics from the head up  Psychiatric:  Speech:  clear, coherent, regular rate, rhythm, and volume,  No pressure speech noted.  Associations:  no loose associations  Thought Process:  logical, linear and goal oriented  Thought Content:   No evidence of suicidal ideation or homicidal ideation, no evidence of psychotic thought, no auditory hallucinations present and no visual hallucinations present  Mood:  good  Affect:  full range/stable (normal variation of emotions during exam) and was congruent to speech content.  Insight:  good  Judgment:  intact, adequate for safety  Impulse Control:  intact  Neurological:  Oriented to:  person, place, time, and situation  Attention Span and Concentration:  Able to attend to the interview     Language: intact    Recent and Remote Memory:  Intact to interview. Not formally assessed. No amnesia.   Fund of Knowledge: appropriate         DSM 5 DIAGNOSIS:      Conduct disorder, undifferentiated type  Attention deficit hyperactivity disorder (ADHD), combined type  Generalized anxiety disorder  Major depressive disorder, recurrent episode, severe with anxious distress (H)      MEDICAL COMORBIDITY IMPACTING CLINICAL PICTURE: None noted.  Known issue that I take into account for their medical decisions, no current exacerbations or new concerns      ASSESSMENT AND PLAN    Huey was seen today for recheck.  No adjustments to adderall as symptoms are controlled. Increasing Wellbutrin to 300mg to better treat depression. Restarted naltrexone.   Diagnoses and all orders for this visit:    Attention deficit hyperactivity disorder (ADHD), combined type  -     amphetamine-dextroamphetamine (ADDERALL) 10 MG tablet; Take 1 tablet (10 mg) by mouth  daily.  -     amphetamine-dextroamphetamine (ADDERALL) 10 MG tablet; Take 1 tablet (10 mg) by mouth daily. Do not start before October 5, 2024.  -     amphetamine-dextroamphetamine (ADDERALL) 10 MG tablet; Take 1 tablet (10 mg) by mouth daily. Do not start before November 4, 2024.  -     amphetamine-dextroamphetamine (ADDERALL XR) 25 MG 24 hr capsule; Take 2 capsules (50 mg) by mouth daily.  -     amphetamine-dextroamphetamine (ADDERALL XR) 25 MG 24 hr capsule; Take 2 capsules (50 mg) by mouth daily. Do not start before October 5, 2024.  -     amphetamine-dextroamphetamine (ADDERALL XR) 25 MG 24 hr capsule; Take 2 capsules (50 mg) by mouth daily. Do not start before November 4, 2024.    Other Specified Disruptive, Impulse Control, and Conduct Disorder (Hypersexual Disorder)    -     naltrexone (DEPADE/REVIA) 50 MG tablet; Take 1 50 mg tablet daily    Generalized anxiety disorder  -     buPROPion (WELLBUTRIN XL) 300 MG 24 hr tablet; Take 1 tablet (300 mg) by mouth every morning.    Major depressive disorder, recurrent episode, severe with anxious distress (H)  -     buPROPion (WELLBUTRIN XL) 150 MG 24 hr tablet; Take 1 tablet (150 mg) by mouth every morning.  -     buPROPion (WELLBUTRIN XL) 300 MG 24 hr tablet; Take 1 tablet (300 mg) by mouth every morning.      Follow up: 1 month    HIGH RISK MEDICATION:  No        ADMINISTRATIVE BILLING:   The longitudinal plan of care for the diagnosis(es)/condition(s) as documented were addressed during this visit. Due to the added complexity in care, I will continue to support Huey in the subsequent management and with ongoing continuity of care.       Patient Status:  Patient will continue to be seen for ongoing consultation and stabilization.    Signed:   Samm Stratton PA-C

## 2024-09-13 ENCOUNTER — OFFICE VISIT (OUTPATIENT)
Dept: PSYCHOLOGY | Facility: CLINIC | Age: 34
End: 2024-09-13
Payer: COMMERCIAL

## 2024-09-13 DIAGNOSIS — F33.2 MDD (MAJOR DEPRESSIVE DISORDER), RECURRENT SEVERE, WITHOUT PSYCHOSIS (H): Primary | ICD-10-CM

## 2024-09-13 DIAGNOSIS — F90.2 ATTENTION DEFICIT HYPERACTIVITY DISORDER (ADHD), COMBINED TYPE: ICD-10-CM

## 2024-09-13 DIAGNOSIS — F41.1 GENERALIZED ANXIETY DISORDER: ICD-10-CM

## 2024-09-13 DIAGNOSIS — N52.1 ERECTILE DYSFUNCTION DUE TO DISEASES CLASSIFIED ELSEWHERE: ICD-10-CM

## 2024-09-13 DIAGNOSIS — F91.8 CONDUCT DISORDER, UNDIFFERENTIATED TYPE: ICD-10-CM

## 2024-09-13 PROCEDURE — 90834 PSYTX W PT 45 MINUTES: CPT | Performed by: MARRIAGE & FAMILY THERAPIST

## 2024-09-13 NOTE — PROGRESS NOTES
[facility] - Progress Note    Date of Service: 24   Name: Xavier Landon  : 1990  Medical Record Number: [id number]  Treating Provider: VIKTOR Anderson, [facility]  Type of Session: Individual  Present in Session: pt  Session Start and Stop Time:   Number of Minutes:  50    SERVICE MODALITY:  In-person    DSM-5 Diagnoses:  296.33 (F33.2) Major Depressive Disorder, Recurrent Episode, Severe _ and With anxious distress  300.02 (F41.1) Generalized Anxiety Disorder.  Attention-Deficit/Hyperactivity Disorder  314.01 (F90.9) Unspecified Attention -Deficit / Hyperactivity Disorder.  Substance-Related & Addictive Disorders Alcohol Use Disorder   303.90 (F10.20) Moderate   301.83 (F60.3) Borderline Personality Disorder    Current Reported Symptoms and Status update:  Changes since last session-nger over feelihng stuck   Struggling with compulsive sexual behavior  Struggling with emotional regulation    Progress Toward Treatment Goals:   Satisfactory progress     Therapeutic Interventions/Treatment Strategies:    Area(s) of treatment focus addressed in this session included Symptom Management      Psychotherapist offered support, feedback and validation and reinforced use of skills Treatment modalities used include Dialectical Behavioral Therapy Interpersonal Coping Skills: Facilitated discussion on learning and applying radical acceptance skill and Facilitated understanding of  what factors may contribute to symptom relapse and skills plan to manage symptom relapse  and Emotions Management:  Discussed barriers to emotional regulation  Support and Feedback    Patient Response:   Patient responded to session by accepting support  Possible barriers to participation / learning include: N/A    Current Mental Status Exam:   Appearance:  Appropriate   Eye Contact:  Good   Attitude / Demeanor: Cooperative   Speech      Rate / Production: Normal/ Responsive Emotional       Volume:  Loud  volume  Orientation:  All  Mood:   Irritable   Affect:   Appropriate   Thought Content: Clear   Insight:   Good       Plan/Need for Future Services:  Return for therapy in 1 weeks to treat diagnosed problems.    Patient has a current master individualized treatment plan.  See Epic treatment plan for more information.    Referral / Collaboration:  Referral to another professional/service is not indicated at this time..  Emergency Services Needed?  No    Assignment:  Return in one week     Interactive Complexity:  There are four specific communication difficulties that complicate the work of the primary psychiatric procedure.  Interactive complexity (+18060) may be reported when at least one of these difficulties is present.    Communication difficulties present during current the psychiatric procedure include:  None.      Signature/Title:    Vance Watson, LMFT, [facility]

## 2024-09-14 ENCOUNTER — HEALTH MAINTENANCE LETTER (OUTPATIENT)
Age: 34
End: 2024-09-14

## 2024-09-19 ENCOUNTER — MYC REFILL (OUTPATIENT)
Dept: FAMILY MEDICINE | Facility: CLINIC | Age: 34
End: 2024-09-19

## 2024-09-19 DIAGNOSIS — N52.1 ERECTILE DYSFUNCTION DUE TO DISEASES CLASSIFIED ELSEWHERE: ICD-10-CM

## 2024-09-19 RX ORDER — TADALAFIL 5 MG/1
5 TABLET ORAL DAILY
Qty: 90 TABLET | Refills: 0 | OUTPATIENT
Start: 2024-09-19

## 2024-09-24 ENCOUNTER — OFFICE VISIT (OUTPATIENT)
Dept: PSYCHOLOGY | Facility: CLINIC | Age: 34
End: 2024-09-24
Payer: COMMERCIAL

## 2024-09-24 DIAGNOSIS — F91.8 CONDUCT DISORDER, UNDIFFERENTIATED TYPE: ICD-10-CM

## 2024-09-24 DIAGNOSIS — F41.1 GENERALIZED ANXIETY DISORDER: ICD-10-CM

## 2024-09-24 DIAGNOSIS — F33.2 MDD (MAJOR DEPRESSIVE DISORDER), RECURRENT SEVERE, WITHOUT PSYCHOSIS (H): Primary | ICD-10-CM

## 2024-09-24 DIAGNOSIS — F90.2 ATTENTION DEFICIT HYPERACTIVITY DISORDER (ADHD), COMBINED TYPE: ICD-10-CM

## 2024-09-24 PROCEDURE — 90853 GROUP PSYCHOTHERAPY: CPT | Performed by: MARRIAGE & FAMILY THERAPIST

## 2024-09-25 NOTE — GROUP NOTE
[facility]  [address], [address]  [place]  [address]    Group Progress Note  [facility] - Progress Note    Date of Service: 24   Name: Xavier Landon  : 1990  Medical Record Number: [id number]  START TIME:  6:30 PM  END TIME:  8:30 PM  FACILITATOR(S): Deborah Watson LMFT, [facility]  TOPIC: [facility] Group Therapy  Type of Session: Group  :  Number of Attendees:  4  Number of Minutes:  /120    SERVICE MODALITY:  In-person      DSM-5 Diagnoses:  296.33 (F33.2) Major Depressive Disorder, Recurrent Episode, Severe _ and With anxious distress  300.02 (F41.1) Generalized Anxiety Disorder.  Attention-Deficit/Hyperactivity Disorder  314.01 (F90.9) Unspecified Attention -Deficit / Hyperactivity Disorder.  Substance-Related & Addictive Disorders Alcohol Use Disorder   303.90 (F10.20) Moderate   301.83 (F60.3) Borderline Personality Disorder    Current Reported Symptoms and Status update:  Changes since last session-continued struggle with keeping up w routine and goals   Struggling with compulsive sexual behavior  Struggling with emotional regulation    Progress Toward Treatment Goals:   Satisfactory progress     Therapeutic Interventions/Treatment Strategies:    Area(s) of treatment focus addressed in this session included Symptom Management    Patient checked in about their mental health symptoms, healthy coping skills used over the week, negative coping skills used over the week, what sexual behaviors they engaged in-fantasy, masturbation, sex, their comfort level with their behaviors, if there were triggers, urges to violate boundaries, and violations of boundaries.  They took time to process about leaving group and focusing on meeting needs in more intentional way they provided support and feedback to others in the group.    Psychotherapist offered support, feedback and validation and reinforced use of skills Treatment modalities used include  [facility] THERAPY INTERVENTIONS: Group Dynamic Therapy  Interpersonal Relapse Prevention: Facilitated understanding the importance of awareness of factors that contribute to relapse  and Assisted patient in identifying the challenges and barriers to participation and attendance to support groups/community resources  Support, Feedback, and Structured Activity    Patient Response:   Patient responded to session by accepting feedback, giving feedback, and listening  Possible barriers to participation / learning include: N/A    Current Mental Status Exam:   Appearance:  Appropriate   Eye Contact:  Good   Attitude / Demeanor: Cooperative   Speech      Rate / Production: Normal/ Responsive      Volume:  Normal  volume  Orientation:  All  Mood:   Irritable  Normal  Affect:   Appropriate   Thought Content: Clear   Insight:   Good       Plan/Need for Future Services:  Return for therapy in na weeks to treat diagnosed problems.    Patient has a current master individualized treatment plan.  See Epic treatment plan for more information.    Referral / Collaboration:  Referral to another professional/service is not indicated at this time..  Emergency Services Needed?  No    Assignment:  Return to individual therapy     Interactive Complexity:  There are four specific communication difficulties that complicate the work of the primary psychiatric procedure.  Interactive complexity (+35236) may be reported when at least one of these difficulties is present.    Communication difficulties present during current the psychiatric procedure include:  None.      Signature/Title:    Vance Watson, LMFT, [facility]

## 2024-09-27 ENCOUNTER — OFFICE VISIT (OUTPATIENT)
Dept: PSYCHOLOGY | Facility: CLINIC | Age: 34
End: 2024-09-27
Payer: COMMERCIAL

## 2024-09-27 DIAGNOSIS — N52.1 ERECTILE DYSFUNCTION DUE TO DISEASES CLASSIFIED ELSEWHERE: ICD-10-CM

## 2024-09-27 DIAGNOSIS — F91.8 CONDUCT DISORDER, UNDIFFERENTIATED TYPE: ICD-10-CM

## 2024-09-27 DIAGNOSIS — F90.2 ATTENTION DEFICIT HYPERACTIVITY DISORDER (ADHD), COMBINED TYPE: ICD-10-CM

## 2024-09-27 DIAGNOSIS — F33.2 MDD (MAJOR DEPRESSIVE DISORDER), RECURRENT SEVERE, WITHOUT PSYCHOSIS (H): Primary | ICD-10-CM

## 2024-09-27 DIAGNOSIS — F41.1 GENERALIZED ANXIETY DISORDER: ICD-10-CM

## 2024-09-27 PROCEDURE — 90837 PSYTX W PT 60 MINUTES: CPT | Performed by: MARRIAGE & FAMILY THERAPIST

## 2024-09-27 NOTE — PROGRESS NOTES
[place] [facility] - Progress Note    Date of Service: 24   Name: Xavier Landon  : 1990  Medical Record Number: [id number]  Treating Provider: VIKTOR Anderson LAD  Type of Session: Individual  Present in Session: pt  Session Start and Stop Time: 2626-1366  Number of Minutes:  58    SERVICE MODALITY:  In-person    DSM-5 Diagnoses:  296.33 (F33.2) Major Depressive Disorder, Recurrent Episode, Severe _ and With anxious distress  300.02 (F41.1) Generalized Anxiety Disorder.  Attention-Deficit/Hyperactivity Disorder  314.01 (F90.9) Unspecified Attention -Deficit / Hyperactivity Disorder.  Substance-Related & Addictive Disorders Alcohol Use Disorder   303.90 (F10.20) Moderate   301.83 (F60.3) Borderline Personality Disorder    Current Reported Symptoms and Status update:  Changes since last session-anger over lack of progress  Struggling with compulsive sexual behavior  Struggling with emotional regulation    Progress Toward Treatment Goals:   Satisfactory progress     Therapeutic Interventions/Treatment Strategies:    Area(s) of treatment focus addressed in this session included Symptom Management and Interpersonal Relationship Skills    Psychotherapist offered support, feedback and validation and reinforced use of skills Treatment modalities used include Dialectical Behavioral Therapy Sex Therapy Interpersonal explored challenging unrealistic expectations of self/others  Support, Feedback, and Education    Patient Response:   Patient responded to session by listening  Possible barriers to participation / learning include: N/A    Current Mental Status Exam:   Appearance:  Appropriate   Eye Contact:  Good   Attitude / Demeanor: Cooperative   Speech      Rate / Production: Normal/ Responsive Emotional      Volume:  Loud  volume  Orientation:  All  Mood:   Irritable   Affect:   Constricted   Thought Content: Clear   Insight:   Good       Plan/Need for Future  Services:  Return for therapy in 2 weeks to treat diagnosed problems.    Patient has a current master individualized treatment plan.  See Epic treatment plan for more information.    Referral / Collaboration:  Referral to another professional/service is not indicated at this time..  Emergency Services Needed?  No    Assignment:  Self regulation    Interactive Complexity:  There are four specific communication difficulties that complicate the work of the primary psychiatric procedure.  Interactive complexity (+04379) may be reported when at least one of these difficulties is present.    Communication difficulties present during current the psychiatric procedure include:  None.      Signature/Title:    Vance Watson, LMFT, [facility]

## 2024-10-01 ENCOUNTER — TELEPHONE (OUTPATIENT)
Dept: FAMILY MEDICINE | Facility: CLINIC | Age: 34
End: 2024-10-01

## 2024-10-18 ENCOUNTER — MYC REFILL (OUTPATIENT)
Dept: FAMILY MEDICINE | Facility: CLINIC | Age: 34
End: 2024-10-18

## 2024-10-18 DIAGNOSIS — N52.1 ERECTILE DYSFUNCTION DUE TO DISEASES CLASSIFIED ELSEWHERE: ICD-10-CM

## 2024-10-18 RX ORDER — TADALAFIL 5 MG/1
5 TABLET ORAL DAILY
Qty: 90 TABLET | Refills: 0 | Status: SHIPPED | OUTPATIENT
Start: 2024-10-18

## 2024-11-01 ENCOUNTER — OFFICE VISIT (OUTPATIENT)
Dept: PSYCHOLOGY | Facility: CLINIC | Age: 34
End: 2024-11-01
Payer: COMMERCIAL

## 2024-11-01 DIAGNOSIS — F33.2 MDD (MAJOR DEPRESSIVE DISORDER), RECURRENT SEVERE, WITHOUT PSYCHOSIS (H): Primary | ICD-10-CM

## 2024-11-01 DIAGNOSIS — F90.2 ATTENTION DEFICIT HYPERACTIVITY DISORDER (ADHD), COMBINED TYPE: ICD-10-CM

## 2024-11-01 DIAGNOSIS — F91.8 CONDUCT DISORDER, UNDIFFERENTIATED TYPE: ICD-10-CM

## 2024-11-01 DIAGNOSIS — F41.1 GENERALIZED ANXIETY DISORDER: ICD-10-CM

## 2024-11-01 PROCEDURE — 90837 PSYTX W PT 60 MINUTES: CPT | Performed by: MARRIAGE & FAMILY THERAPIST

## 2024-11-01 NOTE — PROGRESS NOTES
[facility] - Progress Note    Date of Service: 24   Name: Xavier Landon  : 1990  Medical Record Number: [id number]  Treating Provider: VIKTOR Anderson, [facility]  Type of Session: Individual  Present in Session: pt  Session Start and Stop Time: 0784-8897  Number of Minutes:  58    SERVICE MODALITY:  In-person    DSM-5 Diagnoses:  296.33 (F33.2) Major Depressive Disorder, Recurrent Episode, Severe _ and With anxious distress  300.02 (F41.1) Generalized Anxiety Disorder.  Attention-Deficit/Hyperactivity Disorder  314.01 (F90.9) Unspecified Attention -Deficit / Hyperactivity Disorder.  Substance-Related & Addictive Disorders Alcohol Use Disorder   303.90 (F10.20) Moderate   301.83 (F60.3) Borderline Personality Disorder    Current Reported Symptoms and Status update:  Changes since last session-increased depression   Struggling with compulsive sexual behavior  Struggling with emotional regulation    Progress Toward Treatment Goals:   Satisfactory progress     Therapeutic Interventions/Treatment Strategies:    Area(s) of treatment focus addressed in this session included Symptom Management    Psychotherapist offered support, feedback and validation and reinforced use of skills Treatment modalities used include Cognitive Behavioral Therapy Interpersonal Behavioral Activation: Explored how behaviors effect mood and interact with thoughts and feelings and Coping Skills: Facilitated understanding of  what factors may contribute to symptom relapse and skills plan to manage symptom relapse  and Addressed barriers to utilizing coping skills when in distress  Support and Feedback    Patient Response:   Patient responded to session by accepting support, verbalizing understanding, and actively engaged  Possible barriers to participation / learning include: N/A    Current Mental Status Exam:   Appearance:  Appropriate   Eye Contact:  Good   Attitude / Demeanor: Cooperative    Speech      Rate / Production: Normal/ Responsive      Volume:  Normal  volume  Orientation:  All  Mood:   Depressed   Affect:   Subdued   Thought Content: Clear   Insight:   Good       Plan/Need for Future Services:  Return for therapy in 1 weeks to treat diagnosed problems.    Patient has a current master individualized treatment plan.  See Epic treatment plan for more information.    Referral / Collaboration:  Referral to another professional/service is not indicated at this time..  Emergency Services Needed?  No    Assignment:  Finances and mindfulness    Interactive Complexity:  There are four specific communication difficulties that complicate the work of the primary psychiatric procedure.  Interactive complexity (+41873) may be reported when at least one of these difficulties is present.    Communication difficulties present during current the psychiatric procedure include:  None.      Signature/Title:    Vance Watson, LMFT, [facility]

## 2024-11-08 ENCOUNTER — OFFICE VISIT (OUTPATIENT)
Dept: PSYCHOLOGY | Facility: CLINIC | Age: 34
End: 2024-11-08
Payer: COMMERCIAL

## 2024-11-08 DIAGNOSIS — F91.8 CONDUCT DISORDER, UNDIFFERENTIATED TYPE: ICD-10-CM

## 2024-11-08 DIAGNOSIS — F33.2 MDD (MAJOR DEPRESSIVE DISORDER), RECURRENT SEVERE, WITHOUT PSYCHOSIS (H): Primary | ICD-10-CM

## 2024-11-08 DIAGNOSIS — F90.2 ATTENTION DEFICIT HYPERACTIVITY DISORDER (ADHD), COMBINED TYPE: ICD-10-CM

## 2024-11-08 DIAGNOSIS — F41.1 GENERALIZED ANXIETY DISORDER: ICD-10-CM

## 2024-11-08 PROCEDURE — 90837 PSYTX W PT 60 MINUTES: CPT | Performed by: MARRIAGE & FAMILY THERAPIST

## 2024-11-08 NOTE — PROGRESS NOTES
[facility] Progress [facility]    Date of Service: 24   Name: Xavier Landon  : 1990  Medical Record Number: [id number]  Treating Provider: VIKTOR Anderson, [facility]  Type of Session: Individual  Present in Session: pt  Session Start and Stop Time:   Number of Minutes:  56    SERVICE MODALITY:  In-person    DSM-5 Diagnoses:  296.33 (F33.2) Major Depressive Disorder, Recurrent Episode, Severe _ and With anxious distress  300.02 (F41.1) Generalized Anxiety Disorder.  Attention-Deficit/Hyperactivity Disorder  314.01 (F90.9) Unspecified Attention -Deficit / Hyperactivity Disorder.  Substance-Related & Addictive Disorders Alcohol Use Disorder   303.90 (F10.20) Moderate   301.83 (F60.3) Borderline Personality Disorder    Current Reported Symptoms and Status update:  Changes since last session-increased depressive symptoms  Struggling with compulsive sexual behavior  Struggling with emotional regulation    Progress Toward Treatment Goals:   Satisfactory progress     Therapeutic Interventions/Treatment Strategies:    Area(s) of treatment focus addressed in this session included Symptom Management    Psychotherapist offered support, feedback and validation and reinforced use of skills Treatment modalities used include Cognitive Behavioral Therapy Interpersonal Behavioral Activation: Reinforced benefits/challenges of change process through applying skills to replace unwanted behaviors, Explored how behaviors effect mood and interact with thoughts and feelings, and Encouraged strategies to reduce individual procrastination and increase motivation by increasing goal-directed activities to enhance mood and reduce symptoms.  Support, Feedback, and Problem Solving    Patient Response:   Patient responded to session by listening and accepting support  Possible barriers to participation / learning include: N/A    Current Mental Status Exam:   Appearance:  Appropriate    Eye Contact:  Good   Attitude / Demeanor: Cooperative   Speech      Rate / Production: Normal/ Responsive      Volume:  Normal  volume  Orientation:  All  Mood:   Depressed   Affect:   Appropriate   Thought Content: Clear   Insight:   Good       Plan/Need for Future Services:  Return for therapy in 2 weeks to treat diagnosed problems.    Patient has a current master individualized treatment plan.  See Epic treatment plan for more information.    Referral / Collaboration:  Referral to another professional/service is not indicated at this time..  Emergency Services Needed?  No    Assignment:  Using rules for discipline     Interactive Complexity:  There are four specific communication difficulties that complicate the work of the primary psychiatric procedure.  Interactive complexity (+29352) may be reported when at least one of these difficulties is present.    Communication difficulties present during current the psychiatric procedure include:  None.      Signature/Title:    VIKTOR Anderson, [facility]

## 2024-12-04 ENCOUNTER — MYC MEDICAL ADVICE (OUTPATIENT)
Dept: PSYCHIATRY | Facility: CLINIC | Age: 34
End: 2024-12-04

## 2024-12-04 DIAGNOSIS — F90.2 ATTENTION DEFICIT HYPERACTIVITY DISORDER (ADHD), COMBINED TYPE: ICD-10-CM

## 2024-12-04 RX ORDER — DEXTROAMPHETAMINE SACCHARATE, AMPHETAMINE ASPARTATE MONOHYDRATE, DEXTROAMPHETAMINE SULFATE AND AMPHETAMINE SULFATE 6.25; 6.25; 6.25; 6.25 MG/1; MG/1; MG/1; MG/1
50 CAPSULE, EXTENDED RELEASE ORAL DAILY
Qty: 60 CAPSULE | Refills: 0 | Status: SHIPPED | OUTPATIENT
Start: 2024-12-04

## 2024-12-04 RX ORDER — DEXTROAMPHETAMINE SACCHARATE, AMPHETAMINE ASPARTATE, DEXTROAMPHETAMINE SULFATE AND AMPHETAMINE SULFATE 2.5; 2.5; 2.5; 2.5 MG/1; MG/1; MG/1; MG/1
10 TABLET ORAL DAILY
Qty: 30 TABLET | Refills: 0 | Status: SHIPPED | OUTPATIENT
Start: 2024-12-04

## 2024-12-05 ENCOUNTER — VIRTUAL VISIT (OUTPATIENT)
Dept: PSYCHIATRY | Facility: CLINIC | Age: 34
End: 2024-12-05
Payer: COMMERCIAL

## 2024-12-05 DIAGNOSIS — F90.2 ATTENTION DEFICIT HYPERACTIVITY DISORDER (ADHD), COMBINED TYPE: ICD-10-CM

## 2024-12-05 DIAGNOSIS — F33.2 MDD (MAJOR DEPRESSIVE DISORDER), RECURRENT SEVERE, WITHOUT PSYCHOSIS (H): Primary | ICD-10-CM

## 2024-12-05 PROCEDURE — G2211 COMPLEX E/M VISIT ADD ON: HCPCS | Mod: 95 | Performed by: STUDENT IN AN ORGANIZED HEALTH CARE EDUCATION/TRAINING PROGRAM

## 2024-12-05 PROCEDURE — 99214 OFFICE O/P EST MOD 30 MIN: CPT | Mod: 95 | Performed by: STUDENT IN AN ORGANIZED HEALTH CARE EDUCATION/TRAINING PROGRAM

## 2024-12-05 RX ORDER — BUPROPION HYDROCHLORIDE 300 MG/1
TABLET ORAL
Qty: 90 TABLET | Refills: 0 | Status: SHIPPED | OUTPATIENT
Start: 2024-12-05

## 2024-12-05 RX ORDER — BUPROPION HYDROCHLORIDE 150 MG/1
150 TABLET ORAL EVERY MORNING
Qty: 90 TABLET | Refills: 0 | Status: SHIPPED | OUTPATIENT
Start: 2024-12-05 | End: 2025-03-05

## 2024-12-05 ASSESSMENT — PATIENT HEALTH QUESTIONNAIRE - PHQ9
SUM OF ALL RESPONSES TO PHQ QUESTIONS 1-9: 18
10. IF YOU CHECKED OFF ANY PROBLEMS, HOW DIFFICULT HAVE THESE PROBLEMS MADE IT FOR YOU TO DO YOUR WORK, TAKE CARE OF THINGS AT HOME, OR GET ALONG WITH OTHER PEOPLE: VERY DIFFICULT
SUM OF ALL RESPONSES TO PHQ QUESTIONS 1-9: 18

## 2024-12-05 ASSESSMENT — ANXIETY QUESTIONNAIRES
3. WORRYING TOO MUCH ABOUT DIFFERENT THINGS: NEARLY EVERY DAY
IF YOU CHECKED OFF ANY PROBLEMS ON THIS QUESTIONNAIRE, HOW DIFFICULT HAVE THESE PROBLEMS MADE IT FOR YOU TO DO YOUR WORK, TAKE CARE OF THINGS AT HOME, OR GET ALONG WITH OTHER PEOPLE: VERY DIFFICULT
5. BEING SO RESTLESS THAT IT IS HARD TO SIT STILL: NOT AT ALL
7. FEELING AFRAID AS IF SOMETHING AWFUL MIGHT HAPPEN: MORE THAN HALF THE DAYS
2. NOT BEING ABLE TO STOP OR CONTROL WORRYING: NEARLY EVERY DAY
7. FEELING AFRAID AS IF SOMETHING AWFUL MIGHT HAPPEN: MORE THAN HALF THE DAYS
1. FEELING NERVOUS, ANXIOUS, OR ON EDGE: MORE THAN HALF THE DAYS
8. IF YOU CHECKED OFF ANY PROBLEMS, HOW DIFFICULT HAVE THESE MADE IT FOR YOU TO DO YOUR WORK, TAKE CARE OF THINGS AT HOME, OR GET ALONG WITH OTHER PEOPLE?: VERY DIFFICULT
GAD7 TOTAL SCORE: 14
GAD7 TOTAL SCORE: 14
6. BECOMING EASILY ANNOYED OR IRRITABLE: NEARLY EVERY DAY
4. TROUBLE RELAXING: SEVERAL DAYS
GAD7 TOTAL SCORE: 14

## 2024-12-05 ASSESSMENT — PAIN SCALES - GENERAL: PAINLEVEL_OUTOF10: NO PAIN (0)

## 2024-12-05 NOTE — NURSING NOTE
Current patient location: unsure    Is the patient currently in the state of [place]? YES    Visit mode:VIDEO    If the visit is dropped, the patient can be reconnected by:VIDEO VISIT: Text to cell phone:   Telephone Information:   Mobile [phone]       Will anyone else be joining the visit? NO  (If patient encounters technical issues they should call [phone])    Are changes needed to the allergy or medication list? Pt stated no changes to allergies and Pt stated no med changes    Are refills needed on medications prescribed by this physician? Discuss with provider    Rooming Documentation:  Questionnaire(s) completed    Reason for visit: RECHPUSHPA DE LEON

## 2024-12-05 NOTE — PROGRESS NOTES
Virtual Visit Details    Type of service:  Video Visit     Originating Location (pt. Location): Home    Distant Location (provider location):  Off-site  Platform used for Video Visit: Velma  Start: 11A  End: 11:15A       CARE TEAM:    PCP- Physician No Ref-Primary  Therapist- Deborah Watson, [facility], [facility]      Huey is a 34 year old who uses the pronouns he, him, his, himself.      Diagnoses        MDD (major depressive disorder), recurrent severe, without psychosis (H)  Attention deficit hyperactivity disorder (ADHD), combined type      Assessment     Huey presented today for a recheck. ADHD controlled. Depression not controlled. Increasing wellbutrin.       Future Considerations: taper up wellburin and/or switch to SNRI.     Psychotropic Drug Interactions:    Additive dopaminergic  Additive noradrenergic  MANAGEMENT:  routine monitoring    [place] indicates that controlled prescriptions have been filled as prescribed    Risk Statements:   Treatment Risk- Risks, benefits, alternatives and potential adverse effects have been discussed and are understood.   Safety Risk-Huey Arzate did not appear to be an imminent safety risk to self or others.     Plan     1) Medications:     Current Outpatient Medications:     buPROPion (WELLBUTRIN XL) 150 MG 24 hr tablet, Take 1 tablet (150 mg) by mouth every morning., Disp: 90 tablet, Rfl: 0    buPROPion (WELLBUTRIN XL) 300 MG 24 hr tablet, Take 1 tablet (300mg) in the morning along with other wellbutrin prescription (150mg) totaling 450mg in the morning daily., Disp: 90 tablet, Rfl: 0    amphetamine-dextroamphetamine (ADDERALL XR) 25 MG 24 hr capsule, Take 2 capsules (50 mg) by mouth daily., Disp: 60 capsule, Rfl: 0    amphetamine-dextroamphetamine (ADDERALL) 10 MG tablet, Take 1 tablet (10 mg) by mouth daily., Disp: 30 tablet, Rfl: 0    buPROPion (WELLBUTRIN XL) 150 MG 24 hr tablet, Take 1 tablet (150 mg) by mouth every morning., Disp: 90 tablet, Rfl: 0     buPROPion (WELLBUTRIN XL) 300 MG 24 hr tablet, Take 1 tablet (300 mg) by mouth every morning., Disp: 90 tablet, Rfl: 0    naltrexone (DEPADE/REVIA) 50 MG tablet, Take 1 50 mg tablet daily, Disp: 90 tablet, Rfl: 0    2) Psychotherapy: continue    3) Next due:  Labs- Routine monitoring is not indicated for current psychotropic medication regimen   EKG- Routine monitoring is not indicated for current psychotropic medication regimen   Rating scales- none needed    4) Referrals: none    5) Other: none    6) Follow-up: Return to clinic in 4 weeks.       Pertinent Background                                                   [most recent eval 12/05/24]     Initial psychiatric consult consult in 3/27/24.    Wanted consistent care within institute as he receives mental health care as well. Increased Wellbutrin to 300mg to further target anxiety and depression. States ADHD was well controlled current regimen. On naltrexone as well for impulsive sexual behavior. Today he has not additional concerns. Would like medication refills. States medications are at good doses.     Xavier Landon is a 33 year old White Not  or  male presenting for psychiatric evaluation and medication management. Information is obtained from patient and available records.  Reports history of Depression, generalized anxiety disorder, ADHD, substance related and addictive disorders alcohol use disorder, borderline personality disorder, compulsive sexual behavior. Denies prior psychiatric hospitalizations. Hx of suicidal ideation, no suicide attempts. No history of self-injurious behaviors. Genetically loaded for  ADHD and substance use. Grew up in an intact home with all basic needs being met.     Not exposed to multiple Adverse childhood experiences (ACEs). ACEs are strongly related to the development and prevalence of a wide range of health problems throughout a person s lifespan, including those associated with substance  misuse. These events are likely playing into the clinical picture.      Patient is in agreement to have consistent care within the institute receiving therapy and psychiatric medications. We dediced to increase the Wellbutrin to further target anxiety and depression. He does state ADHD is well controlled. No additional medication changes at this time.      9/5/24: Doing pretty well. Feels wellbutrin is not having an effect on his depression. Went off of naltrexone a couple months ago. Notes no issues with adderall.   No adjustments to adderall as symptoms are controlled. Increasing Wellbutrin to 300mg to better treat depression. Restarted naltrexone.          Subjective     Since the last visit:   - Does not notice a difference with higher wellbutrin dose.   -tried several SSRIs in the past without benefit.      Current Social History:  Financial/occupational: employed  Living situation (partner, children, pets, etc): not assessed  Social/spiritual support: yes  Feels safe at home: Yes      Medical Review of Systems:   Lightheadedness/orthostasis: None  Headaches: None  GI: none  Sexual health concerns: None         Mental Status Exam     General/Constitutional:  Appearance:  awake, alert, adequately groomed, appeared stated age and no apparent distress  Attitude:   cooperative   Eye Contact:  good  Musculoskeletal:  Psychomotor Behavior:  no evidence of tardive dyskinesia, dystonia, or tics from the head up  Psychiatric:  Speech:  clear, coherent, regular rate, rhythm, and volume,  No pressure speech noted.  Associations:  no loose associations  Thought Process:  logical, linear and goal oriented  Thought Content:   No evidence of suicidal ideation or homicidal ideation, no evidence of psychotic thought, no auditory hallucinations present and no visual hallucinations present  Mood:  depressed  Affect:  full range/stable (normal variation of emotions during exam) and was congruent to speech content.  Insight:   good  Judgment:  intact, adequate for safety  Impulse Control:  intact  Neurological:  Oriented to:  person, place, time, and situation  Attention Span and Concentration:  Able to attend to the interview     Language: intact    Recent and Remote Memory:  Intact to interview. Not formally assessed. No amnesia.   Fund of Knowledge: appropriate        Past Psych Med Trials        Medication Max Dose (mg) Dates / Duration Helpful? DC Reason / Adverse Effects?   fluoxetine   denies    lamictal   denies    propranolol   denies    abilify   denies    lithium                                      Treatment Course and Pina Events since  JULY 2023     Initial psychiatric consult consult in 3/27/24.  Increased wellbutrin to 300mg.    9/5/24: Doing pretty well. Feels wellbutrin is not having an effect on his depression. Went off of naltrexone a couple months ago. Notes no issues with adderall.  Restarted naltrexone.   12/5/24: increasing wellbutrin to 450mg.      Vitals   There were no vitals taken for this visit.  Pulse Readings from Last 3 Encounters:   03/27/24 67   06/20/23 73   05/09/22 94     Wt Readings from Last 3 Encounters:   03/27/24 94.3 kg (208 lb)   06/20/23 88.5 kg (195 lb)   05/09/22 103.9 kg (229 lb)     BP Readings from Last 3 Encounters:   03/27/24 110/67   06/20/23 121/79   05/09/22 130/82        Medical History     ALLERGIES: Patient has no known allergies.    Patient Active Problem List   Diagnosis    MDD (major depressive disorder), recurrent severe, without psychosis (H)    Major depressive disorder, recurrent episode, severe with anxious distress (H)    Other Specified Disruptive, Impulse Control, and Conduct Disorder (Hypersexual Disorder)      Generalized anxiety disorder    Attention deficit hyperactivity disorder (ADHD), combined type    Substance use disorder    ADD (attention deficit disorder)    Depression, major, recurrent (H)    Erectile dysfunction due to diseases classified elsewhere         Medications     Current Outpatient Medications   Medication Sig Dispense Refill    amphetamine-dextroamphetamine (ADDERALL XR) 25 MG 24 hr capsule Take 2 capsules (50 mg) by mouth daily. 60 capsule 0    amphetamine-dextroamphetamine (ADDERALL) 10 MG tablet Take 1 tablet (10 mg) by mouth daily. 30 tablet 0    buPROPion (WELLBUTRIN XL) 150 MG 24 hr tablet Take 1 tablet (150 mg) by mouth every morning. 90 tablet 0    buPROPion (WELLBUTRIN XL) 300 MG 24 hr tablet Take 1 tablet (300 mg) by mouth every morning. 90 tablet 0    naltrexone (DEPADE/REVIA) 50 MG tablet Take 1 50 mg tablet daily 90 tablet 0    tadalafil (CIALIS) 5 MG tablet Take 1 tablet (5 mg) by mouth daily. TAKE ONE TABLET BY MOUTH EVERY 24 HOURS 90 tablet 0        Labs and Data         5/31/2023     9:41 AM 6/20/2024    11:29 AM 12/5/2024    11:05 AM   PROMIS-10 Total Score w/o Sub Scores   PROMIS TOTAL - SUBSCORES 25    25 22    22 18         10/14/2021     3:37 PM 1/19/2022     5:27 PM 2/24/2022     2:07 PM   CAGE-AID Total Score   Total Score 3 2 2   Total Score MyChart  2 (A total score of 2 or greater is considered clinically significant)          6/20/2024    11:26 AM 9/5/2024    10:55 AM 12/5/2024    11:04 AM   PHQ-9 SCORE   PHQ-9 Total Score MyChart 11 (Moderate depression) 13 (Moderate depression) 18 (Moderately severe depression)   PHQ-9 Total Score 11    11 13    13 18        Patient-reported    Multiple values from one day are sorted in reverse-chronological order         6/20/2023     7:33 AM 9/5/2024    10:56 AM 12/5/2024    11:05 AM   SHAUNA-7 SCORE   Total Score 6 (mild anxiety) 9 (mild anxiety) 14 (moderate anxiety)   Total Score 6 9    9 14        Patient-reported    Multiple values from one day are sorted in reverse-chronological order       Liver/Kidney Function, TSH Metabolic Blood counts   Recent Labs   Lab Test 06/20/23  0818 06/01/22  1123   AST 30 19   ALT 37 55   ALKPHOS 80 68   CR 1.13 0.84     Recent Labs   Lab Test 06/20/23  0818    TSH 1.80    Recent Labs   Lab Test 06/20/23  0818   CHOL 146   TRIG 74   LDL 67   HDL 64     No lab results found.  Recent Labs   Lab Test 06/20/23  0818   GLC 93    Recent Labs   Lab Test 06/20/23  0818   WBC 4.9   HGB 14.9   HCT 46.1   MCV 88              Administrative/Billing:   The longitudinal plan of care for the diagnosis(es)/condition(s) as documented were addressed during this visit. Due to the added complexity in care, I will continue to support Huey in the subsequent management and with ongoing continuity of care.        PROVIDER: Samm Stratton PA-C

## 2025-01-02 DIAGNOSIS — F90.2 ATTENTION DEFICIT HYPERACTIVITY DISORDER (ADHD), COMBINED TYPE: ICD-10-CM

## 2025-01-02 RX ORDER — DEXTROAMPHETAMINE SACCHARATE, AMPHETAMINE ASPARTATE, DEXTROAMPHETAMINE SULFATE AND AMPHETAMINE SULFATE 2.5; 2.5; 2.5; 2.5 MG/1; MG/1; MG/1; MG/1
10 TABLET ORAL DAILY
Qty: 30 TABLET | Refills: 0 | Status: SHIPPED | OUTPATIENT
Start: 2025-01-02 | End: 2025-02-01

## 2025-01-02 RX ORDER — DEXTROAMPHETAMINE SACCHARATE, AMPHETAMINE ASPARTATE MONOHYDRATE, DEXTROAMPHETAMINE SULFATE AND AMPHETAMINE SULFATE 6.25; 6.25; 6.25; 6.25 MG/1; MG/1; MG/1; MG/1
50 CAPSULE, EXTENDED RELEASE ORAL DAILY
Qty: 60 CAPSULE | Refills: 0 | Status: SHIPPED | OUTPATIENT
Start: 2025-02-01 | End: 2025-03-03

## 2025-01-02 RX ORDER — DEXTROAMPHETAMINE SACCHARATE, AMPHETAMINE ASPARTATE, DEXTROAMPHETAMINE SULFATE AND AMPHETAMINE SULFATE 2.5; 2.5; 2.5; 2.5 MG/1; MG/1; MG/1; MG/1
10 TABLET ORAL DAILY
Qty: 30 TABLET | Refills: 0 | Status: SHIPPED | OUTPATIENT
Start: 2025-02-01 | End: 2025-03-03

## 2025-01-02 RX ORDER — DEXTROAMPHETAMINE SACCHARATE, AMPHETAMINE ASPARTATE MONOHYDRATE, DEXTROAMPHETAMINE SULFATE AND AMPHETAMINE SULFATE 6.25; 6.25; 6.25; 6.25 MG/1; MG/1; MG/1; MG/1
50 CAPSULE, EXTENDED RELEASE ORAL DAILY
Qty: 60 CAPSULE | Refills: 0 | Status: SHIPPED | OUTPATIENT
Start: 2025-01-02 | End: 2025-02-01

## 2025-02-18 DIAGNOSIS — F90.2 ATTENTION DEFICIT HYPERACTIVITY DISORDER (ADHD), COMBINED TYPE: ICD-10-CM

## 2025-02-18 NOTE — TELEPHONE ENCOUNTER
[facility] Call Center    Phone Message    May a detailed message be left on voicemail: yes     Reason for Call: Medication Refill Request    Has the patient contacted the pharmacy for the refill? Yes   Name of medication being requested: Adderall 50 mg, Adderall 10 mg  Provider who prescribed the medication: Samm Stratton  Pharmacy: N/A - patient requested paper scripts  Date medication is needed: March?       Action Taken: Message routed to:  Other: [facility] Nurse Pool    Travel Screening: Not Applicable     Date of Service: [facility] 3/5/2025

## 2025-02-18 NOTE — TELEPHONE ENCOUNTER
Last seen: 12/5/2024  RTC: 4 weeks  Any patient initiated cancellations or no shows since last visit? No  Next appt: 3/5/2025       Incoming refill from patient via phone by patient or caregiver    Is note signed/closed? Yes    Is this a 90 day request for a psych medication? No    From chart note:    Return to clinic in 4 weeks.       Medication unable to be refilled by RN due to criteria not met as indicated.                 []Eligibility - not seen in the last year              []Supervision - no future appointment              []Compliance - no shows, cancellations or lapse in therapy              []Verification - order discrepancy              [x]Controlled medication              []Medication not included in policy              []90-day supply request              [x]Other:  Patient requesting paper script

## 2025-02-19 RX ORDER — DEXTROAMPHETAMINE SACCHARATE, AMPHETAMINE ASPARTATE MONOHYDRATE, DEXTROAMPHETAMINE SULFATE AND AMPHETAMINE SULFATE 6.25; 6.25; 6.25; 6.25 MG/1; MG/1; MG/1; MG/1
50 CAPSULE, EXTENDED RELEASE ORAL DAILY
Qty: 60 CAPSULE | Refills: 0 | OUTPATIENT
Start: 2025-03-02

## 2025-02-19 RX ORDER — DEXTROAMPHETAMINE SACCHARATE, AMPHETAMINE ASPARTATE, DEXTROAMPHETAMINE SULFATE AND AMPHETAMINE SULFATE 2.5; 2.5; 2.5; 2.5 MG/1; MG/1; MG/1; MG/1
10 TABLET ORAL DAILY
Qty: 30 TABLET | Refills: 0 | OUTPATIENT
Start: 2025-03-02

## 2025-03-12 ENCOUNTER — MYC REFILL (OUTPATIENT)
Dept: FAMILY MEDICINE | Facility: CLINIC | Age: 35
End: 2025-03-12

## 2025-03-12 DIAGNOSIS — N52.1 ERECTILE DYSFUNCTION DUE TO DISEASES CLASSIFIED ELSEWHERE: ICD-10-CM

## 2025-03-12 NOTE — TELEPHONE ENCOUNTER
Pt cancelled 12/3/24 appt.  Last visit was 6/20/23.    DEX Savage, BSN, PHN, RN-[facility]  [phone]

## 2025-03-13 RX ORDER — TADALAFIL 5 MG/1
5 TABLET ORAL DAILY
Qty: 90 TABLET | Refills: 0 | OUTPATIENT
Start: 2025-03-13

## 2025-03-30 ENCOUNTER — MYC MEDICAL ADVICE (OUTPATIENT)
Dept: PSYCHIATRY | Facility: CLINIC | Age: 35
End: 2025-03-30

## 2025-03-30 DIAGNOSIS — F90.2 ATTENTION DEFICIT HYPERACTIVITY DISORDER (ADHD), COMBINED TYPE: ICD-10-CM

## 2025-03-31 RX ORDER — DEXTROAMPHETAMINE SACCHARATE, AMPHETAMINE ASPARTATE MONOHYDRATE, DEXTROAMPHETAMINE SULFATE AND AMPHETAMINE SULFATE 6.25; 6.25; 6.25; 6.25 MG/1; MG/1; MG/1; MG/1
50 CAPSULE, EXTENDED RELEASE ORAL DAILY
Qty: 60 CAPSULE | Status: SHIPPED
Start: 2025-03-31 | End: 2025-03-31

## 2025-03-31 RX ORDER — DEXTROAMPHETAMINE SACCHARATE, AMPHETAMINE ASPARTATE MONOHYDRATE, DEXTROAMPHETAMINE SULFATE AND AMPHETAMINE SULFATE 6.25; 6.25; 6.25; 6.25 MG/1; MG/1; MG/1; MG/1
50 CAPSULE, EXTENDED RELEASE ORAL DAILY
Qty: 60 CAPSULE | Refills: 0 | Status: SHIPPED | OUTPATIENT
Start: 2025-03-31

## 2025-03-31 RX ORDER — DEXTROAMPHETAMINE SACCHARATE, AMPHETAMINE ASPARTATE, DEXTROAMPHETAMINE SULFATE AND AMPHETAMINE SULFATE 2.5; 2.5; 2.5; 2.5 MG/1; MG/1; MG/1; MG/1
10 TABLET ORAL DAILY
Qty: 30 TABLET | Refills: 0 | Status: SHIPPED | OUTPATIENT
Start: 2025-03-31

## 2025-03-31 RX ORDER — DEXTROAMPHETAMINE SACCHARATE, AMPHETAMINE ASPARTATE, DEXTROAMPHETAMINE SULFATE AND AMPHETAMINE SULFATE 2.5; 2.5; 2.5; 2.5 MG/1; MG/1; MG/1; MG/1
10 TABLET ORAL DAILY
Qty: 30 TABLET | Status: SHIPPED
Start: 2025-03-31 | End: 2025-03-31

## 2025-04-23 DIAGNOSIS — F33.2 MDD (MAJOR DEPRESSIVE DISORDER), RECURRENT SEVERE, WITHOUT PSYCHOSIS (H): ICD-10-CM

## 2025-04-23 DIAGNOSIS — F90.2 ATTENTION DEFICIT HYPERACTIVITY DISORDER (ADHD), COMBINED TYPE: ICD-10-CM

## 2025-04-23 DIAGNOSIS — F91.8 CONDUCT DISORDER, UNDIFFERENTIATED TYPE: ICD-10-CM

## 2025-04-23 NOTE — TELEPHONE ENCOUNTER
[facility] Call Center    Phone Message    May a detailed message be left on voicemail: yes     Reason for Call: Medication Refill Request    Has the patient contacted the pharmacy for the refill? Yes   Name of medication being requested: Adderall (paper script), Naltrexone, and Wellbutrin  Provider who prescribed the medication: Samm Stratton PAC  Pharmacy:      [id number] [place], [place] - [address]    Date medication is needed: When available     Pt would like a call to confirm when these prescriptions have been sent to pharmacy/to update pt on when paper script will be available.    Action Taken: Message routed to:  Other: [facility] nurse sobia Gunderson on 4/23/2025 at 3:34 PM

## 2025-04-24 RX ORDER — DEXTROAMPHETAMINE SACCHARATE, AMPHETAMINE ASPARTATE, DEXTROAMPHETAMINE SULFATE AND AMPHETAMINE SULFATE 2.5; 2.5; 2.5; 2.5 MG/1; MG/1; MG/1; MG/1
10 TABLET ORAL DAILY
Qty: 30 TABLET | Refills: 0 | OUTPATIENT
Start: 2025-04-24

## 2025-04-24 RX ORDER — BUPROPION HYDROCHLORIDE 150 MG/1
150 TABLET ORAL EVERY MORNING
Qty: 30 TABLET | Refills: 0 | Status: SHIPPED | OUTPATIENT
Start: 2025-04-24

## 2025-04-24 RX ORDER — NALTREXONE HYDROCHLORIDE 50 MG/1
TABLET, FILM COATED ORAL
Qty: 30 TABLET | Refills: 0 | Status: SHIPPED | OUTPATIENT
Start: 2025-04-24

## 2025-04-24 RX ORDER — DEXTROAMPHETAMINE SACCHARATE, AMPHETAMINE ASPARTATE MONOHYDRATE, DEXTROAMPHETAMINE SULFATE AND AMPHETAMINE SULFATE 6.25; 6.25; 6.25; 6.25 MG/1; MG/1; MG/1; MG/1
50 CAPSULE, EXTENDED RELEASE ORAL DAILY
Qty: 60 CAPSULE | Refills: 0 | OUTPATIENT
Start: 2025-04-24

## 2025-04-24 RX ORDER — BUPROPION HYDROCHLORIDE 300 MG/1
TABLET ORAL
Qty: 30 TABLET | Refills: 0 | Status: SHIPPED | OUTPATIENT
Start: 2025-04-24

## 2025-04-24 NOTE — TELEPHONE ENCOUNTER
Patient requesting paper script of adderall.     Other medications approved per RN refill protocol.

## 2025-05-22 DIAGNOSIS — F90.2 ATTENTION DEFICIT HYPERACTIVITY DISORDER (ADHD), COMBINED TYPE: ICD-10-CM

## 2025-05-22 RX ORDER — DEXTROAMPHETAMINE SACCHARATE, AMPHETAMINE ASPARTATE, DEXTROAMPHETAMINE SULFATE AND AMPHETAMINE SULFATE 2.5; 2.5; 2.5; 2.5 MG/1; MG/1; MG/1; MG/1
10 TABLET ORAL DAILY
Qty: 30 TABLET | Refills: 0 | OUTPATIENT
Start: 2025-05-22

## 2025-05-22 RX ORDER — DEXTROAMPHETAMINE SACCHARATE, AMPHETAMINE ASPARTATE MONOHYDRATE, DEXTROAMPHETAMINE SULFATE AND AMPHETAMINE SULFATE 6.25; 6.25; 6.25; 6.25 MG/1; MG/1; MG/1; MG/1
50 CAPSULE, EXTENDED RELEASE ORAL DAILY
Qty: 60 CAPSULE | Refills: 0 | OUTPATIENT
Start: 2025-05-22

## 2025-05-22 NOTE — TELEPHONE ENCOUNTER
[facility] Call Center    Phone Message    May a detailed message be left on voicemail: yes     Reason for Call: Medication Refill Request    Has the patient contacted the pharmacy for the refill? Yes   Name of medication being requested:     amphetamine-dextroamphetamine (ADDERALL) 10 MG tablet     amphetamine-dextroamphetamine (ADDERALL XR) 25 MG 24 hr capsule       Provider who prescribed the medication: Oh Stratton    Pharmacy: Patient states he usually picks up a paper script  Date medication is needed:       Action Taken: Other: Sent to [facility] nurse pool

## 2025-05-22 NOTE — TELEPHONE ENCOUNTER
Medication unable to be refilled by RN due to criteria not met as indicated.                 []Eligibility - not seen in the last year              []Supervision - no future appointment              []Compliance - no shows, cancellations or lapse in therapy              []Verification - order discrepancy              [x]Controlled medication              []Medication not included in policy              []90-day supply request              []Other:

## 2025-05-22 NOTE — TELEPHONE ENCOUNTER
Patient picks up paper copies of prescriptions  Needs in clinic appointment needed    Last seen: 3/5/2025  RTC: 3 mo  Any patient initiated cancellations or no shows since last visit? No  Next appt:   Last filled:      Incoming refill from patient via phone by patient or caregiver    Medication requested: Adderall 10 MG tablet  Adderal XR 25 MG 24 hr capsule    From chart note:     1) Medications:      CONTINUE:     buPROPion (WELLBUTRIN XL) 150 MG 24 hr tablet, Take 1 tablet (150 mg) by mouth every morning., Disp: 90 tablet, Rfl: 0    amphetamine-dextroamphetamine (ADDERALL XR) 25 MG 24 hr capsule, Take 2 capsules (50 mg) by mouth daily., Disp: 60 capsule, Rfl: 0    amphetamine-dextroamphetamine (ADDERALL) 10 MG tablet, Take 1 tablet (10 mg) by mouth daily., Disp: 30 tablet, Rfl: 0    buPROPion (WELLBUTRIN XL) 300 MG 24 hr tablet, Take 1 tablet (300mg) in the morning along with other wellbutrin prescription (150mg) totaling 450mg in the morning daily., Disp: 90 tablet, Rfl: 0    naltrexone (DEPADE/REVIA) 50 MG tablet, Take 1 50 mg tablet daily, Disp: 90 tablet, Rfl: 0           2) Psychotherapy: continue     3) Next due:  Labs- Routine monitoring is not indicated for current psychotropic medication regimen   EKG- Routine monitoring is not indicated for current psychotropic medication regimen   Rating scales- none needed     4) Referrals: none     5) Other: none     6) Follow-up: Return to clinic in 3 months.     Is note signed/closed? Yes    Is this a 90 day request for a psych medication? No    LPNs - Please check  if controlled and send to provider  Others - Send to RNs     Lois Baidr CMA

## 2025-05-23 DIAGNOSIS — F90.2 ATTENTION DEFICIT HYPERACTIVITY DISORDER (ADHD), COMBINED TYPE: ICD-10-CM

## 2025-05-23 RX ORDER — DEXTROAMPHETAMINE SACCHARATE, AMPHETAMINE ASPARTATE, DEXTROAMPHETAMINE SULFATE AND AMPHETAMINE SULFATE 2.5; 2.5; 2.5; 2.5 MG/1; MG/1; MG/1; MG/1
10 TABLET ORAL DAILY
Qty: 30 TABLET | Refills: 0 | Status: SHIPPED | OUTPATIENT
Start: 2025-05-23

## 2025-05-23 RX ORDER — DEXTROAMPHETAMINE SACCHARATE, AMPHETAMINE ASPARTATE MONOHYDRATE, DEXTROAMPHETAMINE SULFATE AND AMPHETAMINE SULFATE 6.25; 6.25; 6.25; 6.25 MG/1; MG/1; MG/1; MG/1
50 CAPSULE, EXTENDED RELEASE ORAL DAILY
Qty: 60 CAPSULE | Refills: 0 | Status: SHIPPED | OUTPATIENT
Start: 2025-05-23

## 2025-05-29 ENCOUNTER — VIRTUAL VISIT (OUTPATIENT)
Dept: PSYCHIATRY | Facility: CLINIC | Age: 35
End: 2025-05-29
Payer: COMMERCIAL

## 2025-05-29 DIAGNOSIS — F33.2 MAJOR DEPRESSIVE DISORDER, RECURRENT EPISODE, SEVERE WITH ANXIOUS DISTRESS (H): ICD-10-CM

## 2025-05-29 DIAGNOSIS — F90.2 ATTENTION DEFICIT HYPERACTIVITY DISORDER (ADHD), COMBINED TYPE: Primary | ICD-10-CM

## 2025-05-29 ASSESSMENT — ANXIETY QUESTIONNAIRES
5. BEING SO RESTLESS THAT IT IS HARD TO SIT STILL: NOT AT ALL
7. FEELING AFRAID AS IF SOMETHING AWFUL MIGHT HAPPEN: SEVERAL DAYS
GAD7 TOTAL SCORE: 12
3. WORRYING TOO MUCH ABOUT DIFFERENT THINGS: MORE THAN HALF THE DAYS
GAD7 TOTAL SCORE: 12
8. IF YOU CHECKED OFF ANY PROBLEMS, HOW DIFFICULT HAVE THESE MADE IT FOR YOU TO DO YOUR WORK, TAKE CARE OF THINGS AT HOME, OR GET ALONG WITH OTHER PEOPLE?: VERY DIFFICULT
7. FEELING AFRAID AS IF SOMETHING AWFUL MIGHT HAPPEN: SEVERAL DAYS
1. FEELING NERVOUS, ANXIOUS, OR ON EDGE: MORE THAN HALF THE DAYS
IF YOU CHECKED OFF ANY PROBLEMS ON THIS QUESTIONNAIRE, HOW DIFFICULT HAVE THESE PROBLEMS MADE IT FOR YOU TO DO YOUR WORK, TAKE CARE OF THINGS AT HOME, OR GET ALONG WITH OTHER PEOPLE: VERY DIFFICULT
6. BECOMING EASILY ANNOYED OR IRRITABLE: NEARLY EVERY DAY
GAD7 TOTAL SCORE: 12
2. NOT BEING ABLE TO STOP OR CONTROL WORRYING: MORE THAN HALF THE DAYS
4. TROUBLE RELAXING: MORE THAN HALF THE DAYS

## 2025-05-29 NOTE — NURSING NOTE
Is the patient currently in the state of [place]? YES    Current patient location: [address] [place] [id number]    Visit mode:Telephone    If the visit is dropped, the patient can be reconnected by: TELEPHONE VISIT: Phone number: [phone]    Will anyone else be joining the visit? No  (If patient encounters technical issues they should call [phone])    Are changes needed to the allergy or medication list? No    Are refills needed on medications prescribed by this physician? Discuss with Provider    Rooming Documentation: Pt declined qnrs with VF. States he does them at every visit, and the answers are the same.    Reason for visit: HALEY Franco

## 2025-05-29 NOTE — PROGRESS NOTES
Virtual Visit Details    Type of service:  Telephone Visit   Phone call duration: 5 minutes   Originating Location (pt. Location): Home    Distant Location (provider location):  Off-site  Telephone visit completed due to the video connection was lost and majority of visit was completed via audio-only.         CARE TEAM:    PCP- Physician No Ref-Primary  Therapist- Deborah Watson, VIKTOR, [facility]      Huey is a 34 year old who uses the pronouns he, him, his, himself.      Diagnoses        MDD (major depressive disorder), recurrent severe, without psychosis (H)  Attention deficit hyperactivity disorder (ADHD), combined type      Assessment   Patient presents to clinic for recheck conditions well-controlled no medication adjustments at this time      Future Considerations: taper up wellburin and/or switch to SNRI.     Psychotropic Drug Interactions:    Additive dopaminergic  Additive noradrenergic  MANAGEMENT:  routine monitoring    [place]        Risk Statements:   Treatment Risk- Risks, benefits, alternatives and potential adverse effects have been discussed and are understood.   Safety Risk-Huey Arzate did not appear to be an imminent safety risk to self or others.      Plan     1) Medications:     CONTINUE:     buPROPion (WELLBUTRIN XL) 150 MG 24 hr tablet, Take 1 tablet (150 mg) by mouth every morning., Disp: 90 tablet, Rfl: 0    amphetamine-dextroamphetamine (ADDERALL XR) 25 MG 24 hr capsule, Take 2 capsules (50 mg) by mouth daily., Disp: 60 capsule, Rfl: 0    amphetamine-dextroamphetamine (ADDERALL) 10 MG tablet, Take 1 tablet (10 mg) by mouth daily., Disp: 30 tablet, Rfl: 0    buPROPion (WELLBUTRIN XL) 300 MG 24 hr tablet, Take 1 tablet (300mg) in the morning along with other wellbutrin prescription (150mg) totaling 450mg in the morning daily., Disp: 90 tablet, Rfl: 0    naltrexone (DEPADE/REVIA) 50 MG tablet, Take 1 50 mg tablet daily, Disp: 90 tablet, Rfl: 0        2) Psychotherapy:  continue    3) Next due:  Labs- Routine monitoring is not indicated for current psychotropic medication regimen   EKG- Routine monitoring is not indicated for current psychotropic medication regimen   Rating scales- none needed    4) Referrals: none    5) Other: none    6) Follow-up: Return to clinic in 3 months.        Pertinent Background                                                   [most recent eval 12/05/24]     Initial psychiatric consult consult in 3/27/24.    Wanted consistent care within institute as he receives mental health care as well. Increased Wellbutrin to 300mg to further target anxiety and depression. States ADHD was well controlled current regimen. On naltrexone as well for impulsive sexual behavior. Today he has not additional concerns. Would like medication refills. States medications are at good doses.     Xavier Landon is a 33 year old White Not  or  male presenting for psychiatric evaluation and medication management. Information is obtained from patient and available records.  Reports history of Depression, generalized anxiety disorder, ADHD, substance related and addictive disorders alcohol use disorder, borderline personality disorder, compulsive sexual behavior. Denies prior psychiatric hospitalizations. Hx of suicidal ideation, no suicide attempts. No history of self-injurious behaviors. Genetically loaded for  ADHD and substance use. Grew up in an intact home with all basic needs being met.     Not exposed to multiple Adverse childhood experiences (ACEs). ACEs are strongly related to the development and prevalence of a wide range of health problems throughout a person s lifespan, including those associated with substance misuse. These events are likely playing into the clinical picture.      Patient is in agreement to have consistent care within the institute receiving therapy and psychiatric medications. We dediced to increase the Wellbutrin to further  target anxiety and depression. He does state ADHD is well controlled. No additional medication changes at this time.      9/5/24: Doing pretty well. Feels wellbutrin is not having an effect on his depression. Went off of naltrexone a couple months ago. Notes no issues with adderall.   No adjustments to adderall as symptoms are controlled. Increasing Wellbutrin to 300mg to better treat depression. Restarted naltrexone.     12/5/2024:  - Does not notice a difference with higher wellbutrin dose.   -tried several SSRIs in the past without benefit.   Huey presented today for a recheck. ADHD controlled. Depression not controlled. Increasing wellbutrin.    Subjective     Since the last visit:   Doing well notes no mental health concerns tolerating medications well.     Current Social History:  Financial/occupational: employed  Living situation (partner, children, pets, etc): not assessed  Social/spiritual support: yes  Feels safe at home: Yes      Medical Review of Systems:   Lightheadedness/orthostasis: None  Headaches: None  GI: none  Sexual health concerns: None         Mental Status Exam     Psychiatric:  Speech:  clear, coherent, regular rate, rhythm, and volume,  No pressure speech noted.  Associations:  no loose associations  Thought Process:  logical, linear and goal oriented  Thought Content:   Evidence of suicidal ideation or homicidal ideation, no evidence of psychotic thought, no auditory hallucinations present and no visual hallucinations present  Mood:  depressed  Affect:  full range/stable (normal variation of emotions during exam) and was congruent to speech content.  Insight:  good  Judgment:  intact, adequate for safety  Impulse Control:  intact  Neurological:  Oriented to:  person, place, time, and situation  Attention Span and Concentration:  Able to attend to the interview     Language: intact    Recent and Remote Memory:  Intact to interview. Not formally assessed. No amnesia.   Fund of Knowledge:  appropriate        Past Psych Med Trials        Medication Max Dose (mg) Dates / Duration Helpful? DC Reason / Adverse Effects?   fluoxetine   denies    lamictal   denies    propranolol   denies    abilify   denies    lithium                                      Treatment Course and Pina Events since  JULY 2023     Initial psychiatric consult consult in 3/27/24.  Increased wellbutrin to 300mg.    9/5/24: Doing pretty well. Feels wellbutrin is not having an effect on his depression. Went off of naltrexone a couple months ago. Notes no issues with adderall.  Restarted naltrexone.   12/5/24: increasing wellbutrin to 450mg.      Vitals   There were no vitals taken for this visit.  Pulse Readings from Last 3 Encounters:   03/27/24 67   06/20/23 73   05/09/22 94     Wt Readings from Last 3 Encounters:   03/27/24 94.3 kg (208 lb)   06/20/23 88.5 kg (195 lb)   05/09/22 103.9 kg (229 lb)     BP Readings from Last 3 Encounters:   03/27/24 110/67   06/20/23 121/79   05/09/22 130/82        Medical History     ALLERGIES: Patient has no known allergies.    Patient Active Problem List   Diagnosis    MDD (major depressive disorder), recurrent severe, without psychosis (H)    Major depressive disorder, recurrent episode, severe with anxious distress (H)    Other Specified Disruptive, Impulse Control, and Conduct Disorder (Hypersexual Disorder)      Generalized anxiety disorder    Attention deficit hyperactivity disorder (ADHD), combined type    Substance use disorder    ADD (attention deficit disorder)    Depression, major, recurrent    Erectile dysfunction due to diseases classified elsewhere        Medications     Current Outpatient Medications   Medication Sig Dispense Refill    amphetamine-dextroamphetamine (ADDERALL XR) 25 MG 24 hr capsule Take 2 capsules (50 mg) by mouth daily. 60 capsule 0    amphetamine-dextroamphetamine (ADDERALL) 10 MG tablet Take 1 tablet (10 mg) by mouth daily. 30 tablet 0    buPROPion (WELLBUTRIN XL) 150  MG 24 hr tablet Take 1 tablet (150 mg) by mouth every morning. 30 tablet 0    buPROPion (WELLBUTRIN XL) 300 MG 24 hr tablet Take 1 tablet (300mg) in the morning along with other wellbutrin prescription (150mg) totaling 450mg in the morning daily. 30 tablet 0    naltrexone (DEPADE/REVIA) 50 MG tablet Take 1 50 mg tablet daily 30 tablet 0    tadalafil (CIALIS) 5 MG tablet Take 1 tablet (5 mg) by mouth daily. TAKE ONE TABLET BY MOUTH EVERY 24 HOURS 90 tablet 0        Labs and Data         6/20/2024    11:29 AM 12/5/2024    11:05 AM 5/29/2025     2:26 PM   PROMIS-10 Total Score w/o Sub Scores   PROMIS TOTAL - SUBSCORES 22    22 18 21        Patient-reported         10/14/2021     3:37 PM 1/19/2022     5:27 PM 2/24/2022     2:07 PM   CAGE-AID Total Score   Total Score 3 2 2   Total Score MyChart  2 (A total score of 2 or greater is considered clinically significant)          9/5/2024    10:55 AM 12/5/2024    11:04 AM 5/29/2025     2:24 PM   PHQ-9 SCORE   PHQ-9 Total Score MyChart 13 (Moderate depression) 18 (Moderately severe depression) 11 (Moderate depression)   PHQ-9 Total Score 13    13 18  11        Patient-reported         9/5/2024    10:56 AM 12/5/2024    11:05 AM 5/29/2025     2:25 PM   SHAUNA-7 SCORE   Total Score 9 (mild anxiety) 14 (moderate anxiety) 12 (moderate anxiety)   Total Score 9    9 14  12        Patient-reported       Liver/Kidney Function, TSH Metabolic Blood counts   Recent Labs   Lab Test 06/20/23 0818 06/01/22  1123   AST 30 19   ALT 37 55   ALKPHOS 80 68   CR 1.13 0.84     Recent Labs   Lab Test 06/20/23  0818   TSH 1.80    Recent Labs   Lab Test 06/20/23 0818   CHOL 146   TRIG 74   LDL 67   HDL 64     No lab results found.  Recent Labs   Lab Test 06/20/23  0818   GLC 93    Recent Labs   Lab Test 06/20/23 0818   WBC 4.9   HGB 14.9   HCT 46.1   MCV 88              Administrative/Billing:   The longitudinal plan of care for the diagnosis(es)/condition(s) as documented were addressed during  this visit. Due to the added complexity in care, I will continue to support Huey in the subsequent management and with ongoing continuity of care.        PROVIDER: Samm Stratton PA-C

## 2025-06-19 PROBLEM — F33.9 DEPRESSION, MAJOR, RECURRENT: Status: RESOLVED | Noted: 2018-05-21 | Resolved: 2025-06-19

## 2025-06-19 PROBLEM — F41.1 GENERALIZED ANXIETY DISORDER: Status: RESOLVED | Noted: 2021-02-14 | Resolved: 2025-06-19

## 2025-06-19 PROBLEM — F19.90 SUBSTANCE USE DISORDER: Status: RESOLVED | Noted: 2021-02-14 | Resolved: 2025-06-19

## 2025-06-19 PROBLEM — F33.2 MAJOR DEPRESSIVE DISORDER, RECURRENT EPISODE, SEVERE WITH ANXIOUS DISTRESS (H): Status: RESOLVED | Noted: 2021-01-11 | Resolved: 2025-06-19

## 2025-06-19 PROBLEM — F90.2 ATTENTION DEFICIT HYPERACTIVITY DISORDER (ADHD), COMBINED TYPE: Status: RESOLVED | Noted: 2021-02-14 | Resolved: 2025-06-19

## 2025-06-19 PROBLEM — F33.2 MDD (MAJOR DEPRESSIVE DISORDER), RECURRENT SEVERE, WITHOUT PSYCHOSIS (H): Status: RESOLVED | Noted: 2020-12-23 | Resolved: 2025-06-19

## 2025-06-19 PROBLEM — N52.1 ERECTILE DYSFUNCTION DUE TO DISEASES CLASSIFIED ELSEWHERE: Status: RESOLVED | Noted: 2023-08-18 | Resolved: 2025-06-19

## 2025-06-19 PROBLEM — F98.8 ADD (ATTENTION DEFICIT DISORDER): Status: RESOLVED | Noted: 2019-12-29 | Resolved: 2025-06-19

## 2025-06-19 PROBLEM — F91.8 CONDUCT DISORDER, UNDIFFERENTIATED TYPE: Status: RESOLVED | Noted: 2021-02-14 | Resolved: 2025-06-19

## 2025-06-23 DIAGNOSIS — F90.2 ATTENTION DEFICIT HYPERACTIVITY DISORDER (ADHD), COMBINED TYPE: ICD-10-CM

## 2025-06-23 NOTE — TELEPHONE ENCOUNTER
Last seen: 5/29/2025  Follow up: 3 months  Hard copy of patient prescriptions needed      Xavier Landon is requesting refills for     amphetamine-dextroamphetamine (ADDERALL) 10 MG tablet     amphetamine-dextroamphetamine (ADDERALL XR) 25 MG 24 hr capsule           Patient states he usually picks up paper scripts and is asking for a call back once scripts are ready for .    Huber Huffman

## 2025-06-25 RX ORDER — DEXTROAMPHETAMINE SACCHARATE, AMPHETAMINE ASPARTATE MONOHYDRATE, DEXTROAMPHETAMINE SULFATE AND AMPHETAMINE SULFATE 6.25; 6.25; 6.25; 6.25 MG/1; MG/1; MG/1; MG/1
50 CAPSULE, EXTENDED RELEASE ORAL DAILY
Qty: 60 CAPSULE | Refills: 0 | Status: SHIPPED | OUTPATIENT
Start: 2025-06-25

## 2025-06-25 RX ORDER — DEXTROAMPHETAMINE SACCHARATE, AMPHETAMINE ASPARTATE, DEXTROAMPHETAMINE SULFATE AND AMPHETAMINE SULFATE 2.5; 2.5; 2.5; 2.5 MG/1; MG/1; MG/1; MG/1
10 TABLET ORAL DAILY
Qty: 30 TABLET | Refills: 0 | Status: SHIPPED | OUTPATIENT
Start: 2025-06-25

## 2025-08-22 ENCOUNTER — TELEPHONE (OUTPATIENT)
Dept: PSYCHIATRY | Facility: CLINIC | Age: 35
End: 2025-08-22